# Patient Record
Sex: FEMALE | Race: WHITE | NOT HISPANIC OR LATINO | Employment: OTHER | URBAN - METROPOLITAN AREA
[De-identification: names, ages, dates, MRNs, and addresses within clinical notes are randomized per-mention and may not be internally consistent; named-entity substitution may affect disease eponyms.]

---

## 2017-01-14 ENCOUNTER — HOSPITAL ENCOUNTER (EMERGENCY)
Facility: HOSPITAL | Age: 22
Discharge: HOME | End: 2017-01-14
Attending: EMERGENCY MEDICINE
Payer: MEDICARE

## 2017-01-14 VITALS
DIASTOLIC BLOOD PRESSURE: 74 MMHG | BODY MASS INDEX: 34.33 KG/M2 | HEART RATE: 104 BPM | OXYGEN SATURATION: 100 % | TEMPERATURE: 98 F | SYSTOLIC BLOOD PRESSURE: 130 MMHG | WEIGHT: 200 LBS | RESPIRATION RATE: 20 BRPM

## 2017-01-14 DIAGNOSIS — M54.50 LOW BACK PAIN: Primary | ICD-10-CM

## 2017-01-14 PROCEDURE — 99283 EMERGENCY DEPT VISIT LOW MDM: CPT

## 2017-01-14 RX ORDER — PREDNISONE 10 MG/1
TABLET ORAL
Qty: 30 TABLET | Refills: 0 | Status: SHIPPED | OUTPATIENT
Start: 2017-01-14 | End: 2017-04-01 | Stop reason: ALTCHOICE

## 2017-01-14 RX ORDER — ACETAMINOPHEN 325 MG/1
650 TABLET ORAL ONCE
Status: COMPLETED | OUTPATIENT
Start: 2017-01-14 | End: 2017-01-14

## 2017-01-14 RX ADMIN — PREDNISONE 50 MG: 20 TABLET ORAL at 20:35

## 2017-01-14 RX ADMIN — ACETAMINOPHEN 650 MG: 325 TABLET, FILM COATED ORAL at 20:35

## 2017-01-24 ENCOUNTER — APPOINTMENT (EMERGENCY)
Dept: RADIOLOGY | Facility: HOSPITAL | Age: 22
End: 2017-01-24
Payer: MEDICARE

## 2017-01-24 ENCOUNTER — HOSPITAL ENCOUNTER (EMERGENCY)
Facility: HOSPITAL | Age: 22
Discharge: HOME | End: 2017-01-24
Attending: EMERGENCY MEDICINE
Payer: MEDICARE

## 2017-01-24 VITALS
RESPIRATION RATE: 20 BRPM | SYSTOLIC BLOOD PRESSURE: 136 MMHG | WEIGHT: 214 LBS | DIASTOLIC BLOOD PRESSURE: 74 MMHG | TEMPERATURE: 98.8 F | HEART RATE: 112 BPM | HEIGHT: 64 IN | OXYGEN SATURATION: 98 % | BODY MASS INDEX: 36.54 KG/M2

## 2017-01-24 DIAGNOSIS — M54.30 SCIATICA: Primary | ICD-10-CM

## 2017-01-24 LAB
CLARITY, POC: CLEAR
COLOR, POC: YELLOW
EXT BLOOD URINE: 50
EXT GLUCOSE, UA: 50
EXT KETONES: 15
EXT NITRITE, UA: NEGATIVE
EXT PH, UA: 5
EXT PROTEIN, UA: NEGATIVE
HCG UR QL: NORMAL
WBC # BLD EST: 25 10*3/UL

## 2017-01-24 PROCEDURE — 81025 URINE PREGNANCY TEST: CPT | Performed by: EMERGENCY MEDICINE

## 2017-01-24 PROCEDURE — 81002 URINALYSIS NONAUTO W/O SCOPE: CPT | Performed by: EMERGENCY MEDICINE

## 2017-01-24 PROCEDURE — 99283 EMERGENCY DEPT VISIT LOW MDM: CPT

## 2017-01-24 PROCEDURE — 74022 RADEX COMPL AQT ABD SERIES: CPT

## 2017-01-24 RX ORDER — CYCLOBENZAPRINE HCL 10 MG
5 TABLET ORAL 3 TIMES DAILY PRN
Qty: 9 TABLET | Refills: 0 | Status: SHIPPED | OUTPATIENT
Start: 2017-01-24 | End: 2017-08-06 | Stop reason: ALTCHOICE

## 2017-01-24 RX ORDER — CYCLOBENZAPRINE HCL 10 MG
10 TABLET ORAL ONCE
Status: COMPLETED | OUTPATIENT
Start: 2017-01-24 | End: 2017-01-24

## 2017-01-24 RX ORDER — NAPROXEN 500 MG/1
500 TABLET ORAL 2 TIMES DAILY WITH MEALS
Qty: 10 TABLET | Refills: 0 | Status: SHIPPED | OUTPATIENT
Start: 2017-01-24 | End: 2017-08-06 | Stop reason: ALTCHOICE

## 2017-01-24 RX ORDER — NAPROXEN 500 MG/1
500 TABLET ORAL ONCE
Status: COMPLETED | OUTPATIENT
Start: 2017-01-24 | End: 2017-01-24

## 2017-01-24 RX ADMIN — NAPROXEN 500 MG: 500 TABLET ORAL at 23:24

## 2017-01-24 RX ADMIN — CYCLOBENZAPRINE HYDROCHLORIDE 10 MG: 10 TABLET, FILM COATED ORAL at 23:30

## 2017-02-01 ENCOUNTER — GENERIC CONVERSION - ENCOUNTER (OUTPATIENT)
Dept: OTHER | Facility: OTHER | Age: 22
End: 2017-02-01

## 2017-02-17 ENCOUNTER — GENERIC CONVERSION - ENCOUNTER (OUTPATIENT)
Dept: OTHER | Facility: OTHER | Age: 22
End: 2017-02-17

## 2017-03-03 ENCOUNTER — HOSPITAL ENCOUNTER (EMERGENCY)
Facility: HOSPITAL | Age: 22
Discharge: HOME | End: 2017-03-03
Admitting: EMERGENCY MEDICINE
Payer: MEDICARE

## 2017-03-03 ENCOUNTER — APPOINTMENT (EMERGENCY)
Dept: RADIOLOGY | Facility: HOSPITAL | Age: 22
End: 2017-03-03
Payer: MEDICARE

## 2017-03-03 VITALS
HEIGHT: 64 IN | OXYGEN SATURATION: 98 % | SYSTOLIC BLOOD PRESSURE: 135 MMHG | DIASTOLIC BLOOD PRESSURE: 75 MMHG | HEART RATE: 118 BPM | TEMPERATURE: 98.3 F | BODY MASS INDEX: 33.12 KG/M2 | WEIGHT: 194 LBS | RESPIRATION RATE: 16 BRPM

## 2017-03-03 DIAGNOSIS — R06.02 SOB (SHORTNESS OF BREATH): Primary | ICD-10-CM

## 2017-03-03 PROCEDURE — 71020 HB CHEST X-RAY 2VW FRONTAL&LATL: CPT

## 2017-03-03 PROCEDURE — 99285 EMERGENCY DEPT VISIT HI MDM: CPT

## 2017-03-03 PROCEDURE — 94640 AIRWAY INHALATION TREATMENT: CPT

## 2017-03-03 RX ORDER — IPRATROPIUM BROMIDE AND ALBUTEROL SULFATE 2.5; .5 MG/3ML; MG/3ML
3 SOLUTION RESPIRATORY (INHALATION)
Status: DISCONTINUED | OUTPATIENT
Start: 2017-03-03 | End: 2017-03-03

## 2017-03-03 RX ORDER — IPRATROPIUM BROMIDE AND ALBUTEROL SULFATE 2.5; .5 MG/3ML; MG/3ML
3 SOLUTION RESPIRATORY (INHALATION) ONCE
Status: COMPLETED | OUTPATIENT
Start: 2017-03-03 | End: 2017-03-03

## 2017-03-03 RX ORDER — ALBUTEROL SULFATE 90 UG/1
2 AEROSOL, METERED RESPIRATORY (INHALATION) EVERY 6 HOURS PRN
Qty: 1 INHALER | Refills: 0 | Status: SHIPPED | OUTPATIENT
Start: 2017-03-03 | End: 2017-04-02

## 2017-03-03 RX ADMIN — IPRATROPIUM BROMIDE AND ALBUTEROL SULFATE 3 ML: .5; 3 SOLUTION RESPIRATORY (INHALATION) at 18:14

## 2017-03-07 ENCOUNTER — ALLSCRIPTS OFFICE VISIT (OUTPATIENT)
Dept: OTHER | Facility: OTHER | Age: 22
End: 2017-03-07

## 2017-03-10 ENCOUNTER — APPOINTMENT (EMERGENCY)
Dept: RADIOLOGY | Facility: HOSPITAL | Age: 22
End: 2017-03-10
Payer: MEDICARE

## 2017-03-10 ENCOUNTER — HOSPITAL ENCOUNTER (EMERGENCY)
Facility: HOSPITAL | Age: 22
Discharge: HOME | End: 2017-03-10
Attending: EMERGENCY MEDICINE | Admitting: EMERGENCY MEDICINE
Payer: MEDICARE

## 2017-03-10 VITALS
WEIGHT: 219 LBS | BODY MASS INDEX: 37.39 KG/M2 | OXYGEN SATURATION: 97 % | HEIGHT: 64 IN | HEART RATE: 110 BPM | RESPIRATION RATE: 16 BRPM | DIASTOLIC BLOOD PRESSURE: 70 MMHG | TEMPERATURE: 97.8 F | SYSTOLIC BLOOD PRESSURE: 128 MMHG

## 2017-03-10 DIAGNOSIS — R11.10 VOMITING: Primary | ICD-10-CM

## 2017-03-10 LAB
ANION GAP SERPL CALCULATED.3IONS-SCNC: 10 MMOL/L (ref 4–13)
BACTERIA UR QL AUTO: ABNORMAL /HPF
BASOPHILS # BLD AUTO: 0 THOUSANDS/ΜL (ref 0–0.1)
BASOPHILS NFR BLD AUTO: 1 % (ref 0–1)
BILIRUB UR QL STRIP: NEGATIVE
BUN SERPL-MCNC: 11 MG/DL (ref 5–25)
CALCIUM SERPL-MCNC: 8.1 MG/DL (ref 8.3–10.1)
CHLORIDE SERPL-SCNC: 105 MMOL/L (ref 100–108)
CLARITY UR: ABNORMAL
CO2 SERPL-SCNC: 27 MMOL/L (ref 21–32)
COLOR UR: YELLOW
CREAT SERPL-MCNC: 1.03 MG/DL (ref 0.6–1.3)
EOSINOPHIL # BLD AUTO: 0.2 THOUSAND/ΜL (ref 0–0.61)
EOSINOPHIL NFR BLD AUTO: 2 % (ref 0–6)
ERYTHROCYTE [DISTWIDTH] IN BLOOD BY AUTOMATED COUNT: 13.7 % (ref 11.6–15.1)
GFR SERPL CREATININE-BSD FRML MDRD: >60 ML/MIN/1.73SQ M
GLUCOSE SERPL-MCNC: 92 MG/DL (ref 65–140)
GLUCOSE UR STRIP-MCNC: NEGATIVE MG/DL
HCG UR QL: NORMAL
HCT VFR BLD AUTO: 38.2 % (ref 37–47)
HGB BLD-MCNC: 12.8 G/DL (ref 12–16)
HGB UR QL STRIP.AUTO: ABNORMAL
KETONES UR STRIP-MCNC: NEGATIVE MG/DL
LEUKOCYTE ESTERASE UR QL STRIP: NEGATIVE
LYMPHOCYTES # BLD AUTO: 2.5 THOUSANDS/ΜL (ref 0.6–4.47)
LYMPHOCYTES NFR BLD AUTO: 33 % (ref 14–44)
MCH RBC QN AUTO: 29.5 PG (ref 27–31)
MCHC RBC AUTO-ENTMCNC: 33.4 G/DL (ref 31.4–37.4)
MCV RBC AUTO: 88 FL (ref 82–98)
MONOCYTES # BLD AUTO: 1 THOUSAND/ΜL (ref 0.17–1.22)
MONOCYTES NFR BLD AUTO: 14 % (ref 4–12)
NEUTROPHILS # BLD AUTO: 3.8 THOUSANDS/ΜL (ref 1.85–7.62)
NEUTS SEG NFR BLD AUTO: 51 % (ref 43–75)
NITRITE UR QL STRIP: NEGATIVE
NON-SQ EPI CELLS URNS QL MICRO: ABNORMAL /HPF
NRBC BLD AUTO-RTO: 0 /100 WBCS
PH UR STRIP.AUTO: 7 [PH] (ref 5–9)
PLATELET # BLD AUTO: 345 THOUSANDS/UL (ref 130–400)
PMV BLD AUTO: 7 FL (ref 8.9–12.7)
POTASSIUM SERPL-SCNC: 3.8 MMOL/L (ref 3.5–5.3)
PROT UR STRIP-MCNC: NEGATIVE MG/DL
RBC # BLD AUTO: 4.33 MILLION/UL (ref 4.2–5.4)
RBC #/AREA URNS AUTO: ABNORMAL /HPF
SODIUM SERPL-SCNC: 142 MMOL/L (ref 136–145)
SP GR UR STRIP.AUTO: 1.02 (ref 1–1.03)
UROBILINOGEN UR QL STRIP.AUTO: 0.2 E.U./DL
WBC # BLD AUTO: 7.5 THOUSAND/UL (ref 4.8–10.8)
WBC #/AREA URNS AUTO: ABNORMAL /HPF

## 2017-03-10 PROCEDURE — 74022 RADEX COMPL AQT ABD SERIES: CPT

## 2017-03-10 PROCEDURE — 36415 COLL VENOUS BLD VENIPUNCTURE: CPT | Performed by: EMERGENCY MEDICINE

## 2017-03-10 PROCEDURE — 99285 EMERGENCY DEPT VISIT HI MDM: CPT

## 2017-03-10 PROCEDURE — 80048 BASIC METABOLIC PNL TOTAL CA: CPT | Performed by: EMERGENCY MEDICINE

## 2017-03-10 PROCEDURE — 81025 URINE PREGNANCY TEST: CPT | Performed by: EMERGENCY MEDICINE

## 2017-03-10 PROCEDURE — 85025 COMPLETE CBC W/AUTO DIFF WBC: CPT | Performed by: EMERGENCY MEDICINE

## 2017-03-10 PROCEDURE — 81001 URINALYSIS AUTO W/SCOPE: CPT | Performed by: EMERGENCY MEDICINE

## 2017-03-10 RX ORDER — ONDANSETRON 4 MG/1
4 TABLET, FILM COATED ORAL EVERY 6 HOURS
Qty: 12 TABLET | Refills: 0 | Status: SHIPPED | OUTPATIENT
Start: 2017-03-10 | End: 2017-08-06 | Stop reason: ALTCHOICE

## 2017-04-01 ENCOUNTER — APPOINTMENT (EMERGENCY)
Dept: RADIOLOGY | Facility: HOSPITAL | Age: 22
End: 2017-04-01
Payer: MEDICARE

## 2017-04-01 ENCOUNTER — HOSPITAL ENCOUNTER (EMERGENCY)
Facility: HOSPITAL | Age: 22
Discharge: HOME | End: 2017-04-01
Attending: EMERGENCY MEDICINE
Payer: MEDICARE

## 2017-04-01 ENCOUNTER — GENERIC CONVERSION - ENCOUNTER (OUTPATIENT)
Dept: OTHER | Facility: OTHER | Age: 22
End: 2017-04-01

## 2017-04-01 VITALS
RESPIRATION RATE: 16 BRPM | SYSTOLIC BLOOD PRESSURE: 141 MMHG | WEIGHT: 210 LBS | TEMPERATURE: 97.9 F | OXYGEN SATURATION: 99 % | DIASTOLIC BLOOD PRESSURE: 63 MMHG | HEIGHT: 64 IN | HEART RATE: 102 BPM | BODY MASS INDEX: 35.85 KG/M2

## 2017-04-01 DIAGNOSIS — K62.5 RECTAL BLEEDING: Primary | ICD-10-CM

## 2017-04-01 LAB
ANION GAP SERPL CALCULATED.3IONS-SCNC: 7 MMOL/L (ref 4–13)
BACTERIA UR QL AUTO: ABNORMAL /HPF
BASOPHILS # BLD AUTO: 0.1 THOUSANDS/ΜL (ref 0–0.1)
BASOPHILS NFR BLD AUTO: 1 % (ref 0–1)
BILIRUB UR QL STRIP: NEGATIVE
BUN SERPL-MCNC: 9 MG/DL (ref 5–25)
CALCIUM SERPL-MCNC: 8.6 MG/DL (ref 8.3–10.1)
CHLORIDE SERPL-SCNC: 105 MMOL/L (ref 100–108)
CLARITY UR: ABNORMAL
CO2 SERPL-SCNC: 26 MMOL/L (ref 21–32)
COLOR UR: YELLOW
CREAT SERPL-MCNC: 0.8 MG/DL (ref 0.6–1.3)
EOSINOPHIL # BLD AUTO: 0.4 THOUSAND/ΜL (ref 0–0.61)
EOSINOPHIL NFR BLD AUTO: 5 % (ref 0–6)
ERYTHROCYTE [DISTWIDTH] IN BLOOD BY AUTOMATED COUNT: 13.8 % (ref 11.6–15.1)
GFR SERPL CREATININE-BSD FRML MDRD: >60 ML/MIN/1.73SQ M
GLUCOSE SERPL-MCNC: 95 MG/DL (ref 65–140)
GLUCOSE UR STRIP-MCNC: NEGATIVE MG/DL
HCG UR QL: NEGATIVE
HCT VFR BLD AUTO: 37.9 % (ref 37–47)
HGB BLD-MCNC: 12.9 G/DL (ref 12–16)
HGB UR QL STRIP.AUTO: ABNORMAL
KETONES UR STRIP-MCNC: NEGATIVE MG/DL
LEUKOCYTE ESTERASE UR QL STRIP: ABNORMAL
LYMPHOCYTES # BLD AUTO: 4 THOUSANDS/ΜL (ref 0.6–4.47)
LYMPHOCYTES NFR BLD AUTO: 42 % (ref 14–44)
MCH RBC QN AUTO: 30.1 PG (ref 27–31)
MCHC RBC AUTO-ENTMCNC: 33.9 G/DL (ref 31.4–37.4)
MCV RBC AUTO: 89 FL (ref 82–98)
MONOCYTES # BLD AUTO: 0.9 THOUSAND/ΜL (ref 0.17–1.22)
MONOCYTES NFR BLD AUTO: 10 % (ref 4–12)
MUCOUS THREADS UR QL AUTO: ABNORMAL
NEUTROPHILS # BLD AUTO: 4 THOUSANDS/ΜL (ref 1.85–7.62)
NEUTS SEG NFR BLD AUTO: 43 % (ref 43–75)
NITRITE UR QL STRIP: NEGATIVE
NON-SQ EPI CELLS URNS QL MICRO: ABNORMAL /HPF
NRBC BLD AUTO-RTO: 0 /100 WBCS
PH UR STRIP.AUTO: 6.5 [PH] (ref 5–9)
PLATELET # BLD AUTO: 365 THOUSANDS/UL (ref 130–400)
PMV BLD AUTO: 7.4 FL (ref 8.9–12.7)
POTASSIUM SERPL-SCNC: 5.3 MMOL/L (ref 3.5–5.3)
PROT UR STRIP-MCNC: NEGATIVE MG/DL
RBC # BLD AUTO: 4.27 MILLION/UL (ref 4.2–5.4)
RBC #/AREA URNS AUTO: ABNORMAL /HPF
SODIUM SERPL-SCNC: 138 MMOL/L (ref 136–145)
SP GR UR STRIP.AUTO: 1.02 (ref 1–1.03)
UROBILINOGEN UR QL STRIP.AUTO: 0.2 E.U./DL
WBC # BLD AUTO: 9.5 THOUSAND/UL (ref 4.8–10.8)
WBC #/AREA URNS AUTO: ABNORMAL /HPF

## 2017-04-01 PROCEDURE — 99284 EMERGENCY DEPT VISIT MOD MDM: CPT

## 2017-04-01 PROCEDURE — 74177 CT ABD & PELVIS W/CONTRAST: CPT

## 2017-04-01 PROCEDURE — 96361 HYDRATE IV INFUSION ADD-ON: CPT

## 2017-04-01 PROCEDURE — 96360 HYDRATION IV INFUSION INIT: CPT

## 2017-04-01 PROCEDURE — 81001 URINALYSIS AUTO W/SCOPE: CPT | Performed by: EMERGENCY MEDICINE

## 2017-04-01 PROCEDURE — 81025 URINE PREGNANCY TEST: CPT | Performed by: EMERGENCY MEDICINE

## 2017-04-01 PROCEDURE — 36415 COLL VENOUS BLD VENIPUNCTURE: CPT | Performed by: EMERGENCY MEDICINE

## 2017-04-01 PROCEDURE — 87086 URINE CULTURE/COLONY COUNT: CPT | Performed by: EMERGENCY MEDICINE

## 2017-04-01 PROCEDURE — 80048 BASIC METABOLIC PNL TOTAL CA: CPT | Performed by: EMERGENCY MEDICINE

## 2017-04-01 PROCEDURE — 85025 COMPLETE CBC W/AUTO DIFF WBC: CPT | Performed by: EMERGENCY MEDICINE

## 2017-04-01 RX ORDER — IBUPROFEN 600 MG/1
600 TABLET ORAL ONCE
Status: COMPLETED | OUTPATIENT
Start: 2017-04-01 | End: 2017-04-01

## 2017-04-01 RX ADMIN — IOHEXOL 100 ML: 350 INJECTION, SOLUTION INTRAVENOUS at 21:20

## 2017-04-01 RX ADMIN — IBUPROFEN 600 MG: 600 TABLET, FILM COATED ORAL at 22:12

## 2017-04-01 RX ADMIN — SODIUM CHLORIDE 1000 ML: 0.9 INJECTION, SOLUTION INTRAVENOUS at 20:20

## 2017-04-02 LAB — BACTERIA UR CULT: NORMAL

## 2017-04-13 ENCOUNTER — ALLSCRIPTS OFFICE VISIT (OUTPATIENT)
Dept: OTHER | Facility: OTHER | Age: 22
End: 2017-04-13

## 2017-04-20 ENCOUNTER — ALLSCRIPTS OFFICE VISIT (OUTPATIENT)
Dept: OTHER | Facility: OTHER | Age: 22
End: 2017-04-20

## 2017-04-20 ENCOUNTER — HOSPITAL ENCOUNTER (EMERGENCY)
Facility: HOSPITAL | Age: 22
Discharge: HOME | End: 2017-04-20
Attending: EMERGENCY MEDICINE | Admitting: EMERGENCY MEDICINE
Payer: MEDICARE

## 2017-04-20 ENCOUNTER — APPOINTMENT (EMERGENCY)
Dept: RADIOLOGY | Facility: HOSPITAL | Age: 22
End: 2017-04-20
Payer: MEDICARE

## 2017-04-20 VITALS
HEART RATE: 103 BPM | BODY MASS INDEX: 41.66 KG/M2 | WEIGHT: 244 LBS | SYSTOLIC BLOOD PRESSURE: 146 MMHG | OXYGEN SATURATION: 98 % | HEIGHT: 64 IN | TEMPERATURE: 98.6 F | RESPIRATION RATE: 16 BRPM | DIASTOLIC BLOOD PRESSURE: 76 MMHG

## 2017-04-20 DIAGNOSIS — S50.01XA CONTUSION OF RIGHT ELBOW, INITIAL ENCOUNTER: Primary | ICD-10-CM

## 2017-04-20 PROCEDURE — 99283 EMERGENCY DEPT VISIT LOW MDM: CPT

## 2017-04-20 PROCEDURE — 73080 X-RAY EXAM OF ELBOW: CPT

## 2017-04-20 RX ORDER — HYDROCODONE BITARTRATE AND ACETAMINOPHEN 5; 325 MG/1; MG/1
1 TABLET ORAL ONCE
Status: COMPLETED | OUTPATIENT
Start: 2017-04-20 | End: 2017-04-20

## 2017-04-20 RX ORDER — ACETAMINOPHEN AND CODEINE PHOSPHATE 300; 30 MG/1; MG/1
1 TABLET ORAL EVERY 6 HOURS PRN
Qty: 6 TABLET | Refills: 0 | Status: SHIPPED | OUTPATIENT
Start: 2017-04-20 | End: 2017-04-22

## 2017-04-20 RX ORDER — AMOXICILLIN AND CLAVULANATE POTASSIUM 875; 125 MG/1; MG/1
1 TABLET, FILM COATED ORAL 2 TIMES DAILY
COMMUNITY
End: 2017-05-11 | Stop reason: ALTCHOICE

## 2017-04-20 RX ADMIN — HYDROCODONE BITARTRATE AND ACETAMINOPHEN 1 TABLET: 5; 325 TABLET ORAL at 19:55

## 2017-04-25 ENCOUNTER — GENERIC CONVERSION - ENCOUNTER (OUTPATIENT)
Dept: OTHER | Facility: OTHER | Age: 22
End: 2017-04-25

## 2017-04-27 ENCOUNTER — ALLSCRIPTS OFFICE VISIT (OUTPATIENT)
Dept: OTHER | Facility: OTHER | Age: 22
End: 2017-04-27

## 2017-04-27 DIAGNOSIS — S50.01XA CONTUSION OF RIGHT ELBOW: ICD-10-CM

## 2017-04-27 DIAGNOSIS — M25.521 PAIN IN RIGHT ELBOW: ICD-10-CM

## 2017-05-02 ENCOUNTER — GENERIC CONVERSION - ENCOUNTER (OUTPATIENT)
Dept: OTHER | Facility: OTHER | Age: 22
End: 2017-05-02

## 2017-05-02 ENCOUNTER — APPOINTMENT (OUTPATIENT)
Dept: OCCUPATIONAL THERAPY | Facility: CLINIC | Age: 22
End: 2017-05-02
Payer: MEDICARE

## 2017-05-02 PROCEDURE — G8985 CARRY GOAL STATUS: HCPCS

## 2017-05-02 PROCEDURE — 97165 OT EVAL LOW COMPLEX 30 MIN: CPT

## 2017-05-02 PROCEDURE — G8984 CARRY CURRENT STATUS: HCPCS

## 2017-05-04 ENCOUNTER — ALLSCRIPTS OFFICE VISIT (OUTPATIENT)
Dept: OTHER | Facility: OTHER | Age: 22
End: 2017-05-04

## 2017-05-09 ENCOUNTER — APPOINTMENT (OUTPATIENT)
Dept: OCCUPATIONAL THERAPY | Facility: CLINIC | Age: 22
End: 2017-05-09
Payer: MEDICARE

## 2017-05-09 PROCEDURE — 97140 MANUAL THERAPY 1/> REGIONS: CPT

## 2017-05-09 PROCEDURE — 97110 THERAPEUTIC EXERCISES: CPT

## 2017-05-11 ENCOUNTER — APPOINTMENT (EMERGENCY)
Dept: RADIOLOGY | Facility: HOSPITAL | Age: 22
End: 2017-05-11
Payer: MEDICARE

## 2017-05-11 ENCOUNTER — HOSPITAL ENCOUNTER (EMERGENCY)
Facility: HOSPITAL | Age: 22
Discharge: HOME | End: 2017-05-11
Payer: MEDICARE

## 2017-05-11 ENCOUNTER — ALLSCRIPTS OFFICE VISIT (OUTPATIENT)
Dept: OTHER | Facility: OTHER | Age: 22
End: 2017-05-11

## 2017-05-11 VITALS
HEART RATE: 98 BPM | SYSTOLIC BLOOD PRESSURE: 116 MMHG | HEIGHT: 64 IN | OXYGEN SATURATION: 97 % | RESPIRATION RATE: 18 BRPM | BODY MASS INDEX: 47.29 KG/M2 | TEMPERATURE: 97.4 F | DIASTOLIC BLOOD PRESSURE: 59 MMHG | WEIGHT: 277 LBS

## 2017-05-11 DIAGNOSIS — M25.562 LEFT KNEE PAIN: Primary | ICD-10-CM

## 2017-05-11 PROCEDURE — 73560 X-RAY EXAM OF KNEE 1 OR 2: CPT

## 2017-05-11 PROCEDURE — 99284 EMERGENCY DEPT VISIT MOD MDM: CPT

## 2017-05-11 RX ORDER — IBUPROFEN 400 MG/1
400 TABLET ORAL ONCE
Status: COMPLETED | OUTPATIENT
Start: 2017-05-11 | End: 2017-05-11

## 2017-05-11 RX ADMIN — IBUPROFEN 400 MG: 400 TABLET ORAL at 16:17

## 2017-05-12 ENCOUNTER — TRANSCRIBE ORDERS (OUTPATIENT)
Dept: ADMINISTRATIVE | Facility: HOSPITAL | Age: 22
End: 2017-05-12

## 2017-05-12 DIAGNOSIS — M94.262 CHONDROMALACIA OF LEFT KNEE: Primary | ICD-10-CM

## 2017-05-13 ENCOUNTER — HOSPITAL ENCOUNTER (EMERGENCY)
Facility: HOSPITAL | Age: 22
Discharge: HOME | End: 2017-05-13
Admitting: EMERGENCY MEDICINE
Payer: MEDICARE

## 2017-05-13 VITALS
BODY MASS INDEX: 47.29 KG/M2 | TEMPERATURE: 98.2 F | OXYGEN SATURATION: 98 % | WEIGHT: 277 LBS | HEART RATE: 93 BPM | HEIGHT: 64 IN | SYSTOLIC BLOOD PRESSURE: 127 MMHG | RESPIRATION RATE: 18 BRPM | DIASTOLIC BLOOD PRESSURE: 72 MMHG

## 2017-05-13 DIAGNOSIS — M25.562 LEFT LATERAL KNEE PAIN: Primary | ICD-10-CM

## 2017-05-13 PROCEDURE — 99283 EMERGENCY DEPT VISIT LOW MDM: CPT

## 2017-05-13 RX ORDER — GUAIFENESIN 600 MG
600 TABLET, EXTENDED RELEASE 12 HR ORAL ONCE
Status: COMPLETED | OUTPATIENT
Start: 2017-05-13 | End: 2017-05-13

## 2017-05-13 RX ORDER — PREDNISONE 50 MG/1
50 TABLET ORAL DAILY
Qty: 4 TABLET | Refills: 0 | Status: SHIPPED | OUTPATIENT
Start: 2017-05-13 | End: 2017-05-17

## 2017-05-13 RX ADMIN — PREDNISONE 50 MG: 20 TABLET ORAL at 15:49

## 2017-05-13 RX ADMIN — GUAIFENESIN 600 MG: 600 TABLET, EXTENDED RELEASE ORAL at 15:49

## 2017-05-15 ENCOUNTER — APPOINTMENT (OUTPATIENT)
Dept: OCCUPATIONAL THERAPY | Facility: CLINIC | Age: 22
End: 2017-05-15
Payer: MEDICARE

## 2017-05-15 PROCEDURE — 97140 MANUAL THERAPY 1/> REGIONS: CPT

## 2017-05-15 PROCEDURE — 97035 APP MDLTY 1+ULTRASOUND EA 15: CPT

## 2017-05-17 ENCOUNTER — APPOINTMENT (OUTPATIENT)
Dept: OCCUPATIONAL THERAPY | Facility: CLINIC | Age: 22
End: 2017-05-17
Payer: MEDICARE

## 2017-05-19 ENCOUNTER — HOSPITAL ENCOUNTER (EMERGENCY)
Facility: HOSPITAL | Age: 22
Discharge: HOME | End: 2017-05-19
Attending: EMERGENCY MEDICINE
Payer: COMMERCIAL

## 2017-05-19 ENCOUNTER — HOSPITAL ENCOUNTER (OUTPATIENT)
Dept: RADIOLOGY | Facility: HOSPITAL | Age: 22
Discharge: HOME | End: 2017-05-19
Attending: ORTHOPAEDIC SURGERY
Payer: COMMERCIAL

## 2017-05-19 VITALS
WEIGHT: 277 LBS | HEIGHT: 64 IN | SYSTOLIC BLOOD PRESSURE: 115 MMHG | OXYGEN SATURATION: 98 % | TEMPERATURE: 96.9 F | HEART RATE: 100 BPM | RESPIRATION RATE: 20 BRPM | BODY MASS INDEX: 47.29 KG/M2 | DIASTOLIC BLOOD PRESSURE: 76 MMHG

## 2017-05-19 DIAGNOSIS — M94.262 CHONDROMALACIA OF LEFT KNEE: ICD-10-CM

## 2017-05-19 DIAGNOSIS — M71.22 BAKER'S CYST OF KNEE, LEFT: Primary | ICD-10-CM

## 2017-05-19 PROCEDURE — 99283 EMERGENCY DEPT VISIT LOW MDM: CPT

## 2017-05-19 PROCEDURE — 73721 MRI JNT OF LWR EXTRE W/O DYE: CPT

## 2017-05-19 RX ORDER — IBUPROFEN 800 MG/1
800 TABLET ORAL 3 TIMES DAILY
Qty: 90 TABLET | Refills: 0 | Status: SHIPPED | OUTPATIENT
Start: 2017-05-19 | End: 2017-09-14

## 2017-05-22 ENCOUNTER — APPOINTMENT (OUTPATIENT)
Dept: OCCUPATIONAL THERAPY | Facility: CLINIC | Age: 22
End: 2017-05-22
Payer: MEDICARE

## 2017-05-22 PROCEDURE — 97110 THERAPEUTIC EXERCISES: CPT

## 2017-05-24 ENCOUNTER — APPOINTMENT (OUTPATIENT)
Dept: OCCUPATIONAL THERAPY | Facility: CLINIC | Age: 22
End: 2017-05-24
Payer: MEDICARE

## 2017-05-30 ENCOUNTER — ALLSCRIPTS OFFICE VISIT (OUTPATIENT)
Dept: OTHER | Facility: OTHER | Age: 22
End: 2017-05-30

## 2017-05-30 DIAGNOSIS — M25.561 PAIN IN RIGHT KNEE: ICD-10-CM

## 2017-05-30 DIAGNOSIS — M25.562 PAIN IN LEFT KNEE: ICD-10-CM

## 2017-06-06 ENCOUNTER — GENERIC CONVERSION - ENCOUNTER (OUTPATIENT)
Dept: OTHER | Facility: OTHER | Age: 22
End: 2017-06-06

## 2017-06-13 ENCOUNTER — APPOINTMENT (EMERGENCY)
Dept: RADIOLOGY | Facility: HOSPITAL | Age: 22
End: 2017-06-13
Payer: MEDICARE

## 2017-06-13 ENCOUNTER — HOSPITAL ENCOUNTER (EMERGENCY)
Facility: HOSPITAL | Age: 22
Discharge: HOME | End: 2017-06-14
Attending: EMERGENCY MEDICINE
Payer: MEDICARE

## 2017-06-13 VITALS
HEART RATE: 113 BPM | OXYGEN SATURATION: 98 % | TEMPERATURE: 98.4 F | WEIGHT: 266 LBS | HEIGHT: 64 IN | SYSTOLIC BLOOD PRESSURE: 131 MMHG | BODY MASS INDEX: 45.41 KG/M2 | RESPIRATION RATE: 17 BRPM | DIASTOLIC BLOOD PRESSURE: 71 MMHG

## 2017-06-13 DIAGNOSIS — R07.9 CHEST PAIN: Primary | ICD-10-CM

## 2017-06-13 DIAGNOSIS — R00.0 TACHYCARDIA: ICD-10-CM

## 2017-06-13 PROCEDURE — 93005 ELECTROCARDIOGRAM TRACING: CPT | Performed by: EMERGENCY MEDICINE

## 2017-06-13 PROCEDURE — 71010 HB CHEST X-RAY 1 VIEW FRONTAL (PORTABLE): CPT

## 2017-06-14 LAB
ALBUMIN SERPL BCP-MCNC: 2.9 G/DL (ref 3.5–5)
ALP SERPL-CCNC: 48 U/L (ref 46–116)
ALT SERPL W P-5'-P-CCNC: 24 U/L (ref 12–78)
ANION GAP SERPL CALCULATED.3IONS-SCNC: 12 MMOL/L (ref 4–13)
AST SERPL W P-5'-P-CCNC: 26 U/L (ref 5–45)
BASOPHILS # BLD AUTO: 0.1 THOUSANDS/ΜL (ref 0–0.1)
BASOPHILS NFR BLD AUTO: 1 % (ref 0–1)
BILIRUB SERPL-MCNC: 0.2 MG/DL (ref 0.2–1)
BUN SERPL-MCNC: 12 MG/DL (ref 5–25)
CALCIUM SERPL-MCNC: 8.4 MG/DL (ref 8.3–10.1)
CHLORIDE SERPL-SCNC: 103 MMOL/L (ref 100–108)
CO2 SERPL-SCNC: 23 MMOL/L (ref 21–32)
CREAT SERPL-MCNC: 1.14 MG/DL (ref 0.6–1.3)
EOSINOPHIL # BLD AUTO: 0.3 THOUSAND/ΜL (ref 0–0.61)
EOSINOPHIL NFR BLD AUTO: 2 % (ref 0–6)
ERYTHROCYTE [DISTWIDTH] IN BLOOD BY AUTOMATED COUNT: 12.9 % (ref 11.6–15.1)
GFR SERPL CREATININE-BSD FRML MDRD: >60 ML/MIN/1.73SQ M
GLUCOSE SERPL-MCNC: 92 MG/DL (ref 65–140)
HCT VFR BLD AUTO: 36.7 % (ref 37–47)
HGB BLD-MCNC: 12.4 G/DL (ref 12–16)
LYMPHOCYTES # BLD AUTO: 3.5 THOUSANDS/ΜL (ref 0.6–4.47)
LYMPHOCYTES NFR BLD AUTO: 28 % (ref 14–44)
MCH RBC QN AUTO: 30.1 PG (ref 27–31)
MCHC RBC AUTO-ENTMCNC: 33.8 G/DL (ref 31.4–37.4)
MCV RBC AUTO: 89 FL (ref 82–98)
MONOCYTES # BLD AUTO: 0.8 THOUSAND/ΜL (ref 0.17–1.22)
MONOCYTES NFR BLD AUTO: 6 % (ref 4–12)
NEUTROPHILS # BLD AUTO: 8 THOUSANDS/ΜL (ref 1.85–7.62)
NEUTS SEG NFR BLD AUTO: 63 % (ref 43–75)
NRBC BLD AUTO-RTO: 0 /100 WBCS
PLATELET # BLD AUTO: 322 THOUSANDS/UL (ref 130–400)
PMV BLD AUTO: 7.3 FL (ref 8.9–12.7)
POTASSIUM SERPL-SCNC: 4.4 MMOL/L (ref 3.5–5.3)
PROT SERPL-MCNC: 6.9 G/DL (ref 6.4–8.2)
RBC # BLD AUTO: 4.12 MILLION/UL (ref 4.2–5.4)
SODIUM SERPL-SCNC: 138 MMOL/L (ref 136–145)
TROPONIN I SERPL-MCNC: <0.02 NG/ML
TSH SERPL DL<=0.05 MIU/L-ACNC: 4.04 UIU/ML (ref 0.46–3.98)
WBC # BLD AUTO: 12.7 THOUSAND/UL (ref 4.8–10.8)

## 2017-06-14 PROCEDURE — 80053 COMPREHEN METABOLIC PANEL: CPT | Performed by: EMERGENCY MEDICINE

## 2017-06-14 PROCEDURE — 85025 COMPLETE CBC W/AUTO DIFF WBC: CPT | Performed by: EMERGENCY MEDICINE

## 2017-06-14 PROCEDURE — 99285 EMERGENCY DEPT VISIT HI MDM: CPT

## 2017-06-14 PROCEDURE — 84484 ASSAY OF TROPONIN QUANT: CPT | Performed by: EMERGENCY MEDICINE

## 2017-06-14 PROCEDURE — 36415 COLL VENOUS BLD VENIPUNCTURE: CPT | Performed by: EMERGENCY MEDICINE

## 2017-06-14 PROCEDURE — 84443 ASSAY THYROID STIM HORMONE: CPT | Performed by: EMERGENCY MEDICINE

## 2017-06-16 LAB
ATRIAL RATE: 114 BPM
P AXIS: 49 DEGREES
PR INTERVAL: 128 MS
QRS AXIS: 17 DEGREES
QRSD INTERVAL: 76 MS
QT INTERVAL: 320 MS
QTC INTERVAL: 441 MS
T WAVE AXIS: 23 DEGREES
VENTRICULAR RATE: 114 BPM

## 2017-06-21 ENCOUNTER — GENERIC CONVERSION - ENCOUNTER (OUTPATIENT)
Dept: OTHER | Facility: OTHER | Age: 22
End: 2017-06-21

## 2017-08-04 ENCOUNTER — GENERIC CONVERSION - ENCOUNTER (OUTPATIENT)
Dept: OTHER | Facility: OTHER | Age: 22
End: 2017-08-04

## 2017-08-06 ENCOUNTER — HOSPITAL ENCOUNTER (EMERGENCY)
Facility: HOSPITAL | Age: 22
Discharge: HOME | End: 2017-08-06
Attending: EMERGENCY MEDICINE | Admitting: EMERGENCY MEDICINE
Payer: MEDICARE

## 2017-08-06 ENCOUNTER — GENERIC CONVERSION - ENCOUNTER (OUTPATIENT)
Dept: OTHER | Facility: OTHER | Age: 22
End: 2017-08-06

## 2017-08-06 VITALS
TEMPERATURE: 97.3 F | HEIGHT: 64 IN | DIASTOLIC BLOOD PRESSURE: 68 MMHG | HEART RATE: 95 BPM | WEIGHT: 229.5 LBS | OXYGEN SATURATION: 97 % | RESPIRATION RATE: 18 BRPM | SYSTOLIC BLOOD PRESSURE: 128 MMHG | BODY MASS INDEX: 39.18 KG/M2

## 2017-08-06 DIAGNOSIS — N91.2 AMENORRHEA: ICD-10-CM

## 2017-08-06 DIAGNOSIS — R11.0 NAUSEA: Primary | ICD-10-CM

## 2017-08-06 LAB
CLARITY, POC: CLEAR
COLOR, POC: YELLOW
EXT BLOOD URINE: NORMAL
EXT GLUCOSE, UA: NORMAL
EXT KETONES: NORMAL
EXT NITRITE, UA: NORMAL
EXT PH, UA: 7
EXT PROTEIN, UA: NORMAL
HCG SERPL QL: NEGATIVE
WBC # BLD EST: NORMAL 10*3/UL

## 2017-08-06 PROCEDURE — 99283 EMERGENCY DEPT VISIT LOW MDM: CPT

## 2017-08-06 PROCEDURE — 84703 CHORIONIC GONADOTROPIN ASSAY: CPT | Performed by: EMERGENCY MEDICINE

## 2017-08-06 PROCEDURE — 36415 COLL VENOUS BLD VENIPUNCTURE: CPT | Performed by: EMERGENCY MEDICINE

## 2017-08-06 PROCEDURE — 81002 URINALYSIS NONAUTO W/O SCOPE: CPT | Performed by: EMERGENCY MEDICINE

## 2017-08-11 ENCOUNTER — GENERIC CONVERSION - ENCOUNTER (OUTPATIENT)
Dept: OTHER | Facility: OTHER | Age: 22
End: 2017-08-11

## 2017-08-20 ENCOUNTER — GENERIC CONVERSION - ENCOUNTER (OUTPATIENT)
Dept: OTHER | Facility: OTHER | Age: 22
End: 2017-08-20

## 2017-08-22 ENCOUNTER — GENERIC CONVERSION - ENCOUNTER (OUTPATIENT)
Dept: OTHER | Facility: OTHER | Age: 22
End: 2017-08-22

## 2017-09-14 ENCOUNTER — ALLSCRIPTS OFFICE VISIT (OUTPATIENT)
Dept: OTHER | Facility: OTHER | Age: 22
End: 2017-09-14

## 2017-09-14 ENCOUNTER — APPOINTMENT (EMERGENCY)
Dept: RADIOLOGY | Facility: HOSPITAL | Age: 22
End: 2017-09-14
Payer: MEDICARE

## 2017-09-14 ENCOUNTER — HOSPITAL ENCOUNTER (EMERGENCY)
Facility: HOSPITAL | Age: 22
Discharge: HOME | End: 2017-09-14
Attending: EMERGENCY MEDICINE
Payer: MEDICARE

## 2017-09-14 VITALS
HEIGHT: 64 IN | DIASTOLIC BLOOD PRESSURE: 64 MMHG | SYSTOLIC BLOOD PRESSURE: 134 MMHG | OXYGEN SATURATION: 99 % | HEART RATE: 90 BPM | WEIGHT: 221 LBS | TEMPERATURE: 98.1 F | RESPIRATION RATE: 18 BRPM | BODY MASS INDEX: 37.73 KG/M2

## 2017-09-14 DIAGNOSIS — R42 DIZZY: Primary | ICD-10-CM

## 2017-09-14 DIAGNOSIS — R11.0 NAUSEA: ICD-10-CM

## 2017-09-14 LAB
ALBUMIN SERPL BCP-MCNC: 3.4 G/DL (ref 3.5–5)
ALP SERPL-CCNC: 64 U/L (ref 46–116)
ALT SERPL W P-5'-P-CCNC: 25 U/L (ref 12–78)
ANION GAP SERPL CALCULATED.3IONS-SCNC: 5 MMOL/L (ref 4–13)
AST SERPL W P-5'-P-CCNC: 16 U/L (ref 5–45)
BACTERIA UR QL AUTO: ABNORMAL /HPF
BASOPHILS # BLD AUTO: 0.3 THOUSANDS/ΜL (ref 0–0.1)
BASOPHILS NFR BLD AUTO: 3 % (ref 0–1)
BILIRUB SERPL-MCNC: 0.4 MG/DL (ref 0.2–1)
BILIRUB UR QL STRIP: NEGATIVE
BUN SERPL-MCNC: 9 MG/DL (ref 5–25)
CALCIUM SERPL-MCNC: 8.8 MG/DL (ref 8.3–10.1)
CHLORIDE SERPL-SCNC: 105 MMOL/L (ref 100–108)
CLARITY UR: ABNORMAL
CO2 SERPL-SCNC: 28 MMOL/L (ref 21–32)
COLOR UR: YELLOW
CREAT SERPL-MCNC: 0.93 MG/DL (ref 0.6–1.3)
EOSINOPHIL # BLD AUTO: 0.1 THOUSAND/ΜL (ref 0–0.61)
EOSINOPHIL NFR BLD AUTO: 1 % (ref 0–6)
ERYTHROCYTE [DISTWIDTH] IN BLOOD BY AUTOMATED COUNT: 12.3 % (ref 11.6–15.1)
EXT PREG TEST URINE: NEGATIVE
GFR SERPL CREATININE-BSD FRML MDRD: 88 ML/MIN/1.73SQ M
GLUCOSE SERPL-MCNC: 89 MG/DL (ref 65–140)
GLUCOSE UR STRIP-MCNC: NEGATIVE MG/DL
HCT VFR BLD AUTO: 40.4 % (ref 37–47)
HGB BLD-MCNC: 13.5 G/DL (ref 12–16)
HGB BLD-MCNC: 14.8 G/DL
HGB UR QL STRIP.AUTO: ABNORMAL
KETONES UR STRIP-MCNC: ABNORMAL MG/DL
LEUKOCYTE ESTERASE UR QL STRIP: NEGATIVE
LYMPHOCYTES # BLD AUTO: 2.6 THOUSANDS/ΜL (ref 0.6–4.47)
LYMPHOCYTES NFR BLD AUTO: 26 % (ref 14–44)
MCH RBC QN AUTO: 29.1 PG (ref 27–31)
MCHC RBC AUTO-ENTMCNC: 33.5 G/DL (ref 31.4–37.4)
MCV RBC AUTO: 87 FL (ref 82–98)
MONOCYTES # BLD AUTO: 0.8 THOUSAND/ΜL (ref 0.17–1.22)
MONOCYTES NFR BLD AUTO: 8 % (ref 4–12)
MUCOUS THREADS UR QL AUTO: ABNORMAL
NEUTROPHILS # BLD AUTO: 6.5 THOUSANDS/ΜL (ref 1.85–7.62)
NEUTS SEG NFR BLD AUTO: 62 % (ref 43–75)
NITRITE UR QL STRIP: NEGATIVE
NON-SQ EPI CELLS URNS QL MICRO: ABNORMAL /HPF
PH UR STRIP.AUTO: 6 [PH] (ref 5–9)
PLATELET # BLD AUTO: 403 THOUSANDS/UL (ref 130–400)
PMV BLD AUTO: 7.7 FL (ref 8.9–12.7)
POTASSIUM SERPL-SCNC: 3.9 MMOL/L (ref 3.5–5.3)
PROT SERPL-MCNC: 7.1 G/DL (ref 6.4–8.2)
PROT UR STRIP-MCNC: NEGATIVE MG/DL
RBC # BLD AUTO: 4.65 MILLION/UL (ref 4.2–5.4)
RBC #/AREA URNS AUTO: ABNORMAL /HPF
SODIUM SERPL-SCNC: 138 MMOL/L (ref 136–145)
SP GR UR STRIP.AUTO: 1.01 (ref 1–1.03)
TROPONIN I SERPL-MCNC: <0.02 NG/ML
UROBILINOGEN UR QL STRIP.AUTO: 0.2 E.U./DL
WBC # BLD AUTO: 10.3 THOUSAND/UL (ref 4.8–10.8)
WBC #/AREA URNS AUTO: ABNORMAL /HPF

## 2017-09-14 PROCEDURE — 96374 THER/PROPH/DIAG INJ IV PUSH: CPT

## 2017-09-14 PROCEDURE — 99284 EMERGENCY DEPT VISIT MOD MDM: CPT

## 2017-09-14 PROCEDURE — 85025 COMPLETE CBC W/AUTO DIFF WBC: CPT | Performed by: EMERGENCY MEDICINE

## 2017-09-14 PROCEDURE — 70450 CT HEAD/BRAIN W/O DYE: CPT

## 2017-09-14 PROCEDURE — 96375 TX/PRO/DX INJ NEW DRUG ADDON: CPT

## 2017-09-14 PROCEDURE — 80053 COMPREHEN METABOLIC PANEL: CPT | Performed by: EMERGENCY MEDICINE

## 2017-09-14 PROCEDURE — 81025 URINE PREGNANCY TEST: CPT | Performed by: EMERGENCY MEDICINE

## 2017-09-14 PROCEDURE — 81001 URINALYSIS AUTO W/SCOPE: CPT | Performed by: EMERGENCY MEDICINE

## 2017-09-14 PROCEDURE — 36415 COLL VENOUS BLD VENIPUNCTURE: CPT | Performed by: EMERGENCY MEDICINE

## 2017-09-14 PROCEDURE — 93005 ELECTROCARDIOGRAM TRACING: CPT | Performed by: EMERGENCY MEDICINE

## 2017-09-14 PROCEDURE — 96361 HYDRATE IV INFUSION ADD-ON: CPT

## 2017-09-14 PROCEDURE — 84484 ASSAY OF TROPONIN QUANT: CPT | Performed by: EMERGENCY MEDICINE

## 2017-09-14 RX ORDER — PANTOPRAZOLE SODIUM 20 MG/1
20 TABLET, DELAYED RELEASE ORAL DAILY
Qty: 20 TABLET | Refills: 0 | Status: SHIPPED | OUTPATIENT
Start: 2017-09-14 | End: 2017-12-11

## 2017-09-14 RX ORDER — ONDANSETRON 2 MG/ML
4 INJECTION INTRAMUSCULAR; INTRAVENOUS ONCE
Status: DISCONTINUED | OUTPATIENT
Start: 2017-09-14 | End: 2017-09-14 | Stop reason: HOSPADM

## 2017-09-14 RX ORDER — MECLIZINE HYDROCHLORIDE 25 MG/1
25 TABLET ORAL ONCE
Status: COMPLETED | OUTPATIENT
Start: 2017-09-14 | End: 2017-09-14

## 2017-09-14 RX ORDER — ONDANSETRON 4 MG/1
4 TABLET, ORALLY DISINTEGRATING ORAL EVERY 6 HOURS PRN
Qty: 12 TABLET | Refills: 0 | Status: SHIPPED | OUTPATIENT
Start: 2017-09-14 | End: 2017-10-29

## 2017-09-14 RX ORDER — DIAZEPAM 5 MG/1
5 TABLET ORAL EVERY 12 HOURS PRN
Qty: 10 TABLET | Refills: 0 | Status: SHIPPED | OUTPATIENT
Start: 2017-09-14 | End: 2017-09-15

## 2017-09-14 RX ORDER — ONDANSETRON 2 MG/ML
4 INJECTION INTRAMUSCULAR; INTRAVENOUS ONCE
Status: COMPLETED | OUTPATIENT
Start: 2017-09-14 | End: 2017-09-14

## 2017-09-14 RX ORDER — DIAZEPAM 5 MG/1
5 TABLET ORAL ONCE
Status: COMPLETED | OUTPATIENT
Start: 2017-09-14 | End: 2017-09-14

## 2017-09-14 RX ORDER — ALBUTEROL SULFATE 2.5 MG/3ML
2.5 SOLUTION RESPIRATORY (INHALATION) EVERY 4 HOURS PRN
COMMUNITY
Start: 2015-02-06 | End: 2017-10-29

## 2017-09-14 RX ORDER — ALBUTEROL SULFATE 90 UG/1
AEROSOL, METERED RESPIRATORY (INHALATION)
COMMUNITY
Start: 2014-10-24 | End: 2017-12-19

## 2017-09-14 RX ADMIN — ONDANSETRON 4 MG: 2 INJECTION INTRAMUSCULAR; INTRAVENOUS at 16:25

## 2017-09-14 RX ADMIN — DIAZEPAM 5 MG: 5 TABLET ORAL at 19:00

## 2017-09-14 RX ADMIN — FAMOTIDINE 20 MG: 10 INJECTION, SOLUTION INTRAVENOUS at 16:27

## 2017-09-14 RX ADMIN — SODIUM CHLORIDE 1000 ML: 0.9 INJECTION, SOLUTION INTRAVENOUS at 16:26

## 2017-09-14 RX ADMIN — MECLIZINE HYDROCHLORIDE 25 MG: 25 TABLET ORAL at 16:29

## 2017-09-15 ENCOUNTER — HOSPITAL ENCOUNTER (EMERGENCY)
Facility: HOSPITAL | Age: 22
Discharge: HOME | End: 2017-09-15
Admitting: EMERGENCY MEDICINE
Payer: MEDICARE

## 2017-09-15 ENCOUNTER — HOSPITAL ENCOUNTER (EMERGENCY)
Facility: HOSPITAL | Age: 22
Discharge: HOME | End: 2017-09-15
Attending: EMERGENCY MEDICINE | Admitting: EMERGENCY MEDICINE
Payer: MEDICARE

## 2017-09-15 VITALS
WEIGHT: 221 LBS | DIASTOLIC BLOOD PRESSURE: 70 MMHG | SYSTOLIC BLOOD PRESSURE: 113 MMHG | HEART RATE: 104 BPM | OXYGEN SATURATION: 97 % | RESPIRATION RATE: 22 BRPM | BODY MASS INDEX: 37.73 KG/M2 | HEIGHT: 64 IN | TEMPERATURE: 98.9 F

## 2017-09-15 VITALS
SYSTOLIC BLOOD PRESSURE: 113 MMHG | DIASTOLIC BLOOD PRESSURE: 64 MMHG | OXYGEN SATURATION: 98 % | TEMPERATURE: 98.4 F | RESPIRATION RATE: 16 BRPM | HEART RATE: 85 BPM

## 2017-09-15 DIAGNOSIS — R42 DIZZINESS: Primary | ICD-10-CM
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
## 2017-09-15 DIAGNOSIS — R42 DIZZINESS: ICD-10-CM

## 2017-09-15 DIAGNOSIS — R51.9 HEAD PAIN: ICD-10-CM

## 2017-09-15 LAB
ATRIAL RATE: 102 BPM
P AXIS: 40 DEGREES
PR INTERVAL: 142 MS
QRS AXIS: 14 DEGREES
QRSD INTERVAL: 82 MS
QT INTERVAL: 334 MS
QTC INTERVAL: 435 MS
T WAVE AXIS: 24 DEGREES
VENTRICULAR RATE: 102 BPM

## 2017-09-15 PROCEDURE — 99283 EMERGENCY DEPT VISIT LOW MDM: CPT

## 2017-09-15 RX ORDER — IBUPROFEN 600 MG/1
600 TABLET ORAL ONCE
Status: COMPLETED | OUTPATIENT
Start: 2017-09-15 | End: 2017-09-15

## 2017-09-15 RX ORDER — ONDANSETRON 4 MG/1
4 TABLET, ORALLY DISINTEGRATING ORAL EVERY 8 HOURS PRN
Qty: 15 TABLET | Refills: 0 | Status: SHIPPED | OUTPATIENT
Start: 2017-09-15 | End: 2017-12-11

## 2017-09-15 RX ORDER — DIAZEPAM 5 MG/1
5 TABLET ORAL EVERY 6 HOURS PRN
COMMUNITY
End: 2017-12-11

## 2017-09-15 RX ORDER — MECLIZINE HYDROCHLORIDE 25 MG/1
25 TABLET ORAL EVERY 8 HOURS PRN
Qty: 15 TABLET | Refills: 0 | Status: SHIPPED | OUTPATIENT
Start: 2017-09-15 | End: 2017-12-11

## 2017-09-15 RX ADMIN — IBUPROFEN 600 MG: 600 TABLET, FILM COATED ORAL at 22:53

## 2017-09-16 ENCOUNTER — GENERIC CONVERSION - ENCOUNTER (OUTPATIENT)
Dept: OTHER | Facility: OTHER | Age: 22
End: 2017-09-16

## 2017-10-15 ENCOUNTER — GENERIC CONVERSION - ENCOUNTER (OUTPATIENT)
Dept: OTHER | Facility: OTHER | Age: 22
End: 2017-10-15

## 2017-10-29 ENCOUNTER — HOSPITAL ENCOUNTER (EMERGENCY)
Facility: HOSPITAL | Age: 22
Discharge: HOME | End: 2017-10-29
Attending: EMERGENCY MEDICINE
Payer: MEDICARE

## 2017-10-29 VITALS
RESPIRATION RATE: 16 BRPM | TEMPERATURE: 98.4 F | WEIGHT: 218 LBS | SYSTOLIC BLOOD PRESSURE: 136 MMHG | HEART RATE: 107 BPM | DIASTOLIC BLOOD PRESSURE: 78 MMHG | OXYGEN SATURATION: 98 % | HEIGHT: 64 IN | BODY MASS INDEX: 37.22 KG/M2

## 2017-10-29 DIAGNOSIS — N73.0 PID (ACUTE PELVIC INFLAMMATORY DISEASE): Primary | ICD-10-CM

## 2017-10-29 LAB
BACTERIA UR QL AUTO: ABNORMAL /HPF
BILIRUB UR QL STRIP: NEGATIVE
CLARITY UR: ABNORMAL
CLARITY, POC: NORMAL
COLOR UR: ABNORMAL
COLOR, POC: YELLOW
EXT BLOOD URINE: 50
EXT GLUCOSE, UA: NORMAL
EXT KETONES: 15
EXT NITRITE, UA: NEGATIVE
EXT PH, UA: 6
EXT PREG TEST URINE: NEGATIVE
EXT PROTEIN, UA: NEGATIVE
GLUCOSE UR STRIP-MCNC: NEGATIVE MG/DL
HGB UR QL STRIP.AUTO: ABNORMAL
KETONES UR STRIP-MCNC: ABNORMAL MG/DL
LEUKOCYTE ESTERASE UR QL STRIP: ABNORMAL
NITRITE UR QL STRIP: NEGATIVE
NON-SQ EPI CELLS URNS QL MICRO: ABNORMAL /HPF
PH UR STRIP.AUTO: 6.5 [PH] (ref 5–9)
PROT UR STRIP-MCNC: NEGATIVE MG/DL
RBC #/AREA URNS AUTO: ABNORMAL /HPF
SP GR UR STRIP.AUTO: 1.02 (ref 1–1.03)
UROBILINOGEN UR QL STRIP.AUTO: 0.2 E.U./DL
WBC # BLD EST: 500 10*3/UL
WBC #/AREA URNS AUTO: ABNORMAL /HPF

## 2017-10-29 PROCEDURE — 81002 URINALYSIS NONAUTO W/O SCOPE: CPT | Performed by: EMERGENCY MEDICINE

## 2017-10-29 PROCEDURE — 87491 CHLMYD TRACH DNA AMP PROBE: CPT | Performed by: EMERGENCY MEDICINE

## 2017-10-29 PROCEDURE — 81001 URINALYSIS AUTO W/SCOPE: CPT | Performed by: EMERGENCY MEDICINE

## 2017-10-29 PROCEDURE — 87086 URINE CULTURE/COLONY COUNT: CPT | Performed by: EMERGENCY MEDICINE

## 2017-10-29 PROCEDURE — 96372 THER/PROPH/DIAG INJ SC/IM: CPT

## 2017-10-29 PROCEDURE — 81025 URINE PREGNANCY TEST: CPT | Performed by: EMERGENCY MEDICINE

## 2017-10-29 PROCEDURE — 87591 N.GONORRHOEAE DNA AMP PROB: CPT | Performed by: EMERGENCY MEDICINE

## 2017-10-29 PROCEDURE — 99284 EMERGENCY DEPT VISIT MOD MDM: CPT

## 2017-10-29 RX ORDER — ONDANSETRON 4 MG/1
4 TABLET, ORALLY DISINTEGRATING ORAL ONCE
Status: COMPLETED | OUTPATIENT
Start: 2017-10-29 | End: 2017-10-29

## 2017-10-29 RX ORDER — AZITHROMYCIN 250 MG/1
1000 TABLET, FILM COATED ORAL ONCE
Status: COMPLETED | OUTPATIENT
Start: 2017-10-29 | End: 2017-10-29

## 2017-10-29 RX ORDER — METRONIDAZOLE 500 MG/1
2000 TABLET ORAL ONCE
Status: COMPLETED | OUTPATIENT
Start: 2017-10-29 | End: 2017-10-29

## 2017-10-29 RX ADMIN — LIDOCAINE HYDROCHLORIDE 250 MG: 10 INJECTION, SOLUTION EPIDURAL; INFILTRATION; INTRACAUDAL; PERINEURAL at 22:00

## 2017-10-29 RX ADMIN — METRONIDAZOLE 2000 MG: 500 TABLET ORAL at 22:02

## 2017-10-29 RX ADMIN — ONDANSETRON 4 MG: 4 TABLET, ORALLY DISINTEGRATING ORAL at 22:02

## 2017-10-29 RX ADMIN — AZITHROMYCIN 1000 MG: 250 TABLET, FILM COATED ORAL at 22:01

## 2017-10-30 NOTE — ED PROVIDER NOTES
History  Chief Complaint   Patient presents with    Abdominal Pain     for a couple weeks "I slept the wrong wilbur and I've had pain down there ever since  It hurts down there when I pee"    Painful Urination     burns during urination    Vaginal Discharge     states she has a foul oder coming from her vagina     Patient presents for evaluation of lower abdominal pain for 2 weeks after sleeping with the "wrong wilbur"  Patient also complains of pain with urination and foul smeeling vaginal discharge  History provided by:  Patient   used: No    Abdominal Pain   Associated symptoms: dysuria and vaginal discharge    Vaginal Discharge   Associated symptoms: abdominal pain and dysuria        Prior to Admission Medications   Prescriptions Last Dose Informant Patient Reported? Taking? Divalproex Sodium (DEPAKOTE PO) 10/28/2017 at Unknown time  Yes Yes   Sig: Take 750 mg by mouth   Lurasidone HCl (LATUDA) 60 MG TABS 10/28/2017 at Unknown time  Yes Yes   Sig: Take by mouth   albuterol (VENTOLIN HFA) 90 mcg/act inhaler 10/29/2017 at Unknown time  Yes Yes   Sig: Inhale   diazepam (VALIUM) 5 mg tablet 10/29/2017 at Unknown time  Yes Yes   Sig: Take 5 mg by mouth every 6 (six) hours as needed for anxiety   meclizine (ANTIVERT) 25 mg tablet   No No   Sig: Take 1 tablet by mouth every 8 (eight) hours as needed for dizziness for up to 5 days   ondansetron (ZOFRAN-ODT) 4 mg disintegrating tablet   No No   Sig: Take 1 tablet by mouth every 8 (eight) hours as needed for nausea for up to 5 days   pantoprazole (PROTONIX) 20 mg tablet   No No   Sig: Take 1 tablet by mouth daily for 20 days      Facility-Administered Medications: None       Past Medical History:   Diagnosis Date    Asthma     Bipolar 1 disorder ([facility] [address] )     Psychiatric disorder        Past Surgical History:   Procedure Laterality Date    KNEE SURGERY         History reviewed  No pertinent family history    I have reviewed and agree with the history as documented  Social History   Substance Use Topics    Smoking status: Former Smoker     Packs/day: 0 25     Quit date: 4/6/2017    Smokeless tobacco: Never Used    Alcohol use No        Review of Systems   Gastrointestinal: Positive for abdominal pain  Genitourinary: Positive for dysuria and vaginal discharge  All other systems reviewed and are negative  Physical Exam  ED Triage Vitals [10/29/17 2052]   Temperature Pulse Respirations Blood Pressure SpO2   98 4 °F (36 9 °C) (!) 107 16 136/78 98 %      Temp Source Heart Rate Source Patient Position - Orthostatic VS BP Location FiO2 (%)   Tympanic Monitor Sitting Right arm --      Pain Score       7           Orthostatic Vital Signs  Vitals:    10/29/17 2052   BP: 136/78   Pulse: (!) 107   Patient Position - Orthostatic VS: Sitting       Physical Exam   Constitutional: She is oriented to person, place, and time  No distress  HENT:   Mouth/Throat: Oropharynx is clear and moist    Eyes: Pupils are equal, round, and reactive to light  Cardiovascular: Normal rate, regular rhythm and intact distal pulses  Pulmonary/Chest: Effort normal and breath sounds normal  No respiratory distress  Abdominal: Soft  Bowel sounds are normal  There is tenderness  Mild lower abdominal tenderness   Genitourinary: Vagina normal  Cervix exhibits motion tenderness and discharge  Right adnexum displays no mass  Left adnexum displays no mass  Musculoskeletal: Normal range of motion  Neurological: She is alert and oriented to person, place, and time  Skin: Capillary refill takes less than 2 seconds  She is not diaphoretic  Nursing note and vitals reviewed        ED Medications  Medications   cefTRIAXone (ROCEPHIN) 250 mg in lidocaine (PF) (XYLOCAINE-MPF) 1 % IM only syringe (250 mg Intramuscular Given 10/29/17 2200)   azithromycin (ZITHROMAX) tablet 1,000 mg (1,000 mg Oral Given 10/29/17 2201)   metroNIDAZOLE (FLAGYL) tablet 2,000 mg (2,000 mg Oral Given 10/29/17 2202)   ondansetron (ZOFRAN-ODT) dispersible tablet 4 mg (4 mg Oral Given 10/29/17 2202)       Diagnostic Studies  Results Reviewed     Procedure Component Value Units Date/Time    Urine Microscopic [[id number]]  (Abnormal) Collected:  10/29/17 2153    Lab Status:  Final result Specimen:  Urine from Urine, Clean Catch Updated:  10/29/17 2213     RBC, UA 1-2 (A) /hpf      WBC, UA 10-20 (A) /hpf      Epithelial Cells Moderate (A) /hpf      Bacteria, UA Moderate (A) /hpf     Urine culture [[id number]] Collected:  10/29/17 2153    Lab Status: In process Specimen:  Urine from Urine, Clean Catch Updated:  10/29/17 2213    UA w Reflex to Microscopic w Reflex to Culture [[id number]]  (Abnormal) Collected:  10/29/17 2153    Lab Status:  Final result Specimen:  Urine from Urine, Clean Catch Updated:  10/29/17 2201     Color, UA Light Yellow     Clarity, UA Slightly Cloudy     Specific [place], UA 1 020     pH, UA 6 5     Leukocytes, UA Moderate (A)     Nitrite, UA Negative     Protein, UA Negative mg/dl      Glucose, UA Negative mg/dl      Ketones, UA Trace (A) mg/dl      Urobilinogen, UA 0 2 E U /dl      Bilirubin, UA Negative     Blood, UA Small (A)    Chlamydia/GC amplified DNA by PCR [[id number]] Collected:  10/29/17 2153    Lab Status:   In process Specimen:  Genital from Cervix Updated:  10/29/17 2158    POCT urinalysis dipstick [[id number]]  (Normal) Resulted:  10/29/17 2111    Lab Status:  Final result Updated:  10/29/17 2112     Color, UA yellow     Clarity, UA cloudy     EXT Glucose, UA normal     EXT Ketones, UA (Ref: Negative) 15     EXT Blood, UA (Ref: Negative) 50     EXT pH, UA 6     EXT Protein, UA (Ref: Negative) negative     EXT Leukocytes, UA (Ref: Negative) 500     EXT Nitrite, UA (Ref: Negative) negative    POCT pregnancy, urine [[id number]]  (Normal) Resulted:  10/29/17 2110    Lab Status:  Final result Updated:  10/29/17 2110     EXT PREG TEST UR (Ref: Negative) Negative                 No orders to display              Procedures  Procedures       Phone Contacts  ED Phone Contact    ED Course  ED Course                                MDM  Number of Diagnoses or Management Options  PID (acute pelvic inflammatory disease):   Diagnosis management comments: Advised on no sex for 1 week and to follow up with PMD        Amount and/or Complexity of Data Reviewed  Clinical lab tests: ordered and reviewed    Patient Progress  Patient progress: stable    CritCare Time    Disposition  Final diagnoses:   PID (acute pelvic inflammatory disease)     Time reflects when diagnosis was documented in both MDM as applicable and the Disposition within this note     Time User Action Codes Description Comment    10/29/2017 10:28 PM Harry Gu[address]upe [address] [[address] 0] PID (acute pelvic inflammatory disease)       ED Disposition     ED Disposition Condition Comment    Discharge  [address] discharge to home/self care      Condition at discharge: stable        Follow-up Information     Follow up With Specialties Details Why [facility], DO Family Medicine In 2 days  [address] [facility] [place] [employer]  [facility]  [phone]          Discharge Medication List as of 10/29/2017 10:29 PM      CONTINUE these medications which have NOT CHANGED    Details   albuterol (VENTOLIN HFA) 90 mcg/act inhaler Inhale, Starting Fri 10/24/2014, Historical Med      diazepam (VALIUM) 5 mg tablet Take 5 mg by mouth every 6 (six) hours as needed for anxiety, Historical Med      Divalproex Sodium (DEPAKOTE PO) Take 750 mg by mouth, Historical Med      Lurasidone HCl (LATUDA) 60 MG TABS Take by mouth, Historical Med      meclizine (ANTIVERT) 25 mg tablet Take 1 tablet by mouth every 8 (eight) hours as needed for dizziness for up to 5 days, Starting Fri 9/15/2017, Until Wed 9/20/2017, Print      ondansetron (ZOFRAN-ODT) 4 mg disintegrating tablet Take 1 tablet by mouth every 8 (eight) hours as needed for nausea for up to 5 days, Starting Fri 9/15/2017, Until Wed 9/20/2017, Print      pantoprazole (PROTONIX) 20 mg tablet Take 1 tablet by mouth daily for 20 days, Starting Thu 9/14/2017, Until Wed 10/4/2017, Print           No discharge procedures on file      ED Provider  Electronically Signed by           Dorota Luna DO  10/29/17 5644

## 2017-10-30 NOTE — DISCHARGE INSTRUCTIONS
Pelvic Inflammatory Disease   WHAT YOU NEED TO KNOW:   Pelvic inflammatory disease (PID) is a condition that causes your reproductive organs to become inflamed  Your reproductive organs include your ovaries, fallopian tubes, uterus, cervix (lower area of your uterus), and vagina  PID may cause chronic (long-term) abdominal pain and problems with future pregnancies  You may have no symptoms of PID  DISCHARGE INSTRUCTIONS:   Seek care immediately if:   · You have chills or a high fever  · You have pain in your upper right abdomen  · You have pain in your lower abdomen that does not go away with rest or medicine  · Your symptoms get worse or do not improve after 3 days of treatment  Contact your healthcare provider if:   · You have nausea or are vomiting  · Your skin is red, itchy, or you have a new rash  · You think or know you are pregnant  · You have questions or concerns about your condition or care  Medicines:   · Medicines  may be given to prevent or fight a bacterial infection or to reduce pain  Ask your healthcare provider how to take pain medicine safely  · Take your medicine as directed  Contact your healthcare provider if you think your medicine is not helping or if you have side effects  Tell him or her if you are allergic to any medicine  Keep a list of the medicines, vitamins, and herbs you take  Include the amounts, and when and why you take them  Bring the list or the pill bottles to follow-up visits  Carry your medicine list with you in case of an emergency  Manage PID:   · Finish your treatment  If you do not finish your treatment for PID, your infection may not go away  You may also have an increased risk for another STI in the future  · Do not have sex until your healthcare provider says it is okay  You will need to finish treatment before it is safe to have sex  · Talk to your sex partners  If you have an STI, tell your recent partners   Tell them to see a healthcare provider for testing and treatment  This will help stop the spread of infection to others or back to you  Decrease your risk for PID:   · Do not have unprotected sex  Always use a latex condom  Do not have sex while you or your partners are being treated for an STI  · Get tested for STIs  before and after new sex partners  Talk to your partner and ask them to get tested  · Do not delay treatment for STIs or vaginal infections  Talk to your healthcare provider if you think you have an STI  Early treatment can prevent health problems that may lead to PID  Follow up with your healthcare provider as directed: You may need a follow up visit a few days after you start your treatment  Your healthcare provider may ask you if your recent sexual partners have also been treated for an STI  You may need more tests if your symptoms do not go away or worsen after treatment  Your treatment may need to be changed if your symptoms are not getting better  Write down your questions so you remember to ask them during your visits  © 2017 [facility] Information is for End User's use only and may not be sold, redistributed or otherwise used for commercial purposes  All illustrations and images included in CareNotes® are the copyrighted property of A D A M , Inc  or Rosalio Cano  The above information is an  only  It is not intended as medical advice for individual conditions or treatments  Talk to your doctor, nurse or pharmacist before following any medical regimen to see if it is safe and effective for you

## 2017-10-31 LAB — BACTERIA UR CULT: NORMAL

## 2017-11-01 LAB
CHLAMYDIA DNA CVX QL NAA+PROBE: NORMAL
N GONORRHOEA DNA GENITAL QL NAA+PROBE: NORMAL

## 2017-11-02 ENCOUNTER — GENERIC CONVERSION - ENCOUNTER (OUTPATIENT)
Dept: OTHER | Facility: OTHER | Age: 22
End: 2017-11-02

## 2017-11-04 ENCOUNTER — GENERIC CONVERSION - ENCOUNTER (OUTPATIENT)
Dept: OTHER | Facility: OTHER | Age: 22
End: 2017-11-04

## 2017-11-12 ENCOUNTER — GENERIC CONVERSION - ENCOUNTER (OUTPATIENT)
Dept: OTHER | Facility: OTHER | Age: 22
End: 2017-11-12

## 2017-11-24 ENCOUNTER — HOSPITAL ENCOUNTER (EMERGENCY)
Facility: HOSPITAL | Age: 22
Discharge: HOME | End: 2017-11-24
Payer: MEDICARE

## 2017-11-24 VITALS
WEIGHT: 217.25 LBS | OXYGEN SATURATION: 96 % | SYSTOLIC BLOOD PRESSURE: 135 MMHG | HEIGHT: 64 IN | TEMPERATURE: 98.5 F | DIASTOLIC BLOOD PRESSURE: 75 MMHG | RESPIRATION RATE: 18 BRPM | BODY MASS INDEX: 37.09 KG/M2 | HEART RATE: 104 BPM

## 2017-11-24 DIAGNOSIS — N39.0 UTI (URINARY TRACT INFECTION): ICD-10-CM

## 2017-11-24 DIAGNOSIS — J06.9 URI (UPPER RESPIRATORY INFECTION): Primary | ICD-10-CM

## 2017-11-24 LAB
ALBUMIN SERPL BCP-MCNC: 3.3 G/DL (ref 3.5–5)
ALP SERPL-CCNC: 53 U/L (ref 46–116)
ALT SERPL W P-5'-P-CCNC: 22 U/L (ref 12–78)
ANION GAP SERPL CALCULATED.3IONS-SCNC: 11 MMOL/L (ref 4–13)
AST SERPL W P-5'-P-CCNC: 16 U/L (ref 5–45)
BACTERIA UR QL AUTO: ABNORMAL /HPF
BASOPHILS # BLD AUTO: 0.2 THOUSANDS/ΜL (ref 0–0.1)
BASOPHILS NFR BLD AUTO: 1 % (ref 0–1)
BILIRUB SERPL-MCNC: 0.2 MG/DL (ref 0.2–1)
BILIRUB UR QL STRIP: NEGATIVE
BUN SERPL-MCNC: 8 MG/DL (ref 5–25)
CALCIUM SERPL-MCNC: 9.3 MG/DL (ref 8.3–10.1)
CHLORIDE SERPL-SCNC: 100 MMOL/L (ref 100–108)
CLARITY UR: ABNORMAL
CO2 SERPL-SCNC: 26 MMOL/L (ref 21–32)
COLOR UR: YELLOW
CREAT SERPL-MCNC: 0.8 MG/DL (ref 0.6–1.3)
EOSINOPHIL # BLD AUTO: 0.5 THOUSAND/ΜL (ref 0–0.61)
EOSINOPHIL NFR BLD AUTO: 4 % (ref 0–6)
ERYTHROCYTE [DISTWIDTH] IN BLOOD BY AUTOMATED COUNT: 14 % (ref 11.6–15.1)
GFR SERPL CREATININE-BSD FRML MDRD: 106 ML/MIN/1.73SQ M
GLUCOSE SERPL-MCNC: 106 MG/DL (ref 65–140)
GLUCOSE UR STRIP-MCNC: NEGATIVE MG/DL
HCT VFR BLD AUTO: 40.5 % (ref 37–47)
HGB BLD-MCNC: 13.5 G/DL (ref 12–16)
HGB UR QL STRIP.AUTO: ABNORMAL
KETONES UR STRIP-MCNC: NEGATIVE MG/DL
LEUKOCYTE ESTERASE UR QL STRIP: ABNORMAL
LYMPHOCYTES # BLD AUTO: 2.3 THOUSANDS/ΜL (ref 0.6–4.47)
LYMPHOCYTES NFR BLD AUTO: 19 % (ref 14–44)
MCH RBC QN AUTO: 29.2 PG (ref 27–31)
MCHC RBC AUTO-ENTMCNC: 33.4 G/DL (ref 31.4–37.4)
MCV RBC AUTO: 87 FL (ref 82–98)
MONOCYTES # BLD AUTO: 1.6 THOUSAND/ΜL (ref 0.17–1.22)
MONOCYTES NFR BLD AUTO: 13 % (ref 4–12)
NEUTROPHILS # BLD AUTO: 7.7 THOUSANDS/ΜL (ref 1.85–7.62)
NEUTS SEG NFR BLD AUTO: 63 % (ref 43–75)
NITRITE UR QL STRIP: NEGATIVE
NON-SQ EPI CELLS URNS QL MICRO: ABNORMAL /HPF
NRBC BLD AUTO-RTO: 0 /100 WBCS
PH UR STRIP.AUTO: 5.5 [PH] (ref 5–9)
PLATELET # BLD AUTO: 351 THOUSANDS/UL (ref 130–400)
PMV BLD AUTO: 7.2 FL (ref 8.9–12.7)
POTASSIUM SERPL-SCNC: 3.6 MMOL/L (ref 3.5–5.3)
PROT SERPL-MCNC: 7.6 G/DL (ref 6.4–8.2)
PROT UR STRIP-MCNC: NEGATIVE MG/DL
RBC # BLD AUTO: 4.63 MILLION/UL (ref 4.2–5.4)
RBC #/AREA URNS AUTO: ABNORMAL /HPF
SODIUM SERPL-SCNC: 137 MMOL/L (ref 136–145)
SP GR UR STRIP.AUTO: 1.01 (ref 1–1.03)
UROBILINOGEN UR QL STRIP.AUTO: 0.2 E.U./DL
WBC # BLD AUTO: 12.2 THOUSAND/UL (ref 4.8–10.8)
WBC #/AREA URNS AUTO: ABNORMAL /HPF

## 2017-11-24 PROCEDURE — 80053 COMPREHEN METABOLIC PANEL: CPT | Performed by: PHYSICIAN ASSISTANT

## 2017-11-24 PROCEDURE — 85025 COMPLETE CBC W/AUTO DIFF WBC: CPT | Performed by: PHYSICIAN ASSISTANT

## 2017-11-24 PROCEDURE — 36415 COLL VENOUS BLD VENIPUNCTURE: CPT | Performed by: PHYSICIAN ASSISTANT

## 2017-11-24 PROCEDURE — 81001 URINALYSIS AUTO W/SCOPE: CPT | Performed by: PHYSICIAN ASSISTANT

## 2017-11-24 PROCEDURE — 96374 THER/PROPH/DIAG INJ IV PUSH: CPT

## 2017-11-24 PROCEDURE — 96361 HYDRATE IV INFUSION ADD-ON: CPT

## 2017-11-24 PROCEDURE — 99284 EMERGENCY DEPT VISIT MOD MDM: CPT

## 2017-11-24 RX ORDER — METOCLOPRAMIDE HYDROCHLORIDE 5 MG/ML
10 INJECTION INTRAMUSCULAR; INTRAVENOUS ONCE
Status: COMPLETED | OUTPATIENT
Start: 2017-11-24 | End: 2017-11-24

## 2017-11-24 RX ORDER — CEPHALEXIN 500 MG/1
500 CAPSULE ORAL 2 TIMES DAILY
Qty: 14 CAPSULE | Refills: 0 | Status: SHIPPED | OUTPATIENT
Start: 2017-11-24 | End: 2017-12-01

## 2017-11-24 RX ADMIN — SODIUM CHLORIDE 1000 ML: 0.9 INJECTION, SOLUTION INTRAVENOUS at 16:33

## 2017-11-24 RX ADMIN — METOCLOPRAMIDE 10 MG: 5 INJECTION, SOLUTION INTRAMUSCULAR; INTRAVENOUS at 16:33

## 2017-11-24 NOTE — DISCHARGE INSTRUCTIONS

## 2017-11-24 NOTE — ED PROVIDER NOTES
History  Chief Complaint   Patient presents with    Abdominal Cramping     1 month and 3 weeks pregnant  pains in lower abdomen this am   feels feverish   n,v, same as usual       25-year-old female, 7 weeks pregnant, presenting today with continued nausea and vomiting  States that she has been having abdominal cramping over the past month that is on and off that has not worsened in nature and is noted diffusely  States that she has not been to an OBGYN physician yet due to insurance reasons  Feels slightly dehydrated  Mild cough and nasal congestion  Otherwise feeling well without any other complaints  Has not been taking prenatal vitamins  Denies vaginal bleeding, one-sided abdominal pain, vaginal discharge, shortness of breath, wheezing, calf pain or swelling  Prior to Admission Medications   Prescriptions Last Dose Informant Patient Reported? Taking?    Divalproex Sodium (DEPAKOTE PO) More than a month at Unknown time  Yes No   Sig: Take 750 mg by mouth   Lurasidone HCl (LATUDA) 60 MG TABS Past Month at Unknown time  Yes Yes   Sig: Take by mouth   albuterol (VENTOLIN HFA) 90 mcg/act inhaler 11/23/2017 at 2000  Yes Yes   Sig: Inhale   diazepam (VALIUM) 5 mg tablet More than a month at Unknown time  Yes No   Sig: Take 5 mg by mouth every 6 (six) hours as needed for anxiety   meclizine (ANTIVERT) 25 mg tablet   No No   Sig: Take 1 tablet by mouth every 8 (eight) hours as needed for dizziness for up to 5 days   ondansetron (ZOFRAN-ODT) 4 mg disintegrating tablet   No No   Sig: Take 1 tablet by mouth every 8 (eight) hours as needed for nausea for up to 5 days   pantoprazole (PROTONIX) 20 mg tablet   No No   Sig: Take 1 tablet by mouth daily for 20 days      Facility-Administered Medications: None       Past Medical History:   Diagnosis Date    Asthma     Bipolar 1 disorder ([facility] [address] )     Psychiatric disorder        Past Surgical History:   Procedure Laterality Date    KNEE SURGERY         History reviewed  No pertinent family history  I have reviewed and agree with the history as documented  Social History   Substance Use Topics    Smoking status: Former Smoker     Packs/day: 0 25     Quit date: 4/6/2017    Smokeless tobacco: Never Used    Alcohol use No        Review of Systems   Constitutional: Negative  Negative for activity change  HENT: Negative  Eyes: Negative  Respiratory: Positive for cough  Negative for apnea, choking, chest tightness, shortness of breath, wheezing and stridor  Cardiovascular: Negative  Gastrointestinal: Positive for nausea and vomiting  Negative for abdominal distention, abdominal pain, anal bleeding, blood in stool, constipation, diarrhea and rectal pain  Genitourinary: Negative  Musculoskeletal: Negative  Skin: Negative  Neurological: Negative  All other systems reviewed and are negative  Physical Exam  ED Triage Vitals [11/24/17 1554]   Temperature Pulse Respirations Blood Pressure SpO2   98 5 °F (36 9 °C) 104 18 135/75 96 %      Temp Source Heart Rate Source Patient Position - Orthostatic VS BP Location FiO2 (%)   Oral Monitor Sitting Right arm --      Pain Score       5           Orthostatic Vital Signs  Vitals:    11/24/17 1554   BP: 135/75   Pulse: 104   Patient Position - Orthostatic VS: Sitting       Physical Exam   Constitutional: She is oriented to person, place, and time  She appears well-developed and well-nourished  Patient appears very well in no apparent distress    HENT:   Head: Normocephalic and atraumatic  Right Ear: External ear normal    Left Ear: External ear normal    Mouth/Throat: Oropharynx is clear and moist    Clear rhinorrhea, + nasal congestion  Eyes: Conjunctivae and EOM are normal  Pupils are equal, round, and reactive to light  Neck: Normal range of motion  Neck supple  Cardiovascular: Regular rhythm, normal heart sounds and intact distal pulses      Pulmonary/Chest: Effort normal and breath sounds normal  No respiratory distress  She has no wheezes  She has no rales  She exhibits no tenderness  spo2 is 96% indicating adequate oxygenation  Abdominal: Soft  Bowel sounds are normal  She exhibits no distension and no mass  There is no tenderness  There is no rebound and no guarding  No hernia  No abdominal tenderness or rebound guarding or peritoneal signs  Neurological: She is alert and oriented to person, place, and time  Skin: Skin is warm and dry  Capillary refill takes less than 2 seconds  Nursing note and vitals reviewed        ED Medications  Medications   sodium chloride 0 9 % bolus 1,000 mL (1,000 mL Intravenous New Bag 11/24/17 1633)   metoclopramide (REGLAN) injection 10 mg (10 mg Intravenous Given 11/24/17 1633)       Diagnostic Studies  Results Reviewed     Procedure Component Value Units Date/Time    Urine Microscopic [[id number]]  (Abnormal) Collected:  11/24/17 1706    Lab Status:  Final result Specimen:  Urine from Urine, Clean Catch Updated:  11/24/17 1727     RBC, UA 0-1 (A) /hpf      WBC, UA 2-4 (A) /hpf      Epithelial Cells Innumerable (A) /hpf      Bacteria, UA Moderate (A) /hpf     UA w Reflex to Microscopic [[id number]]  (Abnormal) Collected:  11/24/17 1706    Lab Status:  Final result Specimen:  Urine from Urine, Clean Catch Updated:  11/24/17 1717     Color, UA Yellow     Clarity, UA Cloudy     Specific [place], UA 1 010     pH, UA 5 5     Leukocytes, UA Trace (A)     Nitrite, UA Negative     Protein, UA Negative mg/dl      Glucose, UA Negative mg/dl      Ketones, UA Negative mg/dl      Urobilinogen, UA 0 2 E U /dl      Bilirubin, UA Negative     Blood, UA Small (A)    Comprehensive metabolic panel [[id number]]  (Abnormal) Collected:  11/24/17 1630    Lab Status:  Final result Specimen:  Blood from Arm, Left Updated:  11/24/17 1656     Sodium 137 mmol/L      Potassium 3 6 mmol/L      Chloride 100 mmol/L      CO2 26 mmol/L      Anion Gap 11 mmol/L      BUN 8 mg/dL      Creatinine summarize past medical records: yes  Independent visualization of images, tracings, or specimens: yes      CritCare Time    Disposition  Final diagnoses:   URI (upper respiratory infection)   UTI (urinary tract infection)     Time reflects when diagnosis was documented in both MDM as applicable and the Disposition within this note     Time User Action Codes Description Comment    11/24/2017  5:25 PM Silvina Anderson Add [J06 9] URI (upper respiratory infection)     11/24/2017  5:28 PM Silvina Anderson Add [N39 0] UTI (urinary tract infection)       ED Disposition     ED Disposition Condition Comment    Discharge  [place] discharge to home/self care  Condition at discharge: Good        Follow-up Information     Follow up With Specialties Details Why Contact Info Additional [address] Emergency Department Emergency Medicine Go to If symptoms worsen such as fevers, difficulty breathing, severe abdominal pain or vaginal bleeding  [facility]  [phone] [facility], [place], [place], [facility] [address], DO Family Medicine Schedule an appointment as soon as possible for a visit As needed [id number] [address] [place] [address]       Sparkle Moscoso MD Obstetrics and Gynecology Schedule an appointment as soon as possible for a visit in 1 day  [address] [place] [address] [address]  [phone]           Patient's Medications   Discharge Prescriptions    CEPHALEXIN (KEFLEX) 500 MG CAPSULE    Take 1 capsule by mouth 2 (two) times a day for 7 days       Start Date: 11/24/2017End Date: 12/1/2017       Order Dose: 500 mg       Quantity: 14 capsule    Refills: 0     No discharge procedures on file      ED Provider  Electronically Signed by           Wilma Huber PA-C  11/24/17 Waleska Soto PA-C  11/24/17 4919

## 2017-12-11 ENCOUNTER — HOSPITAL ENCOUNTER (EMERGENCY)
Facility: HOSPITAL | Age: 22
Discharge: HOME | End: 2017-12-11
Attending: EMERGENCY MEDICINE | Admitting: EMERGENCY MEDICINE
Payer: MEDICARE

## 2017-12-11 VITALS
WEIGHT: 214 LBS | HEIGHT: 64 IN | HEART RATE: 98 BPM | RESPIRATION RATE: 18 BRPM | BODY MASS INDEX: 36.54 KG/M2 | SYSTOLIC BLOOD PRESSURE: 126 MMHG | TEMPERATURE: 98 F | DIASTOLIC BLOOD PRESSURE: 63 MMHG | OXYGEN SATURATION: 97 %

## 2017-12-11 DIAGNOSIS — O20.0 THREATENED ABORTION IN EARLY PREGNANCY: Primary | ICD-10-CM

## 2017-12-11 DIAGNOSIS — S39.91XA BLUNT TRAUMA TO ABDOMEN, INITIAL ENCOUNTER: ICD-10-CM

## 2017-12-11 LAB
ABO GROUP BLD: NORMAL
B-HCG SERPL-ACNC: ABNORMAL MIU/ML
EXT PREG TEST URINE: POSITIVE
RH BLD: POSITIVE

## 2017-12-11 PROCEDURE — 36415 COLL VENOUS BLD VENIPUNCTURE: CPT | Performed by: EMERGENCY MEDICINE

## 2017-12-11 PROCEDURE — 81025 URINE PREGNANCY TEST: CPT | Performed by: EMERGENCY MEDICINE

## 2017-12-11 PROCEDURE — 84702 CHORIONIC GONADOTROPIN TEST: CPT | Performed by: EMERGENCY MEDICINE

## 2017-12-11 PROCEDURE — 99283 EMERGENCY DEPT VISIT LOW MDM: CPT

## 2017-12-11 PROCEDURE — 86901 BLOOD TYPING SEROLOGIC RH(D): CPT | Performed by: EMERGENCY MEDICINE

## 2017-12-11 PROCEDURE — 86900 BLOOD TYPING SEROLOGIC ABO: CPT | Performed by: EMERGENCY MEDICINE

## 2017-12-12 NOTE — DISCHARGE INSTRUCTIONS
Threatened Miscarriage   WHAT YOU NEED TO KNOW:   A threatened miscarriage occurs when you have vaginal bleeding within the first 20 weeks of pregnancy  It means that a miscarriage may happen  A threatened miscarriage may also be called a threatened   DISCHARGE INSTRUCTIONS:   Return to the emergency department if:   · You feel weak or faint  · Your pain or cramping in your abdomen or back gets worse  · You have vaginal bleeding that soaks 1 or more pads in an hour  · You pass material that looks like tissue or large clots  Contact your healthcare provider or obstetrician if:   · You have a fever  · You have trouble urinating, burning when you urinate, or feel a need to urinate often  · You have new or worsening vaginal bleeding  · You have vaginal pain or itching, or vaginal discharge that is yellow, green, or foul-smelling  · You have questions or concerns about your condition or care  Self-care: The following may help you manage your symptoms and decrease your risk for a miscarriage:  · Do not put anything in your vagina  Do not have sex, douche, or use tampons  These actions may increase your risk for infection and miscarriage  · Rest as directed  Do not exercise or do strenuous activities  These activities may cause  labor or miscarriage  Ask your healthcare provider what activities are okay to do  Stay healthy during pregnancy:   · Eat a variety of healthy foods  Healthy foods can help you get extra protein, water, and calories that you need while you are pregnant  Healthy foods include fruits, vegetables, whole-grain breads, low-fat dairy products, beans, lean meats, and fish  Avoid raw or undercooked meat and fish  Ask your healthcare provider if you need a special diet  · Take prenatal vitamins as directed  These help you get the right amount of vitamins and minerals  They may also decrease the risk of certain birth defects      · Do not drink alcohol or use illegal drugs  These can increase your risk for a miscarriage or harm your baby  · Do not smoke  Nicotine and other chemicals in cigarettes and cigars can harm your baby and cause miscarriage or  labor  Ask your healthcare provider for information if you currently smoke and need help to quit  E-cigarettes or smokeless tobacco still contain nicotine  Do not use these products  · Decrease your risk for an infection  Always wash your hands before eating or preparing meals  Do not spend time with people who are sick  Ask your healthcare provider if you need immunizations such as the flu or hepatitis B vaccine  Immunizations may decrease your risk for infections that could cause a miscarriage  · Manage your medical conditions  Keep your blood pressure and blood sugars under control  Maintain a healthy weight during pregnancy  Follow up with your obstetrician as directed: You may need to see your obstetrician frequently for ultrasounds or blood tests  Write down your questions so you remember to ask them during your visits  ©  [employer]  Information is for End User's use only and may not be sold, redistributed or otherwise used for commercial purposes  All illustrations and images included in CareNotes® are the copyrighted property of [employer]  or [employer]al[employer] Cano  The above information is an  only  It is not intended as medical advice for individual conditions or treatments  Talk to your doctor, nurse or pharmacist before following any medical regimen to see if it is safe and effective for you  Blunt Abdominal Injury   WHAT YOU NEED TO KNOW:   A blunt abdominal injury is a direct blow to the abdomen without an open wound  These injuries are caused by car accidents, sports injuries, or a fall  Organs such as your pancreas, liver, spleen, or bladder may be injured  Your intestines may also be injured   These injuries may cause internal bleeding  DISCHARGE INSTRUCTIONS:   Call 911 for any of the following:   · You feel weak, lightheaded, or you faint  · You have a fast heartbeat, fast breathing, and pale, sweaty skin  · You have new or severe pain, swelling, or firmness in your abdomen  Return to the emergency department if:   · You have nausea and are vomiting  · You have blood in your urine or bowel movement  Contact your healthcare provider if:   · You have questions or concerns about your condition or care  Medicines:   · NSAIDs  help decrease swelling and pain or fever  This medicine is available with or without a doctor's order  NSAIDs can cause stomach bleeding or kidney problems in certain people  If you take blood thinner medicine, always ask your healthcare provider if NSAIDs are safe for you  Always read the medicine label and follow directions  · Take your medicine as directed  Contact your healthcare provider if you think your medicine is not helping or if you have side effects  Tell him or her if you are allergic to any medicine  Keep a list of the medicines, vitamins, and herbs you take  Include the amounts, and when and why you take them  Bring the list or the pill bottles to follow-up visits  Carry your medicine list with you in case of an emergency  Apply ice:  Ice helps to decrease swelling and pain  Ice may also help prevent tissue damage  Use an ice pack, or put crushed ice in a plastic bag  Cover it with a towel before you apply it to your skin  Place it on your injured area for 15 to 20 minutes every hour or as directed  Limit activity as directed: This will help decrease pain and swelling, and prevent other injuries  Do not exercise or play sports until your healthcare provider says it is okay  Follow up with your healthcare provider as directed:  Write down your questions so you remember to ask them during your visits     © 2017 [employer] Information is for End User's use only and may not be sold, redistributed or otherwise used for commercial purposes  All illustrations and images included in CareNotes® are the copyrighted property of [employer] M [employer]  or Rosalio Cano  The above information is an  only  It is not intended as medical advice for individual conditions or treatments  Talk to your doctor, nurse or pharmacist before following any medical regimen to see if it is safe and effective for you

## 2017-12-12 NOTE — ED NOTES
Patient informed nurse that she has been having liquid discharge throughout the day,but no bleeding and no cramping at this time       Gadiel Daly RN  12/11/17 2021

## 2017-12-12 NOTE — ED PROVIDER NOTES
History  Chief Complaint   Patient presents with    Fall     pt states she fell earlier and is now having left upper quad pain  States she is 2 months preg but denies any bleeding  Supposed to be taking antibiotics for UTI prescribed 1 week ago and hasn't started them yet     8WKS PREG, C/O FELL TODAY 5 HRS AGO ON HER ABD  NO VAG BL  C/O ABD PAIN  NO ABD TENDERNESS        History provided by:  Patient  Fall   Mechanism of injury: fall    Injury location: ABD  Fall:     Fall occurred:  [employer] of impact: ABD  Prior to arrival data:     Bystander interventions:  None    Blood loss:  None    Loss of consciousness: no      Amnesic to event: no    Associated symptoms: abdominal pain    Associated symptoms: no nausea and no vomiting        Prior to Admission Medications   Prescriptions Last Dose Informant Patient Reported? Taking? albuterol (VENTOLIN HFA) 90 mcg/act inhaler   Yes No   Sig: Inhale      Facility-Administered Medications: None       Past Medical History:   Diagnosis Date    Asthma     Bipolar 1 disorder ([facility] [address] )     Psychiatric disorder        Past Surgical History:   Procedure Laterality Date    KNEE SURGERY         History reviewed  No pertinent family history  I have reviewed and agree with the history as documented  Social History   Substance Use Topics    Smoking status: Former Smoker     Packs/day: 0 25     Quit date: 4/6/2017    Smokeless tobacco: Never Used    Alcohol use No        Review of Systems   Gastrointestinal: Positive for abdominal pain  Negative for nausea and vomiting  Genitourinary: Negative for vaginal bleeding  PREG     All other systems reviewed and are negative        Physical Exam  ED Triage Vitals [12/11/17 1943]   Temperature Pulse Respirations Blood Pressure SpO2   98 °F (36 7 °C) 92 18 126/63 95 %      Temp Source Heart Rate Source Patient Position - Orthostatic VS BP Location FiO2 (%)   Oral Monitor Lying Right arm --      Pain Score       5 Orthostatic Vital Signs  Vitals:    12/11/17 1943 12/11/17 1945   BP: 126/63 126/63   Pulse: 92 98   Patient Position - Orthostatic VS: Lying        Physical Exam   Constitutional: She is oriented to person, place, and time  She appears well-developed and well-nourished  No distress  HENT:   Head: Normocephalic and atraumatic  Neck: Normal range of motion  Neck supple  Cardiovascular: Normal rate and regular rhythm  Abdominal: Soft  There is no tenderness  Musculoskeletal: Normal range of motion  She exhibits no tenderness  Neurological: She is alert and oriented to person, place, and time  Skin: Skin is warm and dry  She is not diaphoretic  Nursing note and vitals reviewed  ED Medications  Medications - No data to display    Diagnostic Studies  Results Reviewed     Procedure Component Value Units Date/Time    POCT pregnancy, urine [[id number]]  (Normal) Resulted:  12/11/17 1958    Lab Status:  Final result Updated:  12/11/17 1958     EXT PREG TEST UR (Ref: Negative) Positive                 No orders to display              Procedures  Procedures       Phone Contacts  ED Phone Contact    ED Course  ED Course                                [facility]  CritCare Time    Disposition  Final diagnoses:   None     ED Disposition     None      Follow-up Information    None       Patient's Medications   Discharge Prescriptions    No medications on file     No discharge procedures on file      ED Provider  Electronically Signed by           Buck Anne MD  12/11/17 8652

## 2017-12-14 ENCOUNTER — HOSPITAL ENCOUNTER (EMERGENCY)
Facility: HOSPITAL | Age: 22
Discharge: HOME | End: 2017-12-14
Attending: EMERGENCY MEDICINE | Admitting: EMERGENCY MEDICINE
Payer: MEDICARE

## 2017-12-14 VITALS
RESPIRATION RATE: 18 BRPM | HEIGHT: 64 IN | SYSTOLIC BLOOD PRESSURE: 133 MMHG | OXYGEN SATURATION: 98 % | DIASTOLIC BLOOD PRESSURE: 72 MMHG | HEART RATE: 88 BPM | WEIGHT: 214 LBS | BODY MASS INDEX: 36.54 KG/M2 | TEMPERATURE: 98.7 F

## 2017-12-14 DIAGNOSIS — R11.10 VOMITING: Primary | ICD-10-CM

## 2017-12-14 DIAGNOSIS — N30.90 CYSTITIS: ICD-10-CM

## 2017-12-14 DIAGNOSIS — Z3A.09 9 WEEKS GESTATION OF PREGNANCY: ICD-10-CM

## 2017-12-14 LAB
BACTERIA UR QL AUTO: ABNORMAL /HPF
BILIRUB UR QL STRIP: NEGATIVE
CLARITY UR: CLEAR
COLOR UR: YELLOW
COLOR, POC: NORMAL
EXT PREG TEST URINE: POSITIVE
GLUCOSE UR STRIP-MCNC: NEGATIVE MG/DL
HGB UR QL STRIP.AUTO: ABNORMAL
HYALINE CASTS #/AREA URNS LPF: ABNORMAL /LPF
KETONES UR STRIP-MCNC: NEGATIVE MG/DL
LEUKOCYTE ESTERASE UR QL STRIP: NEGATIVE
NITRITE UR QL STRIP: NEGATIVE
NON-SQ EPI CELLS URNS QL MICRO: ABNORMAL /HPF
PH UR STRIP.AUTO: 6.5 [PH] (ref 4.5–8)
PROT UR STRIP-MCNC: NEGATIVE MG/DL
RBC #/AREA URNS AUTO: ABNORMAL /HPF
SP GR UR STRIP.AUTO: 1.01 (ref 1–1.03)
UROBILINOGEN UR QL STRIP.AUTO: 0.2 E.U./DL
WBC #/AREA URNS AUTO: ABNORMAL /HPF

## 2017-12-14 PROCEDURE — 81001 URINALYSIS AUTO W/SCOPE: CPT

## 2017-12-14 PROCEDURE — 99283 EMERGENCY DEPT VISIT LOW MDM: CPT

## 2017-12-14 PROCEDURE — 96361 HYDRATE IV INFUSION ADD-ON: CPT

## 2017-12-14 PROCEDURE — 87086 URINE CULTURE/COLONY COUNT: CPT

## 2017-12-14 PROCEDURE — 81002 URINALYSIS NONAUTO W/O SCOPE: CPT | Performed by: EMERGENCY MEDICINE

## 2017-12-14 PROCEDURE — 81025 URINE PREGNANCY TEST: CPT | Performed by: EMERGENCY MEDICINE

## 2017-12-14 PROCEDURE — 96374 THER/PROPH/DIAG INJ IV PUSH: CPT

## 2017-12-14 RX ORDER — FLUCONAZOLE 150 MG/1
150 TABLET ORAL ONCE
Status: COMPLETED | OUTPATIENT
Start: 2017-12-14 | End: 2017-12-14

## 2017-12-14 RX ORDER — ONDANSETRON 2 MG/ML
4 INJECTION INTRAMUSCULAR; INTRAVENOUS ONCE
Status: COMPLETED | OUTPATIENT
Start: 2017-12-14 | End: 2017-12-14

## 2017-12-14 RX ORDER — CEPHALEXIN 500 MG/1
500 CAPSULE ORAL EVERY 12 HOURS SCHEDULED
Qty: 14 CAPSULE | Refills: 0 | Status: SHIPPED | OUTPATIENT
Start: 2017-12-14 | End: 2017-12-17

## 2017-12-14 RX ADMIN — ONDANSETRON 4 MG: 2 INJECTION INTRAMUSCULAR; INTRAVENOUS at 20:24

## 2017-12-14 RX ADMIN — FLUCONAZOLE 150 MG: 150 TABLET ORAL at 20:48

## 2017-12-14 RX ADMIN — SODIUM CHLORIDE 1000 ML: 0.9 INJECTION, SOLUTION INTRAVENOUS at 20:24

## 2017-12-15 NOTE — ED PROVIDER NOTES
History  Chief Complaint   Patient presents with    Vomiting     Pt 9 weeks pregnant and can't keep any food down  Pt c/o abdominal cramping that lasts up to 3 hours on and off       52-year-old female  9 week pregnant by ultrasound comes emergent department for evaluation of nausea and pelvic pain since the beginning of her pregnancy  Patient states that she is here because her symptoms are getting worse  Patient also complaining of clear mucoid discharge since the beginning of her pregnancy  Patient states that abdominal pain is diffuse and constant since the beginning of her pregnancy  Patient states that she has not take any medications because she has vomited  Patient states that she only had [employer] fries since the beginning of her pregnancy  Otherwise, patient denies fever, chest pain, shortness of breath, dysuria, constipation or diarrhea  Medical management decision making:  Patient presenting with nausea and abdominal pain in the setting of pregnancy  I will do a vaginal speculum exam to examine for discharge  I will also do bedside ultrasound to examine for for ectopic pregnancy I will treat patient symptomatically with Zofran and give 1 L of normal saline  I also do a urinalysis given the patient was diagnosed of UTI but has not taken antibiotics  Prior to Admission Medications   Prescriptions Last Dose Informant Patient Reported? Taking? albuterol (VENTOLIN HFA) 90 mcg/act inhaler 2017 at Unknown time  Yes Yes   Sig: Inhale      Facility-Administered Medications: None       Past Medical History:   Diagnosis Date    Asthma     Bipolar 1 disorder ([facility] [address] )     Psychiatric disorder        Past Surgical History:   Procedure Laterality Date    KNEE SURGERY         History reviewed  No pertinent family history  I have reviewed and agree with the history as documented      Social History   Substance Use Topics    Smoking status: Current Every Day Smoker     Packs/day: 0 25 Last attempt to quit: 4/6/2017    Smokeless tobacco: Never Used    Alcohol use No        Review of Systems   Constitutional: Negative for appetite change, chills, diaphoresis, fatigue and fever  HENT: Negative for congestion, ear discharge, ear pain, hearing loss, postnasal drip, rhinorrhea, sneezing and sore throat  Eyes: Negative for pain, discharge and redness  Respiratory: Negative for choking, chest tightness, shortness of breath, wheezing and stridor  Cardiovascular: Negative for chest pain and palpitations  Gastrointestinal: Positive for abdominal pain, nausea and vomiting  Negative for abdominal distention, blood in stool, constipation and diarrhea  Genitourinary: Negative for decreased urine volume, difficulty urinating, dysuria, flank pain, frequency and hematuria  Musculoskeletal: Negative for arthralgias, gait problem, joint swelling and neck pain  Skin: Negative for color change, pallor and rash  Allergic/Immunologic: Negative for environmental allergies, food allergies and immunocompromised state  Neurological: Negative for dizziness, seizures, weakness, light-headedness, numbness and headaches  Hematological: Negative for adenopathy  Does not bruise/bleed easily  Psychiatric/Behavioral: Negative for agitation and behavioral problems         Physical Exam  ED Triage Vitals   Temperature Pulse Respirations Blood Pressure SpO2   12/14/17 1855 12/14/17 1855 12/14/17 1855 12/14/17 1855 12/14/17 1855   98 7 °F (37 1 °C) 100 16 126/78 98 %      Temp src Heart Rate Source Patient Position - Orthostatic VS BP Location FiO2 (%)   -- 12/14/17 2048 12/14/17 2048 12/14/17 2048 --    Monitor Lying Right arm       Pain Score       12/14/17 2048       2           Orthostatic Vital Signs  Vitals:    12/14/17 1855 12/14/17 2048 12/14/17 2153   BP: 126/78 128/58 133/72   Pulse: 100 (!) 106 88   Patient Position - Orthostatic VS:  Lying Lying       Physical Exam   Constitutional: She is oriented to person, place, and time  She appears well-developed and well-nourished  HENT:   Head: Normocephalic and atraumatic  Nose: Nose normal    Mouth/Throat: Oropharynx is clear and moist    Eyes: Conjunctivae and EOM are normal  Pupils are equal, round, and reactive to light  Neck: Normal range of motion  Neck supple  Cardiovascular: Normal rate, regular rhythm and normal heart sounds  Exam reveals no gallop and no friction rub  No murmur heard  Pulmonary/Chest: Effort normal and breath sounds normal    Abdominal: Soft  Bowel sounds are normal  She exhibits no distension  There is tenderness  There is no rebound and no guarding  Musculoskeletal: Normal range of motion  Neurological: She is alert and oriented to person, place, and time  She has normal reflexes  Skin: Skin is warm and dry  No erythema  No pallor  Psychiatric: She has a normal mood and affect  Her behavior is normal    Nursing note and vitals reviewed  ED Medications  Medications   sodium chloride 0 9 % bolus 1,000 mL (0 mL Intravenous Stopped 12/14/17 2152)   ondansetron (ZOFRAN) injection 4 mg (4 mg Intravenous Given 12/14/17 2024)   fluconazole (DIFLUCAN) tablet 150 mg (150 mg Oral Given 12/14/17 2048)       Diagnostic Studies  Results Reviewed     Procedure Component Value Units Date/Time    Urine Microscopic [[id number]]  (Abnormal) Collected:  12/14/17 2002    Lab Status:  Final result Specimen:  Urine from Urine, Clean Catch Updated:  12/14/17 2028     RBC, UA 10-20 (A) /hpf      WBC, UA 10-20 (A) /hpf      Epithelial Cells Occasional /hpf      Bacteria, UA Occasional /hpf      Hyaline Casts, UA 3-5 (A) /lpf     Urine culture [[id number]] Collected:  12/14/17 2002    Lab Status:   In process Specimen:  Urine from Urine, Clean Catch Updated:  12/14/17 2028    POCT pregnancy, urine [[id number]]  (Normal) Resulted:  12/14/17 2003    Lab Status:  Final result Updated:  12/14/17 2003     EXT PREG TEST UR (Ref: Negative) positive    POCT urinalysis dipstick [[id number]]  (Normal) Resulted:  12/14/17 2003    Lab Status:  Final result Specimen:  Urine Updated:  12/14/17 2003     Color, UA see chart results    ED Urine Macroscopic [[id number]]  (Abnormal) Collected:  12/14/17 2002    Lab Status:  Final result Specimen:  Urine Updated:  12/14/17 2002     Color, UA Yellow     Clarity, UA Clear     pH, UA 6 5     Leukocytes, UA Negative     Nitrite, UA Negative     Protein, UA Negative mg/dl      Glucose, UA Negative mg/dl      Ketones, UA Negative mg/dl      Urobilinogen, UA 0 2 E U /dl      Bilirubin, UA Negative     Blood, UA Small (A)     Specific [place], UA 1 015    Narrative:       CLINITEK RESULT                 No orders to display         Procedures  Procedures      Phone Consults  ED Phone Contact    ED Course  ED Course as of Dec 15 0132   Thu Dec 14, 2017   2012 IUP found on bedside ultrasound, fetal heart rate 175    2138 I gave patient resources for shelters that she can stay at    2142 Patient feels better                                MDM  CritCare Time    Disposition  Final diagnoses:   Vomiting   9 weeks gestation of pregnancy   Cystitis     Time reflects when diagnosis was documented in both MDM as applicable and the Disposition within this note     Time User Action Codes Description Comment    12/14/2017  9:42 PM Viki Quemado Add [R11 10] Vomiting     12/14/2017  9:42 PM Viki Quemado Add [Z3A 09] 9 weeks gestation of pregnancy     12/14/2017  9:42 PM Viki Quemado Add [N30 90] Cystitis       ED Disposition     ED Disposition Condition Comment    Discharge  [address] discharge to home/self care      Condition at discharge: Stable        Follow-up Information     Follow up With Specialties Details Why [address] Connors, DO Family Medicine In 3 days  [address]   [phone]          Discharge Medication List as of 12/14/2017  9:43 PM      START taking these medications    Details   cephalexin (KEFLEX) 500 mg capsule Take 1 capsule by mouth every 12 (twelve) hours for 7 days, Starting Thu 12/14/2017, Until Thu 12/21/2017, Print         CONTINUE these medications which have NOT CHANGED    Details   albuterol (VENTOLIN HFA) 90 mcg/act inhaler Inhale, Starting Fri 10/24/2014, Historical Med           No discharge procedures on file  ED Provider  Attending physically available and evaluated [address]  I managed the patient along with the ED Attending      Electronically Signed by         Pallavi Vera MD  Resident  12/15/17 8734

## 2017-12-15 NOTE — DISCHARGE INSTRUCTIONS
Follow up with primary care doctor in 3-5 days  If your symptoms worsen, return to the ED     Acute Nausea and Vomiting   WHAT YOU NEED TO KNOW:   Acute nausea and vomiting start suddenly, worsen quickly, and last a short time  DISCHARGE INSTRUCTIONS:   Return to the emergency department if:   · You see blood in your vomit or your bowel movements  · You have sudden, severe pain in your chest and upper abdomen after hard vomiting or retching  · You have swelling in your neck and chest      · You are dizzy, cold, and thirsty and your eyes and mouth are dry  · You are urinating very little or not at all  · You have muscle weakness, leg cramps, and trouble breathing  · Your heart is beating much faster than normal      · You continue to vomit for more than 48 hours  Contact your healthcare provider if:   · You have frequent dry heaves (vomiting but nothing comes out)  · Your nausea and vomiting does not get better or go away after you use medicine  · You have questions or concerns about your condition or treatment  Medicines: You may need any of the following:  · Medicines  may be given to calm your stomach and stop your vomiting  You may also need medicines to help you feel more relaxed or to stop nausea and vomiting caused by motion sickness  · Gastrointestinal stimulants  are used to help empty your stomach and bowels  This may help decrease nausea and vomiting  · Take your medicine as directed  Contact your healthcare provider if you think your medicine is not helping or if you have side effects  Tell him or her if you are allergic to any medicine  Keep a list of the medicines, vitamins, and herbs you take  Include the amounts, and when and why you take them  Bring the list or the pill bottles to follow-up visits  Carry your medicine list with you in case of an emergency  Prevent or manage acute nausea and vomiting:   · Do not drink alcohol    Alcohol may upset or irritate your stomach  Too much alcohol can also cause acute nausea and vomiting  · Control stress  Headaches due to stress may cause nausea and vomiting  Find ways to relax and manage your stress  Get more rest and sleep  · Drink more liquids as directed  Vomiting can lead to dehydration  It is important to drink more liquids to help replace lost body fluids  Ask your healthcare provider how much liquid to drink each day and which liquids are best for you  Your provider may recommend that you drink an oral rehydration solution (ORS)  ORS contains water, salts, and sugar that are needed to replace the lost body fluids  Ask what kind of ORS to use, how much to drink, and where to get it  · Eat smaller meals, more often  Eat small amounts of food every 2 to 3 hours, even if you are not hungry  Food in your stomach may decrease your nausea  · Talk to your healthcare provider before you take over-the-counter (OTC) medicines  These medicines can cause serious problems if you use certain other medicines, or you have a medical condition  You may have problems if you use too much or use them for longer than the label says  Follow directions on the label carefully  Follow up with your healthcare provider as directed:  Write down your questions so you remember to ask them during your follow-up visits  © 2017 [employer] Information is for End User's use only and may not be sold, redistributed or otherwise used for commercial purposes  All illustrations and images included in CareNotes® are the copyrighted property of [employer]  or Rosalio Cano  The above information is an  only  It is not intended as medical advice for individual conditions or treatments  Talk to your doctor, nurse or pharmacist before following any medical regimen to see if it is safe and effective for you

## 2017-12-16 LAB — BACTERIA UR CULT: NORMAL

## 2017-12-17 ENCOUNTER — HOSPITAL ENCOUNTER (EMERGENCY)
Facility: HOSPITAL | Age: 22
Discharge: HOME | End: 2017-12-17
Attending: EMERGENCY MEDICINE | Admitting: EMERGENCY MEDICINE
Payer: MEDICARE

## 2017-12-17 VITALS
SYSTOLIC BLOOD PRESSURE: 109 MMHG | BODY MASS INDEX: 36.73 KG/M2 | RESPIRATION RATE: 18 BRPM | OXYGEN SATURATION: 99 % | WEIGHT: 214 LBS | HEART RATE: 90 BPM | DIASTOLIC BLOOD PRESSURE: 54 MMHG | TEMPERATURE: 98.6 F

## 2017-12-17 DIAGNOSIS — O20.0 THREATENED MISCARRIAGE: Primary | ICD-10-CM

## 2017-12-17 DIAGNOSIS — O46.90 VAGINAL BLEEDING IN PREGNANCY: ICD-10-CM

## 2017-12-17 LAB
BACTERIA UR QL AUTO: ABNORMAL /HPF
BILIRUB UR QL STRIP: NEGATIVE
CLARITY UR: CLEAR
COLOR UR: YELLOW
GLUCOSE UR STRIP-MCNC: NEGATIVE MG/DL
HGB UR QL STRIP.AUTO: ABNORMAL
KETONES UR STRIP-MCNC: NEGATIVE MG/DL
LEUKOCYTE ESTERASE UR QL STRIP: NEGATIVE
NITRITE UR QL STRIP: NEGATIVE
NON-SQ EPI CELLS URNS QL MICRO: ABNORMAL /HPF
PH UR STRIP.AUTO: 6.5 [PH] (ref 4.5–8)
PROT UR STRIP-MCNC: NEGATIVE MG/DL
RBC #/AREA URNS AUTO: ABNORMAL /HPF
SP GR UR STRIP.AUTO: 1.01 (ref 1–1.03)
UROBILINOGEN UR QL STRIP.AUTO: 0.2 E.U./DL
WBC #/AREA URNS AUTO: ABNORMAL /HPF

## 2017-12-17 PROCEDURE — 81001 URINALYSIS AUTO W/SCOPE: CPT

## 2017-12-17 PROCEDURE — 99284 EMERGENCY DEPT VISIT MOD MDM: CPT

## 2017-12-17 NOTE — DISCHARGE INSTRUCTIONS
Threatened Miscarriage   WHAT YOU NEED TO KNOW:   A threatened miscarriage occurs when you have vaginal bleeding within the first 20 weeks of pregnancy  It means that a miscarriage may happen  A threatened miscarriage may also be called a threatened   DISCHARGE INSTRUCTIONS:   Return to the emergency department if:   · You feel weak or faint  · Your pain or cramping in your abdomen or back gets worse  · You have vaginal bleeding that soaks 1 or more pads in an hour  · You pass material that looks like tissue or large clots  Contact your healthcare provider or obstetrician if:   · You have a fever  · You have trouble urinating, burning when you urinate, or feel a need to urinate often  · You have new or worsening vaginal bleeding  · You have vaginal pain or itching, or vaginal discharge that is yellow, green, or foul-smelling  · You have questions or concerns about your condition or care  Self-care: The following may help you manage your symptoms and decrease your risk for a miscarriage:  · Do not put anything in your vagina  Do not have sex, douche, or use tampons  These actions may increase your risk for infection and miscarriage  · Rest as directed  Do not exercise or do strenuous activities  These activities may cause  labor or miscarriage  Ask your healthcare provider what activities are okay to do  Stay healthy during pregnancy:   · Eat a variety of healthy foods  Healthy foods can help you get extra protein, water, and calories that you need while you are pregnant  Healthy foods include fruits, vegetables, whole-grain breads, low-fat dairy products, beans, lean meats, and fish  Avoid raw or undercooked meat and fish  Ask your healthcare provider if you need a special diet  · Take prenatal vitamins as directed  These help you get the right amount of vitamins and minerals  They may also decrease the risk of certain birth defects      · Do not drink alcohol or use illegal drugs  These can increase your risk for a miscarriage or harm your baby  · Do not smoke  Nicotine and other chemicals in cigarettes and cigars can harm your baby and cause miscarriage or  labor  Ask your healthcare provider for information if you currently smoke and need help to quit  E-cigarettes or smokeless tobacco still contain nicotine  Do not use these products  · Decrease your risk for an infection  Always wash your hands before eating or preparing meals  Do not spend time with people who are sick  Ask your healthcare provider if you need immunizations such as the flu or hepatitis B vaccine  Immunizations may decrease your risk for infections that could cause a miscarriage  · Manage your medical conditions  Keep your blood pressure and blood sugars under control  Maintain a healthy weight during pregnancy  Follow up with your obstetrician as directed: You may need to see your obstetrician frequently for ultrasounds or blood tests  Write down your questions so you remember to ask them during your visits  ©  [address] [employer] Information is for End User's use only and may not be sold, redistributed or otherwise used for commercial purposes  All illustrations and images included in CareNotes® are the copyrighted property of [employer]  or Rosalio Cano  The above information is an  only  It is not intended as medical advice for individual conditions or treatments  Talk to your doctor, nurse or pharmacist before following any medical regimen to see if it is safe and effective for you

## 2017-12-17 NOTE — ED ATTENDING ATTESTATION
Rodriguez Christianson MD, saw and evaluated the patient  I have discussed the patient with the resident/non-physician practitioner and agree with the resident's/non-physician practitioner's findings, Plan of Care, and MDM as documented in the resident's/non-physician practitioner's note, except where noted  All available labs and Radiology studies were reviewed  At this point I agree with the current assessment done in the Emergency Department  I have conducted an independent evaluation of this patient a history and physical is as follows:      Critical Care Time  CritCare Time    24 yo female , 10 weeks pregnant, c/o light spotting this morning, nausea, vomiting, no abdominal pain  Bleeding resolved  Pt had abdominal ultrasound last week with iup  Pt with no urinary symptoms  Vss, afebrile, lungs cta, rrr, abdomen soft nontender  Urine, bedside u/s  Reassurance

## 2017-12-17 NOTE — ED PROVIDER NOTES
History  Chief Complaint   Patient presents with    Vaginal Bleeding - Pregnant     Patient reports being 10 weeks pregnant, states that she had some light bleeding earlier when she went to the bathroom  50-year-old female  approximately 10 weeks pregnant presenting for evaluation of vaginal bleeding  Patient reports that symptoms started this morning and have currently resolved  She describes light vaginal bleeding, less than a normal period  Patient reports never having these symptoms before  She denies any associated lightheadedness, dizziness, CP, SOB, abdominal pain/cramping, urinary complaints or abnormal vaginal discharge  Patient was seen in the ED 4 days ago for emesis and had a bedside ultrasound at that time showing intrauterine pregnancy with fetal heart rate  Blood type is B+ per records  Patient has not followed up with OB yet  She reports that she has been taking her prenatal vitamins  A/P:   50-year-old female 10 weeks pregnant with vaginal bleeding, will do bedside ultrasound to assess fetal heart rate, UA to rule UTI, patient's blood type is B-positive therefore no RhoGAM is required, OB follow-up             Prior to Admission Medications   Prescriptions Last Dose Informant Patient Reported? Taking? Prenatal Vit-Iron Carbonyl-FA (PRENATAL MULTIVITAMIN) TABS   Yes Yes   Sig: Take 1 tablet by mouth daily   albuterol (VENTOLIN HFA) 90 mcg/act inhaler   Yes No   Sig: Inhale      Facility-Administered Medications: None       Past Medical History:   Diagnosis Date    Asthma     [facility]     Psychiatric disorder     Seizures ([facility] )        Past Surgical History:   Procedure Laterality Date    KNEE SURGERY         History reviewed  No pertinent family history  I have reviewed and agree with the history as documented      Social History   Substance Use Topics    Smoking status: Former Smoker     Packs/day: 0 25     Quit date: 2017    Smokeless tobacco: Never Used    Alcohol use No        Review of Systems   Constitutional: Negative for chills and fever  HENT: Negative for rhinorrhea and sore throat  Respiratory: Negative for cough and shortness of breath  Cardiovascular: Negative for chest pain and leg swelling  Gastrointestinal: Negative for abdominal pain, diarrhea, nausea and vomiting  Genitourinary: Positive for vaginal bleeding  Negative for dysuria, frequency, hematuria, urgency, vaginal discharge and vaginal pain  Musculoskeletal: Negative for back pain and neck pain  Skin: Negative for color change and rash  Allergic/Immunologic: Negative for immunocompromised state  Neurological: Negative for dizziness, weakness, light-headedness, numbness and headaches  Hematological: Negative for adenopathy  Does not bruise/bleed easily  Psychiatric/Behavioral: Negative for agitation and confusion  All other systems reviewed and are negative  Physical Exam  ED Triage Vitals   Temperature Pulse Respirations Blood Pressure SpO2   12/17/17 1422 12/17/17 1422 12/17/17 1422 12/17/17 1422 12/17/17 1422   98 6 °F (37 °C) 96 18 121/67 98 %      Temp Source Heart Rate Source Patient Position - Orthostatic VS BP Location FiO2 (%)   12/17/17 1422 12/17/17 1545 12/17/17 1422 12/17/17 1422 --   Oral Monitor Lying Right arm       Pain Score       12/17/17 1422       3           Orthostatic Vital Signs  Vitals:    12/17/17 1422 12/17/17 1545   BP: 121/67 109/54   Pulse: 96 90   Patient Position - Orthostatic VS: Lying Lying       Physical Exam   Constitutional: She is oriented to person, place, and time  She appears well-developed and well-nourished  HENT:   Head: Normocephalic and atraumatic  Nose: Nose normal    Mouth/Throat: Oropharynx is clear and moist    Eyes: Conjunctivae and EOM are normal    Neck: Normal range of motion  Neck supple  Cardiovascular: Normal rate, regular rhythm and normal heart sounds      Pulmonary/Chest: Effort normal and breath sounds normal    Abdominal: Soft  She exhibits no distension  There is no tenderness  There is no rebound and no guarding  No back/flank ttp   Musculoskeletal: She exhibits no edema or deformity  Neurological: She is alert and oriented to person, place, and time  She exhibits normal muscle tone  Skin: Skin is warm and dry  Psychiatric: She has a normal mood and affect  Judgment and thought content normal    Nursing note and vitals reviewed        ED Medications  Medications - No data to display    Diagnostic Studies  Results Reviewed     Procedure Component Value Units Date/Time    Urine Microscopic [[id number]]  (Abnormal) Collected:  12/17/17 1511    Lab Status:  Final result Specimen:  Urine from Urine, Clean Catch Updated:  12/17/17 1643     RBC, UA 2-4 (A) /hpf      WBC, UA 2-4 (A) /hpf      Epithelial Cells Occasional /hpf      Bacteria, UA Occasional /hpf     ED Urine Macroscopic [[id number]]  (Abnormal) Collected:  12/17/17 1511    Lab Status:  Final result Specimen:  Urine Updated:  12/17/17 1511     Color, UA Yellow     Clarity, UA Clear     pH, UA 6 5     Leukocytes, UA Negative     Nitrite, UA Negative     Protein, UA Negative mg/dl      Glucose, UA Negative mg/dl      Ketones, UA Negative mg/dl      Urobilinogen, UA 0 2 E U /dl      Bilirubin, UA Negative     Blood, UA Trace (A)     Specific [place], UA 1 010    Narrative:       CLINITEK RESULT                 No orders to display         Procedures  Pelvic Ultrasound  Performed by: Carlos WHITTINGTON  Authorized by: Geraldo Calvillo     Procedure date/time:  12/17/2017 4:40 PM  Patient location:  ED  Procedure details:     Indications: pregnant with vaginal bleeding      Assess:  Fetal viability    Technique:  Transabdominal obstetric (limited) exam  Uterine findings:     Single gestation: identified      Fetal heart rate: identified      Fetal heart rate (bpm):  166  Other findings:     Free pelvic fluid: not identified            Phone Consults  ED Phone Contact    ED Course  ED Course                                MDM  Number of Diagnoses or Management Options  Threatened miscarriage:   Vaginal bleeding in pregnancy:   Diagnosis management comments: 24 yo F approx  10 weeks pregnant with vaginal bleeding  - IUP with FHR seen on ultrasound  - discussed strict return precautions with pt  - gave pt OB clinic information         Amount and/or Complexity of Data Reviewed  Clinical lab tests: reviewed  Tests in the medicine section of CPT®: reviewed  Review and summarize past medical records: yes      CritCare Time    Disposition  Final diagnoses:   Threatened miscarriage   Vaginal bleeding in pregnancy     Time reflects when diagnosis was documented in both MDM as applicable and the Disposition within this note     Time User Action Codes Description Comment    12/17/2017  4:39 PM Sol Irons Add [O20 0] Threatened miscarriage     12/17/2017  4:39 PM Geoff Cote A Add [O46 90] Vaginal bleeding in pregnancy       ED Disposition     ED Disposition Condition Comment    Discharge  [address] discharge to home/self care  Condition at discharge: Stable        Follow-up Information     Follow up With Specialties Details Why [address]  Schedule an appointment as soon as possible for a visit  [address]  [phone]          Discharge Medication List as of 12/17/2017  4:39 PM      CONTINUE these medications which have NOT CHANGED    Details   Prenatal Vit-Iron Carbonyl-FA (PRENATAL MULTIVITAMIN) TABS Take 1 tablet by mouth daily, Historical Med      albuterol (VENTOLIN HFA) 90 mcg/act inhaler Inhale, Starting Fri 10/24/2014, Historical Med           No discharge procedures on file  ED Provider  Attending physically available and evaluated [address]  I managed the patient along with the ED Attending      Electronically Signed by         Sindy Leal DO  Resident  12/18/17 5034

## 2017-12-19 ENCOUNTER — HOSPITAL ENCOUNTER (EMERGENCY)
Facility: HOSPITAL | Age: 22
Discharge: HOME | End: 2017-12-19
Attending: EMERGENCY MEDICINE | Admitting: EMERGENCY MEDICINE
Payer: MEDICARE

## 2017-12-19 VITALS
DIASTOLIC BLOOD PRESSURE: 62 MMHG | SYSTOLIC BLOOD PRESSURE: 127 MMHG | OXYGEN SATURATION: 99 % | RESPIRATION RATE: 18 BRPM | HEART RATE: 92 BPM | WEIGHT: 214 LBS | BODY MASS INDEX: 36.73 KG/M2 | TEMPERATURE: 98.2 F

## 2017-12-19 DIAGNOSIS — R11.0 NAUSEA: Primary | ICD-10-CM

## 2017-12-19 PROCEDURE — 99285 EMERGENCY DEPT VISIT HI MDM: CPT

## 2017-12-19 NOTE — ED NOTES
Pt states she is homeless and 10 weeks pregnant  Pt feels nauseous and weak and hasn't taken her prenatal vitamins in 2 days  Pt concerned about her safety in a shelter  Pauline Salazar from case management is already aware         Artem Burger RN  62/09/97 5354

## 2017-12-19 NOTE — SOCIAL WORK
Cm contacted to complete consult for patient  Patient had resources for shelters at bedside  Patient stated that she attempted to contact some of the shelters but did not receive answers, or beds were full  Cm received permission to contact some of the shelters also to assist patient  Cm received similar answers at the shelters that were able to accept women  Cm instructed patient to contact [address] to be placed on the list for open shelter beds  Patient stated that she understood, but would most likely return to [facility], since all her stuff is there  Cm provided patient with written instructions on how to get the bus from the hospital back to the warming [facility]  Patient in agreement with this discharge plan   Cm also informed medical team

## 2017-12-19 NOTE — ED NOTES
Pt states she has been trying to get into the pregnancy shelter and would like to speak with the  about getting into it         Cindy Nicolas RN  17/23/29 0195

## 2017-12-19 NOTE — ED NOTES
Patient requesting to speak with the doctor in regards to her left knee  States that it has been causing her problems with ambulation  Patient is currently sitting with knee bent with no difficultlies        Ajith Corona RN  12/19/17 3743

## 2017-12-19 NOTE — DISCHARGE INSTRUCTIONS

## 2017-12-20 NOTE — SOCIAL WORK
CM responded to consult  Pt reported in triage that she is pregnant, homeless and does not feel safe in the [facility]  CM met with Pt and provided her with lists for [place] and [employer] since she feels unsafe at the [facility]  She was also given a list of longer-term shelters and transitional housing  Pt instructed to call all the shelters and see if the beds are available and if she can place herself on the waiting list   Pt stated no one is harming her at the shelter but she does not feel safe and that she has to be out of the shelter all day long and her feet are hurting from all the walking  Pt provided bus pass to get back to the [facility] and CM will check back with Pt after she made some phone calls  Hand off to case management office for D/C follow up

## 2017-12-20 NOTE — ED PROVIDER NOTES
History  Chief Complaint   Patient presents with    Weakness - Generalized     pt states she is 9 weeks pregnant and has been vomiting and feeling lightheaded  Patient is a 26 yo  with a pmh of bipolar disorder who presents for evaluation of nausea  Patient states that she is currently 10 weeks pregnant by recent U/S  She has been experiencing nausea throughout her pregnancy  Over the past 2 days she has een unable to keep meals down without vomiting  She is unsure of how many episodes of vomiting that she has had  She denies blood in the vomitus  She has been seen here multiple times over the month and has been given scripts for antiemetics and follow-up information for Ob/Gyn  She states that she currently lives in a local women's shelter  She would like to be placed at a shelter that can accommodate pregnant women  She complains of soreness in her knees and ankles today  She walks everywhere and is sore from walking here  She denies abdominal pain, cramping, vaginal pain, bleeding, discharge  Denies HA, sob, cp, dysuria, diarrhea, fever, chills  History provided by:  Patient   used: No        Prior to Admission Medications   Prescriptions Last Dose Informant Patient Reported? Taking? Prenatal Vit-Iron Carbonyl-FA (PRENATAL MULTIVITAMIN) TABS Past Week at Unknown time  Yes Yes   Sig: Take 1 tablet by mouth daily      Facility-Administered Medications: None       Past Medical History:   Diagnosis Date    Asthma     [facility]     Psychiatric disorder     Seizures ([facility])        Past Surgical History:   Procedure Laterality Date    KNEE SURGERY         History reviewed  No pertinent family history  I have reviewed and agree with the history as documented      Social History   Substance Use Topics    Smoking status: Former Smoker     Packs/day: 0 25     Quit date: 2017    Smokeless tobacco: Never Used    Alcohol use No        Review of Systems Constitutional: Negative for appetite change, chills, diaphoresis, fatigue and fever  HENT: Negative for congestion, rhinorrhea and sore throat  Respiratory: Negative for cough, shortness of breath, wheezing and stridor  Cardiovascular: Negative for chest pain, palpitations and leg swelling  Gastrointestinal: Positive for nausea and vomiting  Negative for abdominal distention, abdominal pain, anal bleeding, blood in stool, constipation and diarrhea  Endocrine: Negative for polydipsia and polyuria  Genitourinary: Negative for dysuria, hematuria, menstrual problem, vaginal bleeding, vaginal discharge and vaginal pain  Musculoskeletal: Positive for arthralgias  Negative for back pain, myalgias, neck pain and neck stiffness  Skin: Negative for pallor, rash and wound  Neurological: Negative for dizziness and headaches  Psychiatric/Behavioral: Negative for behavioral problems and confusion  The patient is nervous/anxious  Physical Exam  ED Triage Vitals   Temperature Pulse Respirations Blood Pressure SpO2   12/19/17 1313 12/19/17 1313 12/19/17 1313 12/19/17 1313 12/19/17 1313   98 2 °F (36 8 °C) (!) 115 18 119/58 98 %      Temp Source Heart Rate Source Patient Position - Orthostatic VS BP Location FiO2 (%)   12/19/17 1313 12/19/17 1530 12/19/17 1530 12/19/17 1530 --   Oral Monitor Lying Right arm       Pain Score       12/19/17 1530       3           Orthostatic Vital Signs  Vitals:    12/19/17 1313 12/19/17 1530   BP: 119/58 127/62   Pulse: (!) 115 92   Patient Position - Orthostatic VS:  Lying       Physical Exam   Constitutional: She is oriented to person, place, and time  Vital signs are normal  She appears well-developed and well-nourished  No distress  HENT:   Head: Normocephalic and atraumatic  Eyes: Conjunctivae are normal  No scleral icterus  Neck: Normal range of motion  Neck supple  Cardiovascular: Normal rate, regular rhythm, normal heart sounds and intact distal pulses  No murmur heard  Pulmonary/Chest: Effort normal and breath sounds normal  No respiratory distress  She has no wheezes  She has no rales  She exhibits no tenderness  Abdominal: Soft  Bowel sounds are normal  She exhibits no distension and no mass  There is no tenderness  There is no rebound and no guarding  Musculoskeletal: Normal range of motion  She exhibits no edema, tenderness or deformity  Neurological: She is alert and oriented to person, place, and time  Skin: Skin is warm and dry  No rash noted  She is not diaphoretic  No erythema  No pallor  Psychiatric: She has a normal mood and affect  Her behavior is normal    Nursing note and vitals reviewed  ED Medications  Medications - No data to display    Diagnostic Studies  Results Reviewed     None                 No orders to display         Procedures  Procedures      Phone Consults  ED Phone Contact    ED Course  ED Course                                MDM  Number of Diagnoses or Management Options  Nausea: new and requires workup  Diagnosis management comments: 24 yo  at 10 weeks by recent U/S presents with nausea and vomiting  Patient is well appearing  Will obtain UA to assess for dehydration  Will have  meet with patient to assist with placement in appropriate shelter for pregnant women  Patient received information and is refusing UA  Will discharge back to shelter         Amount and/or Complexity of Data Reviewed  Decide to obtain previous medical records or to obtain history from someone other than the patient: yes  Obtain history from someone other than the patient: yes  Review and summarize past medical records: yes  Discuss the patient with other providers: yes  Independent visualization of images, tracings, or specimens: yes    Risk of Complications, Morbidity, and/or Mortality  Presenting problems: low  Diagnostic procedures: minimal  Management options: minimal    Patient Progress  Patient progress: stable    CritCare Time    Disposition  Final diagnoses:   Nausea     Time reflects when diagnosis was documented in both MDM as applicable and the Disposition within this note     Time User Action Codes Description Comment    12/19/2017  3:56 PM Carlos Chapa Add [R11 0] Nausea       ED Disposition     ED Disposition Condition Comment    Discharge  [address] discharge to home/self care  Condition at discharge: Stable        Follow-up Information     Follow up With Specialties Details Why [address] [facility] Emergency Department Emergency Medicine Go to As needed, If symptoms worsen [address] [place] [address], [address] [place] [address], [facility]        Discharge Medication List as of 12/19/2017  3:57 PM      CONTINUE these medications which have NOT CHANGED    Details   Prenatal Vit-Iron Carbonyl-FA (PRENATAL MULTIVITAMIN) TABS Take 1 tablet by mouth daily, Historical Med           No discharge procedures on file  ED Provider  Attending physically available and evaluated [address]  I managed the patient along with the ED Attending      Electronically Signed by         Shira Yoder MD  Resident  12/20/17 2667

## 2017-12-21 NOTE — ED ATTENDING ATTESTATION
Roma Jimenez DO, saw and evaluated the patient  I have discussed the patient with the resident/non-physician practitioner and agree with the resident's/non-physician practitioner's findings, Plan of Care, and MDM as documented in the resident's/non-physician practitioner's note, except where noted  All available labs and Radiology studies were reviewed  At this point I agree with the current assessment done in the Emergency Department  I have conducted an independent evaluation of this patient a history and physical is as follows:  Patient is a 52-year-old female who is a  at approximately 10 weeks gestation  Patient has past medical history bipolar disorder  Patient presents for evaluation of persistent nausea and generalized weakness  States she's been experiencing nausea throughout her entire pregnancy  Decreased by mouth intake secondary to nausea  Denies any vaginal bleeding, cramping, spotting, discharge, pelvic pain or abdominal pain  Denies shortness of breath or swelling or edema  Denies any headache or visual changes  Patient states currently lives in a women's shelter and has had difficulty getting to her OB appointments  patient overall appears well  Urinalysis was negative for infection and also did not indicate any evidence for dehydration such as the presence of ketones or an elevated specific gravity  Patient was evaluated by our  for other living arrangements and follow up OB appointments      Critical Care Time  CritCare Time    Procedures

## 2018-01-02 ENCOUNTER — ALLSCRIPTS OFFICE VISIT (OUTPATIENT)
Dept: OTHER | Facility: OTHER | Age: 23
End: 2018-01-02

## 2018-01-09 ENCOUNTER — GENERIC CONVERSION - ENCOUNTER (OUTPATIENT)
Dept: OTHER | Facility: OTHER | Age: 23
End: 2018-01-09

## 2018-01-09 NOTE — PROGRESS NOTES
History of Present Illness  Care Coordination Encounter Information:   Type of Encounter: Telephonic    Spoke to Other   voice mail  Care Coordination SL Nurse [facility]:   The reason for call is to discuss outreach for follow up/needed services  Attempted to reach pt to see how she was doing and to encourage her to schedule and keep appt with PCP  Left VM requested pt call [facility] to schedule appt with PCP  Active Problems    1  Abnormal menses (626 9) (N92 6)   2  Attention deficit hyperactivity disorder (ADHD) (314 01) (F90 9)   3  Bilateral leg edema (782 3) (R60 0)   4  Bipolar disorder (296 80) (F31 9)   5  Birth control (V25 9) (Z30 9)   6  BMI 33 0-33 9,adult (V85 33) (Z68 33)   7  Chronic fatigue (780 79) (R53 82)   8  Chronic low back pain (724 2,338 29) (M54 5,G89 29)   9  Deviated nasal septum (470) (J34 2)   10  Follow-up exam (V67 9) (Z09)   11  Hypothyroidism (244 9) (E03 9)   12  Mucocele, 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)   13  Need for influenza vaccination (V04 81) (Z23)   14  Palpitations (785 1) (R00 2)   15  Patellofemoral arthralgia of left knee (719 46) (M25 562)   16  Seasonal allergies (477 9) (J30 2)   17  Stabbing chest pain (786 50) (R07 89)   18  Tachycardia (785 0) (R00 0)    Past Medical History    1  History of Acute URI (465 9) (J06 9)   2  History of Acute viral pharyngitis (462) (J02 8,B97 89)   3  History of Asthma with exacerbation (493 92) (J45 901)   4  History of acute sinusitis (V12 69) (Z87 09)   5  History of contact dermatitis (V13 3) (Z87 2)   6  History of MYLA (middle ear effusion), left (381 4) (H65 92)   7  History of Screening for HIV without presence of risk factors (V73 89) (Z11 4)   8  History of Upper respiratory disease (478 9) (J39 9)    Surgical History    1  Denied: History Of Prior Surgery    Family History  Mother    1  Family history of Cancer (199 1)   2  Family history of Diabetes  Father    3  Family history of Cancer (199 1)   4   Family history of Diabetes    Social History    · Current every day smoker (305 1) (F17 200)   · Current smoker (305 1) (F17 200)   · Lack of exercise (V69 0) (Z72 3)   · No alcohol use   · Pets/Animals: Dog   · Single   · Sleeps 8 - 10 hours a day    Current Meds    1  Loestrin 1 5/30 (21) 1 5-30 MG-MCG Oral Tablet; TAKE 1 TABLET DAILY AS DIRECTED; Therapy: 72VJV1498 to (Evaluate:33Nnr3381); Last Rx:22Mar2016 Ordered    2  Naproxen 375 MG Oral Tablet; TAKE 1 TABLET EVERY 12 HOURS AS NEEDED; Therapy: 32HMX2412 to (Ladon Peabody)  Requested for: 30MHJ8923; Last   Rx:08Nov2016 Ordered    3  Depakote 500 MG Oral Tablet Delayed Release (Divalproex Sodium); TAKE 1 TABLET AT   BEDTIME; Therapy: (Recorded:29Nov2016) to Recorded    4  Ventolin  (90 Base) MCG/ACT Inhalation Aerosol Solution; INHALE ONE TO TWO   PUFFS EVERY 4 TO 6 HOURS  AS NEEDED; Therapy: 67QGB4785 to (Evaluate:95Tat6051); Last Rx:27Mar2015 Ordered    5  Albuterol Sulfate (2 5 MG/3ML) 0 083% Inhalation Nebulization Solution; Take 1 puff every   4 hours as needed for wheezing; Therapy: 45WSP5387 to (Last Rx:06Feb2015) Ordered    6  Fluticasone Propionate 50 MCG/ACT Nasal Suspension; use 2 sprays in each nostril   once daily; Therapy: 02LFJ9549 to (Last Rx:27Jan2016) Ordered    7  SEROquel 50 MG Oral Tablet (QUEtiapine Fumarate); Therapy: (Recorded:22Apr2016) to Recorded    Allergies    1  No Known Drug Allergies    2  Seafood   3  Shellfish    End of Encounter Meds    1  Loestrin 1 5/30 (21) 1 5-30 MG-MCG Oral Tablet; TAKE 1 TABLET DAILY AS DIRECTED; Therapy: 73DSS8431 to (Evaluate:61Vpj0156); Last Rx:22Mar2016 Ordered    2  Naproxen 375 MG Oral Tablet; TAKE 1 TABLET EVERY 12 HOURS AS NEEDED; Therapy: 93CPG1271 to (Ladon Peabody)  Requested for: 74UZA5020; Last   Rx:08Nov2016 Ordered    3  Depakote 500 MG Oral Tablet Delayed Release (Divalproex Sodium); TAKE 1 TABLET AT   BEDTIME; Therapy: (Recorded:29Nov2016) to Recorded    4   Ventolin  (90 Base) MCG/ACT Inhalation Aerosol Solution; INHALE ONE TO TWO   PUFFS EVERY 4 TO 6 HOURS  AS NEEDED; Therapy: 88OQL6536 to (Evaluate:23Pfz6886); Last Rx:27Mar2015 Ordered    5  Albuterol Sulfate (2 5 MG/3ML) 0 083% Inhalation Nebulization Solution; Take 1 puff every   4 hours as needed for wheezing; Therapy: 61UZL2653 to (Last Rx:12Lnn0652) Ordered    6  Fluticasone Propionate 50 MCG/ACT Nasal Suspension; use 2 sprays in each nostril   once daily; Therapy: 34TFM1272 to (Last Rx:27Jan2016) Ordered    7  SEROquel 50 MG Oral Tablet (QUEtiapine Fumarate); Therapy: (Deborah Hippo) to Recorded    Patient Care Team    Care Team Member Role Specialty Office Number   Nava OLIVERA    Cardiology [phone]   [facility], [address] [facility] [address] [phone]     Signatures   Electronically signed by : Patric Case RN; Feb 1 2017  3:18PM EST                       (Author)

## 2018-01-09 NOTE — MISCELLANEOUS
Message  Message Free Text Note Form: Pt called on call phone c/o once daily vomiting for the past 5 weeks  She is a 26 yo primigravida currently at gestation age 7 wks who states that for the past 5 weeks, she has been vomiting once daily  Vomitus is NBNB  Pt states she she was not been able to follow up at [facility] due to inability to get through on the phone  She denies any abdominal pain, vaginal bleeding, diarrhea or urinary Sx  She also missed her past 2 appts  Offered pt to call in gravol and she to call [facility] in the am for an shari't  However pt is asking to be seen by an MD   She denies any lethargy, decreased urine output or weakness  Pt is insisting she needs to be seen today as she is "tired of this" and even though Sx have been unchanged for 5 weeks  She was advised that [facility] is closed today and will need to go to an urgent care centre or ER if she needs to be seen today   She verbalised understanding and prefers to go to the ER      Signatures   Electronically signed by : MANGO Mccord ; Aug  6 2017  2:10PM EST                       (Author)    Electronically signed by : MANGO Mccord ; Aug  6 2017  2:12PM EST                       (Author)    Electronically signed by : MANGO Marcus ; Aug  7 2017  7:07AM EST                       (Review)

## 2018-01-09 NOTE — MISCELLANEOUS
Provider Comments  Provider Comments:   CALLED PT FOR NOT SHOW, L/M TO RESCHEDULED          Signatures   Electronically signed by : MANGO Graham ; Nov 27 2017  9:34AM EST                       (Author)

## 2018-01-10 NOTE — MISCELLANEOUS
Message  Message Free Text Note Form: pt called with low back numbness for 3-4 mos  no incontinence  able to ambulate  told if still bothering her monday call [facility] for appt        Signatures   Electronically signed by : Daniel Ruiz DO; Nov 25 2016 10:00PM EST                       (Author)

## 2018-01-11 NOTE — MISCELLANEOUS
Message  Message Free Text Note Form: Message received @: 5:32 PM  Message Return @: 5:33PM  Message: seen in office for Ear pain and fluid behind the ear, now its bleeding  Plan: pt was advised to go to ED since she has wilner blood from the ear and the throat  Signatures   Electronically signed by : MANGO Diaz ; Feb  3 2016 10:10AM EST                       (Author)    Electronically signed by :  JAROD Leavitt ; Feb  3 2016  3:54PM EST                       (Author)

## 2018-01-11 NOTE — MISCELLANEOUS
Message  Message Free Text Note Form: Call received on call phone at 7:35pm and call returned promptly  Patient reports she has been feeling dizzy and nausea x 1 day  Patient denies fever, chills, cough, rhinorrhea  She reports she had one episode of NBNB emesis  Denies skin rashes  No hx of ill contacts  Patient reports is at a friends house while she was on the phone with me  She is tolerating oral intake  Voiding independently  No consumption of raw seafood/oysters'  Patient also reports she took 2 home pregnancy tests were negative  Patient reports she had her last menstrual period 6 weeks  She reports she was last sexually active on October 4, 2017, which was unprotected  Discussed with patient to keep hydrated  Patient doesn't need to be evaluated in ED as she is tolerating oral intake, voiding independently  And told the patient to call and make an appointment tomorrow to be evaluated by PCP        Signatures   Electronically signed by : MANGO Wynne ; Nov 12 2017 10:05PM EST                       (Author)    Electronically signed by : MANGO Talavera ; Dec  7 2017  4:28PM EST

## 2018-01-11 NOTE — PROCEDURES
Procedures by Christiano Daigle DO at 2016   7:24 PM      Author:  Christiano Daigle DO Service:  Cardiology Author Type:  Physician     Filed:  2016  7:31 PM Date of Service:  2016  7:24 PM Status:  Signed     :  Christiano Daigle DO (Physician)            PT NAME: Charlet Goldberg  : 1995  AGE: 22 y o  GENDER: female  MRN: [id number]  PROCEDURE: Holter monitor - 24 hour      INDICATIONS:   24 hour Holter monitor was performed 16 for evaluation of palpitations  PROCEDURE:    Average heart rate was 89 BPM; minimum heart rate was sinus bradycardia at 62 BPM at 02:30; and maximum heart rate was sinus tachycardia at 139 BPM at 13:38  No Ventricular ectopic activity  Supraventricular ectopic activity consisted of 1 isolated beat  No significant bradyarrhythmias were present  No significant symptoms recorded in patient diary    IMPRESSION:  1  Normal 24 hour holter tracing report  2  Rhythm was sinus throughout monitoring period  3  There were rare episodes of Supraventricular ectopic activity  4  There were no episodes of Ventricular ectopic activity    5  Patient's diary did not show any significant symptoms    Interpreted by: Christian[facility] Daigle DO, [facility]             Received for:Sesar Kidd DO  Aug 12 2016  7:29PM [facility] Standard Time

## 2018-01-11 NOTE — MISCELLANEOUS
Provider Comments  Provider Comments:   L/M TO CALL OFFICE TO R/S MISSED APPT LAD      Signatures   Electronically signed by : DANITZA Chapa;  Aug 22 2017  2:53PM EST                       (Author)

## 2018-01-11 NOTE — MISCELLANEOUS
Provider Comments  Provider Comments:   S/W pt who states she didn't know she missed her apt  She was sleeping and will call back later to R/S  Signatures   Electronically signed by :  Patsy Ledesma DO; May  1 2017  9:10AM EST                       (Author)

## 2018-01-12 VITALS
HEIGHT: 65 IN | SYSTOLIC BLOOD PRESSURE: 112 MMHG | TEMPERATURE: 98.4 F | DIASTOLIC BLOOD PRESSURE: 60 MMHG | HEART RATE: 118 BPM | RESPIRATION RATE: 18 BRPM | OXYGEN SATURATION: 98 %

## 2018-01-12 NOTE — MISCELLANEOUS
Message  Message Free Text Note Form: called on-call phone at 2:55pm  call returned immediately  States that yesterday visited the ER for shoulder pain after being accidentally kicked  was told she dislocated it  Had it relocated and was placed in sling  Today she was swimming and slipped and hurt it again  severe pain  thinks she dislocated it again  Wants to know what to take for pain  Advised her that if she thinks its dislocated again she needs to have someone take her to the ER to have it replaced again and from here on needs to keep it in the sling and avoid activity that might re-injure it  Patient also asked for referral to cardiology, has apt tomorrow  advised her to call for that during regular business hours  she agreed to plan        Signatures   Electronically signed by : Aleksander Cary MD; Jun 25 2016  5:16PM EST                       (Author)

## 2018-01-12 NOTE — PROGRESS NOTES
Assessment    1  Bilateral leg edema (782 3) (R60 0)    Plan  Bilateral leg edema    · Furosemide 20 MG Oral Tablet; TAKE 1 TABLET DAILY    Discussion/Summary    Arlene Simon is a 23 yo F who presents today for f/u of bilateral lower extremity edema  Kidney function and cardiac function appear to be WNL  Advised leg elevation, weight loss, compression stockings, monitoring of weight, and started pt on Furosemide 20mg daily for 30 days with 1 refill  Pt is told to follow up in 1 month to recheck blood work, weight and status of swelling  Chief Complaint  Patient presents with swollen ankles and feet  History of Present Illness  HPI: Arlene Simon is an obese 23 yo F who presents today for continuing swelling in her ankles and feet bilaterally since the beginning of March  Pt was seen for the same complaint on 3/7/17 and sent home on 1 week of Furosemide 20mg  Pt states that this helped her significantly but after she stopped taking the Furosemide, her swelling came back  It is associated with pain in her legs and occasional trouble walking  She was seen in the ED on 4/1/17 and blood work done at that time showed normal kidney function  Pt was evaluated by Dr Trino Pinon (Cardiologist) and had an echo done on 12/08/2016 which showed an EF of 55-60% and no significant cardiac abnormalities  Review of Systems    Constitutional: No fever, no chills, feels well, no tiredness, no recent weight gain or loss  Cardiovascular: fast heart rate and lower extremity edema, but the heart rate was not slow, no chest pain and no palpitations  Respiratory: no shortness of breath, no cough and no wheezing  Gastrointestinal: no complaints of abdominal pain, no constipation, no nausea or diarrhea, no vomiting, no bloody stools  Genitourinary: Increased frequency of urination  , but no dysuria, no pelvic pain, no incontinence and no dysmenorrhea  Musculoskeletal: no arthralgias and no myalgias  Integumentary: no rashes, no skin lesions and no skin wound  Neurological: no headache, no numbness, no tingling and no dizziness  Active Problems    1  Abnormal menses (626 9) (N92 6)   2  Attention deficit hyperactivity disorder (ADHD) (314 01) (F90 9)   3  Bilateral leg edema (782 3) (R60 0)   4  Bipolar disorder (296 80) (F31 9)   5  Birth control (V25 9) (Z30 9)   6  BMI 33 0-33 9,adult (V85 33) (Z68 33)   7  Chronic fatigue (780 79) (R53 82)   8  Chronic low back pain (724 2,338 29) (M54 5,G89 29)   9  Deviated nasal septum (470) (J34 2)   10  Follow-up exam (V67 9) (Z09)   11  Hypothyroidism (244 9) (E03 9)   12  Mucocele, 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)   13  Need for influenza vaccination (V04 81) (Z23)   14  Palpitations (785 1) (R00 2)   15  Patellofemoral arthralgia of left knee (719 46) (M25 562)   16  Seasonal allergies (477 9) (J30 2)   17  Stabbing chest pain (786 50) (R07 89)   18  Tachycardia (785 0) (R00 0)    Past Medical History    1  History of Acute URI (465 9) (J06 9)   2  History of Acute viral pharyngitis (462) (J02 8,B97 89)   3  History of Asthma with exacerbation (493 92) (J45 901)   4  History of acute sinusitis (V12 69) (Z87 09)   5  History of contact dermatitis (V13 3) (Z87 2)   6  History of MYLA (middle ear effusion), left (381 4) (H65 92)   7  History of Screening for HIV without presence of risk factors (V73 89) (Z11 4)   8  History of Upper respiratory disease (478 9) (J39 9)    Family History  Mother    1  Family history of Cancer (199 1)   2  Family history of Diabetes  Father    3  Family history of Cancer (199 1)   4  Family history of Diabetes    Social History    · Current every day smoker (305 1) (F17 200)   · Smokes about 1-2 cigarettes/day - 2 yrs   · Current smoker (305 1) (F17 200)   · Lack of exercise (V69 0) (Z72 3)   · No alcohol use   · Pets/Animals: Dog   · Single   · Sleeps 8 - 10 hours a day    Surgical History    1   Denied: History Of Prior Surgery    Current Meds   1  Albuterol Sulfate (2 5 MG/3ML) 0 083% Inhalation Nebulization Solution; Take 1 puff   every 4 hours as needed for wheezing; Therapy: 40IJP5170 to (Last Rx:99Gvj0337) Ordered   2  Depakote 500 MG Oral Tablet Delayed Release; TAKE 1 TABLET AT BEDTIME; Therapy: (Recorded:29Nov2016) to Recorded   3  Furosemide 20 MG Oral Tablet; TAKE 1 TABLET DAILY; Therapy: 75YEF0629 to (Evaluate:14Mar2017)  Requested for: 56USY8472; Last   Rx:07Mar2017 Ordered   4  Loestrin 1 5/30 (21) 1 5-30 MG-MCG Oral Tablet; TAKE 1 TABLET DAILY FOR 21 DAYS,   THEN 7 TABLET-FREE DAYS; REPEAT; Therapy: 39QJA5234 to (Evaluate:30Nov2017)  Requested for: 68AGP8816; Last   Rx:23Mar2017 Ordered   5  Microgestin 1 5/30 1 5-30 MG-MCG Oral Tablet; take 1 tablet by mouth once daily as   directed; Therapy: 18ZFD7683 to (Vimal Joe)  Requested for: 12GNY5837; Last   Rx:23Mar2017 Ordered   6  Naproxen 375 MG Oral Tablet; TAKE 1 TABLET EVERY 12 HOURS AS NEEDED; Therapy: 85GSD3478 to (Jaime Whitehead)  Requested for: 28GNP2503; Last   Rx:08Nov2016 Ordered   7  SEROquel 50 MG Oral Tablet; Therapy: (Montserrat White) to Recorded   8  Ventolin  (90 Base) MCG/ACT Inhalation Aerosol Solution; INHALE ONE TO   TWO PUFFS EVERY 4 TO 6 HOURS  AS NEEDED; Therapy: 07ILC0439 to (Evaluate:28Jty5114); Last Rx:27Mar2015 Ordered    Allergies    1  No Known Drug Allergies    2  Seafood   3  Shellfish    Vitals   Recorded: 13Apr2017 03:54PM   Temperature 98 9 F   Heart Rate 113   Respiration 18   Systolic 887   Diastolic 78   Height 5 ft 4 5 in   Weight 225 lb    BMI Calculated 38 02   BSA Calculated 2 07   O2 Saturation 98     Physical Exam    Constitutional   General appearance: No acute distress, well appearing and well nourished  Pulmonary   Respiratory effort: No increased work of breathing or signs of respiratory distress  Auscultation of lungs: Clear to auscultation      Cardiovascular   Palpation of heart: Normal PMI, no thrills  Auscultation of heart: Abnormal   The heart rate was tachycardic  Examination of extremities for edema and/or varicosities: Abnormal   bilateral ankle 1+ pitting edema and bilateral pretibial 1+ pitting edema  Skin   Skin and subcutaneous tissue: Normal without rashes or lesions  Neurologic   Sensation: No sensory loss  Psychiatric   Orientation to person, place, and time: Normal     Mood and affect: Normal          Attending Note  Attending Note [facility]: Patient's History: 22 y o  here for f/u of LE edema- temporary improvement while on short course of furosemide  Key Parts of the Exam: As per resident's note  Diagnosis and Plan: LE edema- stable clinically, in patient with normal renal function and normal cardiac w/u done a little over a year ago; likely related to obesity  Plan- low dose furosemide, f/u in 1-2 weeks with BMP at that time; counselled re: weight loss; below the knee compression stockings  I agree with the Resident's note  Future Appointments    Date/Time Provider Specialty Site   05/03/2017 05:15 PM MANGO Bradford  Family Medicine [facility]     Signatures   Electronically signed by : Abdirahman Valladares MD; Apr 13 2017  4:54PM EST                       (Author)    Electronically signed by :  MANGO Allison ; Apr 13 2017  5:00PM EST                       (Co-author)

## 2018-01-13 VITALS
BODY MASS INDEX: 37.15 KG/M2 | OXYGEN SATURATION: 99 % | SYSTOLIC BLOOD PRESSURE: 110 MMHG | HEART RATE: 109 BPM | DIASTOLIC BLOOD PRESSURE: 82 MMHG | HEIGHT: 65 IN | RESPIRATION RATE: 18 BRPM | WEIGHT: 223 LBS | TEMPERATURE: 95.9 F

## 2018-01-13 VITALS
HEART RATE: 107 BPM | HEIGHT: 65 IN | BODY MASS INDEX: 36.49 KG/M2 | SYSTOLIC BLOOD PRESSURE: 110 MMHG | WEIGHT: 219 LBS | RESPIRATION RATE: 18 BRPM | TEMPERATURE: 97.9 F | DIASTOLIC BLOOD PRESSURE: 70 MMHG

## 2018-01-13 VITALS
WEIGHT: 225 LBS | HEIGHT: 65 IN | HEART RATE: 113 BPM | SYSTOLIC BLOOD PRESSURE: 118 MMHG | RESPIRATION RATE: 18 BRPM | OXYGEN SATURATION: 98 % | BODY MASS INDEX: 37.49 KG/M2 | DIASTOLIC BLOOD PRESSURE: 78 MMHG | TEMPERATURE: 98.9 F

## 2018-01-13 VITALS
HEIGHT: 65 IN | HEART RATE: 113 BPM | DIASTOLIC BLOOD PRESSURE: 62 MMHG | OXYGEN SATURATION: 98 % | RESPIRATION RATE: 18 BRPM | WEIGHT: 227.44 LBS | BODY MASS INDEX: 37.89 KG/M2 | SYSTOLIC BLOOD PRESSURE: 110 MMHG

## 2018-01-13 NOTE — MISCELLANEOUS
Message  patient called on call phone at 6:30pm  call returned immediately  21 yo Female patient states shes having chest pain, substernal with palpitations for the last 5 days  occurs with rest  no other symptoms  chart reviewed and noted that patient saw dr Nikko Cabezas on June 28th  he ordered a Holter monitor and did a stress test  Both nml  I gave her the results of the tests and let her know that if the pain is significant she should come to the ER to be evaluated otherwise she can call Dr Tejinder Wiley office Monday morning to follow up  He had also recommended that she wear jody hose and start high salt diet  I asked if she is doing so and she states she hasn't because shes been busy  I advised her to follow cardio recs for best results with her symptoms  patient stated understanding  unsure if she wants to go to the ED at this time        Signatures   Electronically signed by : Rosetta Concepcion MD; Aug 20 2016  6:41PM EST                       (Author)

## 2018-01-13 NOTE — MISCELLANEOUS
Message  26 yo F with dog scratch yesterday over leg now with some swelling and pain  Denies rash or restriction in ROM  Denies subjective chills or fevers  Advised patient to wash leg with soap and water and monitor for signs for infection  Call office tomorrow AM and schedule to be seen same day  Patient understands and agrees with plan        Signatures   Electronically signed by : MANGO Marx ; Aug 20 2017  3:19PM EST                       (Author)    Electronically signed by : MANGO Valente ; Aug 21 2017 10:30AM EST                       (Author)

## 2018-01-14 VITALS
DIASTOLIC BLOOD PRESSURE: 80 MMHG | TEMPERATURE: 97.4 F | RESPIRATION RATE: 18 BRPM | HEART RATE: 112 BPM | OXYGEN SATURATION: 98 % | HEIGHT: 65 IN | SYSTOLIC BLOOD PRESSURE: 122 MMHG | WEIGHT: 224 LBS | BODY MASS INDEX: 37.32 KG/M2

## 2018-01-15 NOTE — MISCELLANEOUS
Provider Comments  Provider Comments:   no show, called and left message on machine        Signatures   Electronically signed by : MANGO Mendenhall ; Dec 12 2016  9:16AM EST                       (Author)

## 2018-01-15 NOTE — MISCELLANEOUS
Provider Comments  Provider Comments:     NOT AVAILABLE TO LEFT MESSAGE        Signatures   Electronically signed by :  Idelle Kussmaul, M D ; Aug  5 2017 10:33PM EST                       (Author)

## 2018-01-15 NOTE — MISCELLANEOUS
Message  Message Free Text Note Form: pt called this evening around 6:47pm  returned call within 10 mins  states she has been have rectal bleeding for 1 month- more than 10 times a day  today she felt dizzy and hence call the on call phone- denies chest pain, SOB, n/v or abdominal pain  pt states she's loosing a lot of blood daily and is worried  advised pt to go to ER for evaluation and check her Hgb  pt agreed      [facility]   Electronically signed by :  MANGO Hernadez ; Apr 1 2017  8:35PM EST                       (Author)

## 2018-01-15 NOTE — MISCELLANEOUS
Message  Message Free Text Note Form: Patient called on-call provider phone  Call returned immediately  Multiple attempts made to reach patient, however the phone would ring one or two times and then go straight to voicemail  Left voicemail indicating that I need to speak to patient and to call us back  Patient's message on the on-call provider phone stated "I took a pill 2 hours ago  I just drank alcohol  I was unaware that I could not drink while on this medication  I feel funny " Upon chart review, it shows patient was recently prescribed meclizine 25mg PO TID for dizziness and Zofran 8mg PO TID PRN  Best guess would be that patient took meclizine with alcohol, exacerbating her symptoms due to interaction  Due to patient not answering her phone and moderate interaction with alcohol + meclizine including side effects of CNS depression, breathing rate, phone called placed to [address] to conduct a well-check on patient  Dr Tamra Cedeno is aware  Signatures   Electronically signed by : MANGO Mahoney ; Sep 16 2017  8:29PM EST                       (Author)    Electronically signed by :  JAROD Simon ; Sep 26 2017  9:56AM EST                       (Author)

## 2018-01-16 NOTE — MISCELLANEOUS
Provider Comments  Provider Comments:   CALLED PT FOR NOT SHOW, L/M TO RESCHEDULED          Signatures   Electronically signed by : MANGO Gómez ; Feb 17 2017  5:08PM EST                       (Author)

## 2018-01-16 NOTE — PROGRESS NOTES
Chief Complaint  patient did not refill birth control pill and has not taken it for one month , lmp 7/24/16-7/30/16 , reports breast tenderness , urine hcg negative at home and in office , advised patient to use condoms if pregnancy is not desired and see doctor if menses does not return in the next couple weeks  Active Problems    1  Abnormal menses (626 9) (N92 6)   2  Attention deficit hyperactivity disorder (ADHD) (314 01) (F90 9)   3  Bipolar disorder (296 80) (F31 9)   4  Birth control (V25 9) (Z30 9)   5  Chronic fatigue (780 79) (R53 82)   6  Deviated nasal septum (470) (J34 2)   7  Encounter for screening for cardiovascular disorders (V81 2) (Z13 6)   8  Generalized rash (782 1) (R21)   9  Mucocele, 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)   10  Palpitations (785 1) (R00 2)   11  Patellofemoral arthralgia of left knee (719 46) (M25 562)   12  Seasonal allergies (477 9) (J30 2)   13  Stabbing chest pain (786 50) (R07 89)   14  Tachycardia (785 0) (R00 0)    Current Meds   1  Albuterol Sulfate (2 5 MG/3ML) 0 083% Inhalation Nebulization Solution; Take 1 puff   every 4 hours as needed for wheezing; Therapy: 49UPV1262 to (Last Rx:64Buy4363) Ordered   2  Fluticasone Propionate 50 MCG/ACT Nasal Suspension; use 2 sprays in each nostril   once daily; Therapy: 12VBY1957 to (Last Rx:27Jan2016) Ordered   3  HydrOXYzine HCl - 10 MG Oral Tablet; TAKE 1 TABLET 3 TIMES DAILY AS NEEDED; Therapy: 22Apr2016 to (Evaluate:69Lfv6888); Last Rx:22Apr2016 Ordered   4  Loestrin 1 5/30 (21) 1 5-30 MG-MCG Oral Tablet; TAKE 1 TABLET DAILY AS DIRECTED; Therapy: 42SAM0327 to (Evaluate:85Mpv8984); Last Rx:22Mar2016 Ordered   5  Loratadine-D 12HR 5-120 MG Oral Tablet Extended Release 12 Hour; TAKE 1 TABLET   EVERY 12 HOURS; Therapy: 90POY5310 to (Evaluate:50Gzo2822)  Requested for: 40NBR2275; Last   Rx:25Mar2016 Ordered   6  SEROquel 50 MG Oral Tablet; Therapy: (Ashlyn Mccarty) to Recorded   7   Ventolin  (90 Base) MCG/ACT Inhalation Aerosol Solution; INHALE ONE TO   TWO PUFFS EVERY 4 TO 6 HOURS  AS NEEDED; Therapy: 23OEO6728 to (Evaluate:82Rcg6042); Last Rx:27Mar2015 Ordered    Allergies    1  No Known Drug Allergies    2  Seafood   3   Shellfish    Results/Data  Urine HCG- POC 48Xkb5591 11:33AM Kalli Levy     Test Name Result Flag Reference   Urine HCG Negative         Plan  Abnormal menses    · Urine HCG- POC; Status:Complete;   Done: 64CGN3636 11:33AM    Signatures   Electronically signed by : MANGO Kahn ; Aug 30 2016 11:53AM EST                       (Author)

## 2018-01-17 NOTE — MISCELLANEOUS
Message  Message Free Text Note Form: Patient called to our on-call provider phone  Called back immediately  Patient stated that she was playing with her puppy earlier in the evening and she felt she twisted her pointer finger of right hand, now she is having pain, finger is a little swollen but she can move it  Advised patient to put ice pack, try ibuprofen 200 mg 3 tabs together with food, and to make an appointment tomorrow to be seen in our office        Signatures   Electronically signed by : MANGO Gilmore ; Oct 15 2017 10:03PM EST                       (Author)    Electronically signed by : JAROD Hoffman ; Oct 16 2017 10:38AM EST                       (Author)

## 2018-01-17 NOTE — PROGRESS NOTES
Assessment    1  Upper respiratory disease (478 9) (J39 9)   2  MYLA (middle ear effusion), left (381 4) (H65 92)    Plan  Upper respiratory disease    · Fluticasone Propionate 50 MCG/ACT Nasal Suspension; use 2 sprays in each  nostril once daily   · Pseudoephedrine HCl - 30 MG Oral Tablet; TAKE 1 TABLET EVERY 6 HOURS AS  NEEDED    Discussion/Summary    URI/Ear Effusion: will start with Fluticasone and Pseudophed  F/u as needed  Hydration/Honey and ginger Tea  The patient was counseled regarding  Chief Complaint  pt c/o cough and stuffy nose x 2 days      History of Present Illness  HPI: 18yo f with hx of asthma is here with c/o nasal congestion/headache and ear pain  Pt states she was out in the snow for last 3 days and started with this congestion  + cough  Denies any subjective fever or chills  Denies any sick contact  Review of Systems    Constitutional: feeling poorly, but no fever and no chills  ENT: earache, sore throat and nasal discharge  Cardiovascular: no chest pain and no palpitations  Respiratory: cough, but no shortness of breath and no wheezing  Active Problems    1  ADHD (attention deficit hyperactivity disorder) (314 01) (F90 9)   2  Bipolar disorder (296 80) (F31 9)   3  Contraception management (V25 9) (Z30 9)   4  Mucocele, 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)   5  Seasonal allergies (477 9) (J30 2)    Past Medical History    1  History of Acute URI (465 9) (J06 9)   2  History of Asthma with exacerbation (493 92) (J45 901)   3  History of acute sinusitis (V12 69) (Z87 09)   4  History of contact dermatitis (V13 3) (Z87 2)   5  History of Screening for HIV without presence of risk factors (V73 89) (Z11 4)  Active Problems And Past Medical History Reviewed: The active problems and past medical history were reviewed and updated today  Family History    1  Family history of Cancer (199 1)   2  Family history of Diabetes    3  Family history of Cancer (199 1)   4   Family history of Diabetes  Family History Reviewed: The family history was reviewed and updated today  Social History    · Current every day smoker (305 1) (F17 200)   · Current smoker (305 1) (F17 200)   · No alcohol use  The social history was reviewed and updated today  The social history was reviewed and is unchanged  Surgical History  Surgical History Reviewed: The surgical history was reviewed and updated today  Current Meds   1  Albuterol Sulfate (2 5 MG/3ML) 0 083% Inhalation Nebulization Solution; Take 1 puff   every 4 hours as needed for wheezing; Therapy: 55MOG6745 to (Last Rx:27Rvo0228) Ordered   2  Flonase Allergy Relief 50 MCG/ACT Nasal Suspension; use 2 sprays in each nostril once   daily; Therapy: 59QBF0029 to (Last Rx:10Nov2015) Ordered   3  Loestrin 1 5/30 (21) 1 5-30 MG-MCG Oral Tablet; TAKE 1 TABLET DAILY AS DIRECTED; Therapy: 13VEI0154 to (Evaluate:11Rba1831); Last Rx:05Jun2015 Ordered   4  Naproxen 500 MG Oral Tablet Recorded   5  Ventolin  (90 Base) MCG/ACT Inhalation Aerosol Solution; INHALE ONE TO   TWO PUFFS EVERY 4 TO 6 HOURS  AS NEEDED; Therapy: 83YZC8322 to (Evaluate:36Fjo3802); Last Rx:27Mar2015 Ordered    The medication list was reviewed and updated today  Allergies    1  No Known Drug Allergies    2  Seafood   3  Shellfish    Vitals   Recorded: 66YDS0091 10:57AM   Temperature 97 7 F   Heart Rate 109   Respiration 20   Systolic 337   Diastolic 70   Height 5 ft 4 5 in   2-20 Stature Percentile 53 %   Weight 173 lb 5 oz   2-20 Weight Percentile 93 %   BMI Calculated 29 29   BMI Percentile 92 %   BSA Calculated 1 85   O2 Saturation 99     Physical Exam    Constitutional   General appearance: No acute distress, well appearing and well nourished  Eyes   Conjunctiva and lids: No swelling, erythema or discharge  Pupils and irises: Equal, round and reactive to light      Ears, Nose, Mouth, and Throat   External inspection of ears and nose: Normal     Otoscopic examination: Abnormal   + L ear effusion, decrease light reflex  Pulmonary   Auscultation of lungs: Clear to auscultation  Cardiovascular   Auscultation of heart: Normal rate and rhythm, normal S1 and S2, without murmurs  Attending Note  Attending Note [facility]: Attending Note: I discussed the case with the Resident and reviewed the Resident's note and I agree with the Resident management plan as it was presented to me  Future Appointments    Date/Time Provider Specialty Site   02/01/2016 10:30 AM MANGO Villalta   Family Medicine [facility]     Signatures   Electronically signed by : MANGO Callahan ; Jan 27 2016 11:35AM EST                       (Author)    Electronically signed by : MANGO Peterson ; Jan 27 2016 11:41AM EST                       (Co-author)

## 2018-01-18 NOTE — MISCELLANEOUS
Message  Message Free Text Note Form: Received a text on call phone at 5:22pm that pt was having trouble keeping food down  She had an appt in office today which she no-showed  Tried calling pt back twice, with no answer  No voicemail set up for messages        Signatures   Electronically signed by : Sophie Dejesus MD; Aug  4 2017  5:39PM EST                       (Author)

## 2018-01-22 VITALS
SYSTOLIC BLOOD PRESSURE: 102 MMHG | HEIGHT: 65 IN | BODY MASS INDEX: 36.82 KG/M2 | OXYGEN SATURATION: 98 % | HEART RATE: 102 BPM | RESPIRATION RATE: 18 BRPM | TEMPERATURE: 97.7 F | DIASTOLIC BLOOD PRESSURE: 62 MMHG | WEIGHT: 221 LBS

## 2018-01-23 VITALS
DIASTOLIC BLOOD PRESSURE: 70 MMHG | WEIGHT: 209.25 LBS | HEIGHT: 64 IN | BODY MASS INDEX: 35.72 KG/M2 | SYSTOLIC BLOOD PRESSURE: 102 MMHG

## 2018-01-23 NOTE — MISCELLANEOUS
Message  Pt  dropped off a form this AM from a homeless shelter in [facility] and asked to have physician complete it, but she then cancelled her OB appt  for today  I called pt  to explain that unless she sees a Dr  they cannot fill out the physical form  She stated she is no longer going to that shelter, and is instead going to one in [place]  She stated she was currently at a new OB providers office  When asked if she planned to return here pt  said no  Stressed the importance of continuing prenatal care, and pt  assured me she would  Active Problems    1  Abnormal menses (626 9) (N92 6)   2  Acute sinusitis (461 9) (J01 90)   3  Acute vomiting (787 03) (R11 10)   4  Attention deficit hyperactivity disorder (ADHD) (314 01) (F90 9)   5  Bilateral knee pain (719 46) (M25 561,M25 562)   6  Bilateral leg edema (782 3) (R60 0)   7  Bipolar disorder (296 80) (F31 9)   8  BMI 33 0-33 9,adult (V85 33) (Z68 33)   9  Chronic fatigue (780 79) (R53 82)   10  Chronic low back pain (724 2,338 29) (M54 5,G89 29)   11  Contusion of elbow, right (923 11) (S50 01XA)   12  Deviated nasal septum (470) (J34 2)   13  Dizziness (780 4) (R42)   14  Elbow pain, right (719 42) (M25 521)   15  Follow-up exam (V67 9) (Z09)   16  Hypothyroidism (244 9) (E03 9)   17  Knee pain, left (719 46) (M25 562)   18  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)   19  Mucocele, 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)   20  Nausea and vomiting (787 01) (R11 2)   21  Nausea and vomiting, intractability of vomiting not specified, unspecified vomiting type    (787 01) (R11 2)   22  Patellofemoral arthralgia of left knee (719 46) (M25 562)   23  Pregnancy (V22 2) (Z34 90)   24  Right knee pain (719 46) (M25 561)   25  Seasonal allergies (477 9) (J30 2)   26  Vomiting blood (578 0) (K92 0)    Current Meds   1  Albuterol Sulfate (2 5 MG/3ML) 0 083% Inhalation Nebulization Solution; Take 1 puff   every 4 hours as needed for wheezing;    Therapy: 69KKC9800 to (Last Rx:80Cpq3018) Ordered   2  Flintstones Gummies Oral Tablet Chewable; Therapy: (Recorded:02Jan2018) to Recorded   3  Meclizine HCl - 25 MG Oral Tablet; take 1 three times daily; Therapy: 23Aae7253 to (Last Rx:45Eze4973)  Requested for: 39Piu2192 Ordered   4  Ondansetron HCl - 8 MG Oral Tablet; TAKE 1 TABLET Every 8 hours; Therapy: 34Sgy1244 to (Evaluate:16Sep2017)  Requested for: 25Brg2352; Last   Rx:87Hpq6815 Ordered    Allergies    1  No Known Drug Allergies    2  Seafood   3   Shellfish    Signatures   Electronically signed by : Severo Giovanni, RN; Jan 9 2018  3:24PM EST                       (Author)

## 2018-01-26 ENCOUNTER — TELEPHONE (OUTPATIENT)
Dept: GASTROENTEROLOGY | Facility: AMBULARY SURGERY CENTER | Age: 23
End: 2018-01-26

## 2018-01-30 ENCOUNTER — TELEPHONE (OUTPATIENT)
Dept: GASTROENTEROLOGY | Facility: AMBULARY SURGERY CENTER | Age: 23
End: 2018-01-30

## 2018-01-30 NOTE — LETTER
[facility] [address] [address]  Phone#  [phone]  Fax#  [phone]          1/30/2018    Dear Jazmin Gorman,    Review of our records shows you are due for the following:    Consult    Please call the following office to schedule your appointment:    Rosa M    We look forward to hearing from you      Sincerely,    [facility]

## 2018-01-30 NOTE — TELEPHONE ENCOUNTER
Left message to have patient call and schedule  This is the second attempt to contact patient   Will also send letter reminder

## 2018-02-13 NOTE — MISCELLANEOUS
Message  Patient contacted call with complaints missing her menstrual cycle this  Per patient she was supposed to have her period by 11/2 but she is 2 days late  States she is usually regular an worried that she might be pregnant  Patient sexually and does not use protection all the time  States she pregnancy test at which negative but she is still worried  Counseled patient that sometimes it is normal to be bit late for your period, however, if she is worried about possible pregnancy, she can call and make an appointment when office opens on Monday  Patient agrees with plan        Signatures   Electronically signed by : Kassie Orourke MD; Nov 5 2017 10:55PM EST                       (Author)

## 2018-06-12 ENCOUNTER — TELEPHONE (OUTPATIENT)
Dept: FAMILY MEDICINE CLINIC | Facility: CLINIC | Age: 23
End: 2018-06-12

## 2018-06-12 NOTE — TELEPHONE ENCOUNTER
Pt's  called in to schedule OB appt  Pt is currently going to [facility] for her OB visits  Pt is moving to [place] on 6/18/18  Pt is due July 2018  I advised the  that we will need all records  Please call  to discuss   's name is Randell Robertcory [phone] ext [phone]

## 2018-06-12 NOTE — TELEPHONE ENCOUNTER
Called and spoke with Arun López regarding pt  She stated she is currently a pt  @ [facility] and is being discharged on 6/18/18  She will be living with her sister here in [place] and wants to transfer her OB care to this practice  Her CINDY is 7/11/18  She has been receiving care at a practice in [facility]  I explained to Arun López that pt had come here on 1/2/18 to establish care but then transferred  We usually do not accept a pt  back during the same pregnancy if they transfer, but I spoke with Dr Mac Soto who said we need to see her records before making a decision  I inquired if sister could continue to take her to her current Dr for continuity of care since pt is due in 3 weeks  , Arun López stated she would call and ask sister if she could transport pt to [facility], but requested we fax her a release of records form for her to have pt sign and she would forward to prenatal provider  I told her we would contact her as soon as we review the records  If it is after the 18th we will contact pt directly if we are provided with current contact #

## 2018-06-19 ENCOUNTER — TELEPHONE (OUTPATIENT)
Dept: FAMILY MEDICINE CLINIC | Facility: CLINIC | Age: 23
End: 2018-06-19

## 2018-06-19 NOTE — TELEPHONE ENCOUNTER
Spoke with Garima Lewis,  @ [facility], regarding pts OB care  Dr Morgan Fonseca reviewed all prenatal records and then called pts sister, Vlad Marsh, whom pt is living with  Dr Morgan Fonseca asked sister if she could provide transportation for [place] to go to the  center in [place] weekly as she is currently getting weekly BPP/UVALDO's  Sister stated her car is currently not  working but they "could probably figure something out"  Sister then inquired about providers that deliver @ [facility] and was given Dr Annie Carrera name  Sister said she would call there to see if they would accept [place]  Dr Morgan Fonseca advised her that we have all prenatal records we can fax to his office if she lets us know the outcome  This AM our office received a call from [facility] OB floor where pt was being seen  They did not want to discharge her without knowing who pt's prenatal provider is  They were advised that we are currently not her provider  I called sister to see if pt was accepted at Dr Annie Carrera practice  Left message for her to please call back today and update us on pts status

## 2018-07-10 ENCOUNTER — TRANSCRIBE ORDERS (OUTPATIENT)
Dept: ADMINISTRATIVE | Facility: HOSPITAL | Age: 23
End: 2018-07-10

## 2018-07-19 ENCOUNTER — OFFICE VISIT (OUTPATIENT)
Dept: FAMILY MEDICINE CLINIC | Facility: CLINIC | Age: 23
End: 2018-07-19
Payer: MEDICARE

## 2018-07-19 ENCOUNTER — APPOINTMENT (EMERGENCY)
Dept: RADIOLOGY | Facility: HOSPITAL | Age: 23
End: 2018-07-19
Payer: MEDICARE

## 2018-07-19 ENCOUNTER — HOSPITAL ENCOUNTER (EMERGENCY)
Facility: HOSPITAL | Age: 23
Discharge: HOME | End: 2018-07-19
Attending: EMERGENCY MEDICINE | Admitting: EMERGENCY MEDICINE
Payer: MEDICARE

## 2018-07-19 VITALS
RESPIRATION RATE: 16 BRPM | DIASTOLIC BLOOD PRESSURE: 53 MMHG | OXYGEN SATURATION: 99 % | HEART RATE: 67 BPM | SYSTOLIC BLOOD PRESSURE: 111 MMHG | TEMPERATURE: 97.8 F

## 2018-07-19 VITALS
BODY MASS INDEX: 32.61 KG/M2 | RESPIRATION RATE: 16 BRPM | OXYGEN SATURATION: 98 % | TEMPERATURE: 98.7 F | HEART RATE: 78 BPM | WEIGHT: 190 LBS | DIASTOLIC BLOOD PRESSURE: 60 MMHG | SYSTOLIC BLOOD PRESSURE: 100 MMHG

## 2018-07-19 DIAGNOSIS — N93.9 VAGINAL BLEEDING: ICD-10-CM

## 2018-07-19 DIAGNOSIS — R07.9 CHEST PAIN, UNSPECIFIED TYPE: ICD-10-CM

## 2018-07-19 DIAGNOSIS — R07.1 CHEST PAIN VARYING WITH BREATHING: ICD-10-CM

## 2018-07-19 DIAGNOSIS — M54.50 LOWER BACK PAIN: Primary | ICD-10-CM

## 2018-07-19 DIAGNOSIS — H92.03 OTALGIA OF BOTH EARS: Primary | ICD-10-CM

## 2018-07-19 DIAGNOSIS — J45.901 ASTHMA EXACERBATION: ICD-10-CM

## 2018-07-19 LAB
ANION GAP SERPL CALCULATED.3IONS-SCNC: 9 MMOL/L (ref 4–13)
APTT PPP: 31 SECONDS (ref 24–33)
BASOPHILS # BLD AUTO: 0.04 THOUSANDS/ΜL (ref 0–0.1)
BASOPHILS NFR BLD AUTO: 0 % (ref 0–1)
BUN SERPL-MCNC: 17 MG/DL (ref 5–25)
CALCIUM SERPL-MCNC: 8.9 MG/DL (ref 8.3–10.1)
CHLORIDE SERPL-SCNC: 105 MMOL/L (ref 100–108)
CO2 SERPL-SCNC: 26 MMOL/L (ref 21–32)
CREAT SERPL-MCNC: 0.9 MG/DL (ref 0.6–1.3)
EOSINOPHIL # BLD AUTO: 0.27 THOUSAND/ΜL (ref 0–0.61)
EOSINOPHIL NFR BLD AUTO: 2 % (ref 0–6)
ERYTHROCYTE [DISTWIDTH] IN BLOOD BY AUTOMATED COUNT: 14.5 % (ref 11.6–15.1)
GFR SERPL CREATININE-BSD FRML MDRD: 92 ML/MIN/1.73SQ M
GLUCOSE SERPL-MCNC: 82 MG/DL (ref 65–140)
HCT VFR BLD AUTO: 40.6 % (ref 34.8–46.1)
HGB BLD-MCNC: 12.7 G/DL (ref 11.5–15.4)
IMM GRANULOCYTES # BLD AUTO: 0.07 THOUSAND/UL (ref 0–0.2)
IMM GRANULOCYTES NFR BLD AUTO: 1 % (ref 0–2)
INR PPP: 0.93 (ref 0.86–1.16)
LYMPHOCYTES # BLD AUTO: 2.45 THOUSANDS/ΜL (ref 0.6–4.47)
LYMPHOCYTES NFR BLD AUTO: 18 % (ref 14–44)
MCH RBC QN AUTO: 28.1 PG (ref 26.8–34.3)
MCHC RBC AUTO-ENTMCNC: 31.3 G/DL (ref 31.4–37.4)
MCV RBC AUTO: 90 FL (ref 82–98)
MONOCYTES # BLD AUTO: 1 THOUSAND/ΜL (ref 0.17–1.22)
MONOCYTES NFR BLD AUTO: 7 % (ref 4–12)
NEUTROPHILS # BLD AUTO: 9.72 THOUSANDS/ΜL (ref 1.85–7.62)
NEUTS SEG NFR BLD AUTO: 72 % (ref 43–75)
NRBC BLD AUTO-RTO: 0 /100 WBCS
PLATELET # BLD AUTO: 382 THOUSANDS/UL (ref 149–390)
PMV BLD AUTO: 9.2 FL (ref 8.9–12.7)
POTASSIUM SERPL-SCNC: 4.3 MMOL/L (ref 3.5–5.3)
PROTHROMBIN TIME: 9.8 SECONDS (ref 9.4–11.7)
RBC # BLD AUTO: 4.52 MILLION/UL (ref 3.81–5.12)
SODIUM SERPL-SCNC: 140 MMOL/L (ref 136–145)
WBC # BLD AUTO: 13.55 THOUSAND/UL (ref 4.31–10.16)

## 2018-07-19 PROCEDURE — 85610 PROTHROMBIN TIME: CPT | Performed by: PHYSICIAN ASSISTANT

## 2018-07-19 PROCEDURE — 96360 HYDRATION IV INFUSION INIT: CPT

## 2018-07-19 PROCEDURE — 99284 EMERGENCY DEPT VISIT MOD MDM: CPT

## 2018-07-19 PROCEDURE — 99213 OFFICE O/P EST LOW 20 MIN: CPT | Performed by: NURSE PRACTITIONER

## 2018-07-19 PROCEDURE — 85025 COMPLETE CBC W/AUTO DIFF WBC: CPT | Performed by: PHYSICIAN ASSISTANT

## 2018-07-19 PROCEDURE — 93000 ELECTROCARDIOGRAM COMPLETE: CPT | Performed by: NURSE PRACTITIONER

## 2018-07-19 PROCEDURE — 36415 COLL VENOUS BLD VENIPUNCTURE: CPT | Performed by: PHYSICIAN ASSISTANT

## 2018-07-19 PROCEDURE — 96361 HYDRATE IV INFUSION ADD-ON: CPT

## 2018-07-19 PROCEDURE — 71275 CT ANGIOGRAPHY CHEST: CPT

## 2018-07-19 PROCEDURE — 93005 ELECTROCARDIOGRAM TRACING: CPT

## 2018-07-19 PROCEDURE — 80048 BASIC METABOLIC PNL TOTAL CA: CPT | Performed by: PHYSICIAN ASSISTANT

## 2018-07-19 PROCEDURE — 85730 THROMBOPLASTIN TIME PARTIAL: CPT | Performed by: PHYSICIAN ASSISTANT

## 2018-07-19 RX ORDER — ONDANSETRON HYDROCHLORIDE 8 MG/1
1 TABLET, FILM COATED ORAL EVERY 8 HOURS
COMMUNITY
Start: 2017-09-14 | End: 2018-07-19 | Stop reason: ALTCHOICE

## 2018-07-19 RX ORDER — CITALOPRAM 10 MG/1
20 TABLET ORAL EVERY MORNING
Refills: 0 | COMMUNITY
Start: 2018-07-05 | End: 2018-11-02 | Stop reason: HOSPADM

## 2018-07-19 RX ORDER — LURASIDONE HYDROCHLORIDE 80 MG/1
80 TABLET, FILM COATED ORAL
Refills: 0 | COMMUNITY
Start: 2018-07-06 | End: 2018-09-12 | Stop reason: DRUGHIGH

## 2018-07-19 RX ORDER — DIPHENHYDRAMINE HCL 25 MG
50 CAPSULE ORAL
COMMUNITY
End: 2018-11-02 | Stop reason: HOSPADM

## 2018-07-19 RX ORDER — ALBUTEROL SULFATE 2.5 MG/3ML
SOLUTION RESPIRATORY (INHALATION)
COMMUNITY
Start: 2015-02-06 | End: 2020-01-10 | Stop reason: ALTCHOICE

## 2018-07-19 RX ORDER — RANITIDINE 150 MG/1
150 TABLET ORAL 2 TIMES DAILY
COMMUNITY
Start: 2018-06-15 | End: 2018-07-25 | Stop reason: SDUPTHER

## 2018-07-19 RX ORDER — ALBUTEROL SULFATE 90 UG/1
2 AEROSOL, METERED RESPIRATORY (INHALATION) EVERY 4 HOURS PRN
Qty: 1 INHALER | Refills: 0 | Status: SHIPPED | OUTPATIENT
Start: 2018-07-19 | End: 2018-08-18

## 2018-07-19 RX ADMIN — SODIUM CHLORIDE 1000 ML: 0.9 INJECTION, SOLUTION INTRAVENOUS at 16:59

## 2018-07-19 RX ADMIN — IOHEXOL 85 ML: 350 INJECTION, SOLUTION INTRAVENOUS at 17:30

## 2018-07-19 NOTE — DISCHARGE INSTRUCTIONS
Acute Low Back Pain, Ambulatory Care   GENERAL INFORMATION:   Acute low back pain  is discomfort in your lower back area that lasts for less than 12 weeks  The word acute is used to describe pain that starts suddenly, worsens quickly, and lasts for a short time  Common symptoms include the following:   · Back stiffness or spasms    · Pain down the back or side of one leg    · Holding yourself in an unusual position or posture to decrease your back pain    · Not being able to find a sitting position that is comfortable    · Slow increase in your pain for 24 to 48 hours after you stress your back    · Tenderness on your lower back or severe pain when you move your back  Seek immediate care for the following symptoms:   · Severe pain    · Sudden stiffness and heaviness in both buttocks down to both legs    · Numbness or weakness in one leg, or pain in both legs    · Numbness in your genital area or across your lower back    · Unable to control your urine or bowel movements  Treatment for acute low back pain  may include any of the following:  · Medicines:      ¨ NSAIDs  help decrease swelling and pain or fever  This medicine is available with or without a doctor's order  NSAIDs can cause stomach bleeding or kidney problems in certain people  If you take blood thinner medicine, always ask your healthcare provider if NSAIDs are safe for you  Always read the medicine label and follow directions  ¨ Muscle relaxers  help decrease muscle spasms pain  ¨ Prescription pain medicine  may be given  Ask how to take this medicine safely  · Surgery  may be needed if your pain is severe and other treatments do not work  Surgery may be needed for conditions of the lumbar spine, such as herniated disc or spinal stenosis  Manage your symptoms:   · Sleep on a firm mattress  If you do not have a firm mattress, have someone move your mattress to the floor for a few days   A piece of plywood under your mattress can also help make it firmer  · Apply ice  on your lower back for 15 to 20 minutes every hour or as directed  Use an ice pack, or put crushed ice in a plastic bag  Cover it with a towel  Ice helps prevent tissue damage and decreases swelling and pain  You can alternate ice and heat  · Apply heat  on your lower back for 20 to 30 minutes every 2 hours for as many days as directed  Heat helps decrease pain and muscle spasms  · Go to physical therapy  A physical therapist teaches you exercises to help improve movement and strength, and to decrease pain  Prevent acute low back pain:   · Use proper body mechanics  ¨ Bend at the hips and knees when you  objects  Do not bend from the waist  Use your leg muscles as you lift the load  Do not use your back  Keep the object close to your chest as you lift it  Try not to twist or lift anything above your waist     ¨ Change your position often when you stand for long periods of time  Rest one foot on a small box or footrest, and then switch to the other foot often  ¨ Try not to sit for long periods of time  When you do, sit in a straight-backed chair with your feet flat on the floor  Never reach, pull, or push while you are sitting  · Exercise regularly  Warm up before you exercise  Do exercises that strengthen your back muscles  Ask about the best exercise plan for you  · Maintain a healthy weight  Ask your healthcare provider how much you should weigh  Ask him to help you create a weight loss plan if you are overweight  Follow up with your healthcare provider as directed:  Return for a follow-up visit if you still have pain after 1 to 3 weeks of treatment  You may need to visit an orthopedist if your back pain lasts more than 6 to 12 weeks  Write down your questions so you remember to ask them during your visits  CARE AGREEMENT:   You have the right to help plan your care  Learn about your health condition and how it may be treated   Discuss treatment options with your caregivers to decide what care you want to receive  You always have the right to refuse treatment  The above information is an  only  It is not intended as medical advice for individual conditions or treatments  Talk to your doctor, nurse or pharmacist before following any medical regimen to see if it is safe and effective for you  © 2014 [address] is for End User's use only and may not be sold, redistributed or otherwise used for commercial purposes  All illustrations and images included in CareNotes® are the copyrighted property of [employer]  or Rosalio Cano  Asthma   AMBULATORY CARE:   Asthma  is a lung disease that makes breathing difficult  Chronic inflammation and reactions to triggers narrow the airways in your lungs  Asthma can become life-threatening if it is not managed  Cough-variant asthma  is a type of asthma that causes a dry cough that keeps coming back  A dry cough may be your only symptom, or you may also have chest tightness  These symptoms may be caused by exercise or exposure to odors, allergens, or respiratory tract infections  Cough-variant asthma is treated the same way as typical asthma  Common symptoms include the following:   · Coughing     · Wheezing     · Shortness of breath     · Chest tightness  Seek care immediately if:   · You have severe shortness of breath  · Your lips or nails turn blue or gray  · The skin around your neck and ribs pulls in with each breath  · You have shortness of breath, even after you take your short-term medicine as directed  · Your peak flow numbers are in the red zone of your AAP  Contact your healthcare provider if:   · You run out of medicine before your next refill is due  · Your symptoms get worse  · You need to take more medicine than usual to control your symptoms  · You have questions or concerns about your condition or care    Treatment for asthma  will depend on how severe your asthma is  Medicine may decrease inflammation, open airways, and make it easier to breathe  Medicines may be inhaled, taken as a pill, or injected  Short-term medicines relieve your symptoms quickly  Long-term medicines are used to prevent future attacks  You may also need medicine to help control your allergies  Manage and prevent future asthma attacks:   · Follow your asthma action plan  This is a written plan that you and your healthcare provider create  It explains which medicine you need and when to change doses if necessary  It also explains how you can monitor symptoms and use a peak flow meter  The meter measures how well your lungs are working  · Manage other health conditions , such as allergies, acid reflux, and sleep apnea  · Identify and avoid triggers  These may include pets, dust mites, mold, and cockroaches  · Do not smoke or be around others who smoke  Nicotine and other chemicals in cigarettes and cigars can cause lung damage  Ask your healthcare provider for information if you currently smoke and need help to quit  E-cigarettes or smokeless tobacco still contain nicotine  Talk to your healthcare provider before you use these products  · Ask about the flu vaccine  The flu can make your asthma worse  You may need a yearly flu shot  Follow up with your healthcare provider as directed: You will need to return to make sure your medicine is working and your symptoms are controlled  You may be referred to an asthma or allergy specialist  Jaleel Zambrano may be asked to keep a record of your peak flow values and bring it with you to your appointments  Write down your questions so you remember to ask them during your visits  © 2017 [address] is for End User's use only and may not be sold, redistributed or otherwise used for commercial purposes   All illustrations and images included in CareNotes® are the copyrighted property of [employer]  or Rosalio Cano  The above information is an  only  It is not intended as medical advice for individual conditions or treatments  Talk to your doctor, nurse or pharmacist before following any medical regimen to see if it is safe and effective for you

## 2018-07-19 NOTE — PROGRESS NOTES
Assessment/Plan:  1  Sent patient via ambulance to hospital to rule out PE  2  Follow-up condition changes or worsens  3  Take NSAID for symptom relief         Diagnoses and all orders for this visit:    Otalgia of both ears    Chest pain, unspecified type    Chest pain varying with breathing    Other orders  -     LATUDA 80 MG tablet;   -     ranitidine (ZANTAC) 150 mg tablet;   -     ondansetron (ZOFRAN) 8 mg tablet; Take 1 tablet by mouth every 8 (eight) hours  -     diphenhydrAMINE (BENADRYL) 25 mg capsule; Take 25 mg by mouth  -     citalopram (CeleXA) 10 mg tablet; Take 10 mg by mouth every morning  -     VENTOLIN  (90 Base) MCG/ACT inhaler;   -     albuterol (2 5 mg/3 mL) 0 083 % nebulizer solution; Inhale          Subjective:      Patient ID: Coty Angeles is a 25 y o  female  81-year-old female presents with bilateral ear pain for 1 week  Denies any changes in hearing  Denies URI sx  Patient reports that she has had having some chest pain as well shortness of breath with pain with breathing for couple days  Patient just gave birth to child 1 week ago  Reports severe pain with breathing  Not sure is she has coagulopathy  The following portions of the patient's history were reviewed and updated as appropriate: allergies and current medications  Review of Systems   Constitutional: Negative  HENT: Positive for ear pain  Respiratory: Negative  Cardiovascular: Negative  Objective:      /60   Pulse 78   Temp 98 7 °F (37 1 °C)   Resp 16   Wt 86 2 kg (190 lb)   LMP 10/04/2017   SpO2 98%   BMI 32 61 kg/m²          Physical Exam   Constitutional: She appears well-developed and well-nourished  HENT:   Head: Normocephalic and atraumatic  Right Ear: External ear normal    Left Ear: External ear normal    Mouth/Throat: Oropharynx is clear and moist    Cardiovascular: Normal rate, regular rhythm and normal heart sounds      Pulmonary/Chest: Effort normal and breath sounds normal

## 2018-07-19 NOTE — ED PROVIDER NOTES
History  Chief Complaint   Patient presents with    Vaginal Bleeding     Pt was at PMD's office for eval of ear infection  States vaginal delivery on   Has had low back pain and left chest pain since  Also sts continues with bleeding  Has to change pad every hour  26 y/o female, , presenting with multiple complaints today  Sent in by her family NP for rule out PE  States that she recently gave birth on the  of this month, 5 days ago, without any complications  Continues with vaginal bleeding since birth, going through a pad every hour or two  No lightheadedness  Goes on to mention that she has had left-sided chest pain worsened with deep inhalation over the past 3 days  She has not noticed any calf pain or swelling  No history of clots or smoking  OBGYN is Dr Rafal Nunez that she has also had some lower back pain that is nonradiating worsened with movement better with rest which seems to be her main concern today  She is not breast-feeding  Pain is 7 as 10 nonradiating  Occasional shortness of breath however states that she ran out of her inhaler, feels slightly different than her usual asthma  Denies nausea, vomiting, diarrhea, constipation, rash, lightheadedness, weakness  Prior to Admission Medications   Prescriptions Last Dose Informant Patient Reported? Taking?    LATUDA 80 MG tablet   Yes No   Sig: Take 80 mg by mouth daily at bedtime     VENTOLIN  (90 Base) MCG/ACT inhaler   Yes No   Sig: Inhale 2 puffs every 4 (four) hours as needed     albuterol (2 5 mg/3 mL) 0 083 % nebulizer solution   Yes No   Sig: Inhale   citalopram (CeleXA) 10 mg tablet   Yes No   Sig: Take 10 mg by mouth every morning   diphenhydrAMINE (BENADRYL) 25 mg capsule   Yes No   Sig: Take 50 mg by mouth daily at bedtime as needed     ranitidine (ZANTAC) 150 mg tablet   Yes No   Sig: Take 150 mg by mouth 2 (two) times a day        Facility-Administered Medications: None       Past Medical History:   Diagnosis Date    Asthma     Bipolar 1 disorder [facility])     Palpitations     Last Assessed [id number]    Psychiatric disorder     Seizures ([facility] [address] )     Stabbing chest pain     Last Assessed 29Nov2016    Tachycardia     Last Assessed [id number]    Upper respiratory disease     Last Assessed 27Jan2016       Past Surgical History:   Procedure Laterality Date    KNEE SURGERY         Family History   Problem Relation Age of Onset    Cancer Mother     Diabetes Mother     Cancer Father     Diabetes Father     Arthritis Father     Cancer Maternal Grandmother     Cancer Maternal Aunt     Cancer Paternal Uncle     Diabetes Other      I have reviewed and agree with the history as documented  Social History   Substance Use Topics    Smoking status: Former Smoker     Packs/day: 0 25     Quit date: 4/6/2017    Smokeless tobacco: Never Used      Comment: Per [employer] - current every day smoker    Alcohol use No      Comment: Per [employer] - social use        Review of Systems   Constitutional: Negative  Negative for activity change  HENT: Negative  Eyes: Negative  Respiratory: Positive for shortness of breath  Negative for apnea, cough, choking, chest tightness, wheezing and stridor  Cardiovascular: Positive for chest pain  Negative for palpitations and leg swelling  Gastrointestinal: Negative  Genitourinary: Positive for pelvic pain and vaginal bleeding  Negative for decreased urine volume, difficulty urinating, dyspareunia, dysuria, enuresis, flank pain, frequency, genital sores, hematuria, menstrual problem, urgency, vaginal discharge and vaginal pain  Musculoskeletal: Negative  Skin: Negative  Neurological: Negative  All other systems reviewed and are negative  Physical Exam  Physical Exam   Constitutional: She is oriented to person, place, and time  She appears well-developed and well-nourished  No distress  No respiratory distress     HENT:   Head: Normocephalic and atraumatic  Right Ear: External ear normal    Left Ear: External ear normal    Nose: Nose normal    Mouth/Throat: Oropharynx is clear and moist    Bilateral tympanic membranes are without erythema injection bulging or exudates, completely unremarkable  Eyes: Conjunctivae and EOM are normal  Pupils are equal, round, and reactive to light  Neck: Normal range of motion  Neck supple  Cardiovascular: Normal rate, regular rhythm, normal heart sounds and intact distal pulses  Exam reveals no gallop and no friction rub  No murmur heard  Pulmonary/Chest: Effort normal and breath sounds normal  No respiratory distress  She has no wheezes  She has no rales  She exhibits no tenderness  S PO2 is 99% indicating adequate oxygenation  Abdominal: Soft  Bowel sounds are normal  She exhibits no distension and no mass  There is tenderness  There is no rebound and no guarding  No hernia  No visible abnormality, patient has minimal amount of lower pelvic tenderness, uterus is contracted  No bogginess     Musculoskeletal:        Arms:  Neurological: She is alert and oriented to person, place, and time  Skin: Skin is warm and dry  Capillary refill takes less than 2 seconds  She is not diaphoretic  Nursing note and vitals reviewed        Vital Signs  ED Triage Vitals [07/19/18 1622]   Temperature Pulse Respirations Blood Pressure SpO2   97 8 °F (36 6 °C) 67 16 111/53 99 %      Temp src Heart Rate Source Patient Position - Orthostatic VS BP Location FiO2 (%)   -- -- -- -- --      Pain Score       6           Vitals:    07/19/18 1622   BP: 111/53   Pulse: 67       Visual Acuity      ED Medications  Medications   sodium chloride 0 9 % bolus 1,000 mL (1,000 mL Intravenous New Bag 7/19/18 1659)   iohexol (OMNIPAQUE) 350 MG/ML injection (MULTI-DOSE) 85 mL (85 mL Intravenous Given 7/19/18 1730)       Diagnostic Studies  Results Reviewed     Procedure Component Value Units Date/Time    Protime-INR [[id number]] (Normal) Collected:  07/19/18 1657    Lab Status:  Final result Specimen:  Blood from Arm, Left Updated:  07/19/18 1720     Protime 9 8 seconds      INR 0 93    APTT [[id number]]  (Normal) Collected:  07/19/18 1657    Lab Status:  Final result Specimen:  Blood from Arm, Left Updated:  07/19/18 1720     PTT 31 seconds     Basic metabolic panel [[id number]] Collected:  07/19/18 1657    Lab Status:  Final result Specimen:  Blood from Arm, Left Updated:  07/19/18 1718     Sodium 140 mmol/L      Potassium 4 3 mmol/L      Chloride 105 mmol/L      CO2 26 mmol/L      Anion Gap 9 mmol/L      BUN 17 mg/dL      Creatinine 0 90 mg/dL      Glucose 82 mg/dL      Calcium 8 9 mg/dL      eGFR 92 ml/min/1 73sq m     Narrative:         National Kidney Disease Education Program recommendations are as follows:  GFR calculation is accurate only with a steady state creatinine  Chronic Kidney disease less than 60 ml/min/1 73 sq  meters  Kidney failure less than 15 ml/min/1 73 sq  meters  CBC and differential [[id number]]  (Abnormal) Collected:  07/19/18 1657    Lab Status:  Final result Specimen:  Blood from Arm, Left Updated:  07/19/18 1705     WBC 13 55 (H) Thousand/uL      RBC 4 52 Million/uL      Hemoglobin 12 7 g/dL      Hematocrit 40 6 %      MCV 90 fL      MCH 28 1 pg      MCHC 31 3 (L) g/dL      RDW 14 5 %      MPV 9 2 fL      Platelets 272 Thousands/uL      nRBC 0 /100 WBCs      Neutrophils Relative 72 %      Immat GRANS % 1 %      Lymphocytes Relative 18 %      Monocytes Relative 7 %      Eosinophils Relative 2 %      Basophils Relative 0 %      Neutrophils Absolute 9 72 (H) Thousands/µL      Immature Grans Absolute 0 07 Thousand/uL      Lymphocytes Absolute 2 45 Thousands/µL      Monocytes Absolute 1 00 Thousand/µL      Eosinophils Absolute 0 27 Thousand/µL      Basophils Absolute 0 04 Thousands/µL                  CTA ED chest PE study   Final Result by Jesus Cedeno MD (07/19 1750)   1  No pulmonary emboli demonstrated  Workstation performed: [id number]                    Procedures  ECG 12 Lead Documentation  Date/Time: 7/19/2018 5:02 PM  Performed by: Payal Horn  Authorized by: Payal Horn     Indications / Diagnosis:  Left sided chest pain   ECG reviewed by me, the ED Provider: yes    Patient location:  ED  Previous ECG:     Previous ECG:  Unavailable    Comparison to cardiac monitor: Yes    Interpretation:     Interpretation: normal    Rate:     ECG rate:  60    ECG rate assessment: normal    Rhythm:     Rhythm: sinus rhythm    Ectopy:     Ectopy: none    QRS:     QRS axis:  Normal    QRS intervals:  Normal  Conduction:     Conduction: normal    ST segments:     ST segments:  Normal  T waves:     T waves: normal             Phone Contacts  ED Phone Contact    ED Course  ED Course as of Jul 19 1837   Thu Jul 19, 2018   1640 LM with [facility] [[email or web address]]  Number of Diagnoses or Management Options  Asthma exacerbation:   Lower back pain:   Vaginal bleeding:   Diagnosis management comments: Sent in by PCP for eval for PE  Discussed with patient results of the lab test and imaging studies  Patient overall is feeling better with some IV fluids  No signs of anemia or PE  Lumbosacral muscle tenderness  Overall vaginal bleeding has slowed  Chest pain and tightness may be secondary to lack of inhaler use over the past week as she ran out  Will have patient follow up with her PCP or OBGYN for re-evaluation or return for any worsening of symptoms  Patient verbalizes understanding and agrees with the above assessment plan         Amount and/or Complexity of Data Reviewed  Clinical lab tests: ordered and reviewed  Tests in the radiology section of CPT®: reviewed and ordered  Review and summarize past medical records: yes  Discuss the patient with other providers: yes (Dr Shila Brewster)  Independent visualization of images, tracings, or specimens: yes      CritCare Time    Disposition  Final diagnoses:   Lower back pain   Vaginal bleeding   Asthma exacerbation     Time reflects when diagnosis was documented in both MDM as applicable and the Disposition within this note     Time User Action Codes Description Comment    7/19/2018  6:32 PM Gama Aguilar Add [M54 5] Lower back pain     7/19/2018  6:33 PM Gama Aguilar Add [N93 9] Vaginal bleeding     7/19/2018  6:33 PM Gama Aguilar Add [J45 901] Asthma exacerbation       ED Disposition     ED Disposition Condition Comment    Discharge  [address] discharge to home/self care  Condition at discharge: Good        Follow-up Information     Follow up With Specialties Details Why Contact Info Additional [phone] Emergency Department Emergency Medicine Go to If symptoms worsen [address] [place] [address]  [phone] [facility] [address]    Sienna Acevedo MD Family Medicine Schedule an appointment as soon as possible for a visit As needed if symptoms persist  [address] [place] [id number]             Patient's Medications   Discharge Prescriptions    ALBUTEROL (PROVENTIL HFA,VENTOLIN HFA) 90 MCG/ACT INHALER    Inhale 2 puffs every 4 (four) hours as needed for wheezing or shortness of breath for up to 30 days       Start Date: 7/19/2018 End Date: 8/18/2018       Order Dose: 2 puffs       Quantity: 1 Inhaler    Refills: 0     No discharge procedures on file      ED Provider  Electronically Signed by           Marcella Zhu PA-C  07/19/18 7342

## 2018-07-20 ENCOUNTER — VBI (OUTPATIENT)
Dept: FAMILY MEDICINE CLINIC | Facility: CLINIC | Age: 23
End: 2018-07-20

## 2018-07-20 NOTE — TELEPHONE ENCOUNTER
Pt was seen in [address] on 7/19/18  CC; Vaginal bleeding  DX: Lower back pain; Vaginal bleeding; asthma exacerbation  Pt states she is not in need of f/u appt at this time  Informed of  on call, office hours and phone number

## 2018-07-22 LAB
ATRIAL RATE: 60 BPM
P AXIS: 47 DEGREES
PR INTERVAL: 138 MS
QRS AXIS: 20 DEGREES
QRSD INTERVAL: 84 MS
QT INTERVAL: 388 MS
QTC INTERVAL: 388 MS
T WAVE AXIS: 32 DEGREES
VENTRICULAR RATE: 60 BPM

## 2018-07-22 PROCEDURE — 93010 ELECTROCARDIOGRAM REPORT: CPT | Performed by: INTERNAL MEDICINE

## 2018-07-25 ENCOUNTER — OFFICE VISIT (OUTPATIENT)
Dept: FAMILY MEDICINE CLINIC | Facility: CLINIC | Age: 23
End: 2018-07-25
Payer: MEDICARE

## 2018-07-25 VITALS
BODY MASS INDEX: 32.27 KG/M2 | RESPIRATION RATE: 16 BRPM | OXYGEN SATURATION: 97 % | HEART RATE: 100 BPM | TEMPERATURE: 98.3 F | WEIGHT: 188 LBS | DIASTOLIC BLOOD PRESSURE: 80 MMHG | SYSTOLIC BLOOD PRESSURE: 120 MMHG

## 2018-07-25 DIAGNOSIS — J01.40 ACUTE NON-RECURRENT PANSINUSITIS: Primary | ICD-10-CM

## 2018-07-25 DIAGNOSIS — K21.9 GASTROESOPHAGEAL REFLUX DISEASE, ESOPHAGITIS PRESENCE NOT SPECIFIED: ICD-10-CM

## 2018-07-25 PROCEDURE — 99213 OFFICE O/P EST LOW 20 MIN: CPT | Performed by: NURSE PRACTITIONER

## 2018-07-25 RX ORDER — FLUTICASONE PROPIONATE 50 MCG
2 SPRAY, SUSPENSION (ML) NASAL DAILY
Qty: 16 G | Refills: 0 | Status: SHIPPED | OUTPATIENT
Start: 2018-07-25 | End: 2019-03-27 | Stop reason: ALTCHOICE

## 2018-07-25 RX ORDER — RANITIDINE 150 MG/1
150 TABLET ORAL 2 TIMES DAILY
Qty: 30 TABLET | Refills: 3 | Status: SHIPPED | OUTPATIENT
Start: 2018-07-25 | End: 2019-04-01 | Stop reason: ALTCHOICE

## 2018-07-25 NOTE — PROGRESS NOTES
Assessment/Plan:  1  Take medication as prescribed  2  Follow-up condition changes or worsens       Diagnoses and all orders for this visit:    Acute non-recurrent pansinusitis  -     fluticasone (FLONASE) 50 mcg/act nasal spray; 2 sprays into each nostril daily    Gastroesophageal reflux disease, esophagitis presence not specified  -     ranitidine (ZANTAC) 150 mg tablet; Take 1 tablet (150 mg total) by mouth 2 (two) times a day          Subjective:      Patient ID: Mey Knott is a 26 y o  female  28-year-old female presents with cold-like symptoms for about 3 days  Denies fever  Reports she has been sweating a little bit more than usual   Denies medications  Mild cough  Very congested  Patient has GERD would like a refill on her Zantac  Reports she just ran out  Helps with her reflux  No symptoms at this time  The following portions of the patient's history were reviewed and updated as appropriate: allergies and current medications  Review of Systems   Constitutional: Negative  HENT: Positive for congestion and rhinorrhea  Respiratory: Positive for cough  Cardiovascular: Negative  Gastrointestinal: Negative  Objective:      /80   Pulse 100   Temp 98 3 °F (36 8 °C)   Resp 16   Wt 85 3 kg (188 lb)   LMP 10/04/2017   SpO2 97%   BMI 32 27 kg/m²          Physical Exam   Constitutional: She appears well-developed and well-nourished  HENT:   Head: Normocephalic and atraumatic  Right Ear: External ear normal    Left Ear: External ear normal    Nose: Right sinus exhibits maxillary sinus tenderness and frontal sinus tenderness  Left sinus exhibits maxillary sinus tenderness and frontal sinus tenderness  Mouth/Throat: Oropharynx is clear and moist    Abdominal: Soft   Bowel sounds are normal

## 2018-09-11 ENCOUNTER — HOSPITAL ENCOUNTER (EMERGENCY)
Facility: HOSPITAL | Age: 23
Discharge: HOME | End: 2018-09-11
Attending: EMERGENCY MEDICINE | Admitting: EMERGENCY MEDICINE
Payer: MEDICARE

## 2018-09-11 VITALS
TEMPERATURE: 98.6 F | DIASTOLIC BLOOD PRESSURE: 55 MMHG | HEART RATE: 86 BPM | HEIGHT: 64 IN | RESPIRATION RATE: 14 BRPM | SYSTOLIC BLOOD PRESSURE: 126 MMHG

## 2018-09-11 DIAGNOSIS — F41.9 ANXIETY: Primary | ICD-10-CM

## 2018-09-11 PROCEDURE — 99284 EMERGENCY DEPT VISIT MOD MDM: CPT

## 2018-09-11 NOTE — DISCHARGE INSTRUCTIONS
Anxiety   WHAT YOU SHOULD KNOW:   Anxiety is a condition that causes you to feel excessive worry, uneasiness, or fear  Family or work stress, smoking, caffeine, and alcohol can increase your risk for anxiety  Certain medicines or health conditions can also increase your risk  Anxiety may begin gradually, and can become a long-term condition if it is not managed or treated  AFTER YOU LEAVE:   Medicines:   · Medicines  can help you feel more calm and relaxed, and decrease your symptoms  · Take your medicine as directed  Contact your healthcare provider if you think your medicine is not helping or if you have side effects  Tell him if you are allergic to any medicine  Keep a list of the medicines, vitamins, and herbs you take  Include the amounts, and when and why you take them  Bring the list or the pill bottles to follow-up visits  Carry your medicine list with you in case of an emergency  Follow up with your healthcare provider within 2 weeks or as directed:  Write down your questions so you remember to ask them during your visits  Manage anxiety:   · Go to counseling as directed  Cognitive behavioral therapy can help you understand and change how you react to events that trigger your symptoms  · Find ways to manage your symptoms  Activities such as exercise, meditation, or listening to music can help you relax  · Practice deep breathing  Breathing can change how your body reacts to stress  Focus on taking slow, deep breaths several times a day, or during an anxiety attack  Breathe in through your nose, and out through your mouth  · Avoid caffeine  Caffeine can make your symptoms worse  Avoid foods or drinks that are meant to increase your energy level  · Limit or avoid alcohol  Ask your healthcare provider if alcohol is safe for you  You may not be able to drink alcohol if you take certain anxiety or depression medicines  Limit alcohol to 1 drink per day if you are a woman   Limit alcohol to 2 drinks per day if you are a man  A drink of alcohol is 12 ounces of beer, 5 ounces of wine, or 1½ ounces of liquor  Contact your healthcare provider if:   · Your symptoms get worse or do not get better with treatment  · You think your medicine may be causing side effects  · Your anxiety keeps you from doing your regular daily activities  · You have new symptoms since your last visit  · You have questions or concerns about your condition or care  Seek care immediately or call 911 if:   · You have chest pain, tightness, or heaviness that may spread to your shoulders, arms, jaw, neck, or back  · You feel like hurting yourself or someone else  · You feel dizzy, lightheaded, or faint  © 2014 [address] [employer] is for End User's use only and may not be sold, redistributed or otherwise used for commercial purposes  All illustrations and images included in CareNotes® are the copyrighted property of A D A M , Inc  or Rosalio Cano  The above information is an  only  It is not intended as medical advice for individual conditions or treatments  Talk to your doctor, nurse or pharmacist before following any medical regimen to see if it is safe and effective for you
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
## 2018-09-11 NOTE — ED PROVIDER NOTES
History  Chief Complaint   Patient presents with    Psychiatric Evaluation     pt cites stressors at home-crisis worker out to home-pt stated thoughts of self-harm-"if I don't get out of that house, I'm going to jump in front of a car "       History provided by:  Patient   used: No    Psychiatric Evaluation   Presenting symptoms: no agitation, no homicidal ideas, no self-mutilation, no suicidal thoughts and no suicidal threats    Presenting symptoms comment:  Anxiety  Degree of incapacity (severity):  Mild  Context: recent medication change and stressful life event    Context: not alcohol use and not drug abuse    Relieved by: Antidepressants  Worsened by:  Nothing  Ineffective treatments:  None tried  Associated symptoms: anxiety    Associated symptoms: no abdominal pain, no appetite change, no chest pain, no decreased need for sleep, not distractible, no fatigue and no irritability    Risk factors: hx of mental illness    Risk factors: no family hx of mental illness        Prior to Admission Medications   Prescriptions Last Dose Informant Patient Reported? Taking?    LATUDA 80 MG tablet   Yes No   Sig: Take 80 mg by mouth daily at bedtime     VENTOLIN  (90 Base) MCG/ACT inhaler   Yes No   Sig: Inhale 2 puffs every 4 (four) hours as needed     albuterol (2 5 mg/3 mL) 0 083 % nebulizer solution   Yes No   Sig: Inhale   citalopram (CeleXA) 10 mg tablet   Yes No   Sig: Take 10 mg by mouth every morning   diphenhydrAMINE (BENADRYL) 25 mg capsule   Yes No   Sig: Take 50 mg by mouth daily at bedtime as needed     fluticasone (FLONASE) 50 mcg/act nasal spray   No No   Si sprays into each nostril daily   ranitidine (ZANTAC) 150 mg tablet   No No   Sig: Take 1 tablet (150 mg total) by mouth 2 (two) times a day      Facility-Administered Medications: None       Past Medical History:   Diagnosis Date    Asthma     Bipolar 1 disorder ([facility] [address] )     Palpitations     Last Assessed [id number]   Alexia Stokes Psychiatric disorder     Seizures ([facility] [address] )     Stabbing chest pain     Last Assessed 29Nov2016    Tachycardia     Last Assessed [id number]    Upper respiratory disease     Last Assessed 27Jan2016       Past Surgical History:   Procedure Laterality Date    KNEE SURGERY         Family History   Problem Relation Age of Onset    Cancer Mother     Diabetes Mother     Cancer Father     Diabetes Father     Arthritis Father     Cancer Maternal Grandmother     Cancer Maternal Aunt     Cancer Paternal Uncle     Diabetes Other      I have reviewed and agree with the history as documented  Social History   Substance Use Topics    Smoking status: Former Smoker     Packs/day: 1 00     Quit date: 4/6/2017    Smokeless tobacco: Never Used      Comment: Per [employer] - current every day smoker    Alcohol use No      Comment: Per [employer] - social use        Review of Systems   Constitutional: Negative for activity change, appetite change, chills, diaphoresis, fatigue, fever and irritability  HENT: Negative for congestion, dental problem, ear discharge, facial swelling, nosebleeds, rhinorrhea, sinus pressure and trouble swallowing  Eyes: Negative for photophobia, discharge, itching and visual disturbance  Respiratory: Negative for choking, chest tightness and shortness of breath  Cardiovascular: Negative for chest pain, palpitations and leg swelling  Gastrointestinal: Negative for abdominal distention, abdominal pain, constipation, diarrhea, nausea and vomiting  Endocrine: Negative for polydipsia and polyphagia  Genitourinary: Negative for decreased urine volume, difficulty urinating, dysuria, flank pain, frequency, hematuria, vaginal bleeding and vaginal discharge  Musculoskeletal: Negative for back pain, gait problem, joint swelling, neck pain and neck stiffness  Skin: Negative for color change and rash     Neurological: Negative for dizziness, facial asymmetry, speech difficulty, weakness and light-headedness  Psychiatric/Behavioral: Negative for agitation, behavioral problems, homicidal ideas, self-injury and suicidal ideas  The patient is nervous/anxious  The patient is not hyperactive  All other systems reviewed and are negative  Physical Exam  Physical Exam   Constitutional: She is oriented to person, place, and time  She appears well-developed and well-nourished  No distress  HENT:   Head: Normocephalic and atraumatic  Eyes: EOM are normal  Pupils are equal, round, and reactive to light  Neck: Normal range of motion  Neck supple  Cardiovascular: Normal rate, regular rhythm and normal heart sounds  No murmur heard  Pulmonary/Chest: Effort normal and breath sounds normal  No respiratory distress  She has no wheezes  She has no rales  Abdominal: Soft  Bowel sounds are normal  She exhibits no distension  There is no tenderness  There is no rebound and no guarding  Musculoskeletal: Normal range of motion  She exhibits no edema or deformity  Lymphadenopathy:     She has no cervical adenopathy  Neurological: She is alert and oriented to person, place, and time  No cranial nerve deficit  She exhibits normal muscle tone  Coordination normal    Skin: Capillary refill takes less than 2 seconds  No rash noted  No erythema  Psychiatric: Her behavior is normal  Her mood appears anxious  She expresses no homicidal and no suicidal ideation  She expresses no suicidal plans and no homicidal plans  Nursing note and vitals reviewed        Vital Signs  ED Triage Vitals [09/11/18 1730]   Temperature Pulse Respirations Blood Pressure SpO2   98 6 °F (37 °C) 86 14 126/55 --      Temp Source Heart Rate Source Patient Position - Orthostatic VS BP Location FiO2 (%)   Tympanic Monitor Sitting Right arm --      Pain Score       4           Vitals:    09/11/18 1730   BP: 126/55   Pulse: 86   Patient Position - Orthostatic VS: Sitting       Visual Acuity      ED Medications  Medications - No data to display    Diagnostic Studies  Results Reviewed     None                 No orders to display              Procedures  Procedures       Phone Contacts  ED Phone Contact    ED Course                               MDM  Number of Diagnoses or Management Options  Anxiety: new and does not require workup  Diagnosis management comments: Patient presents for evaluation of increased anxiety  Patient seen by her psychiatrist this morning in underwent medication changes  She reports stressors at home including sibling interactions  She denies any plan of suicide or homicide  She denies any auditory visual hallucinations  Vital signs stable  Patient requesting placement in a group home  She feels safe at home  She has not yet tried her new medications as they recently prescribed to her today  Patient medically clear for psychiatric evaluation  Crisis team saw patient emergency department discussed case with patient's sister and outpatient therapist   All have clear patient for discharge home with instructions to use her medications as prescribed and continue to follow up with her outpatient providers  Contact information given to patient for application to group home  Patient ambulatory, no acute distress at time discharge  No labs or imaging clinically indicated         Amount and/or Complexity of Data Reviewed  Review and summarize past medical records: yes  Discuss the patient with other providers: yes    Risk of Complications, Morbidity, and/or Mortality  Presenting problems: moderate  Diagnostic procedures: moderate  Management options: moderate      CritCare Time    Disposition  Final diagnoses:   Anxiety     Time reflects when diagnosis was documented in both MDM as applicable and the Disposition within this note     Time User Action Codes Description Comment    9/11/2018  7:09 PM Rylee Chandler Add [F41 9] Anxiety       ED Disposition     ED Disposition Condition Comment    Discharge  [place] Li discharge to home/self care  Condition at discharge: Good        Follow-up Information     Follow up With Specialties Details Why [facility], DO Family Medicine Schedule an appointment as soon as possible for a visit in 3 days As needed, If symptoms worsen [address]   [phone]            Discharge Medication List as of 9/11/2018  7:10 PM      CONTINUE these medications which have NOT CHANGED    Details   albuterol (2 5 mg/3 mL) 0 083 % nebulizer solution Inhale, Starting Fri 2/6/2015, Historical Med      citalopram (CeleXA) 10 mg tablet Take 10 mg by mouth every morning, Starting Thu 7/5/2018, Historical Med      diphenhydrAMINE (BENADRYL) 25 mg capsule Take 50 mg by mouth daily at bedtime as needed  , Historical Med      fluticasone (FLONASE) 50 mcg/act nasal spray 2 sprays into each nostril daily, Starting Wed 7/25/2018, Normal      LATUDA 80 MG tablet Take 80 mg by mouth daily at bedtime  , Starting Fri 7/6/2018, Historical Med      ranitidine (ZANTAC) 150 mg tablet Take 1 tablet (150 mg total) by mouth 2 (two) times a day, Starting Wed 7/25/2018, Normal      VENTOLIN  (90 Base) MCG/ACT inhaler Inhale 2 puffs every 4 (four) hours as needed  , Starting Fri 6/15/2018, Historical Med           No discharge procedures on file      ED Provider  Electronically Signed by           Santos Torres MD  09/11/18 4530

## 2018-09-12 ENCOUNTER — OFFICE VISIT (OUTPATIENT)
Dept: FAMILY MEDICINE CLINIC | Facility: CLINIC | Age: 23
End: 2018-09-12
Payer: MEDICARE

## 2018-09-12 VITALS
OXYGEN SATURATION: 99 % | HEIGHT: 64 IN | BODY MASS INDEX: 32.78 KG/M2 | TEMPERATURE: 96.9 F | HEART RATE: 95 BPM | RESPIRATION RATE: 20 BRPM | SYSTOLIC BLOOD PRESSURE: 122 MMHG | WEIGHT: 192 LBS | DIASTOLIC BLOOD PRESSURE: 66 MMHG

## 2018-09-12 DIAGNOSIS — IMO0001 COLD: Primary | ICD-10-CM

## 2018-09-12 PROBLEM — J01.40 ACUTE NON-RECURRENT PANSINUSITIS: Status: RESOLVED | Noted: 2018-07-25 | Resolved: 2018-09-12

## 2018-09-12 PROBLEM — H92.03 OTALGIA OF BOTH EARS: Status: RESOLVED | Noted: 2018-07-19 | Resolved: 2018-09-12

## 2018-09-12 PROBLEM — R07.1 CHEST PAIN VARYING WITH BREATHING: Status: RESOLVED | Noted: 2018-07-19 | Resolved: 2018-09-12

## 2018-09-12 PROBLEM — R07.9 CHEST PAIN: Status: RESOLVED | Noted: 2018-07-19 | Resolved: 2018-09-12

## 2018-09-12 PROCEDURE — 99213 OFFICE O/P EST LOW 20 MIN: CPT | Performed by: NURSE PRACTITIONER

## 2018-09-12 RX ORDER — LURASIDONE HYDROCHLORIDE 120 MG/1
TABLET, FILM COATED ORAL
Refills: 0 | COMMUNITY
Start: 2018-08-07 | End: 2018-11-02 | Stop reason: HOSPADM

## 2018-09-12 RX ORDER — LORATADINE 10 MG/1
TABLET ORAL
COMMUNITY
Start: 2017-09-05 | End: 2019-03-27 | Stop reason: ALTCHOICE

## 2018-09-12 NOTE — PROGRESS NOTES
Assessment/Plan:  1  Use nasal saline for congestion  2  Take Mucinex for cough  3  Follow-up condition changes or worsens       Diagnoses and all orders for this visit:    Cold    Other orders  -     loratadine (CLARITIN) 10 mg tablet; Take by mouth  -     LATUDA 120 MG tablet;           Subjective:      Patient ID: Radha Hill is a 26 y o  female  A 81-year-old female presents with cough and congestion for over week  Feels hot  No fever  Denies medications  Wet cough  Denies wheezing  The following portions of the patient's history were reviewed and updated as appropriate: allergies and current medications  Review of Systems   Constitutional: Negative  HENT: Positive for congestion  Respiratory: Positive for cough  Cardiovascular: Negative  Objective:      /66 (BP Location: Left arm, Patient Position: Sitting)   Pulse 95   Temp (!) 96 9 °F (36 1 °C) (Tympanic)   Resp 20   Ht 5' 4" (1 626 m)   Wt 87 1 kg (192 lb)   LMP 10/04/2017 Comment: post partum 2 months   SpO2 99%   Breastfeeding? Unknown   BMI 32 96 kg/m²          Physical Exam   Constitutional: She appears well-developed and well-nourished  HENT:   Head: Normocephalic and atraumatic  Right Ear: External ear normal    Left Ear: External ear normal    Nose: Rhinorrhea (clear) present  Mouth/Throat: Oropharynx is clear and moist    Cardiovascular: Normal rate, regular rhythm and normal heart sounds      Pulmonary/Chest: Effort normal and breath sounds normal

## 2018-09-15 ENCOUNTER — HOSPITAL ENCOUNTER (EMERGENCY)
Facility: HOSPITAL | Age: 23
End: 2018-09-15
Attending: EMERGENCY MEDICINE | Admitting: EMERGENCY MEDICINE
Payer: MEDICARE

## 2018-09-15 ENCOUNTER — APPOINTMENT (EMERGENCY)
Dept: RADIOLOGY | Facility: HOSPITAL | Age: 23
End: 2018-09-15
Payer: MEDICARE

## 2018-09-15 VITALS
OXYGEN SATURATION: 98 % | HEART RATE: 92 BPM | RESPIRATION RATE: 16 BRPM | SYSTOLIC BLOOD PRESSURE: 111 MMHG | TEMPERATURE: 97.9 F | HEIGHT: 63 IN | DIASTOLIC BLOOD PRESSURE: 60 MMHG | WEIGHT: 192 LBS | BODY MASS INDEX: 34.02 KG/M2

## 2018-09-15 DIAGNOSIS — F32.A DEPRESSION: Primary | ICD-10-CM

## 2018-09-15 DIAGNOSIS — R45.851 SUICIDAL IDEATION: ICD-10-CM

## 2018-09-15 DIAGNOSIS — F31.9 BIPOLAR 1 DISORDER (HCC): ICD-10-CM

## 2018-09-15 DIAGNOSIS — R45.850 HOMICIDAL IDEATION: ICD-10-CM

## 2018-09-15 LAB
ALBUMIN SERPL BCP-MCNC: 3.9 G/DL (ref 3.5–5)
ALP SERPL-CCNC: 88 U/L (ref 46–116)
ALT SERPL W P-5'-P-CCNC: 42 U/L (ref 12–78)
AMPHETAMINES SERPL QL SCN: NEGATIVE
ANION GAP SERPL CALCULATED.3IONS-SCNC: 7 MMOL/L (ref 4–13)
AST SERPL W P-5'-P-CCNC: 25 U/L (ref 5–45)
BACTERIA UR QL AUTO: ABNORMAL /HPF
BARBITURATES UR QL: NEGATIVE
BASOPHILS # BLD AUTO: 0.07 THOUSANDS/ΜL (ref 0–0.1)
BASOPHILS NFR BLD AUTO: 1 % (ref 0–1)
BENZODIAZ UR QL: NEGATIVE
BILIRUB SERPL-MCNC: 0.6 MG/DL (ref 0.2–1)
BILIRUB UR QL STRIP: NEGATIVE
BUN SERPL-MCNC: 13 MG/DL (ref 5–25)
CALCIUM SERPL-MCNC: 8.8 MG/DL (ref 8.3–10.1)
CHLORIDE SERPL-SCNC: 103 MMOL/L (ref 100–108)
CLARITY UR: CLEAR
CO2 SERPL-SCNC: 28 MMOL/L (ref 21–32)
COCAINE UR QL: NEGATIVE
COLOR UR: YELLOW
CREAT SERPL-MCNC: 1.13 MG/DL (ref 0.6–1.3)
EOSINOPHIL # BLD AUTO: 0.35 THOUSAND/ΜL (ref 0–0.61)
EOSINOPHIL NFR BLD AUTO: 2 % (ref 0–6)
ERYTHROCYTE [DISTWIDTH] IN BLOOD BY AUTOMATED COUNT: 15.3 % (ref 11.6–15.1)
ETHANOL SERPL-MCNC: 4 MG/DL (ref 0–3)
EXT PREG TEST URINE: NEGATIVE
GFR SERPL CREATININE-BSD FRML MDRD: 69 ML/MIN/1.73SQ M
GLUCOSE SERPL-MCNC: 92 MG/DL (ref 65–140)
GLUCOSE UR STRIP-MCNC: NEGATIVE MG/DL
HCT VFR BLD AUTO: 44.3 % (ref 34.8–46.1)
HGB BLD-MCNC: 14 G/DL (ref 11.5–15.4)
HGB UR QL STRIP.AUTO: NEGATIVE
IMM GRANULOCYTES # BLD AUTO: 0.08 THOUSAND/UL (ref 0–0.2)
IMM GRANULOCYTES NFR BLD AUTO: 1 % (ref 0–2)
KETONES UR STRIP-MCNC: NEGATIVE MG/DL
LEUKOCYTE ESTERASE UR QL STRIP: NEGATIVE
LYMPHOCYTES # BLD AUTO: 3.09 THOUSANDS/ΜL (ref 0.6–4.47)
LYMPHOCYTES NFR BLD AUTO: 21 % (ref 14–44)
MCH RBC QN AUTO: 28.6 PG (ref 26.8–34.3)
MCHC RBC AUTO-ENTMCNC: 31.6 G/DL (ref 31.4–37.4)
MCV RBC AUTO: 91 FL (ref 82–98)
METHADONE UR QL: NEGATIVE
MONOCYTES # BLD AUTO: 1.04 THOUSAND/ΜL (ref 0.17–1.22)
MONOCYTES NFR BLD AUTO: 7 % (ref 4–12)
NEUTROPHILS # BLD AUTO: 10.28 THOUSANDS/ΜL (ref 1.85–7.62)
NEUTS SEG NFR BLD AUTO: 68 % (ref 43–75)
NITRITE UR QL STRIP: NEGATIVE
NON-SQ EPI CELLS URNS QL MICRO: ABNORMAL /HPF
NRBC BLD AUTO-RTO: 0 /100 WBCS
OPIATES UR QL SCN: NEGATIVE
PCP UR QL: NEGATIVE
PH UR STRIP.AUTO: 6.5 [PH] (ref 5–9)
PLATELET # BLD AUTO: 397 THOUSANDS/UL (ref 149–390)
PMV BLD AUTO: 8.6 FL (ref 8.9–12.7)
POTASSIUM SERPL-SCNC: 3.9 MMOL/L (ref 3.5–5.3)
PROT SERPL-MCNC: 8 G/DL (ref 6.4–8.2)
PROT UR STRIP-MCNC: ABNORMAL MG/DL
RBC # BLD AUTO: 4.89 MILLION/UL (ref 3.81–5.12)
RBC #/AREA URNS AUTO: ABNORMAL /HPF
SODIUM SERPL-SCNC: 138 MMOL/L (ref 136–145)
SP GR UR STRIP.AUTO: 1.02 (ref 1–1.03)
THC UR QL: NEGATIVE
UROBILINOGEN UR QL STRIP.AUTO: 0.2 E.U./DL
WBC # BLD AUTO: 14.91 THOUSAND/UL (ref 4.31–10.16)
WBC #/AREA URNS AUTO: ABNORMAL /HPF

## 2018-09-15 PROCEDURE — 80320 DRUG SCREEN QUANTALCOHOLS: CPT | Performed by: EMERGENCY MEDICINE

## 2018-09-15 PROCEDURE — 81025 URINE PREGNANCY TEST: CPT | Performed by: EMERGENCY MEDICINE

## 2018-09-15 PROCEDURE — 80307 DRUG TEST PRSMV CHEM ANLYZR: CPT | Performed by: EMERGENCY MEDICINE

## 2018-09-15 PROCEDURE — 85025 COMPLETE CBC W/AUTO DIFF WBC: CPT | Performed by: EMERGENCY MEDICINE

## 2018-09-15 PROCEDURE — 81001 URINALYSIS AUTO W/SCOPE: CPT | Performed by: EMERGENCY MEDICINE

## 2018-09-15 PROCEDURE — 72100 X-RAY EXAM L-S SPINE 2/3 VWS: CPT

## 2018-09-15 PROCEDURE — 36415 COLL VENOUS BLD VENIPUNCTURE: CPT | Performed by: EMERGENCY MEDICINE

## 2018-09-15 PROCEDURE — 80053 COMPREHEN METABOLIC PANEL: CPT | Performed by: EMERGENCY MEDICINE

## 2018-09-15 PROCEDURE — 99285 EMERGENCY DEPT VISIT HI MDM: CPT

## 2018-09-15 RX ORDER — CLONIDINE HYDROCHLORIDE 0.1 MG/1
0.1 TABLET ORAL EVERY 8 HOURS SCHEDULED
Status: DISCONTINUED | OUTPATIENT
Start: 2018-09-15 | End: 2018-09-15 | Stop reason: HOSPADM

## 2018-09-15 RX ORDER — GUAIFENESIN 600 MG
600 TABLET, EXTENDED RELEASE 12 HR ORAL EVERY 12 HOURS SCHEDULED
Status: DISCONTINUED | OUTPATIENT
Start: 2018-09-15 | End: 2018-09-15 | Stop reason: HOSPADM

## 2018-09-15 RX ORDER — ALBUTEROL SULFATE 90 UG/1
2 AEROSOL, METERED RESPIRATORY (INHALATION) 4 TIMES DAILY
Status: DISCONTINUED | OUTPATIENT
Start: 2018-09-15 | End: 2018-09-15 | Stop reason: HOSPADM

## 2018-09-15 RX ORDER — METHADONE HYDROCHLORIDE 5 MG/1
5 TABLET ORAL DAILY
Status: DISCONTINUED | OUTPATIENT
Start: 2018-09-15 | End: 2018-09-15

## 2018-09-15 RX ORDER — QUETIAPINE FUMARATE 100 MG/1
100 TABLET, FILM COATED ORAL
COMMUNITY
End: 2018-09-20

## 2018-09-15 RX ADMIN — CLONIDINE HYDROCHLORIDE 0.1 MG: 0.1 TABLET ORAL at 15:40

## 2018-09-15 RX ADMIN — METHADONE HYDROCHLORIDE 5 MG: 5 TABLET ORAL at 15:40

## 2018-09-15 NOTE — ED NOTES
Call from Rochelle Torres at [facility]  Need to know the frequency of Latuda and if the MD aware of pt's WBC is high  I called Wanda regarding the medication  Nani [place] reported that pt takes  It once a day in the morning  Informed attending MD about WBC level  She stated pt is medically clear and she knows about the level  I called Rochelle Torres back with the information      Sarah García

## 2018-09-15 NOTE — EMTALA/ACUTE CARE TRANSFER
[facility] EMERGENCY DEPARTMENT  [id number]  Dept: [phone]      EMTALA TRANSFER CONSENT    NAME Bridgette Hannon                                         1995                              MRN [id number]    I have been informed of my rights regarding examination, treatment, and transfer   by Dr Bebe De La Fuente DO    Benefits:  Psychiatric    Risks:        Consent for Transfer:  I acknowledge that my medical condition has been evaluated and explained to me by the emergency department physician or other qualified medical person and/or my attending physician, who has recommended that I be transferred to the service of    at    The above potential benefits of such transfer, the potential risks associated with such transfer, and the probable risks of not being transferred have been explained to me, and I fully understand them  The doctor has explained that, in my case, the benefits of transfer outweigh the risks  I agree to be transferred  I authorize the performance of emergency medical procedures and treatments upon me in both transit and upon arrival at the receiving facility  Additionally, I authorize the release of any and all medical records to the receiving facility and request they be transported with me, if possible  I understand that the safest mode of transportation during a medical emergency is an ambulance and that the Hospital advocates the use of this mode of transport  Risks of traveling to the receiving facility by car, including absence of medical control, life sustaining equipment, such as oxygen, and medical personnel has been explained to me and I fully understand them  (NIKOLE CORRECT BOX BELOW)  [  ]  I consent to the stated transfer and to be transported by ambulance/helicopter  [  ]  I consent to the stated transfer, but refuse transportation by ambulance and accept full responsibility for my transportation by car    I understand the risks of non-ambulance transfers and I exonerate the Hospital and its staff from any deterioration in my condition that results from this refusal     X___________________________________________    DATE  09/15/18  TIME________  Signature of patient or legally responsible individual signing on patient behalf           RELATIONSHIP TO PATIENT_________________________          Provider Certification    NAME Elsa Castelan                                         1995                              MRN [id number]    A medical screening exam was performed on the above named patient  Based on the examination:    Condition Necessitating Transfer The primary encounter diagnosis was Depression  Diagnoses of Bipolar 1 disorder ([facility] [address] ), Suicidal ideation, and Homicidal ideation were also pertinent to this visit  Patient Condition:      Reason for Transfer:      Transfer Requirements: Facility     · Space available and qualified personnel available for treatment as acknowledged by    · Agreed to accept transfer and to provide appropriate medical treatment as acknowledged by          · Appropriate medical records of the examination and treatment of the patient are provided at the time of transfer   [facility], [address] _______  · Transfer will be performed by qualified personnel from    and appropriate transfer equipment as required, including the use of necessary and appropriate life support measures      Provider Certification: I have examined the patient and explained the following risks and benefits of being transferred/refusing transfer to the patient/family:         Based on these reasonable risks and benefits to the patient and/or the unborn child(artur), and based upon the information available at the time of the patients examination, I certify that the medical benefits reasonably to be expected from the provision of appropriate medical treatments at another medical facility outweigh the increasing risks, if any, to the individuals medical condition, and in the case of labor to the unborn child, from effecting the transfer      X____________________________________________ DATE 09/15/18        TIME_______      ORIGINAL - SEND TO MEDICAL RECORDS   COPY - SEND WITH PATIENT DURING TRANSFER

## 2018-09-15 NOTE — EMTALA/ACUTE CARE TRANSFER
[facility] EMERGENCY DEPARTMENT  [address] [facility] [address]  Dept: [phone]      EMTALA TRANSFER CONSENT    NAME Richi Cervantes                                         1995                              MRN [id number]    I have been informed of my rights regarding examination, treatment, and transfer   by Dr Sharpe     Benefits: Specialized equipment and/or services available at the receiving facility (Include comment)________________________    Risks: Potential for delay in receiving treatment      Consent for Transfer:  I acknowledge that my medical condition has been evaluated and explained to me by the emergency department physician or other qualified medical person and/or my attending physician, who has recommended that I be transferred to the service of  Accepting Physician: Dr Penelope Calvillo at [address] Name, [address] : Sigrid Chiang  The above potential benefits of such transfer, the potential risks associated with such transfer, and the probable risks of not being transferred have been explained to me, and I fully understand them  The doctor has explained that, in my case, the benefits of transfer outweigh the risks  I agree to be transferred  I authorize the performance of emergency medical procedures and treatments upon me in both transit and upon arrival at the receiving facility  Additionally, I authorize the release of any and all medical records to the receiving facility and request they be transported with me, if possible  I understand that the safest mode of transportation during a medical emergency is an ambulance and that the Hospital advocates the use of this mode of transport  Risks of traveling to the receiving facility by car, including absence of medical control, life sustaining equipment, such as oxygen, and medical personnel has been explained to me and I fully understand them      (NIKOLE CORRECT BOX BELOW)  [  ]  I consent to the stated transfer and to be transported by ambulance/helicopter  [  ]  I consent to the stated transfer, but refuse transportation by ambulance and accept full responsibility for my transportation by car  I understand the risks of non-ambulance transfers and I exonerate the Hospital and its staff from any deterioration in my condition that results from this refusal     X___________________________________________    DATE  09/15/18  TIME________  Signature of patient or legally responsible individual signing on patient behalf           RELATIONSHIP TO PATIENT_________________________          Provider Certification    NAME Priscilla Doan                                         1995                              MRN [id number]    A medical screening exam was performed on the above named patient  Based on the examination:    Condition Necessitating Transfer The primary encounter diagnosis was Depression  Diagnoses of Bipolar 1 disorder ([facility] [address] ), Suicidal ideation, and Homicidal ideation were also pertinent to this visit  Patient Condition: The patient has been stabilized such that within reasonable medical probability, no material deterioration of the patient condition or the condition of the unborn child(artur) is likely to result from the transfer    Reason for Transfer: Level of Care needed not available at this facility    Transfer Requirements: [facility]   · Space available and qualified personnel available for treatment as acknowledged by    · Agreed to accept transfer and to provide appropriate medical treatment as acknowledged by       Dr Pam Bautista  · Appropriate medical records of the examination and treatment of the patient are provided at the time of transfer   [facility], [address] _______  · Transfer will be performed by qualified personnel from [facility] [address]  and appropriate transfer equipment as required, including the use of necessary and appropriate life support measures      Provider Certification: I have examined the patient and explained the following risks and benefits of being transferred/refusing transfer to the patient/family:  General risk, such as traffic hazards, adverse weather conditions, rough terrain or turbulence, possible failure of equipment (including vehicle or aircraft), or consequences of actions of persons outside the control of the transport personnel      Based on these reasonable risks and benefits to the patient and/or the unborn child(artur), and based upon the information available at the time of the patients examination, I certify that the medical benefits reasonably to be expected from the provision of appropriate medical treatments at another medical facility outweigh the increasing risks, if any, to the individuals medical condition, and in the case of labor to the unborn child, from effecting the transfer      X____________________________________________ DATE 09/15/18        TIME_______      ORIGINAL - SEND TO MEDICAL RECORDS   COPY - SEND WITH PATIENT DURING TRANSFER
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
## 2018-09-15 NOTE — ED NOTES
Pt accepted at [facility] by Dr Paulino Mejía ([phone])  Informed attending MD and pt and pt's nurse  Edelmira Porter

## 2018-09-15 NOTE — ED NOTES
Pleasant and cooperative  C/o anxiety  Medicated per orders        Valentino Blue, RN  09/15/18 8684

## 2018-09-15 NOTE — ED NOTES
Labs and urine collected  Patient given warm blankets and remote  Requesting lunch, called dietary and order placed  Belongings: Grey pants, long sleeve black shirt, socks, sandals  Belongings locked in [place] [id number]        Maria Isabel Hussein  09/15/18 4204
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
## 2018-09-15 NOTE — ED NOTES
Pt states she prefers to stay in [place] but would consider [place] if bed search exhausted in [place]  [facility], [address] access center  No beds available  [facility] beds opening  Faxed chart for review       Ro De La Cruz

## 2018-09-20 ENCOUNTER — APPOINTMENT (EMERGENCY)
Dept: RADIOLOGY | Facility: HOSPITAL | Age: 23
End: 2018-09-20
Payer: MEDICARE

## 2018-09-20 ENCOUNTER — HOSPITAL ENCOUNTER (EMERGENCY)
Facility: HOSPITAL | Age: 23
End: 2018-09-21
Attending: EMERGENCY MEDICINE | Admitting: EMERGENCY MEDICINE
Payer: MEDICARE

## 2018-09-20 DIAGNOSIS — F32.A DEPRESSION: ICD-10-CM

## 2018-09-20 DIAGNOSIS — G40.909 SEIZURE DISORDER (HCC): Primary | ICD-10-CM

## 2018-09-20 LAB
AMPHETAMINES SERPL QL SCN: NEGATIVE
ANION GAP SERPL CALCULATED.3IONS-SCNC: 6 MMOL/L (ref 4–13)
BARBITURATES UR QL: NEGATIVE
BASOPHILS # BLD AUTO: 0.07 THOUSANDS/ΜL (ref 0–0.1)
BASOPHILS NFR BLD AUTO: 1 % (ref 0–1)
BENZODIAZ UR QL: NEGATIVE
BUN SERPL-MCNC: 18 MG/DL (ref 5–25)
CALCIUM SERPL-MCNC: 8.8 MG/DL (ref 8.3–10.1)
CHLORIDE SERPL-SCNC: 104 MMOL/L (ref 100–108)
CO2 SERPL-SCNC: 27 MMOL/L (ref 21–32)
COCAINE UR QL: NEGATIVE
CREAT SERPL-MCNC: 0.97 MG/DL (ref 0.6–1.3)
EOSINOPHIL # BLD AUTO: 0.53 THOUSAND/ΜL (ref 0–0.61)
EOSINOPHIL NFR BLD AUTO: 5 % (ref 0–6)
ERYTHROCYTE [DISTWIDTH] IN BLOOD BY AUTOMATED COUNT: 15.2 % (ref 11.6–15.1)
ETHANOL SERPL-MCNC: <3 MG/DL (ref 0–3)
EXT PREG TEST URINE: NEGATIVE
GFR SERPL CREATININE-BSD FRML MDRD: 83 ML/MIN/1.73SQ M
GLUCOSE SERPL-MCNC: 98 MG/DL (ref 65–140)
HCT VFR BLD AUTO: 41.4 % (ref 34.8–46.1)
HGB BLD-MCNC: 13.2 G/DL (ref 11.5–15.4)
IMM GRANULOCYTES # BLD AUTO: 0.08 THOUSAND/UL (ref 0–0.2)
IMM GRANULOCYTES NFR BLD AUTO: 1 % (ref 0–2)
LYMPHOCYTES # BLD AUTO: 3.64 THOUSANDS/ΜL (ref 0.6–4.47)
LYMPHOCYTES NFR BLD AUTO: 36 % (ref 14–44)
MCH RBC QN AUTO: 28.8 PG (ref 26.8–34.3)
MCHC RBC AUTO-ENTMCNC: 31.9 G/DL (ref 31.4–37.4)
MCV RBC AUTO: 90 FL (ref 82–98)
METHADONE UR QL: NEGATIVE
MONOCYTES # BLD AUTO: 0.82 THOUSAND/ΜL (ref 0.17–1.22)
MONOCYTES NFR BLD AUTO: 8 % (ref 4–12)
NEUTROPHILS # BLD AUTO: 4.87 THOUSANDS/ΜL (ref 1.85–7.62)
NEUTS SEG NFR BLD AUTO: 49 % (ref 43–75)
NRBC BLD AUTO-RTO: 0 /100 WBCS
OPIATES UR QL SCN: NEGATIVE
PCP UR QL: NEGATIVE
PLATELET # BLD AUTO: 397 THOUSANDS/UL (ref 149–390)
PMV BLD AUTO: 9 FL (ref 8.9–12.7)
POTASSIUM SERPL-SCNC: 4.8 MMOL/L (ref 3.5–5.3)
RBC # BLD AUTO: 4.58 MILLION/UL (ref 3.81–5.12)
SODIUM SERPL-SCNC: 137 MMOL/L (ref 136–145)
THC UR QL: NEGATIVE
WBC # BLD AUTO: 10.01 THOUSAND/UL (ref 4.31–10.16)

## 2018-09-20 PROCEDURE — 80048 BASIC METABOLIC PNL TOTAL CA: CPT | Performed by: EMERGENCY MEDICINE

## 2018-09-20 PROCEDURE — 70450 CT HEAD/BRAIN W/O DYE: CPT

## 2018-09-20 PROCEDURE — 80320 DRUG SCREEN QUANTALCOHOLS: CPT | Performed by: EMERGENCY MEDICINE

## 2018-09-20 PROCEDURE — 81025 URINE PREGNANCY TEST: CPT | Performed by: EMERGENCY MEDICINE

## 2018-09-20 PROCEDURE — 36415 COLL VENOUS BLD VENIPUNCTURE: CPT | Performed by: EMERGENCY MEDICINE

## 2018-09-20 PROCEDURE — 85025 COMPLETE CBC W/AUTO DIFF WBC: CPT | Performed by: EMERGENCY MEDICINE

## 2018-09-20 PROCEDURE — 80307 DRUG TEST PRSMV CHEM ANLYZR: CPT | Performed by: EMERGENCY MEDICINE

## 2018-09-20 RX ORDER — DIPHENHYDRAMINE HCL 25 MG
50 TABLET ORAL ONCE
Status: COMPLETED | OUTPATIENT
Start: 2018-09-20 | End: 2018-09-20

## 2018-09-20 RX ADMIN — DIPHENHYDRAMINE HCL 50 MG: 25 TABLET ORAL at 23:39
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
## 2018-09-20 NOTE — ED NOTES
Pt found shaking in bed with eyes closed  Pt foot pinched and pt opened eyes immediately and convulsions ceased         Nimo Hernandez RN  09/20/18 0177

## 2018-09-20 NOTE — ED NOTES
Pt changed into hospital attire and moved to secured holding area  Clothing removed and locked in cabinet  Pt sts she wants to go back to [facility] because she feels her meds aren't right       Juno Mcclelland RN  09/20/18 1264
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
## 2018-09-20 NOTE — ED NOTES
Pt wants a bed in [place] only  No beds at [facility]  [facility] will look at referral - faxed at 18:00  Pt requested that [facility] be bed searched for her; Melba Queen (crisis) called @ 19:00 - chart faxed at 19:05  [facility] called at 19:10 and stated they would only consider admitting pt as an "INVOLUNTARY"  Zenaida Cruz for an update @ 21:15 - spoke with Jaja Dietz; they have not looked at chart as yet due to being busy - pt was updated  Pt's phone was removed due to texting issue; sister Maurice Peraza was in ER and asked to speak with PES; pt said no and sister was informed who then refused to visit with pt  Pt's sister Arlene Shook asked to visit and pt declined  Referral faxed to [facility] @ 21:30 for possible bed tomorrow - called to verify receipt  Chart faxed to [facility] @ 22:00 - called and gave report on what follow-up was needed  Called Rizwan @ 22:35 for an update - message left as charge Rn was to busy to take the call  Pt and ER staff updated at end of PES shift
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
## 2018-09-20 NOTE — ED NOTES
Pt reported to charge nurse that she felt suicidal   Charge RN notified [place], crisis worker       Fabián Schwartz, SARKIS  09/20/18 5304

## 2018-09-21 VITALS
RESPIRATION RATE: 18 BRPM | TEMPERATURE: 98.3 F | HEART RATE: 79 BPM | OXYGEN SATURATION: 98 % | DIASTOLIC BLOOD PRESSURE: 57 MMHG | SYSTOLIC BLOOD PRESSURE: 102 MMHG

## 2018-09-21 LAB
ATRIAL RATE: 77 BPM
P AXIS: 36 DEGREES
PR INTERVAL: 152 MS
QRS AXIS: 20 DEGREES
QRSD INTERVAL: 86 MS
QT INTERVAL: 364 MS
QTC INTERVAL: 411 MS
T WAVE AXIS: 37 DEGREES
VENTRICULAR RATE: 77 BPM

## 2018-09-21 PROCEDURE — 99285 EMERGENCY DEPT VISIT HI MDM: CPT

## 2018-09-21 PROCEDURE — 93005 ELECTROCARDIOGRAM TRACING: CPT

## 2018-09-21 PROCEDURE — 93010 ELECTROCARDIOGRAM REPORT: CPT | Performed by: INTERNAL MEDICINE

## 2018-09-21 NOTE — ED NOTES
Pt had cell phone from previous shift, phone removed and placed in belonging bag in closet  Pt resting in bed, observation maintained       Alex Ferreira RN  09/21/18 4665

## 2018-09-21 NOTE — ED NOTES
Patient accepted by [facility] and [facility] gave RN number for nurse to nurse to call after [phone]  I called [facility] and was told they could transport at 1230am so I called [facility] and they said 11pm   They then said they may be able to do earlier they will call an hr before  as the units are priority over the ER for them  ER charge nurse notified  Patient signed voluntary consent and I faxed back to them

## 2018-09-21 NOTE — ED NOTES
[facility] called; new pick-up time is 15:30; ER staff, pt and [address] [place] [address] all notified  Called [facility] @ 16:10 to check on arrival time as ambulance is 40 minutes late - should arrive in about 5 minutes  Ambulance arrived about 14:35 - [facility]

## 2018-09-21 NOTE — ED NOTES
Pt requesting night time meds, Spoke with Truong from crisis and pt has not been taking meds and unable to verify meds at this time, spoke with family who gives pt meds and unable to verify names of meds at this time       Meka Oliva RN  09/21/18 8405

## 2018-09-21 NOTE — EMTALA/ACUTE CARE TRANSFER
[facility] EMERGENCY DEPARTMENT  [id number]  Dept: [phone]      EMTALA TRANSFER CONSENT    NAME Coty Angeles                                         1995                              MRN [id number]    I have been informed of my rights regarding examination, treatment, and transfer   by Dr Quiroga att  providers found    Benefits: Specialized equipment and/or services available at the receiving facility (Include comment)________________________    Risks: Potential for delay in receiving treatment      Transfer Request   I acknowledge that my medical condition has been evaluated and explained to me by the emergency department physician or other qualified medical person and/or my attending physician who has recommended and offered to me further medical examination and treatment  I understand the Hospital's obligation with respect to the treatment and stabilization of my emergency medical condition  I nevertheless request to be transferred  I release the Hospital, the doctor, and any other persons caring for me from all responsibility or liability for any injury or ill effects that may result from my transfer and agree to accept all responsibility for the consequences of my choice to transfer, rather than receive stabilizing treatment at the Hospital  I understand that because the transfer is my request, my insurance may not provide reimbursement for the services  The Hospital will assist and direct me and my family in how to make arrangements for transfer, but the hospital is not liable for any fees charged by the transport service  In spite of this understanding, I refuse to consent to further medical examination and treatment which has been offered to me, and request transfer to  Pola Marks Name, [address] : [facility]   I authorize the performance of emergency medical procedures and treatments upon me in both transit and upon arrival at the receiving facility  Additionally, I authorize the release of any and all medical records to the receiving facility and request they be transported with me, if possible  I authorize the performance of emergency medical procedures and treatments upon me in both transit and upon arrival at the receiving facility  Additionally, I authorize the release of any and all medical records to the receiving facility and request they be transported with me, if possible  I understand that the safest mode of transportation during a medical emergency is an ambulance and that the Hospital advocates the use of this mode of transport  Risks of traveling to the receiving facility by car, including absence of medical control, life sustaining equipment, such as oxygen, and medical personnel has been explained to me and I fully understand them  (NIKOLE CORRECT BOX BELOW)  [  x]  I consent to the stated transfer and to be transported by ambulance/helicopter  [  ]  I consent to the stated transfer, but refuse transportation by ambulance and accept full responsibility for my transportation by car  I understand the risks of non-ambulance transfers and I exonerate the Hospital and its staff from any deterioration in my condition that results from this refusal     X___________________________________________    DATE  18  TIME________  Signature of patient or legally responsible individual signing on patient behalf           RELATIONSHIP TO PATIENT_________________________          Provider Certification    NAME Anjali Nino Jeremias                                        [facility] 1995                              MRN [id number]    A medical screening exam was performed on the above named patient  Based on the examination:    Condition Necessitating Transfer The primary encounter diagnosis was Seizure disorder ([facility] [address] )  A diagnosis of Depression was also pertinent to this visit      Patient Condition: The patient has been stabilized such that within reasonable medical probability, no material deterioration of the patient condition or the condition of the unborn child(artur) is likely to result from the transfer    Reason for Transfer: Level of Care needed not available at this facility    Transfer Requirements: Óscar Syed   · Space available and qualified personnel available for treatment as acknowledged by    · Agreed to accept transfer and to provide appropriate medical treatment as acknowledged by       Dr Betti Nageotte  · Appropriate medical records of the examination and treatment of the patient are provided at the time of transfer   [facility], [address] _______  · Transfer will be performed by qualified personnel from    and appropriate transfer equipment as required, including the use of necessary and appropriate life support measures  Provider Certification: I have examined the patient and explained the following risks and benefits of being transferred/refusing transfer to the patient/family:  General risk, such as traffic hazards, adverse weather conditions, rough terrain or turbulence, possible failure of equipment (including vehicle or aircraft), or consequences of actions of persons outside the control of the transport personnel      Based on these reasonable risks and benefits to the patient and/or the unborn child(artur), and based upon the information available at the time of the patients examination, I certify that the medical benefits reasonably to be expected from the provision of appropriate medical treatments at another medical facility outweigh the increasing risks, if any, to the individuals medical condition, and in the case of labor to the unborn child, from effecting the transfer      X____________________________________________ DATE 09/21/18        TIME_______      ORIGINAL - SEND TO MEDICAL RECORDS   COPY - SEND WITH PATIENT DURING TRANSFER

## 2018-09-21 NOTE — ED NOTES
Received patient in sign out currently awaiting bed placement  Patient with no complaints   Patient s/o to Dr Jen Webb MD  09/21/18 5997

## 2018-09-21 NOTE — ED NOTES
Pt observed ambulating in common area, attempting to talk to other patients  Pt cheerful and speaking with smile on face       Paolo Butcher RN  09/21/18 1615

## 2018-09-21 NOTE — ED CARE HANDOFF
Emergency Department Sign Out Note        Sign out and transfer of care from outgoing physician  See Separate Emergency Department note  The patient, Fuad Foster, was evaluated by the previous provider for seizure and suicidal ideations  Workup Completed:  CT scan blood work all were normal  Patient needs to be screen by mental health workers    ED Course / Workup Pending (followup): Patient is was seen by the mental health workers and she is a voluntary psychiatric placement                             Procedures  MDM  Number of Diagnoses or Management Options  Depression:   Seizure disorder [facility]):   Diagnosis management comments: Patient is medically stable for psychiatric transfer, she is screened and accepted at [facility] Time      Disposition  Final diagnoses:   Seizure disorder ([facility] [address] )   Depression     Time reflects when diagnosis was documented in both MDM as applicable and the Disposition within this note     Time User Action Codes Description Comment    9/21/2018 12:42 PM Darleene Diesel Add [G40 909] Seizure disorder ([facility] [address] )     9/21/2018 12:42 PM Darleene Diesel Add [F32 9] Depression       ED Disposition     ED Disposition Condition Comment    Transfer to Another [address] [place] [address] Drive should be transferred out to [employer]        MD Documentation      Most Recent Value   Patient Condition  The patient has been stabilized such that within reasonable medical probability, no material deterioration of the patient condition or the condition of the unborn child(artur) is likely to result from the transfer   Reason for Transfer  Level of Care needed not available at this facility   Benefits of Transfer  Specialized equipment and/or services available at the receiving facility (Include comment)________________________   Risks of Transfer  Potential for delay in receiving treatment   Accepting Physician  Dr Kashif Zambrano Name, Grace Paul MD Dr Yogesh Noland   Provider Certification  General risk, such as traffic hazards, adverse weather conditions, rough terrain or turbulence, possible failure of equipment (including vehicle or aircraft), or consequences of actions of persons outside the control of the transport personnel      RN Documentation      [facility], [facility]      Follow-up Information    None       Discharge Medication List as of 9/21/2018  4:51 PM      CONTINUE these medications which have NOT CHANGED    Details   albuterol (2 5 mg/3 mL) 0 083 % nebulizer solution Inhale, Starting Fri 2/6/2015, Historical Med      citalopram (CeleXA) 10 mg tablet Take 10 mg by mouth every morning, Starting Thu 7/5/2018, Historical Med      diphenhydrAMINE (BENADRYL) 25 mg capsule Take 50 mg by mouth daily at bedtime as needed  , Historical Med      fluticasone (FLONASE) 50 mcg/act nasal spray 2 sprays into each nostril daily, Starting Wed 7/25/2018, Normal      LATUDA 120 MG tablet Starting Tue 8/7/2018, Historical Med      loratadine (CLARITIN) 10 mg tablet Take by mouth, Starting Tue 9/5/2017, Historical Med      ranitidine (ZANTAC) 150 mg tablet Take 1 tablet (150 mg total) by mouth 2 (two) times a day, Starting Wed 7/25/2018, Normal      VENTOLIN  (90 Base) MCG/ACT inhaler Inhale 2 puffs every 4 (four) hours as needed  , Starting Fri 6/15/2018, Historical Med           No discharge procedures on file         ED Provider  Electronically Signed by     Doreen Hay MD  09/25/18 6176

## 2018-10-01 ENCOUNTER — APPOINTMENT (OUTPATIENT)
Dept: PREADMISSION TESTING | Facility: HOSPITAL | Age: 23
End: 2018-10-01
Payer: MEDICARE

## 2018-10-01 NOTE — PRE-PROCEDURE INSTRUCTIONS
My Surgical Experience    The following information was developed to assist you to prepare for your operation  What do I need to do before coming to the hospital?   Arrange for a responsible person to drive you to and from the hospital    Arrange care for your children at home  Children are not allowed in the recovery areas of the hospital   Plan to wear clothing that is easy to put on and take off  If you are having shoulder surgery, wear a shirt that buttons or zippers in the front  Bathing  o Shower the evening before and the morning of your surgery with an antibacterial soap  Please refer to the Pre Op Showering Instructions for Surgery Patients Sheet   o Remove nail polish and all body piercing jewelry  o Do not shave any body part for at least 24 hours before surgery-this includes face, arms, legs and upper body  Food  o Nothing to eat or drink after midnight the night before your surgery  This includes candy and chewing gum  o Exception: If your surgery is after 12:00pm (noon), you may have clear liquids such as 7-Up®, ginger ale, apple or cranberry juice, Jell-O®, water, or clear broth until 8:00 am  o Do not drink milk or juice with pulp on the morning before surgery  o Do not drink alcohol 24 hours before surgery  Medicine  o Follow instructions you received from your surgeon about which medicines you may take on the day of surgery  o If instructed to take medicine on the morning of surgery, take pills with just a small sip of water  Call your prescribing doctor for specific infroamtion on what to do if you take insulin    What should I bring to the hospital?    Bring:  [place] Friday or a walker, if you have them, for foot or knee surgery   A list of the daily medicines, vitamins, minerals, herbals and nutritional supplements you take   Include the dosages of medicines and the time you take them each day   Glasses, dentures or hearing aids   Minimal clothing; you will be wearing hospital sleepwear   Photo ID; required to verify your identity   If you have a Living Will or Power of , bring a copy of the documents   If you have an ostomy, bring an extra pouch and any supplies you use    Do not bring   Medicines or inhalers   Money, valuables or jewelry    What other information should I know about the day of surgery?  Notify your surgeons if you develop a cold, sore throat, cough, fever, rash or any other illness   Report to the Ambulatory Surgical/Same Day Surgery Unit   You will be instructed to stop at Registration only if you have not been pre-registered   Inform your  fi they do not stay that they will be asked by the staff to leave a phone number where they can be reached   Be available to be reached before surgery  In the event the operating room schedule changes, you may be asked to come in earlier or later than expected    *It is important to tell your doctor and others involved in your health care if you are taking or have been taking any non-prescription drugs, vitamins, minerals, herbals or other nutritional supplements  Any of these may interact with some food or medicines and cause a reaction      Pre-Surgery Instructions:   Medication Instructions    citalopram (CeleXA) 10 mg tablet Instructed patient per Anesthesia Guidelines   diphenhydrAMINE (BENADRYL) 25 mg capsule Instructed patient per Anesthesia Guidelines   fluticasone (FLONASE) 50 mcg/act nasal spray Instructed patient per Anesthesia Guidelines   LATUDA 120 MG tablet Instructed patient per Anesthesia Guidelines   loratadine (CLARITIN) 10 mg tablet Instructed patient per Anesthesia Guidelines   OXcarbazepine (TRILEPTAL PO) Instructed patient per Anesthesia Guidelines   ranitidine (ZANTAC) 150 mg tablet Instructed patient per Anesthesia Guidelines   VENTOLIN  (90 Base) MCG/ACT inhaler Instructed patient per Anesthesia Guidelines      To take zantac, latuda, celexa, trileptal and use inhaler a m   Of surgery

## 2018-10-03 ENCOUNTER — ANESTHESIA (OUTPATIENT)
Dept: PERIOP | Facility: HOSPITAL | Age: 23
End: 2018-10-03
Payer: MEDICARE

## 2018-10-03 ENCOUNTER — HOSPITAL ENCOUNTER (OUTPATIENT)
Facility: HOSPITAL | Age: 23
Setting detail: OUTPATIENT SURGERY
Discharge: HOME | End: 2018-10-03
Attending: OBSTETRICS & GYNECOLOGY | Admitting: OBSTETRICS & GYNECOLOGY
Payer: MEDICARE

## 2018-10-03 ENCOUNTER — ANESTHESIA EVENT (OUTPATIENT)
Dept: PERIOP | Facility: HOSPITAL | Age: 23
End: 2018-10-03
Payer: MEDICARE

## 2018-10-03 VITALS
OXYGEN SATURATION: 100 % | TEMPERATURE: 97 F | WEIGHT: 194 LBS | DIASTOLIC BLOOD PRESSURE: 54 MMHG | HEART RATE: 74 BPM | BODY MASS INDEX: 34.38 KG/M2 | RESPIRATION RATE: 18 BRPM | HEIGHT: 63 IN | SYSTOLIC BLOOD PRESSURE: 94 MMHG

## 2018-10-03 LAB — EXT PREGNANCY TEST URINE: NEGATIVE

## 2018-10-03 PROCEDURE — 81025 URINE PREGNANCY TEST: CPT | Performed by: NURSE ANESTHETIST, CERTIFIED REGISTERED

## 2018-10-03 RX ORDER — SODIUM CHLORIDE, SODIUM LACTATE, POTASSIUM CHLORIDE, CALCIUM CHLORIDE 600; 310; 30; 20 MG/100ML; MG/100ML; MG/100ML; MG/100ML
125 INJECTION, SOLUTION INTRAVENOUS CONTINUOUS
Status: DISCONTINUED | OUTPATIENT
Start: 2018-10-03 | End: 2018-10-03 | Stop reason: HOSPADM

## 2018-10-03 RX ORDER — MIDAZOLAM HYDROCHLORIDE 1 MG/ML
INJECTION INTRAMUSCULAR; INTRAVENOUS AS NEEDED
Status: DISCONTINUED | OUTPATIENT
Start: 2018-10-03 | End: 2018-10-03 | Stop reason: SURG

## 2018-10-03 RX ORDER — LIDOCAINE HYDROCHLORIDE 10 MG/ML
INJECTION, SOLUTION INFILTRATION; PERINEURAL AS NEEDED
Status: DISCONTINUED | OUTPATIENT
Start: 2018-10-03 | End: 2018-10-03 | Stop reason: SURG

## 2018-10-03 RX ORDER — PROPOFOL 10 MG/ML
INJECTION, EMULSION INTRAVENOUS AS NEEDED
Status: DISCONTINUED | OUTPATIENT
Start: 2018-10-03 | End: 2018-10-03 | Stop reason: SURG

## 2018-10-03 RX ORDER — FENTANYL CITRATE 50 UG/ML
INJECTION, SOLUTION INTRAMUSCULAR; INTRAVENOUS AS NEEDED
Status: DISCONTINUED | OUTPATIENT
Start: 2018-10-03 | End: 2018-10-03 | Stop reason: SURG

## 2018-10-03 RX ORDER — FENTANYL CITRATE/PF 50 MCG/ML
25 SYRINGE (ML) INJECTION
Status: COMPLETED | OUTPATIENT
Start: 2018-10-03 | End: 2018-10-03

## 2018-10-03 RX ORDER — OXYCODONE HYDROCHLORIDE AND ACETAMINOPHEN 5; 325 MG/1; MG/1
1 TABLET ORAL EVERY 4 HOURS PRN
Status: DISCONTINUED | OUTPATIENT
Start: 2018-10-03 | End: 2018-10-03 | Stop reason: HOSPADM

## 2018-10-03 RX ORDER — SODIUM CHLORIDE, SODIUM LACTATE, POTASSIUM CHLORIDE, CALCIUM CHLORIDE 600; 310; 30; 20 MG/100ML; MG/100ML; MG/100ML; MG/100ML
75 INJECTION, SOLUTION INTRAVENOUS CONTINUOUS
Status: DISCONTINUED | OUTPATIENT
Start: 2018-10-03 | End: 2018-10-03 | Stop reason: SDUPTHER

## 2018-10-03 RX ORDER — ROCURONIUM BROMIDE 10 MG/ML
INJECTION, SOLUTION INTRAVENOUS AS NEEDED
Status: DISCONTINUED | OUTPATIENT
Start: 2018-10-03 | End: 2018-10-03 | Stop reason: SURG

## 2018-10-03 RX ORDER — SODIUM CHLORIDE, SODIUM LACTATE, POTASSIUM CHLORIDE, CALCIUM CHLORIDE 600; 310; 30; 20 MG/100ML; MG/100ML; MG/100ML; MG/100ML
50 INJECTION, SOLUTION INTRAVENOUS CONTINUOUS
Status: DISCONTINUED | OUTPATIENT
Start: 2018-10-03 | End: 2018-10-03 | Stop reason: SDUPTHER

## 2018-10-03 RX ORDER — BUPIVACAINE HYDROCHLORIDE 5 MG/ML
INJECTION, SOLUTION PERINEURAL AS NEEDED
Status: DISCONTINUED | OUTPATIENT
Start: 2018-10-03 | End: 2018-10-03 | Stop reason: HOSPADM

## 2018-10-03 RX ORDER — ONDANSETRON 2 MG/ML
INJECTION INTRAMUSCULAR; INTRAVENOUS AS NEEDED
Status: DISCONTINUED | OUTPATIENT
Start: 2018-10-03 | End: 2018-10-03 | Stop reason: SURG

## 2018-10-03 RX ORDER — SODIUM CHLORIDE 9 MG/ML
INJECTION, SOLUTION INTRAVENOUS CONTINUOUS PRN
Status: DISCONTINUED | OUTPATIENT
Start: 2018-10-03 | End: 2018-10-03 | Stop reason: SURG

## 2018-10-03 RX ORDER — METOCLOPRAMIDE HYDROCHLORIDE 5 MG/ML
10 INJECTION INTRAMUSCULAR; INTRAVENOUS ONCE AS NEEDED
Status: DISCONTINUED | OUTPATIENT
Start: 2018-10-03 | End: 2018-10-03 | Stop reason: HOSPADM

## 2018-10-03 RX ORDER — SODIUM CHLORIDE, SODIUM LACTATE, POTASSIUM CHLORIDE, CALCIUM CHLORIDE 600; 310; 30; 20 MG/100ML; MG/100ML; MG/100ML; MG/100ML
125 INJECTION, SOLUTION INTRAVENOUS CONTINUOUS
Status: DISCONTINUED | OUTPATIENT
Start: 2018-10-03 | End: 2018-10-03 | Stop reason: SDUPTHER

## 2018-10-03 RX ORDER — GLYCOPYRROLATE 0.2 MG/ML
INJECTION INTRAMUSCULAR; INTRAVENOUS AS NEEDED
Status: DISCONTINUED | OUTPATIENT
Start: 2018-10-03 | End: 2018-10-03 | Stop reason: SURG

## 2018-10-03 RX ADMIN — ROCURONIUM BROMIDE 40 MG: 10 INJECTION INTRAVENOUS at 10:40

## 2018-10-03 RX ADMIN — GLYCOPYRROLATE 0.4 MG: 0.2 INJECTION, SOLUTION INTRAMUSCULAR; INTRAVENOUS at 11:25

## 2018-10-03 RX ADMIN — SODIUM CHLORIDE: 0.9 INJECTION, SOLUTION INTRAVENOUS at 10:30

## 2018-10-03 RX ADMIN — FENTANYL CITRATE 100 MCG: 50 INJECTION, SOLUTION INTRAMUSCULAR; INTRAVENOUS at 10:40

## 2018-10-03 RX ADMIN — LIDOCAINE HYDROCHLORIDE 50 MG: 10 INJECTION, SOLUTION INFILTRATION; PERINEURAL at 10:40

## 2018-10-03 RX ADMIN — PROPOFOL 200 MG: 10 INJECTION, EMULSION INTRAVENOUS at 10:40

## 2018-10-03 RX ADMIN — OXYCODONE HYDROCHLORIDE AND ACETAMINOPHEN 1 TABLET: 5; 325 TABLET ORAL at 12:23

## 2018-10-03 RX ADMIN — NEOSTIGMINE METHYLSULFATE 3 MG: 1 INJECTION, SOLUTION INTRAMUSCULAR; INTRAVENOUS; SUBCUTANEOUS at 11:25

## 2018-10-03 RX ADMIN — FENTANYL CITRATE 25 MCG: 50 INJECTION, SOLUTION INTRAMUSCULAR; INTRAVENOUS at 11:50

## 2018-10-03 RX ADMIN — FENTANYL CITRATE 25 MCG: 50 INJECTION, SOLUTION INTRAMUSCULAR; INTRAVENOUS at 12:04

## 2018-10-03 RX ADMIN — MIDAZOLAM HYDROCHLORIDE 2 MG: 1 INJECTION, SOLUTION INTRAMUSCULAR; INTRAVENOUS at 10:33

## 2018-10-03 RX ADMIN — ONDANSETRON 4 MG: 2 INJECTION INTRAMUSCULAR; INTRAVENOUS at 10:51

## 2018-10-03 NOTE — DISCHARGE INSTRUCTIONS
DISCHARGE INSTRUCTIONS  DR PEACOCK    · No driving or operating any heavy equipment for at least 24 hours  · Resume normal diet:  Start light including liquids, toast, jello, soup etc   · Resume regular medications unless otherwise directed by you physician    If you have an abdominal incision:  · You may shower in the morning following surgery  · Replace dressing with gauze or band aid  · Call Dr Edyta Johnson for any redness, foul smelling discharge, fever of over 100 4 or any signs of infection      · Nothing in vagina for 5-7 days  No sex, douching or tampons  · Call physician for excessive bleeding, soaking though pads, or passing large clots     · Make an appointment to see MD in 2 weeks    Call Dr Edyta Johnson at [phone] for any problems or questions

## 2018-10-03 NOTE — H&P
Updated history and physical on the chart with notes being brought from the office; no pertinent changes; heart and lungs normal today; consent signed for laparoscopy with fulguration of fallopian tubes for sterilization  The patient is aware that any tubal ligation surgery there is a less than 1% risk of pregnancy in the future  The patient is aware that should she have a pregnancy test at any time in the future of this means that the pregnancy could be in the fallopian tubes meeting in ectopic pregnancy which can be a serious threat to her health and she should notify health professional immediately    She understands these risks and wants to go ahead with the surgical procedure

## 2018-10-03 NOTE — OP NOTE
OPERATIVE REPORT  PATIENT NAME: Jamaica Ortega    :  1995  MRN: [id number]  Pt Location: [facility] OR ROOM 02    SURGERY DATE: 10/3/2018    Surgeon(s) and Role:     * Virgil Pate MD - Primary    Preop Diagnosis:  Encounter for sterilization [Z30 2]    Post-Op Diagnosis Codes:     * Encounter for sterilization [Z30 2]    Procedure(s) (LRB):  LAPAROSCOPIC TUBAL LIGATION (N/A)    Specimen(s):  * No specimens in log *    Estimated Blood Loss:   Minimal    Drains:  Urethral Catheter Latex 16 Fr  (Active)   Number of days: 0       Anesthesia Type:   General    Operative Indications:  Encounter for sterilization [Z30 2]  Elective sterilization    Operative Findings:  Normal pelvis    Complications:   None    Procedure and Technique:  Patient brought into the operating room placed under general anesthesia in lithotomy position prepped and draped in the usual manner; weighted speculum was placed in the posterior fornix of the vagina, straight speculum was used to raise anterior aspect of the vagina revealing the cervix  The cervix was grasped on its anterior lip with an Allis clamp and an acorn uterine manipulator was inserted into the cervical os and connected to the Allis clamp  Hernandez catheter was inserted and left indwelling during the case  Attention was then directed to the umbilicus  A semi lunar infraumbilical incision was made with the 1st knife 2nd knife was used to dissect through subcutaneous and fat tissue to the fascial layer fascia was then doubly grasped with 2 Allis clamps  The Vicryl sutures were placed laterally to the Allis clamps to secure the laparoscoped in position during the procedure  An incision was made in between the Allis clamps through the fascia revealing the peritoneum the peritoneum was then doubly grasped with 2 hemostats and cut in the middle using Metzenbaum scissors    The blunt uterine trocar was then placed through the peritoneal incision and connected to the lateral Vicryl sutures with stability  The laparoscoped was then placed within the sleeve there was no damage to the underlying organs as visualized through the laparoscoped  The patient was then placed in steep Trendelenburg position and manipulating the uterus from below there was good visualization of the pelvis; the ovaries and tubes appeared within normal limits as did the midline uterus  Both fallopian tubes were identified by their fimbriated ends of both fallopian tubes were grasped on its midsection with the [employer] device and fulgurated through and through without damage to surrounding organs  There was good hemostasis at the tubal ligation sites and no bleeding  There was no bleeding anywhere in the pelvis as much CO2 gas as possible was then released from the abdomen the laparoscoped was removed from the sleeve the blunt trocars removed from the patient  The fascia was then closed with Vicryl sutures  The skin edges were infiltrated with Marcaine and the skin edges were reapproximated with 4 on dyed Vicryl in a subcuticular fashion with Steri-Strips approximating the skin edges  The wound was dressed  The Hernandez catheter was removed there was 700 cc of clear yellow urine in the Hernandez catheter at the end of the case    The rest of the vaginal instruments were removed the patient was clean the anesthesia was reversed and the patient was transferred to PACU in good condition   I was present for the entire procedure    Patient Disposition:  PACU     SIGNATURE: Emery Katz MD  DATE: October 3, 2018  TIME: 11:34 AM

## 2018-10-03 NOTE — PERIOPERATIVE NURSING NOTE
Patient received from PACU via stretcher  Awake and alert  Dressing to abdomen dry and intact  Denies any pain at this time  No vaginal bleeding noted at this time  Family at bedside  Tolerating PO fluids

## 2018-10-03 NOTE — PERIOPERATIVE NURSING NOTE
Assisted OOB to BR   Voided without difficulty  Small amount of vaginal bleeding noted  IV d/c'd  Patient requesting to go home

## 2018-10-03 NOTE — ANESTHESIA PREPROCEDURE EVALUATION
Review of Systems/Medical History  Patient summary reviewed  Chart reviewed      Cardiovascular  Exercise tolerance (METS): >4,     Pulmonary  Asthma , well controlled/ stable Asthma type of rescue: PRN inhaler,        GI/Hepatic    GERD ,             Endo/Other    Obesity  morbid obesity   GYN       Hematology   Musculoskeletal       Neurology  Seizures ,     Psychology           Physical Exam    Airway    Mallampati score: II  TM Distance: >3 FB  Neck ROM: full     Dental       Cardiovascular  Rhythm: regular, Rate: normal,     Pulmonary      Other Findings  Tongue deviated to the right       Anesthesia Plan  ASA Score- 2     Anesthesia Type- general with ASA Monitors  Additional Monitors:   Airway Plan: ETT  Plan Factors-    Induction-     Postoperative Plan-     Informed Consent- Anesthetic plan and risks discussed with patient  I personally reviewed this patient with the CRNA  Discussed and agreed on the Anesthesia Plan with the CRNA  Dara Soulier

## 2018-10-24 ENCOUNTER — HOSPITAL ENCOUNTER (INPATIENT)
Facility: HOSPITAL | Age: 23
LOS: 9 days | Discharge: HOME | DRG: 885 | End: 2018-11-02
Attending: PSYCHIATRY & NEUROLOGY | Admitting: PSYCHIATRY & NEUROLOGY
Payer: MEDICARE

## 2018-10-24 ENCOUNTER — HOSPITAL ENCOUNTER (EMERGENCY)
Facility: HOSPITAL | Age: 23
End: 2018-10-24
Attending: EMERGENCY MEDICINE | Admitting: EMERGENCY MEDICINE
Payer: MEDICARE

## 2018-10-24 ENCOUNTER — APPOINTMENT (EMERGENCY)
Dept: RADIOLOGY | Facility: HOSPITAL | Age: 23
End: 2018-10-24
Payer: MEDICARE

## 2018-10-24 VITALS
RESPIRATION RATE: 16 BRPM | OXYGEN SATURATION: 100 % | HEART RATE: 80 BPM | SYSTOLIC BLOOD PRESSURE: 110 MMHG | TEMPERATURE: 97 F | HEIGHT: 64 IN | WEIGHT: 194 LBS | BODY MASS INDEX: 33.12 KG/M2 | DIASTOLIC BLOOD PRESSURE: 60 MMHG

## 2018-10-24 DIAGNOSIS — F31.64 BIPOLAR I DISORDER, MOST RECENT EPISODE MIXED, SEVERE WITH PSYCHOTIC FEATURES (HCC): Primary | Chronic | ICD-10-CM

## 2018-10-24 DIAGNOSIS — K21.9 GASTROESOPHAGEAL REFLUX DISEASE, ESOPHAGITIS PRESENCE NOT SPECIFIED: ICD-10-CM

## 2018-10-24 DIAGNOSIS — G47.00 INSOMNIA: ICD-10-CM

## 2018-10-24 DIAGNOSIS — R56.9 SEIZURE (HCC): ICD-10-CM

## 2018-10-24 DIAGNOSIS — R82.90 ABNORMAL URINALYSIS: ICD-10-CM

## 2018-10-24 DIAGNOSIS — N94.9 VAGINAL DISCOMFORT: ICD-10-CM

## 2018-10-24 DIAGNOSIS — K21.9 GASTROESOPHAGEAL REFLUX DISEASE, ESOPHAGITIS PRESENCE NOT SPECIFIED: Primary | ICD-10-CM

## 2018-10-24 DIAGNOSIS — F25.9 SCHIZOAFFECTIVE DISORDER (HCC): ICD-10-CM

## 2018-10-24 LAB
ALBUMIN SERPL BCP-MCNC: 3.5 G/DL (ref 3.5–5)
ALP SERPL-CCNC: 83 U/L (ref 46–116)
ALT SERPL W P-5'-P-CCNC: 33 U/L (ref 12–78)
AMPHETAMINES SERPL QL SCN: NEGATIVE
ANION GAP SERPL CALCULATED.3IONS-SCNC: 9 MMOL/L (ref 4–13)
AST SERPL W P-5'-P-CCNC: 15 U/L (ref 5–45)
BACTERIA UR QL AUTO: ABNORMAL /HPF
BARBITURATES UR QL: NEGATIVE
BASOPHILS # BLD AUTO: 0.05 THOUSANDS/ΜL (ref 0–0.1)
BASOPHILS NFR BLD AUTO: 1 % (ref 0–1)
BENZODIAZ UR QL: NEGATIVE
BILIRUB SERPL-MCNC: 0.1 MG/DL (ref 0.2–1)
BILIRUB UR QL STRIP: NEGATIVE
BUN SERPL-MCNC: 17 MG/DL (ref 5–25)
CALCIUM SERPL-MCNC: 8.5 MG/DL (ref 8.3–10.1)
CHLORIDE SERPL-SCNC: 104 MMOL/L (ref 100–108)
CLARITY UR: ABNORMAL
CO2 SERPL-SCNC: 27 MMOL/L (ref 21–32)
COCAINE UR QL: NEGATIVE
COLOR UR: ABNORMAL
CREAT SERPL-MCNC: 0.75 MG/DL (ref 0.6–1.3)
EOSINOPHIL # BLD AUTO: 0.44 THOUSAND/ΜL (ref 0–0.61)
EOSINOPHIL NFR BLD AUTO: 5 % (ref 0–6)
ERYTHROCYTE [DISTWIDTH] IN BLOOD BY AUTOMATED COUNT: 13.1 % (ref 11.6–15.1)
ETHANOL SERPL-MCNC: <3 MG/DL (ref 0–3)
EXT PREG TEST URINE: NEGATIVE
GFR SERPL CREATININE-BSD FRML MDRD: 114 ML/MIN/1.73SQ M
GLUCOSE SERPL-MCNC: 81 MG/DL (ref 65–140)
GLUCOSE UR STRIP-MCNC: NEGATIVE MG/DL
HCT VFR BLD AUTO: 39.6 % (ref 34.8–46.1)
HGB BLD-MCNC: 12.6 G/DL (ref 11.5–15.4)
HGB UR QL STRIP.AUTO: NEGATIVE
IMM GRANULOCYTES # BLD AUTO: 0.04 THOUSAND/UL (ref 0–0.2)
IMM GRANULOCYTES NFR BLD AUTO: 1 % (ref 0–2)
KETONES UR STRIP-MCNC: NEGATIVE MG/DL
LEUKOCYTE ESTERASE UR QL STRIP: ABNORMAL
LYMPHOCYTES # BLD AUTO: 2.48 THOUSANDS/ΜL (ref 0.6–4.47)
LYMPHOCYTES NFR BLD AUTO: 28 % (ref 14–44)
MCH RBC QN AUTO: 29.8 PG (ref 26.8–34.3)
MCHC RBC AUTO-ENTMCNC: 31.8 G/DL (ref 31.4–37.4)
MCV RBC AUTO: 94 FL (ref 82–98)
METHADONE UR QL: NEGATIVE
MONOCYTES # BLD AUTO: 0.72 THOUSAND/ΜL (ref 0.17–1.22)
MONOCYTES NFR BLD AUTO: 8 % (ref 4–12)
NEUTROPHILS # BLD AUTO: 5.09 THOUSANDS/ΜL (ref 1.85–7.62)
NEUTS SEG NFR BLD AUTO: 57 % (ref 43–75)
NITRITE UR QL STRIP: NEGATIVE
NON-SQ EPI CELLS URNS QL MICRO: ABNORMAL /HPF
NRBC BLD AUTO-RTO: 0 /100 WBCS
OPIATES UR QL SCN: NEGATIVE
PCP UR QL: NEGATIVE
PH UR STRIP.AUTO: 6 [PH] (ref 5–9)
PLATELET # BLD AUTO: 317 THOUSANDS/UL (ref 149–390)
PMV BLD AUTO: 9.4 FL (ref 8.9–12.7)
POTASSIUM SERPL-SCNC: 4.2 MMOL/L (ref 3.5–5.3)
PROT SERPL-MCNC: 7.2 G/DL (ref 6.4–8.2)
PROT UR STRIP-MCNC: NEGATIVE MG/DL
RBC # BLD AUTO: 4.23 MILLION/UL (ref 3.81–5.12)
RBC #/AREA URNS AUTO: ABNORMAL /HPF
SODIUM SERPL-SCNC: 140 MMOL/L (ref 136–145)
SP GR UR STRIP.AUTO: >=1.03 (ref 1–1.03)
THC UR QL: NEGATIVE
TROPONIN I SERPL-MCNC: <0.02 NG/ML
UROBILINOGEN UR QL STRIP.AUTO: 0.2 E.U./DL
WBC # BLD AUTO: 8.82 THOUSAND/UL (ref 4.31–10.16)
WBC #/AREA URNS AUTO: ABNORMAL /HPF

## 2018-10-24 PROCEDURE — 80307 DRUG TEST PRSMV CHEM ANLYZR: CPT

## 2018-10-24 PROCEDURE — 81001 URINALYSIS AUTO W/SCOPE: CPT

## 2018-10-24 PROCEDURE — 81025 URINE PREGNANCY TEST: CPT

## 2018-10-24 PROCEDURE — 93005 ELECTROCARDIOGRAM TRACING: CPT

## 2018-10-24 PROCEDURE — 99285 EMERGENCY DEPT VISIT HI MDM: CPT

## 2018-10-24 PROCEDURE — 80053 COMPREHEN METABOLIC PANEL: CPT | Performed by: EMERGENCY MEDICINE

## 2018-10-24 PROCEDURE — 84484 ASSAY OF TROPONIN QUANT: CPT | Performed by: EMERGENCY MEDICINE

## 2018-10-24 PROCEDURE — 71045 X-RAY EXAM CHEST 1 VIEW: CPT

## 2018-10-24 PROCEDURE — 80320 DRUG SCREEN QUANTALCOHOLS: CPT

## 2018-10-24 PROCEDURE — 87147 CULTURE TYPE IMMUNOLOGIC: CPT

## 2018-10-24 PROCEDURE — 36415 COLL VENOUS BLD VENIPUNCTURE: CPT

## 2018-10-24 PROCEDURE — 85025 COMPLETE CBC W/AUTO DIFF WBC: CPT | Performed by: EMERGENCY MEDICINE

## 2018-10-24 PROCEDURE — 87086 URINE CULTURE/COLONY COUNT: CPT

## 2018-10-24 RX ORDER — HALOPERIDOL 5 MG
5 TABLET ORAL EVERY 8 HOURS PRN
Status: DISCONTINUED | OUTPATIENT
Start: 2018-10-24 | End: 2018-11-02 | Stop reason: HOSPADM

## 2018-10-24 RX ORDER — HYDROXYZINE HYDROCHLORIDE 25 MG/1
25 TABLET, FILM COATED ORAL EVERY 6 HOURS PRN
Status: DISCONTINUED | OUTPATIENT
Start: 2018-10-24 | End: 2018-10-27

## 2018-10-24 RX ORDER — IBUPROFEN 400 MG/1
400 TABLET ORAL EVERY 8 HOURS PRN
Status: CANCELLED | OUTPATIENT
Start: 2018-10-24

## 2018-10-24 RX ORDER — OXCARBAZEPINE 150 MG/1
150 TABLET, FILM COATED ORAL ONCE
Status: COMPLETED | OUTPATIENT
Start: 2018-10-24 | End: 2018-10-24

## 2018-10-24 RX ORDER — ALBUTEROL SULFATE 90 UG/1
2 AEROSOL, METERED RESPIRATORY (INHALATION) EVERY 4 HOURS PRN
Status: DISCONTINUED | OUTPATIENT
Start: 2018-10-24 | End: 2018-11-02 | Stop reason: HOSPADM

## 2018-10-24 RX ORDER — BENZTROPINE MESYLATE 0.5 MG/1
1 TABLET ORAL EVERY 6 HOURS PRN
Status: CANCELLED | OUTPATIENT
Start: 2018-10-24

## 2018-10-24 RX ORDER — CARBAMAZEPINE 200 MG/1
200 TABLET ORAL
COMMUNITY
Start: 2018-10-22 | End: 2018-10-24

## 2018-10-24 RX ORDER — ALBUTEROL SULFATE 90 UG/1
2 AEROSOL, METERED RESPIRATORY (INHALATION) EVERY 4 HOURS PRN
Status: CANCELLED | OUTPATIENT
Start: 2018-10-24

## 2018-10-24 RX ORDER — HALOPERIDOL 5 MG/ML
5 INJECTION INTRAMUSCULAR EVERY 6 HOURS PRN
Status: CANCELLED | OUTPATIENT
Start: 2018-10-24

## 2018-10-24 RX ORDER — BENZTROPINE MESYLATE 1 MG/1
1 TABLET ORAL EVERY 6 HOURS PRN
Status: DISCONTINUED | OUTPATIENT
Start: 2018-10-24 | End: 2018-11-02 | Stop reason: HOSPADM

## 2018-10-24 RX ORDER — HALOPERIDOL 5 MG/ML
5 INJECTION INTRAMUSCULAR EVERY 6 HOURS PRN
Status: DISCONTINUED | OUTPATIENT
Start: 2018-10-24 | End: 2018-11-02 | Stop reason: HOSPADM

## 2018-10-24 RX ORDER — BENZTROPINE MESYLATE 1 MG/ML
1 INJECTION INTRAMUSCULAR; INTRAVENOUS EVERY 6 HOURS PRN
Status: CANCELLED | OUTPATIENT
Start: 2018-10-24

## 2018-10-24 RX ORDER — TRAZODONE HYDROCHLORIDE 50 MG/1
50 TABLET ORAL
Status: DISCONTINUED | OUTPATIENT
Start: 2018-10-24 | End: 2018-11-02 | Stop reason: HOSPADM

## 2018-10-24 RX ORDER — HALOPERIDOL 5 MG
5 TABLET ORAL EVERY 8 HOURS PRN
Status: CANCELLED | OUTPATIENT
Start: 2018-10-24

## 2018-10-24 RX ORDER — FLUTICASONE PROPIONATE 50 MCG
2 SPRAY, SUSPENSION (ML) NASAL DAILY
Status: DISCONTINUED | OUTPATIENT
Start: 2018-10-25 | End: 2018-11-02 | Stop reason: HOSPADM

## 2018-10-24 RX ORDER — FLUTICASONE PROPIONATE 50 MCG
2 SPRAY, SUSPENSION (ML) NASAL DAILY
Status: CANCELLED | OUTPATIENT
Start: 2018-10-25

## 2018-10-24 RX ORDER — BENZTROPINE MESYLATE 1 MG/ML
1 INJECTION INTRAMUSCULAR; INTRAVENOUS EVERY 6 HOURS PRN
Status: DISCONTINUED | OUTPATIENT
Start: 2018-10-24 | End: 2018-11-02 | Stop reason: HOSPADM

## 2018-10-24 RX ORDER — HYDROXYZINE HYDROCHLORIDE 25 MG/1
50 TABLET, FILM COATED ORAL ONCE
Status: COMPLETED | OUTPATIENT
Start: 2018-10-24 | End: 2018-10-24

## 2018-10-24 RX ORDER — IBUPROFEN 200 MG
400 TABLET ORAL EVERY 8 HOURS PRN
Status: DISCONTINUED | OUTPATIENT
Start: 2018-10-24 | End: 2018-11-01

## 2018-10-24 RX ORDER — TRAZODONE HYDROCHLORIDE 50 MG/1
50 TABLET ORAL
Status: CANCELLED | OUTPATIENT
Start: 2018-10-24

## 2018-10-24 RX ORDER — FAMOTIDINE 20 MG/1
20 TABLET, FILM COATED ORAL 2 TIMES DAILY
Status: DISCONTINUED | OUTPATIENT
Start: 2018-10-24 | End: 2018-10-24 | Stop reason: HOSPADM

## 2018-10-24 RX ORDER — HYDROXYZINE HYDROCHLORIDE 25 MG/1
25 TABLET, FILM COATED ORAL EVERY 6 HOURS PRN
Status: CANCELLED | OUTPATIENT
Start: 2018-10-24

## 2018-10-24 RX ORDER — DIPHENHYDRAMINE HCL 25 MG
50 TABLET ORAL ONCE
Status: COMPLETED | OUTPATIENT
Start: 2018-10-24 | End: 2018-10-24

## 2018-10-24 RX ADMIN — DIPHENHYDRAMINE HCL 50 MG: 25 TABLET ORAL at 21:51

## 2018-10-24 RX ADMIN — FAMOTIDINE 20 MG: 20 TABLET, FILM COATED ORAL at 21:51

## 2018-10-24 RX ADMIN — OXCARBAZEPINE 150 MG: 150 TABLET ORAL at 21:51

## 2018-10-24 RX ADMIN — HYDROXYZINE HYDROCHLORIDE 50 MG: 25 TABLET ORAL at 21:36

## 2018-10-24 NOTE — ED ATTENDING ATTESTATION
Adore Freeman MD, saw and evaluated the patient  I have discussed the patient with the resident/non-physician practitioner and agree with the resident's/non-physician practitioner's findings, Plan of Care, and MDM as documented in the resident's/non-physician practitioner's note, except where noted  All available labs and Radiology studies were reviewed  At this point I agree with the current assessment done in the Emergency Department  I have conducted an independent evaluation of this patient a history and physical is as follows:    Patient was turned over from Dr Rachid Vaughn  Laboratory and other studies were reviewed  The patient is medically cleared for psychiatric evaluation and placement      Critical Care Time  CritCare Time    Procedures

## 2018-10-24 NOTE — ED NOTES
Pt requested [facility] - no beds  Pt further requesting only to go to a hospital with medical services available  [address] faxed @ 18:45 - called to verify receipt; no beds at [facility]  [facility] is not in-network with pt's secondary and pt would be required to cover the 20% medicare doesn't cover  No beds at [facility]'s wouldn't review case until 10/25/18  [facility] - didn't know if they had beds or not, but asked the chart be faxed; chart faxed @ 18:15 - called to verify receipt  Called [facility] plan @ [phone] and spoke with [facility] is in network  Dr Rashel Wooten accepted pt about 19:45; Rn report to be called to [phone]  SATISH Orozco) scheduled to  pt @ 22:30   ER; pt; [facility] all notified  Pt's sister Zelda Donato notified

## 2018-10-24 NOTE — ED NOTES
Patient is C/O CP  Dr Aquilino Stack notified  No new orders at this time  Will continue to monitor  Md notified of patient's PM medications  Will continue to monitor        Shyla Skaggs RN  10/24/18 9712

## 2018-10-24 NOTE — ED NOTES
25 yo SWF; exceptionally well known to ER and PES; presents via her [address] RIST worker who picked pt up at [facility]; due to [address] toward sisters  Please note: Pt was d/c'd from [facility] 2 days ago  Stressor: sisters talked about my daughter - the mini-me I made"; d/c'd to soon from inpt; "Rx's are not working any more"  Mood = "angry"; unstable  Sxs include: "I want to smash my sister's heads together - I want them dead basically and I want new sisters"; pt reports a 56 pound weight increase in less than one month die to "Rx's and getting my tubes tied"; poor concentration; "low" energy level (but sleeping 11 hours nightly); poor (perceived) social supports; low self-esteem; not thinking clearly with +racing/tangential thoughts; anxiety (denies physical sxs) - "mind races"; +paranoia toward sisters; pt also worried about the children in the home getting hurt, accidentally (by pt)  Pt denies: SI; D/A use (last cannabis was 9/10/18 per old records); any change in appetite or sleep  Collateral with [facility] RIST staff, Kika Martin: "Picked up pt at [facility], pt had called the office several times from program today; pt stated she can't go home - if I go home, I will re-arrange my sister's faces (initially referring to Boone Bai, then both); pt is afraid anger will go toward the kids; pt wants to go back to [place]"

## 2018-10-24 NOTE — ED PROVIDER NOTES
History  Chief Complaint   Patient presents with    Psychiatric Evaluation     Patient states that she wants to punch her sisters for saying that she is a bad mother  Patient states "I just want to punch them in their face  I want to punch them until they are dead!"      25 y/o female presents with homicidal ideation where she wants to punch her sisters until they die as she doesn't like them  She has a 4 month old baby and her sisters said that the patient is not a good mother, so she got angry  Denies suicidal or homicidal dieation  Multiple admits to inpatient psych hospitals in the past  Denies medical complaints  History provided by:  Patient   used: No        Prior to Admission Medications   Prescriptions Last Dose Informant Patient Reported? Taking?    LATUDA 120 MG tablet   Yes No   Sig: daily with breakfast     OXcarbazepine (TRILEPTAL PO)   Yes No   Sig: Take 200 mg by mouth 3 (three) times a day   VENTOLIN  (90 Base) MCG/ACT inhaler   Yes No   Sig: Inhale 2 puffs every 4 (four) hours as needed     albuterol (2 5 mg/3 mL) 0 083 % nebulizer solution   Yes No   Sig: Inhale   citalopram (CeleXA) 10 mg tablet   Yes No   Sig: Take 10 mg by mouth every morning   diphenhydrAMINE (BENADRYL) 25 mg capsule   Yes No   Sig: Take 50 mg by mouth daily at bedtime as needed     fluticasone (FLONASE) 50 mcg/act nasal spray   No No   Si sprays into each nostril daily   loratadine (CLARITIN) 10 mg tablet   Yes No   Sig: Take by mouth   ranitidine (ZANTAC) 150 mg tablet   No No   Sig: Take 1 tablet (150 mg total) by mouth 2 (two) times a day      Facility-Administered Medications: None       Past Medical History:   Diagnosis Date    Asthma     Bipolar 1 disorder (HCC)     Palpitations     Last Assessed [id number]    Psychiatric disorder     Seizures (HCC)     last time 2 weeks ago- petit mal    Stabbing chest pain     Last Assessed [id number]    Tachycardia     Last Assessed [id number]    Upper respiratory disease     Last Assessed 27Jan2016       Past Surgical History:   Procedure Laterality Date    KNEE SURGERY      [place] LAP,TUBAL CAUTERY N/A 10/3/2018    Procedure: LAPAROSCOPIC TUBAL LIGATION;  Surgeon: Zee Paige MD;  Location: [address] [place] [address];  Service: Gynecology       Family History   Problem Relation Age of Onset    Cancer Mother     Diabetes Mother     Cancer Father     Diabetes Father     Arthritis Father     Cancer Maternal Grandmother     Cancer Maternal Aunt     Cancer Paternal Uncle     Diabetes Other      I have reviewed and agree with the history as documented  Social History   Substance Use Topics    Smoking status: Current Every Day Smoker     Packs/day: 0 25     Years: 4 00     Types: Cigarettes     Last attempt to quit: 9/17/2018    Smokeless tobacco: Never Used    Alcohol use No        Review of Systems   All other systems reviewed and are negative  Physical Exam  Physical Exam   Constitutional: She is oriented to person, place, and time  She appears well-developed and well-nourished  HENT:   Head: Normocephalic and atraumatic  Eyes: Pupils are equal, round, and reactive to light  EOM are normal    Neck: Normal range of motion  Neck supple  Cardiovascular: Normal rate and regular rhythm  Pulmonary/Chest: Effort normal and breath sounds normal    Abdominal: Soft  Bowel sounds are normal    Musculoskeletal: Normal range of motion  Neurological: She is alert and oriented to person, place, and time  Skin: Skin is warm and dry  Psychiatric: She has a normal mood and affect  Nursing note and vitals reviewed        Vital Signs  ED Triage Vitals [10/24/18 1543]   Temperature Pulse Respirations Blood Pressure SpO2   98 2 °F (36 8 °C) 94 18 125/63 97 %      Temp Source Heart Rate Source Patient Position - Orthostatic VS BP Location FiO2 (%)   Tympanic Monitor Sitting Right arm --      Pain Score       No Pain           Vitals:    10/24/18 1543 10/24/18 1612   BP: 125/63 125/63   Pulse: 94 94   Patient Position - Orthostatic VS: Sitting        Visual Acuity      ED Medications  Medications - No data to display    Diagnostic Studies  Results Reviewed     Procedure Component Value Units Date/Time    Rapid drug screen, urine [[id number]] Collected:  10/24/18 1628    Lab Status:  No result Specimen:  Urine from Urine, Clean Catch     Ethanol [[id number]] Collected:  10/24/18 1628    Lab Status:  No result Specimen:  Blood from Arm, Left     UA w Reflex to Microscopic w Reflex to Culture [[id number]] Collected:  10/24/18 1628    Lab Status:  No result Specimen:  Urine from Urine, Clean Catch     POCT pregnancy, urine [[id number]]     Lab Status:  No result Specimen:  Urine                  No orders to display              Procedures  ECG 12 Lead Documentation  Performed by: Chandrika Bautista by: Nora Perez     ECG reviewed by me, the ED Provider: yes    Patient location:  ED  Previous ECG:     Previous ECG:  Unavailable  Interpretation:     Interpretation: non-specific    Rate:     ECG rate assessment: normal    Rhythm:     Rhythm: sinus rhythm    Ectopy:     Ectopy: none    QRS:     QRS axis:  Normal  Conduction:     Conduction: normal    ST segments:     ST segments:  Normal  T waves:     T waves: normal             Phone Contacts  ED Phone Contact    ED Course                               MDM  Number of Diagnoses or Management Options  Diagnosis management comments: Patient medically evaluated, labs pending   Crisis consulted, care pending care transitioned to Dr Monik Gee and/or Complexity of Data Reviewed  Clinical lab tests: ordered  Tests in the radiology section of CPT®: ordered  Tests in the medicine section of CPT®: ordered    Patient Progress  Patient progress: stable    CritCare Time    Disposition  Final diagnoses:   None     ED Disposition     None      Follow-up Information    None         Patient's Medications   Discharge Prescriptions    No medications on file     No discharge procedures on file      ED Provider  Electronically Signed by           Beatrice Heller DO  10/24/18 3741

## 2018-10-24 NOTE — ED CARE HANDOFF
Emergency Department Sign Out Note        Sign out and transfer of care from Dr Krupa Carrillo See Separate Emergency Department note  The patient, Christina Orozco, was evaluated by the previous provider for psychiatric evaluation    Workup Completed:  labs    ED Course / Workup Pending (followup): Patient was turned over from prior physician  Laboratory values another tests were reviewed  The patient is deemed medically clear for psychiatric evaluation and/or placement                             Procedures  MDM  CritCare Time      Disposition  Final diagnoses:   None     ED Disposition     None      Follow-up Information    None       Patient's Medications   Discharge Prescriptions    No medications on file     No discharge procedures on file         ED Provider  Electronically Signed by     Chidi Valle MD  10/24/18 2363

## 2018-10-24 NOTE — ED NOTES
Dinner order placed for patient  Patient sitting in room watching TV  Patient is cooperative at this time  Will continue to monitor       Ree SARKIS Sykes  10/24/18 3591

## 2018-10-25 ENCOUNTER — APPOINTMENT (INPATIENT)
Dept: RADIOLOGY | Facility: HOSPITAL | Age: 23
DRG: 885 | End: 2018-10-25
Payer: MEDICARE

## 2018-10-25 PROBLEM — Z00.8 ENCOUNTER FOR PSYCHOLOGICAL EVALUATION: Status: ACTIVE | Noted: 2018-10-25

## 2018-10-25 PROBLEM — N30.90 CYSTITIS: Status: ACTIVE | Noted: 2018-10-25

## 2018-10-25 LAB
ATRIAL RATE: 71 BPM
BACTERIA UR CULT: ABNORMAL
MAGNESIUM SERPL-MCNC: 2.1 MG/DL (ref 1.6–2.6)
P AXIS: 43 DEGREES
PR INTERVAL: 152 MS
QRS AXIS: 36 DEGREES
QRSD INTERVAL: 88 MS
QT INTERVAL: 388 MS
QTC INTERVAL: 421 MS
T WAVE AXIS: 35 DEGREES
TROPONIN I SERPL-MCNC: <0.02 NG/ML
TSH SERPL DL<=0.05 MIU/L-ACNC: 1.25 UIU/ML (ref 0.36–3.74)
VENTRICULAR RATE: 71 BPM

## 2018-10-25 PROCEDURE — 99232 SBSQ HOSP IP/OBS MODERATE 35: CPT | Performed by: PHYSICIAN ASSISTANT

## 2018-10-25 PROCEDURE — 84484 ASSAY OF TROPONIN QUANT: CPT | Performed by: PHYSICIAN ASSISTANT

## 2018-10-25 PROCEDURE — 99222 1ST HOSP IP/OBS MODERATE 55: CPT | Performed by: PSYCHIATRY & NEUROLOGY

## 2018-10-25 PROCEDURE — 70450 CT HEAD/BRAIN W/O DYE: CPT

## 2018-10-25 PROCEDURE — 93010 ELECTROCARDIOGRAM REPORT: CPT | Performed by: INTERNAL MEDICINE

## 2018-10-25 PROCEDURE — 83735 ASSAY OF MAGNESIUM: CPT | Performed by: PSYCHIATRY & NEUROLOGY

## 2018-10-25 PROCEDURE — 99222 1ST HOSP IP/OBS MODERATE 55: CPT | Performed by: SURGERY

## 2018-10-25 PROCEDURE — 84443 ASSAY THYROID STIM HORMONE: CPT | Performed by: PSYCHIATRY & NEUROLOGY

## 2018-10-25 PROCEDURE — 93005 ELECTROCARDIOGRAM TRACING: CPT

## 2018-10-25 RX ORDER — CIPROFLOXACIN 500 MG/1
500 TABLET, FILM COATED ORAL
Status: COMPLETED | OUTPATIENT
Start: 2018-10-25 | End: 2018-10-28

## 2018-10-25 RX ORDER — ACETAMINOPHEN 325 MG/1
975 TABLET ORAL EVERY 6 HOURS PRN
Status: DISCONTINUED | OUTPATIENT
Start: 2018-10-25 | End: 2018-11-02 | Stop reason: HOSPADM

## 2018-10-25 RX ORDER — PROMETHAZINE HYDROCHLORIDE 25 MG/1
25 TABLET ORAL EVERY 6 HOURS PRN
Status: DISCONTINUED | OUTPATIENT
Start: 2018-10-25 | End: 2018-11-02 | Stop reason: HOSPADM

## 2018-10-25 RX ORDER — OXCARBAZEPINE 300 MG/1
300 TABLET, FILM COATED ORAL 2 TIMES DAILY
Status: DISCONTINUED | OUTPATIENT
Start: 2018-10-25 | End: 2018-10-25

## 2018-10-25 RX ORDER — LORAZEPAM 2 MG/ML
2 INJECTION INTRAMUSCULAR EVERY 6 HOURS PRN
Status: DISCONTINUED | OUTPATIENT
Start: 2018-10-25 | End: 2018-11-02 | Stop reason: HOSPADM

## 2018-10-25 RX ORDER — LORAZEPAM 1 MG/1
2 TABLET ORAL EVERY 6 HOURS PRN
Status: DISCONTINUED | OUTPATIENT
Start: 2018-10-25 | End: 2018-11-02 | Stop reason: HOSPADM

## 2018-10-25 RX ORDER — FAMOTIDINE 20 MG/1
20 TABLET, FILM COATED ORAL EVERY 12 HOURS SCHEDULED
Status: DISCONTINUED | OUTPATIENT
Start: 2018-10-25 | End: 2018-11-02 | Stop reason: HOSPADM

## 2018-10-25 RX ORDER — ALBUTEROL SULFATE 90 UG/1
2 AEROSOL, METERED RESPIRATORY (INHALATION) EVERY 4 HOURS PRN
Status: DISCONTINUED | OUTPATIENT
Start: 2018-10-25 | End: 2018-10-25

## 2018-10-25 RX ORDER — CITALOPRAM 20 MG/1
20 TABLET ORAL DAILY
Status: DISCONTINUED | OUTPATIENT
Start: 2018-10-25 | End: 2018-11-02 | Stop reason: HOSPADM

## 2018-10-25 RX ORDER — ALBUTEROL SULFATE 2.5 MG/3ML
2.5 SOLUTION RESPIRATORY (INHALATION) EVERY 4 HOURS PRN
Status: DISCONTINUED | OUTPATIENT
Start: 2018-10-25 | End: 2018-10-25

## 2018-10-25 RX ORDER — LORATADINE 10 MG/1
10 TABLET ORAL DAILY
Status: DISCONTINUED | OUTPATIENT
Start: 2018-10-25 | End: 2018-11-02 | Stop reason: HOSPADM

## 2018-10-25 RX ORDER — OXCARBAZEPINE 300 MG/1
300 TABLET, FILM COATED ORAL 2 TIMES DAILY
Status: DISCONTINUED | OUTPATIENT
Start: 2018-10-25 | End: 2018-10-26

## 2018-10-25 RX ORDER — ACETAMINOPHEN 325 MG/1
650 TABLET ORAL EVERY 4 HOURS PRN
Status: DISCONTINUED | OUTPATIENT
Start: 2018-10-25 | End: 2018-11-02 | Stop reason: HOSPADM

## 2018-10-25 RX ORDER — FLUTICASONE PROPIONATE 50 MCG
2 SPRAY, SUSPENSION (ML) NASAL DAILY
Status: DISCONTINUED | OUTPATIENT
Start: 2018-10-25 | End: 2018-10-25

## 2018-10-25 RX ORDER — DIPHENHYDRAMINE HCL 25 MG
50 TABLET ORAL
Status: DISCONTINUED | OUTPATIENT
Start: 2018-10-25 | End: 2018-11-02 | Stop reason: HOSPADM

## 2018-10-25 RX ADMIN — LORAZEPAM 2 MG: 1 TABLET ORAL at 15:44

## 2018-10-25 RX ADMIN — OXCARBAZEPINE 300 MG: 300 TABLET, FILM COATED ORAL at 15:40

## 2018-10-25 RX ADMIN — FAMOTIDINE 20 MG: 20 TABLET ORAL at 21:47

## 2018-10-25 RX ADMIN — CIPROFLOXACIN HYDROCHLORIDE 500 MG: 500 TABLET, FILM COATED ORAL at 21:47

## 2018-10-25 RX ADMIN — HALOPERIDOL LACTATE 5 MG: 5 INJECTION, SOLUTION INTRAMUSCULAR at 19:37

## 2018-10-25 RX ADMIN — FLUTICASONE PROPIONATE 2 SPRAY: 50 SPRAY, METERED NASAL at 10:50

## 2018-10-25 NOTE — CASE MANAGEMENT
called patients sister Bertha Adams for additional history and she stated she is [place]'s legal guardian and will fax the guardianship paperwork to 's fax number  Dankwilner Angie said [place] was at [facility] for 4 months which is a [facility] in [place], [place] and she signed [place] out on June 18th because she was 4 months pregnant  Bertha Adams said [place] stayed with her until the end of July and then moved in with sister Manju Carrillo because there were concerns about [place] being with the baby  [place] had been attending the [facility] day program which is a Monday through Thursday 8-3:30 PM program which teaches life skills and coping skills and there is a psychiatrist to monitor medications  Bertha Adams also said [place] has a [facility] RIST team [facility])which follows her in the community  She said the RIST team was aware [place] would like group home placement and Bertha Adams said she was told "they will look into it" but Bertha Adams said she is not aware of any referrals being made  Bertha Adams said [place] did have a tubal ligation on October 3rd of this year   told Bertha Adams that [place] refused to sign any KARIN for family or providers and will continue to request KARIN's

## 2018-10-25 NOTE — CONSULTS
Consult- [place] Suarez 1995, 26 y o  female MRN: [id number]  Unit/Bed#: OW6 551-71 Encounter: [id number]  Primary Care Provider: Cheri Ba DO   Date and time admitted to hospital: 10/24/2018 11:27 PM    Inpatient consult to Internal Medicine  Consult performed by: Antonietta Scruggs ordered by: Brenden Welch        Chest pain   Assessment & Plan    · Patient with multiple somatic complaints including left chest pain  Nonradiating  Associated with nausea and vomiting per patient recollection however no one has observed the vomiting  Reports lightheadedness  · Troponin negative  EKG normal sinus without ischemic changes  · Continue anti reflux medication, Pepcid  · Can check orthostatic vitals       Cystitis   Assessment & Plan    · Follow-up urine culture, Cipro x3 days     * Encounter for psychological evaluation   Assessment & Plan    · Per primary team       Recommendations for Discharge:  · Follow up with PCP   · No further workup   · Will sign off, call [facility] with questions as patient follows Dr Radha Estes at [facility] / Coordination of Care Time: 30 minutes  Greater than 50% of total time spent on patient counseling and coordination of care  Collaboration of Care: Were Recommendations Directly Discussed with Primary Treatment Team? - Yes   History of Present Illness:    [place] Suarez is a 26 y o  female who is originally admitted to the [facility] service due to psych eval  We are consulted for medical management  Patient has past medical history significant for GERD and takes Pepcid as well as intermittent asthma and uses a Ventolin inhaler on an as needed basis  She states that she had chest pain that started yesterday around her heart which she points to her left chest wall  Denies radiation but states that she feels nauseated and lightheaded    Nausea and lightheadedness persists throughout the day and Zofran does not help per the patient's recollection  States that she was vomiting but did not show any of the staff  She states she cannot breathe because of the chest pain  On exam patient has chest wall tenderness, normal troponin, normal EKG  She also reports palpitations  She denied suprapubic tenderness, frequency or urgency but does report nausea  No CVA tenderness  There is questionable seizure-like activity? Denies fevers or chills  She was admitted due to homicidal ideations towards her sister  Patient repeatedly was telling me that she needs to be on the medical floor and not the psychiatric floor  Review of Systems:    Review of Systems   Constitutional: Negative for chills and fever  HENT: Negative for congestion  Respiratory: Positive for shortness of breath  Negative for wheezing  Cardiovascular: Positive for chest pain and palpitations  Gastrointestinal: Negative for abdominal pain  Genitourinary: Negative for dysuria, frequency, hematuria and urgency  Musculoskeletal: Negative for back pain  Allergic/Immunologic: Negative for immunocompromised state  Neurological: Positive for light-headedness  Negative for dizziness and numbness  Psychiatric/Behavioral: Negative for confusion       Past Medical and Surgical History:     Past Medical History:   Diagnosis Date    Anxiety     Asthma     Bipolar 1 disorder ([facility] [address] )     Depression     Palpitations     Last Assessed [id number]    Psychiatric disorder     Psychiatric illness     Seizures ([facility] [address] )     last time 2 weeks ago- petit mal    Stabbing chest pain     Last Assessed [id number]    Tachycardia     Last Assessed [id number]    Upper respiratory disease     Last Assessed 27Jan2016       Past Surgical History:   Procedure Laterality Date    KNEE SURGERY      [place] LAP,TUBAL CAUTERY N/A 10/3/2018    Procedure: LAPAROSCOPIC TUBAL LIGATION;  Surgeon: Emery Katz MD;  Location: [address] [place] [address];  Service: Gynecology       Meds/Allergies:    all medications and allergies reviewed    Allergies: Allergies   Allergen Reactions    Fish-Derived Products Itching    Shellfish Allergy Anaphylaxis    Shellfish-Derived Products        Social History:     Marital Status: Single    Substance Use History:   History   Alcohol Use No     History   Smoking Status    Former Smoker    Packs/day: 0 25    Years: 4 00    Types: Cigarettes    Quit date: 9/17/2018   Smokeless Tobacco    Never Used     History   Drug Use No     Comment: last smoked 3-4 days ago     Family History:    Family History   Problem Relation Age of Onset    Cancer Mother     Diabetes Mother     Cancer Father     Diabetes Father     Arthritis Father     Cancer Maternal Grandmother     Cancer Maternal Aunt     Cancer Paternal Uncle     Diabetes Other      Physical Exam:     Vitals:   Blood Pressure: 112/72 (10/25/18 1602)  Pulse: 87 (10/25/18 1602)  Temperature: 98 °F (36 7 °C) (10/25/18 1602)  Temp Source: Oral (10/25/18 1602)  Respirations: 16 (10/25/18 1602)  Height: 5' 4" (162 6 cm) (10/24/18 2359)  Weight - Scale: 88 kg (194 lb) (10/24/18 2359)  SpO2: 99 % (10/24/18 2359)    Physical Exam   Constitutional: She is oriented to person, place, and time  She appears well-developed and well-nourished  HENT:   Head: Normocephalic and atraumatic  Mouth/Throat: Oropharynx is clear and moist    Eyes: EOM are normal    Neck: Neck supple  Cardiovascular: Normal rate, regular rhythm and normal heart sounds  No murmur heard  Pulmonary/Chest: Effort normal and breath sounds normal  She has no wheezes  She exhibits tenderness (left chest wall )  Abdominal: Soft  Bowel sounds are normal    Musculoskeletal: Normal range of motion  She exhibits no edema  Neurological: She is alert and oriented to person, place, and time  Skin: Skin is warm and dry  Psychiatric: She has a normal mood and affect  Additional Data:     Lab Results: I have personally reviewed pertinent reports  Results from last 7 days  Lab Units 10/24/18  1647   WBC Thousand/uL 8 82   HEMOGLOBIN g/dL 12 6   HEMATOCRIT % 39 6   PLATELETS Thousands/uL 317   NEUTROS PCT % 57   LYMPHS PCT % 28   MONOS PCT % 8   EOS PCT % 5       Results from last 7 days  Lab Units 10/24/18  1628   SODIUM mmol/L 140   POTASSIUM mmol/L 4 2   CHLORIDE mmol/L 104   CO2 mmol/L 27   BUN mg/dL 17   CREATININE mg/dL 0 75   ANION GAP mmol/L 9   CALCIUM mg/dL 8 5   ALBUMIN g/dL 3 5   TOTAL BILIRUBIN mg/dL 0 10*   ALK PHOS U/L 83   ALT U/L 33   AST U/L 15           Results from last 7 days  Lab Units 10/25/18  1458 10/24/18  1628   TROPONIN I ng/mL <0 02 <0 02     No results found for: HGBA1C            Imaging: I have personally reviewed pertinent reports  No orders to display       EKG, Pathology, and Other Studies Reviewed on Admission:   · EKG: NSR     ** Please Note: This note has been constructed using a voice recognition system   **

## 2018-10-25 NOTE — PROGRESS NOTES
Patient denies SI and states that she continues with HI toward her sister  No plan at this time  States that her sister has custody of her 4 month old daughter and that she (patient) "sometimes" helps to care for her daughter  Patient irritable  Cursing after meeting with the Dr because she no longer wants to be on [facility]  About the unit  Appears comfortable  Will monitor

## 2018-10-25 NOTE — PROGRESS NOTES
Pt yelled "help" from shower in pt room at 1845  Pt found sitting curled up in back corner of the shower, states she fell and hit the back of her head on the wall  Pt dressed and ambulated self to bed  Pt was assessed, no bruises or abrasions were noted, Range of motion is WNL  Pt complains of pain in the back of her head, states she also hit her nose  Pt does not show any signs of trauma  Vitals taken at 1855, /69, P-99, [id number]% on room air  The fall was not witnessed  Staff who were in her bedroom at the time state they did not hear any noises that would indicate a fall had occurred
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
## 2018-10-25 NOTE — ASSESSMENT & PLAN NOTE
· Patient with multiple somatic complaints including left chest pain  Nonradiating  Associated with nausea and vomiting per patient recollection however no one has observed the vomiting  Reports lightheadedness  · Troponin negative  EKG normal sinus without ischemic changes    · Continue anti reflux medication, Pepcid  · Can check orthostatic vitals

## 2018-10-25 NOTE — EMTALA/ACUTE CARE TRANSFER
[facility] EMERGENCY DEPARTMENT  [address] [facility] [address]  Dept: [phone]      EMTALA TRANSFER CONSENT    NAME Arnie Reeder                                         1995                              MRN [id number]    I have been informed of my rights regarding examination, treatment, and transfer   by Dr Mary Roy MD    Benefits: Specialized equipment and/or services available at the receiving facility (Include comment)________________________    Risks: Potential for delay in receiving treatment      Transfer Request   I acknowledge that my medical condition has been evaluated and explained to me by the emergency department physician or other qualified medical person and/or my attending physician who has recommended and offered to me further medical examination and treatment  I understand the Hospital's obligation with respect to the treatment and stabilization of my emergency medical condition  I nevertheless request to be transferred  I release the Hospital, the doctor, and any other persons caring for me from all responsibility or liability for any injury or ill effects that may result from my transfer and agree to accept all responsibility for the consequences of my choice to transfer, rather than receive stabilizing treatment at the Hospital  I understand that because the transfer is my request, my insurance may not provide reimbursement for the services  The Hospital will assist and direct me and my family in how to make arrangements for transfer, but the hospital is not liable for any fees charged by the transport service  In spite of this understanding, I refuse to consent to further medical examination and treatment which has been offered to me, and request transfer to  [facility]ola Marks Name, [address] : [facility]   I authorize the performance of emergency medical procedures and treatments upon me in both transit and upon arrival at the receiving facility  Additionally, I authorize the release of any and all medical records to the receiving facility and request they be transported with me, if possible  I authorize the performance of emergency medical procedures and treatments upon me in both transit and upon arrival at the receiving facility  Additionally, I authorize the release of any and all medical records to the receiving facility and request they be transported with me, if possible  I understand that the safest mode of transportation during a medical emergency is an ambulance and that the Hospital advocates the use of this mode of transport  Risks of traveling to the receiving facility by car, including absence of medical control, life sustaining equipment, such as oxygen, and medical personnel has been explained to me and I fully understand them  (NIKOLE CORRECT BOX BELOW)  [  ]  I consent to the stated transfer and to be transported by ambulance/helicopter  [  ]  I consent to the stated transfer, but refuse transportation by ambulance and accept full responsibility for my transportation by car  I understand the risks of non-ambulance transfers and I exonerate the Hospital and its staff from any deterioration in my condition that results from this refusal     X___________________________________________    DATE  10/24/18  TIME________  Signature of patient or legally responsible individual signing on patient behalf           RELATIONSHIP TO PATIENT_________________________          Provider Certification    NAME Heath [facility]                                         1995                              MRN [id number]    A medical screening exam was performed on the above named patient  Based on the examination:    Condition Necessitating Transfer The primary encounter diagnosis was Gastroesophageal reflux disease, esophagitis presence not specified   A diagnosis of Schizoaffective disorder ([facility] [address] ) was also pertinent to this visit  Patient Condition: The patient has been stabilized such that within reasonable medical probability, no material deterioration of the patient condition or the condition of the unborn child(artur) is likely to result from the transfer    Reason for Transfer: Level of Care needed not available at this facility    Transfer Requirements: [address]   · Space available and qualified personnel available for treatment as acknowledged by    · Agreed to accept transfer and to provide appropriate medical treatment as acknowledged by       Dr Cj De La Cruz  · Appropriate medical records of the examination and treatment of the patient are provided at the time of transfer   [facility] [address] _______  · Transfer will be performed by qualified personnel from    and appropriate transfer equipment as required, including the use of necessary and appropriate life support measures  Provider Certification: I have examined the patient and explained the following risks and benefits of being transferred/refusing transfer to the patient/family:  General risk, such as traffic hazards, adverse weather conditions, rough terrain or turbulence, possible failure of equipment (including vehicle or aircraft), or consequences of actions of persons outside the control of the transport personnel      Based on these reasonable risks and benefits to the patient and/or the unborn child(artur), and based upon the information available at the time of the patients examination, I certify that the medical benefits reasonably to be expected from the provision of appropriate medical treatments at another medical facility outweigh the increasing risks, if any, to the individuals medical condition, and in the case of labor to the unborn child, from effecting the transfer      X____________________________________________ DATE 10/24/18        TIME_______      ORIGINAL - SEND TO MEDICAL RECORDS   COPY - SEND WITH PATIENT DURING TRANSFER
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
## 2018-10-25 NOTE — PROGRESS NOTES
Patient c/o of her "heart hurting"  Also states that she has numbness in her left arm  VS checked; 109/60 HR 85  EKG to be completed  RN informed NP Sadie Hernández

## 2018-10-25 NOTE — EMTALA/ACUTE CARE TRANSFER
[facility] EMERGENCY DEPARTMENT  Jeanna Sosa [phone]  Dept: [phone]      EMTALA TRANSFER CONSENT    NAME Morris Gill                                         1995                              MRN [id number]    I have been informed of my rights regarding examination, treatment, and transfer   by Dr Maria Teresa Garcia MD    Benefits:      Risks:        Consent for Transfer:  I acknowledge that my medical condition has been evaluated and explained to me by the emergency department physician or other qualified medical person and/or my attending physician, who has recommended that I be transferred to the service of    at [address] [place] [address] Name, Rubina 41 : [facility]  The above potential benefits of such transfer, the potential risks associated with such transfer, and the probable risks of not being transferred have been explained to me, and I fully understand them  The doctor has explained that, in my case, the benefits of transfer outweigh the risks  I agree to be transferred  I authorize the performance of emergency medical procedures and treatments upon me in both transit and upon arrival at the receiving facility  Additionally, I authorize the release of any and all medical records to the receiving facility and request they be transported with me, if possible  I understand that the safest mode of transportation during a medical emergency is an ambulance and that the Hospital advocates the use of this mode of transport  Risks of traveling to the receiving facility by car, including absence of medical control, life sustaining equipment, such as oxygen, and medical personnel has been explained to me and I fully understand them  (NIKOLE CORRECT BOX BELOW)  [  ]  I consent to the stated transfer and to be transported by ambulance/helicopter    [  ]  I consent to the stated transfer, but refuse transportation by ambulance and accept full responsibility for my transportation by car  I understand the risks of non-ambulance transfers and I exonerate the Hospital and its staff from any deterioration in my condition that results from this refusal     X___________________________________________    DATE  10/24/18  TIME________  Signature of patient or legally responsible individual signing on patient behalf           RELATIONSHIP TO PATIENT_________________________          Provider Certification    NAME Conway Boas Cathie Guarneri                                         1995                              MRN [id number]    A medical screening exam was performed on the above named patient  Based on the examination:    Condition Necessitating Transfer The encounter diagnosis was Gastroesophageal reflux disease, esophagitis presence not specified  Patient Condition:      Reason for Transfer:      Transfer Requirements: [address]   · Space available and qualified personnel available for treatment as acknowledged by    · Agreed to accept transfer and to provide appropriate medical treatment as acknowledged by          · Appropriate medical records of the examination and treatment of the patient are provided at the time of transfer   [facility], [address] _______  · Transfer will be performed by qualified personnel from    and appropriate transfer equipment as required, including the use of necessary and appropriate life support measures      Provider Certification: I have examined the patient and explained the following risks and benefits of being transferred/refusing transfer to the patient/family:         Based on these reasonable risks and benefits to the patient and/or the unborn child(artur), and based upon the information available at the time of the patients examination, I certify that the medical benefits reasonably to be expected from the provision of appropriate medical treatments at another medical facility outweigh the increasing risks, if any, to the individuals medical condition, and in the case of labor to the unborn child, from effecting the transfer      X____________________________________________ DATE 10/24/18        TIME_______      ORIGINAL - SEND TO MEDICAL RECORDS   COPY - SEND WITH PATIENT DURING TRANSFER

## 2018-10-25 NOTE — PLAN OF CARE
Problem: Risk for Violence/Aggression Toward Others  Goal: Treatment Goal: Refrain from acts of violence/aggression during length of stay, and demonstrate improved impulse control at the time of discharge  Outcome: Progressing    Goal: Verbalize thoughts and feelings  Interventions:  - Assess and re-assess patient's level of risk, every waking shift  - Engage patient in 1:1 interactions, daily, for a minimum of 15 minutes   - Allow patient to express feelings and frustrations in a safe and non-threatening manner   - Establish rapport/trust with patient    Outcome: Progressing    Goal: Refrain from harming others  Outcome: Progressing    Goal: Refrain from destructive acts on the environment or property  Outcome: Progressing    Goal: Identify appropriate positive anger management techniques  Interventions:  - Offer anger management and coping skills groups   - Staff will provide positive feedback for appropriate anger control   Outcome: Not Progressing      Problem: DISCHARGE PLANNING  Goal: Discharge to home or other facility with appropriate resources  INTERVENTIONS:  - Identify barriers to discharge w/patient and caregiver  - Arrange for needed discharge resources and transportation as appropriate  - Identify discharge learning needs (meds, wound care, etc )  - Arrange for interpretive services to assist at discharge as needed  - Refer to Case Management Department for coordinating discharge planning if the patient needs post-hospital services based on physician/advanced practitioner order or complex needs related to functional status, cognitive ability, or social support system   Outcome: Progressing      Problem: Ineffective Coping  Goal: Participates in unit activities  Interventions:  - Provide therapeutic environment   - Provide required programming   - Redirect inappropriate behaviors    Outcome: Progressing

## 2018-10-25 NOTE — CONSULTS
H&P Exam    Jamaica Mata 26 y o  female MRN: [id number]  Unit/Bed#: WD4 927-77 Encounter: [id number]    Assessment:  27 yo female with bipolar 1 disorder     Plan:  SLIM for UTI treatment   Albuterol as needed   Psychiatric care per behavioral health    History of Present Illness   Jamaica Erickson is a 27 yo female that was [facility] the [facility] ED yesterday 10/24/18 for homicidal ideation  She states that she wanted to punch her sister in the head  Pt has had multiple inpatient psych admissions in the past  She admits to having asthma and that she uses albuterol  Also admits to having a tubal ligation  She denies any other medical issues  Pt complained of chest pain in the ED, EKG and chest x-ray normal  No medical complaints at this time         Historical Information   Past Medical History:   Diagnosis Date    Anxiety     Asthma     [facility]     Depression     Palpitations     Last Assessed [id number]    Psychiatric disorder     Psychiatric illness     Seizures ([facility] )     last time 2 weeks ago- petit mal    Stabbing chest pain     Last Assessed [id number]    Tachycardia     Last Assessed [id number]    Upper respiratory disease     Last Assessed [id number]     Past Surgical History:   Procedure Laterality Date    KNEE SURGERY      NV LAP,TUBAL CAUTERY N/A 10/3/2018    Procedure: LAPAROSCOPIC TUBAL LIGATION;  Surgeon: Bong Sierra MD;  Location: [address] [place] [address];  Service: Gynecology     Social History   History   Alcohol Use No     History   Drug Use No     Comment: last smoked 3-4 days ago     History   Smoking Status    Former Smoker    Packs/day: 0 25    Years: 4 00    Types: Cigarettes    Quit date: 9/17/2018   Smokeless Tobacco    Never Used     Family History: non-contributory    Meds/Allergies   Prescriptions Prior to Admission   Medication Sig Dispense Refill Last Dose    albuterol (2 5 mg/3 mL) 0 083 % nebulizer solution Inhale   Past Month at Unknown time    citalopram (CeleXA) 10 mg tablet Take 20 mg by mouth every morning    0 10/24/2018 at Unknown time    diphenhydrAMINE (BENADRYL) 25 mg capsule Take 50 mg by mouth daily at bedtime as needed     10/23/2018 at Unknown time    fluticasone (FLONASE) 50 mcg/act nasal spray 2 sprays into each nostril daily 16 g 0 Past Month at Unknown time    LATUDA 120 MG tablet daily with breakfast    0 10/24/2018 at Unknown time    loratadine (CLARITIN) 10 mg tablet Take by mouth   10/24/2018 at Unknown time    OXcarbazepine (TRILEPTAL PO) Take 200 mg by mouth 2 (two) times a day     10/24/2018 at Unknown time    ranitidine (ZANTAC) 150 mg tablet Take 1 tablet (150 mg total) by mouth 2 (two) times a day 30 tablet 3 10/24/2018 at Unknown time    VENTOLIN  (90 Base) MCG/ACT inhaler Inhale 2 puffs every 4 (four) hours as needed     Past Week at Unknown time       Current Facility-Administered Medications:     albuterol (PROVENTIL HFA,VENTOLIN HFA) inhaler 2 puff, 2 puff, Inhalation, Q4H PRN, Elodia Milan MD    benztropine (COGENTIN) injection 1 mg, 1 mg, Intramuscular, Q6H PRN, Elodia Milan MD    benztropine (COGENTIN) tablet 1 mg, 1 mg, Oral, Q6H PRN, Elodia Milan MD    fluticasone (FLONASE) 50 mcg/act nasal spray 2 spray, 2 spray, Each Nare, Daily, Elodia Milan MD    haloperidol (HALDOL) tablet 5 mg, 5 mg, Oral, Q8H PRN, Elodia Milan MD    haloperidol lactate (HALDOL) injection 5 mg, 5 mg, Intramuscular, Q6H PRN, Elodia Milan MD    hydrOXYzine HCL (ATARAX) tablet 25 mg, 25 mg, Oral, Q6H PRN, Elodia Milan MD    ibuprofen (MOTRIN) tablet 400 mg, 400 mg, Oral, Q8H PRN, Elodia Milan MD    magnesium hydroxide (MILK OF MAGNESIA) 400 mg/5 mL oral suspension 30 mL, 30 mL, Oral, Daily PRN, Elodia Milan MD    traZODone (DESYREL) tablet 50 mg, 50 mg, Oral, HS Elodia GRUBBS MD  Allergies   Allergen Reactions    Fish-Derived Products Itching    Shellfish Allergy Anaphylaxis    Shellfish-Derived Products        Objective   First Vitals:   Blood Pressure: 121/70 (10/24/18 2359)  Pulse: 80 (10/24/18 2359)  Temperature: 98 4 °F (36 9 °C) (10/24/18 2359)  Temp Source: Oral (10/24/18 2359)  Respirations: 16 (10/24/18 2359)  Height: 5' 4" (162 6 cm) (10/24/18 2359)  Weight - Scale: 88 kg (194 lb) (10/24/18 2359)  SpO2: 99 % (10/24/18 2359)    Current Vitals:   Blood Pressure: 121/70 (10/24/18 2359)  Pulse: 80 (10/24/18 2359)  Temperature: 98 4 °F (36 9 °C) (10/24/18 2359)  Temp Source: Oral (10/24/18 2359)  Respirations: 16 (10/24/18 2359)  Height: 5' 4" (162 6 cm) (10/24/18 2359)  Weight - Scale: 88 kg (194 lb) (10/24/18 2359)  SpO2: 99 % (10/24/18 2359)    No intake or output data in the 24 hours ending 10/25/18 0748    Invasive Devices     Peripheral Intravenous Line            Peripheral IV 10/03/18 22 days          Drain            Urethral Catheter Latex 16 Fr  21 days                Review of Systems   Constitutional: Negative for chills and fever  HENT: Negative  Respiratory: Negative for chest tightness and shortness of breath  Cardiovascular: Negative for chest pain and palpitations  Gastrointestinal: Negative for abdominal pain, constipation, diarrhea, nausea and vomiting  Genitourinary: Negative  Neurological: Negative for dizziness and light-headedness  Psychiatric/Behavioral: Negative for sleep disturbance  Physical Exam   Constitutional: She is oriented to person, place, and time  She appears well-developed and well-nourished  HENT:   Head: Normocephalic and atraumatic  Cardiovascular: Normal rate and regular rhythm  Pulmonary/Chest: Effort normal and breath sounds normal  No respiratory distress  Abdominal: Soft  There is no tenderness  Musculoskeletal: Normal range of motion  She exhibits no edema  Neurological: She is alert and oriented to person, place, and time  No cranial nerve deficit  Skin: Skin is warm and dry  She is not diaphoretic         Lab Results:   Recent Results (from the past 24 hour(s))   Rapid drug screen, urine    Collection Time: 10/24/18  4:28 PM   Result Value Ref Range    Amph/Meth UR Negative Negative    Barbiturate Ur Negative Negative    Benzodiazepine Urine Negative Negative    Cocaine Urine Negative Negative    Methadone Urine Negative Negative    Opiate Urine Negative Negative    PCP Ur Negative Negative    THC Urine Negative Negative   Ethanol    Collection Time: 10/24/18  4:28 PM   Result Value Ref Range    Ethanol Lvl <3 0 - 3 mg/dL   UA w Reflex to Microscopic w Reflex to Culture    Collection Time: 10/24/18  4:28 PM   Result Value Ref Range    Color, UA Light Yellow     Clarity, UA Slightly Cloudy     Specific Gravity, UA >=1 030 1 000 - 1 030    pH, UA 6 0 5 0 - 9 0    Leukocytes, UA Small (A) Negative    Nitrite, UA Negative Negative    Protein, UA Negative Negative mg/dl    Glucose, UA Negative Negative mg/dl    Ketones, UA Negative Negative mg/dl    Urobilinogen, UA 0 2 0 2, 1 0 E U /dl E U /dl    Bilirubin, UA Negative Negative    Blood, UA Negative Negative   Comprehensive metabolic panel    Collection Time: 10/24/18  4:28 PM   Result Value Ref Range    Sodium 140 136 - 145 mmol/L    Potassium 4 2 3 5 - 5 3 mmol/L    Chloride 104 100 - 108 mmol/L    CO2 27 21 - 32 mmol/L    ANION GAP 9 4 - 13 mmol/L    BUN 17 5 - 25 mg/dL    Creatinine 0 75 0 60 - 1 30 mg/dL    Glucose 81 65 - 140 mg/dL    Calcium 8 5 8 3 - 10 1 mg/dL    AST 15 5 - 45 U/L    ALT 33 12 - 78 U/L    Alkaline Phosphatase 83 46 - 116 U/L    Total Protein 7 2 6 4 - 8 2 g/dL    Albumin 3 5 3 5 - 5 0 g/dL    Total Bilirubin 0 10 (L) 0 20 - 1 00 mg/dL    eGFR 114 ml/min/1 73sq m   Troponin I    Collection Time: 10/24/18  4:28 PM   Result Value Ref Range    Troponin I <0 02 <=0 04 ng/mL   Urine Microscopic    Collection Time: 10/24/18  4:28 PM   Result Value Ref Range    RBC, UA None Seen None Seen, 0-5 /hpf    WBC, UA 10-20 (A) None Seen, 0-5, 5-55, 5-65 /hpf Epithelial Cells Moderate (A) None Seen, Occasional /hpf    Bacteria, UA Moderate (A) None Seen, Occasional /hpf   POCT pregnancy, urine    Collection Time: 10/24/18  4:43 PM   Result Value Ref Range    EXT PREG TEST UR (Ref: Negative) Negative    CBC and differential    Collection Time: 10/24/18  4:47 PM   Result Value Ref Range    WBC 8 82 4 31 - 10 16 Thousand/uL    RBC 4 23 3 81 - 5 12 Million/uL    Hemoglobin 12 6 11 5 - 15 4 g/dL    Hematocrit 39 6 34 8 - 46 1 %    MCV 94 82 - 98 fL    MCH 29 8 26 8 - 34 3 pg    MCHC 31 8 31 4 - 37 4 g/dL    RDW 13 1 11 6 - 15 1 %    MPV 9 4 8 9 - 12 7 fL    Platelets 911 243 - 061 Thousands/uL    nRBC 0 /100 WBCs    Neutrophils Relative 57 43 - 75 %    Immat GRANS % 1 0 - 2 %    Lymphocytes Relative 28 14 - 44 %    Monocytes Relative 8 4 - 12 %    Eosinophils Relative 5 0 - 6 %    Basophils Relative 1 0 - 1 %    Neutrophils Absolute 5 09 1 85 - 7 62 Thousands/µL    Immature Grans Absolute 0 04 0 00 - 0 20 Thousand/uL    Lymphocytes Absolute 2 48 0 60 - 4 47 Thousands/µL    Monocytes Absolute 0 72 0 17 - 1 22 Thousand/µL    Eosinophils Absolute 0 44 0 00 - 0 61 Thousand/µL    Basophils Absolute 0 05 0 00 - 0 10 Thousands/µL       Imaging: normal   - Chest x-ray 10/24: No acute cardiopulmonary disease      EKG, Pathology, and Other Studies: Reviewed   - EKG 10/24: normal sinus rhythm, normal rate and rhythm    Code Status: No Order    Dagmar Jovel PA-C

## 2018-10-25 NOTE — TREATMENT PLAN
TREATMENT PLAN REVIEW - Behavioral Health Jamaica Kilgore 26 y o  1995 female MRN: [id number]    [address] [place] [address] Room / Bed: [facility] Encounter: [id number]          Admit Date/Time:  10/24/2018 11:27 PM    Treatment Team: Attending Provider: Noreen Grayson MD; Consulting Physician: Aicha Camacho MD; Patient Care Technician: Garrett Galicia; Patient Care Technician: Marlene Feng; Patient Care Technician: Xu Shankar; Patient Care Technician: Lin Wild; Patient Care Technician: Ernie Rosales; Registered Nurse: Arelis Santana RN; Consulting Physician:  Marck Thorpe MD    Diagnosis: Principal Problem:    Encounter for psychological evaluation  Active Problems:    Chest pain    Gastroesophageal reflux disease    Cystitis    Patient Strengths: negotiates basic needs, patient is on a voluntary commitment     Patient Barriers: family conflict, limited education, poor insight, uncooperative, unresourceful    Short Term Goals: decrease in homicidal thoughts    Long Term Goals: free of homicidal thoughts    Progress Towards Goals: starting psychitric medications as prescribed    Recommended Treatment: medication management, patient medication education, group therapy, milieu therapy, continued Behavioral Health psychiatric evaluation/assessment process     Treatment Frequency: daily medication monitoring, group and milieu therapy daily, monitoring through interdisciplinary rounds, monitoring through weekly patient care conferences    Expected Discharge Date:  4-6 days    Discharge Plan: follow up with [address] [place] [address] Team for close psychiatric monitoring, return to previous living arrangement    Treatment Plan Created/Updated By: Noreen Grayson MD

## 2018-10-25 NOTE — ED NOTES
Patient is sitting in bed watching tv  No complaints at this time  Will continue to monitor        Naheed Simms RN  10/24/18 3055

## 2018-10-25 NOTE — PROGRESS NOTES
CMS Checklist:     BMI: yes  BP: yes   Fasting glucose: yes  A1c: no, needs to be ordered  Lipid panel: no , needs to be ordered

## 2018-10-26 LAB
ATRIAL RATE: 76 BPM
P AXIS: 36 DEGREES
PR INTERVAL: 146 MS
QRS AXIS: 26 DEGREES
QRSD INTERVAL: 84 MS
QT INTERVAL: 366 MS
QTC INTERVAL: 411 MS
T WAVE AXIS: 31 DEGREES
VENTRICULAR RATE: 76 BPM

## 2018-10-26 PROCEDURE — 99231 SBSQ HOSP IP/OBS SF/LOW 25: CPT | Performed by: PHYSICIAN ASSISTANT

## 2018-10-26 PROCEDURE — 99222 1ST HOSP IP/OBS MODERATE 55: CPT | Performed by: PSYCHIATRY & NEUROLOGY

## 2018-10-26 PROCEDURE — 93010 ELECTROCARDIOGRAM REPORT: CPT | Performed by: INTERNAL MEDICINE

## 2018-10-26 RX ORDER — ONDANSETRON 4 MG/1
4 TABLET, ORALLY DISINTEGRATING ORAL EVERY 8 HOURS PRN
Status: DISCONTINUED | OUTPATIENT
Start: 2018-10-26 | End: 2018-11-02 | Stop reason: HOSPADM

## 2018-10-26 RX ORDER — GABAPENTIN 300 MG/1
300 CAPSULE ORAL 3 TIMES DAILY
Status: DISCONTINUED | OUTPATIENT
Start: 2018-10-26 | End: 2018-10-31

## 2018-10-26 RX ADMIN — FAMOTIDINE 20 MG: 20 TABLET ORAL at 21:12

## 2018-10-26 RX ADMIN — OXCARBAZEPINE 300 MG: 300 TABLET, FILM COATED ORAL at 08:12

## 2018-10-26 RX ADMIN — LURASIDONE HYDROCHLORIDE 100 MG: 80 TABLET, FILM COATED ORAL at 08:12

## 2018-10-26 RX ADMIN — DIPHENHYDRAMINE HCL 50 MG: 25 TABLET ORAL at 21:11

## 2018-10-26 RX ADMIN — FAMOTIDINE 20 MG: 20 TABLET ORAL at 08:12

## 2018-10-26 RX ADMIN — OXCARBAZEPINE 450 MG: 300 TABLET, FILM COATED ORAL at 17:33

## 2018-10-26 RX ADMIN — HALOPERIDOL 5 MG: 5 TABLET ORAL at 15:38

## 2018-10-26 RX ADMIN — LORATADINE 10 MG: 10 TABLET ORAL at 08:12

## 2018-10-26 RX ADMIN — CIPROFLOXACIN HYDROCHLORIDE 500 MG: 500 TABLET, FILM COATED ORAL at 15:38

## 2018-10-26 RX ADMIN — LORAZEPAM 2 MG: 1 TABLET ORAL at 13:07

## 2018-10-26 RX ADMIN — CITALOPRAM HYDROBROMIDE 20 MG: 20 TABLET ORAL at 08:12

## 2018-10-26 RX ADMIN — ACETAMINOPHEN 975 MG: 325 TABLET, FILM COATED ORAL at 17:18

## 2018-10-26 RX ADMIN — IBUPROFEN 400 MG: 400 TABLET ORAL at 15:37

## 2018-10-26 RX ADMIN — GABAPENTIN 300 MG: 300 CAPSULE ORAL at 15:38

## 2018-10-26 RX ADMIN — ALBUTEROL SULFATE 2 PUFF: 90 AEROSOL, METERED RESPIRATORY (INHALATION) at 12:26

## 2018-10-26 RX ADMIN — CIPROFLOXACIN HYDROCHLORIDE 500 MG: 500 TABLET, FILM COATED ORAL at 06:44

## 2018-10-26 NOTE — PROGRESS NOTES
Pt states depressed, anxious  HI with plan to "bash in" the head of her sister  Pt denies all other symptoms  She is cooperative with medications  Pt had a brief argument with other pts, was heard saying "get the fuck out of my way," redirected back to room  Pt was cooperative at this time  States she  "just wanted to watch TV" and she is upset that it is not on yet

## 2018-10-26 NOTE — PROGRESS NOTES
Pt was calm this morning denies SI depression or delusions  Pt stated " I do have HI thoughts towards my younger sister because she is annoying  I still want to hurt her,I don't like her  " Pt did not want to discuss any more about her feeling towards her sister  Will cont to monitor

## 2018-10-26 NOTE — PROGRESS NOTES
Pt Bp rechecked after lunch 102/64 Per Pt she was very anxious and felt like she was going to have a seizure  PRN Ativan given at 1315  Pt stated " I am 26years old but my mind is like a 12year old and that is why I get what I want   My parents give me what I want so they dont have to deal with my anger  " It was explained to Pt that getting angry and hurting people is wrong and Pt stated " I dont know any better Im like a 12year old " Will monitor

## 2018-10-26 NOTE — PROGRESS NOTES
At 11am vss Pt BP was a little low Pt denies dizziness nausea pain or discomfort  Pt was encouraged to increase fluids  Pt verbalized understanding   Will cont to monitor and recheck Bp
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
## 2018-10-26 NOTE — PROGRESS NOTES
Pt increasing agitation, uncooperative with staff  Pt threatening staff, "If you don't leave me alone you'll be sorry "  Verbal de-escalation unsuccessful  Pt attempting to push staff out of the way  Pt received PRN Haldol IM, then attempted to barricade self in bathroom but was prevented by staff  Pt continued to threaten staff, "Your neck is mine!" and "You better not come into work tomorrow!"  Pt was placed in 4-point locked restraints due to out of control behavior, verbal threats and grabbing at staff  Pt continued to fight, kick and scream at staff, states "I want to die! I have nothing to live for!"  Pt was reminded she has a three month old, replied "Linda Kimble that baby, she can die!"  Pt pulling at restraints, attempting to grab nearby staff

## 2018-10-26 NOTE — PROGRESS NOTES
Debriefing occurred, pt verbalized understanding of her behavior that led to restraint use, verbalized understanding of discontinuation criteria, agreed she will cooperate with staff and refrain from aggression and threats toward others

## 2018-10-26 NOTE — PROGRESS NOTES
Pt in day room at start of shift and c/o having a seizure after being seen moving her head side to side for less than a minute  Pt had no post ictal period and was not incontinent  When asked why she felt she had a seizure she stated she had pain all over her body  Pt was informed that she had just had medications to prevent seizures and was asked to remain sitting so she did not fall  She was given her scheduled medications and a PRN Haldol for agitated behaviors  Pt now in small tv room socializing with peers,  c/o feeling dizzy and requesting that her blood sugar be tested  Explained to pt that she is not diabetic and does not have testing ordered  Encouraged to reamain seated until dinner arrives  Pt is angry and talking about staff as RN left the room  Will monitor
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
## 2018-10-26 NOTE — PLAN OF CARE
Problem: Risk for Violence/Aggression Toward Others  Goal: Treatment Goal: Refrain from acts of violence/aggression during length of stay, and demonstrate improved impulse control at the time of discharge  Outcome: Progressing    Goal: Verbalize thoughts and feelings  Interventions:  - Assess and re-assess patient's level of risk, every waking shift  - Engage patient in 1:1 interactions, daily, for a minimum of 15 minutes   - Allow patient to express feelings and frustrations in a safe and non-threatening manner   - Establish rapport/trust with patient    Outcome: Progressing    Goal: Refrain from harming others  Outcome: Progressing    Goal: Refrain from destructive acts on the environment or property  Outcome: Progressing    Goal: Identify appropriate positive anger management techniques  Interventions:  - Offer anger management and coping skills groups   - Staff will provide positive feedback for appropriate anger control   Outcome: Progressing      Problem: DISCHARGE PLANNING  Goal: Discharge to home or other facility with appropriate resources  INTERVENTIONS:  - Identify barriers to discharge w/patient and caregiver  - Arrange for needed discharge resources and transportation as appropriate  - Identify discharge learning needs (meds, wound care, etc )  - Arrange for interpretive services to assist at discharge as needed  - Refer to Case Management Department for coordinating discharge planning if the patient needs post-hospital services based on physician/advanced practitioner order or complex needs related to functional status, cognitive ability, or social support system   Outcome: Progressing      Problem: Ineffective Coping  Goal: Participates in unit activities  Interventions:  - Provide therapeutic environment   - Provide required programming   - Redirect inappropriate behaviors    Outcome: Progressing

## 2018-10-26 NOTE — PROGRESS NOTES
Pt is pleasant and calm upon approach c/o of stomach ache after breakfast  Pt was encouraged to eat small bites and drink fluids and lay down and if cramping cont she can be given maallox Pt declined maallox and went to sleep and rested until lunch time and offered no f c/o at this time
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
## 2018-10-26 NOTE — PROGRESS NOTES
Pt moved from room with roommate as roommate acting out  Pt asked that restraints be removed from bed and assures staff that she is in control of her behavior  Asking for HS meds at this time and told she has to wait until scheduled time  childlike behaviors, speaks in child's voice at times and asked if staff could stay with her while she falls asleep  Given extra blanket  Pt went and laid down  No behavioral issues  Will monitor
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
## 2018-10-27 LAB
BACTERIA UR QL AUTO: ABNORMAL /HPF
BILIRUB UR QL STRIP: NEGATIVE
CLARITY UR: CLEAR
COLOR UR: YELLOW
GLUCOSE UR STRIP-MCNC: NEGATIVE MG/DL
HGB UR QL STRIP.AUTO: NEGATIVE
HYALINE CASTS #/AREA URNS LPF: ABNORMAL /LPF
KETONES UR STRIP-MCNC: NEGATIVE MG/DL
LEUKOCYTE ESTERASE UR QL STRIP: ABNORMAL
NITRITE UR QL STRIP: NEGATIVE
NON-SQ EPI CELLS URNS QL MICRO: ABNORMAL /HPF
PH UR STRIP.AUTO: 6.5 [PH] (ref 4.5–8)
PROT UR STRIP-MCNC: NEGATIVE MG/DL
RBC #/AREA URNS AUTO: ABNORMAL /HPF
SP GR UR STRIP.AUTO: 1.02 (ref 1–1.03)
UROBILINOGEN UR QL STRIP.AUTO: 0.2 E.U./DL
WBC #/AREA URNS AUTO: ABNORMAL /HPF

## 2018-10-27 PROCEDURE — 99231 SBSQ HOSP IP/OBS SF/LOW 25: CPT | Performed by: PSYCHIATRY & NEUROLOGY

## 2018-10-27 PROCEDURE — 81001 URINALYSIS AUTO W/SCOPE: CPT | Performed by: PSYCHIATRY & NEUROLOGY

## 2018-10-27 RX ORDER — HYDROXYZINE HYDROCHLORIDE 25 MG/1
50 TABLET, FILM COATED ORAL EVERY 6 HOURS PRN
Status: DISCONTINUED | OUTPATIENT
Start: 2018-10-27 | End: 2018-11-02 | Stop reason: HOSPADM

## 2018-10-27 RX ADMIN — OXCARBAZEPINE 450 MG: 300 TABLET, FILM COATED ORAL at 09:27

## 2018-10-27 RX ADMIN — FAMOTIDINE 20 MG: 20 TABLET ORAL at 09:29

## 2018-10-27 RX ADMIN — CITALOPRAM HYDROBROMIDE 20 MG: 20 TABLET ORAL at 09:28

## 2018-10-27 RX ADMIN — HYDROXYZINE HYDROCHLORIDE 25 MG: 25 TABLET ORAL at 02:42

## 2018-10-27 RX ADMIN — IBUPROFEN 400 MG: 400 TABLET ORAL at 10:57

## 2018-10-27 RX ADMIN — GABAPENTIN 300 MG: 300 CAPSULE ORAL at 09:29

## 2018-10-27 RX ADMIN — HYDROXYZINE HYDROCHLORIDE 50 MG: 25 TABLET ORAL at 16:48

## 2018-10-27 RX ADMIN — DIPHENHYDRAMINE HCL 50 MG: 25 TABLET ORAL at 20:00

## 2018-10-27 RX ADMIN — GABAPENTIN 300 MG: 300 CAPSULE ORAL at 16:46

## 2018-10-27 RX ADMIN — LORATADINE 10 MG: 10 TABLET ORAL at 09:28

## 2018-10-27 RX ADMIN — ACETAMINOPHEN 650 MG: 325 TABLET ORAL at 16:56

## 2018-10-27 RX ADMIN — TRAZODONE HYDROCHLORIDE 50 MG: 50 TABLET ORAL at 21:39

## 2018-10-27 RX ADMIN — CIPROFLOXACIN HYDROCHLORIDE 500 MG: 500 TABLET, FILM COATED ORAL at 16:46

## 2018-10-27 RX ADMIN — HYDROXYZINE HYDROCHLORIDE 25 MG: 25 TABLET ORAL at 09:34

## 2018-10-27 RX ADMIN — GABAPENTIN 300 MG: 300 CAPSULE ORAL at 20:00

## 2018-10-27 RX ADMIN — TRAZODONE HYDROCHLORIDE 50 MG: 50 TABLET ORAL at 02:42

## 2018-10-27 RX ADMIN — FLUTICASONE PROPIONATE 2 SPRAY: 50 SPRAY, METERED NASAL at 09:46

## 2018-10-27 RX ADMIN — CIPROFLOXACIN HYDROCHLORIDE 500 MG: 500 TABLET, FILM COATED ORAL at 06:43

## 2018-10-27 RX ADMIN — LURASIDONE HYDROCHLORIDE 100 MG: 80 TABLET, FILM COATED ORAL at 09:28

## 2018-10-27 RX ADMIN — OXCARBAZEPINE 450 MG: 300 TABLET, FILM COATED ORAL at 17:00

## 2018-10-27 RX ADMIN — FAMOTIDINE 20 MG: 20 TABLET ORAL at 20:00
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
## 2018-10-27 NOTE — PLAN OF CARE
Problem: Risk for Violence/Aggression Toward Others  Goal: Treatment Goal: Refrain from acts of violence/aggression during length of stay, and demonstrate improved impulse control at the time of discharge  Outcome: Progressing    Goal: Verbalize thoughts and feelings  Interventions:  - Assess and re-assess patient's level of risk, every waking shift  - Engage patient in 1:1 interactions, daily, for a minimum of 15 minutes   - Allow patient to express feelings and frustrations in a safe and non-threatening manner   - Establish rapport/trust with patient    Outcome: Progressing    Goal: Refrain from harming others  Outcome: Progressing    Goal: Refrain from destructive acts on the environment or property  Outcome: Progressing    Goal: Identify appropriate positive anger management techniques  Interventions:  - Offer anger management and coping skills groups   - Staff will provide positive feedback for appropriate anger control   Outcome: Progressing      Problem: DISCHARGE PLANNING  Goal: Discharge to home or other facility with appropriate resources  INTERVENTIONS:  - Identify barriers to discharge w/patient and caregiver  - Arrange for needed discharge resources and transportation as appropriate  - Identify discharge learning needs (meds, wound care, etc )  - Arrange for interpretive services to assist at discharge as needed  - Refer to Case Management Department for coordinating discharge planning if the patient needs post-hospital services based on physician/advanced practitioner order or complex needs related to functional status, cognitive ability, or social support system   Outcome: Progressing      Problem: Ineffective Coping  Goal: Participates in unit activities  Interventions:  - Provide therapeutic environment   - Provide required programming   - Redirect inappropriate behaviors    Outcome: Progressing      Problem: SAFETY, RESTRAINT - VIOLENT/SELF-DESTRUCTIVE  Goal: Remains free of harm/injury from restraints (Restraint for Violent/Self-Destructive Behavior)  INTERVENTIONS:  - Instruct patient/family regarding restraint use   - Assess and monitor physiologic and psychological status   - Provide interventions and comfort measures to meet assessed patient needs   - Ensure continuous in person monitoring is provided   - Identify and implement measures to help patient regain control  - Assess readiness for release of restraint   Outcome: Progressing    Goal: Returns to optimal restraint-free functioning  INTERVENTIONS:  - Assess the patient's behavior and symptoms that indicate continued need for restraint  - Identify and implement measures to help patient regain control  - Assess readiness for release of restraint    Outcome: Progressing
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
## 2018-10-27 NOTE — PROGRESS NOTES
Pt awoke during the night and asked for something to help her get back to sleep  Pt requested trazodone and benadryl  Pt was informed that she had already received benadryl with her HS medications  Pt given PRN trazodone and atarax 0242  Pt returned to bed and was sleeping within 30 minutes

## 2018-10-28 PROCEDURE — 99231 SBSQ HOSP IP/OBS SF/LOW 25: CPT | Performed by: PSYCHIATRY & NEUROLOGY

## 2018-10-28 RX ADMIN — ACETAMINOPHEN 650 MG: 325 TABLET ORAL at 15:08

## 2018-10-28 RX ADMIN — OXCARBAZEPINE 450 MG: 300 TABLET, FILM COATED ORAL at 08:37

## 2018-10-28 RX ADMIN — TRAZODONE HYDROCHLORIDE 50 MG: 50 TABLET ORAL at 22:01

## 2018-10-28 RX ADMIN — FAMOTIDINE 20 MG: 20 TABLET ORAL at 21:35

## 2018-10-28 RX ADMIN — HYDROXYZINE HYDROCHLORIDE 50 MG: 25 TABLET ORAL at 16:40

## 2018-10-28 RX ADMIN — GABAPENTIN 300 MG: 300 CAPSULE ORAL at 08:38

## 2018-10-28 RX ADMIN — GABAPENTIN 300 MG: 300 CAPSULE ORAL at 17:05

## 2018-10-28 RX ADMIN — LORAZEPAM 2 MG: 1 TABLET ORAL at 13:09

## 2018-10-28 RX ADMIN — GABAPENTIN 300 MG: 300 CAPSULE ORAL at 21:35

## 2018-10-28 RX ADMIN — LURASIDONE HYDROCHLORIDE 100 MG: 80 TABLET, FILM COATED ORAL at 08:37

## 2018-10-28 RX ADMIN — FAMOTIDINE 20 MG: 20 TABLET ORAL at 08:37

## 2018-10-28 RX ADMIN — CIPROFLOXACIN HYDROCHLORIDE 500 MG: 500 TABLET, FILM COATED ORAL at 06:44

## 2018-10-28 RX ADMIN — LORATADINE 10 MG: 10 TABLET ORAL at 08:37

## 2018-10-28 RX ADMIN — OXCARBAZEPINE 450 MG: 300 TABLET, FILM COATED ORAL at 17:05

## 2018-10-28 RX ADMIN — DIPHENHYDRAMINE HCL 50 MG: 25 TABLET ORAL at 22:02

## 2018-10-28 RX ADMIN — CITALOPRAM HYDROBROMIDE 20 MG: 20 TABLET ORAL at 08:38

## 2018-10-28 NOTE — PROGRESS NOTES
Pt was calm  And offered no c/o pain or discomfort this morning  Pt di not want to take her morning meds  But was able to take them after with encouragement  Pt stated " I am so so today and I don't feel like taking my meds because they make me feel sleepy  "It was  Explained the importance of taking her meds and they will help her stay well and Pt verbalized understanding and decided to take the meds  Will monitor

## 2018-10-28 NOTE — PROGRESS NOTES
Pt requested to get her HS medications as early as possible, because she was tired and wanted to get to sleep  Pt given HS meds and PRN benadryl at 2000  Pt went to bed briefly, without sleeping, then was back up in the milieu at [facility] and watching tv  Pt then requesting trazodone for sleep  This writer encouraged pt to take the medication when she is actually ready to go to sleep  Pt was given trazodone at 2139 then went to bed  Medication is effective as pt is asleep within an hour

## 2018-10-29 PROBLEM — Z00.8 ENCOUNTER FOR PSYCHOLOGICAL EVALUATION: Status: RESOLVED | Noted: 2018-10-25 | Resolved: 2018-10-29

## 2018-10-29 PROBLEM — S54.10XA MEDIAN NERVE INJURY: Status: ACTIVE | Noted: 2017-05-04

## 2018-10-29 PROBLEM — F70 MILD INTELLECTUAL DISABILITY: Chronic | Status: ACTIVE | Noted: 2018-10-29

## 2018-10-29 PROBLEM — F31.64 BIPOLAR I DISORDER, MOST RECENT EPISODE MIXED, SEVERE WITH PSYCHOTIC FEATURES (HCC): Chronic | Status: ACTIVE | Noted: 2018-10-17

## 2018-10-29 PROBLEM — F31.9 BIPOLAR 1 DISORDER (HCC): Status: ACTIVE | Noted: 2018-10-17

## 2018-10-29 PROBLEM — J30.2 SEASONAL ALLERGIES: Status: ACTIVE | Noted: 2017-05-04

## 2018-10-29 PROCEDURE — 99232 SBSQ HOSP IP/OBS MODERATE 35: CPT | Performed by: PSYCHIATRY & NEUROLOGY

## 2018-10-29 RX ORDER — OXCARBAZEPINE 300 MG/1
600 TABLET, FILM COATED ORAL 2 TIMES DAILY
Status: DISCONTINUED | OUTPATIENT
Start: 2018-10-29 | End: 2018-11-02 | Stop reason: HOSPADM

## 2018-10-29 RX ADMIN — OXCARBAZEPINE 600 MG: 300 TABLET, FILM COATED ORAL at 17:50

## 2018-10-29 RX ADMIN — HALOPERIDOL 5 MG: 5 TABLET ORAL at 12:30

## 2018-10-29 RX ADMIN — LURASIDONE HYDROCHLORIDE 100 MG: 80 TABLET, FILM COATED ORAL at 09:49

## 2018-10-29 RX ADMIN — DIPHENHYDRAMINE HCL 50 MG: 25 TABLET ORAL at 21:55

## 2018-10-29 RX ADMIN — OXCARBAZEPINE 450 MG: 300 TABLET, FILM COATED ORAL at 09:49

## 2018-10-29 RX ADMIN — FLUTICASONE PROPIONATE 2 SPRAY: 50 SPRAY, METERED NASAL at 09:49

## 2018-10-29 RX ADMIN — GABAPENTIN 300 MG: 300 CAPSULE ORAL at 21:17

## 2018-10-29 RX ADMIN — ACETAMINOPHEN 650 MG: 325 TABLET ORAL at 10:07

## 2018-10-29 RX ADMIN — FAMOTIDINE 20 MG: 20 TABLET ORAL at 09:49

## 2018-10-29 RX ADMIN — CITALOPRAM HYDROBROMIDE 20 MG: 20 TABLET ORAL at 09:49

## 2018-10-29 RX ADMIN — TRAZODONE HYDROCHLORIDE 50 MG: 50 TABLET ORAL at 21:55

## 2018-10-29 RX ADMIN — GABAPENTIN 300 MG: 300 CAPSULE ORAL at 09:50

## 2018-10-29 RX ADMIN — FAMOTIDINE 20 MG: 20 TABLET ORAL at 21:17

## 2018-10-29 RX ADMIN — HYDROXYZINE HYDROCHLORIDE 50 MG: 25 TABLET ORAL at 03:09

## 2018-10-29 RX ADMIN — GABAPENTIN 300 MG: 300 CAPSULE ORAL at 17:50

## 2018-10-29 RX ADMIN — LORATADINE 10 MG: 10 TABLET ORAL at 09:49

## 2018-10-29 RX ADMIN — LORAZEPAM 2 MG: 1 TABLET ORAL at 12:30
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
## 2018-10-29 NOTE — PROGRESS NOTES
Patient sitting in dayroom and was witnessed by nursing students sliding herself off of the chair and on to the floor  Patient then laid on the floor in the dayroom and would not respond to staffs questions  Patient appeared to be sleeping but eyes were fluttering as if she were awake  Vital signs WNL  121/65, o2 99%  Patient then opened her eyes and was able to get off of the floor  No complaints  Will monitor

## 2018-10-29 NOTE — CASE MANAGEMENT
called Manny DIAMOND ( [phone]) regarding coordinating discharge planning ,  spoke with Ezell Paget Ezell Paget asked  fax a KARIN and said the KARIN will be given to the  and they will call    did fax the KARIN to fax [phone]

## 2018-10-29 NOTE — PROGRESS NOTES
Progress Note - Behavioral Health     Jamaica Kilgore 26 y o  female MRN: [id number]   Unit/Bed#: PX6 594-34 Encounter: [id number]    Behavior over the last 24 hours: unchanged  Autumn still feels anxious and depressed, still reports homicidal thoughts towards sister "to smash her head against the wall"  She denies suicidal thoughts today  Labile affect, was in restraints before the weekend and continued to express thoughts to harm sister over the weekend  Childlike, immature, poor insight  Limited participation in milieu  Has been taking medications with some resistance as per staff report      Sleep: slept off and on  Appetite: normal  Medication side effects: No   ROS: reports chest pain and nose pain, denies any shortness of breath or abdominal pain    Mental Status Evaluation:    Appearance:  disheveled   Behavior:  cooperative, limited eye contact, restless and fidgety   Speech:  scant, soft   Mood:  depressed, anxious   Affect:  labile   Thought Process:  organized, impaired abstract reasoning, concrete   Associations: concrete associations   Thought Content:  somatic delusions, paranoid ideation   Perceptual Disturbances: no auditory hallucinations, no visual hallucinations   Risk Potential: Suicidal ideation - None at present  Homicidal ideation - Yes, towards sister "to smashe her head against the wall"  Potential for aggression - Yes, due to poor impulse control   Sensorium:  oriented to person, place and time/date   Memory:  recent and remote memory grossly intact   Consciousness:  alert and awake   Attention: attention span and concentration appear shorter than expected for age   Insight:  impaired   Judgment: impaired   Gait/Station: normal gait/station and normal balance   Motor Activity: no abnormal movements     Vital signs in last 24 hours:    Temp:  [98 °F (36 7 °C)-98 1 °F (36 7 °C)] 98 °F (36 7 °C)  HR:  [89-94] 89  Resp:  [16] 16  BP: (100-125)/(55-58) 100/55    Laboratory results:  I have personally reviewed all pertinent laboratory/tests results  Most Recent Labs:   Lab Results   Component Value Date    WBC 8 82 10/24/2018    RBC 4 23 10/24/2018    HGB 12 6 10/24/2018    HCT 39 6 10/24/2018     10/24/2018    RDW 13 1 10/24/2018    NEUTROABS 5 09 10/24/2018     10/24/2018    K 4 2 10/24/2018     10/24/2018    CO2 27 10/24/2018    BUN 17 10/24/2018    CREATININE 0 75 10/24/2018    GLUC 81 10/24/2018    CALCIUM 8 5 10/24/2018    AST 15 10/24/2018    ALT 33 10/24/2018    ALKPHOS 83 10/24/2018    TP 7 2 10/24/2018    ALB 3 5 10/24/2018    TBILI 0 10 (L) 10/24/2018    MKE3KORUBBOB 1 250 10/25/2018   Drug Screen:   Lab Results   Component Value Date    AMPMETHUR Negative 10/24/2018    BARBTUR Negative 10/24/2018    BDZUR Negative 10/24/2018    THCUR Negative 10/24/2018    COCAINEUR Negative 10/24/2018    METHADONEUR Negative 10/24/2018    OPIATEUR Negative 10/24/2018    PCPUR Negative 10/24/2018       Progress Toward Goals: no significant progress, still anxious, remains labile, still has homicidal thoughts    Assessment/Plan   Principal Problem:    Bipolar I disorder, most recent episode mixed, severe with psychotic features ([facility] [address] )  Active Problems:    Attention deficit hyperactivity disorder, combined type    Mild intellectual disability    Chest pain    Gastroesophageal reflux disease    Cystitis    Pseudoseizure    Recommended Treatment:     Planned medication and treatment changes: All current active medications have been reviewed    Encourage group therapy, milieu therapy and occupational therapy  Behavioral Health checks every 5 minutes  Increase Latuda to 120 mg daily to help with mood  Increase Trileptal to 600 mg bid to help with mood stabilization  Check lipid profile, hemoglobin A1C, HCG and RPR in the morning - not done on admission    Continue all other medications:    Current Facility-Administered Medications:  acetaminophen 650 mg Oral Q4H PRN Albertina Jacinto MD acetaminophen 975 mg Oral Q6H PRN Farhana Spence MD   albuterol 2 puff Inhalation Q4H PRN Vijay Raza MD   benztropine 1 mg Intramuscular Q6H PRN Vijay Raza MD   benztropine 1 mg Oral Q6H PRN Vijay Raza MD   citalopram 20 mg Oral Daily Farhana Spence MD   diphenhydrAMINE 50 mg Oral HS PRN Farhana Spence MD   famotidine 20 mg Oral Q12H [address] Farhana Spence MD   fluticasone 2 spray Each Nare Daily Vijay Raza MD   gabapentin 300 mg Oral TID Farhana Spence MD   haloperidol 5 mg Oral Q8H PRN Vijay Raza MD   haloperidol lactate 5 mg Intramuscular Q6H PRN Vijay Raza MD   hydrOXYzine HCL 50 mg Oral Q6H PRN Chrissie Cao MD   ibuprofen 400 mg Oral Q8H PRN Vijay Raza MD   loratadine 10 mg Oral Daily Farhana Spence MD   LORazepam 2 mg Intramuscular Q6H PRN Farhana Spence MD   LORazepam 2 mg Oral Q6H PRN MD Khanh Willis ON 10/30/2018] lurasidone 120 mg Oral Daily With Breakfast Polo Rosario MD   magnesium hydroxide 30 mL Oral Daily PRN Vijay Raza MD   ondansetron 4 mg Oral Q8H PRN Farhana Spence MD   OXcarbazepine 600 mg Oral BID Polo Rosario MD   promethazine 25 mg Oral Q6H PRN Farhana Spence MD   traZODone 50 mg Oral HS PRN Vijay Raza MD       Risks / Benefits of Treatment:    Risks, benefits, and possible side effects of medications explained to patient including risk of parkinsonian symptoms, Tardive Dyskinesia and metabolic syndrome related to treatment with antipsychotic medications and risk of suicidality related to treatment with antidepressants  Patient has limited understanding of risks and benefits treatment at this time, but agrees to take medications as prescribed  Risks of medications in pregnancy explained if female patient  Patient verbalizes understanding and agrees to notify her doctor if she becomes pregnant      Counseling / Coordination of Care:    Patient's progress discussed with staff in treatment team meeting  Medications, treatment progress and treatment plan reviewed with patient  Medication changes discussed with patient      Maricarmen Valentin MD 10/29/18

## 2018-10-29 NOTE — PROGRESS NOTES
Pt awoke to use the bathroom during the night  Pt stated she is having some mild anxiety and trouble falling back to sleep  Pt given PRN atarax @ 0309  Medication appears to be effective as pt is sleeping within an hour

## 2018-10-30 LAB
CHOLEST SERPL-MCNC: 151 MG/DL (ref 50–200)
EST. AVERAGE GLUCOSE BLD GHB EST-MCNC: 103 MG/DL
HBA1C MFR BLD: 5.2 % (ref 4.2–6.3)
HCG SERPL QL: NEGATIVE
HDLC SERPL-MCNC: 34 MG/DL (ref 40–60)
LDLC SERPL CALC-MCNC: 57 MG/DL (ref 0–100)
NONHDLC SERPL-MCNC: 117 MG/DL
RPR SER QL: NORMAL
TRIGL SERPL-MCNC: 300 MG/DL

## 2018-10-30 PROCEDURE — 86592 SYPHILIS TEST NON-TREP QUAL: CPT | Performed by: PSYCHIATRY & NEUROLOGY

## 2018-10-30 PROCEDURE — 99232 SBSQ HOSP IP/OBS MODERATE 35: CPT | Performed by: PSYCHIATRY & NEUROLOGY

## 2018-10-30 PROCEDURE — 80061 LIPID PANEL: CPT | Performed by: PSYCHIATRY & NEUROLOGY

## 2018-10-30 PROCEDURE — 84703 CHORIONIC GONADOTROPIN ASSAY: CPT | Performed by: PSYCHIATRY & NEUROLOGY

## 2018-10-30 PROCEDURE — 83036 HEMOGLOBIN GLYCOSYLATED A1C: CPT | Performed by: PSYCHIATRY & NEUROLOGY

## 2018-10-30 RX ADMIN — FLUTICASONE PROPIONATE 2 SPRAY: 50 SPRAY, METERED NASAL at 08:53

## 2018-10-30 RX ADMIN — LORAZEPAM 2 MG: 1 TABLET ORAL at 09:02

## 2018-10-30 RX ADMIN — OXCARBAZEPINE 600 MG: 300 TABLET, FILM COATED ORAL at 17:02

## 2018-10-30 RX ADMIN — LORAZEPAM 2 MG: 1 TABLET ORAL at 15:46

## 2018-10-30 RX ADMIN — CITALOPRAM HYDROBROMIDE 20 MG: 20 TABLET ORAL at 08:52

## 2018-10-30 RX ADMIN — LORATADINE 10 MG: 10 TABLET ORAL at 08:52

## 2018-10-30 RX ADMIN — FAMOTIDINE 20 MG: 20 TABLET ORAL at 08:52

## 2018-10-30 RX ADMIN — FAMOTIDINE 20 MG: 20 TABLET ORAL at 21:14

## 2018-10-30 RX ADMIN — GABAPENTIN 300 MG: 300 CAPSULE ORAL at 17:02

## 2018-10-30 RX ADMIN — LURASIDONE HYDROCHLORIDE 120 MG: 80 TABLET, FILM COATED ORAL at 08:52

## 2018-10-30 RX ADMIN — DIPHENHYDRAMINE HCL 50 MG: 25 TABLET ORAL at 21:22

## 2018-10-30 RX ADMIN — OXCARBAZEPINE 600 MG: 300 TABLET, FILM COATED ORAL at 08:52

## 2018-10-30 RX ADMIN — GABAPENTIN 300 MG: 300 CAPSULE ORAL at 21:15

## 2018-10-30 RX ADMIN — GABAPENTIN 300 MG: 300 CAPSULE ORAL at 08:52

## 2018-10-30 RX ADMIN — TRAZODONE HYDROCHLORIDE 50 MG: 50 TABLET ORAL at 21:21

## 2018-10-30 NOTE — PROGRESS NOTES
Patient denies HI as well as having aggressive thoughts toward her sister  Appropriate as of this time  Does remain somatic  Disheveled; encouraged shower  RN also encouraged patient to participate in groups  Will monitor

## 2018-10-30 NOTE — CASE MANAGEMENT
spoke to Brigid Byrne, director at [facility] and updated    explained Autumn continues homicidal towards her sister and can not return to her residence and Autumn requested a group home for the discharge plan  Brigid Byrne said there is a 2-3 year wait in [place] for a group home and the patients being discharged from [facility] have priority   asked since Autumn was at [facility] a few months ago would Autumn qualify for a group home sooner and Brigid Byrne said she would not  Brigid Byrne said they have tried to help Jamaica numerous times and set her up for emergency housing and "she would disappear for weeks"  He said he believes her outpatient is at [facility] in [place] but she is also non compliant there  Brigid Byrne said he would like to send their housing liason, Edelmira Payne to come and meet with [place] this week   is to call Edelmira Payne after team meeting tomorrow to discuss a day and time of a visit  Edd's cell number is [phone]

## 2018-10-30 NOTE — PROGRESS NOTES
Clinical Pharmacy Note: Antipsychotic Monitoring Requirements     Irma Casey is a 26 y o  female who presents with mixed symptoms of bipolar disorder and unstable mood and is currently taking  lurasidone 120 mg once daily  Antipsychotic treatment increases the risk of developing type 2 diabetes mellitus, hypertension, and hyperlipidemia  According to the [facility]   (NICE) guidelines, baseline weight, waist circumference, pulse, blood pressure, fasting blood glucose, hemoglobin A1c, lipid profile, and prolactin level (if initiating risperidone or paliperidone) should be collected  These guidelines coincide with Centers and Medicare & Medicaid Services (CMS) requirements including baseline Body Mass Index, blood pressure, fasting glucose, hemoglobin A1c, and fasting lipid panel  CMS states laboratory values collected 12 months from discharge date are acceptable for evaluation  The following values have been collected:  BMI:  yes  Blood pressure: yes  Fasting glucose: yes  Hemoglobin A1c: yes  Lipid panel: yes    Based on the results of hemoglobin A1c, lipid panel, and blood pressure monitoring, [place] is an potential candidate for no pharmacological interventions and routine monitoring based on  HbA1c<6 5%, ASCVD<7 5% and BP within goal       ASCVD risk score: n/a  History of ACS, MI, stable angina, stroke, TIA, PAD, coronary/aterial revascularization:  yes  LDL cholesterol =>190 mg/dL: no  Age: 26  Diabetes: no  Sex: female  Race: white  HDL Cholesterol: 34  Systolic Blood Pressure: 910  Treatment for Hypertension: no  Smoker: no  (score reflects the risk of cardiovascular events such as stroke or myocardial infarction in the next 10 years)    Pharmacy will continue to follow patient with team   Electronically signed by: Fabby Acosta, Pharmacist,

## 2018-10-30 NOTE — PROGRESS NOTES
Progress Note - Behavioral Health     Jamaica Kilgore 26 y o  female MRN: [id number]   Unit/Bed#: QW9 686-92 Encounter: [id number]    Behavior over the last 24 hours: some improvement  Jamaica states she feels slightly less depressed today  Denies suicidal thoughts and denies thoughts to harm sister today  Still very somatic otherwise, anxious, childlike and immature  Limited participation in milieu  Has been taking medications  Sleep: slept better  Appetite: normal  Medication side effects: No   ROS: reports vaginal discomfort, denies any chest pain or abdominal pain    Mental Status Evaluation:    Appearance:  disheveled   Behavior:  cooperative, poor eye contact   Speech:  scant, soft   Mood:  anxious, slightly less depressed   Affect:  labile   Thought Process:  organized, concrete   Associations: concrete associations   Thought Content:  somatic preoccupation, paranoid ideation   Perceptual Disturbances: no auditory hallucinations, no visual hallucinations   Risk Potential: Suicidal ideation - None at present  Homicidal ideation - None at present  Potential for aggression - Yes, due to poor impulse control   Sensorium:  oriented to person, place and time/date   Memory:  recent and remote memory grossly intact   Consciousness:  alert and awake   Attention: poor concentration and poor attention span   Insight:  impaired   Judgment: impaired   Gait/Station: normal gait/station and normal balance   Motor Activity: no abnormal movements     Vital signs in last 24 hours:    Temp:  [98 °F (36 7 °C)] 98 °F (36 7 °C)  HR:  [73-95] 90  Resp:  [16] 16  BP: ()/(46-63) 115/63    Laboratory results:  I have personally reviewed all pertinent laboratory/tests results      Lipid Profile:   Lab Results   Component Value Date    CHOLESTEROL 151 10/30/2018    HDL 34 (L) 10/30/2018    TRIG 300 (H) 10/30/2018    LDLCALC 57 10/30/2018    [place] 117 10/30/2018   RPR:   Lab Results   Component Value Date    RPR Non-Reactive 10/30/2018   Pregnancy:   Lab Results   Component Value Date    PREGSERUM Negative 10/30/2018       Progress Toward Goals: limited progress, slightly less depressed, not suicidal today, poor insight    Assessment/Plan   Principal Problem:    Bipolar I disorder, most recent episode mixed, severe with psychotic features ([address] )  Active Problems:    Attention deficit hyperactivity disorder, combined type    Mild intellectual disability    Chest pain    Gastroesophageal reflux disease    Cystitis    Pseudoseizure    Recommended Treatment:     Planned medication and treatment changes: All current active medications have been reviewed    Encourage group therapy, milieu therapy and occupational therapy  [address] checks every 5 minutes  PB/GYN consult due to vaginal discomfort    Continue current medications:    Current Facility-Administered Medications:  acetaminophen 650 mg Oral Q4H PRN Elisha Freire MD   acetaminophen 975 mg Oral Q6H PRN Elisha Freire MD   albuterol 2 puff Inhalation Q4H PRN Jose Cruz Wilks MD   benztropine 1 mg Intramuscular Q6H PRN Jose Cruz Wilks MD   benztropine 1 mg Oral Q6H PRN Jose Cruz Wilks MD   citalopram 20 mg Oral Daily Elisha Freire MD   diphenhydrAMINE 50 mg Oral HS PRN Elisha Freire MD   famotidine 20 mg Oral Q12H [address] Elisha Freire MD   fluticasone 2 spray Each Nare Daily Jose Cruz Wilks MD   gabapentin 300 mg Oral TID Elisha Frerie MD   haloperidol 5 mg Oral Q8H PRN Jose Cruz Wilks MD   haloperidol lactate 5 mg Intramuscular Q6H PRN Jose Cruz Wilks MD   hydrOXYzine HCL 50 mg Oral Q6H PRN Jennifer Givens MD   ibuprofen 400 mg Oral Q8H PRN Jose Cruz Wilks MD   loratadine 10 mg Oral Daily Elisha Freire MD   LORazepam 2 mg Intramuscular Q6H PRN Elisha Freire MD   LORazepam 2 mg Oral Q6H PRN Elisha Freire MD   lurasidone 120 mg Oral Daily With Breakfast Poyl Wheat MD   magnesium hydroxide 30 mL Oral Daily PRN Vickie Arango Orlando Pack MD   ondansetron 4 mg Oral Q8H PRN Ike Payne MD   OXcarbazepine 600 mg Oral BID tM Marques MD   promethazine 25 mg Oral Q6H PRN Ike Payne MD   traZODone 50 mg Oral HS PRN Tara Brown MD       Risks / Benefits of Treatment:    Risks, benefits, and possible side effects of medications explained to patient  Patient has limited understanding of risks and benefits of treatment at this time, but agrees to take medications as prescribed  Risks of medications in pregnancy explained if female patient  Patient verbalizes understanding and agrees to notify her doctor if she becomes pregnant  Counseling / Coordination of Care:    Patient's progress discussed with staff in treatment team meeting  Medications, treatment progress and treatment plan reviewed with patient      Mendel Chase, MD 10/30/18

## 2018-10-30 NOTE — PROGRESS NOTES
Patient came to RN with reports of feeling like like she had a "cut in her vagina"  Patient states that she is bleeding and does not believe that it her period  RN notified Dr Kimberly Jose whom put in a OBGYN consult  Will monitor

## 2018-10-31 PROCEDURE — 99232 SBSQ HOSP IP/OBS MODERATE 35: CPT | Performed by: NURSE PRACTITIONER

## 2018-10-31 PROCEDURE — 99251 PR INITL INPATIENT CONSULT NEW/ESTAB PT 20 MIN: CPT | Performed by: OBSTETRICS & GYNECOLOGY

## 2018-10-31 RX ORDER — GABAPENTIN 400 MG/1
400 CAPSULE ORAL 3 TIMES DAILY
Status: DISCONTINUED | OUTPATIENT
Start: 2018-10-31 | End: 2018-11-02 | Stop reason: HOSPADM

## 2018-10-31 RX ADMIN — FAMOTIDINE 20 MG: 20 TABLET ORAL at 08:13

## 2018-10-31 RX ADMIN — LORATADINE 10 MG: 10 TABLET ORAL at 08:13

## 2018-10-31 RX ADMIN — HALOPERIDOL 5 MG: 5 TABLET ORAL at 12:47

## 2018-10-31 RX ADMIN — ACETAMINOPHEN 975 MG: 325 TABLET, FILM COATED ORAL at 08:27

## 2018-10-31 RX ADMIN — DIPHENHYDRAMINE HCL 50 MG: 25 TABLET ORAL at 20:13

## 2018-10-31 RX ADMIN — GABAPENTIN 400 MG: 400 CAPSULE ORAL at 20:13

## 2018-10-31 RX ADMIN — OXCARBAZEPINE 600 MG: 300 TABLET, FILM COATED ORAL at 17:06

## 2018-10-31 RX ADMIN — GABAPENTIN 300 MG: 300 CAPSULE ORAL at 08:13

## 2018-10-31 RX ADMIN — FAMOTIDINE 20 MG: 20 TABLET ORAL at 20:13

## 2018-10-31 RX ADMIN — CITALOPRAM HYDROBROMIDE 20 MG: 20 TABLET ORAL at 08:13

## 2018-10-31 RX ADMIN — HYDROXYZINE HYDROCHLORIDE 50 MG: 25 TABLET ORAL at 12:47

## 2018-10-31 RX ADMIN — ACETAMINOPHEN 975 MG: 325 TABLET, FILM COATED ORAL at 16:17

## 2018-10-31 RX ADMIN — LURASIDONE HYDROCHLORIDE 120 MG: 80 TABLET, FILM COATED ORAL at 08:13

## 2018-10-31 RX ADMIN — LORAZEPAM 2 MG: 1 TABLET ORAL at 08:26

## 2018-10-31 RX ADMIN — GABAPENTIN 400 MG: 400 CAPSULE ORAL at 17:06

## 2018-10-31 RX ADMIN — OXCARBAZEPINE 600 MG: 300 TABLET, FILM COATED ORAL at 08:13

## 2018-10-31 NOTE — PROGRESS NOTES
Pt paces around unit frequently  Denies SI/HI currently  Pt is attention seeking and somatic  Stating "I need to be on a medical floor because I feel shaky"  Pt does not appear anxious or shaky  Pt is also requesting to have a one to one all night long  Medicated with prn trazodone and benadryl at hs

## 2018-10-31 NOTE — CONSULTS
Consultation - Gynecology   Drew Garcia 26 y o  female MRN: [id number]  Unit/Bed#: VU6 558-01 Encounter: [id number]    No chief complaint on file  History of Present Illness   Physician Requesting Consult: Leia Rodriguez MD  Reason for Consult / Principal Problem: Vaginal Irritation  Subspeciality: General GYN  HPI: Drew Garcia is a 26y o  year old P1 s/p tubal ligation, who is currently admitted for bipolar disorder, who request evaluation of vaginal discomfort  Reports vaginal irritation for the past few weeks with occasional itching  Denies heavy vaginal discharge or associated odor  Denies taking any treatment for vaginal discomfort  Denies using douching products  Also reports minimal amount of vaginal bleeding yesterday, soaking less than a pad  Reports irregular menses at baseline, with last period occurring approximately 3 months ago  Denies ongoing vaginal bleeding  Inpatient consult to Obstetrics / Gynecology     Performed by  Ismael Cheema by Valere Bees               Review of Systems   Constitutional: Negative for chills, diaphoresis, fatigue and fever  Respiratory: Negative for cough, chest tightness and shortness of breath  Cardiovascular: Negative for chest pain, palpitations and leg swelling  Gastrointestinal: Negative for abdominal pain, constipation, diarrhea, nausea and vomiting  Genitourinary: Positive for menstrual problem   Negative for dysuria, hematuria, pelvic pain, vaginal bleeding, vaginal discharge and vaginal pain         + vaginal irritation        Historical Information   Past Medical History:   Diagnosis Date    Anxiety     Asthma     Bipolar 1 disorder ([facility] )     Depression     Palpitations     Last Assessed [id number]    Psychiatric disorder     Psychiatric illness     Seizures ([facility] )     last time 2 weeks ago- petit mal    Stabbing chest pain     Last Assessed [id number]    Tachycardia     Last Assessed [id number]    Upper respiratory disease     Last Assessed 2016     Past Surgical History:   Procedure Laterality Date    KNEE SURGERY      SD LAP,TUBAL CAUTERY N/A 10/3/2018    Procedure: LAPAROSCOPIC TUBAL LIGATION;  Surgeon: Adelso Foote MD;  Location: [facility];  Service: Gynecology     OB History    Para Term  AB Living   1             SAB TAB Ectopic Multiple Live Births                  # Outcome Date GA Lbr Tavon/2nd Weight Sex Delivery Anes PTL Lv   1                  Family History   Problem Relation Age of Onset    Cancer Mother     Diabetes Mother     Cancer Father     Diabetes Father     Arthritis Father     Cancer Maternal Grandmother     Cancer Maternal Aunt     Cancer Paternal Uncle     Diabetes Other      Social History   History   Alcohol Use No     History   Drug Use No     Comment: last smoked 3-4 days ago     History   Smoking Status    Former Smoker    Packs/day: 0 25    Years: 4 00    Types: Cigarettes    Quit date: 2018   Smokeless Tobacco    Never Used       Meds/Allergies   Current Facility-Administered Medications   Medication Dose Route Frequency    acetaminophen (TYLENOL) tablet 650 mg  650 mg Oral Q4H PRN    acetaminophen (TYLENOL) tablet 975 mg  975 mg Oral Q6H PRN    albuterol (PROVENTIL HFA,VENTOLIN HFA) inhaler 2 puff  2 puff Inhalation Q4H PRN    benztropine (COGENTIN) injection 1 mg  1 mg Intramuscular Q6H PRN    benztropine (COGENTIN) tablet 1 mg  1 mg Oral Q6H PRN    citalopram (CeleXA) tablet 20 mg  20 mg Oral Daily    diphenhydrAMINE (BENADRYL) tablet 50 mg  50 mg Oral HS PRN    famotidine (PEPCID) tablet 20 mg  20 mg Oral Q12H ASHUTOSH    fluticasone (FLONASE) 50 mcg/act nasal spray 2 spray  2 spray Each Nare Daily    gabapentin (NEURONTIN) capsule 400 mg  400 mg Oral TID    haloperidol (HALDOL) tablet 5 mg  5 mg Oral Q8H PRN    haloperidol lactate (HALDOL) injection 5 mg  5 mg Intramuscular Q6H PRN    hydrOXYzine HCL (ATARAX) tablet 50 mg  50 mg Oral Q6H PRN    ibuprofen (MOTRIN) tablet 400 mg  400 mg Oral Q8H PRN    loratadine (CLARITIN) tablet 10 mg  10 mg Oral Daily    LORazepam (ATIVAN) 2 mg/mL injection 2 mg  2 mg Intramuscular Q6H PRN    LORazepam (ATIVAN) tablet 2 mg  2 mg Oral Q6H PRN    lurasidone (LATUDA) tablet 120 mg  120 mg Oral Daily With Breakfast    magnesium hydroxide (MILK OF MAGNESIA) 400 mg/5 mL oral suspension 30 mL  30 mL Oral Daily PRN    ondansetron (ZOFRAN-ODT) dispersible tablet 4 mg  4 mg Oral Q8H PRN    OXcarbazepine (TRILEPTAL) tablet 600 mg  600 mg Oral BID    promethazine (PHENERGAN) tablet 25 mg  25 mg Oral Q6H PRN    traZODone (DESYREL) tablet 50 mg  50 mg Oral HS PRN         Allergies   Allergen Reactions    Fish-Derived Products Itching    Shellfish Allergy Anaphylaxis    Shellfish-Derived Products        Objective   Vitals: Blood pressure 113/55, pulse 78, temperature (!) 96 6 °F (35 9 °C), temperature source Tympanic, resp  rate 15, height 5' 4" (1 626 m), weight 88 kg (194 lb), SpO2 98 %, not currently breastfeeding  Body mass index is 33 3 kg/m²  No intake or output data in the 24 hours ending 10/31/18 1456    Invasive Devices     Peripheral Intravenous Line            Peripheral IV 10/03/18 28 days          Drain            Urethral Catheter Latex 16 Fr  28 days                Physical Exam   Constitutional: She is oriented to person, place, and time  She appears well-developed and well-nourished  No distress  Genitourinary: There is no rash, tenderness or lesion on the right labia  There is no rash or tenderness on the left labia  No erythema or tenderness in the vagina  No foreign body in the vagina  No signs of injury around the vagina  No vaginal discharge found  Genitourinary Comments: Scant brown old blood in vaginal vault, without discharge or odor    Neurological: She is alert and oriented to person, place, and time  Skin: She is not diaphoretic         Lab Results:   Admission on 10/24/2018   Component Date Value    TSH 3RD GENERATON 10/25/2018 1 250     Magnesium 10/25/2018 2 1     Troponin I 10/25/2018 <0 02     Ventricular Rate 10/25/2018 71     Atrial Rate 10/25/2018 71     WA Interval 10/25/2018 152     QRSD Interval 10/25/2018 88     QT Interval 10/25/2018 388     QTC Interval 10/25/2018 421     P Axis 10/25/2018 43     QRS Axis 10/25/2018 36     T Wave Axis 10/25/2018 35     Color, UA 10/27/2018 Yellow     Clarity, UA 10/27/2018 Clear     Specific Gravity, UA 10/27/2018 1 018     pH, UA 10/27/2018 6 5     Leukocytes, UA 10/27/2018 Small*    Nitrite, UA 10/27/2018 Negative     Protein, UA 10/27/2018 Negative     Glucose, UA 10/27/2018 Negative     Ketones, UA 10/27/2018 Negative     Urobilinogen, UA 10/27/2018 0 2     Bilirubin, UA 10/27/2018 Negative     Blood, UA 10/27/2018 Negative     RBC, UA 10/27/2018 None Seen     WBC, UA 10/27/2018 4-10*    Epithelial Cells 10/27/2018 None Seen     Bacteria, UA 10/27/2018 Occasional     Hyaline Casts, UA 10/27/2018 None Seen     Cholesterol 10/30/2018 151     Triglycerides 10/30/2018 300*    HDL, Direct 10/30/2018 34*    LDL Calculated 10/30/2018 57     Non-HDL-Chol (CHOL-HDL) 10/30/2018 117     Hemoglobin A1C 10/30/2018 5 2     EAG 10/30/2018 103     Preg, Serum 10/30/2018 Negative     RPR 10/30/2018 Non-Reactive      Imaging Studies: I have personally reviewed pertinent reports  EKG, Pathology, and Other Studies: I have personally reviewed pertinent reports        Assessment/Plan     Assessment:  26 y o  P1 s/p tubal ligation with vaginal irritation likely secondary to light menstrual bleeding, without signs of infection   Plan:  Encourage pelvic hygiene-loose fitting clothing, hydrating lotions, continued non-use of douching products or lotions with perfumes  Can [facility] at [facility] for routine gynecologic care, evaluation of abnormal uterine bleeding    Gynecology will sign off     Code Status: Level 1 - Full Code  Advance Directive and Living Will:      Power of :    POLST:      Counseling / Coordination of Care  Total floor / unit time spent today15 minutes  minutes  Greater than 50% of total time was spent with the patient and / or family counseling and / or coordination of care       Clary Dixon DO

## 2018-10-31 NOTE — PLAN OF CARE
Risk for Violence/Aggression Toward Others     Treatment Goal: Refrain from acts of violence/aggression during length of stay, and demonstrate improved impulse control at the time of discharge Progressing     Verbalize thoughts and feelings Progressing     Refrain from harming others Progressing     Refrain from destructive acts on the environment or property Progressing     Identify appropriate positive anger management techniques Progressing        SAFETY, RESTRAINT - VIOLENT/SELF-DESTRUCTIVE     Remains free of harm/injury from restraints (Restraint for Violent/Self-Destructive Behavior) Progressing     Returns to optimal restraint-free functioning Progressing

## 2018-10-31 NOTE — CASE MANAGEMENT
SW called [facility] in [address] [place] [address], [phone], to give update re: pt's status  SW advised that pt C/O somatic issues & anxiety  He said that pt could not return to her previous address  Is currently homeless  Luis Garcia he was talking with pt's providers re: housing programs  SW asked when he'd come to see pt  He said he could come tomorrow morning to see pt

## 2018-10-31 NOTE — PLAN OF CARE
Problem: Ineffective Coping  Goal: Participates in unit activities  Interventions:  - Provide therapeutic environment   - Provide required programming   - Redirect inappropriate behaviors    Outcome: Not Progressing  No group  attendance today

## 2018-10-31 NOTE — PROGRESS NOTES
Pt c/o headache 10/10  PRN Tylenol administered @ 1617  Pain reassessment done at 1715 which was effective  Pain 3/10  No other interventions requested

## 2018-10-31 NOTE — PROGRESS NOTES
Pt denies SI and HI  She does have anxiety and had prn medication for same (see previous note)  Pt is childlike and immature, has poor impulse control, but has been in behavioral control thus far  She is somatic and has many physical complaints  Will continue to monitor

## 2018-10-31 NOTE — PROGRESS NOTES
Progress Note - Behavioral Health     Christina Orozco 26 y o  female MRN: [id number]   Unit/Bed#: QV5 558-01 Encounter: [id number]    Behavior over the last 24 hours: minimal improvement  Jamaica was seen today for continuation of care, records reviewed, patient was discussed in morning case reviewed team   Jamaica remains somatically preoccupied, she reports vaginal discharge white in clumps be in nature and is describing vaginal discomfort  Gyn consult was placed yesterday awaiting evaluation  Autumn also complains of nausea denies vomiting, remains anxious, child-like though cooperative and pleasant  Patient reports that her appetite is normal she sleeping well and tolerating her medications without issue her eye contact is poor, she denies suicidal or homicidal ideation denies auditory and visual hallucinations      Sleep: normal  Appetite: normal  Medication side effects: No   ROS: reports nausea, vaginal discharge and white clumpy discharge with foul smell, denies any headache, shortness of breath, chest pain, abdominal pain, muscle cramps, tremor or vomiting    Mental Status Evaluation:    Appearance:  disheveled   Behavior:  cooperative, calm, poor eye contact   Speech:  scant, soft   Mood:  anxious, slightly less depressed   Affect:  somewhat labile   Thought Process:  concrete   Associations: concrete associations   Thought Content:  somatic preoccupation, paranoid ideation   Perceptual Disturbances: no auditory hallucinations, no visual hallucinations, does not appear responding to internal stimuli   Risk Potential: Suicidal ideation - None at present  Homicidal ideation - None at present  Potential for aggression - Yes, due to poor impulse control   Sensorium:  oriented to person and place   Memory:  recent and remote memory grossly intact   Consciousness:  alert and awake   Attention: poor concentration and poor attention span   Insight:  impaired   Judgment: impaired   Gait/Station: normal gait/station and normal balance   Motor Activity: no abnormal movements     Vital signs in last 24 hours:    Temp:  [96 6 °F (35 9 °C)-98 9 °F (37 2 °C)] 96 6 °F (35 9 °C)  HR:  [] 78  Resp:  [15-18] 15  BP: (113-129)/(55-96) 113/55    Laboratory results:    I have personally reviewed all pertinent laboratory/tests results  Most Recent Labs:   Lab Results   Component Value Date    WBC 8 82 10/24/2018    RBC 4 23 10/24/2018    HGB 12 6 10/24/2018    HCT 39 6 10/24/2018     10/24/2018    RDW 13 1 10/24/2018    NEUTROABS 5 09 10/24/2018     10/24/2018    K 4 2 10/24/2018     10/24/2018    CO2 27 10/24/2018    BUN 17 10/24/2018    CREATININE 0 75 10/24/2018    GLUC 81 10/24/2018    CALCIUM 8 5 10/24/2018    AST 15 10/24/2018    ALT 33 10/24/2018    ALKPHOS 83 10/24/2018    TP 7 2 10/24/2018    ALB 3 5 10/24/2018    TBILI 0 10 (L) 10/24/2018    CHOLESTEROL 151 10/30/2018    HDL 34 (L) 10/30/2018    TRIG 300 (H) 10/30/2018    LDLCALC 57 10/30/2018    NONHDLC 117 10/30/2018    DJW6HTMMOHNG 1 250 10/25/2018    FREET4 1 01 12/08/2016    T3FREE 3 15 03/25/2016    PREGSERUM Negative 10/30/2018    ,890 (H) 12/11/2017    RPR Non-Reactive 10/30/2018    HGBA1C 5 2 10/30/2018     10/30/2018       Progress Toward Goals: limited progress patient has been compliant with medication, continues to be somatically preoccupied reports vaginal discharge and discomfort gyn consult pending  Patient continues to report anxiety  Assessment/Plan   Principal Problem:    Bipolar I disorder, most recent episode mixed, severe with psychotic features ([address] )  Active Problems:    Chest pain    Gastroesophageal reflux disease    Cystitis    Pseudoseizure    Attention deficit hyperactivity disorder, combined type    Mild intellectual disability    Recommended Treatment:     Planned medication and treatment changes: All current active medications have been reviewed    Encourage group therapy, milieu therapy and occupational therapy  Behavioral Health checks every 7 minutes  Increase Neurontin from 300 mg p o  T i d  To 400 mg p o  T i d  for continued anxiety  Gyn consult pending for vaginal discomfort and discharge  Marcio Nunez from [facility] in [place] will come to evaluate patient tomorrow 11/01/2018  Continue all other medications as ordered    Current Facility-Administered Medications:  acetaminophen 650 mg Oral Q4H PRN Monserrat Bean MD   acetaminophen 975 mg Oral Q6H PRN Monserrat Bean MD   albuterol 2 puff Inhalation Q4H PRN Bernardo Benites MD   benztropine 1 mg Intramuscular Q6H PRN Bernardo Benites MD   benztropine 1 mg Oral Q6H PRN Bernardo Benites MD   citalopram 20 mg Oral Daily Monserrat Bean MD   diphenhydrAMINE 50 mg Oral HS PRN Monserrat Bean MD   famotidine 20 mg Oral Q12H [address] Monserrat Bean MD   fluticasone 2 spray Each Nare Daily Bernardo Benites MD   gabapentin 400 mg Oral TID PAMELA Hess   haloperidol 5 mg Oral Q8H PRN Bernardo Benites MD   haloperidol lactate 5 mg Intramuscular Q6H PRN Bernardo Benites MD   hydrOXYzine HCL 50 mg Oral Q6H PRN Daysi Vicente MD   ibuprofen 400 mg Oral Q8H PRN Bernardo Benites MD   loratadine 10 mg Oral Daily Monserrat Bean MD   LORazepam 2 mg Intramuscular Q6H PRN Monserrat Bean MD   LORazepam 2 mg Oral Q6H PRN Monserrat Bean MD   lurasidone 120 mg Oral Daily With Breakfast Aleyda Cross MD   magnesium hydroxide 30 mL Oral Daily PRN Bernardo Benites MD   ondansetron 4 mg Oral Q8H PRN Monserrat Bean MD   OXcarbazepine 600 mg Oral BID Aleyda Cross MD   promethazine 25 mg Oral Q6H PRN Monserrat Bean MD   traZODone 50 mg Oral HS PRN Bernardo Benties MD       Risks / Benefits of Treatment:    Risks, benefits, and possible side effects of medications explained to patient and patient verbalizes understanding and agreement for treatment      Counseling / Coordination of Care:    Patient's progress discussed with staff in treatment team meeting  Medications, treatment progress and treatment plan reviewed with patient  Supportive counseling provided to the patient

## 2018-11-01 PROBLEM — E78.1 HYPERTRIGLYCERIDEMIA: Status: ACTIVE | Noted: 2018-11-01

## 2018-11-01 PROBLEM — R10.32 LEFT LOWER QUADRANT PAIN: Status: ACTIVE | Noted: 2018-11-01

## 2018-11-01 PROBLEM — R07.9 CHEST PAIN: Status: RESOLVED | Noted: 2018-07-19 | Resolved: 2018-11-01

## 2018-11-01 LAB
ALBUMIN SERPL BCP-MCNC: 3.4 G/DL (ref 3.5–5)
ALP SERPL-CCNC: 74 U/L (ref 46–116)
ALT SERPL W P-5'-P-CCNC: 61 U/L (ref 12–78)
ANION GAP SERPL CALCULATED.3IONS-SCNC: 4 MMOL/L (ref 4–13)
AST SERPL W P-5'-P-CCNC: 32 U/L (ref 5–45)
BASOPHILS # BLD AUTO: 0.07 THOUSANDS/ΜL (ref 0–0.1)
BASOPHILS NFR BLD AUTO: 1 % (ref 0–1)
BILIRUB SERPL-MCNC: 0.29 MG/DL (ref 0.2–1)
BUN SERPL-MCNC: 17 MG/DL (ref 5–25)
CALCIUM SERPL-MCNC: 9 MG/DL (ref 8.3–10.1)
CHLORIDE SERPL-SCNC: 102 MMOL/L (ref 100–108)
CO2 SERPL-SCNC: 30 MMOL/L (ref 21–32)
CREAT SERPL-MCNC: 0.99 MG/DL (ref 0.6–1.3)
EOSINOPHIL # BLD AUTO: 0.54 THOUSAND/ΜL (ref 0–0.61)
EOSINOPHIL NFR BLD AUTO: 5 % (ref 0–6)
ERYTHROCYTE [DISTWIDTH] IN BLOOD BY AUTOMATED COUNT: 13.1 % (ref 11.6–15.1)
GFR SERPL CREATININE-BSD FRML MDRD: 81 ML/MIN/1.73SQ M
GLUCOSE P FAST SERPL-MCNC: 102 MG/DL (ref 65–99)
GLUCOSE SERPL-MCNC: 102 MG/DL (ref 65–140)
HCT VFR BLD AUTO: 36.6 % (ref 34.8–46.1)
HGB BLD-MCNC: 12 G/DL (ref 11.5–15.4)
IMM GRANULOCYTES # BLD AUTO: 0.15 THOUSAND/UL (ref 0–0.2)
IMM GRANULOCYTES NFR BLD AUTO: 2 % (ref 0–2)
LYMPHOCYTES # BLD AUTO: 3.67 THOUSANDS/ΜL (ref 0.6–4.47)
LYMPHOCYTES NFR BLD AUTO: 37 % (ref 14–44)
MCH RBC QN AUTO: 30 PG (ref 26.8–34.3)
MCHC RBC AUTO-ENTMCNC: 32.8 G/DL (ref 31.4–37.4)
MCV RBC AUTO: 92 FL (ref 82–98)
MONOCYTES # BLD AUTO: 0.88 THOUSAND/ΜL (ref 0.17–1.22)
MONOCYTES NFR BLD AUTO: 9 % (ref 4–12)
NEUTROPHILS # BLD AUTO: 4.69 THOUSANDS/ΜL (ref 1.85–7.62)
NEUTS SEG NFR BLD AUTO: 46 % (ref 43–75)
NRBC BLD AUTO-RTO: 0 /100 WBCS
PLATELET # BLD AUTO: 325 THOUSANDS/UL (ref 149–390)
PMV BLD AUTO: 8.9 FL (ref 8.9–12.7)
POTASSIUM SERPL-SCNC: 4 MMOL/L (ref 3.5–5.3)
PROT SERPL-MCNC: 7 G/DL (ref 6.4–8.2)
RBC # BLD AUTO: 4 MILLION/UL (ref 3.81–5.12)
SODIUM SERPL-SCNC: 136 MMOL/L (ref 136–145)
WBC # BLD AUTO: 10 THOUSAND/UL (ref 4.31–10.16)

## 2018-11-01 PROCEDURE — 99232 SBSQ HOSP IP/OBS MODERATE 35: CPT | Performed by: NURSE PRACTITIONER

## 2018-11-01 PROCEDURE — 80053 COMPREHEN METABOLIC PANEL: CPT | Performed by: NURSE PRACTITIONER

## 2018-11-01 PROCEDURE — 85025 COMPLETE CBC W/AUTO DIFF WBC: CPT | Performed by: NURSE PRACTITIONER

## 2018-11-01 PROCEDURE — 99232 SBSQ HOSP IP/OBS MODERATE 35: CPT | Performed by: PSYCHIATRY & NEUROLOGY

## 2018-11-01 RX ORDER — IBUPROFEN 200 MG
600 TABLET ORAL EVERY 8 HOURS PRN
Status: DISCONTINUED | OUTPATIENT
Start: 2018-11-01 | End: 2018-11-02 | Stop reason: HOSPADM

## 2018-11-01 RX ADMIN — GABAPENTIN 400 MG: 400 CAPSULE ORAL at 20:30

## 2018-11-01 RX ADMIN — IBUPROFEN 400 MG: 400 TABLET ORAL at 16:05

## 2018-11-01 RX ADMIN — DIPHENHYDRAMINE HCL 50 MG: 25 TABLET ORAL at 21:13

## 2018-11-01 RX ADMIN — GABAPENTIN 400 MG: 400 CAPSULE ORAL at 08:25

## 2018-11-01 RX ADMIN — HYDROXYZINE HYDROCHLORIDE 50 MG: 25 TABLET ORAL at 12:24

## 2018-11-01 RX ADMIN — FAMOTIDINE 20 MG: 20 TABLET ORAL at 20:30

## 2018-11-01 RX ADMIN — OXCARBAZEPINE 600 MG: 300 TABLET, FILM COATED ORAL at 08:25

## 2018-11-01 RX ADMIN — CITALOPRAM HYDROBROMIDE 20 MG: 20 TABLET ORAL at 08:24

## 2018-11-01 RX ADMIN — LORATADINE 10 MG: 10 TABLET ORAL at 08:25

## 2018-11-01 RX ADMIN — FLUTICASONE PROPIONATE 2 SPRAY: 50 SPRAY, METERED NASAL at 08:24

## 2018-11-01 RX ADMIN — GABAPENTIN 400 MG: 400 CAPSULE ORAL at 17:38

## 2018-11-01 RX ADMIN — OXCARBAZEPINE 600 MG: 300 TABLET, FILM COATED ORAL at 17:38

## 2018-11-01 RX ADMIN — FAMOTIDINE 20 MG: 20 TABLET ORAL at 08:24

## 2018-11-01 RX ADMIN — LURASIDONE HYDROCHLORIDE 120 MG: 80 TABLET, FILM COATED ORAL at 08:25

## 2018-11-01 RX ADMIN — TRAZODONE HYDROCHLORIDE 50 MG: 50 TABLET ORAL at 20:30

## 2018-11-01 NOTE — CMS CERTIFICATION NOTE
Recertification: Based upon physical, mental and social evaluations, I certify that inpatient psychiatric services continue to be medically necessary for this patient for a duration of 230 midnights for the treatment of  Bipolar I disorder, most recent episode mixed, severe with psychotic features [facility])  Available alternative community resources still do not meet the patient's mental health care needs  I further attest that an established written individualized plan of care has been updated and is outlined in the patient's medical records

## 2018-11-01 NOTE — PLAN OF CARE
Problem: Ineffective Coping  Goal: Participates in unit activities  Interventions:  - Provide therapeutic environment   - Provide required programming   - Redirect inappropriate behaviors    Outcome: Progressing  Calm throughout the day  Pt did check in on morning groups at their conclusion but did not attend them in progress

## 2018-11-01 NOTE — CASE MANAGEMENT
Mtg held with pt & Lakhwinder Islas, [phone], [facility]  from [place]  Only complaint that pt had was re: a stomach ache that she had  Pt denied SI, HI, & voices  Asked when she'd be going home  Pt said she spoke to her sister, Ezequiel Zuniga, this AM  Ardena Breath their conversation went well  Pt C/O her mother (who's her rep payee) did not give her money  Manoj Hay spoke about possibility of changing the rep payee  Said her 23 yr old sister, Lencho Salmeron, got on her nerves  Mirian Zhou spoke to pt about continuing PHP  SW asked how many days/wk she went  Pt said she went 4 days/wk  SW asked pt if she had been going that many days  At 1st, pt said yes  SW asked her again  Pt smiled, & said 1 or 2 days  Manoj Hay spoke to pt about going 4 days, as it would keep her occupied & out of the house while her sister was there  Pt also asked about group home  Manoj Hay said there were no opening at this time  He said staying with her sister in [facility] would let him pursue other options  Pt calm  Manoj Hay spoke to SW after mtg with pt  SW asked if pt was at her baseline  He said yes  SW said pt would probably be D/C tomorrow, if her sister agreed with pt returning home  Said he could pick pt up @ D/C  SW called pt's sister, Charisse Barros, [phone]  Advised her of above mtg  SW said pt was doing well  Said pt was ready for D/C  Sister confirmed she spoke with pt this AM, & their conversation went well  SW asked if pt could return to live with her  Sister said yes  SW asked about status with other sister in the home  Ezequiel Zuniga said that they "incited each other"  SW advised of plan for pt to go to PHP 4 day/s wk  Said she could not pick pt up  SW said that [facility] , Manoj Hay, could pick pt up  SW asked if she'd be home tomorrow, & she said yes  SW called Salma Handley [facility], [phone], ext [phone]  Left message advising of above & to confirm PHP start date of 11/6 @ 9 AM  SW spoke to Josh Desai, nurse prac, & advised of above  She spoke to pt's , who agreed with D/C for tomorrow  SW called [facility], & advised that pt to be D/C tomorrow  Said he'd pick pt up @ 10 AM tomorrow

## 2018-11-01 NOTE — PROGRESS NOTES
Pt calm and cooperative with care  Pt was medication compliant  Pt reported anxiety this morning, but refused all other symptoms  Pt originally refused AM medications, as she was unhappy with her breakfast tray  Pt eventually agreed to take her medications after much encouragement  Pt continues with somatic behavior today  Pt reported that she had blood coming from her rectum, but when pt showed this nurse, no blood was visible on the toilet paper  This nurse informed pt that if she was bleeding vaginally, the blood she noticed could be from that  Pt denied bleeding vaginally, but yesterday reported she was  Pt was overheard on the phone, becoming worked up on the phone  After pt hung up, pt requested to speak to this nurse  Pt was laying in her bed and crying out for "Sissy"  Pt stated that her stomach hurt, stabbing pains, 10/10  Pt requested to see the doctor  The doctor had already been consulted  Pt was offered Tylenol, but refused stating that "Tylenol doesn't work"  When asked when her stomach began to hurt, pt stated "When I was on the phone with [address] [facility] [address] [address] [address]  Sissy needs back surgery  Who is going to watch the kids?"  Pt was again offered Tylenol, but refused  Pt was out in the milieu intermittently, but did not interact much with her peers  Will continue to monitor

## 2018-11-01 NOTE — PROGRESS NOTES
Progress Note - Behavioral Health     Jazmin Gorman 26 y o  female MRN: [id number]   Unit/Bed#: XX4 558-01 Encounter: [id number]    Behavior over the last 24 hours: limited improvement  Jamaica was seen today for continuation of care, records reviewed, the inpatient discussed in case reviewed team   Patient is calm and cooperative upon approach she is slightly better eye contact, she remains slightly disheveled, she is less anxious less depressed she remains slightly irritable at times she remains child-like in her behavior  Jamaica continues to complain of abdominal pain and decreased appetite though she reports this is due to not liking the change in her food which is a low-cholesterol low-fat diet due to elevated triglycerides and low HDL  Requested a repeat consult from Internal Medicine to evaluate abdominal pain and consideration for discussing diet and possibility of adding fish oil or another medication to address abnormal lipid profile  Consult pending      Sleep: slept off and on  Appetite: decreased , patient unhappy with low cholesterol/low fat diet Medication side effects: No   ROS: reports abdominal pain and abdominal cramps, denies any headache, shortness of breath or chest pain    Mental Status Evaluation:    Appearance:  slightly disheveled   Behavior:  cooperative, calm, slightly better eye contact   Speech:  normal rate, slow   Mood:  less anxious, less depressed, slightly less irritable   Affect:  slightly less labile   Thought Process:  concrete   Associations: concrete associations   Thought Content:  somatic preoccupation   Perceptual Disturbances: no auditory hallucinations, no visual hallucinations   Risk Potential: Suicidal ideation - None  Homicidal ideation - None  Potential for aggression - Yes, due to poor impulse control   Sensorium:  oriented to person, place and time/date   Memory:  recent memory intact, remote memory intact   Consciousness:  alert and awake   Attention: decreased concentration and decreased attention span   Insight:  impaired   Judgment: impaired   Gait/Station: normal gait/station and normal balance   Motor Activity: no abnormal movements     Vital signs in last 24 hours:    Temp:  [97 5 °F (36 4 °C)-98 4 °F (36 9 °C)] 97 5 °F (36 4 °C)  HR:  [] 81  Resp:  [15-18] 15  BP: (107-111)/(52-65) 108/52    Laboratory results:  No new labs to review today    Progress Toward Goals: limited improvement  Patient has improved eye contact, remains child-like, decreased oral intake  Assessment/Plan   Principal Problem:    Bipolar I disorder, most recent episode mixed, severe with psychotic features ([facility] [address] )  Active Problems:    Chest pain    Gastroesophageal reflux disease    Cystitis    Pseudoseizure    Attention deficit hyperactivity disorder, combined type    Mild intellectual disability    Recommended Treatment:     Planned medication and treatment changes:    -All current active medications have been reviewed  -Encourage group therapy, milieu therapy and occupational therapy  -[facility] checks every 7 minutes  -Tamie Clark from [facility] in [place] will be visiting patient today for evaluation 11/01/2018 (patient requesting to return home to live with sister in previous living situation)  -Internal Medicine to evaluate patient for continued complaints of abdominal pain and patient reporting poor oral intake due to concerns about change in diet to low cholesterol low fat diet  Question if fish oil or another medication can be added for elevated triglycerides (triglycerides 300 on 10/30/2018, HDL low at 34 on 10/30/2018)    -Continue current medications:    Current Facility-Administered Medications:  acetaminophen 650 mg Oral Q4H PRN Indra Lua MD   acetaminophen 975 mg Oral Q6H PRN Indra Lua MD   albuterol 2 puff Inhalation Q4H PRN Kimberly Herndon MD   benztropine 1 mg Intramuscular Q6H PRN Kimberly Herndon MD   benztropine 1 mg Oral Q6H PRN Abraham Ramos MD   citalopram 20 mg Oral Daily Slim Fairbanks MD   diphenhydrAMINE 50 mg Oral HS PRN Slim Fairbanks MD   famotidine 20 mg Oral Q12H [address] Slim Fairbanks MD   fluticasone 2 spray Each Nare Daily Abraham Ramos MD   gabapentin 400 mg Oral TID PAMELA Landeros   haloperidol 5 mg Oral Q8H PRN Abraham Ramos MD   haloperidol lactate 5 mg Intramuscular Q6H PRN Abraham Ramos MD   hydrOXYzine HCL 50 mg Oral Q6H PRN Carson Barboza MD   ibuprofen 400 mg Oral Q8H PRN Abraham Ramos MD   loratadine 10 mg Oral Daily Slim Fairbanks MD   LORazepam 2 mg Intramuscular Q6H PRN Slim Fairbanks MD   LORazepam 2 mg Oral Q6H PRN Slim Fairbanks MD   lurasidone 120 mg Oral Daily With Breakfast Donn Martines MD   magnesium hydroxide 30 mL Oral Daily PRN Abraham Ramos MD   ondansetron 4 mg Oral Q8H PRN Slim Fairbanks MD   OXcarbazepine 600 mg Oral BID Donn Martines MD   promethazine 25 mg Oral Q6H PRN Slim Fairbanks MD   traZODone 50 mg Oral HS PRN Abraham Ramos MD       Risks / Benefits of Treatment:    Risks, benefits, and possible side effects of medications explained to patient and patient verbalizes understanding and agreement for treatment  Counseling / Coordination of Care:    Patient's progress discussed with staff in treatment team meeting  Medications, treatment progress and treatment plan reviewed with patient  Supportive counseling provided to the patient

## 2018-11-01 NOTE — PROGRESS NOTES
Pt reported increased anxiety  PRN Atarax administered as ordered  [facility] contacted regarding abdominal cramps and elevated lipid panel  SLIM reports they will see the pt today  Will continue to monitor

## 2018-11-01 NOTE — CONSULTS
Consult- [place] CHOWDHURY Caroline Ortega 1995, 26 y o  female MRN: [id number]    Unit/Bed#: DN3 558-01 Encounter: [id number]    Primary Care Provider: Griselda Valenzuela DO   Date and time admitted to hospital: 10/24/2018 11:27 PM      Consults    Hypertriglyceridemia   Assessment & Plan    · Recommend lifestyle modifications  · Lowfat/low chol diet  · Exercise  · OP follow up     Left lower quadrant pain   Assessment & Plan    · Present all day, no rebound or guarding  · Patient s/p tubal ligation 10/3/18  · Denied vaginal bleeding, states no menstrual cycle since procedure, but reported vaginal bleeding to GYN yesterday (see consultation note)  · Reports N/V/D but this has not been observed by staff and she reports blood per rectum and hematemesis but per nursing she has been complaining of random symptoms on and off for days and keeps commenting that tomorrow she will be in "medical" (patient is for tentative discharge home tomorrow)  · Will check CBC and CMP  · Unclear if this pain is ovarian in etiology versus somatic  · Supportive measures  · Motrin PRN  · Monitor closely       VTE Pharmacologic Prophylaxis:   Pharmacologic: patient low risk, recommend ambulation  Mechanical: Mechanical VTE prophylaxis in place  Was care plan discussed with the Primary service today?: Yes discussed with nursing    Education and Discussions with Family / Patient: patient    Time Spent for Care: 20 minutes  More than 50% of total time spent on counseling and coordination of care as described above  Is patient acceptable for discharge from medicine standpoint?: Yes, if she is tolerating diet and is ambulatory she can be discharged with OP follow up  Discharge Recommendations: follow up with PCP and GYN    Subjective:   Reports abd pain LLQ   States she had had rectal bleeding, emsis with blood, nausea, diarrhea 5-6x/day, lightheaded and dizzy, reported falling and feeling so weak that she couldn't get up, reports heartburn, coughing up blood  Stated she is in bed all day but er discussion with nursing she has been out of room interacting with staff for majority of the day  She dislikes current diet  Nursing has not observed any of the blood losses patient reports  Per nursing she does not appear to be in distress    Objective:     Vitals:   Temp (24hrs), Av 9 °F (36 6 °C), Min:97 5 °F (36 4 °C), Max:98 4 °F (36 9 °C)    Temp:  [97 5 °F (36 4 °C)-98 4 °F (36 9 °C)] 98 4 °F (36 9 °C)  HR:  [] 120  Resp:  [15-18] 15  BP: (108-113)/(52-65) 113/57  SpO2:  [97 %-98 %] 97 %  Body mass index is 33 3 kg/m²  Input and Output Summary (last 24 hours):     No intake or output data in the 24 hours ending 18    Physical Exam:     Physical Exam   Constitutional: She is oriented to person, place, and time  She appears well-developed  HENT:   Head: Normocephalic and atraumatic  Mouth/Throat: Oropharynx is clear and moist    Neck: Normal range of motion  Neck supple  Cardiovascular: Normal rate and regular rhythm  Pulmonary/Chest: Effort normal  No respiratory distress  Abdominal: Soft  Bowel sounds are normal  She exhibits no distension and no mass  There is tenderness  There is no rebound and no guarding  Abdomen tender LLQ   Musculoskeletal: Normal range of motion  She exhibits no edema  Neurological: She is alert and oriented to person, place, and time  No cranial nerve deficit  Skin: Skin is warm and dry  Psychiatric: Her speech is normal  Her affect is labile  She expresses inappropriate judgment  Affect flat   Vitals reviewed  Additional Data:     Labs:      Results from last 7 days  Lab Units 18   WBC Thousand/uL 10 00   HEMOGLOBIN g/dL 12 0   HEMATOCRIT % 36 6   PLATELETS Thousands/uL 325   NEUTROS PCT % 46   LYMPHS PCT % 37   MONOS PCT % 9   EOS PCT % 5           Invalid input(s): LABALBU        * I Have Reviewed All Lab Data Listed Above  * Additional Pertinent Lab Tests Reviewed:  All Labs For Current Hospital Admission Reviewed    Imaging:    Imaging Reports Reviewed Today Include: none  Imaging Personally Reviewed by Myself Includes:  none    Cultures:   Blood Culture: No results found for: BLOODCX  Urine Culture:   Lab Results   Component Value Date    URINECX (A) 10/24/2018     >100,000 cfu/ml Beta Hemolytic Streptococcus Group B    URINECX >100,000 cfu/ml  12/14/2017    URINECX 40,000-49,000 cfu/ml  10/29/2017    URINECX No Growth <1000 cfu/mL 04/01/2017     Sputum Culture: No components found for: SPUTUMCX  Wound Culture: No results found for: WOUNDCULT    Last 24 Hours Medication List:     Current Facility-Administered Medications:  acetaminophen 650 mg Oral Q4H PRN Brock Feng MD   acetaminophen 975 mg Oral Q6H PRN Brock Feng MD   albuterol 2 puff Inhalation Q4H PRN Ardenjennifer oCx MD   benztropine 1 mg Intramuscular Q6H PRN Ardenjennifer Cox MD   benztropine 1 mg Oral Q6H PRN Ardenjennifer Cox MD   citalopram 20 mg Oral Daily Brock Feng MD   diphenhydrAMINE 50 mg Oral HS PRN Brock Feng MD   famotidine 20 mg Oral Q12H [address] Brock Feng MD   fluticasone 2 spray Each Nare Daily Arden Cox MD   gabapentin 400 mg Oral TID Jena Oliva, CRNP   haloperidol 5 mg Oral Q8H PRN Arden Cox MD   haloperidol lactate 5 mg Intramuscular Q6H PRN Arden Cox MD   hydrOXYzine HCL 50 mg Oral Q6H PRN Lavetta Cockayne, MD   ibuprofen 600 mg Oral Q8H PRN Alfred Cuff, CRNP   loratadine 10 mg Oral Daily Brock Feng MD   LORazepam 2 mg Intramuscular Q6H PRN Brock Feng MD   LORazepam 2 mg Oral Q6H PRN Brock Feng MD   lurasidone 120 mg Oral Daily With Breakfast Jaime Souza MD   magnesium hydroxide 30 mL Oral Daily PRN Arden Cox MD   ondansetron 4 mg Oral Q8H PRN Brock Feng MD   OXcarbazepine 600 mg Oral BID Jaime Souza MD   promethazine 25 mg Oral Q6H PRN Brock Feng MD   traZODone 50 mg Oral HS PRN Taz Cottrell Shoshana Jones MD        Today, Patient Was Seen By: PAMELA Dennis    ** Please Note:  Dictation voice to text software may have been used in the creation of this document   **

## 2018-11-01 NOTE — ASSESSMENT & PLAN NOTE
· Present all day, no rebound or guarding  · Patient s/p tubal ligation 10/3/18  · Denied vaginal bleeding, states no menstrual cycle since procedure, but reported vaginal bleeding to GYN yesterday (see consultation note)  · Reports N/V/D but this has not been observed by staff and she reports blood per rectum and hematemesis but per nursing she has been complaining of random symptoms on and off for days and keeps commenting that tomorrow she will be in "medical" (patient is for tentative discharge home tomorrow)  · Will check CBC and CMP  · Unclear if this pain is ovarian in etiology versus somatic  · Supportive measures  · Motrin PRN  · Monitor closely

## 2018-11-01 NOTE — PROGRESS NOTES
Pt denies all symptoms except for anxiety which she states it's moderate d/t missing her daughter  Pt continues to be very childlike and attention seeking  Pt visible in the milieu and social among staff and peers and attended group this evening  Medication compliant

## 2018-11-02 VITALS
RESPIRATION RATE: 16 BRPM | SYSTOLIC BLOOD PRESSURE: 118 MMHG | HEART RATE: 78 BPM | OXYGEN SATURATION: 97 % | TEMPERATURE: 98.1 F | HEIGHT: 64 IN | WEIGHT: 194 LBS | DIASTOLIC BLOOD PRESSURE: 55 MMHG | BODY MASS INDEX: 33.12 KG/M2

## 2018-11-02 PROBLEM — N30.90 CYSTITIS: Status: RESOLVED | Noted: 2018-10-25 | Resolved: 2018-11-02

## 2018-11-02 PROCEDURE — 99239 HOSP IP/OBS DSCHRG MGMT >30: CPT | Performed by: NURSE PRACTITIONER

## 2018-11-02 RX ORDER — OXCARBAZEPINE 600 MG/1
600 TABLET, FILM COATED ORAL 2 TIMES DAILY
Qty: 60 TABLET | Refills: 1 | Status: SHIPPED | OUTPATIENT
Start: 2018-11-02 | End: 2019-04-01 | Stop reason: SDUPTHER

## 2018-11-02 RX ORDER — TRAZODONE HYDROCHLORIDE 50 MG/1
50 TABLET ORAL
Qty: 30 TABLET | Refills: 0 | Status: SHIPPED | OUTPATIENT
Start: 2018-11-02 | End: 2019-04-01 | Stop reason: SDUPTHER

## 2018-11-02 RX ORDER — CITALOPRAM 20 MG/1
20 TABLET ORAL DAILY
Qty: 30 TABLET | Refills: 1 | Status: SHIPPED | OUTPATIENT
Start: 2018-11-02 | End: 2019-04-01 | Stop reason: SDDI

## 2018-11-02 RX ORDER — GABAPENTIN 400 MG/1
400 CAPSULE ORAL 3 TIMES DAILY
Qty: 90 CAPSULE | Refills: 1 | Status: SHIPPED | OUTPATIENT
Start: 2018-11-02 | End: 2019-04-01 | Stop reason: SDDI

## 2018-11-02 RX ADMIN — LURASIDONE HYDROCHLORIDE 120 MG: 80 TABLET, FILM COATED ORAL at 08:16

## 2018-11-02 RX ADMIN — OXCARBAZEPINE 600 MG: 300 TABLET, FILM COATED ORAL at 08:16

## 2018-11-02 RX ADMIN — GABAPENTIN 400 MG: 400 CAPSULE ORAL at 08:16

## 2018-11-02 RX ADMIN — FAMOTIDINE 20 MG: 20 TABLET ORAL at 08:16

## 2018-11-02 RX ADMIN — HYDROXYZINE HYDROCHLORIDE 50 MG: 25 TABLET ORAL at 09:56

## 2018-11-02 RX ADMIN — CITALOPRAM HYDROBROMIDE 20 MG: 20 TABLET ORAL at 08:16

## 2018-11-02 RX ADMIN — LORATADINE 10 MG: 10 TABLET ORAL at 08:16

## 2018-11-02 NOTE — PROGRESS NOTES
Pt continues to be somatic, c/o cramping and feeling dizzy  Pt advised to lay down and is currently using Aqua K pad as ordered for her abdominal cramps  Admits to anxiety from "all of her medical issues"  Denies SI/HI  Medication compliant  Received prn trazodone and benadryl at hs

## 2018-11-02 NOTE — CASE MANAGEMENT
VICTORINO rec'd call from Oleta Sandifer, [facility], [phone], ext [phone]  VICTORINO advised that pt to be D/C today  VICTORINO confirmed that pt could go to [facility] on 11/6 @ [address] said yes  VICTORINO said that pt's LOBO [facility]WE[facility], would pick pt up today  VICTORINO advised that pt returning to live with her sister, Mae Owens  VICTORINO asked for her fax # so SW could fax D/C info to her  Yadira Do in agreement  Said her fax # was [phone]  VICTORINO met with pt & LOBO Anthony, prior to D/C  VICTORINO reviewed Medicare D/C agreement form with pt  Pt signed it, & copy given to pt  VICTORINO advised that she spoke to Oleta Sandifer @ [facility]  VICTORINO said that it was expected that pt would go to [facility] 4 days/wk  SW asked pt what days she was to go  Pt said [address]  VICTORINO asked what she did on Fri  "SLEEP"  Orly Hanson spoke to pt re:planning something to do on her day off from [facility]  Pt in good spirits  Pleasant  Asked for a PRN for anxiety, which pt rec'd from her nurse  Amilcar Smith told pt he'd meet with her sister when he took her home to review her D/C plan

## 2018-11-02 NOTE — NURSING NOTE
Patient ambulated from unit accompanied by her   BRANDAN reviewed with patient and , patient verbalized understanding  Prescriptions and belongings returned to patient

## 2018-11-02 NOTE — PLAN OF CARE
Risk for Violence/Aggression Toward Others     Treatment Goal: Refrain from acts of violence/aggression during length of stay, and demonstrate improved impulse control at the time of discharge Adequate for Discharge     Verbalize thoughts and feelings Adequate for Discharge     Refrain from harming others Adequate for Discharge     Refrain from destructive acts on the environment or property Adequate for Discharge     Identify appropriate positive anger management techniques Adequate for Discharge        SAFETY, RESTRAINT - VIOLENT/SELF-DESTRUCTIVE     Remains free of harm/injury from restraints (Restraint for Violent/Self-Destructive Behavior) Adequate for Discharge     Returns to optimal restraint-free functioning Adequate for Discharge

## 2018-11-02 NOTE — CASE MANAGEMENT
Pt looking forward to D/C  Affect bright  Appropriate  Pt to be picked up @ 10 AM by [facility] [place] from [place]   Pt to start [facility] in [place], [place], on 11/6 @ 9 AM

## 2018-11-02 NOTE — DISCHARGE INSTRUCTIONS
Fluticasone (Into the nose)   Fluticasone (thrw-IEW-m-sone)  Treats allergy symptoms, such as runny or stuffy nose  This medicine is a corticosteroid  Brand Name(s): Children's Flonase, DermacinRx CartoDB, DermacinRx [place], [facility], Flonase Sensimist, Fluticasone Propionate Novaplus, Ticaspray, Veramyst   There may be other brand names for this medicine  When This Medicine Should Not Be Used: This medicine is not right for everyone  Do not use if you had an allergic reaction to fluticasone  How to Use This Medicine:   Spray  · Your doctor will tell you how much medicine to use  Do not use more than directed  · This medicine is for use only in the nose  Do not get any of it in your eyes or on your skin  If it does get on these areas, rinse it off right away  · Prime the spray: Release 6 test sprays into the air away from the face, or pump the bottle until some of the medicine sprays out  Now it is ready to use  Prime the spray if it has not been used for more than 7 days (or 30 days for Veramyst®) or if the cap has been left off the bottle for 5 days or longer  · Shake the medicine well just before each use  · Before using the medicine, gently blow your nose to clear the nostrils  · After using the nasal spray, wipe the tip of the bottle with a clean tissue and put the cap back on  · You may need to use this medicine for a few days before you start to feel better  · Read and follow the patient instructions that come with this medicine  Talk to your doctor or pharmacist if you have any questions  · Follow the instructions on the medicine label if you are using this medicine without a prescription  · Missed dose: Take a dose as soon as you remember  If it is almost time for your next dose, wait until then and take a regular dose  Do not take extra medicine to make up for a missed dose  · Keep the bottle tightly closed when not using it   Store at room temperature, away from heat and direct light  Do not freeze or refrigerate  Throw this medicine away after you use 120 sprays  Drugs and Foods to Avoid:   Ask your doctor or pharmacist before using any other medicine, including over-the-counter medicines, vitamins, and herbal products  · Do not use this medicine together with ritonavir  · Some foods and medicines can affect how fluticasone works  Tell your doctor if you are using ketoconazole  Warnings While Using This Medicine:   · Tell your doctor if you are pregnant or breastfeeding, or if you have liver disease, asthma, an infection, or a history of cataracts or glaucoma  Make sure your doctor knows if you have had nose surgery, a nose injury, or a recent infection in your nose  · This medicine may cause the following problems:  ¨ Holes or ulcers inside the nose  ¨ Slow wound healing  ¨ Cataracts or glaucoma  ¨ Problems with the adrenal glands  ¨ Slow growth in children  · Avoid people who are sick or have infections  Tell your doctor right away if you think you have been exposed to measles or chickenpox  · Your doctor will check your progress and the effects of this medicine at regular visits  Keep all appointments  · Call your doctor if your symptoms do not improve or if they get worse  · Keep all medicine out of the reach of children  Never share your medicine with anyone    Possible Side Effects While Using This Medicine:   Call your doctor right away if you notice any of these side effects:  · Allergic reaction: Itching or hives, swelling in your face or hands, swelling or tingling in your mouth or throat, chest tightness, trouble breathing  · Burning, redness, swelling, or irritation around or inside your nose  · Eye pain or vision changes  · Fever, chills, cough, sore throat, and body aches  · Heavy nosebleeds  · Sores or white patches inside the nose or mouth  · Tiredness, weakness, dizziness  If you notice other side effects that you think are caused by this medicine, tell your doctor  Call your doctor for medical advice about side effects  You may report side effects to FDA at 8-337-FDA-6713  © 2017 [address] [employer] Information is for End User's use only and may not be sold, redistributed or otherwise used for commercial purposes  The above information is an  only  It is not intended as medical advice for individual conditions or treatments  Talk to your doctor, nurse or pharmacist before following any medical regimen to see if it is safe and effective for you  Diphenhydramine (By mouth)   Diphenhydramine (dye-fen-STANLEY-tameka-meen)  Treats hay fever, allergy, and cold symptoms  Also treats insomnia  Brand Name(s): Aller-G-Time, Allergy Relief, Allermax, Anti-Hist, Banophen, Benadryl Allergy, Children's Allergy, Children's Benadryl Allergy, Children's Wal-Dryl Allergy, Complete Allergy, Complete Allergy Medicine, Contrast Allergy PreMed Pack, Diphen, Diphenhist, Dormin Sleep Aid   There may be other brand names for this medicine  When This Medicine Should Not Be Used: You should not use this medicine if you have ever had an allergic reaction to diphenhydramine  This medicine should not be given to women who are breast feeding  Do not give any over-the-counter (OTC) cough and cold medicine to a baby or child under 3years old  Using these medicines in very young children might cause serious or possibly life-threatening side effects  How to Use This Medicine:   Capsule, Thin Sheet, Dissolving Tablet, Tablet, Liquid, Liquid Filled Capsule, Chewable Tablet  · Your doctor will tell you how much medicine to use  Do not use more than directed  · Follow the instructions on the medicine label if you are using this medicine without a prescription  · Measure the oral liquid medicine with a marked measuring spoon, oral syringe, or medicine cup  · Swallow the capsule, tablet, and liquid filled capsule whole  Do not crush, break, or chew it    · The chewable tablet must be chewed completely before you swallow it  · Make sure your hands are dry before you handle the disintegrating tablet  Peel back the foil from the blister pack, then remove the tablet  Do not push the tablet through the foil  Place the tablet in your mouth  After it has melted, swallow or take a drink of water  If a dose is missed: Take a dose as soon as you remember  If it is almost time fo  Metabolic Syndrome X   WHAT YOU NEED TO KNOW:   Metabolic syndrome is a group of medical conditions that can lead to heart disease, stroke, or type 2 diabetes  The medical conditions include high triglycerides, low HDL cholesterol, high blood pressure, high blood sugar levels, and extra abdominal fat  DISCHARGE INSTRUCTIONS:   Medicines:   Blood pressure medicine: This is given to lower your blood pressure  A controlled blood pressure helps protect your organs, such as your heart, lungs, brain, and kidneys  Take your blood pressure medicine exactly as directed  Cholesterol medicine: This type of medicine is given to help decrease (lower) the amount of cholesterol (fat) in your blood  Cholesterol medicine works best if you also exercise and eat a healthy diet that is low in certain kinds of fats  Some cholesterol medicines may cause liver problems  You may need to have blood taken for tests while using this medicine  Take your medicine as directed  Contact your healthcare provider if you think your medicine is not helping or if you have side effects  Tell him or her if you are allergic to any medicine  Keep a list of the medicines, vitamins, and herbs you take  Include the amounts, and when and why you take them  Bring the list or the pill bottles to follow-up visits  Carry your medicine list with you in case of an emergency  Hypoglycemic medicine: This medicine may be given to decrease the amount of sugar in your blood   Hypoglycemic medicine helps your body move the sugar to your cells, where it is needed for energy  Follow up with your healthcare provider as directed:  Write down your questions so you remember to ask them during your visits  Manage metabolic syndrome:   Maintain a healthy weight:  Weight loss helps lower cholesterol, triglycerides, blood pressure, and blood glucose levels  It can also raise HDL (good cholesterol)  Ask your healthcare provider how much you should weigh  Ask him to help you create a weight loss plan if you are overweight  Eat a variety of healthy foods:  Healthy foods include fruits, vegetables, whole-grain breads, low-fat dairy products, beans, lean meats, and fish  Eat foods that are low in fat and sodium (salt)  A dietitian can help you plan healthy meals  Exercise:  Ask your healthcare provider to help you create an exercise plan  Exercise can help lower your blood pressure, cholesterol, and blood sugar levels  Exercise can also help raise your HDL level and help you to lose weight  Get at least 30 minutes of exercise, 5 days each week  Check your blood pressure as directed: You may be asked to keep a record of your blood pressure and bring it with you to follow-up visits  Ask your healthcare provider what your blood pressure should be and how to check it  Limit alcohol:  Women should limit alcohol to 1 drink a day  Men should limit alcohol to 2 drinks a day  A drink of alcohol is 12 ounces of beer, 5 ounces of wine, or 1½ ounces of liquor  Do not smoke: If you smoke, it is never too late to quit  Smoking further increases your risk for heart disease and stroke  Ask your healthcare provider for information if you need help quitting  Contact your healthcare provider if:   You have more thirst or hunger than usual      You urinate more frequently  You have blurred vision  You have questions or concerns about your condition or care  Seek care immediately or call 911 if:   Your blood pressure is higher than healthcare providers told it should be      You have chest pain or discomfort that spreads to your arms, jaw, or back  You have a severe headache or dizziness  You have trouble thinking, speaking, or understanding others  © 2017 [address] [employer]  Information is for End User's use only and may not be sold, redistributed or otherwise used for commercial purposes  All illustrations and images included in CareNotes® are the copyrighted property of [employer] D [employer]  or Rosalio Cano  The above information is an  only  It is not intended as medical advice for individual conditions or treatments  Talk to your doctor, nurse or pharmacist before following any medical regimen to see if it is safe and effective for you  Ranitidine (By mouth)   Ranitidine Hydrochloride (qi-JV-vi-gamaliel ana maria-droe-KLOR-yandy)  Treats and prevents heartburn  Also treats stomach ulcers, gastroesophageal reflux disease (GERD), and conditions that cause too much stomach acid  Brand Name(s): DermaSilkRx Anodynexa [place], DermacinRx Inflammatral [place], [facility] Acid Control 150, [facility] Acid Reducer, Good Sense Acid Reducer, Leader Acid Control, Leader raNITIdine HCl, [facility] Acid Reducer, [facility] Acid Reducer, TopCare Heartburn Relief 150, TopCare Heartburn Relief 75, Zantac, Zantac 150, Zantac 300, Zantac 75   There may be other brand names for this medicine  When This Medicine Should Not Be Used: This medicine is not right for everyone  Do not use it if you had an allergic reaction to ranitidine  How to Use This Medicine:   Capsule, Tablet, Liquid, Fizzy Tablet, Liquid Filled Capsule, Granule  · Your doctor will tell you how much medicine to use  Do not use more than directed  · Follow the instructions on the medicine label if you are using this medicine without a prescription  · Measure the oral liquid medicine with a marked measuring spoon, oral syringe, or medicine cup    · The effervescent tablet should not be chewed, swallowed whole, or dissolved on the tongue  The Zantac 25 EFFERdose Tablet should be mixed in 1 teaspoon (or more) of water  Do not drink the liquid until the tablet is completely dissolved  · If you are using this medicine to prevent heartburn, take it 30 to 60 minutes before you eat or drink anything that causes you to have heartburn  · Missed dose: Take a dose as soon as you remember  If it is almost time for your next dose, wait until then and take a regular dose  Do not take extra medicine to make up for a missed dose  · Store the medicine in a closed container at room temperature, away from heat, moisture, and direct light  You may store the oral liquid in the refrigerator  Drugs and Foods to Avoid:   Ask your doctor or pharmacist before using any other medicine, including over-the-counter medicines, vitamins, and herbal products  · Some medicines can affect how ranitidine works  Tell your doctor if you are using the following:   ¨ Atazanavir  ¨ Delavirdine  ¨ Gefitinib  ¨ Glipizide  ¨ Ketoconazole  ¨ Midazolam  ¨ Triazolam  ¨ Warfarin  Warnings While Using This Medicine:   · Tell your doctor if you are pregnant or breastfeeding, or if you have kidney disease, liver disease, or a history of acute porphyria  · EFFERdose® tablets contain phenylalanine  If you have phenylketonuria (PKU), talk to your doctor before you use this medicine  · Tell any doctor or dentist who treats you that you are using this medicine  This medicine may affect certain medical test results  · Keep all medicine out of the reach of children  Never share your medicine with anyone    Possible Side Effects While Using This Medicine:   Call your doctor right away if you notice any of these side effects:  · Allergic reaction: Itching or hives, swelling in your face or hands, swelling or tingling in your mouth or throat, chest tightness, trouble breathing  · Blistering, peeling, red skin rash  · Dark urine or pale stools, nausea, vomiting, loss of appetite, stomach pain, yellow skin or eyes  · Fast, slow, or uneven heartbeat  · Unusual bleeding, bruising, or weakness  If you notice these less serious side effects, talk with your doctor:   · Constipation or diarrhea  · Headache  · Mild nausea, vomiting, or stomach pain  If you notice other side effects that you think are caused by this medicine, tell your doctor  Call your doctor for medical advice about side effects  You may report side effects to FDA at 5-462-FZK-1811  © 2017 [address] [employer]  Information is for End User's use only and may not be sold, redistributed or otherwise used for commercial purposes  The above information is an  only  It is not intended as medical advice for individual conditions or treatments  Talk to your doctor, nurse or pharmacist before following any medical regimen to see if it is safe and effective for you  Oxcarbazepine (By mouth)   Oxcarbazepine (ah-lag-SXM-e-peen)  Treats seizures  Brand Name(s): Oxtellar XR, Trileptal   There may be other brand names for this medicine  When This Medicine Should Not Be Used: This medicine is not right for everyone  Do not use it if you had an allergic reaction to oxcarbazepine  How to Use This Medicine:   Liquid, Tablet, Long Acting Tablet  · Take your medicine as directed  Your dose may need to be changed several times to find what works best for you  · This medicine should come with a Medication Guide  Ask your pharmacist for a copy if you do not have one  · Swallow the extended-release tablet whole  Do not crush, break, or chew it  · Oral liquid: Measure the oral liquid medicine with a marked measuring spoon, oral syringe, or medicine cup  Shake well just before you measure a dose  Swallow the medicine directly, or mix it in a glass with a small amount of water and drink all of the mixture right away    · Food:   ¨ Take the extended-release tablet on an empty stomach at least 1 hour before or 2 hours after a meal   ¨ You may take the oral liquid or regular tablet with or without food  · Missed dose: Take a dose as soon as you remember  If it is almost time for your next dose, wait until then and take a regular dose  Do not take extra medicine to make up for a missed dose  · Store the medicine in a closed container at room temperature, away from heat, moisture, and direct light  Store the oral liquid in the original container and use within 7 weeks after you first open the bottle  Throw away any unused liquid  Drugs and Foods to Avoid:   Ask your doctor or pharmacist before using any other medicine, including over-the-counter medicines, vitamins, and herbal products  · Some medicines and foods can affect how this drug works  Tell your doctor if you are also using any of the following:  ¨ Calcium channel blocker medicine, including felodipine or verapamil  ¨ Other medicine to control seizures  · Tell your doctor if you use anything else that makes you sleepy  Some examples are allergy medicine, narcotic pain medicine, and alcohol  Warnings While Using This Medicine:   · Tell your doctor if you are pregnant or breastfeeding, or if you have kidney disease or liver disease  Also tell your doctor if you are allergic to carbamazepine or tartrazine  · This medicine may cause the following problems:  ¨ Drug reaction with eosinophilia and systemic symptoms (DRESS), which may damage organs such as the liver, kidney, or heart  ¨ Low sodium levels in the blood  ¨ Serious skin or allergic reactions  ¨ Decreased levels of blood cells, which may cause bleeding problems or increase your risk for infection  · This medicine may cause depression, thoughts of suicide, or changes in mood or behavior  Tell your doctor if you have a history of depression or mental health problems  · Do not stop using this medicine suddenly  Your doctor will need to slowly decrease your dose before you stop it completely   The seizures might become more frequent if you suddenly stop using this medicine  · Birth control that uses hormones may not work as well while you are using this medicine  Use a different type of birth control if you need to  Talk with your doctor if you have questions  · This medicine may make you dizzy or drowsy  Do not drive or do anything else that could be dangerous until you know how this medicine affects you  · Your doctor will do lab tests at regular visits to check on the effects of this medicine  Keep all appointments  · Keep all medicine out of the reach of children  Never share your medicine with anyone  Possible Side Effects While Using This Medicine:   Call your doctor right away if you notice any of these side effects:  · Allergic reaction: Itching or hives, swelling in your face or hands, swelling or tingling in your mouth or throat, chest tightness, trouble breathing  · Blistering, peeling, red skin rash  · Confusion, weakness, muscle twitching  · Fever, skin rash, or swollen glands in your armpits, neck, or groin  · Unusual thoughts or behaviors, thoughts of hurting yourself, or feeling depressed, irritable, nervous, restless  · Unusual bleeding, bruising, or weakness  If you notice these less serious side effects, talk with your doctor:   · Dizziness, headache  · Nausea, vomiting, stomach pain  · Trouble concentrating or speaking, tiredness, clumsiness, trouble walking  · Vision changes, double vision  If you notice other side effects that you think are caused by this medicine, tell your doctor  Call your doctor for medical advice about side effects  You may report side effects to FDA at 8-396-FDA-4768  © 2017 [address] [employer] Information is for End User's use only and may not be sold, redistributed or otherwise used for commercial purposes  The above information is an  only  It is not intended as medical advice for individual conditions or treatments   Talk to your doctor, nurse or pharmacist before following any medical regimen to see if it is safe and effective for you  Lurasidone (By mouth)   Lurasidone (dfy-CNZ-s-done)  Treats schizophrenia and depression  Brand Name(s): Jesus   There may be other brand names for this medicine  When This Medicine Should Not Be Used: This medicine is not right for everyone  Do not use it if you had an allergic reaction to lurasidone  How to Use This Medicine:   Tablet  · Take your medicine as directed  Your dose may need to be changed several times to find what works best for you  · It is best to take this medicine with food or milk  · Keep using this medicine for the full treatment time, even if you feel better after the first few doses  · This medicine should come with a Medication Guide  Ask your pharmacist for a copy if you do not have one  · Missed dose: Take a dose as soon as you remember  If it is almost time for your next dose, wait until then and take a regular dose  Do not take extra medicine to make up for a missed dose  · Store the medicine in a closed container at room temperature, away from heat, moisture, and direct light  Drugs and Foods to Avoid:   Ask your doctor or pharmacist before using any other medicine, including over-the-counter medicines, vitamins, and herbal products  · Do not use this medicine if you are using certain other medicines including carbamazepine, clarithromycin, ketoconazole, phenytoin, rifampin, ritonavir, Emy's wort, or voriconazole  · Some medicines can affect how lurasidone works  Tell your doctor if you are using any of the following:  ¨ Atazanavir, bosentan, diltiazem, efavirenz, erythromycin, etravirine, fluconazole, modafinil, nafcillin, or verapamil  ¨ Blood pressure medicine  · Do not eat grapefruit or drink grapefruit juice while you are using this medicine  · Tell your doctor if you use anything else that makes you sleepy   Some examples are allergy medicine, narcotic pain medicine, and alcohol  · Do not drink alcohol while you are using this medicine  Warnings While Using This Medicine:   · Tell your doctor if you are pregnant or breastfeeding, or if you have kidney disease, liver disease, blood or bone marrow problems, diabetes, prolactin-dependent breast cancer, Parkinson's disease, trouble swallowing, or a history of seizures or neuroleptic malignant syndrome (NMS)  Tell your doctor if you have any kind of blood vessel or heart problems, including low blood pressure, heart failure, a low amount of blood, heart rhythm problems, or a history of a heart attack or stroke  · This medicine may cause the following problems:  ¨ Increased risk of stroke  ¨ Neuroleptic malignant syndrome (NMS)  ¨ Tardive dyskinesia (a movement disorder)  ¨ Changes in blood sugar levels  · This medicine can increase thoughts of suicide  Tell your doctor right away if you start to feel depressed and have thoughts about hurting yourself  · This medicine may make you bleed, bruise, or get infections more easily  Take precautions to prevent illness and injury  Wash your hands often  · This medicine may make you dizzy or drowsy, or to have trouble with thinking or controlling body movements, which may lead to falls, fractures or other injuries  Do not drive or do anything else that could be dangerous until you know how this medicine affects you  · This medicine might reduce how much you sweat  Your body could get too hot if you do not sweat enough  If your body gets too hot, you might feel dizzy, weak, tired, or confused  You might vomit or have an upset stomach  Do not get too hot while you are exercising  Avoid places that are very hot  · Tell any doctor or dentist who treats you that you are using this medicine  This medicine may affect certain medical test results  · Your doctor will do lab tests at regular visits to check on the effects of this medicine  Keep all appointments    · Keep all medicine out of the reach of children  Never share your medicine with anyone  Possible Side Effects While Using This Medicine:   Call your doctor right away if you notice any of these side effects:  · Allergic reaction: Itching or hives, swelling in your face or hands, swelling or tingling in your mouth or throat, chest tightness, trouble breathing  · Chest pain, fast, slow, pounding, or uneven heartbeat  · Confusion, double vision, headache, trouble speaking, thinking, or walking  · Fever, chills, cough, runny or stuffy nose, sore throat, body aches  · Increased thirst, hunger, or urination  · Lightheadedness, dizziness, or fainting  · Mood or behavioral changes, or thoughts of hurting yourself or others  · Neck muscle spasm, throat tightness, trouble swallowing or breathing, or sticking out of the tongue  · Seizures  · Sweating, muscle stiffness  · Twitching or muscle movements you cannot control (often in your face, tongue, or jaw)  If you notice these less serious side effects, talk with your doctor:   · Anxiety or restlessness  · Diarrhea, nausea, vomiting, or upset stomach  · Sleepiness or unusual drowsiness, tiredness  · Weight gain  If you notice other side effects that you think are caused by this medicine, tell your doctor  Call your doctor for medical advice about side effects  You may report side effects to FDA at 3-806-FDA-8125  © 2017 [address] Information is for End User's use only and may not be sold, redistributed or otherwise used for commercial purposes  The above information is an  only  It is not intended as medical advice for individual conditions or treatments  Talk to your doctor, nurse or pharmacist before following any medical regimen to see if it is safe and effective for you  Loratadine (By mouth)   Loratadine ([id number]-U-[id number]-gamaliel)  Treats allergy symptoms and hives     Brand Name(s): Alavert, Allergy, Brite-Life Allergy Relief, Children's Claritin, Children's Claritin Allergy, Children's Loratadine, Children's Loratadine Allergy, Claritin, Claritin 24HR, Claritin Liqui-Gels, Claritin RediTabs, [facility] Loratadine, [facility] Children's Loratadine, [facility] Loratadine, Good Sense Allergy Relief   There may be other brand names for this medicine  When This Medicine Should Not Be Used: You should not use this medicine if you have had an allergic reaction to loratadine  Do not give any over-the-counter (OTC) cough and cold medicine to a baby or child under 3years old  Using these medicines in very young children might cause serious or possibly life-threatening side effects  How to Use This Medicine:   Tablet, Dissolving Tablet, Liquid, Liquid Filled Capsule, Chewable Tablet  · Your doctor will tell you how much of this medicine to use and how often  Do not use more medicine or use it more often than your doctor tells you to  · Follow the instructions on the medicine label if you are using this medicine without a prescription  · After you remove a rapidly disintegrating tablet (Reditab®) from the package, use it right away by putting the tablet on your tongue  The tablet will break up and dissolve quickly  You may take the tablet with or without water  · Measure the oral liquid medicine with a marked measuring spoon, oral syringe, or medicine cup  If a dose is missed:   · If you miss a dose or forget to take your medicine, take it as soon as you can  If it is almost time for your next dose, wait until then to take the medicine and skip the missed dose  · Do not use extra medicine to make up for a missed dose  How to Store and Dispose of This Medicine:   · Store the medicine at room temperature, away from heat and direct light  Do not freeze  · Ask your pharmacist, doctor, or health caregiver about the best way to dispose of any outdated medicine or medicine no longer needed    · Keep all medicine out of the reach of children and never share your medicine with anyone  Drugs and Foods to Avoid:      Ask your doctor or pharmacist before using any other medicine, including over-the-counter medicines, vitamins, and herbal products  Warnings While Using This Medicine:   · Make sure your doctor knows if you are pregnant or breastfeeding, or if you have liver disease or kidney disease  Possible Side Effects While Using This Medicine:   Call your doctor right away if you notice any of these side effects:  · Allergic reaction: Itching or hives, swelling in face or hands, swelling or tingling in the mouth or throat, tightness in chest, trouble breathing  · Extreme weakness  · Fast or irregular heartbeat  · Uncontrolled movements of the head, neck, eyes or tongue  If you notice these less serious side effects, talk with your doctor:   · Cough, sore throat, hoarseness  · Drowsiness  · Dry mouth  · Headache  · Nervousness  · Red, irritated eyes  If you notice other side effects that you think are caused by this medicine, tell your doctor  Call your doctor for medical advice about side effects  You may report side effects to FDA at 0-295-FDA-8097  © 2017 [facility] Information is for End User's use only and may not be sold, redistributed or otherwise used for commercial purposes  The above information is an  only  It is not intended as medical advice for individual conditions or treatments  Talk to your doctor, nurse or pharmacist before following any medical regimen to see if it is safe and effective for you  Gabapentin (By mouth)   Gabapentin (lincoln-a-PEN-tin)  Treats seizures and pain caused by shingles  Brand Name(s): ACTIVE-PAC with Gabapentin, Convenience Genaro, Cyclo/[place] 10/300 Pack, FusePaq Fanatrex, [place]-V, Gralise, [address] Pack, Neurontin, SmartRx [place] Kit   There may be other brand names for this medicine  When This Medicine Should Not Be Used:    This medicine is not right for everyone  Do not use it if you had an allergic reaction to gabapentin  How to Use This Medicine:   Capsule, Liquid, Tablet  · Take your medicine as directed  Your dose may need to be changed several times to find what works best for you  If you have epilepsy, do not allow more than 12 hours to pass between doses  · Capsule: Swallow the capsule whole with plenty of water  Do not open, crush, or chew it  · Gralise® tablet: Swallow the tablet whole   Do not crush, break, or chew it  · Neurontin® tablet: If you break a tablet into 2 pieces, use the second half as your next dose  If you don't use it within 28 days, throw it away  · Measure the oral liquid medicine with a marked measuring spoon, oral syringe, or medicine cup  · This medicine should come with a Medication Guide  Ask your pharmacist for a copy if you do not have one  · Missed dose: Take a dose as soon as you remember  If it is almost time for your next dose, wait until then and take a regular dose  Do not take extra medicine to make up for a missed dose  · Store the medicine in a closed container at room temperature, away from heat, moisture, and direct light  Store the Neurontin® oral liquid in the refrigerator  Do not freeze  Drugs and Foods to Avoid:   Ask your doctor or pharmacist before using any other medicine, including over-the-counter medicines, vitamins, and herbal products  · Some medicines can affect how gabapentin works  Tell your doctor if you also use any of the following:   ¨ Hydrocodone  ¨ Morphine  · If you take an antacid, wait at least 2 hours before you take gabapentin  · Tell your doctor if you use anything else that makes you sleepy  Some examples are allergy medicine, narcotic pain medicine, and alcohol  Warnings While Using This Medicine:   · Tell your doctor if you are pregnant or breastfeeding, or if you have kidney problems or are receiving dialysis   Tell your doctor if you have a history of depression or mental health problems  · This medicine may increase depression or thoughts of suicide  Tell your doctor right away if you start to feel more depressed or think about hurting yourself  · This medicine may cause a serious allergic reaction called multiorgan hypersensitivity, which can damage organs and be life-threatening  · Do not stop using this medicine suddenly  Your doctor will need to slowly decrease your dose before you stop it completely  If you take this medicine to prevent seizures, your seizures may return or occur more often if you stop this medicine suddenly  · This medicine may make you dizzy or drowsy  Do not drive or do anything else that could be dangerous until you know how this medicine affects you  · Tell any doctor or dentist who treats you that you are using this medicine  This medicine may affect certain medical test results  · Your doctor will check your progress and the effects of this medicine at regular visits  Keep all appointments  · Keep all medicine out of the reach of children  Never share your medicine with anyone    Possible Side Effects While Using This Medicine:   Call your doctor right away if you notice any of these side effects:  · Allergic reaction: Itching or hives, swelling in your face or hands, swelling or tingling in your mouth or throat, chest tightness, trouble breathing  · Behavior problems, aggression, restlessness, trouble concentrating, moodiness (especially in children)  · Blistering, peeling, red skin rash  · Change in how much or how often you urinate, bloody or cloudy urine,  · Chest pain, fast heartbeat, trouble breathing  · Dark urine or pale stools, nausea, vomiting, loss of appetite, stomach pain, yellow skin or eyes  · Fever, rash, swollen or tender glands in the neck, armpit, or groin  · Problems with coordination, shakiness, unsteadiness  · Rapid weight gain, swelling in your hands, ankles, or feet  · Unusual moods or behaviors, thoughts of hurting yourself, feeling depressed  If you notice these less serious side effects, talk with your doctor:   · Dizziness, drowsiness, sleepiness, tiredness  If you notice other side effects that you think are caused by this medicine, tell your doctor  Call your doctor for medical advice about side effects  You may report side effects to FDA at 2-743-FDA-1786  © 2017 [address] Information is for End User's use only and may not be sold, redistributed or otherwise used for commercial purposes  The above information is an  only  It is not intended as medical advice for individual conditions or treatments  Talk to your doctor, nurse or pharmacist before following any medical regimen to see if it is safe and effective for you  · r your next dose, wait until then and take a regular dose  Do not take extra medicine to make up for a missed dose  How to Store and Dispose of This Medicine:   · Store the medicine in a closed container at room temperature, away from heat, moisture, and direct light  Do not freeze the liquid  · Ask your pharmacist, doctor, or health caregiver about the best way to dispose of any outdated medicine or medicine no longer needed  · Keep all medicine out of the reach of children  Never share your medicine with anyone  Drugs and Foods to Avoid:   Ask your doctor or pharmacist before using any other medicine, including over-the-counter medicines, vitamins, and herbal products  · Make sure your doctor knows if you are using an MAO inhibitor (MAOI) such as Eldepryl®, Marplan®, [place], or Parnate®  · Ask your doctor before you use any other products that have diphenhydramine in it  This includes medicines that you use on your skin  · Tell your doctor if you use anything else that makes you sleepy  Some examples are allergy medicine, narcotic pain medicine, and alcohol  · Do not drink alcohol while you are using this medicine    Warnings While Using This Medicine: · Make sure your doctor knows if you are pregnant or breastfeeding, or if you have an enlarged prostate, or trouble urinating  Tell your doctor if you have glaucoma, or a breathing problem such as emphysema, bronchitis, or asthma  Make sure your doctor knows if you have any other allergies  · This medicine may make you dizzy or drowsy  Avoid driving, using machines, or doing anything else that could be dangerous if you are not alert  · This medicine might contain phenylalanine (aspartame)  This is a concern if you have a disorder called phenylketonuria (a problem with amino acids)  If you have this condition, talk to your doctor before using this medicine  Possible Side Effects While Using This Medicine:   Call your doctor right away if you notice any of these side effects:  · Allergic reaction: Itching or hives, swelling in your face or hands, swelling or tingling in your mouth or throat, chest tightness, trouble breathing  · Hallucinations (seeing things that are not really there)  · Lightheadedness or fainting  · Pain when urinating, or change in how much or how often you urinate  If you notice these less serious side effects, talk with your doctor:   · Clumsiness  · Constipation, nausea, or stomach upset  · Dry nose, mouth, or throat  · Nervousness or excitability, especially in children  · Upset stomach  · Thick mucus in your nose or throat  If you notice other side effects that you think are caused by this medicine, tell your doctor  Call your doctor for medical advice about side effects  You may report side effects to FDA at 2-486-FDA-1175  © 2017 [address]  Information is for End User's use only and may not be sold, redistributed or otherwise used for commercial purposes  The above information is an  only  It is not intended as medical advice for individual conditions or treatments   Talk to your doctor, nurse or pharmacist before following any medical regimen to see if it is safe and effective for you  Citalopram (By mouth)   Citalopram (hlg-QYP-ck-pram)  Treats depression  Brand Name(s): CeleXA   There may be other brand names for this medicine  When This Medicine Should Not Be Used: This medicine is not right for everyone  Do not use it if you had an allergic reaction to citalopram   How to Use This Medicine:   Liquid, Tablet  · Take this medicine as directed  You may need to take it for a month or more before you feel better  Your dose may need to be changed to find out what works best for you  · Oral liquid: Measure the oral liquid medicine with a marked measuring spoon, oral syringe, or medicine cup  · This medicine should come with a Medication Guide  Ask your pharmacist for a copy if you do not have one  · Missed dose: Take a dose as soon as you remember  If it is almost time for your next dose, wait until then and take a regular dose  Do not take extra medicine to make up for a missed dose  · Store the medicine in a closed container at room temperature, away from heat, moisture, and direct light  Drugs and Foods to Avoid:   Ask your doctor or pharmacist before using any other medicine, including over-the-counter medicines, vitamins, and herbal products  · Do not use this medicine together with pimozide  Do not use this medicine and an MAO inhibitor (MAOI) within 14 days of each other  · Some medicines can affect how citalopram works   Tell your doctor if you are using the following:   ¨ Buspirone, carbamazepine, chlorpromazine, cimetidine, fentanyl, gatifloxacin, levomethadyl, lithium, methadone, moxifloxacin, omeprazole, pentamidine, Emy's wort, thioridazine, tramadol, or tryptophan supplements  ¨ Amphetamines  ¨ Blood thinner (including warfarin)  ¨ Diuretic (water pill)  ¨ Medicine for heart rhythm problems (including amiodarone, procainamide, quinidine, sotalol)  ¨ NSAID pain or arthritis medicine (including aspirin, celecoxib, diclofenac, ibuprofen, naproxen)  ¨ Triptan medicine to treat migraine headaches (including sumatriptan)  · Do not drink alcohol while you are using this medicine  Warnings While Using This Medicine:   · Tell your doctor if you are pregnant or breastfeeding, or if you have kidney disease, liver disease, bleeding problems, glaucoma, heart problems, or a seizure disorder  Tell your doctor if you or anyone in your family has a bipolar disorder, heart rhythm problem (such as QT prolongation or a slow heartbeat), or a recent heart attack  · This medicine may cause the following problems:   ¨ Heart rhythm problems  ¨ Serotonin syndrome (more likely when used with certain other medicines)  ¨ Increased risk of bleeding problems  ¨ Low sodium levels  ¨ Slow growth in children  · This medicine can increase thoughts of suicide  Tell your doctor right away if you start to feel depressed and have thoughts about hurting yourself  · This medicine may make you dizzy or drowsy  Do not drive or do anything that could be dangerous until you know how this medicine affects you  · Do not stop using this medicine suddenly  Your doctor will need to slowly decrease your dose before you stop it completely  · Your doctor will check your progress and the effects of this medicine at regular visits  Keep all appointments  · Keep all medicine out of the reach of children  Never share your medicine with anyone    Possible Side Effects While Using This Medicine:   Call your doctor right away if you notice any of these side effects:  · Allergic reaction: Itching or hives, swelling in your face or hands, swelling or tingling in your mouth or throat, chest tightness, trouble breathing  · Anxiety, restlessness, fever, sweating, muscle spasms, nausea, vomiting, diarrhea, seeing or hearing things that are not there  · Chest pain, trouble breathing  · Confusion, weakness, and muscle twitching  · Fast, pounding, or uneven heartbeat  · Feeling more excited or energetic than usual, trouble sleeping, racing thoughts  · Eye pain, vision changes, seeing halos around lights  · Lightheadedness, dizziness, fainting  · Painful, prolonged erection of your penis  · Thoughts of hurting yourself or others, unusual behavior  · Unusual bleeding or bruising  If you notice these less serious side effects, talk with your doctor:   · Decreased appetite, weight loss (in children)  · Dry mouth  · Sexual problems  · Sleepiness or drowsiness  If you notice other side effects that you think are caused by this medicine, tell your doctor  Call your doctor for medical advice about side effects  You may report side effects to FDA at 4-241-FDA-5555  © 2017 [address]  Information is for End User's use only and may not be sold, redistributed or otherwise used for commercial purposes  The above information is an  only  It is not intended as medical advice for individual conditions or treatments  Talk to your doctor, nurse or pharmacist before following any medical regimen to see if it is safe and effective for you  Trazodone (By mouth)   Trazodone (TRAZ-oh-done)  Treats depression  Brand Name(s): Oleptro   There may be other brand names for this medicine  When This Medicine Should Not Be Used: This medicine is not right for everyone  Do not use it if you had an allergic reaction to trazodone  How to Use This Medicine:   Tablet, Long Acting Tablet  · Take your medicine as directed  Your dose may need to be changed several times to find what works best for you  · Regular tablet: Take it with or shortly after a meal or light snack  · Extended-release tablet: Take it at the same time each day, preferably at bedtime, without food  · The tablet can be swallowed whole, or you may break the tablet in half along the score line  Do not break the tablet unless your doctor tells you to  Do not crush or chew the tablet    · This medicine should come with a Medication Guide  Ask your pharmacist for a copy if you do not have one  · Missed dose: Take a dose as soon as you remember  If it is almost time for your next dose, wait until then and take a regular dose  Do not take extra medicine to make up for a missed dose  · Store the medicine in a closed container at room temperature, away from heat, moisture, and direct light  Drugs and Foods to Avoid:   Ask your doctor or pharmacist before using any other medicine, including over-the-counter medicines, vitamins, and herbal products  · Do not use trazodone if you currently take an MAO inhibitor (MAOI) or have used an MAOI in the past 14 days  · Tell your doctor if you also use any of the following:  ¨ Carbamazepine, digoxin, phenytoin, indinavir, ritonavir, buspirone, fentanyl, lithium, tryptophan, Emy's wort, tramadol  ¨ Medicine to treat a fungal infection (such as itraconazole, ketoconazole), a diuretic (water pill), blood pressure medicine, an NSAID pain or arthritis medicine (such as aspirin, celecoxib, diclofenac, ibuprofen, naproxen), a blood thinner (such as warfarin), other medicine for depression, or triptan medicine to treat migraine headaches  · Do not drink alcohol while you are using this medicine  · Tell your doctor if you use anything else that makes you sleepy  Some examples are allergy medicine, narcotic pain medicine, and alcohol  Warnings While Using This Medicine:   · Tell your doctor if you are pregnant or breastfeeding, or if you have kidney disease, liver disease, bleeding problems, glaucoma, heart disease, heart rhythm problems, or low blood pressure  Tell your doctor if you recently had a heart attack  · For some children, teenagers, and young adults, this medicine may increase mental or emotional problems  This may lead to thoughts of suicide and violence  Talk with your doctor right away if you have any thoughts or behavior changes that concern you   Tell your doctor if you or anyone in your family has a history of bipolar disorder or suicide attempts  · This medicine may cause the following problems:  ¨ Serotonin syndrome (more likely when used with certain other medicines)  ¨ Heart rhythm problems (QT prolongation)  ¨ Low sodium levels  ¨ Higher risk of bleeding  · Do not stop using this medicine suddenly  Your doctor will need to slowly decrease your dose before you stop it completely  · This medicine may make you dizzy or drowsy  Do not drive or do anything that could be dangerous until you know how this medicine affects you  Stand or sit up slowly if you are dizzy  · Tell any doctor or dentist who treats you that you are using this medicine  You may need to stop using this medicine several days before you have surgery or medical tests  · Your doctor will check your progress and the effects of this medicine at regular visits  Keep all appointments  · Keep all medicine out of the reach of children  Never share your medicine with anyone    Possible Side Effects While Using This Medicine:   Call your doctor right away if you notice any of these side effects:  · Allergic reaction: Itching or hives, swelling in your face or hands, swelling or tingling in your mouth or throat, chest tightness, trouble breathing  · Anxiety, restlessness, fever, sweating, muscle spasms, nausea, vomiting, diarrhea, seeing or hearing things that are not there  · Confusion, weakness, muscle twitching  · Fast, pounding, or uneven heartbeat  · Lightheadedness, dizziness, fainting  · Painful, prolonged erection of your penis  · Sudden increase in energy, feeling irritable, trouble sleeping  · Thoughts of hurting yourself or others, unusual behavior  · Unusual bleeding or bruising  If you notice these less serious side effects, talk with your doctor:   · Constipation, mild nausea  · Dry mouth  · Eye pain, vision changes, seeing halos around lights  · Headache  · Sleepiness or unusual drowsiness  If you notice other side effects that you think are caused by this medicine, tell your doctor  Call your doctor for medical advice about side effects  You may report side effects to FDA at 7-621-FDA-6942  © 2017 [address] Information is for End User's use only and may not be sold, redistributed or otherwise used for commercial purposes  The above information is an  only  It is not intended as medical advice for individual conditions or treatments  Talk to your doctor, nurse or pharmacist before following any medical regimen to see if it is safe and effective for you
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
## 2018-11-02 NOTE — PLAN OF CARE

## 2018-11-02 NOTE — DISCHARGE INSTR - LAB
Contact Information: If you have any questions, concerns, pended studies, tests and/or procedures, or emergencies regarding your inpatient behavioral health visit  Please contact [facility] [phone] and ask to speak to a , nurse or physician  A contact is available 24 hours/ 7 days a week at this number  Summary of Procedures Performed During your Stay:  Below is a list of major procedures performed during your hospital stay and a summary of results:  - No major procedures performed  Pending Studies     None        If studies are pending at discharge, follow up with your PCP and/or referring provider

## 2018-11-02 NOTE — PROGRESS NOTES
Patient has various somatic symptoms, she reports having some anxiety r/t discharge  She denies SI and HI  She reports readiness for discharge

## 2018-11-04 ENCOUNTER — HOSPITAL ENCOUNTER (EMERGENCY)
Facility: HOSPITAL | Age: 23
End: 2018-11-06
Attending: EMERGENCY MEDICINE | Admitting: EMERGENCY MEDICINE
Payer: MEDICARE

## 2018-11-04 DIAGNOSIS — F31.9 BIPOLAR DISORDER (HCC): Primary | ICD-10-CM

## 2018-11-04 LAB
AMPHETAMINES SERPL QL SCN: NEGATIVE
BARBITURATES UR QL: NEGATIVE
BENZODIAZ UR QL: NEGATIVE
BILIRUB UR QL STRIP: NEGATIVE
CLARITY UR: CLEAR
COCAINE UR QL: NEGATIVE
COLOR UR: YELLOW
EXT PREG TEST URINE: NEGATIVE
GLUCOSE UR STRIP-MCNC: NEGATIVE MG/DL
HGB UR QL STRIP.AUTO: NEGATIVE
KETONES UR STRIP-MCNC: NEGATIVE MG/DL
LEUKOCYTE ESTERASE UR QL STRIP: NEGATIVE
METHADONE UR QL: NEGATIVE
NITRITE UR QL STRIP: NEGATIVE
OPIATES UR QL SCN: NEGATIVE
PCP UR QL: NEGATIVE
PH UR STRIP.AUTO: 6 [PH] (ref 5–9)
PROT UR STRIP-MCNC: NEGATIVE MG/DL
SP GR UR STRIP.AUTO: 1.02 (ref 1–1.03)
THC UR QL: NEGATIVE
UROBILINOGEN UR QL STRIP.AUTO: 0.2 E.U./DL

## 2018-11-04 PROCEDURE — 80307 DRUG TEST PRSMV CHEM ANLYZR: CPT | Performed by: EMERGENCY MEDICINE

## 2018-11-04 PROCEDURE — 81003 URINALYSIS AUTO W/O SCOPE: CPT | Performed by: EMERGENCY MEDICINE

## 2018-11-04 PROCEDURE — 81025 URINE PREGNANCY TEST: CPT | Performed by: EMERGENCY MEDICINE

## 2018-11-04 PROCEDURE — 99285 EMERGENCY DEPT VISIT HI MDM: CPT

## 2018-11-05 RX ORDER — FAMOTIDINE 20 MG/1
20 TABLET, FILM COATED ORAL 2 TIMES DAILY
Status: DISCONTINUED | OUTPATIENT
Start: 2018-11-05 | End: 2018-11-06 | Stop reason: HOSPADM

## 2018-11-05 RX ORDER — OXCARBAZEPINE 150 MG/1
600 TABLET, FILM COATED ORAL EVERY 12 HOURS SCHEDULED
Status: DISCONTINUED | OUTPATIENT
Start: 2018-11-05 | End: 2018-11-06 | Stop reason: HOSPADM

## 2018-11-05 RX ORDER — TRAZODONE HYDROCHLORIDE 50 MG/1
50 TABLET ORAL ONCE
Status: COMPLETED | OUTPATIENT
Start: 2018-11-05 | End: 2018-11-05

## 2018-11-05 RX ORDER — TRAZODONE HYDROCHLORIDE 50 MG/1
50 TABLET ORAL
Status: DISCONTINUED | OUTPATIENT
Start: 2018-11-05 | End: 2018-11-06 | Stop reason: HOSPADM

## 2018-11-05 RX ORDER — CITALOPRAM 10 MG/1
20 TABLET ORAL DAILY
Status: DISCONTINUED | OUTPATIENT
Start: 2018-11-05 | End: 2018-11-06 | Stop reason: HOSPADM

## 2018-11-05 RX ADMIN — TRAZODONE HYDROCHLORIDE 50 MG: 50 TABLET ORAL at 21:17

## 2018-11-05 RX ADMIN — OXCARBAZEPINE 600 MG: 150 TABLET ORAL at 20:51

## 2018-11-05 RX ADMIN — FAMOTIDINE 20 MG: 20 TABLET, FILM COATED ORAL at 18:53

## 2018-11-05 RX ADMIN — TRAZODONE HYDROCHLORIDE 50 MG: 50 TABLET ORAL at 00:18

## 2018-11-05 RX ADMIN — GABAPENTIN 400 MG: 100 CAPSULE ORAL at 20:50

## 2018-11-05 RX ADMIN — LURASIDONE HYDROCHLORIDE 120 MG: 80 TABLET, FILM COATED ORAL at 20:50

## 2018-11-05 RX ADMIN — CITALOPRAM HYDROBROMIDE 20 MG: 10 TABLET ORAL at 18:53

## 2018-11-05 RX ADMIN — Medication: at 13:40

## 2018-11-05 NOTE — ED NOTES
[address] assessed patient again to see if she was still having HI and she was  Meredith Friends for update and was told they would call us back with update  [address] called and declined patient stating they did not feel that she was voluntary and they are declining her as a voluntary  [facility] was notified and they will be out to screen for voluntary commitment      [facility] arrived to screen patient for committment

## 2018-11-05 NOTE — ED NOTES
Patient laying in bed  Appears to be sleeping  Respirations easy and unlabored  Remains under constant visual observation       Faby Vega RN  11/05/18 [id number]
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
## 2018-11-05 NOTE — ED NOTES
Patient standing and staring inside window in the control room  Asked to sit down , patient wants to see someone  Crisis notified, but busy at the moment        George Cali  11/05/18 7508
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
## 2018-11-05 NOTE — ED NOTES
21:09 - [address] and left a message  Alex Julia stated that they do not take the pt's secondary insurance  [facility] has a bed available  Pt's information was faxed over for review

## 2018-11-06 VITALS
HEART RATE: 97 BPM | TEMPERATURE: 99 F | DIASTOLIC BLOOD PRESSURE: 53 MMHG | RESPIRATION RATE: 18 BRPM | OXYGEN SATURATION: 97 % | SYSTOLIC BLOOD PRESSURE: 107 MMHG

## 2018-11-06 RX ADMIN — GABAPENTIN 400 MG: 100 CAPSULE ORAL at 16:10

## 2018-11-06 RX ADMIN — GABAPENTIN 400 MG: 100 CAPSULE ORAL at 10:24

## 2018-11-06 RX ADMIN — FAMOTIDINE 20 MG: 20 TABLET, FILM COATED ORAL at 10:32

## 2018-11-06 RX ADMIN — CITALOPRAM HYDROBROMIDE 20 MG: 10 TABLET ORAL at 10:23

## 2018-11-06 RX ADMIN — OXCARBAZEPINE 600 MG: 150 TABLET ORAL at 11:21

## 2018-11-06 RX ADMIN — LURASIDONE HYDROCHLORIDE 120 MG: 80 TABLET, FILM COATED ORAL at 07:51

## 2018-11-06 NOTE — ED CARE HANDOFF
Emergency Department Sign Out Note        Sign out and transfer of care from outgoing physician    See Separate Emergency Department note  The patient, Drew Garcia, was evaluated by the previous provider for psychiatric evaluation and placement  Workup Completed:  Patient had full medical workup less than a week ago when she was hospitalized for similar issues  They reviewed by me and she is medically clear for psychiatric placement    ED Course / Workup Pending (followup): Patient is stable and been common cooperative she is going to need to be committed to the hospital for psychiatric placement  Procedures  [facility]  CritCare Time      Disposition  Final diagnoses:   Bipolar disorder ([facility] )     Time reflects when diagnosis was documented in both MDM as applicable and the Disposition within this note     Time User Action Codes Description Comment    11/6/2018  4:04 PM Giana Myles Add [F31 9] Bipolar disorder [facility])       ED Disposition     ED Disposition Condition Comment    Transfer to Another [address] [place] [address] Drive should be transferred out to [facility]        MD Documentation      Most Recent Value   Patient Condition  The patient has been stabilized such that within reasonable medical probability, no material deterioration of the patient condition or the condition of the unborn child(artur) is likely to result from the transfer   Reason for Transfer  Level of Care needed not available at this facility   Benefits of Transfer  Specialized equipment and/or services available at the receiving facility (Include comment)________________________   Risks of Transfer  Potential for delay in receiving treatment   Accepting Physician  [address] Name, Kuldeep Goodman   Sending MD  [facility]   Provider Certification  General risk, such as traffic hazards, adverse weather conditions, rough terrain or turbulence, possible failure of equipment (including vehicle or aircraft), or consequences of actions of persons outside the control of the transport personnel      RN Documentation      Most [address] [facility] [address] Name, [address]      Follow-up Information    None       Discharge Medication List as of 11/6/2018  6:08 PM      CONTINUE these medications which have NOT CHANGED    Details   albuterol (2 5 mg/3 mL) 0 083 % nebulizer solution Inhale, Starting Fri 2/6/2015, Historical Med      citalopram (CeleXA) 20 mg tablet Take 1 tablet (20 mg total) by mouth daily for 30 days, Starting Fri 11/2/2018, Until Sun 12/2/2018, Print      diphenhydrAMINE (BENADRYL) 50 MG tablet Take 1 tablet (50 mg total) by mouth daily at bedtime as needed for sleep, Starting Fri 11/2/2018, No Print      fluticasone (FLONASE) 50 mcg/act nasal spray 2 sprays into each nostril daily, Starting Wed 7/25/2018, Normal      gabapentin (NEURONTIN) 400 mg capsule Take 1 capsule (400 mg total) by mouth 3 (three) times a day for 30 days, Starting Fri 11/2/2018, Until Sun 12/2/2018, Print      loratadine (CLARITIN) 10 mg tablet Take by mouth, Starting Tue 9/5/2017, Historical Med      lurasidone (LATUDA) 120 mg tablet Take 1 tablet (120 mg total) by mouth daily with breakfast for 30 days, Starting Fri 11/2/2018, Until Sun 12/2/2018, Print      OXcarbazepine (TRILEPTAL) 600 mg tablet Take 1 tablet (600 mg total) by mouth 2 (two) times a day for 30 days, Starting Fri 11/2/2018, Until Sun 12/2/2018, Print      ranitidine (ZANTAC) 150 mg tablet Take 1 tablet (150 mg total) by mouth 2 (two) times a day, Starting Wed 7/25/2018, Normal      traZODone (DESYREL) 50 mg tablet Take 1 tablet (50 mg total) by mouth daily at bedtime as needed for sleep for up to 30 days, Starting Fri 11/2/2018, Until Sun 12/2/2018, Print      VENTOLIN  (90 Base) MCG/ACT inhaler Inhale 2 puffs every 4 (four) hours as needed  , Starting Fri 6/15/2018, Historical Med           No discharge procedures on file         ED Provider  Electronically Signed by     Margarette Cruz MD  11/13/18 8644

## 2018-11-06 NOTE — ED NOTES
Pt continues to sleep, repositions self in bed, remains on constant observation     Yu Polanco RN  11/06/18 5935

## 2018-11-06 NOTE — ED NOTES
Pt sleeping quietly in bed  Easily aroused and cooperative with medication  Breakfast tray ordered   Remains on continuous observation     Nathalia Palacios RN  11/06/18 6614

## 2018-11-06 NOTE — ED NOTES
Pt ate 100% of lunch   watchint tv in room   Remains on continuous observation     Susu Wallace RN  11/06/18 4162
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
## 2018-11-06 NOTE — ED NOTES
Patient resting on stretcher, requesting phone to call sister, advised she may not have the phone at this time     Kelton Chopra RN  11/05/18 0475

## 2018-11-06 NOTE — ED NOTES
Dr Armstead Duane aware of pt c/o and request for US, assessment as noted  No new orders given        [address]  11/05/18 2121

## 2018-11-06 NOTE — ED NOTES
11/6/18 @ 0715:  Farida Lopez at [facility] updated on bed search; patient was declined at [facility] and [facility], and is currently referred to [facility]  Loulou Mansfield [address] [place] [address] Way: Patient is accepted at [facility]  Patient is accepted by Dr Raul Akbar per [facility]OR[facility]  Transportation is arranged with [facility]; [facility] declined transport  Transportation is scheduled for 0800 11/7/18      Nurse report is to be called to [phone] prior to patient transfer  ED staff and Aaron Montiel at [facility] notified    Loulou Mansfield MS

## 2018-11-06 NOTE — ED NOTES
16:51 - [facility] called and can transport pt at 18:00  ED staff notified, Luanne Tariq notified, and Magui Vásquez notified      Richard Farmer MA  Crisis Intervention Worker  11/06/18

## 2018-11-06 NOTE — ED NOTES
Pt ate 75% of breakfast   Cooperative    Remains on continuous observation     Rosa Ann RN  11/06/18 1653

## 2018-11-06 NOTE — EMTALA/ACUTE CARE TRANSFER
[facility] EMERGENCY DEPARTMENT  [id number] [id number]  Dept: [phone]      EMTALA TRANSFER CONSENT    NAME Amarilys Jeffrey                                         1995                              MRN [id number]    I have been informed of my rights regarding examination, treatment, and transfer   by Dr Shantelle Estevez MD    Benefits: Specialized equipment and/or services available at the receiving facility (Include comment)________________________    Risks: Potential for delay in receiving treatment      Transfer Request   I acknowledge that my medical condition has been evaluated and explained to me by the emergency department physician or other qualified medical person and/or my attending physician who has recommended and offered to me further medical examination and treatment  I understand the Hospital's obligation with respect to the treatment and stabilization of my emergency medical condition  I nevertheless request to be transferred  I release the Hospital, the doctor, and any other persons caring for me from all responsibility or liability for any injury or ill effects that may result from my transfer and agree to accept all responsibility for the consequences of my choice to transfer, rather than receive stabilizing treatment at the Hospital  I understand that because the transfer is my request, my insurance may not provide reimbursement for the services  The Hospital will assist and direct me and my family in how to make arrangements for transfer, but the hospital is not liable for any fees charged by the transport service  In spite of this understanding, I refuse to consent to further medical examination and treatment which has been offered to me, and request transfer to  [address], [address] : Andrew Chiang   I authorize the performance of emergency medical procedures and treatments upon me in both transit and upon arrival at the receiving facility  Additionally, I authorize the release of any and all medical records to the receiving facility and request they be transported with me, if possible  I authorize the performance of emergency medical procedures and treatments upon me in both transit and upon arrival at the receiving facility  Additionally, I authorize the release of any and all medical records to the receiving facility and request they be transported with me, if possible  I understand that the safest mode of transportation during a medical emergency is an ambulance and that the Hospital advocates the use of this mode of transport  Risks of traveling to the receiving facility by car, including absence of medical control, life sustaining equipment, such as oxygen, and medical personnel has been explained to me and I fully understand them  (NIKOLE CORRECT BOX BELOW)  [  ]  I consent to the stated transfer and to be transported by ambulance/helicopter  [  ]  I consent to the stated transfer, but refuse transportation by ambulance and accept full responsibility for my transportation by car  I understand the risks of non-ambulance transfers and I exonerate the Hospital and its staff from any deterioration in my condition that results from this refusal     X___________________________________________    DATE  18  TIME________  Signature of patient or legally responsible individual signing on patient behalf           RELATIONSHIP TO PATIENT_________________________          Provider Certification    NAME Cande Franco                                         1995                              MRN [id number]    A medical screening exam was performed on the above named patient  Based on the examination:    Condition Necessitating Transfer The encounter diagnosis was Bipolar disorder ([facility] [address] )      Patient Condition: The patient has been stabilized such that within reasonable medical probability, no material deterioration of the patient condition or the condition of the unborn child(artur) is likely to result from the transfer    Reason for Transfer: Level of Care needed not available at this facility    Transfer Requirements: [facility]   · Space available and qualified personnel available for treatment as acknowledged by    · Agreed to accept transfer and to provide appropriate medical treatment as acknowledged by       Boo  · Appropriate medical records of the examination and treatment of the patient are provided at the time of transfer   [facility], [address] _______  · Transfer will be performed by qualified personnel from    and appropriate transfer equipment as required, including the use of necessary and appropriate life support measures  Provider Certification: I have examined the patient and explained the following risks and benefits of being transferred/refusing transfer to the patient/family:  General risk, such as traffic hazards, adverse weather conditions, rough terrain or turbulence, possible failure of equipment (including vehicle or aircraft), or consequences of actions of persons outside the control of the transport personnel      Based on these reasonable risks and benefits to the patient and/or the unborn child(artur), and based upon the information available at the time of the patients examination, I certify that the medical benefits reasonably to be expected from the provision of appropriate medical treatments at another medical facility outweigh the increasing risks, if any, to the individuals medical condition, and in the case of labor to the unborn child, from effecting the transfer      X____________________________________________ DATE 11/06/18        TIME_______      ORIGINAL - SEND TO MEDICAL RECORDS   COPY - SEND WITH PATIENT DURING TRANSFER

## 2018-12-18 ENCOUNTER — TELEPHONE (OUTPATIENT)
Dept: FAMILY MEDICINE CLINIC | Facility: CLINIC | Age: 23
End: 2018-12-18

## 2018-12-18 NOTE — TELEPHONE ENCOUNTER
Pt c/o dizziness/nausea, her period id late but she states she had her tubes tied in October  She thinks it might her blood sugar "because diabetes runs in her famity"  Offered appt but the staff said they can't bring her here nor can they check her sugar    and if she takes an evening appt she will get locked out of her house  She would like a call back asap with advice    @ [phone]

## 2019-03-20 ENCOUNTER — TELEPHONE (OUTPATIENT)
Dept: PSYCHOLOGY | Facility: CLINIC | Age: 24
End: 2019-03-20

## 2019-03-27 ENCOUNTER — OFFICE VISIT (OUTPATIENT)
Dept: URGENT CARE | Facility: CLINIC | Age: 24
End: 2019-03-27
Payer: MEDICARE

## 2019-03-27 VITALS
WEIGHT: 194 LBS | TEMPERATURE: 98 F | BODY MASS INDEX: 33.12 KG/M2 | RESPIRATION RATE: 16 BRPM | HEIGHT: 64 IN | SYSTOLIC BLOOD PRESSURE: 124 MMHG | OXYGEN SATURATION: 98 % | HEART RATE: 94 BPM | DIASTOLIC BLOOD PRESSURE: 66 MMHG

## 2019-03-27 DIAGNOSIS — Z76.0 MEDICATION REFILL: ICD-10-CM

## 2019-03-27 DIAGNOSIS — F41.9 ANXIETY: Primary | ICD-10-CM

## 2019-03-27 PROCEDURE — G0463 HOSPITAL OUTPT CLINIC VISIT: HCPCS | Performed by: NURSE PRACTITIONER

## 2019-03-27 PROCEDURE — 99203 OFFICE O/P NEW LOW 30 MIN: CPT | Performed by: NURSE PRACTITIONER

## 2019-03-27 RX ORDER — TRAZODONE HYDROCHLORIDE 100 MG/1
100 TABLET ORAL
COMMUNITY
End: 2020-01-10 | Stop reason: ALTCHOICE

## 2019-03-27 RX ORDER — CLONAZEPAM 1 MG/1
1 TABLET ORAL 2 TIMES DAILY
Qty: 14 TABLET | Refills: 0 | Status: SHIPPED | OUTPATIENT
Start: 2019-03-27 | End: 2019-04-01 | Stop reason: SDUPTHER

## 2019-03-27 RX ORDER — OXCARBAZEPINE 600 MG/1
300 TABLET, FILM COATED ORAL EVERY 12 HOURS SCHEDULED
COMMUNITY
End: 2020-01-10 | Stop reason: ALTCHOICE

## 2019-03-27 RX ORDER — CLONAZEPAM 1 MG/1
1 TABLET ORAL 2 TIMES DAILY
COMMUNITY
End: 2019-03-27 | Stop reason: SDUPTHER

## 2019-03-27 RX ORDER — ZIPRASIDONE HYDROCHLORIDE 40 MG/1
40 CAPSULE ORAL 2 TIMES DAILY WITH MEALS
COMMUNITY
End: 2020-01-10 | Stop reason: ALTCHOICE
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
## 2019-03-27 NOTE — PATIENT INSTRUCTIONS
Anxiety   WHAT YOU NEED TO KNOW:   Anxiety is a condition that causes you to feel extremely worried or nervous  The feelings are so strong that they can cause problems with your daily activities or sleep  Anxiety may be triggered by something you fear, or it may happen without a cause  Family or work stress, smoking, caffeine, and alcohol can increase your risk for anxiety  Certain medicines or health conditions can also increase your risk  Anxiety can become a long-term condition if it is not managed or treated  DISCHARGE INSTRUCTIONS:   Call 911 if:   · You have chest pain, tightness, or heaviness that may spread to your shoulders, arms, jaw, neck, or back  · You feel like hurting yourself or someone else  Contact your healthcare provider if:   · Your symptoms get worse or do not get better with treatment  · Your anxiety keeps you from doing your regular daily activities  · You have new symptoms since your last visit  · You have questions or concerns about your condition or care  Medicines:   · Medicines  may be given to help you feel more calm and relaxed, and decrease your symptoms  · Take your medicine as directed  Contact your healthcare provider if you think your medicine is not helping or if you have side effects  Tell him of her if you are allergic to any medicine  Keep a list of the medicines, vitamins, and herbs you take  Include the amounts, and when and why you take them  Bring the list or the pill bottles to follow-up visits  Carry your medicine list with you in case of an emergency  Follow up with your healthcare provider within 2 weeks or as directed:  Write down your questions so you remember to ask them during your visits  Manage anxiety:   · Talk to someone about your anxiety  Your healthcare provider may suggest counseling  Cognitive behavioral therapy can help you understand and change how you react to events that trigger your symptoms   You might feel more comfortable talking with a friend or family member about your anxiety  Choose someone you know will be supportive and encouraging  · Find ways to relax  Activities such as exercise, meditation, or listening to music can help you relax  Spend time with friends, or do things you enjoy  · Practice deep breathing  Deep breathing can help you relax when you feel anxious  Focus on taking slow, deep breaths several times a day, or during an anxiety attack  Breathe in through your nose and out through your mouth  · Create a regular sleep routine  Regular sleep can help you feel calmer during the day  Go to sleep and wake up at the same times every day  Do not watch television or use the computer right before bed  Your room should be comfortable, dark, and quiet  · Eat a variety of healthy foods  Healthy foods include fruits, vegetables, low-fat dairy products, lean meats, fish, whole-grain breads, and cooked beans  Healthy foods can help you feel less anxious and have more energy  · Exercise regularly  Exercise can increase your energy level  Exercise may also lift your mood and help you sleep better  Your healthcare provider can help you create an exercise plan  · Do not smoke  Nicotine and other chemicals in cigarettes and cigars can increase anxiety  Ask your healthcare provider for information if you currently smoke and need help to quit  E-cigarettes or smokeless tobacco still contain nicotine  Talk to your healthcare provider before you use these products  · Do not have caffeine  Caffeine can make your symptoms worse  Do not have foods or drinks that are meant to increase your energy level  · Limit or do not drink alcohol  Ask your healthcare provider if alcohol is safe for you  You may not be able to drink alcohol if you take certain anxiety or depression medicines  Limit alcohol to 1 drink per day if you are a woman  Limit alcohol to 2 drinks per day if you are a man   A drink of alcohol is 12 ounces of beer, 5 ounces of wine, or 1½ ounces of liquor  · Do not use drugs  Drugs can make your anxiety worse  It can also make anxiety hard to manage  Talk to your healthcare provider if you use drugs and want help to quit  © 2017 [address] [employer] Information is for End User's use only and may not be sold, redistributed or otherwise used for commercial purposes  All illustrations and images included in CareNotes® are the copyrighted property of A D A M , [employer]  or Rosalio Cano  The above information is an  only  It is not intended as medical advice for individual conditions or treatments  Talk to your doctor, nurse or pharmacist before following any medical regimen to see if it is safe and effective for you

## 2019-04-01 ENCOUNTER — OFFICE VISIT (OUTPATIENT)
Dept: PSYCHOLOGY | Facility: CLINIC | Age: 24
End: 2019-04-01
Payer: MEDICARE

## 2019-04-01 VITALS
BODY MASS INDEX: 39.61 KG/M2 | HEIGHT: 64 IN | DIASTOLIC BLOOD PRESSURE: 70 MMHG | HEART RATE: 98 BPM | SYSTOLIC BLOOD PRESSURE: 118 MMHG | WEIGHT: 232 LBS

## 2019-04-01 DIAGNOSIS — F31.64 BIPOLAR I DISORDER, MOST RECENT EPISODE MIXED, SEVERE WITH PSYCHOTIC FEATURES (HCC): Primary | Chronic | ICD-10-CM

## 2019-04-01 PROCEDURE — 90791 PSYCH DIAGNOSTIC EVALUATION: CPT

## 2019-04-01 PROCEDURE — G0410 GRP PSYCH PARTIAL HOSP 45-50: HCPCS

## 2019-04-01 PROCEDURE — G0177 OPPS/PHP; TRAIN & EDUC SERV: HCPCS

## 2019-04-02 ENCOUNTER — OFFICE VISIT (OUTPATIENT)
Dept: PSYCHOLOGY | Facility: CLINIC | Age: 24
End: 2019-04-02
Payer: MEDICARE

## 2019-04-02 VITALS — RESPIRATION RATE: 22 BRPM | DIASTOLIC BLOOD PRESSURE: 60 MMHG | SYSTOLIC BLOOD PRESSURE: 108 MMHG | HEART RATE: 92 BPM

## 2019-04-02 DIAGNOSIS — F32.9 MAJOR DEPRESSION: ICD-10-CM

## 2019-04-02 DIAGNOSIS — F41.9 ANXIETY: ICD-10-CM

## 2019-04-02 DIAGNOSIS — Z76.0 MEDICATION REFILL: ICD-10-CM

## 2019-04-02 DIAGNOSIS — F31.64 BIPOLAR I DISORDER, MOST RECENT EPISODE MIXED, SEVERE WITH PSYCHOTIC FEATURES (HCC): Primary | ICD-10-CM

## 2019-04-02 PROCEDURE — G0177 OPPS/PHP; TRAIN & EDUC SERV: HCPCS

## 2019-04-02 PROCEDURE — G0410 GRP PSYCH PARTIAL HOSP 45-50: HCPCS

## 2019-04-02 RX ORDER — CLONAZEPAM 1 MG/1
1 TABLET ORAL 2 TIMES DAILY
Qty: 14 TABLET | Refills: 0 | Status: SHIPPED | OUTPATIENT
Start: 2019-04-02 | End: 2020-01-10 | Stop reason: ALTCHOICE

## 2019-04-02 RX ORDER — CLONAZEPAM 1 MG/1
1 TABLET ORAL 2 TIMES DAILY
Qty: 14 TABLET | Refills: 0 | Status: SHIPPED | OUTPATIENT
Start: 2019-04-02 | End: 2019-04-02

## 2019-04-02 RX ORDER — ESCITALOPRAM OXALATE 10 MG/1
10 TABLET ORAL DAILY
Qty: 30 TABLET | Refills: 1 | Status: SHIPPED | OUTPATIENT
Start: 2019-04-02 | End: 2020-01-10 | Stop reason: ALTCHOICE

## 2019-04-02 RX ORDER — ESCITALOPRAM OXALATE 10 MG/1
10 TABLET ORAL DAILY
Qty: 30 TABLET | Refills: 1 | Status: SHIPPED | OUTPATIENT
Start: 2019-04-02 | End: 2019-04-02

## 2019-04-03 ENCOUNTER — APPOINTMENT (OUTPATIENT)
Dept: PSYCHOLOGY | Facility: CLINIC | Age: 24
End: 2019-04-03
Payer: MEDICARE

## 2019-04-04 ENCOUNTER — OFFICE VISIT (OUTPATIENT)
Dept: PSYCHOLOGY | Facility: CLINIC | Age: 24
End: 2019-04-04
Payer: MEDICARE

## 2019-04-04 ENCOUNTER — DOCUMENTATION (OUTPATIENT)
Dept: PSYCHOLOGY | Facility: CLINIC | Age: 24
End: 2019-04-04

## 2019-04-04 VITALS — SYSTOLIC BLOOD PRESSURE: 104 MMHG | HEART RATE: 80 BPM | DIASTOLIC BLOOD PRESSURE: 70 MMHG | RESPIRATION RATE: 20 BRPM

## 2019-04-04 DIAGNOSIS — F31.64 BIPOLAR I DISORDER, MOST RECENT EPISODE MIXED, SEVERE WITH PSYCHOTIC FEATURES (HCC): Primary | Chronic | ICD-10-CM

## 2019-04-04 PROCEDURE — G0177 OPPS/PHP; TRAIN & EDUC SERV: HCPCS

## 2019-04-04 PROCEDURE — G0410 GRP PSYCH PARTIAL HOSP 45-50: HCPCS

## 2019-04-05 ENCOUNTER — DOCUMENTATION (OUTPATIENT)
Dept: PSYCHOLOGY | Facility: CLINIC | Age: 24
End: 2019-04-05

## 2019-04-05 ENCOUNTER — APPOINTMENT (OUTPATIENT)
Dept: PSYCHOLOGY | Facility: CLINIC | Age: 24
End: 2019-04-05
Payer: MEDICARE

## 2019-04-08 ENCOUNTER — OFFICE VISIT (OUTPATIENT)
Dept: PSYCHOLOGY | Facility: CLINIC | Age: 24
End: 2019-04-08
Payer: MEDICARE

## 2019-04-08 ENCOUNTER — APPOINTMENT (OUTPATIENT)
Dept: PSYCHOLOGY | Facility: CLINIC | Age: 24
End: 2019-04-08
Payer: MEDICARE

## 2019-04-08 DIAGNOSIS — F31.64 BIPOLAR I DISORDER, MOST RECENT EPISODE MIXED, SEVERE WITH PSYCHOTIC FEATURES (HCC): Primary | Chronic | ICD-10-CM

## 2019-04-08 DIAGNOSIS — F90.2 ATTENTION DEFICIT HYPERACTIVITY DISORDER, COMBINED TYPE: Chronic | ICD-10-CM

## 2019-04-08 PROCEDURE — G0177 OPPS/PHP; TRAIN & EDUC SERV: HCPCS

## 2019-04-08 PROCEDURE — G0410 GRP PSYCH PARTIAL HOSP 45-50: HCPCS

## 2019-04-09 ENCOUNTER — OFFICE VISIT (OUTPATIENT)
Dept: PSYCHOLOGY | Facility: CLINIC | Age: 24
End: 2019-04-09
Payer: MEDICARE

## 2019-04-09 ENCOUNTER — DOCUMENTATION (OUTPATIENT)
Dept: PSYCHOLOGY | Facility: CLINIC | Age: 24
End: 2019-04-09
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
## 2019-04-09 NOTE — PSYCH
Subjective:     Patient ID: Mary Carmen Delgado is a 26 y o  female  [facility] Discharge Summary:   Admission Date: 4/1/19  Patient was referred by [facility]  Discharge Date: 4/9/19  Was this a routine discharge? no   Diagnosis: Axis I: Bipolar I disorder, most recent episode mixed, severe with psychotic features [facility])  Treating Physician: Dr Meri Silva  Treatment Complications: inability to follow program rules, outburst during groups    Presenting Need:  Per Dr Harper Seip:  Mary Carmen Delgado is a 26 y o  female with past psychiatric history of Bipolar Disorder, Anxiety  is admitted to [facility] referred by José Cee at [facility] in [place]  Today [place] Suarez is defiant, demanding and non cooperative in interview  Pt states she does not want to anwer questions because "it should be in my paperwork" and because this provider is not her therapist   She states she has problems with anger, sadness, poor sleep and appetite  She is easily agitated and has poor impulse control  Per this writer:  Mary Carmen Delgado referred to [facility] following inpatient hospitalization at [facility] due to behavioral concerns and increased depression  She is a poor historian  She states, "I don't want to be here "  She expressed resistance towards program rules when reviewed  She reports behavioral issues and states, "I have the mindset of a 9year old  I need the attention "   She struggles with feelings of hopelessness, helplessness, poor appetite, isolation, and poor motivation  Stressors include her move in March '18 to [place] from [place] and her sister, Andre Almaguer whom she has no contact with, having custody of her 10 month old daughter  She reports a history of self mutilation via cutting with a razor blade on her left forearm most recently 2 months ago  She identifies she cuts to relieve anger and stress but did not find it helpful        Course of treatment includes:    group counseling, medication management, individual case management, psychoeducation, psychiatric evaluation, family counseling and family contact father     Treatment Progress: [place] Roberto attended 5 days of PHP focusing on distress tolerance skills, activating wellness tools, and creating structure  She had difficulty with interpersonal skills within the group setting  Defiant and demanding and non-cooperative at times with difficulty re-directing  Program rules were reviewed multiple times with [place] due to her lack of compliance with exchanging phone numbers with peers, leaving premises with peers during program hours, and getting in peers cars during program hours  On 4/9/19 the treatment team met with [place] to discuss concerns of her inability to follow program rules  [place] had an outburst, became disrespectful towards staff, and defiant  Other group members had to be removed from the room due to her vulgar language and refusing to remove self from group setting  Recommended discharge due to non-compliance with program rules  Staff met with [place]'s father prior to discharge  He was provided [employer] and Crisis phone number  Aftercare recommendations include:  J[place] and her father were provided brochures on local mental health providers  They were also encouraged to call [place]'s insurance company to obtain more providers in network with her insurance  Outpatient medication management and individual therapy were recommended  Crisis phone number provided to [place]'s father  Utilize WRAP, support groups, keep appointments, and take medication as prescribed       Discharge Medications include:  Current Outpatient Medications:   •  albuterol (2 5 mg/3 mL) 0 083 % nebulizer solution, Inhale, Disp: , Rfl:   •  clonazePAM (KlonoPIN) 1 mg tablet, Take 1 tablet (1 mg total) by mouth 2 (two) times a day for 7 days, Disp: 14 tablet, Rfl: 0  •  escitalopram (LEXAPRO) 10 mg tablet, Take 1 tablet (10 mg total) by mouth daily, Disp: 30 tablet, Rfl: 1  •  OXcarbazepine (TRILEPTAL) 600 mg tablet, Take 300 mg by mouth every 12 (twelve) hours, Disp: , Rfl:   •  traZODone (DESYREL) 100 mg tablet, Take 100 mg by mouth daily at bedtime, Disp: , Rfl:   •  ziprasidone (GEODON) 40 mg capsule, Take 40 mg by mouth 2 (two) times a day with meals, Disp: , Rfl:

## 2019-04-10 ENCOUNTER — APPOINTMENT (OUTPATIENT)
Dept: PSYCHOLOGY | Facility: CLINIC | Age: 24
End: 2019-04-10
Payer: MEDICARE

## 2019-04-11 ENCOUNTER — APPOINTMENT (OUTPATIENT)
Dept: PSYCHOLOGY | Facility: CLINIC | Age: 24
End: 2019-04-11
Payer: MEDICARE

## 2019-04-12 ENCOUNTER — APPOINTMENT (OUTPATIENT)
Dept: PSYCHOLOGY | Facility: CLINIC | Age: 24
End: 2019-04-12
Payer: MEDICARE

## 2019-05-02 ENCOUNTER — HOSPITAL ENCOUNTER (OUTPATIENT)
Dept: RADIOLOGY | Facility: HOSPITAL | Age: 24
Discharge: HOME | End: 2019-05-02
Payer: MEDICARE

## 2019-05-02 ENCOUNTER — APPOINTMENT (OUTPATIENT)
Dept: LAB | Facility: HOSPITAL | Age: 24
End: 2019-05-02
Payer: MEDICARE

## 2019-05-02 ENCOUNTER — TRANSCRIBE ORDERS (OUTPATIENT)
Dept: ADMINISTRATIVE | Facility: HOSPITAL | Age: 24
End: 2019-05-02

## 2019-05-02 DIAGNOSIS — E55.9 VITAMIN D DEFICIENCY DISEASE: ICD-10-CM

## 2019-05-02 DIAGNOSIS — Z13.9 SCREENING FOR CONDITION: ICD-10-CM

## 2019-05-02 DIAGNOSIS — M19.90 OSTEOARTHRITIS, UNSPECIFIED OSTEOARTHRITIS TYPE, UNSPECIFIED SITE: ICD-10-CM

## 2019-05-02 DIAGNOSIS — M19.90 OSTEOARTHRITIS, UNSPECIFIED OSTEOARTHRITIS TYPE, UNSPECIFIED SITE: Primary | ICD-10-CM

## 2019-05-02 LAB
25(OH)D3 SERPL-MCNC: 23.4 NG/ML (ref 30–100)
ALBUMIN SERPL BCP-MCNC: 3.7 G/DL (ref 3.5–5)
ALP SERPL-CCNC: 88 U/L (ref 46–116)
ALT SERPL W P-5'-P-CCNC: 26 U/L (ref 12–78)
ANION GAP SERPL CALCULATED.3IONS-SCNC: 9 MMOL/L (ref 4–13)
AST SERPL W P-5'-P-CCNC: 16 U/L (ref 5–45)
BASOPHILS # BLD AUTO: 0.06 THOUSANDS/ΜL (ref 0–0.1)
BASOPHILS NFR BLD AUTO: 1 % (ref 0–1)
BILIRUB SERPL-MCNC: 0.3 MG/DL (ref 0.2–1)
BUN SERPL-MCNC: 13 MG/DL (ref 5–25)
CALCIUM SERPL-MCNC: 9.2 MG/DL (ref 8.3–10.1)
CHLORIDE SERPL-SCNC: 103 MMOL/L (ref 100–108)
CHOLEST SERPL-MCNC: 219 MG/DL (ref 50–200)
CO2 SERPL-SCNC: 28 MMOL/L (ref 21–32)
CREAT SERPL-MCNC: 0.86 MG/DL (ref 0.6–1.3)
CRP SERPL QL: 4.6 MG/L
EOSINOPHIL # BLD AUTO: 0.36 THOUSAND/ΜL (ref 0–0.61)
EOSINOPHIL NFR BLD AUTO: 4 % (ref 0–6)
ERYTHROCYTE [DISTWIDTH] IN BLOOD BY AUTOMATED COUNT: 13 % (ref 11.6–15.1)
ERYTHROCYTE [SEDIMENTATION RATE] IN BLOOD: 12 MM/HOUR (ref 0–20)
EST. AVERAGE GLUCOSE BLD GHB EST-MCNC: 105 MG/DL
GFR SERPL CREATININE-BSD FRML MDRD: 96 ML/MIN/1.73SQ M
GLUCOSE P FAST SERPL-MCNC: 98 MG/DL (ref 65–99)
HBA1C MFR BLD: 5.3 % (ref 4.2–6.3)
HCT VFR BLD AUTO: 42.5 % (ref 34.8–46.1)
HDLC SERPL-MCNC: 71 MG/DL (ref 40–60)
HGB BLD-MCNC: 13.7 G/DL (ref 11.5–15.4)
IMM GRANULOCYTES # BLD AUTO: 0.03 THOUSAND/UL (ref 0–0.2)
IMM GRANULOCYTES NFR BLD AUTO: 0 % (ref 0–2)
INSULIN SERPL-ACNC: 17.8 MU/L (ref 3–25)
LDLC SERPL CALC-MCNC: 128 MG/DL (ref 0–100)
LYMPHOCYTES # BLD AUTO: 4.11 THOUSANDS/ΜL (ref 0.6–4.47)
LYMPHOCYTES NFR BLD AUTO: 47 % (ref 14–44)
MCH RBC QN AUTO: 29.5 PG (ref 26.8–34.3)
MCHC RBC AUTO-ENTMCNC: 32.2 G/DL (ref 31.4–37.4)
MCV RBC AUTO: 91 FL (ref 82–98)
MONOCYTES # BLD AUTO: 0.71 THOUSAND/ΜL (ref 0.17–1.22)
MONOCYTES NFR BLD AUTO: 8 % (ref 4–12)
NEUTROPHILS # BLD AUTO: 3.5 THOUSANDS/ΜL (ref 1.85–7.62)
NEUTS SEG NFR BLD AUTO: 40 % (ref 43–75)
NONHDLC SERPL-MCNC: 148 MG/DL
NRBC BLD AUTO-RTO: 0 /100 WBCS
PLATELET # BLD AUTO: 340 THOUSANDS/UL (ref 149–390)
PMV BLD AUTO: 8.8 FL (ref 8.9–12.7)
POTASSIUM SERPL-SCNC: 3.8 MMOL/L (ref 3.5–5.3)
PROT SERPL-MCNC: 7.6 G/DL (ref 6.4–8.2)
RBC # BLD AUTO: 4.65 MILLION/UL (ref 3.81–5.12)
SODIUM SERPL-SCNC: 140 MMOL/L (ref 136–145)
TRIGL SERPL-MCNC: 99 MG/DL
TSH SERPL DL<=0.05 MIU/L-ACNC: 6.45 UIU/ML (ref 0.36–3.74)
WBC # BLD AUTO: 8.77 THOUSAND/UL (ref 4.31–10.16)

## 2019-05-02 PROCEDURE — 73562 X-RAY EXAM OF KNEE 3: CPT

## 2019-05-02 PROCEDURE — 36415 COLL VENOUS BLD VENIPUNCTURE: CPT

## 2019-05-02 PROCEDURE — 85652 RBC SED RATE AUTOMATED: CPT

## 2019-05-02 PROCEDURE — 86140 C-REACTIVE PROTEIN: CPT

## 2019-05-02 PROCEDURE — 83525 ASSAY OF INSULIN: CPT

## 2019-05-02 PROCEDURE — 80053 COMPREHEN METABOLIC PANEL: CPT

## 2019-05-02 PROCEDURE — 80061 LIPID PANEL: CPT

## 2019-05-02 PROCEDURE — 84443 ASSAY THYROID STIM HORMONE: CPT

## 2019-05-02 PROCEDURE — 85025 COMPLETE CBC W/AUTO DIFF WBC: CPT

## 2019-05-02 PROCEDURE — 82306 VITAMIN D 25 HYDROXY: CPT

## 2019-05-02 PROCEDURE — 83036 HEMOGLOBIN GLYCOSYLATED A1C: CPT

## 2019-05-22 ENCOUNTER — HOSPITAL ENCOUNTER (EMERGENCY)
Facility: HOSPITAL | Age: 24
Discharge: HOME | End: 2019-05-22
Attending: EMERGENCY MEDICINE | Admitting: EMERGENCY MEDICINE
Payer: MEDICARE

## 2019-05-22 ENCOUNTER — APPOINTMENT (EMERGENCY)
Dept: RADIOLOGY | Facility: HOSPITAL | Age: 24
End: 2019-05-22
Payer: MEDICARE

## 2019-05-22 ENCOUNTER — APPOINTMENT (EMERGENCY)
Dept: CT IMAGING | Facility: HOSPITAL | Age: 24
End: 2019-05-22
Payer: MEDICARE

## 2019-05-22 VITALS
HEIGHT: 64 IN | RESPIRATION RATE: 18 BRPM | SYSTOLIC BLOOD PRESSURE: 126 MMHG | BODY MASS INDEX: 40.2 KG/M2 | HEART RATE: 80 BPM | OXYGEN SATURATION: 99 % | TEMPERATURE: 98 F | WEIGHT: 235.45 LBS | DIASTOLIC BLOOD PRESSURE: 69 MMHG

## 2019-05-22 DIAGNOSIS — N39.0 UTI (URINARY TRACT INFECTION): ICD-10-CM

## 2019-05-22 DIAGNOSIS — R42 DIZZINESS: Primary | ICD-10-CM

## 2019-05-22 LAB
ALBUMIN SERPL BCP-MCNC: 3.8 G/DL (ref 3.5–5)
ALP SERPL-CCNC: 91 U/L (ref 46–116)
ALT SERPL W P-5'-P-CCNC: 20 U/L (ref 12–78)
ANION GAP SERPL CALCULATED.3IONS-SCNC: 7 MMOL/L (ref 4–13)
AST SERPL W P-5'-P-CCNC: 15 U/L (ref 5–45)
BACTERIA UR QL AUTO: ABNORMAL /HPF
BASOPHILS # BLD AUTO: 0.06 THOUSANDS/ΜL (ref 0–0.1)
BASOPHILS NFR BLD AUTO: 1 % (ref 0–1)
BILIRUB SERPL-MCNC: 0.2 MG/DL (ref 0.2–1)
BILIRUB UR QL STRIP: NEGATIVE
BUN SERPL-MCNC: 12 MG/DL (ref 5–25)
CALCIUM SERPL-MCNC: 9.3 MG/DL (ref 8.3–10.1)
CHLORIDE SERPL-SCNC: 102 MMOL/L (ref 100–108)
CLARITY UR: ABNORMAL
CO2 SERPL-SCNC: 28 MMOL/L (ref 21–32)
COLOR UR: YELLOW
CREAT SERPL-MCNC: 0.82 MG/DL (ref 0.6–1.3)
EOSINOPHIL # BLD AUTO: 0.32 THOUSAND/ΜL (ref 0–0.61)
EOSINOPHIL NFR BLD AUTO: 3 % (ref 0–6)
ERYTHROCYTE [DISTWIDTH] IN BLOOD BY AUTOMATED COUNT: 12.8 % (ref 11.6–15.1)
EXT PREG TEST URINE: NEGATIVE
GFR SERPL CREATININE-BSD FRML MDRD: 102 ML/MIN/1.73SQ M
GLUCOSE SERPL-MCNC: 89 MG/DL (ref 65–140)
GLUCOSE UR STRIP-MCNC: NEGATIVE MG/DL
HCT VFR BLD AUTO: 44.8 % (ref 34.8–46.1)
HGB BLD-MCNC: 14.4 G/DL (ref 11.5–15.4)
HGB UR QL STRIP.AUTO: NEGATIVE
IMM GRANULOCYTES # BLD AUTO: 0.06 THOUSAND/UL (ref 0–0.2)
IMM GRANULOCYTES NFR BLD AUTO: 1 % (ref 0–2)
KETONES UR STRIP-MCNC: NEGATIVE MG/DL
LACTATE SERPL-SCNC: 0.7 MMOL/L (ref 0.5–2)
LEUKOCYTE ESTERASE UR QL STRIP: ABNORMAL
LIPASE SERPL-CCNC: 180 U/L (ref 73–393)
LYMPHOCYTES # BLD AUTO: 3.36 THOUSANDS/ΜL (ref 0.6–4.47)
LYMPHOCYTES NFR BLD AUTO: 27 % (ref 14–44)
MAGNESIUM SERPL-MCNC: 2 MG/DL (ref 1.6–2.6)
MCH RBC QN AUTO: 29.4 PG (ref 26.8–34.3)
MCHC RBC AUTO-ENTMCNC: 32.1 G/DL (ref 31.4–37.4)
MCV RBC AUTO: 92 FL (ref 82–98)
MONOCYTES # BLD AUTO: 0.89 THOUSAND/ΜL (ref 0.17–1.22)
MONOCYTES NFR BLD AUTO: 7 % (ref 4–12)
NEUTROPHILS # BLD AUTO: 7.62 THOUSANDS/ΜL (ref 1.85–7.62)
NEUTS SEG NFR BLD AUTO: 61 % (ref 43–75)
NITRITE UR QL STRIP: NEGATIVE
NON-SQ EPI CELLS URNS QL MICRO: ABNORMAL /HPF
NRBC BLD AUTO-RTO: 0 /100 WBCS
NT-PROBNP SERPL-MCNC: 96 PG/ML
PH UR STRIP.AUTO: 6.5 [PH]
PLATELET # BLD AUTO: 322 THOUSANDS/UL (ref 149–390)
PMV BLD AUTO: 8.7 FL (ref 8.9–12.7)
POTASSIUM SERPL-SCNC: 4 MMOL/L (ref 3.5–5.3)
PROT SERPL-MCNC: 7.9 G/DL (ref 6.4–8.2)
PROT UR STRIP-MCNC: NEGATIVE MG/DL
RBC # BLD AUTO: 4.89 MILLION/UL (ref 3.81–5.12)
RBC #/AREA URNS AUTO: ABNORMAL /HPF
SODIUM SERPL-SCNC: 137 MMOL/L (ref 136–145)
SP GR UR STRIP.AUTO: 1.01 (ref 1–1.03)
TROPONIN I SERPL-MCNC: <0.02 NG/ML
UROBILINOGEN UR QL STRIP.AUTO: 0.2 E.U./DL
WBC # BLD AUTO: 12.31 THOUSAND/UL (ref 4.31–10.16)
WBC #/AREA URNS AUTO: ABNORMAL /HPF

## 2019-05-22 PROCEDURE — 84484 ASSAY OF TROPONIN QUANT: CPT | Performed by: EMERGENCY MEDICINE

## 2019-05-22 PROCEDURE — 36415 COLL VENOUS BLD VENIPUNCTURE: CPT | Performed by: EMERGENCY MEDICINE

## 2019-05-22 PROCEDURE — 96374 THER/PROPH/DIAG INJ IV PUSH: CPT

## 2019-05-22 PROCEDURE — 83735 ASSAY OF MAGNESIUM: CPT | Performed by: EMERGENCY MEDICINE

## 2019-05-22 PROCEDURE — 83880 ASSAY OF NATRIURETIC PEPTIDE: CPT | Performed by: EMERGENCY MEDICINE

## 2019-05-22 PROCEDURE — 96361 HYDRATE IV INFUSION ADD-ON: CPT

## 2019-05-22 PROCEDURE — 83690 ASSAY OF LIPASE: CPT | Performed by: EMERGENCY MEDICINE

## 2019-05-22 PROCEDURE — 83605 ASSAY OF LACTIC ACID: CPT | Performed by: EMERGENCY MEDICINE

## 2019-05-22 PROCEDURE — 93005 ELECTROCARDIOGRAM TRACING: CPT

## 2019-05-22 PROCEDURE — 87086 URINE CULTURE/COLONY COUNT: CPT | Performed by: EMERGENCY MEDICINE

## 2019-05-22 PROCEDURE — 99284 EMERGENCY DEPT VISIT MOD MDM: CPT | Performed by: EMERGENCY MEDICINE

## 2019-05-22 PROCEDURE — 81001 URINALYSIS AUTO W/SCOPE: CPT | Performed by: EMERGENCY MEDICINE

## 2019-05-22 PROCEDURE — 80053 COMPREHEN METABOLIC PANEL: CPT | Performed by: EMERGENCY MEDICINE

## 2019-05-22 PROCEDURE — 85025 COMPLETE CBC W/AUTO DIFF WBC: CPT | Performed by: EMERGENCY MEDICINE

## 2019-05-22 PROCEDURE — 70450 CT HEAD/BRAIN W/O DYE: CPT

## 2019-05-22 PROCEDURE — 99285 EMERGENCY DEPT VISIT HI MDM: CPT

## 2019-05-22 PROCEDURE — 71046 X-RAY EXAM CHEST 2 VIEWS: CPT

## 2019-05-22 PROCEDURE — 81025 URINE PREGNANCY TEST: CPT | Performed by: EMERGENCY MEDICINE

## 2019-05-22 RX ORDER — MECLIZINE HYDROCHLORIDE 25 MG/1
25 TABLET ORAL 3 TIMES DAILY PRN
Qty: 9 TABLET | Refills: 0 | Status: SHIPPED | OUTPATIENT
Start: 2019-05-22 | End: 2020-01-10 | Stop reason: ALTCHOICE

## 2019-05-22 RX ORDER — CEPHALEXIN 250 MG/1
500 CAPSULE ORAL ONCE
Status: COMPLETED | OUTPATIENT
Start: 2019-05-22 | End: 2019-05-22

## 2019-05-22 RX ORDER — KETOROLAC TROMETHAMINE 30 MG/ML
30 INJECTION, SOLUTION INTRAMUSCULAR; INTRAVENOUS ONCE
Status: COMPLETED | OUTPATIENT
Start: 2019-05-22 | End: 2019-05-22

## 2019-05-22 RX ORDER — DIAZEPAM 5 MG/1
5 TABLET ORAL ONCE
Status: DISCONTINUED | OUTPATIENT
Start: 2019-05-22 | End: 2019-05-22

## 2019-05-22 RX ORDER — MECLIZINE HCL 12.5 MG/1
25 TABLET ORAL ONCE
Status: COMPLETED | OUTPATIENT
Start: 2019-05-22 | End: 2019-05-22

## 2019-05-22 RX ORDER — CEPHALEXIN 500 MG/1
500 CAPSULE ORAL EVERY 6 HOURS SCHEDULED
Qty: 12 CAPSULE | Refills: 0 | Status: SHIPPED | OUTPATIENT
Start: 2019-05-22 | End: 2019-05-25

## 2019-05-22 RX ADMIN — KETOROLAC TROMETHAMINE 30 MG: 30 INJECTION, SOLUTION INTRAMUSCULAR at 19:38

## 2019-05-22 RX ADMIN — MECLIZINE 25 MG: 12.5 TABLET ORAL at 19:36

## 2019-05-22 RX ADMIN — CEPHALEXIN 500 MG: 250 CAPSULE ORAL at 21:22

## 2019-05-22 RX ADMIN — SODIUM CHLORIDE 1000 ML: 0.9 INJECTION, SOLUTION INTRAVENOUS at 20:07

## 2019-05-23 LAB
ATRIAL RATE: 79 BPM
P AXIS: 34 DEGREES
PR INTERVAL: 142 MS
QRS AXIS: 6 DEGREES
QRSD INTERVAL: 84 MS
QT INTERVAL: 362 MS
QTC INTERVAL: 415 MS
T WAVE AXIS: 19 DEGREES
VENTRICULAR RATE: 79 BPM

## 2019-05-23 PROCEDURE — 93010 ELECTROCARDIOGRAM REPORT: CPT | Performed by: INTERNAL MEDICINE

## 2019-05-24 LAB — BACTERIA UR CULT: NORMAL

## 2019-05-25 ENCOUNTER — HOSPITAL ENCOUNTER (EMERGENCY)
Facility: HOSPITAL | Age: 24
Discharge: HOME | End: 2019-05-25
Attending: EMERGENCY MEDICINE | Admitting: EMERGENCY MEDICINE
Payer: MEDICARE

## 2019-05-25 VITALS
OXYGEN SATURATION: 95 % | SYSTOLIC BLOOD PRESSURE: 122 MMHG | HEIGHT: 64 IN | RESPIRATION RATE: 18 BRPM | BODY MASS INDEX: 39.37 KG/M2 | HEART RATE: 89 BPM | WEIGHT: 230.6 LBS | DIASTOLIC BLOOD PRESSURE: 62 MMHG | TEMPERATURE: 98.5 F

## 2019-05-25 DIAGNOSIS — Z76.89 ENCOUNTER FOR MEDICAL CARE: Primary | ICD-10-CM

## 2019-05-25 LAB — GLUCOSE SERPL-MCNC: 88 MG/DL (ref 65–140)

## 2019-05-25 PROCEDURE — 99282 EMERGENCY DEPT VISIT SF MDM: CPT | Performed by: EMERGENCY MEDICINE

## 2019-05-25 PROCEDURE — 82948 REAGENT STRIP/BLOOD GLUCOSE: CPT

## 2019-05-25 PROCEDURE — 99284 EMERGENCY DEPT VISIT MOD MDM: CPT

## 2019-06-29 ENCOUNTER — HOSPITAL ENCOUNTER (EMERGENCY)
Facility: HOSPITAL | Age: 24
End: 2019-06-30
Attending: EMERGENCY MEDICINE
Payer: MEDICARE

## 2019-06-29 DIAGNOSIS — Z04.6 INVOLUNTARY COMMITMENT: ICD-10-CM

## 2019-06-29 DIAGNOSIS — F29 PSYCHOSIS (HCC): ICD-10-CM

## 2019-06-29 DIAGNOSIS — F31.9 BIPOLAR DISORDER (HCC): Primary | ICD-10-CM

## 2019-06-29 LAB
AMPHETAMINES SERPL QL SCN: NEGATIVE
BACTERIA UR QL AUTO: ABNORMAL /HPF
BARBITURATES UR QL: NEGATIVE
BENZODIAZ UR QL: NEGATIVE
BILIRUB UR QL STRIP: ABNORMAL
CLARITY UR: ABNORMAL
COCAINE UR QL: NEGATIVE
COLOR UR: YELLOW
ETHANOL SERPL-MCNC: <10 MG/DL
EXT PREG TEST URINE: NORMAL
EXT. CONTROL ED NAV: POSITIVE
GLUCOSE SERPL-MCNC: 94 MG/DL (ref 65–140)
GLUCOSE UR STRIP-MCNC: NEGATIVE MG/DL
HGB UR QL STRIP.AUTO: NEGATIVE
KETONES UR STRIP-MCNC: ABNORMAL MG/DL
LEUKOCYTE ESTERASE UR QL STRIP: ABNORMAL
METHADONE UR QL: NEGATIVE
MUCOUS THREADS UR QL AUTO: ABNORMAL
NITRITE UR QL STRIP: NEGATIVE
NON-SQ EPI CELLS URNS QL MICRO: ABNORMAL /HPF
OPIATES UR QL SCN: NEGATIVE
PCP UR QL: NEGATIVE
PH UR STRIP.AUTO: 6 [PH]
PROT UR STRIP-MCNC: NEGATIVE MG/DL
RBC #/AREA URNS AUTO: ABNORMAL /HPF
SP GR UR STRIP.AUTO: >=1.03 (ref 1–1.03)
THC UR QL: POSITIVE
UROBILINOGEN UR QL STRIP.AUTO: 1 E.U./DL
WBC #/AREA URNS AUTO: ABNORMAL /HPF

## 2019-06-29 PROCEDURE — 81025 URINE PREGNANCY TEST: CPT | Performed by: EMERGENCY MEDICINE

## 2019-06-29 PROCEDURE — 81001 URINALYSIS AUTO W/SCOPE: CPT | Performed by: EMERGENCY MEDICINE

## 2019-06-29 PROCEDURE — 36415 COLL VENOUS BLD VENIPUNCTURE: CPT | Performed by: EMERGENCY MEDICINE

## 2019-06-29 PROCEDURE — 82948 REAGENT STRIP/BLOOD GLUCOSE: CPT

## 2019-06-29 PROCEDURE — 99285 EMERGENCY DEPT VISIT HI MDM: CPT

## 2019-06-29 PROCEDURE — 80307 DRUG TEST PRSMV CHEM ANLYZR: CPT | Performed by: EMERGENCY MEDICINE

## 2019-06-29 PROCEDURE — 81003 URINALYSIS AUTO W/O SCOPE: CPT | Performed by: EMERGENCY MEDICINE

## 2019-06-29 PROCEDURE — 80320 DRUG SCREEN QUANTALCOHOLS: CPT | Performed by: EMERGENCY MEDICINE

## 2019-06-29 PROCEDURE — 87086 URINE CULTURE/COLONY COUNT: CPT | Performed by: EMERGENCY MEDICINE

## 2019-06-29 RX ORDER — ZIPRASIDONE HYDROCHLORIDE 20 MG/1
20 CAPSULE ORAL 2 TIMES DAILY WITH MEALS
COMMUNITY
End: 2019-07-10 | Stop reason: HOSPADM

## 2019-06-29 RX ORDER — TRAZODONE HYDROCHLORIDE 50 MG/1
50 TABLET ORAL
Status: CANCELLED | OUTPATIENT
Start: 2019-06-29

## 2019-06-29 RX ORDER — MAGNESIUM HYDROXIDE/ALUMINUM HYDROXICE/SIMETHICONE 120; 1200; 1200 MG/30ML; MG/30ML; MG/30ML
30 SUSPENSION ORAL ONCE
Status: DISCONTINUED | OUTPATIENT
Start: 2019-06-29 | End: 2019-06-30 | Stop reason: HOSPADM

## 2019-06-29 RX ORDER — LORAZEPAM 2 MG/ML
1 INJECTION INTRAMUSCULAR EVERY 6 HOURS PRN
Status: CANCELLED | OUTPATIENT
Start: 2019-06-29

## 2019-06-29 RX ORDER — MAGNESIUM HYDROXIDE/ALUMINUM HYDROXICE/SIMETHICONE 120; 1200; 1200 MG/30ML; MG/30ML; MG/30ML
30 SUSPENSION ORAL EVERY 4 HOURS PRN
Status: CANCELLED | OUTPATIENT
Start: 2019-06-29

## 2019-06-29 RX ORDER — OXCARBAZEPINE 600 MG/1
300 TABLET, FILM COATED ORAL EVERY 12 HOURS SCHEDULED
Status: ON HOLD | COMMUNITY
End: 2019-07-10 | Stop reason: SDUPTHER

## 2019-06-29 RX ORDER — TRAZODONE HYDROCHLORIDE 100 MG/1
100 TABLET ORAL
Status: ON HOLD | COMMUNITY
End: 2019-07-10 | Stop reason: SDUPTHER

## 2019-06-29 RX ORDER — BENZTROPINE MESYLATE 1 MG/1
1 TABLET ORAL EVERY 6 HOURS PRN
Status: CANCELLED | OUTPATIENT
Start: 2019-06-29

## 2019-06-29 RX ORDER — ACETAMINOPHEN 325 MG/1
650 TABLET ORAL EVERY 6 HOURS PRN
Status: CANCELLED | OUTPATIENT
Start: 2019-06-29

## 2019-06-29 RX ORDER — BENZTROPINE MESYLATE 1 MG/ML
1 INJECTION INTRAMUSCULAR; INTRAVENOUS EVERY 6 HOURS PRN
Status: CANCELLED | OUTPATIENT
Start: 2019-06-29

## 2019-06-29 RX ORDER — HALOPERIDOL 5 MG/ML
5 INJECTION INTRAMUSCULAR EVERY 6 HOURS PRN
Status: CANCELLED | OUTPATIENT
Start: 2019-06-29

## 2019-06-29 RX ORDER — HALOPERIDOL 5 MG
5 TABLET ORAL EVERY 6 HOURS PRN
Status: CANCELLED | OUTPATIENT
Start: 2019-06-29

## 2019-06-29 RX ORDER — LORAZEPAM 1 MG/1
1 TABLET ORAL ONCE
Status: COMPLETED | OUTPATIENT
Start: 2019-06-29 | End: 2019-06-29

## 2019-06-29 RX ORDER — LORAZEPAM 1 MG/1
1 TABLET ORAL EVERY 6 HOURS PRN
Status: CANCELLED | OUTPATIENT
Start: 2019-06-29

## 2019-06-29 RX ORDER — NICOTINE 21 MG/24HR
1 PATCH, TRANSDERMAL 24 HOURS TRANSDERMAL DAILY
Status: CANCELLED | OUTPATIENT
Start: 2019-06-30

## 2019-06-29 RX ADMIN — LORAZEPAM 1 MG: 1 TABLET ORAL at 19:30
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
## 2019-06-29 NOTE — ED NOTES
Unable to contact [facility] for pt med list due to pharmacy closed       Forest Castelan RN  06/29/19 1926

## 2019-06-29 NOTE — ED NOTES
Crisis received original 302 from the [facility] at 18:35, at which time it was completed and faxed to Intake at 18:55 w/ pt's medical clearance note for referral to [facility]

## 2019-06-30 ENCOUNTER — HOSPITAL ENCOUNTER (INPATIENT)
Facility: HOSPITAL | Age: 24
LOS: 10 days | Discharge: HOME | DRG: 885 | End: 2019-07-10
Attending: PSYCHIATRY & NEUROLOGY | Admitting: PSYCHIATRY & NEUROLOGY
Payer: MEDICARE

## 2019-06-30 ENCOUNTER — APPOINTMENT (INPATIENT)
Dept: RADIOLOGY | Facility: HOSPITAL | Age: 24
DRG: 885 | End: 2019-06-30
Payer: MEDICARE

## 2019-06-30 VITALS
BODY MASS INDEX: 39.27 KG/M2 | OXYGEN SATURATION: 96 % | HEART RATE: 86 BPM | SYSTOLIC BLOOD PRESSURE: 112 MMHG | HEIGHT: 64 IN | DIASTOLIC BLOOD PRESSURE: 68 MMHG | TEMPERATURE: 98.3 F | WEIGHT: 230 LBS | RESPIRATION RATE: 18 BRPM

## 2019-06-30 DIAGNOSIS — J45.909 UNCOMPLICATED ASTHMA, UNSPECIFIED ASTHMA SEVERITY, UNSPECIFIED WHETHER PERSISTENT: ICD-10-CM

## 2019-06-30 DIAGNOSIS — F41.9 ANXIETY: ICD-10-CM

## 2019-06-30 DIAGNOSIS — F17.200 NICOTINE DEPENDENCE: ICD-10-CM

## 2019-06-30 DIAGNOSIS — G47.00 INSOMNIA, UNSPECIFIED TYPE: ICD-10-CM

## 2019-06-30 DIAGNOSIS — R41.3 MEMORY CHANGES: ICD-10-CM

## 2019-06-30 DIAGNOSIS — K59.00 CONSTIPATION, UNSPECIFIED CONSTIPATION TYPE: ICD-10-CM

## 2019-06-30 DIAGNOSIS — F31.9 BIPOLAR DISORDER (HCC): ICD-10-CM

## 2019-06-30 DIAGNOSIS — K21.9 GASTROESOPHAGEAL REFLUX DISEASE WITHOUT ESOPHAGITIS: ICD-10-CM

## 2019-06-30 DIAGNOSIS — G43.909 MIGRAINE: ICD-10-CM

## 2019-06-30 DIAGNOSIS — F31.63 BIPOLAR 1 DISORDER, MIXED, SEVERE (HCC): Primary | ICD-10-CM

## 2019-06-30 DIAGNOSIS — G40.909 SEIZURE DISORDER (HCC): ICD-10-CM

## 2019-06-30 DIAGNOSIS — R42 DIZZINESS: ICD-10-CM

## 2019-06-30 DIAGNOSIS — R41.9 COGNITIVE COMPLAINTS: ICD-10-CM

## 2019-06-30 PROBLEM — R73.03 BORDERLINE DIABETES: Status: ACTIVE | Noted: 2019-06-30

## 2019-06-30 PROBLEM — N30.00 ACUTE CYSTITIS WITHOUT HEMATURIA: Status: ACTIVE | Noted: 2019-06-30

## 2019-06-30 PROBLEM — F32.A DEPRESSION: Status: ACTIVE | Noted: 2019-06-30

## 2019-06-30 LAB
ALBUMIN SERPL BCP-MCNC: 4.4 G/DL (ref 3–5.2)
ALP SERPL-CCNC: 89 U/L (ref 43–122)
ALT SERPL W P-5'-P-CCNC: 25 U/L (ref 9–52)
ANION GAP SERPL CALCULATED.3IONS-SCNC: 10 MMOL/L (ref 5–14)
AST SERPL W P-5'-P-CCNC: 23 U/L (ref 14–36)
BACTERIA UR CULT: NORMAL
BASOPHILS # BLD AUTO: 0.1 THOUSANDS/ΜL (ref 0–0.1)
BASOPHILS NFR BLD AUTO: 1 % (ref 0–1)
BILIRUB SERPL-MCNC: 0.2 MG/DL
BUN SERPL-MCNC: 9 MG/DL (ref 5–25)
CALCIUM SERPL-MCNC: 9.6 MG/DL (ref 8.4–10.2)
CHLORIDE SERPL-SCNC: 103 MMOL/L (ref 97–108)
CO2 SERPL-SCNC: 27 MMOL/L (ref 22–30)
CREAT SERPL-MCNC: 0.78 MG/DL (ref 0.6–1.2)
EOSINOPHIL # BLD AUTO: 0.3 THOUSAND/ΜL (ref 0–0.4)
EOSINOPHIL NFR BLD AUTO: 3 % (ref 0–6)
ERYTHROCYTE [DISTWIDTH] IN BLOOD BY AUTOMATED COUNT: 13.3 %
GFR SERPL CREATININE-BSD FRML MDRD: 108 ML/MIN/1.73SQ M
GLUCOSE SERPL-MCNC: 109 MG/DL (ref 70–99)
GLUCOSE SERPL-MCNC: 122 MG/DL (ref 65–140)
GLUCOSE SERPL-MCNC: 82 MG/DL (ref 65–140)
HCT VFR BLD AUTO: 43.9 % (ref 36–46)
HGB BLD-MCNC: 13.5 G/DL (ref 12–16)
LYMPHOCYTES # BLD AUTO: 2.7 THOUSANDS/ΜL (ref 0.5–4)
LYMPHOCYTES NFR BLD AUTO: 27 % (ref 25–45)
MCH RBC QN AUTO: 28.1 PG (ref 26–34)
MCHC RBC AUTO-ENTMCNC: 30.6 G/DL (ref 31–36)
MCV RBC AUTO: 92 FL (ref 80–100)
MONOCYTES # BLD AUTO: 0.7 THOUSAND/ΜL (ref 0.2–0.9)
MONOCYTES NFR BLD AUTO: 7 % (ref 1–10)
NEUTROPHILS # BLD AUTO: 6.3 THOUSANDS/ΜL (ref 1.8–7.8)
NEUTS SEG NFR BLD AUTO: 62 % (ref 45–65)
PLATELET # BLD AUTO: 268 THOUSANDS/UL (ref 150–450)
PMV BLD AUTO: 7 FL (ref 8.9–12.7)
POTASSIUM SERPL-SCNC: 3.6 MMOL/L (ref 3.6–5)
PROT SERPL-MCNC: 8 G/DL (ref 5.9–8.4)
RBC # BLD AUTO: 4.78 MILLION/UL (ref 4–5.2)
SODIUM SERPL-SCNC: 140 MMOL/L (ref 137–147)
TSH SERPL DL<=0.05 MIU/L-ACNC: 2.17 UIU/ML (ref 0.47–4.68)
WBC # BLD AUTO: 10.1 THOUSAND/UL (ref 4.5–11)

## 2019-06-30 PROCEDURE — 99222 1ST HOSP IP/OBS MODERATE 55: CPT | Performed by: INTERNAL MEDICINE

## 2019-06-30 PROCEDURE — 80053 COMPREHEN METABOLIC PANEL: CPT | Performed by: PSYCHIATRY & NEUROLOGY

## 2019-06-30 PROCEDURE — 82948 REAGENT STRIP/BLOOD GLUCOSE: CPT

## 2019-06-30 PROCEDURE — 85025 COMPLETE CBC W/AUTO DIFF WBC: CPT | Performed by: PSYCHIATRY & NEUROLOGY

## 2019-06-30 PROCEDURE — 73630 X-RAY EXAM OF FOOT: CPT

## 2019-06-30 PROCEDURE — 84443 ASSAY THYROID STIM HORMONE: CPT | Performed by: PSYCHIATRY & NEUROLOGY

## 2019-06-30 RX ORDER — TRAZODONE HYDROCHLORIDE 50 MG/1
50 TABLET ORAL
Status: DISCONTINUED | OUTPATIENT
Start: 2019-06-30 | End: 2019-07-10 | Stop reason: HOSPADM

## 2019-06-30 RX ORDER — LORAZEPAM 1 MG/1
1 TABLET ORAL EVERY 6 HOURS PRN
Status: DISCONTINUED | OUTPATIENT
Start: 2019-06-30 | End: 2019-07-02

## 2019-06-30 RX ORDER — ONDANSETRON 4 MG/1
4 TABLET, ORALLY DISINTEGRATING ORAL EVERY 6 HOURS PRN
Status: DISCONTINUED | OUTPATIENT
Start: 2019-06-30 | End: 2019-07-01 | Stop reason: SDUPTHER

## 2019-06-30 RX ORDER — OXCARBAZEPINE 300 MG/1
300 TABLET, FILM COATED ORAL EVERY 12 HOURS SCHEDULED
Status: DISCONTINUED | OUTPATIENT
Start: 2019-06-30 | End: 2019-07-10 | Stop reason: HOSPADM

## 2019-06-30 RX ORDER — HALOPERIDOL 5 MG/ML
5 INJECTION INTRAMUSCULAR EVERY 6 HOURS PRN
Status: DISCONTINUED | OUTPATIENT
Start: 2019-06-30 | End: 2019-07-10 | Stop reason: HOSPADM

## 2019-06-30 RX ORDER — MAGNESIUM HYDROXIDE/ALUMINUM HYDROXICE/SIMETHICONE 120; 1200; 1200 MG/30ML; MG/30ML; MG/30ML
30 SUSPENSION ORAL EVERY 4 HOURS PRN
Status: DISCONTINUED | OUTPATIENT
Start: 2019-06-30 | End: 2019-07-10 | Stop reason: HOSPADM

## 2019-06-30 RX ORDER — NICOTINE 21 MG/24HR
1 PATCH, TRANSDERMAL 24 HOURS TRANSDERMAL DAILY
Status: DISCONTINUED | OUTPATIENT
Start: 2019-06-30 | End: 2019-07-10 | Stop reason: HOSPADM

## 2019-06-30 RX ORDER — MECLIZINE HCL 12.5 MG/1
12.5 TABLET ORAL EVERY 8 HOURS PRN
Status: DISCONTINUED | OUTPATIENT
Start: 2019-06-30 | End: 2019-07-10 | Stop reason: HOSPADM

## 2019-06-30 RX ORDER — ACETAMINOPHEN 325 MG/1
650 TABLET ORAL EVERY 6 HOURS PRN
Status: DISCONTINUED | OUTPATIENT
Start: 2019-06-30 | End: 2019-07-10 | Stop reason: HOSPADM

## 2019-06-30 RX ORDER — HALOPERIDOL 5 MG
5 TABLET ORAL EVERY 6 HOURS PRN
Status: DISCONTINUED | OUTPATIENT
Start: 2019-06-30 | End: 2019-07-10 | Stop reason: HOSPADM

## 2019-06-30 RX ORDER — LORAZEPAM 2 MG/ML
1 INJECTION INTRAMUSCULAR EVERY 6 HOURS PRN
Status: DISCONTINUED | OUTPATIENT
Start: 2019-06-30 | End: 2019-07-10 | Stop reason: HOSPADM

## 2019-06-30 RX ORDER — BENZTROPINE MESYLATE 1 MG/ML
1 INJECTION INTRAMUSCULAR; INTRAVENOUS EVERY 6 HOURS PRN
Status: DISCONTINUED | OUTPATIENT
Start: 2019-06-30 | End: 2019-07-10 | Stop reason: HOSPADM

## 2019-06-30 RX ORDER — BENZTROPINE MESYLATE 1 MG/1
1 TABLET ORAL EVERY 6 HOURS PRN
Status: DISCONTINUED | OUTPATIENT
Start: 2019-06-30 | End: 2019-07-10 | Stop reason: HOSPADM

## 2019-06-30 RX ADMIN — LORAZEPAM 1 MG: 1 TABLET ORAL at 16:56

## 2019-06-30 RX ADMIN — NICOTINE 1 PATCH: 21 PATCH, EXTENDED RELEASE TRANSDERMAL at 15:59

## 2019-06-30 RX ADMIN — MECLIZINE HYDROCHLORIDE 12.5 MG: 12.5 TABLET ORAL at 21:38

## 2019-06-30 RX ADMIN — OXCARBAZEPINE 300 MG: 300 TABLET, FILM COATED ORAL at 21:38

## 2019-06-30 RX ADMIN — ACETAMINOPHEN 650 MG: 325 TABLET ORAL at 15:58

## 2019-06-30 RX ADMIN — TRAZODONE HYDROCHLORIDE 50 MG: 50 TABLET ORAL at 21:38

## 2019-06-30 RX ADMIN — ONDANSETRON 4 MG: 4 TABLET, ORALLY DISINTEGRATING ORAL at 21:38

## 2019-06-30 NOTE — EMTALA/ACUTE CARE TRANSFER
[facility]  [address] [address]  [phone]  Dept: [phone]      EMTALA TRANSFER CONSENT    NAME Jamaica Gutierrez                                         1995                              MRN [id number]    I have been informed of my rights regarding examination, treatment, and transfer   by Dr Mary Teixeira MD    Benefits:  inpatient care    Risks:    Transport related    Transfer Request   I acknowledge that my medical condition has been evaluated and explained to me by the emergency department physician or other qualified medical person and/or my attending physician who has recommended and offered to me further medical examination and treatment  I understand the Hospital's obligation with respect to the treatment and stabilization of my emergency medical condition  I nevertheless request to be transferred  I release the Hospital, the doctor, and any other persons caring for me from all responsibility or liability for any injury or ill effects that may result from my transfer and agree to accept all responsibility for the consequences of my choice to transfer, rather than receive stabilizing treatment at the Hospital  I understand that because the transfer is my request, my insurance may not provide reimbursement for the services  The Hospital will assist and direct me and my family in how to make arrangements for transfer, but the hospital is not liable for any fees charged by the transport service  In spite of this understanding, I refuse to consent to further medical examination and treatment which has been offered to me, and request transfer to  Pola Marks Name, [address] : [place] [address] [place] [address]  I authorize the performance of emergency medical procedures and treatments upon me in both transit and upon arrival at the receiving facility    Additionally, I authorize the release of any and all medical records to the receiving facility and request they be transported with me, if possible  I authorize the performance of emergency medical procedures and treatments upon me in both transit and upon arrival at the receiving facility  Additionally, I authorize the release of any and all medical records to the receiving facility and request they be transported with me, if possible  I understand that the safest mode of transportation during a medical emergency is an ambulance and that the Hospital advocates the use of this mode of transport  Risks of traveling to the receiving facility by car, including absence of medical control, life sustaining equipment, such as oxygen, and medical personnel has been explained to me and I fully understand them  (NIKOLE CORRECT BOX BELOW)  [  ]  I consent to the stated transfer and to be transported by ambulance/helicopter  [  ]  I consent to the stated transfer, but refuse transportation by ambulance and accept full responsibility for my transportation by car  I understand the risks of non-ambulance transfers and I exonerate the Hospital and its staff from any deterioration in my condition that results from this refusal     X___________________________________________    DATE  19  TIME________  Signature of patient or legally responsible individual signing on patient behalf           RELATIONSHIP TO PATIENT_________________________          Provider Certification    NAME Jamaica Luna                                        [facility] 1995                              MRN [id number]    A medical screening exam was performed on the above named patient  Based on the examination:    Condition Necessitating Transfer The primary encounter diagnosis was Bipolar disorder ([facility] [address] )  Diagnoses of Psychosis ([facility] [address] ) and Involuntary commitment were also pertinent to this visit      Patient Condition:      Reason for Transfer:      Transfer Requirements: [address] · Space available and qualified personnel available for treatment as acknowledged by Aarti Weissr, [phone]  · Agreed to accept transfer and to provide appropriate medical treatment as acknowledged by       Dr Piper Sierra MD  · Appropriate medical records of the examination and treatment of the patient are provided at the time of transfer   [address] [facility], [address] _______  · Transfer will be performed by qualified personnel from [facility]  and appropriate transfer equipment as required, including the use of necessary and appropriate life support measures  Provider Certification: I have examined the patient and explained the following risks and benefits of being transferred/refusing transfer to the patient/family:         Based on these reasonable risks and benefits to the patient and/or the unborn child(artur), and based upon the information available at the time of the patients examination, I certify that the medical benefits reasonably to be expected from the provision of appropriate medical treatments at another medical facility outweigh the increasing risks, if any, to the individuals medical condition, and in the case of labor to the unborn child, from effecting the transfer      X____________________________________________ DATE 06/30/19        TIME_______      ORIGINAL - SEND TO MEDICAL RECORDS   COPY - SEND WITH PATIENT DURING TRANSFER

## 2019-06-30 NOTE — ED NOTES
Insurance Authorization for admission:   Medicare A and B: no pre cert is required    COB:  Called [facility] ([phone]): not on file  [facility] ([phone]): termed in 2013

## 2019-06-30 NOTE — ED NOTES
Patient is accepted at [address]  Patient is accepted by Dr Erich Vila MD per Mily  Transportation is arranged with [facility]  Transportation is scheduled for 12:45PM    Patient may go to the floor upon arrival         Nurse report is to be called to [phone] prior to patient transfer

## 2019-06-30 NOTE — ED NOTES
Patient sleeping   1:1 observation continues     [facility], RN  06/30/19 5159

## 2019-06-30 NOTE — PLAN OF CARE
Problem: Risk for Self Injury/Neglect  Goal: Treatment Goal: Remain safe during length of stay, learn and adopt new coping skills, and be free of self-injurious ideation, impulses and acts at the time of discharge  Outcome: Progressing     Problem: Anxiety  Goal: Anxiety is at manageable level  Description  Interventions:  - Assess and monitor patient's anxiety level  - Monitor for signs and symptoms of anxiety both physical and emotional (heart palpitations, chest pain, shortness of breath, headaches, nausea, feeling jumpy, restlessness, irritable, apprehensive)  - Collaborate with interdisciplinary team and initiate plan and interventions as ordered    - [place] patient to unit/surroundings  - Explain treatment plan  - Encourage participation in care  - Encourage verbalization of concerns/fears  - Identify coping mechanisms  - Assist in developing anxiety-reducing skills  - Administer/offer alternative therapies  - Limit or eliminate stimulants  Outcome: Progressing     Problem: Risk for Violence/Aggression Toward Others  Goal: Treatment Goal: Refrain from acts of violence/aggression during length of stay, and demonstrate improved impulse control at the time of discharge  Outcome: Progressing     Problem: Depression  Goal: Treatment Goal: Demonstrate behavioral control of depressive symptoms, verbalize feelings of improved mood/affect, and adopt new coping skills prior to discharge  Outcome: Not Progressing

## 2019-07-01 LAB
AMORPH URATE CRY URNS QL MICRO: ABNORMAL /HPF
BACTERIA UR QL AUTO: ABNORMAL /HPF
BILIRUB UR QL STRIP: NEGATIVE
CLARITY UR: CLEAR
COLOR UR: ABNORMAL
EST. AVERAGE GLUCOSE BLD GHB EST-MCNC: 108 MG/DL
GLUCOSE SERPL-MCNC: 135 MG/DL (ref 65–140)
GLUCOSE SERPL-MCNC: 91 MG/DL (ref 65–140)
GLUCOSE UR STRIP-MCNC: NEGATIVE MG/DL
HBA1C MFR BLD: 5.4 % (ref 4.2–6.3)
HGB UR QL STRIP.AUTO: NEGATIVE
KETONES UR STRIP-MCNC: NEGATIVE MG/DL
LEUKOCYTE ESTERASE UR QL STRIP: 25
MUCOUS THREADS UR QL AUTO: ABNORMAL
NITRITE UR QL STRIP: NEGATIVE
NON-SQ EPI CELLS URNS QL MICRO: ABNORMAL /HPF
PH UR STRIP.AUTO: 5 [PH]
PROT UR STRIP-MCNC: ABNORMAL MG/DL
RBC #/AREA URNS AUTO: ABNORMAL /HPF
SP GR UR STRIP.AUTO: 1.02 (ref 1–1.04)
URATE SERPL-MCNC: 5.2 MG/DL (ref 2.7–7.5)
UROBILINOGEN UA: 1 MG/DL
WBC #/AREA URNS AUTO: ABNORMAL /HPF

## 2019-07-01 PROCEDURE — 81003 URINALYSIS AUTO W/O SCOPE: CPT | Performed by: INTERNAL MEDICINE

## 2019-07-01 PROCEDURE — 82948 REAGENT STRIP/BLOOD GLUCOSE: CPT

## 2019-07-01 PROCEDURE — 86592 SYPHILIS TEST NON-TREP QUAL: CPT | Performed by: INTERNAL MEDICINE

## 2019-07-01 PROCEDURE — 84550 ASSAY OF BLOOD/URIC ACID: CPT | Performed by: INTERNAL MEDICINE

## 2019-07-01 PROCEDURE — 81001 URINALYSIS AUTO W/SCOPE: CPT | Performed by: INTERNAL MEDICINE

## 2019-07-01 PROCEDURE — 83036 HEMOGLOBIN GLYCOSYLATED A1C: CPT | Performed by: INTERNAL MEDICINE

## 2019-07-01 PROCEDURE — 99222 1ST HOSP IP/OBS MODERATE 55: CPT | Performed by: PSYCHIATRY & NEUROLOGY

## 2019-07-01 RX ORDER — TRAZODONE HYDROCHLORIDE 100 MG/1
100 TABLET ORAL
Status: DISCONTINUED | OUTPATIENT
Start: 2019-07-01 | End: 2019-07-10 | Stop reason: HOSPADM

## 2019-07-01 RX ORDER — HYDROXYZINE 50 MG/1
50 TABLET, FILM COATED ORAL EVERY 6 HOURS PRN
Status: DISCONTINUED | OUTPATIENT
Start: 2019-07-01 | End: 2019-07-10 | Stop reason: HOSPADM

## 2019-07-01 RX ORDER — ZIPRASIDONE HYDROCHLORIDE 40 MG/1
40 CAPSULE ORAL 2 TIMES DAILY WITH MEALS
Status: DISCONTINUED | OUTPATIENT
Start: 2019-07-01 | End: 2019-07-03

## 2019-07-01 RX ORDER — ONDANSETRON 4 MG/1
4 TABLET, ORALLY DISINTEGRATING ORAL EVERY 8 HOURS PRN
Status: DISCONTINUED | OUTPATIENT
Start: 2019-07-01 | End: 2019-07-10 | Stop reason: HOSPADM

## 2019-07-01 RX ADMIN — ZIPRASIDONE HCL 40 MG: 40 CAPSULE ORAL at 16:57

## 2019-07-01 RX ADMIN — ZIPRASIDONE HCL 40 MG: 40 CAPSULE ORAL at 12:21

## 2019-07-01 RX ADMIN — TRAZODONE HYDROCHLORIDE 100 MG: 100 TABLET, FILM COATED ORAL at 20:18

## 2019-07-01 RX ADMIN — NICOTINE 1 PATCH: 21 PATCH, EXTENDED RELEASE TRANSDERMAL at 09:58

## 2019-07-01 RX ADMIN — LORAZEPAM 1 MG: 1 TABLET ORAL at 13:22

## 2019-07-01 RX ADMIN — OXCARBAZEPINE 300 MG: 300 TABLET, FILM COATED ORAL at 20:18

## 2019-07-01 RX ADMIN — ONDANSETRON 4 MG: 4 TABLET, ORALLY DISINTEGRATING ORAL at 20:27

## 2019-07-01 RX ADMIN — OXCARBAZEPINE 300 MG: 300 TABLET, FILM COATED ORAL at 09:55
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
## 2019-07-01 NOTE — ASSESSMENT & PLAN NOTE
Patient states that she has a history of stress-induced abscess seizures  Has not been on her antiseizure medication for the last 5 days    Resume outpatient dose of Trileptal

## 2019-07-01 NOTE — PROGRESS NOTES
Pt c/o anxiety, has many somatic complaints, appears restless, frequent mood changes, received PRN ativan which appears to be effective, pt has no further complaints at this time

## 2019-07-01 NOTE — ASSESSMENT & PLAN NOTE
Patient states that she has some urinary urgency and frequency  Is afebrile  Urine culture previously was equivocal   Repeat urine culture  Hold off on antibiotics as patient is currently afebrile without any leukocytosis

## 2019-07-01 NOTE — CONSULTS
Consult- Autumn Jamesetta Romberg 1995, 26 y o  female MRN: [id number]    Unit/Bed#: [facility] 347-02 Encounter: [id number]    Primary Care Provider: Carlos Eduardo Chu DO   Date and time admitted to hospital: 6/30/2019  2:51 PM      Inpatient consult for Medical Clearance for [facility] patient  Consult performed by: Bakari Jovel MD  Consult ordered by: Sharon Hayes MD          * Depression  Assessment & Plan  Patient states that lately she has been depressed  She had showed aggressive behavior towards her father and her mother  She admits to having poor appetite but denies any sleeping disturbance patient denies any auditory visual hallucinations or suicidal or homicidal thoughts or ideation currently  Further treatment per Psychiatry    Acute cystitis without hematuria  Assessment & Plan  Patient states that she has some urinary urgency and frequency  Is afebrile  Urine culture previously was equivocal   Repeat urine culture  Hold off on antibiotics as patient is currently afebrile without any leukocytosis  Borderline diabetes  Assessment & Plan  Follow up on hemoglobin A1c  Continue with serial blood glucose monitoring  Patient is not on any medications as an outpatient  Seizure disorder [facility])  Assessment & Plan  Patient states that she has a history of stress-induced abscess seizures  Has not been on her antiseizure medication for the last 5 days  Resume outpatient dose of Trileptal                VTE Prophylaxis: Reason for no pharmacologic prophylaxis Low risk  Encourage ambulation  / reason for no mechanical VTE prophylaxis Low risk  Recommendations for Discharge:  · To follow    Counseling / Coordination of Care Time: 20 minutes  Greater than 50% of total time spent on patient counseling and coordination of care  Collaboration of Care:  Were Recommendations Directly Discussed with Primary Treatment Team? - Yes     History of Present Illness:    Bryan Bee is a 26 y o  female who is originally admitted to the psychiatry service due to aggressive behavior  We are consulted for medical clearance  Patient was able to provide good history at bedside  She states that she does not have any suicidal homicidal thoughts or ideation however was brought to the emergency room because of having aggressive behavior to words his father and his mother  Patient currently denies any homicidal thoughts or aggressive behavior  She denies any auditory visual hallucinations or delusions  She states that she does not have good appetite and complains of pain in her left big toe which she hurt while playing soccer  She complains of occasional episodes of dizziness  She complains of occasional urinary frequency but denies any dysuria or hematuria  He denies any fever or chills  Review of Systems:    Review of Systems   Constitutional: Positive for appetite change and fatigue  HENT: Negative  Eyes: Negative  Respiratory: Negative  Cardiovascular: Negative  Gastrointestinal: Positive for nausea  Genitourinary: Positive for frequency  Musculoskeletal: Positive for joint swelling  Skin: Negative  Neurological: Positive for dizziness  Hematological: Negative  Psychiatric/Behavioral: Positive for agitation and behavioral problems  Past Medical and Surgical History:     Past Medical History:   Diagnosis Date    ADHD     Anxiety     Asthma     Bipolar 1 disorder ([facility] )     Depression     Diabetes mellitus ([facility] )     GERD (gastroesophageal reflux disease)     Mental and behavioral problem     Migraine     Psychiatric disorder     Psychosis ([facility] )     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 )     Seizures ([facility] )     Suicide attempt ([facility] )        Past Surgical History:   Procedure Laterality Date    TUBAL LIGATION         Meds/Allergies:    all medications and allergies reviewed    Allergies:    Allergies   Allergen Reactions    Shellfish-Derived Products Hives       Social History: Marital Status: Single    Substance Use History:   Social History     Substance and Sexual Activity   Alcohol Use Never    Frequency: Never     Social History     Tobacco Use   Smoking Status Current Every Day Smoker    Packs/day: 1 00    Types: Cigarettes   Smokeless Tobacco Never Used     Social History     Substance and Sexual Activity   Drug Use Yes    Frequency: 1 0 times per week    Types: Marijuana    Comment: last used lastnight       Family History:    non-contributory    Physical Exam:     Vitals:   Blood Pressure: 107/55 (06/30/19 1700)  Pulse: 72 (06/30/19 1700)  Temperature: 97 5 °F (36 4 °C) (06/30/19 1700)  Temp Source: Temporal (06/30/19 1700)  Respirations: 18 (06/30/19 1700)  Height: 5' 4" (162 6 cm) (06/30/19 1454)  Weight - Scale: 103 kg (226 lb) (06/30/19 1454)    Physical Exam   Constitutional: She is oriented to person, place, and time  No distress  HENT:   Head: Normocephalic and atraumatic  Eyes: Pupils are equal, round, and reactive to light  EOM are normal    Neck: Normal range of motion  Neck supple  Cardiovascular: Normal rate and regular rhythm  Pulmonary/Chest: Effort normal and breath sounds normal    Abdominal: Soft  Bowel sounds are normal    Musculoskeletal: She exhibits no edema  Tenderness of left big toe without any underlying erythema  No joint swelling  Neurological: She is alert and oriented to person, place, and time  She displays normal reflexes  No cranial nerve deficit or sensory deficit  She exhibits normal muscle tone  Coordination normal    Nonfocal neurological findings  Cranial nerves 2-12 intact  Skin: Skin is warm  She is not diaphoretic  Psychiatric:   Low mood and flat affect       Additional Data:     Lab Results: I have personally reviewed pertinent reports        Results from last 7 days   Lab Units 06/30/19  1724   WBC Thousand/uL 10 10   HEMOGLOBIN g/dL 13 5   HEMATOCRIT % 43 9   PLATELETS Thousands/uL 268   NEUTROS PCT % 62   LYMPHS PCT % 27   MONOS PCT % 7   EOS PCT % 3     Results from last 7 days   Lab Units 06/30/19  1724   SODIUM mmol/L 140   POTASSIUM mmol/L 3 6   CHLORIDE mmol/L 103   CO2 mmol/L 27   BUN mg/dL 9   CREATININE mg/dL 0 78   ANION GAP mmol/L 10   CALCIUM mg/dL 9 6   ALBUMIN g/dL 4 4   TOTAL BILIRUBIN mg/dL 0 20   ALK PHOS U/L 89   ALT U/L 25   AST U/L 23   GLUCOSE RANDOM mg/dL 109*             No results found for: HGBA1C  Results from last 7 days   Lab Units 06/30/19  1133 06/29/19  2036   POC GLUCOSE mg/dl 82 94           Imaging: I have personally reviewed pertinent reports  XR foot 2 vw left    (Results Pending)       EKG, Pathology, and Other Studies Reviewed on Admission:   · EKG:  Not available    ** Please Note: This note has been constructed using a voice recognition system   **

## 2019-07-01 NOTE — PLAN OF CARE
Problem: Risk for Self Injury/Neglect  Goal: Treatment Goal: Remain safe during length of stay, learn and adopt new coping skills, and be free of self-injurious ideation, impulses and acts at the time of discharge  Outcome: Progressing     Problem: Risk for Violence/Aggression Toward Others  Goal: Treatment Goal: Refrain from acts of violence/aggression during length of stay, and demonstrate improved impulse control at the time of discharge  Outcome: Progressing     Problem: Depression  Goal: Treatment Goal: Demonstrate behavioral control of depressive symptoms, verbalize feelings of improved mood/affect, and adopt new coping skills prior to discharge  Outcome: Not Progressing     Problem: Anxiety  Goal: Anxiety is at manageable level  Description  Interventions:  - Assess and monitor patient's anxiety level  - Monitor for signs and symptoms of anxiety both physical and emotional (heart palpitations, chest pain, shortness of breath, headaches, nausea, feeling jumpy, restlessness, irritable, apprehensive)  - Collaborate with interdisciplinary team and initiate plan and interventions as ordered    - [place] patient to unit/surroundings  - Explain treatment plan  - Encourage participation in care  - Encourage verbalization of concerns/fears  - Identify coping mechanisms  - Assist in developing anxiety-reducing skills  - Administer/offer alternative therapies  - Limit or eliminate stimulants  Outcome: Not Progressing

## 2019-07-01 NOTE — PROGRESS NOTES
Pt has been appropriate in milieu this shift  She denies SI/HI  She has not c/o anxiety  She stated she is having a good evening because she finally got what she wanted for dinner  She stated she was tired and retired to bed after dinner and is currently resting in bed  Compliant with meds and care  No behavioral outbursts  Will continue to monitor

## 2019-07-01 NOTE — PROGRESS NOTES
07/01/19 0988   Team Meeting   Meeting Type Daily Rounds   Team Members Present   Team Members Present Physician;Nurse;;   Physician Team Member Genesis Loo   Nursing Team Member Jalen   Care Management Team Member [id number] Patino Work Team Member Dhiraj   Patient/Family Present   Patient Present No   Patient's Family Present No     Will continue with 303 hearing
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
## 2019-07-01 NOTE — PROGRESS NOTES
Patient witnessed eating food in the milieu  Apologized to staff for how she behaved earlier  Said the Ativan was helpful for her and went to go take a shower

## 2019-07-01 NOTE — PROGRESS NOTES
Pt complaining of nausea, dizziness, and insomnia, received PRN trazadone, antivert, and zofran  Pt appears comfortable, sleeping currently  Medications were effective

## 2019-07-01 NOTE — ASSESSMENT & PLAN NOTE
Follow up on hemoglobin A1c  Continue with serial blood glucose monitoring  Patient is not on any medications as an outpatient
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
## 2019-07-02 LAB
GLUCOSE SERPL-MCNC: 100 MG/DL (ref 65–140)
GLUCOSE SERPL-MCNC: 105 MG/DL (ref 65–140)
GLUCOSE SERPL-MCNC: 106 MG/DL (ref 65–140)
GLUCOSE SERPL-MCNC: 114 MG/DL (ref 65–140)
GLUCOSE SERPL-MCNC: 115 MG/DL (ref 65–140)
GLUCOSE SERPL-MCNC: 129 MG/DL (ref 65–140)
RPR SER QL: NORMAL

## 2019-07-02 PROCEDURE — 82948 REAGENT STRIP/BLOOD GLUCOSE: CPT

## 2019-07-02 PROCEDURE — 99232 SBSQ HOSP IP/OBS MODERATE 35: CPT | Performed by: PSYCHIATRY & NEUROLOGY

## 2019-07-02 RX ORDER — IBUPROFEN 600 MG/1
600 TABLET ORAL EVERY 6 HOURS PRN
Status: DISCONTINUED | OUTPATIENT
Start: 2019-07-02 | End: 2019-07-10 | Stop reason: HOSPADM

## 2019-07-02 RX ORDER — IBUPROFEN 400 MG/1
800 TABLET ORAL EVERY 6 HOURS PRN
Status: DISCONTINUED | OUTPATIENT
Start: 2019-07-02 | End: 2019-07-10 | Stop reason: HOSPADM

## 2019-07-02 RX ORDER — LORAZEPAM 1 MG/1
1 TABLET ORAL EVERY 8 HOURS PRN
Status: DISCONTINUED | OUTPATIENT
Start: 2019-07-02 | End: 2019-07-10 | Stop reason: HOSPADM

## 2019-07-02 RX ADMIN — TRAZODONE HYDROCHLORIDE 100 MG: 100 TABLET, FILM COATED ORAL at 21:05

## 2019-07-02 RX ADMIN — OXCARBAZEPINE 300 MG: 300 TABLET, FILM COATED ORAL at 21:05

## 2019-07-02 RX ADMIN — NICOTINE 1 PATCH: 21 PATCH, EXTENDED RELEASE TRANSDERMAL at 08:33

## 2019-07-02 RX ADMIN — LORAZEPAM 1 MG: 1 TABLET ORAL at 12:36

## 2019-07-02 RX ADMIN — ZIPRASIDONE HCL 40 MG: 40 CAPSULE ORAL at 16:59

## 2019-07-02 RX ADMIN — ZIPRASIDONE HCL 40 MG: 40 CAPSULE ORAL at 08:33

## 2019-07-02 RX ADMIN — ONDANSETRON 4 MG: 4 TABLET, ORALLY DISINTEGRATING ORAL at 21:05

## 2019-07-02 RX ADMIN — MECLIZINE HYDROCHLORIDE 12.5 MG: 12.5 TABLET ORAL at 16:59

## 2019-07-02 RX ADMIN — MECLIZINE HYDROCHLORIDE 12.5 MG: 12.5 TABLET ORAL at 09:40

## 2019-07-02 RX ADMIN — ACETAMINOPHEN 650 MG: 325 TABLET ORAL at 09:40

## 2019-07-02 RX ADMIN — OXCARBAZEPINE 300 MG: 300 TABLET, FILM COATED ORAL at 08:32

## 2019-07-02 NOTE — CASE MANAGEMENT
Pt attended tx team mtg with dr, nurse, & Brenda Lynch in hospital [facility]  Tx plan reviewed  Pt signed plan  VICTORINO met with pt after tx team mtg  Pt was living with her dad, his GF, & 13 yr old step-sister  Pt does not want to return to live with him  Pt wants to return to [place] to live with either her mom or sister  Pt did not know name of her dr Berniec Guerra to [place] a few months ago after D/C from [facility] in [place]  Said her dad was rep payee  VICTORINO advised pt of 303 hearing tomorrow @ 11 AM  Rts reviewed & given to pt  Pt said she was never 302'd before  VICTORINO said she was given chance to sign 201 in the ER, but refused  Said she did not remember  C/O having problems with her memory  Said her daughter's birthday was 7/12  Said she'd be 3 yr old  Daughter lives with sister, Guero Cabezas in [place]  Signed ROIs for dad, mom, sister, PCP, & MH provider  VICTORINO called pt's dad & advised of 303 hearing details  Gave him phone # he had to call tomorrow for phone conference  He agreed to call

## 2019-07-02 NOTE — PLAN OF CARE
Problem: Risk for Self Injury/Neglect  Goal: Treatment Goal: Remain safe during length of stay, learn and adopt new coping skills, and be free of self-injurious ideation, impulses and acts at the time of discharge  Outcome: Progressing     Problem: Depression  Goal: Treatment Goal: Demonstrate behavioral control of depressive symptoms, verbalize feelings of improved mood/affect, and adopt new coping skills prior to discharge  Outcome: Progressing     Problem: Anxiety  Goal: Anxiety is at manageable level  Description  Interventions:  - Assess and monitor patient's anxiety level  - Monitor for signs and symptoms of anxiety both physical and emotional (heart palpitations, chest pain, shortness of breath, headaches, nausea, feeling jumpy, restlessness, irritable, apprehensive)  - Collaborate with interdisciplinary team and initiate plan and interventions as ordered    - [place] patient to unit/surroundings  - Explain treatment plan  - Encourage participation in care  - Encourage verbalization of concerns/fears  - Identify coping mechanisms  - Assist in developing anxiety-reducing skills  - Administer/offer alternative therapies  - Limit or eliminate stimulants  Outcome: Progressing     Problem: Risk for Violence/Aggression Toward Others  Goal: Treatment Goal: Refrain from acts of violence/aggression during length of stay, and demonstrate improved impulse control at the time of discharge  Outcome: Progressing     Problem: DISCHARGE PLANNING  Goal: Discharge to home or other facility with appropriate resources  Description  INTERVENTIONS:  - Identify barriers to discharge w/patient and caregiver  - Arrange for needed discharge resources and transportation as appropriate  - Identify discharge learning needs (meds, wound care, etc )  - Refer to Case Management Department for coordinating discharge planning if the patient needs post-hospital services based on physician/advanced practitioner order or complex needs related to functional status, cognitive ability, or social support system   Outcome: Progressing     Problem: Ineffective Coping  Goal: Participates in unit activities  Description  Interventions:  - Provide therapeutic environment   - Provide required programming   - Redirect inappropriate behaviors   Outcome: Progressing

## 2019-07-02 NOTE — PROGRESS NOTES
Teresa Ju  was seen for continuing care and reviewed with staff  Progress Note - Behavioral Health   Jamaica Lockhart 26 y o  female MRN: @MRN   Unit/Bed#: [facility] 699-63 Encounter: [id number]       Report from staff regarding this patient received and discussed, and records reviewed prior to seeing this patient   Pt felt better today  Pt feels stressed out, and depressed  Had episodes of moderate verbal agitation yesterday about her diet, but could control her emotional responses  Appeared having flat emotional responses today   Initially, and more cheerful and active later as the day goes on  Was not angry agitated  Neuropsychological testing was completed today  Later the patient stated that at night she has feeling of "freeze of her body quote  Likely the patient described sleep paralysis  Will continue to monitor, and order Neurology consult if needed  Sleep: improved  Appetite: normal    Medication side effects:no complaints    Mental Status Evaluation:    Appearance:  dressed in hospital attire   Behavior:  calm, guarded   Mood:  depressed, dysphoric, anxious   Affect: constricted    Speech:  decreased rate, slow   Thought Process:  concrete   Thought Content:  negative thinking   Perceptual Disturbances: does not appear responding to internal stimuli   Risk Potential: Suicidal ideation - None at present, contracts for safety on the unit  Homicidal ideation - None  Potential for aggression - No   Sensorium:  oriented to person and place only   Memory:  short term memory mildly impaired   Consciousness:  alert and awake   Insight:  impaired   Judgment: impaired   Motor Activity: no abnormal movements         Laboratory results:  I have personally reviewed all pertinent laboratory results      BMP:   Recent Labs     06/30/19  1724   K 3 6      CO2 27   BUN 9     Vitals:    07/02/19 0700   BP: 121/61   Pulse: 60   Resp: 16   Temp: 97 9 °F (36 6 °C)   SpO2:         Assessment/Plan   Principal Problem:    Depression  Active Problems:    Seizure disorder ([facility] [address] )    Borderline diabetes    Acute cystitis without hematuria      Recommended Treatment:   Patient's mood improved today  Will not change her medications today will continue to monitor her condition  Brief supportive therapy was provided  Patient was receptive  Planned medication and treatment changes: All current active medications have been reviewed  Continue treatment with group therapy, milieu therapy, occupational therapy and medication management  Risks / Benefits of Treatment:    Risks, benefits, and possible side effects of medications explained to patient and patient verbalizes understanding and agreement for treatment  Planned medication and treatment changes: All current active medications have been reviewed  Continue treatment with group therapy, milieu therapy, occupational therapy and medication management  Counseling / Coordination of Care:    Patient's progress discussed with staff in treatment team meeting  ** Please Note: This note has been constructed using a voice recognition system   **      ----------------------------------------------------------------------------------------------------------------

## 2019-07-02 NOTE — PLAN OF CARE
**UPDATED NOTE**    Problem: Ineffective Coping  Goal: Participates in unit activities  Description  Interventions:  - Provide therapeutic environment   - Provide required programming   - Redirect inappropriate behaviors   Outcome: Progressing     Attended community meeting  Able to interact appropriately during group discussion, including talking about how she could create a calm space  "when I get my own place"  Sits among peers but does not socialize with them  Met with DELEON/L  Tearful  "I don't want to go home to my dad  He doesn't want me"  Encouragement provided to manage distress and subsequently problem solve  After some time, patient resolution was to "sign a release for my mom and sister" in hopes of a different discharge plan  Visibly de escalated and calmer at end of session  Will continue to encourage independent problem solving and distress tolerance  [facility]  Requested and received list of community resources and supports for use upon discharge  Brightened affect  Actively articipated in positive messaging activity, demonstrating ability to identify areas of negativity requiring attention

## 2019-07-02 NOTE — TREATMENT PLAN
TREATMENT PLAN REVIEW - Behavioral Health Jamaica Chantelle 26 y o  1995 female MRN: [id number]    [address] [address] Room / Bed: Deepali Shay 347/[facility] 257-77 Encounter: [id number]          Admit Date/Time:  6/30/2019  2:51 PM    Treatment Team: Attending Provider: Nat Branham MD; Consulting Physician: Alecia Cottrell MD; : Marline Olszewski; Patient Care Assistant: Junior Rosa; Patient Care Technician: Heath Caro;  Registered Nurse: Zan Lancaster RN    Diagnosis: Principal Problem:    Depression  Active Problems:    Seizure disorder ([address] )    Borderline diabetes    Acute cystitis without hematuria    Patient Strengths: negotiates basic needs     Patient Barriers: difficulty adapting, family conflict    Short Term Goals: decrease in paranoid thoughts, decrease in level of agitation, decrease in frequency of aggressive outbursts    Long Term Goals: resolution of manic symptoms    Progress Towards Goals: starting psychitric medications as prescribed, continue psychiatric medications as prescribed    Recommended Treatment: medication management, patient medication education, group therapy, milieu therapy, continued Behavioral Health psychiatric evaluation/assessment process     Treatment Frequency: daily medication monitoring, group and milieu therapy daily, monitoring through interdisciplinary rounds, monitoring through weekly patient care conferences    Expected Discharge Date:   7-9 days      Discharge Plan: referral to [address] [place] [address] Team for close psychiatric monitoring    Treatment Plan Created/Updated By: Nat Branham MD

## 2019-07-02 NOTE — PROGRESS NOTES
Pt has hx of Bipolar d/o with psychotic features  Pt presents as demanding with staff and med seeking  Appears to lack in the ability to rely on coping skills for anxiety  Complained of dizziness, menstrual cramping, nausea and anxiety which she received prn meds for (see MAR for details ) Pt had pos response with dizziness, nausea, pain and anxiety  Pt is often observed with flat affect, appears to be frustrated with peers on occasion and has difficulty with positive communication with others  Currently no immediate thoughts of self harm or violence towards others  Is compliant in attending group

## 2019-07-02 NOTE — H&P
Psychiatric Evaluation - Behavioral Health     Identification Data:Jamaica Lockhart 26 y o  female MRN: [id number]  Unit/Bed#: [facility] 701-25 Encounter: [id number]    Chief Complaint: depression, anxiety, unstable mood, hallucinations, delusional thoughts, bizarre behavior, aggressive behavior, agitation and inability to care for self    History of Present Illness     Jamaica Valdivia is a 26 y o  female with a history of Bipolar Disorder, cognitive disorder and Borderline Personality Disorder who was admitted to the inpatient psychiatric unit on a involuntary 302 commitment basis due to mixed symptoms of bipolar disorder, unstable mood, disorganized behavior, aggressive behavior and inability to care for self  Symptoms prior to admission included increased irritability, erratic behavior, bizarre behavior, racing thoughts, anger outbursts, difficulty controlling anger, aggressive behavior and visual hallucinations of "shadows"  Onset of symptoms was abrupt starting several days ago with progressively worsening course since that time  Stressors preceding admission included family issues  On initial evaluation after admission to the inpatient psychiatric unit the patient  Reported that she had memory loss for her recent events, she stated that she was told she tried to throw the pregnant cut out the windows of her house but she had no recollection of that, the patient was superficially cooperating was the interview, however started to respond was answers I do not remember when we talked about some sensitive issues such as the patient recent aggressive act in statement toward her family members  The patient admitted having some aggressive tendencies, however she denied that she remember anything recently  The patient stated she was treated in inpatient psychiatric unit in [facility] recently, was discharged with a combination of Geodon and Trileptal  for her mood disorder and trazodone for sleep    The patient [phone] petition submitted by her father is to page long describing the patient recent aggressive acts, unstable mood, and unsafe behaviors  Past psychiatric history significant for multiple inpatient psychiatric unit, the writer was 1 of the patient's attending psychiatrist last year in [facility]  The patient was on multiple psychiatric medications including Invega Sustenna injectable but the patient stated that she declined to use it  The patient used to have [place] act team but most recently she moved to [place] to live with her father and does not have act team in [facility]  The the patient had history of suicidal attempts, but stated that recently she was not self harmful  The patient also carries diagnosis of borderline personality disorder with unstable mood, unstable relationship, unstable self esteem and self image, and self harmful behaviors  Previous records were also indicated that the patient had  Symptoms of somatoform disorder      Psychiatric Review Of Systems:    sleep changes: no  appetite changes: yes  energy/anergy: decreased  anxiety/panic: yes  amanda: past mixed episodes  self injurious behavior/risky behavior: yes, not recently  Suicidal ideation: yes  Homicidal ideation: no  Auditory hallucinations: no  Visual hallucinations: yes, visual hallucinations  Delusional thinking: paranoid thoughts      Historical Information     Past Psychiatric History:     Past Inpatient Psychiatric Treatment:   Multiple past inpatient psychiatric admissions  Past Outpatient Psychiatric Treatment:    Was in outpatient psychiatric treatment in the past with a psychiatrist   Past Suicide Attempts:    yes  Past Psychiatric Medication Trials:    patient does not remember and multiple psychiatric medication trials     Substance Abuse History:  Social History     Tobacco History     Smoking Status  Current Every Day Smoker Smoking Frequency  1 pack/day Smoking Tobacco Type  Cigarettes    Smokeless Tobacco Use  Never Used          Alcohol History     Alcohol Use Status  Never          Drug Use     Drug Use Status  Yes Types  Marijuana Frequency   1 time/week Comment  last used lastnight          Sexual Activity     Sexually Active  Yes Partners  Female, Male Birth Control/Protection  Condom Male          Activities of Daily Living    Not Asked               Additional Substance Use Detail     Questions Responses    Substance Use Assessment Substance use within the past 12 months    Alcohol Use Frequency Denies use in past 12 months    Cannabis frequency Never used    Comment: Never used on 6/30/2019     Cocaine frequency Never used    Comment: Never used on 6/30/2019     Crack Cocaine Frequency Denies use in past 12 months    Methamphetamine Frequency Denies use in past 12 months    Narcotic Frequency Denies use in past 12 months    Benzodiazepine Frequency Denies use in past 12 months    Amphetamine frequency Denies use in past 12 months    Barbituate Frequency Denies use use in past 12 months    Inhalant frequency Never used    Comment: Never used on 6/30/2019     Hallucinogen frequency Never used    Comment: Never used on 6/30/2019     Ecstasy frequency Never used    Comment: Never used on 6/30/2019     Other drug frequency Never used    Comment: Never used on 6/30/2019     Opiate frequency Denies use in past 12 months    Last reviewed by Terrance Gomes RN on 6/30/2019        I have assessed this patient for substance use within the past 12 months    History of Inpatient/Outpatient rehabilitation program: no  Smoking history: 1/2 pack per day    Family Psychiatric History:     Psychiatric Illness:  unable to obtain  Substance Abuse:  unable to obtain  Suicide Attempts:  patient denies    Social History:    Marital History: single  Occupational History: currently unemployed          Traumatic History:     Abuse: not willing to provide details    Past Medical History:    History of Seizures: yes    Past Medical History:   Diagnosis Date    ADHD     Anxiety     Asthma     Bipolar 1 disorder ([address] [address] )     Depression     Diabetes mellitus ([address] [address] )     GERD (gastroesophageal reflux disease)     Mental and behavioral problem     Migraine     Psychiatric disorder     Psychosis ([place] [address] )     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[address] )     Seizures ([place] [address] )     Suicide attempt ([address] [address] )      Past Surgical History:   Procedure Laterality Date    TUBAL LIGATION         Medical Review Of Systems:    EFO Review Of Systems: Constitutional: negative  Respiratory: negative  Cardiovascular: negative  Gastrointestinal: negative  Musculoskeletal:negative  Neurological: negative  Endocrine: negative  Allergic/Immunologic: negative    Allergies: Allergies   Allergen Reactions    Shellfish-Derived Products Hives       Medications: All current active medications have been reviewed      Objective     Vital signs in last 24 hours:    Temp:  [98 °F (36 7 °C)-98 5 °F (36 9 °C)] 98 2 °F (36 8 °C)  HR:  [60-97] 97  Resp:  [16-18] 16  BP: ()/(50-71) 114/64    No intake or output data in the 24 hours ending 07/01/19 2118     Mental Status Evaluation:      Appearance:  dressed in hospital attire   Behavior:  calm, guarded   Mood:  dysphoric, anxious   Affect: constricted    Speech:  decreased rate, slow   Language: appropriate   Thought Process:  concrete   Associations: concrete associations   Thought Content:  paranoid ideation   Perceptual Disturbances: does not appear responding to internal stimuli   Risk Potential: Suicidal ideation - None at present  Homicidal ideation - None at present  Potential for aggression - Not at present   Sensorium:  oriented to person only   Memory:  short term memory severely impaired   Consciousness:  alert and awake   Attention: decreased concentration   Fund of Knowledge: past history: unable to assess   Insight:  significantly impaired   Judgment: poor   Muscle Tone: normal   Gait/Station: normal gait/station and normal balance   Motor Activity: no abnormal movements               Laboratory Results:   I have personally reviewed all pertinent laboratory/tests results  Most Recent Labs:   Lab Results   Component Value Date    WBC 10 10 06/30/2019    RBC 4 78 06/30/2019    HGB 13 5 06/30/2019    HCT 43 9 06/30/2019     06/30/2019    RDW 13 3 06/30/2019    NEUTROABS 6 30 06/30/2019    SODIUM 140 06/30/2019    K 3 6 06/30/2019     06/30/2019    CO2 27 06/30/2019    BUN 9 06/30/2019    CREATININE 0 78 06/30/2019    GLUC 109 (H) 06/30/2019    CALCIUM 9 6 06/30/2019    AST 23 06/30/2019    ALT 25 06/30/2019    ALKPHOS 89 06/30/2019    TP 8 0 06/30/2019    ALB 4 4 06/30/2019    TBILI 0 20 06/30/2019    WWP8DOAKAJDR 2 170 06/30/2019    HGBA1C 5 4 07/01/2019     07/01/2019       Imaging Studies: Xr Foot 3+ Vw Left    Result Date: 7/1/2019  Narrative: LEFT FOOT INDICATION:   trauma  Left foot pain COMPARISON:  None VIEWS:  XR FOOT 3+ VW LEFT FINDINGS: There is no acute fracture or dislocation  Foreshortened 4th metatarsal almost certainly developmental in nature  No significant degenerative changes  No lytic or blastic lesions seen  Soft tissues are unremarkable  Impression: No acute osseous abnormality  Workstation performed: [id number]       Code Status: Level 1 - Full Code    Assessment/Plan   Principal Problem:    Depression  Active Problems:    Seizure disorder ([address] )    Borderline diabetes    Acute cystitis without hematuria      Treatment Plan:     Planned Treatment and Medication Changes: All current active medications have been reviewed  Encourage group therapy, milieu therapy and occupational therapy  Behavioral Health checks every 7 minutes  Restart  Patient's medications, restart Trileptal and restart Geodon, was starting dose 40 mg twice a day, and trazodone for sleep    The patient had concerns about her diet, H&P done by internal medicine doctor indicated borderline diabetes and the writer started diabetic diet  Neurocognitive psychological testing will be ordered by the writer to follow up on the patient memory problems  The patient had history of somatization disorder, and that should be taking into account why evaluating patient's problems  Current Facility-Administered Medications:  acetaminophen 650 mg Oral Q6H PRN Shannon Kimbrough MD   aluminum-magnesium hydroxide-simethicone 30 mL Oral Q4H PRN Shannon Kimbrough MD   benztropine 1 mg Intramuscular Q6H PRN Shannon Kimbrough MD   benztropine 1 mg Oral Q6H PRN Shannon Kimbrough MD   haloperidol 5 mg Oral Q6H PRN Shannon Kimbrough MD   haloperidol lactate 5 mg Intramuscular Q6H PRN Shannon Kimbrough MD   hydrOXYzine HCL 50 mg Oral Q6H PRN [facility] Laura   LORazepam 1 mg Intramuscular Q6H PRN Shannon Kimbrough MD   LORazepam 1 mg Oral Q6H PRN Shannon Kimbrough MD   magnesium hydroxide 30 mL Oral Daily PRN Shannon Kimbrough MD   meclizine 12 5 mg Oral Q8H PRN Lala Kocher, MD   nicotine 1 patch Transdermal Daily Shannon Kimbrough MD   ondansetron 4 mg Oral Q8H PRN [facility], MD   OXcarbazepine 300 mg Oral Q12H Jose Delgado MD   traZODone 100 mg Oral HS [facility], MD   traZODone 50 mg Oral HS PRN Shannon Kimbrough MD   ziprasidone 40 mg Oral BID With Meals Arun [place], MD       Risks / Benefits of Treatment:    Risks, benefits, and possible side effects of medications explained to patient and patient verbalizes understanding and agreement for treatment  Counseling / Coordination of Care:    Patient's presentation on admission and proposed treatment plan discussed with staff  Diagnosis, medication changes and treatment plan reviewed with patient      Inpatient Psychiatric Certification:    Estimated length of stay: 7 midnights    Based upon physical, mental and social evaluations, I certify that inpatient psychiatric services are medically necessary for this patient for a duration of 7 midnights for the treatment of Depression    Available alternative community resources do not meet the patient's mental health care needs  I further attest that an established written individualized plan of care has been implemented and is outlined in the patient's medical records  ** Please Note: This note has been constructed using a voice recognition system   **

## 2019-07-02 NOTE — PROGRESS NOTES
Pt pleasant and about unit  Brightens on approach  Childlike at times  Can be med seeking; c/o dizziness and given antivert per request  Appeared it was effective as pt about unit without difficulty but when asked she stated it was not effective and that her doctor told her if antivert does not work she will need a CT scan  Pt c/o nausea at dinner and accepted ginger ale with good effect  Pt denies SI/SAB  She has not c/o anxiety and states she is not depressed  Pt is compliant with routines and care  Will continue to monitor

## 2019-07-02 NOTE — PROGRESS NOTES
Treatment Rounds: Dr Ralf Mcrae,  Michell Short and writer met with patient to discuss treatment planning and to sign treatment plan

## 2019-07-03 PROBLEM — F31.63 BIPOLAR 1 DISORDER, MIXED, SEVERE (HCC): Status: ACTIVE | Noted: 2019-07-03

## 2019-07-03 LAB
GLUCOSE SERPL-MCNC: 139 MG/DL (ref 65–140)
GLUCOSE SERPL-MCNC: 92 MG/DL (ref 65–140)
GLUCOSE SERPL-MCNC: 95 MG/DL (ref 65–140)
GLUCOSE SERPL-MCNC: 96 MG/DL (ref 65–140)

## 2019-07-03 PROCEDURE — 82948 REAGENT STRIP/BLOOD GLUCOSE: CPT

## 2019-07-03 PROCEDURE — 99232 SBSQ HOSP IP/OBS MODERATE 35: CPT | Performed by: PSYCHIATRY & NEUROLOGY

## 2019-07-03 RX ADMIN — OXCARBAZEPINE 300 MG: 300 TABLET, FILM COATED ORAL at 08:26

## 2019-07-03 RX ADMIN — ONDANSETRON 4 MG: 4 TABLET, ORALLY DISINTEGRATING ORAL at 21:22

## 2019-07-03 RX ADMIN — ZIPRASIDONE HCL 40 MG: 40 CAPSULE ORAL at 08:26

## 2019-07-03 RX ADMIN — NICOTINE 1 PATCH: 21 PATCH, EXTENDED RELEASE TRANSDERMAL at 10:38

## 2019-07-03 RX ADMIN — TRAZODONE HYDROCHLORIDE 100 MG: 100 TABLET, FILM COATED ORAL at 21:21

## 2019-07-03 RX ADMIN — OXCARBAZEPINE 300 MG: 300 TABLET, FILM COATED ORAL at 21:21

## 2019-07-03 RX ADMIN — IBUPROFEN 600 MG: 600 TABLET ORAL at 09:01

## 2019-07-03 RX ADMIN — ZIPRASIDONE HCL 60 MG: 20 CAPSULE ORAL at 15:50

## 2019-07-03 NOTE — PROGRESS NOTES
Treatment Rounds: 303 hearing today at 11 a m  Unknown discharge plan as of this time  Dr CUNNINGHAM will be increasing patients Albino

## 2019-07-03 NOTE — PLAN OF CARE
Problem: Risk for Self Injury/Neglect  Goal: Treatment Goal: Remain safe during length of stay, learn and adopt new coping skills, and be free of self-injurious ideation, impulses and acts at the time of discharge  Outcome: Progressing     Problem: Depression  Goal: Treatment Goal: Demonstrate behavioral control of depressive symptoms, verbalize feelings of improved mood/affect, and adopt new coping skills prior to discharge  Outcome: Progressing     Problem: Anxiety  Goal: Anxiety is at manageable level  Description  Interventions:  - Assess and monitor patient's anxiety level  - Monitor for signs and symptoms of anxiety both physical and emotional (heart palpitations, chest pain, shortness of breath, headaches, nausea, feeling jumpy, restlessness, irritable, apprehensive)  - Collaborate with interdisciplinary team and initiate plan and interventions as ordered    - [place] patient to unit/surroundings  - Explain treatment plan  - Encourage participation in care  - Encourage verbalization of concerns/fears  - Identify coping mechanisms  - Assist in developing anxiety-reducing skills  - Administer/offer alternative therapies  - Limit or eliminate stimulants  Outcome: Progressing     Problem: Risk for Violence/Aggression Toward Others  Goal: Treatment Goal: Refrain from acts of violence/aggression during length of stay, and demonstrate improved impulse control at the time of discharge  Outcome: Progressing     Problem: DISCHARGE PLANNING  Goal: Discharge to home or other facility with appropriate resources  Description  INTERVENTIONS:  - Identify barriers to discharge w/patient and caregiver  - Arrange for needed discharge resources and transportation as appropriate  - Identify discharge learning needs (meds, wound care, etc )  - Refer to Case Management Department for coordinating discharge planning if the patient needs post-hospital services based on physician/advanced practitioner order or complex needs related to functional status, cognitive ability, or social support system   Outcome: Progressing     Problem: Ineffective Coping  Goal: Participates in unit activities  Description  Interventions:  - Provide therapeutic environment   - Provide required programming   - Redirect inappropriate behaviors   Outcome: Progressing Pink

## 2019-07-03 NOTE — QUICK NOTE
Vanesa Kelly  was seen for continuing care and reviewed with staff  Progress Note - Behavioral Health   Jamaica Lockhart 26 y o  female MRN: @MRN   Unit/Bed#: [facility] 665-49 Encounter: [id number]       Report from staff regarding this patient received and discussed, and records reviewed prior to seeing this patient  Patient's depression is 10/10 and anxiety is 9/10, although she states her mood is "okay " She complains of severe migraines on the left side that last for 2 hours and are worsened by bright light  She also complains of intermittent numbness and weakness on the left side of her body for the past few days  Patient continues to struggle with memory, reading, and attention and moods are unstable  Sleep: normal  Appetite: increased    Medication side effects:no complaints    Mental Status Evaluation:    Appearance:  dressed in hospital attire   Behavior:  cooperative, calm, guarded   Mood:  depressed, anxious   Affect: constricted    Speech:  normal rate and volume   Thought Process:  concrete   Thought Content:  normal, negative thinking   Perceptual Disturbances: no auditory hallucinations, no visual hallucinations, denies auditory hallucinations when asked, does not appear responding to internal stimuli   Risk Potential: Suicidal ideation - None, contracts for safety on the unit  Homicidal ideation - None  Potential for aggression - No   Sensorium:  oriented to person and place only   Memory:  short term memory mildly impaired   Consciousness:  alert and awake   Insight:  impaired   Judgment: impaired   Motor Activity: no abnormal movements         Laboratory results:  I have personally reviewed all pertinent laboratory results  BMP: No results for input(s): NA, K, CL, CO2, BUN in the last 72 hours      Invalid input(s): CREA, GLU  Vitals:    07/03/19 1801   BP: 113/66   Pulse: 94   Resp: 16   Temp: 98 2 °F (36 8 °C)   SpO2:         Assessment/Plan   Principal Problem:    Bipolar 1 disorder, mixed, severe ([facility] )  Active Problems:    [facility] disorder ([facility] )    Depression    Borderline diabetes    Acute cystitis without hematuria      Recommended Treatment:   Increasing geodon to 60 mg BID  Will consult neurology about migraines, numbness, weakness and memory  Patient does not want to take more medications but is open to trying mental exercises to improve attention  At hearing, patient agreed to stay for up to 20 more days  Planned medication and treatment changes: All current active medications have been reviewed  Continue treatment with group therapy, milieu therapy, occupational therapy and medication management  Risks / Benefits of Treatment:    Risks, benefits, and possible side effects of medications explained to patient and patient verbalizes understanding and agreement for treatment  Planned medication and treatment changes: All current active medications have been reviewed  Continue treatment with group therapy, milieu therapy, occupational therapy and medication management    Counseling / Coordination of Care:    Patient's progress discussed with staff in treatment team meeting           ----------------------------------------------------------------------------------------------------------------

## 2019-07-03 NOTE — CONSULTS
Neuropsychological Test Report      Patient:  Trudy Kelly  Referral: Dr Crescencio Vogel  : 89  C  A : 23 years, 11 months    Location:  [address] [place] [address]  Examiner: Dr Sofia Freed: 12 years      Presenting Problem:     The patient is being referred to assess her for Cognitive Complaints      Past Medical History     History of DM II, Seizures, Schizoid Personality Disorder, Suicide Attempt, Psychosis, Psychiatric Disorder, Migraines, Mental and Behavioral Problems, GERD, Depression, Bipolar I Disorder, Asthma, Anxiety, ADHD, Hallucinations, Delusional Thinking, Agitation, Inability to Care for Self, Borderline Personality Disorder, Multiple Prior Inpatient Admissions  No current facility-administered medications on file prior to encounter  Current Outpatient Medications on File Prior to Encounter   Medication Sig Dispense Refill    OXcarbazepine (TRILEPTAL) 600 mg tablet Take 300 mg by mouth every 12 (twelve) hours      traZODone (DESYREL) 100 mg tablet Take 100 mg by mouth daily at bedtime      ziprasidone (GEODON) 20 mg capsule Take 20 mg by mouth 2 (two) times a day with meals         Behavioral Observations     The patient was alert, oriented to time, but not place, and demonstrated no abnormalities of thought or behavior at this time  She admits to visual hallucinations taking the form of "shadows looking like people "  Her appetite is reported to be largely within normal limits  The patient demonstrated no abnormal movements and ambulated independently  She demonstrated normal speech, and normal thought processes  During the evaluation the patient shared that she had "weakness" on the left side of her body, and this was borne out by decided weakness in her left (non-dominant) hand  She also reported a history of Special Education while in school apparently due to marked Dyslexia       While she appeared to try her best efforts she demonstrated difficulties in retaining information, often complaining that she had the information, but that she could not retain it and "it was gone "  This was especially prominent during assessments of her memory  General Cognitive Functioning     Robin Cognitive Assessment Screen - 16/30 - Impaired Levels      Attention/Concentration     Immediate Attention - Impaired Levels//  Sustained Attention - Borderline Levels//  Processing Speed - Borderline Levels      Executive Functioning     Cognitive Flexibility/Control - Impaired Levels//  Generative Ability (Verbal) - Impaired//  Working Memory - Borderline                Abstract Reasoning - Impaired      Language Functioning     Naming - Average Levels//  Vocabulary - Impaired Levels//  Verbal Comprehension - Borderline Levels      Memory     Auditory Narrative       Short Delay - Borderline Levels//  Long Delay - Low Average Levels     Auditory Verbal List Learning      Encoding - Impaired//  Short Delay - Impaired Levels//  Long Delay - Impaired     Visual Recognition - Average Levels      Visual Spatial Construction - Average Levels      Mood     Depression - Moderate to Severe Levels//  Anxiety - Moderate Levels      Conclusions:     As seen the patient appears to struggle in her ability to attend, retain, and be able to store information most likely due to the combined challenges of her Bipolar Disorder, Reading problems, and her continuing challenges with Attention Deficit Hyperactivity Disorder  While she has been diagnosed with many disorders, these appear to the ones most related to the  challenges with her cognition          Diagnostic Impression:    (1) F 31 32 Bipolar Disorder, Current Episode, Depressed, Moderate    (2) F 81 0 Specific Reading Disorder    (3) F 90 0 Attention Deficit Hyperactivity Disorder, Inattentive Type      Watson Valencia, Ph D   Psychologist

## 2019-07-03 NOTE — PROGRESS NOTES
Pt is pleasant and bright states " I am not suicidal or homicidal , I  Am just depressed the depression gets the best of me   I am feeling a little better today though"Prn Motrin requested for menstrual cramps at 0900 will monitor

## 2019-07-03 NOTE — PROGRESS NOTES
Laying in bed sleeping in the begining of shift offered no c/o pain and denies all s/s  Will monitor

## 2019-07-03 NOTE — PROGRESS NOTES
Pt denies SI and HI  She denies any hallucinations  She has a childlike demeanor with an inappropriate smile and blunted affect  Pt states she feels depressed, but "okay"  Also complaining about increased appetite  Pt frequently asks for snacks  Will monitor

## 2019-07-03 NOTE — CASE MANAGEMENT
303 hearing held  Pt agreed to stay for more tx  Pt committed for up to 20 days  Pt spoke with Patricia Roberts that she COULD NOT return to with her dad  SW asked pt if she called her mom or sister to ask if she could stay with them  Pt said no  Pt pleasant  Childlike   Denied SI & HI

## 2019-07-03 NOTE — PROGRESS NOTES
DELEON GROUP NOTE     07/03/19 0930   Activity/Group Checklist   Group Community meeting   Attendance Attended   Attendance Duration (min) 16-30   Interactions Interacted appropriately   Affect/Mood Appropriate   Goals Achieved Able to listen to others; Able to engage in interactions   Objectives/Key Points:  Living intentionally  Goal Setting  Thought for the Day  Self Check In  Individual goals held for afternoon life skills group  Bright Affect  Group content also addressed sensory coping strategies to manage mood changes   secondary to Bipolar Diagnosis

## 2019-07-03 NOTE — PROGRESS NOTES
Chio Levine  was seen for continuing care and reviewed with staff  Progress Note - Behavioral Health   Jamaica Lockhart 26 y o  female MRN: @MRN   Unit/Bed#: Edel [facility]more 985-27 Encounter: [id number]       Report from staff regarding this patient received and discussed, and records reviewed prior to seeing this patient   Pt was calmer today, cheerful  Feeling sleepy and depressed  Depression is 10 out of 10 today, have been sad for "a couple of days"  Anxiety is 9 out of 10  Complained about headache  Motrin did not help in the past      Sleep: improved  Appetite: fair    Medication side effects:no complaints    Mental Status Evaluation:    Appearance:  dressed appropriately, casually dressed   Behavior:  calm   Mood:  anxious   Affect: constricted    Speech:  slow   Thought Process:  concrete   Thought Content:  negative thinking, poverty of thought   Perceptual Disturbances: denies auditory hallucinations when asked, does not appear responding to internal stimuli   Risk Potential: Suicidal ideation - None, contracts for safety on the unit  Homicidal ideation - None  Potential for aggression - No   Sensorium:  oriented to person, place and time   Memory:  short term memory mildly impaired   Consciousness:  alert and awake   Insight:  impaired   Judgment: limited   Motor Activity: no abnormal movements         Laboratory results:  I have personally reviewed all pertinent laboratory results      BMP:   Recent Labs     06/30/19  1724   K 3 6      CO2 27   BUN 9     Vitals:    07/02/19 1602   BP: 119/56   Pulse: 90   Resp: 16   Temp: 97 8 °F (36 6 °C)   SpO2:         Assessment/Plan   Principal Problem:    Bipolar 1 disorder, mixed, severe ([facility])  Active Problems:    Seizure disorder ([facility])    Depression    Borderline diabetes    Acute cystitis without hematuria      Recommended Treatment: will continue to increase Ceodone to 60 mg bid,   Continue Trileptal   The patient stated she had headaches but the was no headaches associated behavioral, the patient knows for some motivations of her emotional feelings, will continue to monitor  The was no neurological abnormalities noted  Planned medication and treatment changes: All current active medications have been reviewed  Continue treatment with group therapy, milieu therapy, occupational therapy and medication management  Risks / Benefits of Treatment:    Risks, benefits, and possible side effects of medications explained to patient and patient verbalizes understanding and agreement for treatment  Planned medication and treatment changes: All current active medications have been reviewed  Continue treatment with group therapy, milieu therapy, occupational therapy and medication management  Counseling / Coordination of Care:    Patient's progress discussed with staff in treatment team meeting  ** Please Note: This note has been constructed using a voice recognition system   **      ----------------------------------------------------------------------------------------------------------------

## 2019-07-03 NOTE — PLAN OF CARE
Problem: Ineffective Coping  Goal: Participates in unit activities  Description  Interventions:  - Provide therapeutic environment   - Provide required programming   - Redirect inappropriate behaviors   Outcome: Progressing

## 2019-07-04 LAB
GLUCOSE SERPL-MCNC: 110 MG/DL (ref 65–140)
GLUCOSE SERPL-MCNC: 110 MG/DL (ref 65–140)
GLUCOSE SERPL-MCNC: 117 MG/DL (ref 65–140)
GLUCOSE SERPL-MCNC: 95 MG/DL (ref 65–140)

## 2019-07-04 PROCEDURE — 82948 REAGENT STRIP/BLOOD GLUCOSE: CPT

## 2019-07-04 PROCEDURE — 99232 SBSQ HOSP IP/OBS MODERATE 35: CPT | Performed by: PSYCHIATRY & NEUROLOGY

## 2019-07-04 RX ORDER — ZIPRASIDONE HYDROCHLORIDE 40 MG/1
40 CAPSULE ORAL
Status: DISCONTINUED | OUTPATIENT
Start: 2019-07-05 | End: 2019-07-10 | Stop reason: HOSPADM

## 2019-07-04 RX ORDER — HYDROXYZINE 50 MG/1
50 TABLET, FILM COATED ORAL 3 TIMES DAILY
Status: DISCONTINUED | OUTPATIENT
Start: 2019-07-04 | End: 2019-07-10 | Stop reason: HOSPADM

## 2019-07-04 RX ORDER — ZIPRASIDONE HYDROCHLORIDE 40 MG/1
80 CAPSULE ORAL
Status: DISCONTINUED | OUTPATIENT
Start: 2019-07-05 | End: 2019-07-10 | Stop reason: HOSPADM

## 2019-07-04 RX ADMIN — ZIPRASIDONE HCL 60 MG: 20 CAPSULE ORAL at 16:32

## 2019-07-04 RX ADMIN — ZIPRASIDONE HCL 60 MG: 20 CAPSULE ORAL at 08:41

## 2019-07-04 RX ADMIN — NICOTINE 1 PATCH: 21 PATCH, EXTENDED RELEASE TRANSDERMAL at 10:48

## 2019-07-04 RX ADMIN — IBUPROFEN 800 MG: 400 TABLET ORAL at 19:27

## 2019-07-04 RX ADMIN — OXCARBAZEPINE 300 MG: 300 TABLET, FILM COATED ORAL at 21:15

## 2019-07-04 RX ADMIN — HYDROXYZINE HYDROCHLORIDE 50 MG: 50 TABLET, FILM COATED ORAL at 12:55

## 2019-07-04 RX ADMIN — ONDANSETRON 4 MG: 4 TABLET, ORALLY DISINTEGRATING ORAL at 12:59

## 2019-07-04 RX ADMIN — TRAZODONE HYDROCHLORIDE 100 MG: 100 TABLET, FILM COATED ORAL at 21:15

## 2019-07-04 RX ADMIN — IBUPROFEN 800 MG: 400 TABLET ORAL at 08:53

## 2019-07-04 RX ADMIN — OXCARBAZEPINE 300 MG: 300 TABLET, FILM COATED ORAL at 08:41

## 2019-07-04 RX ADMIN — HYDROXYZINE HYDROCHLORIDE 50 MG: 50 TABLET, FILM COATED ORAL at 16:00

## 2019-07-04 RX ADMIN — HYDROXYZINE HYDROCHLORIDE 50 MG: 50 TABLET, FILM COATED ORAL at 21:15

## 2019-07-04 NOTE — PROGRESS NOTES
Pt given PRN Zofran and Atarax at 1259  After eating lunch Pt began crying and rocking back and forth in her chair stating " I have anxiety " Pt was encouraged to drink cold water and go to her room to rest and let the anxiety pill and nausea pill work   will monitor

## 2019-07-04 NOTE — PROGRESS NOTES
Pt denies all symptoms  Pt is mainly seclusive to room with brief intervals out in the milieu  Pt had a tearful moment on the phone with her family regarding her money and wanting to make sure someone throws her daughter a 1st birthday party  Pt is calm and cooperative  Compliant with medications  Will monitor

## 2019-07-04 NOTE — PROGRESS NOTES
Pt is pleasant and bright and is requesting clarification on her meds order stated" The Dr said they will decrease the Geodon  In the morning and increase it at night  Pt was reassured to ask the Dr to clarify orders and this nurse will put it in the chart  She also request something for migraine pain  Something she was also told to tell the Dr and this nurse will put in a note for it   Pt states she is depressed but not as much and not suicidal Will monitor

## 2019-07-04 NOTE — PLAN OF CARE
Problem: Risk for Self Injury/Neglect  Goal: Treatment Goal: Remain safe during length of stay, learn and adopt new coping skills, and be free of self-injurious ideation, impulses and acts at the time of discharge  Outcome: Progressing     Problem: Depression  Goal: Treatment Goal: Demonstrate behavioral control of depressive symptoms, verbalize feelings of improved mood/affect, and adopt new coping skills prior to discharge  Outcome: Progressing     Problem: Anxiety  Goal: Anxiety is at manageable level  Description  Interventions:  - Assess and monitor patient's anxiety level  - Monitor for signs and symptoms of anxiety both physical and emotional (heart palpitations, chest pain, shortness of breath, headaches, nausea, feeling jumpy, restlessness, irritable, apprehensive)  - Collaborate with interdisciplinary team and initiate plan and interventions as ordered    - [place] patient to unit/surroundings  - Explain treatment plan  - Encourage participation in care  - Encourage verbalization of concerns/fears  - Identify coping mechanisms  - Assist in developing anxiety-reducing skills  - Administer/offer alternative therapies  - Limit or eliminate stimulants  Outcome: Progressing     Problem: Risk for Violence/Aggression Toward Others  Goal: Treatment Goal: Refrain from acts of violence/aggression during length of stay, and demonstrate improved impulse control at the time of discharge  Outcome: Progressing     Problem: DISCHARGE PLANNING  Goal: Discharge to home or other facility with appropriate resources  Description  INTERVENTIONS:  - Identify barriers to discharge w/patient and caregiver  - Arrange for needed discharge resources and transportation as appropriate  - Identify discharge learning needs (meds, wound care, etc )  - Refer to Case Management Department for coordinating discharge planning if the patient needs post-hospital services based on physician/advanced practitioner order or complex needs related to functional status, cognitive ability, or social support system   Outcome: Progressing     Problem: Ineffective Coping  Goal: Participates in unit activities  Description  Interventions:  - Provide therapeutic environment   - Provide required programming   - Redirect inappropriate behaviors   Outcome: Progressing

## 2019-07-04 NOTE — PROGRESS NOTES
Progress Note - Behavioral Health   Jamaica Lockhart 26 y o  female MRN: [id number]  Unit/Bed#: [facility] 347-02 Encounter: [id number]    Assessment/Plan   Principal Problem:    Bipolar 1 disorder, mixed, severe ([facility] )  Active Problems:    Seizure disorder ([facility] )    Depression    Borderline diabetes    Acute cystitis without hematuria      Behavior over the last 24 hours:  unchanged  Sleep: normal  Appetite: increased  Medication side effects: Yes sedation and increased appetite  ROS: sedation    Mental Status Evaluation:  Appearance:  age appropriate and casually dressed   Behavior:  normal   Speech:  normal pitch and normal volume   Mood:  anxious and labile   Affect:  mood-congruent   Thought Process:  goal directed and logical   Thought Content:  normal   Perceptual Disturbances: None   Risk Potential: Suicidal Ideations none, Homicidal Ideations none and Potential for Aggression No   Sensorium:  person, place and time/date   Cognition:  grossly intact   Consciousness:  alert and awake    Attention: attention span appeared shorter than expected for age   Insight:  limited   Judgment: limited   Gait/Station: normal gait/station and normal balance   Motor Activity: no abnormal movements     Progress Toward Goals: Patient remains compliant with medications  She stated after dose increase on Geodon to 60 mg bid she has been feeling more sedated and she is reporting increased appetite  I agree to change dose to 40 mg qam and 80 mg qhs  She stated she is very anxious and is requesting Atarax 25 mg tid standing order  She also mentioned she had been diagnosed with ADHD in the past and is interested in starting medication treatment  I will pass this information to her primary treatment team     Recommended Treatment: Continue with group therapy, milieu therapy and occupational therapy        Risks, benefits and possible side effects of Medications:   Risks, benefits, and possible side effects of medications explained to patient and patient verbalizes understanding  Medications:   all current active meds have been reviewed, continue current psychiatric medications and current meds:   Current Facility-Administered Medications   Medication Dose Route Frequency    acetaminophen (TYLENOL) tablet 650 mg  650 mg Oral Q6H PRN    aluminum-magnesium hydroxide-simethicone (MYLANTA) 200-200-20 mg/5 mL oral suspension 30 mL  30 mL Oral Q4H PRN    benztropine (COGENTIN) injection 1 mg  1 mg Intramuscular Q6H PRN    benztropine (COGENTIN) tablet 1 mg  1 mg Oral Q6H PRN    haloperidol (HALDOL) tablet 5 mg  5 mg Oral Q6H PRN    haloperidol lactate (HALDOL) injection 5 mg  5 mg Intramuscular Q6H PRN    hydrOXYzine HCL (ATARAX) tablet 50 mg  50 mg Oral Q6H PRN    hydrOXYzine HCL (ATARAX) tablet 50 mg  50 mg Oral TID    ibuprofen (MOTRIN) tablet 600 mg  600 mg Oral Q6H PRN    ibuprofen (MOTRIN) tablet 800 mg  800 mg Oral Q6H PRN    LORazepam (ATIVAN) 2 mg/mL injection 1 mg  1 mg Intramuscular Q6H PRN    LORazepam (ATIVAN) tablet 1 mg  1 mg Oral Q8H PRN    magnesium hydroxide (MILK OF MAGNESIA) 400 mg/5 mL oral suspension 30 mL  30 mL Oral Daily PRN    meclizine (ANTIVERT) tablet 12 5 mg  12 5 mg Oral Q8H PRN    nicotine (NICODERM CQ) 21 mg/24 hr TD 24 hr patch 1 patch  1 patch Transdermal Daily    ondansetron (ZOFRAN-ODT) dispersible tablet 4 mg  4 mg Oral Q8H PRN    OXcarbazepine (TRILEPTAL) tablet 300 mg  300 mg Oral Q12H [facility]    traZODone (DESYREL) tablet 100 mg  100 mg Oral HS    traZODone (DESYREL) tablet 50 mg  50 mg Oral HS PRN    [START ON 7/5/2019] ziprasidone (GEODON) capsule 40 mg  40 mg Oral Daily With Breakfast    [START ON 7/5/2019] ziprasidone (GEODON) capsule 80 mg  80 mg Oral Daily With Dinner     Labs: reviewed    Counseling / Coordination of Care  Total floor / unit time spent today n/a minutes  Greater than 50% of total time was spent with the patient and / or family counseling and / or coordination of care   A description of the counseling / coordination of care:

## 2019-07-05 ENCOUNTER — APPOINTMENT (INPATIENT)
Dept: RADIOLOGY | Facility: HOSPITAL | Age: 24
DRG: 885 | End: 2019-07-05
Payer: MEDICARE

## 2019-07-05 PROBLEM — R10.9 ABDOMINAL PAIN: Status: ACTIVE | Noted: 2019-07-05

## 2019-07-05 LAB
CHOLEST SERPL-MCNC: 133 MG/DL
GLUCOSE SERPL-MCNC: 106 MG/DL (ref 65–140)
GLUCOSE SERPL-MCNC: 87 MG/DL (ref 65–140)
HDLC SERPL-MCNC: 42 MG/DL (ref 40–59)
LDLC SERPL CALC-MCNC: 73 MG/DL
NONHDLC SERPL-MCNC: 91 MG/DL
TRIGL SERPL-MCNC: 88 MG/DL

## 2019-07-05 PROCEDURE — 82948 REAGENT STRIP/BLOOD GLUCOSE: CPT

## 2019-07-05 PROCEDURE — 99231 SBSQ HOSP IP/OBS SF/LOW 25: CPT | Performed by: FAMILY MEDICINE

## 2019-07-05 PROCEDURE — 74022 RADEX COMPL AQT ABD SERIES: CPT

## 2019-07-05 PROCEDURE — 80061 LIPID PANEL: CPT | Performed by: PSYCHIATRY & NEUROLOGY

## 2019-07-05 RX ORDER — ALBUTEROL SULFATE 90 UG/1
2 AEROSOL, METERED RESPIRATORY (INHALATION) EVERY 4 HOURS PRN
Status: DISCONTINUED | OUTPATIENT
Start: 2019-07-05 | End: 2019-07-10 | Stop reason: HOSPADM

## 2019-07-05 RX ORDER — DOCUSATE SODIUM 100 MG/1
100 CAPSULE, LIQUID FILLED ORAL 2 TIMES DAILY
Status: DISCONTINUED | OUTPATIENT
Start: 2019-07-05 | End: 2019-07-10 | Stop reason: HOSPADM

## 2019-07-05 RX ORDER — PANTOPRAZOLE SODIUM 40 MG/1
40 TABLET, DELAYED RELEASE ORAL
Status: DISCONTINUED | OUTPATIENT
Start: 2019-07-05 | End: 2019-07-10 | Stop reason: HOSPADM

## 2019-07-05 RX ADMIN — ZIPRASIDONE HCL 40 MG: 40 CAPSULE ORAL at 08:22

## 2019-07-05 RX ADMIN — DOCUSATE SODIUM 100 MG: 100 CAPSULE, LIQUID FILLED ORAL at 19:30

## 2019-07-05 RX ADMIN — OXCARBAZEPINE 300 MG: 300 TABLET, FILM COATED ORAL at 08:22

## 2019-07-05 RX ADMIN — OXCARBAZEPINE 300 MG: 300 TABLET, FILM COATED ORAL at 21:19

## 2019-07-05 RX ADMIN — TRAZODONE HYDROCHLORIDE 100 MG: 100 TABLET, FILM COATED ORAL at 21:19

## 2019-07-05 RX ADMIN — NICOTINE 1 PATCH: 21 PATCH, EXTENDED RELEASE TRANSDERMAL at 08:23

## 2019-07-05 RX ADMIN — HYDROXYZINE HYDROCHLORIDE 50 MG: 50 TABLET, FILM COATED ORAL at 15:41

## 2019-07-05 RX ADMIN — PANTOPRAZOLE SODIUM 40 MG: 40 TABLET, DELAYED RELEASE ORAL at 13:56

## 2019-07-05 RX ADMIN — HYDROXYZINE HYDROCHLORIDE 50 MG: 50 TABLET, FILM COATED ORAL at 21:19

## 2019-07-05 RX ADMIN — ZIPRASIDONE HCL 80 MG: 40 CAPSULE ORAL at 15:42

## 2019-07-05 RX ADMIN — HYDROXYZINE HYDROCHLORIDE 50 MG: 50 TABLET, FILM COATED ORAL at 08:22

## 2019-07-05 NOTE — ASSESSMENT & PLAN NOTE
· Diffuse but she is lying in bed comfortable says had a BM today  Denies any urinary symptoms no nausea no vomiting  States happens after she eats, since she has been here   · Abdomen is distended but soft no acute abdomen decreased bowel sounds    · Questionable there is fecal impaction  · Will obtain obstruction series

## 2019-07-05 NOTE — PROGRESS NOTES
Pt is cooperative with medications  She appears depressed, complaining of anxiety  Pt denies all other S/S, brightens on approach  Pt does not have any other complaints at this time  She is medication compliant, social with peers

## 2019-07-05 NOTE — PROGRESS NOTES
Pt denies all symptoms  Pt is seclusive to room complaining of stomach pain and feeling tired  Pt had an x-ray done of the abdomen per the medical doctors request  Pt is cooperative and calm  Compliant with medications  Will monitor

## 2019-07-05 NOTE — PROGRESS NOTES
Progress Note - [place] Bright 1995, 26 y o  female MRN: [id number]    Unit/Bed#: [facility] 347-02 Encounter: [id number]    Primary Care Provider: Sameer Salgado DO   Date and time admitted to hospital: 2019  2:51 PM        Abdominal pain  Assessment & Plan  · Diffuse but she is lying in bed comfortable says had a BM today  Denies any urinary symptoms no nausea no vomiting  States happens after she eats, since she has been here   · Abdomen is distended but soft no acute abdomen decreased bowel sounds  · Questionable there is fecal impaction  · Will obtain obstruction series      VTE Pharmacologic Prophylaxis:   Pharmacologic: Pharmacologic VTE Prophylaxis contraindicated due to low risk   Mechanical VTE Prophylaxis in Place: No    Patient Centered Rounds: I have performed bedside rounds with nursing staff today  Discussions with Specialists or Other Care Team Provider: nursing     Education and Discussions with Family / Patient: patient     Time Spent for Care: 30 minutes  More than 50% of total time spent on counseling and coordination of care as described above  Current Length of Stay: 5 day(s)    Current Patient Status: Inpatient Psych   Certification Statement: The patient will continue to require additional inpatient hospital stay due to Psychiatric treatment    Discharge Plan: To be determined by psychiatrist    Code Status: Level 1 - Full Code      Subjective:   Patient seen and examined has been complaining of abdominal pain diffuse since he has been eating since that time she has been here but she does has a heard has psychosomatic complaints all the time she states she had a bowel movement today she denies any dysuria she is finishing her  There is no nausea no vomiting and crampy did been eating and drinking okay      Objective:     Vitals:   Temp (24hrs), Av 8 °F (36 6 °C), Min:97 5 °F (36 4 °C), Max:98 1 °F (36 7 °C)    Temp:  [97 5 °F (36 4 °C)-98 1 °F (36 7 °C)] 97 5 °F (36 4 °C)  HR:  [75-81] 81  Resp:  [16-18] 16  BP: ()/(53-55) 95/55  Body mass index is 38 79 kg/m²  Input and Output Summary (last 24 hours):     No intake or output data in the 24 hours ending 07/05/19 1648    Physical Exam:     Physical Exam   Constitutional: She is oriented to person, place, and time  Patient is obese   Abdominal: Soft  She exhibits distension (But no acute abdomen)  She exhibits no mass  There is no tenderness (There is no absolute tenderness on deep palpation of all 4 quadrants)  There is no rebound and no guarding  Bowel sounds are hypoactive   Musculoskeletal: Normal range of motion  Neurological: She is alert and oriented to person, place, and time  She has normal reflexes  cranial nerve 2-12 are normal   Normal neurological exam   Skin: Skin is warm  Psychiatric: She has a normal mood and affect  Additional Data:     Labs:    Results from last 7 days   Lab Units 06/30/19  1724   WBC Thousand/uL 10 10   HEMOGLOBIN g/dL 13 5   HEMATOCRIT % 43 9   PLATELETS Thousands/uL 268   NEUTROS PCT % 62   LYMPHS PCT % 27   MONOS PCT % 7   EOS PCT % 3     Results from last 7 days   Lab Units 06/30/19  1724   SODIUM mmol/L 140   POTASSIUM mmol/L 3 6   CHLORIDE mmol/L 103   CO2 mmol/L 27   BUN mg/dL 9   CREATININE mg/dL 0 78   ANION GAP mmol/L 10   CALCIUM mg/dL 9 6   ALBUMIN g/dL 4 4   TOTAL BILIRUBIN mg/dL 0 20   ALK PHOS U/L 89   ALT U/L 25   AST U/L 23   GLUCOSE RANDOM mg/dL 109*         Results from last 7 days   Lab Units 07/05/19  1146 07/05/19  0809 07/04/19 2051 07/04/19  1653 07/04/19  1205 07/04/19  0806 07/03/19 2057 07/03/19  1700 07/03/19  1148 07/03/19  0835 07/02/19 2050 07/02/19  1645   POC GLUCOSE mg/dl 87 106 110 110 117 95 95 139 92 96 100 114     Results from last 7 days   Lab Units 07/01/19  0614   HEMOGLOBIN A1C % 5 4               * I Have Reviewed All Lab Data Listed Above    * Additional Pertinent Lab Tests Reviewed:none today     Imaging:    Imaging Reports Reviewed Today Include:   Imaging Personally Reviewed by Myself Includes:      Recent Cultures (last 7 days):     Results from last 7 days   Lab Units 06/29/19  1631   URINE CULTURE  10,000-19,000 cfu/ml        Last 24 Hours Medication List:     Current Facility-Administered Medications:  acetaminophen 650 mg Oral Q6H PRN Latoya Hollis MD   albuterol 2 puff Inhalation Q4H PRN Crystal Cuellar MD   aluminum-magnesium hydroxide-simethicone 30 mL Oral Q4H PRN Latoya Hollis MD   benztropine 1 mg Intramuscular Q6H PRN Latoya Hollis MD   benztropine 1 mg Oral Q6H PRN Latoya Hollis MD   haloperidol 5 mg Oral Q6H PRN Latoya Hollis MD   haloperidol lactate 5 mg Intramuscular Q6H PRN Latoya Hollis MD   hydrOXYzine HCL 50 mg Oral Q6H PRN Crystal Cuellar MD   hydrOXYzine HCL 50 mg Oral TID Tamie Smith MD   ibuprofen 600 mg Oral Q6H PRN Crystal Cuellar MD   ibuprofen 800 mg Oral Q6H PRN Crystal Cuellar MD   LORazepam 1 mg Intramuscular Q6H PRN Latoya Hollis MD   LORazepam 1 mg Oral Q8H PRN Crystal Cuellar MD   magnesium hydroxide 30 mL Oral Daily PRN Latoya Hollis MD   meclizine 12 5 mg Oral Q8H PRN Lg Burger MD   nicotine 1 patch Transdermal Daily Latoya Hollis MD   ondansetron 4 mg Oral Q8H PRN Crystal Cuellar MD   OXcarbazepine 300 mg Oral Q12H Yessica Gross MD   pantoprazole 40 mg Oral Early Morning Crystal Cuellar MD   traZODone 100 mg Oral HS Crystal Cuellar MD   traZODone 50 mg Oral HS PRN Latoya Hollis MD   ziprasidone 40 mg Oral Daily With Breakfast Tamie Smith MD   ziprasidone 80 mg Oral Daily With Machelle Obrien MD        Today, Patient Was Seen By: [place] Gaines MD    ** Please Note: Dictation voice to text software may have been used in the creation of this document   **

## 2019-07-05 NOTE — PLAN OF CARE
Problem: Risk for Self Injury/Neglect  Goal: Treatment Goal: Remain safe during length of stay, learn and adopt new coping skills, and be free of self-injurious ideation, impulses and acts at the time of discharge  Outcome: Progressing     Problem: Depression  Goal: Treatment Goal: Demonstrate behavioral control of depressive symptoms, verbalize feelings of improved mood/affect, and adopt new coping skills prior to discharge  Outcome: Progressing     Problem: Anxiety  Goal: Anxiety is at manageable level  Description  Interventions:  - Assess and monitor patient's anxiety level  - Monitor for signs and symptoms of anxiety both physical and emotional (heart palpitations, chest pain, shortness of breath, headaches, nausea, feeling jumpy, restlessness, irritable, apprehensive)  - Collaborate with interdisciplinary team and initiate plan and interventions as ordered    - [place] patient to unit/surroundings  - Explain treatment plan  - Encourage participation in care  - Encourage verbalization of concerns/fears  - Identify coping mechanisms  - Assist in developing anxiety-reducing skills  - Administer/offer alternative therapies  - Limit or eliminate stimulants  Outcome: Progressing     Problem: Risk for Violence/Aggression Toward Others  Goal: Treatment Goal: Refrain from acts of violence/aggression during length of stay, and demonstrate improved impulse control at the time of discharge  Outcome: Progressing

## 2019-07-05 NOTE — QUICK NOTE
Jesús Chun  was seen for continuing care and reviewed with staff  Progress Note - Behavioral Health   Jamaica Lockhart 26 y o  female MRN: @MRN   Unit/Bed#: [facility] 465-22 Encounter: [id number]       Report from staff regarding this patient received and discussed, and records reviewed prior to seeing this patient  The patient appears irritable  She initially rated her anxiety 2/10 but became increasingly anxious during the conversation and rated it 5/10  She complains of sleeping too much, migraines, rib pain, abdominal pain when eating, difficulty breathing, nightmares, and panic attacks in the middle of the night  She does not want to try medication for nightmares because she is afraid of side effects and doubts efficacy  Says she might be able to live with her sister upon discharge  Sleep: hypersomnia  Appetite: increased    Medication side effects:sedation    Mental Status Evaluation:    Appearance:  dressed appropriately, casually dressed   Behavior:  normal   Mood:  anxious   Affect: mood-congruent    Speech:  normal rate and volume   Thought Process:  concrete   Thought Content:  normal   Perceptual Disturbances: no auditory hallucinations, no visual hallucinations, denies auditory hallucinations when asked, does not appear responding to internal stimuli   Risk Potential: Suicidal ideation - None, contracts for safety on the unit  Homicidal ideation - None  Potential for aggression - No   Sensorium:  oriented to person, place and time   Memory:  short term memory mildly impaired   Consciousness:  alert and awake   Insight:  limited   Judgment: limited   Motor Activity: no abnormal movements         Laboratory results:  I have personally reviewed all pertinent laboratory results  BMP: No results for input(s): NA, K, CL, CO2, BUN in the last 72 hours      Invalid input(s): CREA, GLU  Vitals:    07/05/19 0700   BP: 107/53   Pulse: 75   Resp: 18   Temp: 98 1 °F (36 7 °C)   SpO2:         Assessment/Plan Principal Problem:    Bipolar 1 disorder, mixed, severe ([facility] [address] )  Active Problems:    [facility] disorder ([facility] )    Depression    Borderline diabetes    Acute cystitis without hematuria      Recommended Treatment: Patient agreed to try tracking nightmares for several days  Refer to neurology for migraines and GI for abdominal pain  Continue current medication regimen  Planned medication and treatment changes: All current active medications have been reviewed  Continue treatment with group therapy, milieu therapy, occupational therapy and medication management  Risks / Benefits of Treatment:    Risks, benefits, and possible side effects of medications explained to patient and patient verbalizes understanding and agreement for treatment  Planned medication and treatment changes: All current active medications have been reviewed  Continue treatment with group therapy, milieu therapy, occupational therapy and medication management    Counseling / Coordination of Care:    Patient's progress discussed with staff in treatment team meeting             ----------------------------------------------------------------------------------------------------------------

## 2019-07-06 RX ORDER — SODIUM PHOSPHATE, DIBASIC AND SODIUM PHOSPHATE, MONOBASIC 7; 19 G/133ML; G/133ML
1 ENEMA RECTAL DAILY PRN
Status: DISCONTINUED | OUTPATIENT
Start: 2019-07-07 | End: 2019-07-08

## 2019-07-06 RX ORDER — SENNOSIDES 8.6 MG
3 TABLET ORAL 2 TIMES DAILY
Status: DISCONTINUED | OUTPATIENT
Start: 2019-07-06 | End: 2019-07-10

## 2019-07-06 RX ADMIN — HYDROXYZINE HYDROCHLORIDE 50 MG: 50 TABLET, FILM COATED ORAL at 08:43

## 2019-07-06 RX ADMIN — ZIPRASIDONE HCL 40 MG: 40 CAPSULE ORAL at 08:43

## 2019-07-06 RX ADMIN — HYDROXYZINE HYDROCHLORIDE 50 MG: 50 TABLET, FILM COATED ORAL at 16:51

## 2019-07-06 RX ADMIN — DOCUSATE SODIUM 100 MG: 100 CAPSULE, LIQUID FILLED ORAL at 17:12

## 2019-07-06 RX ADMIN — NICOTINE 1 PATCH: 21 PATCH, EXTENDED RELEASE TRANSDERMAL at 08:49

## 2019-07-06 RX ADMIN — DOCUSATE SODIUM 100 MG: 100 CAPSULE, LIQUID FILLED ORAL at 08:49

## 2019-07-06 RX ADMIN — TRAZODONE HYDROCHLORIDE 100 MG: 100 TABLET, FILM COATED ORAL at 21:08

## 2019-07-06 RX ADMIN — PANTOPRAZOLE SODIUM 40 MG: 40 TABLET, DELAYED RELEASE ORAL at 06:09

## 2019-07-06 RX ADMIN — ZIPRASIDONE HCL 80 MG: 40 CAPSULE ORAL at 16:50

## 2019-07-06 RX ADMIN — SENNOSIDES 25.8 MG: 8.6 TABLET, FILM COATED ORAL at 17:12

## 2019-07-06 RX ADMIN — OXCARBAZEPINE 300 MG: 300 TABLET, FILM COATED ORAL at 21:08

## 2019-07-06 RX ADMIN — HYDROXYZINE HYDROCHLORIDE 50 MG: 50 TABLET, FILM COATED ORAL at 21:08

## 2019-07-06 RX ADMIN — OXCARBAZEPINE 300 MG: 300 TABLET, FILM COATED ORAL at 08:44

## 2019-07-06 NOTE — PROGRESS NOTES
Pt is calm and depressed upon approach this morning stated " I dont feel that much different I still feel depressed but not as much as when I came in " Pt was oob for breakfast and went back to bed to sleep   Will monitor

## 2019-07-06 NOTE — PROGRESS NOTES
07/05/19 1415   Activity/Group Checklist   Group Other (Comment)  (Art Therapy Group/[facility], Processing)   Attendance Attended   Attendance Duration (min) 46-60  (Left group early)   Interactions Interacted appropriately   Affect/Mood Appropriate   Goals Achieved Able to listen to others; Able to engage in interactions; Able to recieve feedback  (Able to engage art materials; limited participation)

## 2019-07-06 NOTE — PROGRESS NOTES
Keesha Villagomez  was seen for continuing care and reviewed with staff  Progress Note - Behavioral Health   Jamaica Lockhart 26 y o  female MRN: @MRN   Unit/Bed#: [facility] 194-56 Encounter: [id number]      Report from staff regarding this patient received and discussed, and records reviewed prior to seeing this patient   Nursing report that patient is doing better, no suicide statement  On today's interview, she is cooperative, she claimed that she can stay with her sister, she is not homeless anymore  She said that she had mental illness and on SSI benefit for years, and she could not know her diagnosis, she claimed that she finished 12 grade, in regular class  Sleep: good  Appetite: good    Medication side effects: no    Mental Status Evaluation:    Appearance:  Overweight, well groom   Behavior:  Friendly, fair eye contact   Mood:  euthymic   Affect: congruent   Speech:  Clear fluent   Thought Process:  Some tangential   Thought Content:  No delusion, agreed on safety contract   Perceptual Disturbances: No hallucinations   Risk Potential: No suicidal homicidal ideations   Sensorium:  intact   Memory:  Recent memory intact   Consciousness:  X 3   Insight:  Limited, not understand her mental illness   Judgment: Intact, she is taking meds  Motor Activity: No EPS or hand tremors noticed         Laboratory results:  I have personally reviewed all pertinent laboratory results  BMP: No results for input(s): NA, K, CL, CO2, BUN in the last 72 hours  Invalid input(s): CREA, GLU  Vitals:    07/06/19 0812   BP: 129/66   Pulse: 69   Resp: 16   Temp: 98 5 °F (36 9 °C)   SpO2:         Assessment/Plan   Principal Problem:    Bipolar 1 disorder, mixed, severe (HCC)  Active Problems:    Seizure disorder (HCC)    Depression    Borderline diabetes    Acute cystitis without hematuria    Abdominal pain      Recommended Treatment:     Planned medication and treatment changes:     All current active medications have been reviewed  Continue treatment with group therapy, milieu therapy, occupational therapy and medication management      Patient is stable, cooperative, takes meds, limited insight due to mental illness, no agitations, will continue current meds, monitor mood and behaviors     ----------------------------------------------------------------------------------------------------------------

## 2019-07-06 NOTE — PLAN OF CARE
Problem: Risk for Self Injury/Neglect  Goal: Treatment Goal: Remain safe during length of stay, learn and adopt new coping skills, and be free of self-injurious ideation, impulses and acts at the time of discharge  Outcome: Progressing     Problem: Depression  Goal: Treatment Goal: Demonstrate behavioral control of depressive symptoms, verbalize feelings of improved mood/affect, and adopt new coping skills prior to discharge  Outcome: Progressing     Problem: Anxiety  Goal: Anxiety is at manageable level  Description  Interventions:  - Assess and monitor patient's anxiety level  - Monitor for signs and symptoms of anxiety both physical and emotional (heart palpitations, chest pain, shortness of breath, headaches, nausea, feeling jumpy, restlessness, irritable, apprehensive)  - Collaborate with interdisciplinary team and initiate plan and interventions as ordered    - [place] patient to unit/surroundings  - Explain treatment plan  - Encourage participation in care  - Encourage verbalization of concerns/fears  - Identify coping mechanisms  - Assist in developing anxiety-reducing skills  - Administer/offer alternative therapies  - Limit or eliminate stimulants  Outcome: Progressing     Problem: Risk for Violence/Aggression Toward Others  Goal: Treatment Goal: Refrain from acts of violence/aggression during length of stay, and demonstrate improved impulse control at the time of discharge  Outcome: Progressing     Problem: DISCHARGE PLANNING  Goal: Discharge to home or other facility with appropriate resources  Description  INTERVENTIONS:  - Identify barriers to discharge w/patient and caregiver  - Arrange for needed discharge resources and transportation as appropriate  - Identify discharge learning needs (meds, wound care, etc )  - Refer to Case Management Department for coordinating discharge planning if the patient needs post-hospital services based on physician/advanced practitioner order or complex needs related to functional status, cognitive ability, or social support system   Outcome: Progressing     Problem: Ineffective Coping  Goal: Participates in unit activities  Description  Interventions:  - Provide therapeutic environment   - Provide required programming   - Redirect inappropriate behaviors   Outcome: Progressing

## 2019-07-07 LAB — GLUCOSE SERPL-MCNC: 102 MG/DL (ref 65–140)

## 2019-07-07 PROCEDURE — 82948 REAGENT STRIP/BLOOD GLUCOSE: CPT

## 2019-07-07 RX ADMIN — ZIPRASIDONE HCL 40 MG: 40 CAPSULE ORAL at 08:39

## 2019-07-07 RX ADMIN — DOCUSATE SODIUM 100 MG: 100 CAPSULE, LIQUID FILLED ORAL at 08:40

## 2019-07-07 RX ADMIN — NICOTINE 1 PATCH: 21 PATCH, EXTENDED RELEASE TRANSDERMAL at 09:00

## 2019-07-07 RX ADMIN — HYDROXYZINE HYDROCHLORIDE 50 MG: 50 TABLET, FILM COATED ORAL at 16:24

## 2019-07-07 RX ADMIN — ZIPRASIDONE HCL 80 MG: 40 CAPSULE ORAL at 16:24

## 2019-07-07 RX ADMIN — DOCUSATE SODIUM 100 MG: 100 CAPSULE, LIQUID FILLED ORAL at 17:30

## 2019-07-07 RX ADMIN — OXCARBAZEPINE 300 MG: 300 TABLET, FILM COATED ORAL at 08:40

## 2019-07-07 RX ADMIN — HYDROXYZINE HYDROCHLORIDE 50 MG: 50 TABLET, FILM COATED ORAL at 08:39

## 2019-07-07 RX ADMIN — OXCARBAZEPINE 300 MG: 300 TABLET, FILM COATED ORAL at 21:33

## 2019-07-07 RX ADMIN — PANTOPRAZOLE SODIUM 40 MG: 40 TABLET, DELAYED RELEASE ORAL at 06:16

## 2019-07-07 RX ADMIN — SENNOSIDES 25.8 MG: 8.6 TABLET, FILM COATED ORAL at 08:40

## 2019-07-07 RX ADMIN — TRAZODONE HYDROCHLORIDE 100 MG: 100 TABLET, FILM COATED ORAL at 21:33

## 2019-07-07 RX ADMIN — HYDROXYZINE HYDROCHLORIDE 50 MG: 50 TABLET, FILM COATED ORAL at 21:32

## 2019-07-07 NOTE — PROGRESS NOTES
Nikita Mendosa  was seen for continuing care and reviewed with staff  Progress Note - Behavioral Health   Jamaica Lockhart 26 y o  female MRN: @MRN   Unit/Bed#: [facility] 591-11 Encounter: [id number]       Report from staff regarding this patient received and discussed, and records reviewed prior to seeing this patient   Nursing report that patient is more awake alert since the medications were adjusted, she is more social with peers, less depress anxious affect  In today's interview, she had no complaints, she did some art work this morning, She empathized again she can stay with her sister who had 6 children at home  Sleep: good  Appetite: good     Medication side effects: no     Mental Status Evaluation:     Appearance:  Overweight, well groom   Behavior:  Friendly, fair eye contact   Mood:  euthymic   Affect: congruent   Speech:  Clear fluent   Thought Process:  Most goal orientedl   Thought Content:  No delusion,    Perceptual Disturbances: No hallucinations   Risk Potential: agreed on safety contract   Sensorium:  intact   Memory:  Recent memory intact   Consciousness:  X 3   Insight:  Limited, not understand her mental illness   Judgment: Intact, she is taking meds, attend group more social   Motor Activity: No EPS or hand tremors noticed         Laboratory results:  I have personally reviewed all pertinent laboratory results  BMP: No results for input(s): NA, K, CL, CO2, BUN in the last 72 hours  Invalid input(s): CREA, GLU  Vitals:    07/07/19 0915   BP: 118/62   Pulse: 89   Resp: 16   Temp: 98 °F (36 7 °C)   SpO2:         Assessment/Plan   Principal Problem:    Bipolar 1 disorder, mixed, severe (HCC)  Active Problems:    Seizure disorder (HCC)    Depression    Borderline diabetes    Acute cystitis without hematuria    Abdominal pain      Recommended Treatment:     Planned medication and treatment changes: All current active medications have been reviewed    Continue treatment with group therapy, milieu therapy, occupational therapy and medication management  Patient seems better today, more social attends the group, did some art work, no complaint on meds, supportive therapy, continue current meds      Berta Simon MD  [phone]  ----------------------------------------------------------------------------------------------------------------

## 2019-07-07 NOTE — PROGRESS NOTES
Pt pleasant and smiles on approach  Denies SI  Continues with somatic complaints  C/o stomach ache and said it was from meds and in same conversation blamed it on food  Pt compliant with care  No behavioral issues  Will monitor

## 2019-07-07 NOTE — PLAN OF CARE
Problem: Risk for Self Injury/Neglect  Goal: Treatment Goal: Remain safe during length of stay, learn and adopt new coping skills, and be free of self-injurious ideation, impulses and acts at the time of discharge  Outcome: Progressing     Problem: Depression  Goal: Treatment Goal: Demonstrate behavioral control of depressive symptoms, verbalize feelings of improved mood/affect, and adopt new coping skills prior to discharge  Outcome: Progressing     Problem: Anxiety  Goal: Anxiety is at manageable level  Description  Interventions:  - Assess and monitor patient's anxiety level  - Monitor for signs and symptoms of anxiety both physical and emotional (heart palpitations, chest pain, shortness of breath, headaches, nausea, feeling jumpy, restlessness, irritable, apprehensive)  - Collaborate with interdisciplinary team and initiate plan and interventions as ordered    - [place] patient to unit/surroundings  - Explain treatment plan  - Encourage participation in care  - Encourage verbalization of concerns/fears  - Identify coping mechanisms  - Assist in developing anxiety-reducing skills  - Administer/offer alternative therapies  - Limit or eliminate stimulants  Outcome: Progressing     Problem: Risk for Violence/Aggression Toward Others  Goal: Treatment Goal: Refrain from acts of violence/aggression during length of stay, and demonstrate improved impulse control at the time of discharge  Outcome: Progressing     Problem: DISCHARGE PLANNING  Goal: Discharge to home or other facility with appropriate resources  Description  INTERVENTIONS:  - Identify barriers to discharge w/patient and caregiver  - Arrange for needed discharge resources and transportation as appropriate  - Identify discharge learning needs (meds, wound care, etc )  - Refer to Case Management Department for coordinating discharge planning if the patient needs post-hospital services based on physician/advanced practitioner order or complex needs related to functional status, cognitive ability, or social support system   Outcome: Progressing     Problem: Ineffective Coping  Goal: Participates in unit activities  Description  Interventions:  - Provide therapeutic environment   - Provide required programming   - Redirect inappropriate behaviors   Outcome: Progressing
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
## 2019-07-07 NOTE — PROGRESS NOTES
Pt is pleasant bright and social with peers during breakfast and stated she went to the bathroom already why is she getting three tabs of senna but then took the senna and stated " It should help with my stomache pain so I will take it   Pt also reported less depression and anxiety seems to be getting better Will monitor

## 2019-07-08 PROCEDURE — 99222 1ST HOSP IP/OBS MODERATE 55: CPT | Performed by: PHYSICIAN ASSISTANT

## 2019-07-08 RX ORDER — BUTALBITAL, ACETAMINOPHEN AND CAFFEINE 50; 325; 40 MG/1; MG/1; MG/1
1 TABLET ORAL EVERY 4 HOURS PRN
Status: DISCONTINUED | OUTPATIENT
Start: 2019-07-08 | End: 2019-07-10 | Stop reason: HOSPADM

## 2019-07-08 RX ORDER — BUTALBITAL, ACETAMINOPHEN AND CAFFEINE 50; 325; 40 MG/1; MG/1; MG/1
1 TABLET ORAL ONCE
Status: COMPLETED | OUTPATIENT
Start: 2019-07-08 | End: 2019-07-08

## 2019-07-08 RX ORDER — SODIUM PHOSPHATE, DIBASIC AND SODIUM PHOSPHATE, MONOBASIC 7; 19 G/133ML; G/133ML
1 ENEMA RECTAL DAILY PRN
Status: DISCONTINUED | OUTPATIENT
Start: 2019-07-08 | End: 2019-07-10 | Stop reason: HOSPADM

## 2019-07-08 RX ADMIN — PANTOPRAZOLE SODIUM 40 MG: 40 TABLET, DELAYED RELEASE ORAL at 06:05

## 2019-07-08 RX ADMIN — BUTALBITAL, ACETAMINOPHEN, AND CAFFEINE 1 TABLET: 50; 325; 40 TABLET ORAL at 14:58

## 2019-07-08 RX ADMIN — ZIPRASIDONE HCL 80 MG: 40 CAPSULE ORAL at 17:46

## 2019-07-08 RX ADMIN — BUTALBITAL, ACETAMINOPHEN, AND CAFFEINE 1 TABLET: 50; 325; 40 TABLET ORAL at 20:18

## 2019-07-08 RX ADMIN — ZIPRASIDONE HCL 40 MG: 40 CAPSULE ORAL at 08:08

## 2019-07-08 RX ADMIN — HYDROXYZINE HYDROCHLORIDE 50 MG: 50 TABLET, FILM COATED ORAL at 20:19

## 2019-07-08 RX ADMIN — HYDROXYZINE HYDROCHLORIDE 50 MG: 50 TABLET, FILM COATED ORAL at 11:20

## 2019-07-08 RX ADMIN — TRAZODONE HYDROCHLORIDE 100 MG: 100 TABLET, FILM COATED ORAL at 21:37

## 2019-07-08 RX ADMIN — HYDROXYZINE HYDROCHLORIDE 50 MG: 50 TABLET, FILM COATED ORAL at 17:46

## 2019-07-08 RX ADMIN — DOCUSATE SODIUM 100 MG: 100 CAPSULE, LIQUID FILLED ORAL at 17:46

## 2019-07-08 RX ADMIN — HYDROXYZINE HYDROCHLORIDE 50 MG: 50 TABLET, FILM COATED ORAL at 08:08

## 2019-07-08 RX ADMIN — NICOTINE 1 PATCH: 21 PATCH, EXTENDED RELEASE TRANSDERMAL at 08:07

## 2019-07-08 RX ADMIN — OXCARBAZEPINE 300 MG: 300 TABLET, FILM COATED ORAL at 21:37

## 2019-07-08 RX ADMIN — OXCARBAZEPINE 300 MG: 300 TABLET, FILM COATED ORAL at 08:07

## 2019-07-08 NOTE — PROGRESS NOTES
Pt is calm, compliant with medications  She denies all S/S, in the milieu and attending groups  Pt focused on discharge due to daughter's upcoming birthday  She has no other complaints

## 2019-07-08 NOTE — QUICK NOTE
Shari Soulier  was seen for continuing care and reviewed with staff  Progress Note - Behavioral Health   Jamaica Kilgore 26 y o  female MRN: @MRN   Unit/Bed#: [facility] 567-83 Encounter: [id number]       Report from staff regarding this patient received and discussed, and records reviewed prior to seeing this patient  Patient describes her mood as "good," although she appears irritable  Denies anxiety and depression  She has no complaints this morning  She says her migraines are better  Also says she is no longer having problems with her memory or attention  She is requesting to be discharged in time for her daughter's birthday on Friday  Sleep: normal  Appetite: normal    Medication side effects:no complaints    Mental Status Evaluation:    Appearance:  dressed appropriately, casually dressed   Behavior:  normal, cooperative   Mood:  normal   Affect: constricted    Speech:  normal rate and volume   Thought Process:  normal and logical   Thought Content:  normal   Perceptual Disturbances: no auditory hallucinations, no visual hallucinations, denies auditory hallucinations when asked, does not appear responding to internal stimuli   Risk Potential: Suicidal ideation - None, contracts for safety on the unit  Homicidal ideation - None  Potential for aggression - No   Sensorium:  oriented to person, place and time   Memory:  recent and remote memory grossly intact   Consciousness:  alert and awake   Insight:  normal   Judgment: normal   Motor Activity: no abnormal movements         Laboratory results:  I have personally reviewed all pertinent laboratory results  BMP: No results for input(s): NA, K, CL, CO2, BUN in the last 72 hours      Invalid input(s): CREA, GLU  Vitals:    07/08/19 0759   BP: 105/53   Pulse: 99   Resp: 18   Temp: 97 6 °F (36 4 °C)   SpO2:         Assessment/Plan   Principal Problem:    Bipolar 1 disorder, mixed, severe (HCC)  Active Problems:    Seizure disorder (HCC)    Depression    Borderline diabetes    Acute cystitis without hematuria    Abdominal pain      Recommended Treatment:     Planned medication and treatment changes: All current active medications have been reviewed  Continue treatment with group therapy, milieu therapy, occupational therapy and medication management  Risks / Benefits of Treatment:    Risks, benefits, and possible side effects of medications explained to patient and patient verbalizes understanding and agreement for treatment  Planned medication and treatment changes: All current active medications have been reviewed  Continue treatment with group therapy, milieu therapy, occupational therapy and medication management    Counseling / Coordination of Care:    Patient's progress discussed with staff in treatment team meeting         ----------------------------------------------------------------------------------------------------------------

## 2019-07-08 NOTE — PROGRESS NOTES
Treatment Rounds: CM will be contacting patients sister to see if she can go to her house on discharge

## 2019-07-08 NOTE — PROGRESS NOTES
[facility] GROUP NOTE     07/08/19 1000   Activity/Group Checklist   Group Admission/Discharge  (completed Relapse Prevention Plan/7 Day Calendar)   Attendance Attended   Attendance Duration (min) 16-30   Interactions Interacted appropriately   Affect/Mood Appropriate   Goals Achieved Identified feelings; Identified triggers; Identified relapse prevention strategies; Discussed coping strategies; Able to self-disclose; Able to recieve feedback; Other (Comment)  (able to complete independently with additional time for task)

## 2019-07-08 NOTE — PROGRESS NOTES
[facility] GROUP NOTE     07/08/19 0930   Activity/Group Checklist   Group Community meeting   Attendance Attended   Attendance Duration (min) 16-30   Interactions Interacted appropriately  (identified self harm as an area where she wishes to change)   Affect/Mood Appropriate;Bright  (improved insight/ability to express self)   Goals Achieved Identified feelings; Identified triggers; Identified distorted thoughts/beliefs; Able to listen to others; Able to engage in interactions; Able to self-disclose; Able to recieve feedback   Objectives/Key Points:  Living intentionally  SMART Goal Setting  Thought for the Day  Self Check In

## 2019-07-08 NOTE — PLAN OF CARE
Problem: Risk for Self Injury/Neglect  Goal: Treatment Goal: Remain safe during length of stay, learn and adopt new coping skills, and be free of self-injurious ideation, impulses and acts at the time of discharge  Outcome: Progressing     Problem: Depression  Goal: Treatment Goal: Demonstrate behavioral control of depressive symptoms, verbalize feelings of improved mood/affect, and adopt new coping skills prior to discharge  Outcome: Progressing     Problem: Anxiety  Goal: Anxiety is at manageable level  Description  Interventions:  - Assess and monitor patient's anxiety level  - Monitor for signs and symptoms of anxiety both physical and emotional (heart palpitations, chest pain, shortness of breath, headaches, nausea, feeling jumpy, restlessness, irritable, apprehensive)  - Collaborate with interdisciplinary team and initiate plan and interventions as ordered    - [place] patient to unit/surroundings  - Explain treatment plan  - Encourage participation in care  - Encourage verbalization of concerns/fears  - Identify coping mechanisms  - Assist in developing anxiety-reducing skills  - Administer/offer alternative therapies  - Limit or eliminate stimulants  Outcome: Progressing     Problem: Risk for Violence/Aggression Toward Others  Goal: Treatment Goal: Refrain from acts of violence/aggression during length of stay, and demonstrate improved impulse control at the time of discharge  Outcome: Progressing

## 2019-07-08 NOTE — CASE MANAGEMENT
VICTORINO called pt's sister, Jonnie Franco, [phone], to see if pt could live with her after D/C   Message left @ 1:20 PM

## 2019-07-08 NOTE — CASE MANAGEMENT
SW met with pt  Pt asked SW if she called her sister, Shanique Evans  SW said yes & that she left a message  Pt said her sister was always at home, but was babysitting kids  Pt said she spoke with sister, who said pt could stay with her  Pt said her case was still open with [facility], for services  Pt told SW she could call her other sister, Dave Hsu, to confirm housing with Shanique Cristina  Pt signed KARIN for [facility]

## 2019-07-08 NOTE — CONSULTS
Consultation - Neurology   Jamaica Kilgore 26 y o  female MRN: [id number]  Unit/Bed#: [facility] 933-34 Encounter: [id number]      Assessment/Plan   Assessment:  Suspect this is likely an intractable migraine headache  The patient appears comfortable despite reporting a 5/10 headache  Would not want to escalate therapies unless significant distress noted  Plan as follows  Plan:  - Will add Fioricet PRN headaches, with first dose now  · Will avoid use of Triptans for abortive treatment given reported history of palpitations, and likely failure to provide therapeutic relief at this time as the headache had started days ago  Would also defer use of IV Solu-medrol as this can acutely worsen psychiatric disease    - Consider use of magnesium oxide 400 mg BID and Riboflavin 100 mg BID supplementation for migraine prevention    - Continue Zofran PRN nausea  - Continued inpatient psychiatric care per primary team    - An outpatient hospital follow up appointment has been requested with the headache team  Office will call the patient to schedule an appointment  History of Present Illness     Reason for Consult / Principal Problem: Migraines, History of Seizures    HPI: Martha Slater is a 26 y o  female with a past medical history of bipolar disorder, with multiple inpatient psychiatric admissions, borderline personality disorder, somatoform disorder, cognitive dysfunction, migraines, and seizure disorder who was admitted to the [place] [address]  Unit on an involuntary 302 basis, petitioned by her family, due to unstable mood, aggressive behavior, and inability to care for herself  Neurology is asked to see the patent in consultation regarding her history of seizures and migraines  The patient has been reporting a headache since 7/3  She had tried Motrin 600 mg once, which did not relieve her symptoms   The headache is located on the left side of her head and radiates around the back of her towards towards the front  It is described as a stabbing, throbbing pain  It is associated with photophobia and phonophobia  She had reported intermittent left hand numbness and weakness  The headache has affected her ability to concentrate  She reports that she occasionally experiences word finding difficulties at the peak of her headaches  At time of neurologic evaluation, her headache has begun to resolve on its own, rating it a 5/10  It is still associated with photophobia  She has not used any additional PRN medications to help relieve her headache  She believes that this headache cycle was triggered by significant stress at home with her father and because of stress regarding an inpatient psychiatric admission  She states that weather will also trigger her headaches  She does not take anything prophylactically for migraines and has never seen a neurologist     In regards to her seizure history, the patient was diagnosed with psychogenic pseudo-seizures  As seen in her merged chart, she was seen by in October 2018 while admitted to the psychiatric unit at [facility]  The following information is obtained from Nikhil, [facility] consultation note:   "Per review of her record, it seems she is seen quite frequently in the ED with a multitude of complaints; however the first time I see a report of seizures is on 9/20/18 during an ED visit where she noting painful shaking in her leg  There does not appear to be any seizure work-up in her history, including EEG or MRI of the brain  It is unclear when oxcarbazepine was added to her medication regimen, but this seems to be more recent and possibly following a psychiatric stay    Jamaica is seen today laying in bed  She initially refused to see me, but when I told her I only needed to ask her a few questions, she agreed  She is overall a poor historian  She cannot tell me who diagnosed her with seizures   She also cannot give me an accurate timeline, noting they began "a few years ago " She describes shaking "all over her body" and when asked to demonstrate this for me, it appears as tremors  She tells me that this usually happens when she is upset and will last 1-2 hours  There is no aura  There is usually no LOC, but she does "sometimes blackout" but she denies any falls  No tongue bite/self injury and no loss of bowel or bladder control  No family history of seizures  No pertinent seizure risk factors other than anxiety and depression with multiple psychiatric admissions  She tells me that she thinks she had an EEG at [place] [address] 4-5 years ago  She is not sure when or who prescribed oxcarbazepine  She has never seen a neurologist  Per her report, her last event was yesterday, described as shaking all over, lasting 10 minutes, She did not lose consciousness and tells me that no one witnessed this event  "  Per Dr Philip Dao who had also been involved in the case, the patient had changed her story after hearing that her spells were likely non-organic  No AEDs were initiated by neurology at this time  Inpatient consult to Neurology  Consult performed by: Stas Delarosa PA-C  Consult ordered by: Juana Dennis MD          Review of Systems   A 12 point ROS was completed   Other than the above mentioned complaints in the HPI and those commented on below, all remaining systems were negative:  - Anxiety   - Stress     Historical Information   Past Medical History:   Diagnosis Date    ADHD     Anxiety     Asthma     [facility] disorder [facility]     Depression     Diabetes mellitus ([facility]     GERD (gastroesophageal reflux disease)     Mental and behavioral problem     Migraine     Psychiatric disorder     Psychosis [facility] )     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[facility] )     Seizures ([facility] )     Suicide attempt ([facility] )      Past Surgical History:   Procedure Laterality Date    TUBAL LIGATION       Social History   Social History     Substance and Sexual Activity   Alcohol Use Never    Frequency: Never     Social History     Substance and Sexual Activity   Drug Use Yes    Frequency: 1 0 times per week    Types: Marijuana    Comment: last used lastnight     Social History     Tobacco Use   Smoking Status Current Every Day Smoker    Packs/day: 1 00    Types: Cigarettes   Smokeless Tobacco Never Used     Family History: History reviewed  No pertinent family history  Review of previous medical records was completed  Meds/Allergies   all current active meds have been reviewed    Allergies   Allergen Reactions    Shellfish-Derived Products Hives       Objective   Vitals:Blood pressure 105/53, pulse 99, temperature 97 6 °F (36 4 °C), temperature source Temporal, resp  rate 18, height 5' 4" (1 626 m), weight 103 kg (226 lb), SpO2 97 %  ,Body mass index is 38 79 kg/m²  No intake or output data in the 24 hours ending 07/08/19 1204    Invasive Devices: Invasive Devices     None                 Physical Exam   Constitutional: She is oriented to person, place, and time  She appears well-developed and well-nourished  No distress  Patient pleasant and cooperative  No acute distress  HENT:   Head: Normocephalic and atraumatic  Right Ear: External ear normal    Left Ear: External ear normal    Nose: Nose normal    Mouth/Throat: Oropharynx is clear and moist    Eyes: Pupils are equal, round, and reactive to light  Conjunctivae and EOM are normal  Right eye exhibits no discharge  Left eye exhibits no discharge  No scleral icterus  Neck: Normal range of motion  Neck supple  Cardiovascular: Normal rate  Pulmonary/Chest: Effort normal  No respiratory distress  Musculoskeletal: Normal range of motion  She exhibits no edema, tenderness or deformity  Neurological: She is alert and oriented to person, place, and time  She has normal strength  Gait normal    Skin: Skin is warm and dry  No rash noted  She is not diaphoretic  No erythema     Psychiatric: Her speech is normal    Pleasant and conversational  Normal affect  No evidence of agitation or aggressive behavior  Speech is normal  Thought content is normal     Nursing note and vitals reviewed  Neurologic Exam     Mental Status   Oriented to person, place, and time  Speech: speech is normal   Level of consciousness: alert  Knowledge: good  Cranial Nerves     CN II   Visual acuity: (Grossly intact)    CN III, IV, VI   Pupils are equal, round, and reactive to light  Extraocular motions are normal    Nystagmus: none   Conjugate gaze: present    CN V   Facial sensation intact  CN VII   Facial expression full, symmetric  CN VIII   Hearing: intact    CN XII   Tongue: not atrophic  Fasciculations: absent  Tongue deviation: none    Motor Exam   Muscle bulk: normal  Overall muscle tone: normal    Strength   Strength 5/5 throughout  Sensory Exam   Light touch normal      Gait, Coordination, and Reflexes     Gait  Gait: normal    Tremor   Resting tremor: absent  Intention tremor: absent  Action tremor: absent      Lab Results: I have personally reviewed pertinent reports  Imaging Studies: I have personally reviewed pertinent reports  EKG, Pathology, and Other Studies: I have personally reviewed pertinent reports      VTE Prophylaxis: Patient is ambulatory     Code Status: Level 1 - Full Code

## 2019-07-08 NOTE — PROGRESS NOTES
Pt expressing frustration that she believes many peers are leaving tomorrow and she is not  States she "will flip out if not discharged" by her daughter's birthday in 5 days  Pt encouraged to remain positive and in behavioral control so she can leave in a timely manner  More positive and laughing after discussion

## 2019-07-09 LAB — GLUCOSE SERPL-MCNC: 105 MG/DL (ref 65–140)

## 2019-07-09 PROCEDURE — 82948 REAGENT STRIP/BLOOD GLUCOSE: CPT

## 2019-07-09 RX ADMIN — TRAZODONE HYDROCHLORIDE 100 MG: 100 TABLET, FILM COATED ORAL at 21:21

## 2019-07-09 RX ADMIN — HYDROXYZINE HYDROCHLORIDE 50 MG: 50 TABLET, FILM COATED ORAL at 08:50

## 2019-07-09 RX ADMIN — HYDROXYZINE HYDROCHLORIDE 50 MG: 50 TABLET, FILM COATED ORAL at 20:47

## 2019-07-09 RX ADMIN — OXCARBAZEPINE 300 MG: 300 TABLET, FILM COATED ORAL at 20:47

## 2019-07-09 RX ADMIN — OXCARBAZEPINE 300 MG: 300 TABLET, FILM COATED ORAL at 08:48

## 2019-07-09 RX ADMIN — ZIPRASIDONE HCL 80 MG: 40 CAPSULE ORAL at 16:23

## 2019-07-09 RX ADMIN — LORAZEPAM 1 MG: 1 TABLET ORAL at 17:45

## 2019-07-09 RX ADMIN — PANTOPRAZOLE SODIUM 40 MG: 40 TABLET, DELAYED RELEASE ORAL at 06:03

## 2019-07-09 RX ADMIN — NICOTINE 1 PATCH: 21 PATCH, EXTENDED RELEASE TRANSDERMAL at 08:52

## 2019-07-09 RX ADMIN — BUTALBITAL, ACETAMINOPHEN, AND CAFFEINE 1 TABLET: 50; 325; 40 TABLET ORAL at 16:21

## 2019-07-09 RX ADMIN — HYDROXYZINE HYDROCHLORIDE 50 MG: 50 TABLET, FILM COATED ORAL at 16:22

## 2019-07-09 RX ADMIN — DOCUSATE SODIUM 100 MG: 100 CAPSULE, LIQUID FILLED ORAL at 08:50

## 2019-07-09 RX ADMIN — MECLIZINE HYDROCHLORIDE 12.5 MG: 12.5 TABLET ORAL at 16:22

## 2019-07-09 RX ADMIN — ACETAMINOPHEN 650 MG: 325 TABLET ORAL at 17:45

## 2019-07-09 RX ADMIN — ZIPRASIDONE HCL 40 MG: 40 CAPSULE ORAL at 08:50

## 2019-07-09 RX ADMIN — DOCUSATE SODIUM 100 MG: 100 CAPSULE, LIQUID FILLED ORAL at 17:08

## 2019-07-09 RX ADMIN — BUTALBITAL, ACETAMINOPHEN, AND CAFFEINE 1 TABLET: 50; 325; 40 TABLET ORAL at 08:47

## 2019-07-09 NOTE — CASE MANAGEMENT
Pt to be D/C tomorrow  VICTORINO called pt's sister, Jonnie Franco, [phone], & advised that pt to be D/C tomorrow  Keira in agreement with D/C  Said she'd be home  SW asked if she could pick pt up  She said no  Also said her mother could not pick pt up  SW said she'd work on arranging transport  SW called [facility] , [phone] (program pt said her case was still open with)  Spoke to [facility], who said pt's case was closed  SW explained that pt returning to live in [place]  He said pt could call him after she was in [place] to discuss re-opening her case  Pt's insurance is still open with [facility] ([facility])  Pt will need to change insurance provider once she is in [place]  Until that time, SW scheduled intake appt @ [facility] on 7/11 @ 2 PM with Miss Indy Burns  Also scheduled appt with PCP, Dr Arnold Graham, [phone], on 7/12 @ 1:15 PM  Pt given phone # for [facility] to call for intake

## 2019-07-09 NOTE — PROGRESS NOTES
Pt complaining of anxiety, preoccupied with discharge, worried about missing her daughter's birthday  Pt received PRN atarax, has no further complaints  Medication was effective

## 2019-07-09 NOTE — CASE MANAGEMENT
SW called pt's other sister, Alyssa Winston, [phone]  Left message advising of pt's D/C for tomorrow

## 2019-07-09 NOTE — PROGRESS NOTES
Pt stated that the Fioricet was barely effective for her migraine and rates her current pain at 5/10  Will continue to monitor

## 2019-07-09 NOTE — QUICK NOTE
Elvira Hernandez  was seen for continuing care and reviewed with staff  Progress Note - Behavioral Health   Jamaica Magui 26 y o  female MRN: @MRN   Unit/Bed#: [facility] 858-58 Encounter: [id number]       Report from staff regarding this patient received and discussed, and records reviewed prior to seeing this patient  Patient is talkative and energetic this morning and describes her mood as "good " She is looking forward to being discharged and seeing her daughter  Her insight and judgement have improved  She says she understands that she has drug and mental health problems and she knows that she needs to remain stable in order to continue seeing her daughter  She says she will "take things day by day "    Sleep: normal  Appetite: normal    Medication side effects:no complaints    Mental Status Evaluation:    Appearance:  dressed appropriately, casually dressed   Behavior:  normal, pleasant, cooperative   Mood:  improved   Affect: mood-congruent    Speech:  normal rate and volume   Thought Process:  normal and logical   Thought Content:  normal   Perceptual Disturbances: no auditory hallucinations, no visual hallucinations, denies auditory hallucinations when asked, does not appear responding to internal stimuli   Risk Potential: Suicidal ideation - None, contracts for safety on the unit  Homicidal ideation - None  Potential for aggression - No   Sensorium:  oriented to person, place and time   Memory:  recent and remote memory grossly intact   Consciousness:  alert and awake   Insight:  improved   Judgment: improved   Motor Activity: no abnormal movements         Laboratory results:  I have personally reviewed all pertinent laboratory results  BMP: No results for input(s): NA, K, CL, CO2, BUN in the last 72 hours      Invalid input(s): CREA, GLU  Vitals:    07/09/19 0810   BP: 90/60   Pulse: 88   Resp: 16   Temp: (!) 97 2 °F (36 2 °C)   SpO2:         Assessment/Plan   Principal Problem:    Bipolar 1 disorder, mixed, severe ([facility] )  Active Problems:    Seizure disorder ([facility] )    Depression    Borderline diabetes    Acute cystitis without hematuria    Abdominal pain      Recommended Treatment:     Planned medication and treatment changes: All current active medications have been reviewed  Continue treatment with group therapy, milieu therapy, occupational therapy and medication management  Risks / Benefits of Treatment:    Risks, benefits, and possible side effects of medications explained to patient and patient verbalizes understanding and agreement for treatment  Planned medication and treatment changes: All current active medications have been reviewed  Continue treatment with group therapy, milieu therapy, occupational therapy and medication management    Counseling / Coordination of Care:    Patient's progress discussed with staff in treatment team meeting           ----------------------------------------------------------------------------------------------------------------

## 2019-07-09 NOTE — PROGRESS NOTES
[facility] GROUP NOTE     07/09/19 0930   Activity/Group Checklist   Group Community meeting   Attendance Attended   Attendance Duration (min) 16-30   Interactions Interacted appropriately   Affect/Mood Appropriate   Goals Achieved Identified feelings; Able to listen to others; Able to engage in interactions; Able to manage/cope with feelings;Verbalized increased hopefulness; Able to recieve feedback   Objectives/Key Points:  Living intentionally  Goal Setting  Thought for the Day  Self Check In

## 2019-07-09 NOTE — DISCHARGE INSTR - OTHER ORDERS
[facility] - [phone]    [facility]  - call Kaylie Alvarez, director, at [facility]  to re-open your case - [phone]    Substance abuse Hotline # - [phone]

## 2019-07-09 NOTE — CMS CERTIFICATION NOTE
Recertification: Based upon physical, mental and social evaluations, I certify that inpatient psychiatric services continue to be medically necessary for this patient for a duration of 20 midnights for the treatment of Bipolar Disorder  Available alternative community resources still do not meet the patient's mental health care needs  I further attest that an established written individualized plan of care has been updated and is outlined in the patient's medical records

## 2019-07-09 NOTE — PROGRESS NOTES
Pt is cooperative with medications, complaining of anxiety, denies all other S/S  Pt is visible in the milieu, appears bright on approach, focused on discharged  Pt had no complaints or issues

## 2019-07-09 NOTE — PROGRESS NOTES
Pt was out in the milieu socializing with peers  Pt was bright with approach and pleasant and cooperative  Pt was compliant with medications but refused Senna  Pt c/o of 6/10 left sided migraine pain; Fioricet was given  Pt stated that her mood is great and she slept well  Pt denies SI, HI, hallucinations and delusions

## 2019-07-09 NOTE — PLAN OF CARE
Problem: Risk for Self Injury/Neglect  Goal: Treatment Goal: Remain safe during length of stay, learn and adopt new coping skills, and be free of self-injurious ideation, impulses and acts at the time of discharge  Outcome: Progressing     Problem: Depression  Goal: Treatment Goal: Demonstrate behavioral control of depressive symptoms, verbalize feelings of improved mood/affect, and adopt new coping skills prior to discharge  Outcome: Progressing     Problem: Anxiety  Goal: Anxiety is at manageable level  Description  Interventions:  - Assess and monitor patient's anxiety level  - Monitor for signs and symptoms of anxiety both physical and emotional (heart palpitations, chest pain, shortness of breath, headaches, nausea, feeling jumpy, restlessness, irritable, apprehensive)  - Collaborate with interdisciplinary team and initiate plan and interventions as ordered    - [place] patient to unit/surroundings  - Explain treatment plan  - Encourage participation in care  - Encourage verbalization of concerns/fears  - Identify coping mechanisms  - Assist in developing anxiety-reducing skills  - Administer/offer alternative therapies  - Limit or eliminate stimulants  Outcome: Progressing     Problem: Risk for Violence/Aggression Toward Others  Goal: Treatment Goal: Refrain from acts of violence/aggression during length of stay, and demonstrate improved impulse control at the time of discharge  Outcome: Progressing

## 2019-07-10 VITALS
HEART RATE: 83 BPM | BODY MASS INDEX: 38.58 KG/M2 | SYSTOLIC BLOOD PRESSURE: 130 MMHG | OXYGEN SATURATION: 97 % | DIASTOLIC BLOOD PRESSURE: 60 MMHG | WEIGHT: 226 LBS | HEIGHT: 64 IN | RESPIRATION RATE: 16 BRPM | TEMPERATURE: 98 F

## 2019-07-10 PROCEDURE — 99239 HOSP IP/OBS DSCHRG MGMT >30: CPT | Performed by: PSYCHIATRY & NEUROLOGY

## 2019-07-10 RX ORDER — TRAZODONE HYDROCHLORIDE 100 MG/1
100 TABLET ORAL
Qty: 30 TABLET | Refills: 0 | Status: SHIPPED | OUTPATIENT
Start: 2019-07-10 | End: 2020-01-10 | Stop reason: ALTCHOICE

## 2019-07-10 RX ORDER — MECLIZINE HCL 12.5 MG/1
12.5 TABLET ORAL EVERY 8 HOURS PRN
Qty: 30 TABLET | Refills: 0 | Status: SHIPPED | OUTPATIENT
Start: 2019-07-10 | End: 2020-01-10 | Stop reason: ALTCHOICE

## 2019-07-10 RX ORDER — NICOTINE 21 MG/24HR
1 PATCH, TRANSDERMAL 24 HOURS TRANSDERMAL DAILY
Qty: 28 PATCH | Refills: 0 | Status: SHIPPED | OUTPATIENT
Start: 2019-07-11 | End: 2020-01-10 | Stop reason: ALTCHOICE

## 2019-07-10 RX ORDER — ALBUTEROL SULFATE 90 UG/1
2 AEROSOL, METERED RESPIRATORY (INHALATION) EVERY 4 HOURS PRN
Qty: 1 INHALER | Refills: 0 | Status: SHIPPED | OUTPATIENT
Start: 2019-07-10 | End: 2019-08-09

## 2019-07-10 RX ORDER — DOCUSATE SODIUM 100 MG/1
100 CAPSULE, LIQUID FILLED ORAL 2 TIMES DAILY
Qty: 10 CAPSULE | Refills: 0 | Status: SHIPPED | OUTPATIENT
Start: 2019-07-10 | End: 2020-01-10 | Stop reason: ALTCHOICE

## 2019-07-10 RX ORDER — NICOTINE 21 MG/24HR
1 PATCH, TRANSDERMAL 24 HOURS TRANSDERMAL DAILY
Status: DISCONTINUED | OUTPATIENT
Start: 2019-07-10 | End: 2019-07-10

## 2019-07-10 RX ORDER — BUTALBITAL, ACETAMINOPHEN AND CAFFEINE 50; 325; 40 MG/1; MG/1; MG/1
1 TABLET ORAL DAILY PRN
Qty: 15 TABLET | Refills: 1 | Status: SHIPPED | OUTPATIENT
Start: 2019-07-10 | End: 2019-07-25

## 2019-07-10 RX ORDER — ZIPRASIDONE HYDROCHLORIDE 40 MG/1
40 CAPSULE ORAL
Qty: 30 CAPSULE | Refills: 0 | Status: SHIPPED | OUTPATIENT
Start: 2019-07-11 | End: 2020-01-10 | Stop reason: ALTCHOICE

## 2019-07-10 RX ORDER — PANTOPRAZOLE SODIUM 40 MG/1
40 TABLET, DELAYED RELEASE ORAL
Qty: 30 TABLET | Refills: 0 | Status: SHIPPED | OUTPATIENT
Start: 2019-07-11 | End: 2020-01-10 | Stop reason: ALTCHOICE

## 2019-07-10 RX ORDER — HYDROXYZINE 50 MG/1
50 TABLET, FILM COATED ORAL 3 TIMES DAILY
Qty: 30 TABLET | Refills: 0 | Status: SHIPPED | OUTPATIENT
Start: 2019-07-10 | End: 2020-01-10 | Stop reason: ALTCHOICE

## 2019-07-10 RX ORDER — OXCARBAZEPINE 600 MG/1
300 TABLET, FILM COATED ORAL EVERY 12 HOURS SCHEDULED
Qty: 30 TABLET | Refills: 0 | Status: SHIPPED | OUTPATIENT
Start: 2019-07-10 | End: 2020-01-10 | Stop reason: ALTCHOICE

## 2019-07-10 RX ORDER — BUTALBITAL, ACETAMINOPHEN AND CAFFEINE 50; 325; 40 MG/1; MG/1; MG/1
1 TABLET ORAL EVERY 4 HOURS PRN
Qty: 30 TABLET | Refills: 0 | Status: SHIPPED | OUTPATIENT
Start: 2019-07-10 | End: 2019-07-10

## 2019-07-10 RX ORDER — NICOTINE 21 MG/24HR
1 PATCH, TRANSDERMAL 24 HOURS TRANSDERMAL DAILY
Status: DISCONTINUED | OUTPATIENT
Start: 2019-07-11 | End: 2019-07-10 | Stop reason: HOSPADM

## 2019-07-10 RX ORDER — ZIPRASIDONE HYDROCHLORIDE 80 MG/1
80 CAPSULE ORAL
Qty: 30 CAPSULE | Refills: 0 | Status: SHIPPED | OUTPATIENT
Start: 2019-07-10 | End: 2020-01-10 | Stop reason: ALTCHOICE

## 2019-07-10 RX ADMIN — OXCARBAZEPINE 300 MG: 300 TABLET, FILM COATED ORAL at 08:19

## 2019-07-10 RX ADMIN — BUTALBITAL, ACETAMINOPHEN, AND CAFFEINE 1 TABLET: 50; 325; 40 TABLET ORAL at 08:26

## 2019-07-10 RX ADMIN — DOCUSATE SODIUM 100 MG: 100 CAPSULE, LIQUID FILLED ORAL at 08:19

## 2019-07-10 RX ADMIN — ZIPRASIDONE HCL 40 MG: 40 CAPSULE ORAL at 08:19

## 2019-07-10 RX ADMIN — NICOTINE 1 PATCH: 21 PATCH, EXTENDED RELEASE TRANSDERMAL at 08:19

## 2019-07-10 RX ADMIN — HYDROXYZINE HYDROCHLORIDE 50 MG: 50 TABLET, FILM COATED ORAL at 08:19

## 2019-07-10 RX ADMIN — PANTOPRAZOLE SODIUM 40 MG: 40 TABLET, DELAYED RELEASE ORAL at 06:03

## 2019-07-10 NOTE — PLAN OF CARE
Problem: Risk for Self Injury/Neglect  Goal: Treatment Goal: Remain safe during length of stay, learn and adopt new coping skills, and be free of self-injurious ideation, impulses and acts at the time of discharge  Outcome: Completed     Problem: Depression  Goal: Treatment Goal: Demonstrate behavioral control of depressive symptoms, verbalize feelings of improved mood/affect, and adopt new coping skills prior to discharge  Outcome: Completed     Problem: Anxiety  Goal: Anxiety is at manageable level  Description  Interventions:  - Assess and monitor patient's anxiety level  - Monitor for signs and symptoms of anxiety both physical and emotional (heart palpitations, chest pain, shortness of breath, headaches, nausea, feeling jumpy, restlessness, irritable, apprehensive)  - Collaborate with interdisciplinary team and initiate plan and interventions as ordered    - [place] patient to unit/surroundings  - Explain treatment plan  - Encourage participation in care  - Encourage verbalization of concerns/fears  - Identify coping mechanisms  - Assist in developing anxiety-reducing skills  - Administer/offer alternative therapies  - Limit or eliminate stimulants  Outcome: Completed     Problem: Risk for Violence/Aggression Toward Others  Goal: Treatment Goal: Refrain from acts of violence/aggression during length of stay, and demonstrate improved impulse control at the time of discharge  Outcome: Completed     Problem: DISCHARGE PLANNING  Goal: Discharge to home or other facility with appropriate resources  Description  INTERVENTIONS:  - Identify barriers to discharge w/patient and caregiver  - Arrange for needed discharge resources and transportation as appropriate  - Identify discharge learning needs (meds, wound care, etc )  - Refer to Case Management Department for coordinating discharge planning if the patient needs post-hospital services based on physician/advanced practitioner order or complex needs related to functional status, cognitive ability, or social support system   Outcome: Completed     Problem: Ineffective Coping  Goal: Participates in unit activities  Description  Interventions:  - Provide therapeutic environment   - Provide required programming   - Redirect inappropriate behaviors   Outcome: Completed

## 2019-07-10 NOTE — PROGRESS NOTES
07/10/19 0918   Team Meeting   Meeting Type Daily Rounds   Team Members Present   Team Members Present Physician;Nurse;   Physician Team Member Dr CUNNINGHAM   Nursing Team Member [facility] Management Team Member Nessa Payne   OT Team Member Florencia   Patient/Family Present   Patient Present No   Patient's Family Present No   Patient will be discharged today via [place]  Time not set up

## 2019-07-10 NOTE — PROGRESS NOTES
[facility] GROUP NOTE     07/09/19 1500   Activity/Group Checklist   Group Exercise  (Yoga)   Attendance Attended   Attendance Duration (min) 16-30   Interactions Other (Comment)  (Required redirection for outburst behavior  Fair response )   Affect/Mood Other (Comment)  (Distracted by a male peer  Poor awareness of social cues )   Goals Achieved Able to listen to others; Able to engage in interactions   Primary Objectives/Concepts Covered  Mind/Body Connection  Body awareness  Importance of utilizing stillness  Intentional focus through breath, movement  Use of relaxation as a preventative and   Restorative practice  Social skills and tolerance of remaining still over periods of time continues to emerge  Is more successful when an active group follows a seated/educational group or vice versa

## 2019-07-10 NOTE — PROGRESS NOTES
07/09/19 1115   Activity/Group Checklist   Group Other (Comment)  (Surviving Trauma Group/Education, Open Discussion)   Attendance Attended   Attendance Duration (min) Greater than 60   Interactions Interacted appropriately   Affect/Mood Appropriate   Goals Achieved Able to listen to others; Able to engage in interactions

## 2019-07-10 NOTE — NURSING NOTE
AVS reviewed with pt, states understanding  Denies symptoms at time of d/c  [facility] escorted pt to lobby for ride home via [facility]

## 2019-07-10 NOTE — PROGRESS NOTES
Pt about unit and social with peers  Somatic c/o continue  Continued to c/o headache and fiorcet was not effective  She had also c/o dizziness and stated that antivert did not help  Pt requested further medication and was becoming restless/agitated so tylenol and ativan were given and pt layed down briefly and napped  Pt brighter with no further complaints upon awakening  She denies all symptoms and stes she is looking forward to d/c tomorrow  She took HS meds early and retired to bed before 10pm  Will monitor

## 2019-07-10 NOTE — CASE MANAGEMENT
VICTORINO met with pt prior to D/C  Pt eager for D/C  Mood euthymic  Denied all S/S prior to admission  VICTORINO asked pt to signed Fay Salgado, which pt did  Anxious to go to sister's house, [address] [facility]  Excited that she'll be home for her daughter's 1st birthday on 7/12  VICTORINO rec'd message @ 9:38 AM, from pt's sister, [address] [facility], saying that today was not a good day for pt to be D/C, due to her not having things ready for pt  She said next wk would be better for D/C  VICTORINO called sister back  Left message @ 11 AM, advising there was no criteria for pt to be in hospital  VICTORINO said that pt would be D/C today, & that transportation had been arranged  VICTORINO called transport services  Spoke to Jennifer   Scheduled [employer] pick-up @ 12:15 PM  Faxed transport request
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
## 2019-08-24 ENCOUNTER — APPOINTMENT (EMERGENCY)
Dept: RADIOLOGY | Facility: HOSPITAL | Age: 24
End: 2019-08-24
Attending: EMERGENCY MEDICINE
Payer: MEDICARE

## 2019-08-24 ENCOUNTER — HOSPITAL ENCOUNTER (EMERGENCY)
Facility: HOSPITAL | Age: 24
Discharge: HOME | End: 2019-08-25
Attending: EMERGENCY MEDICINE | Admitting: EMERGENCY MEDICINE
Payer: MEDICARE

## 2019-08-24 VITALS
TEMPERATURE: 98.2 F | HEART RATE: 104 BPM | RESPIRATION RATE: 20 BRPM | OXYGEN SATURATION: 98 % | DIASTOLIC BLOOD PRESSURE: 74 MMHG | SYSTOLIC BLOOD PRESSURE: 122 MMHG

## 2019-08-24 DIAGNOSIS — S40.021A CONTUSION OF RIGHT UPPER ARM, INITIAL ENCOUNTER: Primary | ICD-10-CM

## 2019-08-24 DIAGNOSIS — S53.409A ELBOW SPRAIN, INITIAL ENCOUNTER: ICD-10-CM

## 2019-08-24 PROCEDURE — 73030 X-RAY EXAM OF SHOULDER: CPT

## 2019-08-24 PROCEDURE — 73060 X-RAY EXAM OF HUMERUS: CPT

## 2019-08-24 PROCEDURE — 99283 EMERGENCY DEPT VISIT LOW MDM: CPT

## 2019-08-24 PROCEDURE — 73080 X-RAY EXAM OF ELBOW: CPT

## 2019-08-24 RX ORDER — ACETAMINOPHEN 325 MG/1
650 TABLET ORAL ONCE
Status: COMPLETED | OUTPATIENT
Start: 2019-08-24 | End: 2019-08-24

## 2019-08-24 RX ADMIN — ACETAMINOPHEN 650 MG: 325 TABLET, FILM COATED ORAL at 23:09

## 2019-09-03 ENCOUNTER — APPOINTMENT (EMERGENCY)
Dept: RADIOLOGY | Facility: HOSPITAL | Age: 24
End: 2019-09-03
Payer: MEDICARE

## 2019-09-03 ENCOUNTER — HOSPITAL ENCOUNTER (EMERGENCY)
Facility: HOSPITAL | Age: 24
Discharge: HOME | End: 2019-09-03
Attending: EMERGENCY MEDICINE | Admitting: EMERGENCY MEDICINE
Payer: MEDICARE

## 2019-09-03 VITALS
HEART RATE: 97 BPM | RESPIRATION RATE: 16 BRPM | SYSTOLIC BLOOD PRESSURE: 127 MMHG | OXYGEN SATURATION: 96 % | TEMPERATURE: 97.5 F | DIASTOLIC BLOOD PRESSURE: 66 MMHG

## 2019-09-03 DIAGNOSIS — S93.609A FOOT SPRAIN: Primary | ICD-10-CM

## 2019-09-03 PROCEDURE — 99283 EMERGENCY DEPT VISIT LOW MDM: CPT

## 2019-09-03 PROCEDURE — 73630 X-RAY EXAM OF FOOT: CPT
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
## 2019-09-05 ENCOUNTER — HOSPITAL ENCOUNTER (EMERGENCY)
Facility: HOSPITAL | Age: 24
Discharge: HOME | End: 2019-09-05
Attending: EMERGENCY MEDICINE
Payer: MEDICARE

## 2019-09-05 VITALS
TEMPERATURE: 98.9 F | SYSTOLIC BLOOD PRESSURE: 132 MMHG | RESPIRATION RATE: 18 BRPM | HEART RATE: 107 BPM | OXYGEN SATURATION: 96 % | DIASTOLIC BLOOD PRESSURE: 79 MMHG

## 2019-09-05 DIAGNOSIS — M79.672 FOOT PAIN, LEFT: Primary | ICD-10-CM

## 2019-09-05 PROCEDURE — 99283 EMERGENCY DEPT VISIT LOW MDM: CPT

## 2019-09-05 RX ORDER — NAPROXEN 500 MG/1
500 TABLET ORAL 2 TIMES DAILY WITH MEALS
Qty: 30 TABLET | Refills: 0 | Status: SHIPPED | OUTPATIENT
Start: 2019-09-05 | End: 2020-01-10 | Stop reason: ALTCHOICE

## 2019-09-05 NOTE — ED PROVIDER NOTES
History  Chief Complaint   Patient presents with    Foot Pain     pt presents to the ed left foot pain  pt states she was seen everal days ago for the same complaint  pt states "it hurts to walk" pt walked in to the department with no diffulty      HPI    22 year female that presents today with left foot pain  Patient states she was seen a few days ago regarding the same thing had an x-ray which was negative  States she is having difficulty with walking  She is states she is not taking anything for pain  He has not made an appointment with Orthopedics  Denies any recent falls  Denies any numbness or tingling  Normal range of motion  22 year female that presents today with left foot pain  No obvious trauma appreciated  No swelling appreciated normal range of motion no tenderness on exam   Symptomatic treatment advised to follow up with orthopedic  Prior to Admission Medications   Prescriptions Last Dose Informant Patient Reported? Taking?    OXcarbazepine (TRILEPTAL) 600 mg tablet   Yes No   Sig: Take 300 mg by mouth every 12 (twelve) hours   OXcarbazepine (TRILEPTAL) 600 mg tablet   No No   Sig: Take 0 5 tablets (300 mg total) by mouth every 12 (twelve) hours for 30 days   albuterol (2 5 mg/3 mL) 0 083 % nebulizer solution   Yes No   Sig: Inhale   clonazePAM (KlonoPIN) 1 mg tablet   No No   Sig: Take 1 tablet (1 mg total) by mouth 2 (two) times a day for 7 days   docusate sodium (COLACE) 100 mg capsule   No No   Sig: Take 1 capsule (100 mg total) by mouth 2 (two) times a day   escitalopram (LEXAPRO) 10 mg tablet   No No   Sig: Take 1 tablet (10 mg total) by mouth daily   hydrOXYzine HCL (ATARAX) 50 mg tablet   No No   Sig: Take 1 tablet (50 mg total) by mouth 3 (three) times a day   meclizine (ANTIVERT) 12 5 MG tablet   No No   Sig: Take 1 tablet (12 5 mg total) by mouth every 8 (eight) hours as needed for dizziness   meclizine (ANTIVERT) 25 mg tablet   No No   Sig: Take 1 tablet (25 mg total) by mouth 3 (three) times a day as needed for dizziness for up to 3 days   nicotine (NICODERM CQ) 21 mg/24 hr TD 24 hr patch   No No   Sig: Place 1 patch on the skin daily   Patient not taking: Reported on 8/24/2019   pantoprazole (PROTONIX) 40 mg tablet   No No   Sig: Take 1 tablet (40 mg total) by mouth daily in the early morning for 30 days   traZODone (DESYREL) 100 mg tablet   Yes No   Sig: Take 100 mg by mouth daily at bedtime   traZODone (DESYREL) 100 mg tablet   No No   Sig: Take 1 tablet (100 mg total) by mouth daily at bedtime for 30 days   ziprasidone (GEODON) 40 mg capsule   Yes No   Sig: Take 40 mg by mouth 2 (two) times a day with meals   ziprasidone (GEODON) 40 mg capsule   No No   Sig: Take 1 capsule (40 mg total) by mouth daily with breakfast for 30 days Please also take Geodon 80 mg with dinner   ziprasidone (GEODON) 80 mg capsule   No No   Sig: Take 1 capsule (80 mg total) by mouth daily with dinner for 30 days      Facility-Administered Medications: None       Past Medical History:   Diagnosis Date    ADHD     Anxiety     Asthma     [facility] disorder [facility]     Depression     Diabetes mellitus ([facility]     GERD (gastroesophageal reflux disease)     Mental and behavioral problem     Migraine     Palpitations     Last Assessed [id number]    Psychiatric disorder     Psychiatric illness     Psychosis ([facility]     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)     Tachycardia     Last Assessed 29Nov2016    Upper respiratory disease     Last Assessed 27Jan2016       Past Surgical History:   Procedure Laterality Date    KNEE SURGERY      [place] LAP,TUBAL CAUTERY N/A 10/3/2018    Procedure: LAPAROSCOPIC TUBAL LIGATION;  Surgeon: Searcy Sandifer, MD;  Location: [facility];  Service: Gynecology    TUBAL LIGATION         Family History   Problem Relation Age of Onset    Migraines Sister     Cancer Mother     Diabetes Mother     Cancer Father     Diabetes Father     Arthritis Father     Cancer Maternal Grandmother     Cancer Maternal Aunt     Cancer Paternal Uncle     Diabetes Other      I have reviewed and agree with the history as documented  Social History     Tobacco Use    Smoking status: Current Every Day Smoker     Packs/day: 1 00     Types: Cigarettes    Smokeless tobacco: Never Used   Substance Use Topics    Alcohol use: Never     Frequency: Never    Drug use: Yes     Frequency: 1 0 times per week     Types: Marijuana     Comment: last smoked 3-4 days ago        Review of Systems   Constitutional: Negative  Negative for diaphoresis and fever  HENT: Negative  Respiratory: Negative  Negative for cough, shortness of breath and wheezing  Cardiovascular: Negative  Negative for chest pain, palpitations and leg swelling  Gastrointestinal: Negative for abdominal distention, abdominal pain, nausea and vomiting  Genitourinary: Negative  Musculoskeletal:        Left foot pain      Skin: Negative  Neurological: Negative  Psychiatric/Behavioral: Negative  All other systems reviewed and are negative  Physical Exam  Physical Exam   Constitutional: She is oriented to person, place, and time  She appears well-developed and well-nourished  No distress  HENT:   Head: Normocephalic and atraumatic  Eyes: Pupils are equal, round, and reactive to light  EOM are normal    Neck: Normal range of motion  Neck supple  Cardiovascular: Normal rate, regular rhythm and normal heart sounds  No murmur heard  Pulmonary/Chest: Effort normal and breath sounds normal    Abdominal: Soft  Bowel sounds are normal  She exhibits no distension  There is no tenderness  There is no rebound and no guarding  Musculoskeletal: Normal range of motion  Left foot with no swelling  No abrasion  No obvious trauma  No deformity  2+ DP pulses    Soft compartments  Neurological: She is alert and oriented to person, place, and time  Skin: Skin is warm and dry  She is not diaphoretic  Psychiatric: She has a normal mood and affect  Vitals reviewed  Vital Signs  ED Triage Vitals [09/05/19 1458]   Temperature Pulse Respirations Blood Pressure SpO2   98 9 °F (37 2 °C) (!) 107 18 132/79 96 %      Temp src Heart Rate Source Patient Position - Orthostatic VS BP Location FiO2 (%)   -- Monitor -- -- --      Pain Score       --           Vitals:    09/05/19 1458   BP: 132/79   Pulse: (!) 107         Visual Acuity      ED Medications  Medications - No data to display    Diagnostic Studies  Results Reviewed     None                 No orders to display              Procedures  Procedures       ED Course                               MDM    Disposition  Final diagnoses:   None     ED Disposition     None      Follow-up Information    None         Patient's Medications   Discharge Prescriptions    No medications on file     No discharge procedures on file      ED Provider  Electronically Signed by           Estuardo Parham MD  09/05/19 7997

## 2019-09-06 ENCOUNTER — TELEPHONE (OUTPATIENT)
Dept: FAMILY MEDICINE CLINIC | Facility: CLINIC | Age: 24
End: 2019-09-06

## 2019-09-06 NOTE — TELEPHONE ENCOUNTER
Patient called for f/u er appointment for foot pain was given naproxen for pain , she has had mid chest discomfort after starting naproxen , lasting all night last night , no sob , per Dr Juan Cervantes patient to stop naproxen and take tylenol OTC Pepcid or zantac if needed , if chest pain continues or develops sob patient instructed to go to the ER

## 2019-09-17 ENCOUNTER — OFFICE VISIT (OUTPATIENT)
Dept: FAMILY MEDICINE CLINIC | Facility: CLINIC | Age: 24
End: 2019-09-17
Payer: MEDICARE

## 2019-09-17 VITALS
DIASTOLIC BLOOD PRESSURE: 72 MMHG | WEIGHT: 225.6 LBS | OXYGEN SATURATION: 98 % | HEART RATE: 102 BPM | BODY MASS INDEX: 38.72 KG/M2 | SYSTOLIC BLOOD PRESSURE: 124 MMHG | TEMPERATURE: 97.2 F

## 2019-09-17 DIAGNOSIS — R53.83 FATIGUE, UNSPECIFIED TYPE: ICD-10-CM

## 2019-09-17 DIAGNOSIS — R11.2 NAUSEA AND VOMITING, INTRACTABILITY OF VOMITING NOT SPECIFIED, UNSPECIFIED VOMITING TYPE: ICD-10-CM

## 2019-09-17 DIAGNOSIS — B34.9 VIRAL SYNDROME: Primary | ICD-10-CM

## 2019-09-17 DIAGNOSIS — R61 NIGHT SWEATS: ICD-10-CM

## 2019-09-17 PROCEDURE — 99213 OFFICE O/P EST LOW 20 MIN: CPT | Performed by: FAMILY MEDICINE

## 2019-09-24 NOTE — PROGRESS NOTES
Subjective:    52-year-old female presents with 2 weeks of cold like symptoms  Of note, patient has known behavioral disorder, however admits to stopping all of her medications several weeks ago  Patient reports fevers, cough, chills, fatigue, night sweats, sneezing, fullness of the ears, rhinorrhea, congestion, headaches, lack of appetite, nausea, vomiting, body aches, and general malaise  She says she has not eaten anything for 2 weeks  She states that she is also stressed because she is in a new living situation with her partner  Objective:  /72 (BP Location: Left arm, Patient Position: Sitting, Cuff Size: Adult)   Pulse 102   Temp (!) 97 2 °F (36 2 °C) (Tympanic)   Wt 102 kg (225 lb 9 6 oz)   LMP 08/20/2019 (Approximate)   SpO2 98%   BMI 38 72 kg/m²   Gen: Alert, oriented, NAD  HEENT: Exam benign  Ear canals & TMs normal  Nasal mucosa clear without congestion  Throat is clear, nonerythematous, without discharge  Neck is without adenopathy  Pulm: CTAB  CVS: RRR (+) S1S2  Abd: Soft, nontender, normoactive bowel sounds  Ext: Intact, atraumatic, nontender, no edema  Psych: Flat affect, slowed speech  Assessment/Plan    1  Viral syndrome  CBC and differential    Mono Qual W/Rflx Qn    Lyme Antibody Profile with reflex to WB    C-reactive protein   2  Fatigue, unspecified type  CBC and differential    Mono Qual W/Rflx Qn    Lyme Antibody Profile with reflex to WB    C-reactive protein   3  Nausea and vomiting, intractability of vomiting not specified, unspecified vomiting type  CBC and differential    Mono Qual W/Rflx Qn    Lyme Antibody Profile with reflex to WB    C-reactive protein   4  Night sweats  CBC and differential    Mono Qual W/Rflx Qn    Lyme Antibody Profile with reflex to WB    C-reactive protein       Suspect psychosomatic etiology of patient's symptoms  She admits to being very stressed  She has also stopped taking all of her psychiatric medications   Will rule out mono, lyme  Will check CBC for leukocytosis  Check CRP for inflammatory conditions  However, patient's symptoms are likely psychogenic in nature  Advised patient to follow up with behavioral health specialist regarding the discontinuation of her medications  Teresa Marsh DO  09/24/19  12:04 PM    Some portions of this record may have been generated with voice recognition software  There may be translation, syntax, or grammatical errors  Occasional wrong word or "sound-a-like" substitutions may have occurred due to the inherent limitations of the voice recognition software  Read the chart carefully and recognize, using context, where substations may have occurred   If you have any questions, please contact the dictating provider for clarification or correction, as needed

## 2019-10-06 ENCOUNTER — TELEPHONE (OUTPATIENT)
Dept: OTHER | Facility: OTHER | Age: 24
End: 2019-10-06

## 2019-10-06 ENCOUNTER — APPOINTMENT (EMERGENCY)
Dept: RADIOLOGY | Facility: HOSPITAL | Age: 24
End: 2019-10-06
Attending: EMERGENCY MEDICINE
Payer: MEDICARE

## 2019-10-06 ENCOUNTER — HOSPITAL ENCOUNTER (EMERGENCY)
Facility: HOSPITAL | Age: 24
Discharge: HOME | End: 2019-10-06
Attending: EMERGENCY MEDICINE | Admitting: EMERGENCY MEDICINE
Payer: MEDICARE

## 2019-10-06 VITALS
WEIGHT: 225 LBS | RESPIRATION RATE: 18 BRPM | BODY MASS INDEX: 38.62 KG/M2 | TEMPERATURE: 97.8 F | SYSTOLIC BLOOD PRESSURE: 115 MMHG | DIASTOLIC BLOOD PRESSURE: 61 MMHG | OXYGEN SATURATION: 96 % | HEART RATE: 104 BPM

## 2019-10-06 DIAGNOSIS — J40 BRONCHITIS: ICD-10-CM

## 2019-10-06 DIAGNOSIS — R05.9 COUGH: Primary | ICD-10-CM

## 2019-10-06 LAB
ANION GAP SERPL CALCULATED.3IONS-SCNC: 3 MMOL/L (ref 4–13)
BASOPHILS # BLD AUTO: 0.06 THOUSANDS/ΜL (ref 0–0.1)
BASOPHILS NFR BLD AUTO: 1 % (ref 0–1)
BUN SERPL-MCNC: 12 MG/DL (ref 5–25)
CALCIUM SERPL-MCNC: 8.6 MG/DL (ref 8.3–10.1)
CHLORIDE SERPL-SCNC: 102 MMOL/L (ref 100–108)
CK SERPL-CCNC: 59 U/L (ref 26–192)
CO2 SERPL-SCNC: 31 MMOL/L (ref 21–32)
CREAT SERPL-MCNC: 0.91 MG/DL (ref 0.6–1.3)
DEPRECATED D DIMER PPP: 270 NG/ML (FEU) (ref 190–520)
EOSINOPHIL # BLD AUTO: 0.4 THOUSAND/ΜL (ref 0–0.61)
EOSINOPHIL NFR BLD AUTO: 5 % (ref 0–6)
ERYTHROCYTE [DISTWIDTH] IN BLOOD BY AUTOMATED COUNT: 13.1 % (ref 11.6–15.1)
GFR SERPL CREATININE-BSD FRML MDRD: 89 ML/MIN/1.73SQ M
GLUCOSE SERPL-MCNC: 98 MG/DL (ref 65–140)
HCT VFR BLD AUTO: 42.9 % (ref 34.8–46.1)
HGB BLD-MCNC: 13.4 G/DL (ref 11.5–15.4)
IMM GRANULOCYTES # BLD AUTO: 0.05 THOUSAND/UL (ref 0–0.2)
IMM GRANULOCYTES NFR BLD AUTO: 1 % (ref 0–2)
LYMPHOCYTES # BLD AUTO: 2.56 THOUSANDS/ΜL (ref 0.6–4.47)
LYMPHOCYTES NFR BLD AUTO: 29 % (ref 14–44)
MCH RBC QN AUTO: 28.2 PG (ref 26.8–34.3)
MCHC RBC AUTO-ENTMCNC: 31.2 G/DL (ref 31.4–37.4)
MCV RBC AUTO: 90 FL (ref 82–98)
MONOCYTES # BLD AUTO: 1.02 THOUSAND/ΜL (ref 0.17–1.22)
MONOCYTES NFR BLD AUTO: 11 % (ref 4–12)
NEUTROPHILS # BLD AUTO: 4.84 THOUSANDS/ΜL (ref 1.85–7.62)
NEUTS SEG NFR BLD AUTO: 53 % (ref 43–75)
NRBC BLD AUTO-RTO: 0 /100 WBCS
PLATELET # BLD AUTO: 317 THOUSANDS/UL (ref 149–390)
PMV BLD AUTO: 9 FL (ref 8.9–12.7)
POTASSIUM SERPL-SCNC: 4 MMOL/L (ref 3.5–5.3)
RBC # BLD AUTO: 4.75 MILLION/UL (ref 3.81–5.12)
SODIUM SERPL-SCNC: 136 MMOL/L (ref 136–145)
WBC # BLD AUTO: 8.93 THOUSAND/UL (ref 4.31–10.16)

## 2019-10-06 PROCEDURE — 80048 BASIC METABOLIC PNL TOTAL CA: CPT | Performed by: EMERGENCY MEDICINE

## 2019-10-06 PROCEDURE — 82550 ASSAY OF CK (CPK): CPT | Performed by: EMERGENCY MEDICINE

## 2019-10-06 PROCEDURE — 85379 FIBRIN DEGRADATION QUANT: CPT | Performed by: EMERGENCY MEDICINE

## 2019-10-06 PROCEDURE — 94640 AIRWAY INHALATION TREATMENT: CPT

## 2019-10-06 PROCEDURE — 85025 COMPLETE CBC W/AUTO DIFF WBC: CPT | Performed by: EMERGENCY MEDICINE

## 2019-10-06 PROCEDURE — 36415 COLL VENOUS BLD VENIPUNCTURE: CPT | Performed by: EMERGENCY MEDICINE

## 2019-10-06 PROCEDURE — 99284 EMERGENCY DEPT VISIT MOD MDM: CPT

## 2019-10-06 PROCEDURE — 71046 X-RAY EXAM CHEST 2 VIEWS: CPT

## 2019-10-06 PROCEDURE — 93005 ELECTROCARDIOGRAM TRACING: CPT

## 2019-10-06 RX ORDER — PREDNISONE 20 MG/1
60 TABLET ORAL DAILY
Qty: 15 TABLET | Refills: 0 | Status: SHIPPED | OUTPATIENT
Start: 2019-10-06 | End: 2019-10-11

## 2019-10-06 RX ORDER — ACETAMINOPHEN 325 MG/1
650 TABLET ORAL ONCE
Status: COMPLETED | OUTPATIENT
Start: 2019-10-06 | End: 2019-10-06

## 2019-10-06 RX ORDER — AZITHROMYCIN 250 MG/1
250 TABLET, FILM COATED ORAL DAILY
Qty: 6 TABLET | Refills: 0 | Status: SHIPPED | OUTPATIENT
Start: 2019-10-06 | End: 2019-10-12

## 2019-10-06 RX ORDER — ALBUTEROL SULFATE 90 UG/1
2 AEROSOL, METERED RESPIRATORY (INHALATION) EVERY 4 HOURS PRN
Qty: 1 INHALER | Refills: 0 | Status: SHIPPED | OUTPATIENT
Start: 2019-10-06 | End: 2021-02-07

## 2019-10-06 RX ORDER — PREDNISONE 20 MG/1
60 TABLET ORAL ONCE
Status: COMPLETED | OUTPATIENT
Start: 2019-10-06 | End: 2019-10-06

## 2019-10-06 RX ORDER — IPRATROPIUM BROMIDE AND ALBUTEROL SULFATE 2.5; .5 MG/3ML; MG/3ML
3 SOLUTION RESPIRATORY (INHALATION) ONCE
Status: COMPLETED | OUTPATIENT
Start: 2019-10-06 | End: 2019-10-06

## 2019-10-06 RX ADMIN — IPRATROPIUM BROMIDE AND ALBUTEROL SULFATE 3 ML: 2.5; .5 SOLUTION RESPIRATORY (INHALATION) at 20:00

## 2019-10-06 RX ADMIN — PREDNISONE 60 MG: 20 TABLET ORAL at 20:50

## 2019-10-06 RX ADMIN — ACETAMINOPHEN 650 MG: 325 TABLET, FILM COATED ORAL at 19:48

## 2019-10-06 NOTE — TELEPHONE ENCOUNTER
Samm Preston 1995  CONFIDENTIALTY NOTICE: This fax transmission is intended only for the addressee  It contains information that is legally privileged,  confidential or otherwise protected from use or disclosure  If you are not the intended recipient, you are strictly prohibited from reviewing,  disclosing, copying using or disseminating any of this information or taking any action in reliance on or regarding this information  If you have  received this fax in error, please notify us immediately by telephone so that we can arrange for its return to us  Page:   Call Id: [id number]  Health Call  Standard Call Report  Health Call  Patient Name: Samm Preston  Gender: Female  : 1995  Age: 22 Y 2 M 12 D  Return Phone  Number: [phone] (Home)  Address: [address] [place] [address]  City/State/Zip: [address] [place] [address]  Practice Name: [email or web address] Charged:  Physician:  [address] [place] [address] Name:  Relationship To  Patient: Self  Return Phone Number: [phone] (Home)  Presenting Problem: "I have a fever of 100 8 (oral), cough  and runny nose "  Service Type: Triage  Charged Service 1: Messages  Pharmacy Name and  Number:  Nurse Assessment  Protocols  Protocol Title Nurse Date/Time  Disp  Time Disposition Final User  10/6/2019 7:10:42 PM Close Yes SARKIS Hirsch, Liu Benítez  Comments  User: Berta Mitchell RN Date/Time: 10/6/2019 7:10:36 PM  Returned call for cough and temp of 100 8/orally pt states" I'm going over to the hospital now " Didn't want care advice  She  going to the [address] [place] [address]

## 2019-10-06 NOTE — ED PROVIDER NOTES
History  Chief Complaint   Patient presents with    Cough     Pt reports cough and chest pain x 1 week, worse when coughing  23 y/o female presents with shortness of breath and chest pain and cough for a week  No vomiting, nausea, abdominal pain,fevers,chills or any other symptoms  Is a smoker  History provided by:  Patient   used: No        Prior to Admission Medications   Prescriptions Last Dose Informant Patient Reported? Taking?    OXcarbazepine (TRILEPTAL) 600 mg tablet   Yes No   Sig: Take 300 mg by mouth every 12 (twelve) hours   OXcarbazepine (TRILEPTAL) 600 mg tablet   No No   Sig: Take 0 5 tablets (300 mg total) by mouth every 12 (twelve) hours for 30 days   albuterol (2 5 mg/3 mL) 0 083 % nebulizer solution   Yes No   Sig: Inhale   clonazePAM (KlonoPIN) 1 mg tablet   No No   Sig: Take 1 tablet (1 mg total) by mouth 2 (two) times a day for 7 days   docusate sodium (COLACE) 100 mg capsule   No No   Sig: Take 1 capsule (100 mg total) by mouth 2 (two) times a day   Patient not taking: Reported on 9/17/2019   escitalopram (LEXAPRO) 10 mg tablet   No No   Sig: Take 1 tablet (10 mg total) by mouth daily   Patient not taking: Reported on 9/17/2019   hydrOXYzine HCL (ATARAX) 50 mg tablet   No No   Sig: Take 1 tablet (50 mg total) by mouth 3 (three) times a day   Patient not taking: Reported on 9/17/2019   meclizine (ANTIVERT) 12 5 MG tablet   No No   Sig: Take 1 tablet (12 5 mg total) by mouth every 8 (eight) hours as needed for dizziness   Patient not taking: Reported on 9/17/2019   meclizine (ANTIVERT) 25 mg tablet   No No   Sig: Take 1 tablet (25 mg total) by mouth 3 (three) times a day as needed for dizziness for up to 3 days   naproxen (NAPROSYN) 500 mg tablet   No No   Sig: Take 1 tablet (500 mg total) by mouth 2 (two) times a day with meals   Patient not taking: Reported on 9/17/2019   nicotine (NICODERM CQ) 21 mg/24 hr TD 24 hr patch   No No   Sig: Place 1 patch on the skin daily   Patient not taking: Reported on 8/24/2019   pantoprazole (PROTONIX) 40 mg tablet   No No   Sig: Take 1 tablet (40 mg total) by mouth daily in the early morning for 30 days   traZODone (DESYREL) 100 mg tablet   Yes No   Sig: Take 100 mg by mouth daily at bedtime   traZODone (DESYREL) 100 mg tablet   No No   Sig: Take 1 tablet (100 mg total) by mouth daily at bedtime for 30 days   ziprasidone (GEODON) 40 mg capsule   Yes No   Sig: Take 40 mg by mouth 2 (two) times a day with meals   ziprasidone (GEODON) 40 mg capsule   No No   Sig: Take 1 capsule (40 mg total) by mouth daily with breakfast for 30 days Please also take Geodon 80 mg with dinner   ziprasidone (GEODON) 80 mg capsule   No No   Sig: Take 1 capsule (80 mg total) by mouth daily with dinner for 30 days      Facility-Administered Medications: None       Past Medical History:   Diagnosis Date    ADHD     Anxiety     Asthma     Bipolar 1 disorder [facility]     Depression     Diabetes mellitus ([facility]     GERD (gastroesophageal reflux disease)     Mental and behavioral problem     Migraine     Palpitations     Last Assessed [id number]    Psychiatric disorder     Psychiatric illness     Psychosis ([facility]     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[facility]     Seizures ([facility] )     last time 2 weeks ago- petit mal    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[facility] )     Pseudoseizures    Stabbing chest pain     Last Assessed 29Nov2016    [facility])     Tachycardia     Last Assessed 29Nov2016    Upper respiratory disease     Last Assessed 27Jan2016       Past Surgical History:   Procedure Laterality Date    KNEE SURGERY      ID LAP,TUBAL CAUTERY N/A 10/3/2018    Procedure: LAPAROSCOPIC TUBAL LIGATION;  Surgeon: Tereso Marte MD;  Location: [facility];  Service: Gynecology    TUBAL LIGATION         Family History   Problem Relation Age of Onset    Migraines Sister     Cancer Mother     Diabetes Mother     Cancer Father     Diabetes Father     Arthritis Father     Cancer Maternal Grandmother     Cancer Maternal Aunt     Cancer Paternal Uncle     Diabetes Other      I have reviewed and agree with the history as documented  Social History     Tobacco Use    Smoking status: Current Every Day Smoker     Packs/day: 0 50     Types: Cigarettes    Smokeless tobacco: Never Used   Substance Use Topics    Alcohol use: Yes     Frequency: Never     Comment: occasional    Drug use: Yes     Frequency: 1 0 times per week     Types: Marijuana     Comment: last smoked 3-4 days ago        Review of Systems   All other systems reviewed and are negative  Physical Exam  Physical Exam   Constitutional: She is oriented to person, place, and time  She appears well-developed and well-nourished  HENT:   Head: Normocephalic and atraumatic  Eyes: Pupils are equal, round, and reactive to light  EOM are normal    Neck: Normal range of motion  Neck supple  Cardiovascular: Normal rate and regular rhythm  Pulmonary/Chest: Effort normal and breath sounds normal  No stridor  No respiratory distress  She has no wheezes  She has no rales  She exhibits no tenderness  Abdominal: Soft  Bowel sounds are normal    Musculoskeletal: Normal range of motion  Neurological: She is alert and oriented to person, place, and time  Skin: Skin is warm and dry  Psychiatric: She has a normal mood and affect  Nursing note and vitals reviewed        Vital Signs  ED Triage Vitals [10/06/19 1924]   Temperature Pulse Respirations Blood Pressure SpO2   97 8 °F (36 6 °C) 104 18 115/61 96 %      Temp Source Heart Rate Source Patient Position - Orthostatic VS BP Location FiO2 (%)   Tympanic Monitor Lying Right arm --      Pain Score       8           Vitals:    10/06/19 1924   BP: 115/61   Pulse: 104   Patient Position - Orthostatic VS: Lying         Visual Acuity      ED Medications  Medications   acetaminophen (TYLENOL) tablet 650 mg (650 mg Oral Given 10/6/19 1948)   ipratropium-albuterol (DUO-NEB) 0 5-2 5 mg/3 mL inhalation solution 3 mL (3 mL Nebulization Given 10/6/19 2000)   predniSONE tablet 60 mg (60 mg Oral Given 10/6/19 2050)       Diagnostic Studies  Results Reviewed     Procedure Component Value Units Date/Time    D-dimer, quantitative [[id number]]  (Normal) Collected:  10/06/19 1953    Lab Status:  Final result Specimen:  Blood from Arm, Left Updated:  10/06/19 2022     D-Dimer, Quant 270 ng/ml (FEU)     Basic metabolic panel [[id number]]  (Abnormal) Collected:  10/06/19 1953    Lab Status:  Final result Specimen:  Blood from Arm, Left Updated:  10/06/19 2016     Sodium 136 mmol/L      Potassium 4 0 mmol/L      Chloride 102 mmol/L      CO2 31 mmol/L      ANION GAP 3 mmol/L      BUN 12 mg/dL      Creatinine 0 91 mg/dL      Glucose 98 mg/dL      Calcium 8 6 mg/dL      eGFR 89 ml/min/1 73sq m     Narrative:       [place] guidelines for Chronic Kidney Disease (CKD):     Stage 1 with normal or high GFR (GFR > 90 mL/min/1 73 square meters)    Stage 2 Mild CKD (GFR = 60-89 mL/min/1 73 square meters)    Stage 3A Moderate CKD (GFR = 45-59 mL/min/1 73 square meters)    Stage 3B Moderate CKD (GFR = 30-44 mL/min/1 73 square meters)    Stage 4 Severe CKD (GFR = 15-29 mL/min/1 73 square meters)    Stage 5 End Stage CKD (GFR <15 mL/min/1 73 square meters)  Note: GFR calculation is accurate only with a steady state creatinine    CK (with reflex to MB) [[id number]]  (Normal) Collected:  10/06/19 1953    Lab Status:  Final result Specimen:  Blood from Arm, Left Updated:  10/06/19 2016     Total CK 59 U/L     CBC and differential [[id number]]  (Abnormal) Collected:  10/06/19 1953    Lab Status:  Final result Specimen:  Blood from Arm, Left Updated:  10/06/19 1959     WBC 8 93 Thousand/uL      RBC 4 75 Million/uL      Hemoglobin 13 4 g/dL      Hematocrit 42 9 %      MCV 90 fL      MCH 28 2 pg      MCHC 31 2 g/dL      RDW 13 1 %      MPV 9 0 fL      Platelets 001 Thousands/uL      nRBC 0 /100 WBCs Neutrophils Relative 53 %      Immat GRANS % 1 %      Lymphocytes Relative 29 %      Monocytes Relative 11 %      Eosinophils Relative 5 %      Basophils Relative 1 %      Neutrophils Absolute 4 84 Thousands/µL      Immature Grans Absolute 0 05 Thousand/uL      Lymphocytes Absolute 2 56 Thousands/µL      Monocytes Absolute 1 02 Thousand/µL      Eosinophils Absolute 0 40 Thousand/µL      Basophils Absolute 0 06 Thousands/µL                  XR chest 2 views   Final Result by Abril Reyes MD (10/07 1890)      No acute cardiopulmonary disease  Workstation performed: [id number]                    Procedures  ECG 12 Lead Documentation Only  Performed by: Prudence Brittle, DO  Authorized by: Prudence Brittle, DO     ECG reviewed by me, the ED Provider: yes    Patient location:  ED  Previous ECG:     Previous ECG:  Unavailable    Comparison to cardiac monitor: Yes    Interpretation:     Interpretation: normal    Rate:     ECG rate assessment: normal    Rhythm:     Rhythm: sinus rhythm    Ectopy:     Ectopy: none    QRS:     QRS axis:  Normal  Conduction:     Conduction: normal    ST segments:     ST segments:  Normal  T waves:     T waves: normal             ED Course                               MDM  Number of Diagnoses or Management Options  Bronchitis:   Cough:   Diagnosis management comments: Patient evaluated with labs EKG imaging  I reviewed the results and discussed them with the patient  Patient discharged with appropriate instructions medications and follow-up  Patient verbalized understanding had no further questions at the time of discharge  Patient had stable vital signs and well-appearing at the time of discharge         Amount and/or Complexity of Data Reviewed  Clinical lab tests: ordered and reviewed  Tests in the radiology section of CPT®: ordered and reviewed  Tests in the medicine section of CPT®: ordered and reviewed    Patient Progress  Patient progress: stable      Disposition  Final diagnoses:   Cough   Bronchitis     Time reflects when diagnosis was documented in both MDM as applicable and the Disposition within this note     Time User Action Codes Description Comment    10/6/2019  8:45 PM Rashaad Alejandro Add [R05] Cough     10/6/2019  8:45 PM Rashaad Alejandro Add [J40] Bronchitis       ED Disposition     ED Disposition Condition Date/Time Comment    Discharge Stable [place] Oct 6, 2019  8:45 PM [address] discharge to home/self care              Follow-up Information     Follow up With Specialties Details Why Contact Info Additional Information    Susie Rudd DO Family Medicine Schedule an appointment as soon as possible for a visit   [address] Emergency Department Emergency Medicine  If symptoms worsen [address] [facility] ED, [place], [place], [address]          Discharge Medication List as of 10/6/2019  8:45 PM      START taking these medications    Details   albuterol (PROVENTIL HFA,VENTOLIN HFA) 90 mcg/act inhaler Inhale 2 puffs every 4 (four) hours as needed for wheezing, Starting Sun 10/6/2019, Print      predniSONE 20 mg tablet Take 3 tablets (60 mg total) by mouth daily for 5 days, Starting Sun 10/6/2019, Until Fri 10/11/2019, Print         CONTINUE these medications which have NOT CHANGED    Details   albuterol (2 5 mg/3 mL) 0 083 % nebulizer solution Inhale, Starting Fri 2/6/2015, Historical Med      clonazePAM (KlonoPIN) 1 mg tablet Take 1 tablet (1 mg total) by mouth 2 (two) times a day for 7 days, Starting Tue 4/2/2019, Until Wed 5/22/2019, Print      docusate sodium (COLACE) 100 mg capsule Take 1 capsule (100 mg total) by mouth 2 (two) times a day, Starting Wed 7/10/2019, Print      escitalopram (LEXAPRO) 10 mg tablet Take 1 tablet (10 mg total) by mouth daily, Starting Tue 4/2/2019, Normal      hydrOXYzine HCL (ATARAX) 50 mg tablet Take 1 tablet (50 mg total) by mouth 3 (three) times a day, Starting Wed 7/10/2019, Print      meclizine (ANTIVERT) 12 5 MG tablet Take 1 tablet (12 5 mg total) by mouth every 8 (eight) hours as needed for dizziness, Starting Wed 7/10/2019, Print      naproxen (NAPROSYN) 500 mg tablet Take 1 tablet (500 mg total) by mouth 2 (two) times a day with meals, Starting Thu 9/5/2019, Print      nicotine (NICODERM CQ) 21 mg/24 hr TD 24 hr patch Place 1 patch on the skin daily, Starting Thu 7/11/2019, Print      OXcarbazepine (TRILEPTAL) 600 mg tablet Take 300 mg by mouth every 12 (twelve) hours, Historical Med      pantoprazole (PROTONIX) 40 mg tablet Take 1 tablet (40 mg total) by mouth daily in the early morning for 30 days, Starting Thu 7/11/2019, Until Sat 8/10/2019, Print      traZODone (DESYREL) 100 mg tablet Take 100 mg by mouth daily at bedtime, Historical Med      ziprasidone (GEODON) 40 mg capsule Take 40 mg by mouth 2 (two) times a day with meals, Historical Med           No discharge procedures on file      ED Provider  Electronically Signed by           Paul Jennings DO  10/08/19 7226

## 2019-10-08 LAB
ATRIAL RATE: 104 BPM
P AXIS: 47 DEGREES
PR INTERVAL: 130 MS
QRS AXIS: 16 DEGREES
QRSD INTERVAL: 84 MS
QT INTERVAL: 328 MS
QTC INTERVAL: 431 MS
T WAVE AXIS: 23 DEGREES
VENTRICULAR RATE: 104 BPM

## 2019-10-08 PROCEDURE — 93010 ELECTROCARDIOGRAM REPORT: CPT | Performed by: INTERNAL MEDICINE

## 2019-10-13 ENCOUNTER — HOSPITAL ENCOUNTER (EMERGENCY)
Facility: HOSPITAL | Age: 24
Discharge: HOME | End: 2019-10-13
Attending: EMERGENCY MEDICINE | Admitting: EMERGENCY MEDICINE
Payer: MEDICARE

## 2019-10-13 VITALS
HEIGHT: 64 IN | TEMPERATURE: 98.8 F | BODY MASS INDEX: 38.41 KG/M2 | SYSTOLIC BLOOD PRESSURE: 122 MMHG | WEIGHT: 225 LBS | OXYGEN SATURATION: 98 % | DIASTOLIC BLOOD PRESSURE: 66 MMHG | RESPIRATION RATE: 18 BRPM | HEART RATE: 92 BPM

## 2019-10-13 DIAGNOSIS — Z32.02 NEGATIVE PREGNANCY TEST: Primary | ICD-10-CM

## 2019-10-13 LAB
EXT PREG TEST URINE: NEGATIVE
EXT. CONTROL ED NAV: NORMAL

## 2019-10-13 PROCEDURE — 81025 URINE PREGNANCY TEST: CPT | Performed by: PHYSICIAN ASSISTANT

## 2019-10-13 PROCEDURE — 99282 EMERGENCY DEPT VISIT SF MDM: CPT

## 2019-10-13 NOTE — ED NOTES
Pt had documented LMP of 9/26 last week when she was here as an ED patient     Uvaldo Izaguirre, SARKIS  10/13/19 [id number]

## 2019-10-13 NOTE — ED PROVIDER NOTES
History  Chief Complaint   Patient presents with    Pregnancy Test     Pt sts she has lower abd cramping off and on, having food cravings and has missed her period this month  Possible pregnancy but didn't take a test      56-year-old female presents here for pregnancy test   She was seen in the ER last week and states her last menstrual period was around September 26 and is here today because she says that she is late however it would seem she is not due for another week or so  She is here for a pregnancy test   She has been having some generalized abdominal cramping and food cravings which she states is abnormal for her  No fever, chills, chest pain, nausea, vomiting, dizziness, lightheadedness, vaginal bleeding, vaginal discharge, foul odors  Prior to Admission Medications   Prescriptions Last Dose Informant Patient Reported? Taking? OXcarbazepine (TRILEPTAL) 600 mg tablet   Yes No   Sig: Take 300 mg by mouth every 12 (twelve) hours   OXcarbazepine (TRILEPTAL) 600 mg tablet   No No   Sig: Take 0 5 tablets (300 mg total) by mouth every 12 (twelve) hours for 30 days   albuterol (2 5 mg/3 mL) 0 083 % nebulizer solution   Yes No   Sig: Inhale   albuterol (PROVENTIL HFA,VENTOLIN HFA) 90 mcg/act inhaler   No No   Sig: Inhale 2 puffs every 4 (four) hours as needed for wheezing   azithromycin (ZITHROMAX) 250 mg tablet   No No   Sig: Take 1 tablet (250 mg total) by mouth daily for 6 days Take first 2 tablets together, then 1 every day until finished     clonazePAM (KlonoPIN) 1 mg tablet   No No   Sig: Take 1 tablet (1 mg total) by mouth 2 (two) times a day for 7 days   docusate sodium (COLACE) 100 mg capsule   No No   Sig: Take 1 capsule (100 mg total) by mouth 2 (two) times a day   Patient not taking: Reported on 9/17/2019   escitalopram (LEXAPRO) 10 mg tablet   No No   Sig: Take 1 tablet (10 mg total) by mouth daily   Patient not taking: Reported on 9/17/2019   hydrOXYzine HCL (ATARAX) 50 mg tablet   No No Sig: Take 1 tablet (50 mg total) by mouth 3 (three) times a day   Patient not taking: Reported on 9/17/2019   meclizine (ANTIVERT) 12 5 MG tablet   No No   Sig: Take 1 tablet (12 5 mg total) by mouth every 8 (eight) hours as needed for dizziness   Patient not taking: Reported on 9/17/2019   meclizine (ANTIVERT) 25 mg tablet   No No   Sig: Take 1 tablet (25 mg total) by mouth 3 (three) times a day as needed for dizziness for up to 3 days   naproxen (NAPROSYN) 500 mg tablet   No No   Sig: Take 1 tablet (500 mg total) by mouth 2 (two) times a day with meals   Patient not taking: Reported on 9/17/2019   nicotine (NICODERM CQ) 21 mg/24 hr TD 24 hr patch   No No   Sig: Place 1 patch on the skin daily   Patient not taking: Reported on 8/24/2019   pantoprazole (PROTONIX) 40 mg tablet   No No   Sig: Take 1 tablet (40 mg total) by mouth daily in the early morning for 30 days   traZODone (DESYREL) 100 mg tablet   Yes No   Sig: Take 100 mg by mouth daily at bedtime   traZODone (DESYREL) 100 mg tablet   No No   Sig: Take 1 tablet (100 mg total) by mouth daily at bedtime for 30 days   ziprasidone (GEODON) 40 mg capsule   Yes No   Sig: Take 40 mg by mouth 2 (two) times a day with meals   ziprasidone (GEODON) 40 mg capsule   No No   Sig: Take 1 capsule (40 mg total) by mouth daily with breakfast for 30 days Please also take Geodon 80 mg with dinner   ziprasidone (GEODON) 80 mg capsule   No No   Sig: Take 1 capsule (80 mg total) by mouth daily with dinner for 30 days      Facility-Administered Medications: None       Past Medical History:   Diagnosis Date    ADHD     Anxiety     Asthma     Bipolar [facility] disorder ([facility] [address] [facility]     Depression     Diabetes mellitus ([facility]     GERD (gastroesophageal reflux disease)     Mental and behavioral problem     Migraine     Palpitations     Last Assessed 29Nov2016    Psychiatric disorder     Psychiatric illness     Psychosis (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 [address] )     Seizures ([facility] [address] )     last time 2 weeks ago- petit mal    Seizures ([address] )     Pseudoseizures    Stabbing chest pain     Last Assessed 29Nov2016    Suicide [facility])     Tachycardia     Last Assessed 29Nov2016    Upper respiratory disease     Last Assessed 27Jan2016       Past Surgical History:   Procedure Laterality Date    KNEE SURGERY      CT LAP,TUBAL CAUTERY N/A 10/3/2018    Procedure: LAPAROSCOPIC TUBAL LIGATION;  Surgeon: Daisy Raman MD;  Location: [address] [place] [address];  Service: Gynecology    TUBAL LIGATION         Family History   Problem Relation Age of Onset    Migraines Sister     Cancer Mother     Diabetes Mother     Cancer Father     Diabetes Father     Arthritis Father     Cancer Maternal Grandmother     Cancer Maternal Aunt     Cancer Paternal Uncle     Diabetes Other      I have reviewed and agree with the history as documented  Social History     Tobacco Use    Smoking status: Former Smoker     Packs/day: 0 50     Types: Cigarettes    Smokeless tobacco: Never Used   Substance Use Topics    Alcohol use: Yes     Frequency: Never     Comment: occasional    Drug use: Yes     Frequency: 1 0 times per week     Types: Marijuana     Comment: last smoked 3-4 days ago        Review of Systems   Constitutional: Negative for chills and fever  HENT: Negative for sneezing and sore throat  Respiratory: Negative for cough and shortness of breath  Cardiovascular: Negative for chest pain, palpitations and leg swelling  Gastrointestinal: Negative for abdominal pain, constipation, diarrhea, nausea and vomiting  Musculoskeletal: Negative for back pain, gait problem and joint swelling  Skin: Negative for color change, pallor, rash and wound  Neurological: Negative for dizziness, syncope, weakness, light-headedness, numbness and headaches  All other systems reviewed and are negative  Physical Exam  Physical Exam   Constitutional: She appears well-developed and well-nourished  No distress     HENT: Head: Normocephalic and atraumatic  Nose: Nose normal    Eyes: EOM are normal    Neck: Normal range of motion  Cardiovascular: Normal rate, regular rhythm, normal heart sounds and intact distal pulses  Exam reveals no gallop and no friction rub  No murmur heard  Pulmonary/Chest: Effort normal and breath sounds normal  No stridor  No respiratory distress  She has no wheezes  She has no rales  Sp02 is 98% indicating adequate oxygenation on room air   Abdominal: Soft  Bowel sounds are normal  She exhibits no distension and no mass  There is no tenderness  There is no guarding  Skin: Skin is warm and dry  Capillary refill takes less than 2 seconds  No rash noted  She is not diaphoretic  No erythema  No pallor  Nursing note and vitals reviewed  Vital Signs  ED Triage Vitals [10/13/19 1530]   Temperature Pulse Respirations Blood Pressure SpO2   98 8 °F (37 1 °C) 92 18 122/66 98 %      Temp src Heart Rate Source Patient Position - Orthostatic VS BP Location FiO2 (%)   -- -- -- -- --      Pain Score       5           Vitals:    10/13/19 1530   BP: 122/66   Pulse: 92         Visual Acuity      ED Medications  Medications - No data to display    Diagnostic Studies  Results Reviewed     Procedure Component Value Units Date/Time    POCT pregnancy, urine [[id number]]  (Normal) Resulted:  10/13/19 1539    Lab Status:  Final result Updated:  10/13/19 1539     EXT PREG TEST UR (Ref: Negative) negative     Control valid                 No orders to display              Procedures  Procedures       ED Course                               MDM  Number of Diagnoses or Management Options  Diagnosis management comments: Negative pregnancy test  Advised to repeat in another week if continues with no period  Gave patient proper education regarding diagnosis  Answered all questions  Return to ED for any worsening of symptoms otherwise follow up with primary care physician for re-evaluation   Discussed plan with patient who verbalized understanding and agreed to plan  Amount and/or Complexity of Data Reviewed  Tests in the medicine section of CPT®: reviewed and ordered  Discussion of test results with the performing providers: yes  Review and summarize past medical records: yes  Discuss the patient with other providers: yes  Independent visualization of images, tracings, or specimens: yes        Disposition  Final diagnoses:   Negative pregnancy test     Time reflects when diagnosis was documented in both MDM as applicable and the Disposition within this note     Time User Action Codes Description Comment    10/13/2019  3:51 PM Bassamwilner Alvarado Add [Z32 02] Negative pregnancy test       ED Disposition     ED Disposition Condition Date/Time Comment    Discharge Stable Sun Oct 13, 2019  3:51 PM Jamaica WILNER Martinezt discharge to home/self care              Follow-up Information     Follow up With Specialties Details Why Contact Info Additional Information    [facility] Emergency Department Emergency Medicine Go to   [address] [place] [address]  [phone] [facility] ED, [place], [place], [address]          Discharge Medication List as of 10/13/2019  3:51 PM      CONTINUE these medications which have NOT CHANGED    Details   albuterol (2 5 mg/3 mL) 0 083 % nebulizer solution Inhale, Starting Fri 2/6/2015, Historical Med      albuterol (PROVENTIL HFA,VENTOLIN HFA) 90 mcg/act inhaler Inhale 2 puffs every 4 (four) hours as needed for wheezing, Starting Sun 10/6/2019, Print      clonazePAM (KlonoPIN) 1 mg tablet Take 1 tablet (1 mg total) by mouth 2 (two) times a day for 7 days, Starting Tue 4/2/2019, Until Wed 5/22/2019, Print      docusate sodium (COLACE) 100 mg capsule Take 1 capsule (100 mg total) by mouth 2 (two) times a day, Starting Wed 7/10/2019, Print      escitalopram (LEXAPRO) 10 mg tablet Take 1 tablet (10 mg total) by mouth daily, Starting Tue 4/2/2019, Normal      hydrOXYzine HCL (ATARAX) 50 mg tablet Take 1 tablet (50 mg total) by mouth 3 (three) times a day, Starting Wed 7/10/2019, Print      meclizine (ANTIVERT) 12 5 MG tablet Take 1 tablet (12 5 mg total) by mouth every 8 (eight) hours as needed for dizziness, Starting Wed 7/10/2019, Print      naproxen (NAPROSYN) 500 mg tablet Take 1 tablet (500 mg total) by mouth 2 (two) times a day with meals, Starting Thu 9/5/2019, Print      nicotine (NICODERM CQ) 21 mg/24 hr TD 24 hr patch Place 1 patch on the skin daily, Starting Thu 7/11/2019, Print      OXcarbazepine (TRILEPTAL) 600 mg tablet Take 300 mg by mouth every 12 (twelve) hours, Historical Med      pantoprazole (PROTONIX) 40 mg tablet Take 1 tablet (40 mg total) by mouth daily in the early morning for 30 days, Starting Thu 7/11/2019, Until Sat 8/10/2019, Print      traZODone (DESYREL) 100 mg tablet Take 100 mg by mouth daily at bedtime, Historical Med      ziprasidone (GEODON) 40 mg capsule Take 40 mg by mouth 2 (two) times a day with meals, Historical Med         STOP taking these medications       azithromycin (ZITHROMAX) 250 mg tablet Comments:   Reason for Stopping:             No discharge procedures on file      ED Provider  Electronically Signed by           Kevin Geiger PA-C  10/13/19 2019

## 2019-11-06 ENCOUNTER — TELEPHONE (OUTPATIENT)
Dept: FAMILY MEDICINE CLINIC | Facility: CLINIC | Age: 24
End: 2019-11-06

## 2020-01-01 NOTE — PROGRESS NOTES
Normal CT head  Trauma will sign off at this time  Feel free to call with any questions or concerns  Passed

## 2020-01-10 ENCOUNTER — HOSPITAL ENCOUNTER (EMERGENCY)
Facility: HOSPITAL | Age: 25
Discharge: HOME | End: 2020-01-10
Attending: EMERGENCY MEDICINE | Admitting: EMERGENCY MEDICINE
Payer: MEDICARE

## 2020-01-10 VITALS
TEMPERATURE: 96.9 F | RESPIRATION RATE: 20 BRPM | SYSTOLIC BLOOD PRESSURE: 124 MMHG | OXYGEN SATURATION: 100 % | DIASTOLIC BLOOD PRESSURE: 68 MMHG | HEART RATE: 80 BPM

## 2020-01-10 DIAGNOSIS — L30.9 DERMATITIS: ICD-10-CM

## 2020-01-10 DIAGNOSIS — R21 RASH: Primary | ICD-10-CM

## 2020-01-10 LAB
EXT PREG TEST URINE: NEGATIVE
EXT. CONTROL ED NAV: NORMAL

## 2020-01-10 PROCEDURE — 99284 EMERGENCY DEPT VISIT MOD MDM: CPT | Performed by: EMERGENCY MEDICINE

## 2020-01-10 PROCEDURE — 99283 EMERGENCY DEPT VISIT LOW MDM: CPT

## 2020-01-10 PROCEDURE — 81025 URINE PREGNANCY TEST: CPT | Performed by: EMERGENCY MEDICINE

## 2020-01-10 RX ORDER — FAMOTIDINE 20 MG/1
20 TABLET, FILM COATED ORAL ONCE
Status: COMPLETED | OUTPATIENT
Start: 2020-01-10 | End: 2020-01-10

## 2020-01-10 RX ORDER — CALAMINE
LOTION (ML) TOPICAL AS NEEDED
Qty: 120 ML | Refills: 0 | Status: SHIPPED | OUTPATIENT
Start: 2020-01-10 | End: 2020-02-12

## 2020-01-10 RX ORDER — FAMOTIDINE 20 MG/1
20 TABLET, FILM COATED ORAL 2 TIMES DAILY
Qty: 30 TABLET | Refills: 0 | Status: SHIPPED | OUTPATIENT
Start: 2020-01-10 | End: 2021-02-07

## 2020-01-10 RX ORDER — DIPHENHYDRAMINE HCL 25 MG
50 TABLET ORAL ONCE
Status: COMPLETED | OUTPATIENT
Start: 2020-01-10 | End: 2020-01-10

## 2020-01-10 RX ORDER — PREDNISONE 20 MG/1
60 TABLET ORAL ONCE
Status: COMPLETED | OUTPATIENT
Start: 2020-01-10 | End: 2020-01-10

## 2020-01-10 RX ORDER — METHYLPREDNISOLONE 4 MG/1
TABLET ORAL
Qty: 21 TABLET | Refills: 0 | Status: SHIPPED | OUTPATIENT
Start: 2020-01-10 | End: 2021-02-07

## 2020-01-10 RX ORDER — DIPHENHYDRAMINE HCL 25 MG
25 TABLET ORAL EVERY 8 HOURS PRN
Qty: 15 TABLET | Refills: 0 | Status: SHIPPED | OUTPATIENT
Start: 2020-01-10 | End: 2020-02-12

## 2020-01-10 RX ORDER — DIPHENHYDRAMINE HCL 25 MG
25 TABLET ORAL ONCE
Status: DISCONTINUED | OUTPATIENT
Start: 2020-01-10 | End: 2020-01-10

## 2020-01-10 RX ADMIN — PREDNISONE 60 MG: 20 TABLET ORAL at 04:08

## 2020-01-10 RX ADMIN — DIPHENHYDRAMINE HCL 50 MG: 25 TABLET, COATED ORAL at 04:08

## 2020-01-10 RX ADMIN — FAMOTIDINE 20 MG: 20 TABLET ORAL at 04:08

## 2020-01-10 NOTE — ED PROVIDER NOTES
History  Chief Complaint   Patient presents with    Rash     pt states started with itchy rash on body for 3-4 days, areas itching worse tonight     23 y/o female presents with rash noted to her abdomen,back and arms started 2 days ago getting worse  Didn't take any medications  No throat closing,swelling, no diarrhea, no wheezing,shortness of breath  Itching worse at night  No bed bugs per patient, no itching between her fingers  No insect bites, no new medications, no known allergic response  History provided by:  Patient   used: No        Prior to Admission Medications   Prescriptions Last Dose Informant Patient Reported? Taking?    albuterol (PROVENTIL HFA,VENTOLIN HFA) 90 mcg/act inhaler   No No   Sig: Inhale 2 puffs every 4 (four) hours as needed for wheezing      Facility-Administered Medications: None       Past Medical History:   Diagnosis Date    ADHD     Anxiety     Asthma     Bipolar 1 disorder (HCC)     Depression     Diabetes mellitus ([facility]     GERD (gastroesophageal reflux disease)     Mental and behavioral problem     Migraine     Palpitations     Last Assessed [id number]    Psychiatric disorder     Psychiatric illness     Psychosis ([facility] )     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[facility] )     Seizures ([facility] )     last time 2 weeks ago- petit mal    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    Stabbing chest pain     Last Assessed [id number]    Suicide attempt [facility])     Tachycardia     Last Assessed 29Nov2016    Upper respiratory disease     Last Assessed 27Jan2016       Past Surgical History:   Procedure Laterality Date    KNEE SURGERY      [place] LAP,TUBAL CAUTERY N/A 10/3/2018    Procedure: LAPAROSCOPIC TUBAL LIGATION;  Surgeon: Emory Campbell MD;  Location: [place] [facility];  Service: Gynecology    TUBAL LIGATION         Family History   Problem Relation Age of Onset    Migraines Sister     Cancer Mother     Diabetes Mother     Cancer Father     Diabetes Father     Arthritis Father  Cancer Maternal Grandmother     Cancer Maternal Aunt     Cancer Paternal Uncle     Diabetes Other      I have reviewed and agree with the history as documented  Social History     Tobacco Use    Smoking status: Former Smoker     Packs/day: 0 50     Types: Cigarettes    Smokeless tobacco: Never Used   Substance Use Topics    Alcohol use: Yes     Frequency: Never     Comment: occasional    Drug use: Yes     Frequency: 1 0 times per week     Types: Marijuana     Comment: last smoked 3-4 days ago        Review of Systems   All other systems reviewed and are negative  Physical Exam  Physical Exam   Constitutional: She is oriented to person, place, and time  She appears well-developed and well-nourished  HENT:   Head: Normocephalic and atraumatic  Eyes: Pupils are equal, round, and reactive to light  EOM are normal    Neck: Normal range of motion  Neck supple  Cardiovascular: Normal rate and regular rhythm  Pulmonary/Chest: Effort normal and breath sounds normal    Abdominal: Soft  Bowel sounds are normal    Musculoskeletal: Normal range of motion  Neurological: She is alert and oriented to person, place, and time  Skin: Skin is warm and dry  Rash noted abdomen,back and upper extremities red dry consistent with allergic contact dermatitis,    Psychiatric: She has a normal mood and affect  Nursing note and vitals reviewed        Vital Signs  ED Triage Vitals [01/10/20 0354]   Temperature Pulse Respirations Blood Pressure SpO2   (!) 96 9 °F (36 1 °C) 80 20 124/68 100 %      Temp Source Heart Rate Source Patient Position - Orthostatic VS BP Location FiO2 (%)   Tympanic -- Sitting Right arm --      Pain Score       No Pain           Vitals:    01/10/20 0354   BP: 124/68   Pulse: 80   Patient Position - Orthostatic VS: Sitting         Visual Acuity      ED Medications  Medications   predniSONE tablet 60 mg (60 mg Oral Given 1/10/20 3583)   famotidine (PEPCID) tablet 20 mg (20 mg Oral Given 1/10/20 0408)   diphenhydrAMINE (BENADRYL) tablet 50 mg (50 mg Oral Given 1/10/20 0408)       Diagnostic Studies  Results Reviewed     Procedure Component Value Units Date/Time    POCT pregnancy, urine [[id number]]  (Normal) Resulted:  01/10/20 0400    Lab Status:  Final result Updated:  01/10/20 0401     EXT PREG TEST UR (Ref: Negative) negative     Control valid                 No orders to display              Procedures  Procedures         ED Course                               MDM  Number of Diagnoses or Management Options  Patient Progress  Patient progress: stable        Disposition  Final diagnoses:   Rash   Dermatitis     Time reflects when diagnosis was documented in both MDM as applicable and the Disposition within this note     Time User Action Codes Description Comment    1/10/2020  4:09 AM Aundrea Alejandro Add [R21] Rash     1/10/2020  4:09 AM Diamond Alejandro Add [L30 9] Dermatitis       ED Disposition     ED Disposition Condition Date/Time Comment    Discharge Stable Fri [place]an[place] 10, 2020  4:09 AM Jamaica Jaimes discharge to home/self care              Follow-up Information     Follow up With Specialties Details Why Contact Info Additional Information    Neftaly Varela,  Family Medicine Schedule an appointment as soon as possible for a visit   [facility] [address] Emergency Department Emergency Medicine  If symptoms worsen [address] [facility] [address]  [phone] [facility] [address]          Patient's Medications   Discharge Prescriptions    CALAMINE LOTION    Apply topically as needed for itching for up to 7 days       Start Date: 1/10/2020 End Date: 1/17/2020       Order Dose: --       Quantity: 120 mL    Refills: 0    DIPHENHYDRAMINE (BENADRYL) 25 MG TABLET    Take 1 tablet (25 mg total) by mouth every 8 (eight) hours as needed for itching for up to 5 days       Start Date: 1/10/2020 End Date: 1/15/2020       Order Dose: 25 mg       Quantity: 15 tablet    Refills: 0    FAMOTIDINE (PEPCID) 20 MG TABLET    Take 1 tablet (20 mg total) by mouth 2 (two) times a day       Start Date: 1/10/2020 End Date: --       Order Dose: 20 mg       Quantity: 30 tablet    Refills: 0    METHYLPREDNISOLONE 4 MG TABLET THERAPY PACK    Use as directed on package       Start Date: 1/10/2020 End Date: --       Order Dose: --       Quantity: 21 tablet    Refills: 0     No discharge procedures on file      ED Provider  Electronically Signed by           Jacob Clemente DO  01/10/20 0410

## 2020-01-23 ENCOUNTER — APPOINTMENT (EMERGENCY)
Dept: RADIOLOGY | Facility: HOSPITAL | Age: 25
End: 2020-01-23
Payer: MEDICARE

## 2020-01-23 ENCOUNTER — HOSPITAL ENCOUNTER (EMERGENCY)
Facility: HOSPITAL | Age: 25
Discharge: HOME | End: 2020-01-23
Attending: EMERGENCY MEDICINE | Admitting: EMERGENCY MEDICINE
Payer: MEDICARE

## 2020-01-23 VITALS
HEART RATE: 116 BPM | TEMPERATURE: 97.9 F | RESPIRATION RATE: 16 BRPM | DIASTOLIC BLOOD PRESSURE: 59 MMHG | SYSTOLIC BLOOD PRESSURE: 117 MMHG | OXYGEN SATURATION: 99 %

## 2020-01-23 DIAGNOSIS — N94.6 DYSMENORRHEA: Primary | ICD-10-CM

## 2020-01-23 DIAGNOSIS — O02.1 MISSED ABORTION: ICD-10-CM

## 2020-01-23 LAB
ALBUMIN SERPL BCP-MCNC: 3.9 G/DL (ref 3.5–5)
ALP SERPL-CCNC: 65 U/L (ref 46–116)
ALT SERPL W P-5'-P-CCNC: 26 U/L (ref 12–78)
ANION GAP SERPL CALCULATED.3IONS-SCNC: 8 MMOL/L (ref 4–13)
AST SERPL W P-5'-P-CCNC: 15 U/L (ref 5–45)
B-HCG SERPL-ACNC: <2 MIU/ML
BASOPHILS # BLD AUTO: 0.06 THOUSANDS/ΜL (ref 0–0.1)
BASOPHILS NFR BLD AUTO: 1 % (ref 0–1)
BILIRUB SERPL-MCNC: 0.5 MG/DL (ref 0.2–1)
BUN SERPL-MCNC: 12 MG/DL (ref 5–25)
CALCIUM SERPL-MCNC: 8.7 MG/DL (ref 8.3–10.1)
CHLORIDE SERPL-SCNC: 103 MMOL/L (ref 100–108)
CO2 SERPL-SCNC: 28 MMOL/L (ref 21–32)
CREAT SERPL-MCNC: 1.13 MG/DL (ref 0.6–1.3)
EOSINOPHIL # BLD AUTO: 0.25 THOUSAND/ΜL (ref 0–0.61)
EOSINOPHIL NFR BLD AUTO: 2 % (ref 0–6)
ERYTHROCYTE [DISTWIDTH] IN BLOOD BY AUTOMATED COUNT: 13.3 % (ref 11.6–15.1)
GFR SERPL CREATININE-BSD FRML MDRD: 69 ML/MIN/1.73SQ M
GLUCOSE SERPL-MCNC: 104 MG/DL (ref 65–140)
HCT VFR BLD AUTO: 41.8 % (ref 34.8–46.1)
HGB BLD-MCNC: 13.3 G/DL (ref 11.5–15.4)
IMM GRANULOCYTES # BLD AUTO: 0.05 THOUSAND/UL (ref 0–0.2)
IMM GRANULOCYTES NFR BLD AUTO: 0 % (ref 0–2)
LYMPHOCYTES # BLD AUTO: 2.64 THOUSANDS/ΜL (ref 0.6–4.47)
LYMPHOCYTES NFR BLD AUTO: 21 % (ref 14–44)
MCH RBC QN AUTO: 28.3 PG (ref 26.8–34.3)
MCHC RBC AUTO-ENTMCNC: 31.8 G/DL (ref 31.4–37.4)
MCV RBC AUTO: 89 FL (ref 82–98)
MONOCYTES # BLD AUTO: 0.91 THOUSAND/ΜL (ref 0.17–1.22)
MONOCYTES NFR BLD AUTO: 7 % (ref 4–12)
NEUTROPHILS # BLD AUTO: 8.72 THOUSANDS/ΜL (ref 1.85–7.62)
NEUTS SEG NFR BLD AUTO: 69 % (ref 43–75)
NRBC BLD AUTO-RTO: 0 /100 WBCS
PLATELET # BLD AUTO: 353 THOUSANDS/UL (ref 149–390)
PMV BLD AUTO: 9 FL (ref 8.9–12.7)
POTASSIUM SERPL-SCNC: 3.8 MMOL/L (ref 3.5–5.3)
PROT SERPL-MCNC: 7.2 G/DL (ref 6.4–8.2)
RBC # BLD AUTO: 4.7 MILLION/UL (ref 3.81–5.12)
SODIUM SERPL-SCNC: 139 MMOL/L (ref 136–145)
WBC # BLD AUTO: 12.63 THOUSAND/UL (ref 4.31–10.16)

## 2020-01-23 PROCEDURE — 36415 COLL VENOUS BLD VENIPUNCTURE: CPT | Performed by: PHYSICIAN ASSISTANT

## 2020-01-23 PROCEDURE — 96361 HYDRATE IV INFUSION ADD-ON: CPT

## 2020-01-23 PROCEDURE — 80053 COMPREHEN METABOLIC PANEL: CPT | Performed by: PHYSICIAN ASSISTANT

## 2020-01-23 PROCEDURE — 99284 EMERGENCY DEPT VISIT MOD MDM: CPT

## 2020-01-23 PROCEDURE — 76815 OB US LIMITED FETUS(S): CPT

## 2020-01-23 PROCEDURE — 85025 COMPLETE CBC W/AUTO DIFF WBC: CPT | Performed by: PHYSICIAN ASSISTANT

## 2020-01-23 PROCEDURE — 99284 EMERGENCY DEPT VISIT MOD MDM: CPT | Performed by: PHYSICIAN ASSISTANT

## 2020-01-23 PROCEDURE — 96360 HYDRATION IV INFUSION INIT: CPT

## 2020-01-23 PROCEDURE — 84702 CHORIONIC GONADOTROPIN TEST: CPT | Performed by: PHYSICIAN ASSISTANT

## 2020-01-23 RX ORDER — KETOROLAC TROMETHAMINE 30 MG/ML
15 INJECTION, SOLUTION INTRAMUSCULAR; INTRAVENOUS ONCE
Status: DISCONTINUED | OUTPATIENT
Start: 2020-01-23 | End: 2020-01-23

## 2020-01-23 RX ORDER — ACETAMINOPHEN 325 MG/1
650 TABLET ORAL ONCE
Status: COMPLETED | OUTPATIENT
Start: 2020-01-23 | End: 2020-01-23

## 2020-01-23 RX ADMIN — SODIUM CHLORIDE 1000 ML: 0.9 INJECTION, SOLUTION INTRAVENOUS at 14:37

## 2020-01-23 RX ADMIN — ACETAMINOPHEN 650 MG: 325 TABLET, FILM COATED ORAL at 14:39

## 2020-01-23 NOTE — ED NOTES
Patient observed to be sitting upright in bed smiling and laughing, eating quesadillas and chips from [facility] with boyfriend  Does not appear to be in any distress  Patient informed she did not need to sit with her gown down so her breasts were visible, patient repositioned and covered  Informed we were waiting for ultrasound results                Rosalia Hall RN  01/23/20 1600

## 2020-01-30 ENCOUNTER — HOSPITAL ENCOUNTER (EMERGENCY)
Facility: HOSPITAL | Age: 25
Discharge: HOME | End: 2020-01-30
Attending: EMERGENCY MEDICINE | Admitting: EMERGENCY MEDICINE
Payer: MEDICARE

## 2020-01-30 ENCOUNTER — APPOINTMENT (EMERGENCY)
Dept: RADIOLOGY | Facility: HOSPITAL | Age: 25
End: 2020-01-30
Payer: MEDICARE

## 2020-01-30 VITALS
SYSTOLIC BLOOD PRESSURE: 119 MMHG | HEART RATE: 78 BPM | OXYGEN SATURATION: 99 % | TEMPERATURE: 97.5 F | DIASTOLIC BLOOD PRESSURE: 67 MMHG | RESPIRATION RATE: 20 BRPM

## 2020-01-30 DIAGNOSIS — S69.91XA INJURY OF FINGER OF RIGHT HAND, INITIAL ENCOUNTER: Primary | ICD-10-CM

## 2020-01-30 PROCEDURE — 99284 EMERGENCY DEPT VISIT MOD MDM: CPT | Performed by: PHYSICIAN ASSISTANT

## 2020-01-30 PROCEDURE — 99283 EMERGENCY DEPT VISIT LOW MDM: CPT

## 2020-01-30 PROCEDURE — 73140 X-RAY EXAM OF FINGER(S): CPT

## 2020-01-30 NOTE — ED NOTES
Pt and visitor arguing in room very loudly about text messages  Pt and visitor asked to please keep the volume down due to the area they are in        [address], RN  01/30/20 1105

## 2020-01-30 NOTE — ED PROVIDER NOTES
History  Chief Complaint   Patient presents with    Finger Pain     pt presents to the ed with right 5th finger pain post fall      21 y/o female presenting with right 5th finger injury after she had a fall from a seated position and then the chair landed directly onto the pinky finger  No open injury however does note some swelling and bruising  Denies numbness, paresthesias, weakness, other joint pain or swelling, other injury  Prior to Admission Medications   Prescriptions Last Dose Informant Patient Reported? Taking?    albuterol (PROVENTIL HFA,VENTOLIN HFA) 90 mcg/act inhaler   No No   Sig: Inhale 2 puffs every 4 (four) hours as needed for wheezing   calamine lotion   No No   Sig: Apply topically as needed for itching for up to 7 days   diphenhydrAMINE (BENADRYL) 25 mg tablet   No No   Sig: Take 1 tablet (25 mg total) by mouth every 8 (eight) hours as needed for itching for up to 5 days   famotidine (PEPCID) 20 mg tablet   No No   Sig: Take 1 tablet (20 mg total) by mouth 2 (two) times a day   methylPREDNISolone 4 MG tablet therapy pack   No No   Sig: Use as directed on package      Facility-Administered Medications: None       Past Medical History:   Diagnosis Date    ADHD     Anxiety     Asthma     [facility]     Depression     Diabetes mellitus [facility]     GERD (gastroesophageal reflux disease)     Mental and behavioral problem     Migraine     Palpitations     Last Assessed [id number]    Psychiatric disorder     Psychiatric illness     Psychosis [facility]     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[facility]     Seizures ([facility] )     last time 2 weeks ago- petit mal    Seizures ([facility]     Pseudoseizures    Stabbing chest pain     Last Assessed 29Nov2016    [facility])     Tachycardia     Last Assessed 29Nov2016    Upper respiratory disease     Last Assessed 27Jan2016       Past Surgical History:   Procedure Laterality Date    KNEE SURGERY      CT LAP,TUBAL CAUTERY N/A 10/3/2018 Procedure: LAPAROSCOPIC TUBAL LIGATION;  Surgeon: Jabier Cogan, MD;  Location: [facility];  Service: Gynecology    TUBAL LIGATION         Family History   Problem Relation Age of Onset    Migraines Sister     Cancer Mother     Diabetes Mother     Cancer Father     Diabetes Father     Arthritis Father     Cancer Maternal Grandmother     Cancer Maternal Aunt     Cancer Paternal Uncle     Diabetes Other      I have reviewed and agree with the history as documented  Social History     Tobacco Use    Smoking status: Former Smoker     Packs/day: 0 50     Types: Cigarettes    Smokeless tobacco: Never Used   Substance Use Topics    Alcohol use: Yes     Frequency: Never     Comment: occasional    Drug use: Not Currently     Frequency: 1 0 times per week     Types: Marijuana        Review of Systems   Constitutional: Negative  HENT: Negative  Eyes: Negative  Respiratory: Negative  Cardiovascular: Negative  Gastrointestinal: Negative  Genitourinary: Negative  Musculoskeletal: Negative  Skin: Positive for color change  Negative for pallor, rash and wound  Neurological: Negative  All other systems reviewed and are negative  Physical Exam  Physical Exam   Constitutional: She is oriented to person, place, and time  She appears well-developed and well-nourished  HENT:   Head: Normocephalic and atraumatic  Nose: Nose normal    Eyes: Conjunctivae are normal    Cardiovascular: Normal rate and intact distal pulses  Pulmonary/Chest: Effort normal    spo2 is 99% indicating adequate oxygenation    Musculoskeletal: Normal range of motion  She exhibits tenderness  She exhibits no edema or deformity  Neurological: She is alert and oriented to person, place, and time  Skin: Skin is warm and dry  Capillary refill takes less than 2 seconds  Nursing note and vitals reviewed        Vital Signs  ED Triage Vitals [01/30/20 1624]   Temperature Pulse Respirations Blood Pressure SpO2   97 5 °F (36 4 °C) 78 20 119/67 99 %      Temp Source Heart Rate Source Patient Position - Orthostatic VS BP Location FiO2 (%)   Tympanic Monitor -- -- --      Pain Score       --           Vitals:    01/30/20 1624   BP: 119/67   Pulse: 78         Visual Acuity      ED Medications  Medications - No data to display    Diagnostic Studies  Results Reviewed     None                 XR finger fifth digit-pinkie RIGHT    (Results Pending)              Procedures  Procedures         ED Course                               MDM  Number of Diagnoses or Management Options  Diagnosis management comments: Discussed xray  Patient placed in a finger splint and assessed by me with good neurovascular exam before and after  No obvious fracture noted on xray  Patient is informed to return to the emergency department for worsening of symptoms and was given proper education regarding their diagnosis and symptoms  Otherwise the patient is informed to follow up with their primary care doctor for re-evaluation  The patient verbalizes understanding and agrees with above assessment and plan  All questions were answered  Please Note: Fluency Direct voice recognition software may have been used in the creation of this document  Wrong words or sound a like substitutions may have occurred due to the inherent limitations of the voice software             Amount and/or Complexity of Data Reviewed  Tests in the radiology section of CPT®: reviewed and ordered  Review and summarize past medical records: yes  Independent visualization of images, tracings, or specimens: yes          Disposition  Final diagnoses:   Injury of finger of right hand, initial encounter     Time reflects when diagnosis was documented in both MDM as applicable and the Disposition within this note     Time User Action Codes Description Comment    1/30/2020  5:06 PM Josiah Jackson Add [[id number] [id number]] Injury of finger of right hand, initial encounter       ED Disposition     ED Disposition Condition Date/Time Comment    Discharge Stable Thu Jan 30, 2020  5:05 PM Jamaica Loyd discharge to home/self care  Follow-up Information     Follow up With Specialties Details Why Contact Info Additional [address] Emergency Department Emergency Medicine Go to  If symptoms worsen [facility]  [phone] [facility], Stu Guzman, [address] [facility], [place] [address], DO Family Medicine Schedule an appointment as soon as possible for a visit  As needed, if symptoms persist  [address]   [phone]             Patient's Medications   Discharge Prescriptions    No medications on file     No discharge procedures on file      ED Provider  Electronically Signed by           Rohini Lockhart PA-C  01/30/20 7756

## 2020-02-04 NOTE — ED PROCEDURE NOTE
Procedure  Splint application  Date/Time: 2/4/2020 3:56 PM  Performed by: Fifi Ramirez PA-C  Authorized by: Fifi Ramirez PA-C     Patient location:  Bedside  Performing Provider:  PA  Consent:     Consent obtained:  Verbal    Consent given by:  Patient    Risks discussed:  Discoloration, numbness, pain and swelling    Alternatives discussed:  No treatment  Universal protocol:     Procedure explained and questions answered to patient or proxy's satisfaction: yes      Relevant documents present and verified: yes      Test results available and properly labeled: yes      Radiology Images displayed and confirmed  If images not available, report reviewed: yes      Required blood products, implants, devices, and special equipment available: yes      Site/side marked: yes      Immediately prior to procedure a time out was called: yes      Patient identity confirmed:  Verbally with patient  Indication:     Indications: sprain/strain    Pre-procedure details:     Sensation:  Normal  Procedure details:     Laterality:  Right    Location:  Finger    Finger:  R small finger    Strapping: no      Splint type:  Finger splint, static    Supplies:  Aluminum splint  Post-procedure details:     Pain:  Improved    Sensation:  Normal    Neurovascular Exam: skin pink, capillary refill <2 sec, normal pulses and skin intact, warm, and dry      Patient tolerance of procedure:   Tolerated well, no immediate complications                     Fifi Ramirez PA-C  02/04/20 1559

## 2020-02-12 ENCOUNTER — HOSPITAL ENCOUNTER (EMERGENCY)
Facility: HOSPITAL | Age: 25
Discharge: HOME | End: 2020-02-12
Attending: EMERGENCY MEDICINE
Payer: MEDICARE

## 2020-02-12 VITALS
OXYGEN SATURATION: 99 % | DIASTOLIC BLOOD PRESSURE: 84 MMHG | TEMPERATURE: 97.2 F | WEIGHT: 202.6 LBS | RESPIRATION RATE: 18 BRPM | BODY MASS INDEX: 34.78 KG/M2 | HEART RATE: 109 BPM | SYSTOLIC BLOOD PRESSURE: 137 MMHG

## 2020-02-12 DIAGNOSIS — T14.8XXA ABRASION: Primary | ICD-10-CM

## 2020-02-12 PROCEDURE — 99282 EMERGENCY DEPT VISIT SF MDM: CPT | Performed by: PHYSICIAN ASSISTANT

## 2020-02-12 PROCEDURE — 99282 EMERGENCY DEPT VISIT SF MDM: CPT

## 2020-02-12 NOTE — ED PROVIDER NOTES
History  Chief Complaint   Patient presents with    Laceration     has laceration on right upper leg, cut same on glass from a table     This is a 73-year-old female patient with a tetanus vaccination last year  Was moving a piece of glass with her boyfriend and accidentally received a deep abrasion to her right anterior thigh bleeding was controlled it is superficial   This skin could be approximated superficially however it was too superficial to suture  The wound was clean with Betadine by the triage nurse  We followed up with cleaning with tap water  The skin was approximated glue was applied  She tolerated well without complication she has no other complaints she is stable for discharge          Prior to Admission Medications   Prescriptions Last Dose Informant Patient Reported? Taking?    albuterol (PROVENTIL HFA,VENTOLIN HFA) 90 mcg/act inhaler Not Taking at Unknown time  No No   Sig: Inhale 2 puffs every 4 (four) hours as needed for wheezing   Patient not taking: Reported on 2/12/2020   famotidine (PEPCID) 20 mg tablet Not Taking at Unknown time  No No   Sig: Take 1 tablet (20 mg total) by mouth 2 (two) times a day   Patient not taking: Reported on 2/12/2020   methylPREDNISolone 4 MG tablet therapy pack Not Taking at Unknown time  No No   Sig: Use as directed on package   Patient not taking: Reported on 2/12/2020      Facility-Administered Medications: None       Past Medical History:   Diagnosis Date    ADHD     Anxiety     Asthma     Bipolar 1 disorder (HCC)     Depression     [facility] mellitus [facility]     GERD (gastroesophageal reflux disease)     Mental and behavioral problem     Migraine     Palpitations     Last Assessed [id number]    Psychiatric disorder     Psychiatric illness     Psychosis ([facility]     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[facility]     Seizures ([facility] )     last time 2 weeks ago- petit mal    Seizures ([facility]     Pseudoseizures    Stabbing chest pain     Last Assessed [id number]    [facility])     Tachycardia     Last Assessed 29Nov2016    Upper respiratory disease     Last Assessed 27Jan2016       Past Surgical History:   Procedure Laterality Date    KNEE SURGERY      [place] LAP,TUBAL CAUTERY N/A 10/3/2018    Procedure: LAPAROSCOPIC TUBAL LIGATION;  Surgeon: Ash Ovalles MD;  Location: [address] [place] [address];  Service: Gynecology    TUBAL LIGATION         Family History   Problem Relation Age of Onset    Migraines Sister     Cancer Mother     Diabetes Mother     Cancer Father     Diabetes Father     Arthritis Father     Cancer Maternal Grandmother     Cancer Maternal Aunt     Cancer Paternal Uncle     Diabetes Other      I have reviewed and agree with the history as documented  Social History     Tobacco Use    Smoking status: Former Smoker     Packs/day: 0 50     Types: Cigarettes    Smokeless tobacco: Never Used   Substance Use Topics    Alcohol use: Yes     Frequency: Never     Comment: occasional    Drug use: Not Currently     Frequency: 1 0 times per week     Types: Marijuana       Review of Systems   All other systems reviewed and are negative  Physical Exam  Physical Exam   Constitutional: She appears well-developed and well-nourished  HENT:   Head: Normocephalic and atraumatic  Right Ear: External ear normal    Left Ear: External ear normal    Nose: Nose normal    Mouth/Throat: Oropharynx is clear and moist    Eyes: Pupils are equal, round, and reactive to light  Conjunctivae are normal    Neck: Normal range of motion  Neck supple  Cardiovascular: Normal rate and regular rhythm  Pulmonary/Chest: Effort normal and breath sounds normal    Abdominal: Soft  Bowel sounds are normal  There is no tenderness  Musculoskeletal:        Legs:  Neurological: She is alert  Skin: Skin is warm  Psychiatric: She has a normal mood and affect  Her behavior is normal    Nursing note and vitals reviewed        Vital Signs  ED Triage Vitals [02/12/20 1717]   Temperature Pulse Respirations Blood Pressure SpO2   (!) 97 2 °F (36 2 °C) (!) 109 18 137/84 99 %      Temp Source Heart Rate Source Patient Position - Orthostatic VS BP Location FiO2 (%)   Tympanic Monitor Sitting Right arm --      Pain Score       9           Vitals:    02/12/20 1717   BP: 137/84   Pulse: (!) 109   Patient Position - Orthostatic VS: Sitting         Visual Acuity      ED Medications  Medications - No data to display    Diagnostic Studies  Results Reviewed     None                 No orders to display              Procedures  Procedures         ED Course                               MDM      Disposition  Final diagnoses:   Abrasion     Time reflects when diagnosis was documented in both MDM as applicable and the Disposition within this note     Time User Action Codes Description Comment    2/12/2020  5:48 PM [facility], [address] [place] [address] [address] Abrasion       ED Disposition     ED Disposition Condition Date/Time Comment    Discharge Stable Wed Feb 12, 2020  5:48 PM Jamaica Kilgore discharge to home/self care  Follow-up Information     Follow up With Specialties Details Why Contact Info    Will Solorzano,  Family Medicine Schedule an appointment as soon as possible for a visit   [address]   [phone]            Patient's Medications   Discharge Prescriptions    No medications on file     No discharge procedures on file      PDMP Review     None          ED Provider  Electronically Signed by           Marilee Swanson PA-C  02/12/20 5253

## 2020-03-06 ENCOUNTER — APPOINTMENT (EMERGENCY)
Dept: RADIOLOGY | Facility: HOSPITAL | Age: 25
End: 2020-03-06
Payer: MEDICARE

## 2020-03-06 ENCOUNTER — HOSPITAL ENCOUNTER (EMERGENCY)
Facility: HOSPITAL | Age: 25
Discharge: HOME | End: 2020-03-06
Attending: EMERGENCY MEDICINE | Admitting: EMERGENCY MEDICINE
Payer: MEDICARE

## 2020-03-06 VITALS
RESPIRATION RATE: 18 BRPM | DIASTOLIC BLOOD PRESSURE: 67 MMHG | SYSTOLIC BLOOD PRESSURE: 121 MMHG | HEART RATE: 92 BPM | WEIGHT: 205.69 LBS | OXYGEN SATURATION: 100 % | TEMPERATURE: 97.3 F | BODY MASS INDEX: 35.31 KG/M2

## 2020-03-06 DIAGNOSIS — R06.00 DYSPNEA: Primary | ICD-10-CM

## 2020-03-06 LAB
EXT PREG TEST URINE: NEGATIVE
EXT. CONTROL ED NAV: NORMAL

## 2020-03-06 PROCEDURE — 71045 X-RAY EXAM CHEST 1 VIEW: CPT

## 2020-03-06 PROCEDURE — 94640 AIRWAY INHALATION TREATMENT: CPT

## 2020-03-06 PROCEDURE — 99285 EMERGENCY DEPT VISIT HI MDM: CPT

## 2020-03-06 PROCEDURE — 94640 AIRWAY INHALATION TREATMENT: CPT | Performed by: EMERGENCY MEDICINE

## 2020-03-06 PROCEDURE — 81025 URINE PREGNANCY TEST: CPT

## 2020-03-06 PROCEDURE — 99283 EMERGENCY DEPT VISIT LOW MDM: CPT | Performed by: EMERGENCY MEDICINE

## 2020-03-06 RX ORDER — ALBUTEROL SULFATE 90 UG/1
1-2 AEROSOL, METERED RESPIRATORY (INHALATION) EVERY 6 HOURS PRN
Qty: 1 INHALER | Refills: 0 | Status: SHIPPED | OUTPATIENT
Start: 2020-03-06 | End: 2020-04-05

## 2020-03-06 RX ORDER — IPRATROPIUM BROMIDE AND ALBUTEROL SULFATE 2.5; .5 MG/3ML; MG/3ML
3 SOLUTION RESPIRATORY (INHALATION) ONCE
Status: COMPLETED | OUTPATIENT
Start: 2020-03-06 | End: 2020-03-06

## 2020-03-06 RX ORDER — PREDNISONE 20 MG/1
40 TABLET ORAL DAILY
Qty: 8 TABLET | Refills: 0 | Status: SHIPPED | OUTPATIENT
Start: 2020-03-06 | End: 2020-03-10

## 2020-03-06 RX ADMIN — IPRATROPIUM BROMIDE AND ALBUTEROL SULFATE 3 ML: 2.5; .5 SOLUTION RESPIRATORY (INHALATION) at 17:30
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
## 2020-03-23 ENCOUNTER — TELEPHONE (OUTPATIENT)
Dept: CARDIOLOGY CLINIC | Facility: CLINIC | Age: 25
End: 2020-03-23

## 2020-03-23 NOTE — TELEPHONE ENCOUNTER
Patient called regarding "heart pain", she states she really needs to be seen in regards to this, sending a message for advice on appointment scheduling or recommendations    I advised her that she hasn't been seen in quite some time  Oxygen is at 98 and her heart rate has been topping at 111  Patient states that she has shortness of breath - she states she has asthma  She c/o dizziness and lightheadedness  She hasn't seen her PCP recently due to coronavirus  Do you have any advice?

## 2020-04-03 NOTE — ED ATTENDING ATTESTATION
Ryder Hays DO, saw and evaluated the patient  I have discussed the patient with the resident/non-physician practitioner and agree with the resident's/non-physician practitioner's findings, Plan of Care, and MDM as documented in the resident's/non-physician practitioner's note, except where noted  All available labs and Radiology studies were reviewed  At this point I agree with the current assessment done in the Emergency Department  I have conducted an independent evaluation of this patient a history and physical is as follows:    22 yof 9 wks pregnant reports two months of continued abd discomfort and intermittent nausea  She reports having been recently diagnosed with the UTI and not being compliant with her antibiotics  /78   Pulse 100   Temp 98 7 °F (37 1 °C)   Resp 16   Ht 5' 4" (1 626 m)   Wt 97 1 kg (214 lb)   LMP 09/30/2017 (Exact Date)   SpO2 98%   BMI 36 73 kg/m²   Lungs clear  Heart regular  Abdomen soft, nontender and benign other than suprapubic tenderness  Extremities without any clubbing cyanosis or edema    Patient presentation is consistent with a partially treated UTI that has progressed to cystitis at this point  She was given a prescription for Keflex  In addition, her boyfriend is at the bedside stating that she does not have anywhere to sleep  We gave her resources for shelters  The she is discharge in states that she will contact her sister to discuss staying at her house again  Fetal heart was 175 and bedside ultrasound showed a intrauterine pregnancy    She has an outpatient formal ultrasound scheduled with gynecology in [place]    Diagnosis  UTI, possible cystitis  Nine weeks pregnant      Critical Care Time  CritCare Time 61

## 2020-04-07 ENCOUNTER — NURSE TRIAGE (OUTPATIENT)
Dept: OTHER | Facility: OTHER | Age: 25
End: 2020-04-07

## 2020-04-24 ENCOUNTER — HOSPITAL ENCOUNTER (EMERGENCY)
Facility: HOSPITAL | Age: 25
End: 2020-04-25
Attending: EMERGENCY MEDICINE | Admitting: EMERGENCY MEDICINE
Payer: MEDICARE

## 2020-04-24 DIAGNOSIS — T14.91XA SUICIDE ATTEMPT (HCC): Primary | ICD-10-CM

## 2020-04-24 LAB
ALBUMIN SERPL BCP-MCNC: 3.6 G/DL (ref 3.5–5)
ALP SERPL-CCNC: 62 U/L (ref 46–116)
ALT SERPL W P-5'-P-CCNC: 27 U/L (ref 12–78)
AMORPH URATE CRY URNS QL MICRO: ABNORMAL /HPF
AMPHETAMINES SERPL QL SCN: NEGATIVE
ANION GAP SERPL CALCULATED.3IONS-SCNC: 12 MMOL/L (ref 4–13)
APAP SERPL-MCNC: <2 UG/ML (ref 10–20)
AST SERPL W P-5'-P-CCNC: 20 U/L (ref 5–45)
BACTERIA UR QL AUTO: ABNORMAL /HPF
BARBITURATES UR QL: NEGATIVE
BASOPHILS # BLD AUTO: 0.03 THOUSANDS/ΜL (ref 0–0.1)
BASOPHILS NFR BLD AUTO: 0 % (ref 0–1)
BENZODIAZ UR QL: NEGATIVE
BILIRUB SERPL-MCNC: 0.2 MG/DL (ref 0.2–1)
BILIRUB UR QL STRIP: NEGATIVE
BUN SERPL-MCNC: 15 MG/DL (ref 5–25)
CALCIUM SERPL-MCNC: 8.9 MG/DL (ref 8.3–10.1)
CHLORIDE SERPL-SCNC: 102 MMOL/L (ref 100–108)
CLARITY UR: ABNORMAL
CO2 SERPL-SCNC: 22 MMOL/L (ref 21–32)
COCAINE UR QL: NEGATIVE
COLOR UR: YELLOW
CREAT SERPL-MCNC: 1.04 MG/DL (ref 0.6–1.3)
EOSINOPHIL # BLD AUTO: 0.14 THOUSAND/ΜL (ref 0–0.61)
EOSINOPHIL NFR BLD AUTO: 1 % (ref 0–6)
ERYTHROCYTE [DISTWIDTH] IN BLOOD BY AUTOMATED COUNT: 13.2 % (ref 11.6–15.1)
ETHANOL SERPL-MCNC: <3 MG/DL (ref 0–3)
GFR SERPL CREATININE-BSD FRML MDRD: 76 ML/MIN/1.73SQ M
GLUCOSE SERPL-MCNC: 131 MG/DL (ref 65–140)
GLUCOSE SERPL-MCNC: 141 MG/DL (ref 65–140)
GLUCOSE UR STRIP-MCNC: NEGATIVE MG/DL
HCG SERPL QL: NEGATIVE
HCT VFR BLD AUTO: 39.4 % (ref 34.8–46.1)
HGB BLD-MCNC: 13 G/DL (ref 11.5–15.4)
HGB UR QL STRIP.AUTO: NEGATIVE
IMM GRANULOCYTES # BLD AUTO: 0.05 THOUSAND/UL (ref 0–0.2)
IMM GRANULOCYTES NFR BLD AUTO: 1 % (ref 0–2)
KETONES UR STRIP-MCNC: NEGATIVE MG/DL
LEUKOCYTE ESTERASE UR QL STRIP: NEGATIVE
LYMPHOCYTES # BLD AUTO: 3.75 THOUSANDS/ΜL (ref 0.6–4.47)
LYMPHOCYTES NFR BLD AUTO: 36 % (ref 14–44)
MAGNESIUM SERPL-MCNC: 2 MG/DL (ref 1.6–2.6)
MCH RBC QN AUTO: 29.3 PG (ref 26.8–34.3)
MCHC RBC AUTO-ENTMCNC: 33 G/DL (ref 31.4–37.4)
MCV RBC AUTO: 89 FL (ref 82–98)
METHADONE UR QL: NEGATIVE
MONOCYTES # BLD AUTO: 0.71 THOUSAND/ΜL (ref 0.17–1.22)
MONOCYTES NFR BLD AUTO: 7 % (ref 4–12)
MUCOUS THREADS UR QL AUTO: ABNORMAL
NEUTROPHILS # BLD AUTO: 5.8 THOUSANDS/ΜL (ref 1.85–7.62)
NEUTS SEG NFR BLD AUTO: 55 % (ref 43–75)
NITRITE UR QL STRIP: NEGATIVE
NON-SQ EPI CELLS URNS QL MICRO: ABNORMAL /HPF
NRBC BLD AUTO-RTO: 0 /100 WBCS
OPIATES UR QL SCN: NEGATIVE
PCP UR QL: NEGATIVE
PH UR STRIP.AUTO: 5.5 [PH]
PLATELET # BLD AUTO: 338 THOUSANDS/UL (ref 149–390)
PMV BLD AUTO: 9.2 FL (ref 8.9–12.7)
POTASSIUM SERPL-SCNC: 3.6 MMOL/L (ref 3.5–5.3)
PROT SERPL-MCNC: 7.3 G/DL (ref 6.4–8.2)
PROT UR STRIP-MCNC: ABNORMAL MG/DL
RBC # BLD AUTO: 4.44 MILLION/UL (ref 3.81–5.12)
RBC #/AREA URNS AUTO: ABNORMAL /HPF
SALICYLATES SERPL-MCNC: <3 MG/DL (ref 3–20)
SARS-COV-2 RNA RESP QL NAA+PROBE: NEGATIVE
SODIUM SERPL-SCNC: 136 MMOL/L (ref 136–145)
SP GR UR STRIP.AUTO: >=1.03 (ref 1–1.03)
THC UR QL: POSITIVE
UROBILINOGEN UR QL STRIP.AUTO: 0.2 E.U./DL
WBC # BLD AUTO: 10.48 THOUSAND/UL (ref 4.31–10.16)
WBC #/AREA URNS AUTO: ABNORMAL /HPF

## 2020-04-24 PROCEDURE — 82948 REAGENT STRIP/BLOOD GLUCOSE: CPT

## 2020-04-24 PROCEDURE — 99285 EMERGENCY DEPT VISIT HI MDM: CPT

## 2020-04-24 PROCEDURE — 99285 EMERGENCY DEPT VISIT HI MDM: CPT | Performed by: EMERGENCY MEDICINE

## 2020-04-24 PROCEDURE — 80053 COMPREHEN METABOLIC PANEL: CPT | Performed by: EMERGENCY MEDICINE

## 2020-04-24 PROCEDURE — 81001 URINALYSIS AUTO W/SCOPE: CPT | Performed by: EMERGENCY MEDICINE

## 2020-04-24 PROCEDURE — 84703 CHORIONIC GONADOTROPIN ASSAY: CPT | Performed by: EMERGENCY MEDICINE

## 2020-04-24 PROCEDURE — 93005 ELECTROCARDIOGRAM TRACING: CPT

## 2020-04-24 PROCEDURE — 83735 ASSAY OF MAGNESIUM: CPT | Performed by: EMERGENCY MEDICINE

## 2020-04-24 PROCEDURE — 87635 SARS-COV-2 COVID-19 AMP PRB: CPT | Performed by: EMERGENCY MEDICINE

## 2020-04-24 PROCEDURE — 80320 DRUG SCREEN QUANTALCOHOLS: CPT | Performed by: EMERGENCY MEDICINE

## 2020-04-24 PROCEDURE — 96360 HYDRATION IV INFUSION INIT: CPT

## 2020-04-24 PROCEDURE — 85025 COMPLETE CBC W/AUTO DIFF WBC: CPT | Performed by: EMERGENCY MEDICINE

## 2020-04-24 PROCEDURE — 80307 DRUG TEST PRSMV CHEM ANLYZR: CPT | Performed by: EMERGENCY MEDICINE

## 2020-04-24 PROCEDURE — 36415 COLL VENOUS BLD VENIPUNCTURE: CPT | Performed by: EMERGENCY MEDICINE

## 2020-04-24 PROCEDURE — 99448 NTRPROF PH1/NTRNET/EHR 21-30: CPT | Performed by: EMERGENCY MEDICINE

## 2020-04-24 PROCEDURE — 80329 ANALGESICS NON-OPIOID 1 OR 2: CPT | Performed by: EMERGENCY MEDICINE

## 2020-04-24 RX ADMIN — SODIUM CHLORIDE 1000 ML: 0.9 INJECTION, SOLUTION INTRAVENOUS at 17:47

## 2020-04-24 RX ADMIN — SODIUM CHLORIDE 1000 ML: 0.9 INJECTION, SOLUTION INTRAVENOUS at 18:08

## 2020-04-25 VITALS
DIASTOLIC BLOOD PRESSURE: 64 MMHG | HEART RATE: 92 BPM | TEMPERATURE: 98.3 F | OXYGEN SATURATION: 99 % | RESPIRATION RATE: 19 BRPM | SYSTOLIC BLOOD PRESSURE: 106 MMHG

## 2020-04-25 LAB
ATRIAL RATE: 115 BPM
ATRIAL RATE: 131 BPM
P AXIS: 46 DEGREES
P AXIS: 60 DEGREES
PR INTERVAL: 118 MS
PR INTERVAL: 128 MS
QRS AXIS: 20 DEGREES
QRS AXIS: 3 DEGREES
QRSD INTERVAL: 74 MS
QRSD INTERVAL: 74 MS
QT INTERVAL: 306 MS
QT INTERVAL: 324 MS
QTC INTERVAL: 448 MS
QTC INTERVAL: 451 MS
T WAVE AXIS: 28 DEGREES
T WAVE AXIS: 30 DEGREES
VENTRICULAR RATE: 115 BPM
VENTRICULAR RATE: 131 BPM

## 2020-04-25 PROCEDURE — 93010 ELECTROCARDIOGRAM REPORT: CPT | Performed by: INTERNAL MEDICINE

## 2020-04-25 RX ORDER — QUETIAPINE FUMARATE 100 MG/1
100 TABLET, FILM COATED ORAL
COMMUNITY

## 2020-04-25 RX ORDER — LORAZEPAM 0.5 MG/1
0.5 TABLET ORAL ONCE
Status: COMPLETED | OUTPATIENT
Start: 2020-04-25 | End: 2020-04-25

## 2020-04-25 RX ORDER — FLUOXETINE HYDROCHLORIDE 40 MG/1
40 CAPSULE ORAL DAILY
COMMUNITY
End: 2021-09-27 | Stop reason: SDUPTHER

## 2020-04-25 RX ADMIN — LORAZEPAM 0.5 MG: 0.5 TABLET ORAL at 15:29

## 2020-04-25 RX ADMIN — LORAZEPAM 0.5 MG: 0.5 TABLET ORAL at 20:36

## 2020-05-08 ENCOUNTER — NURSE TRIAGE (OUTPATIENT)
Dept: OTHER | Facility: OTHER | Age: 25
End: 2020-05-08

## 2020-05-09 ENCOUNTER — HOSPITAL ENCOUNTER (EMERGENCY)
Facility: HOSPITAL | Age: 25
Discharge: HOME | End: 2020-05-09
Attending: EMERGENCY MEDICINE | Admitting: EMERGENCY MEDICINE
Payer: MEDICARE

## 2020-05-09 ENCOUNTER — NURSE TRIAGE (OUTPATIENT)
Dept: OTHER | Facility: OTHER | Age: 25
End: 2020-05-09

## 2020-05-09 VITALS
WEIGHT: 198.41 LBS | DIASTOLIC BLOOD PRESSURE: 56 MMHG | OXYGEN SATURATION: 96 % | RESPIRATION RATE: 20 BRPM | TEMPERATURE: 97.7 F | SYSTOLIC BLOOD PRESSURE: 101 MMHG | HEART RATE: 92 BPM | BODY MASS INDEX: 34.06 KG/M2

## 2020-05-09 DIAGNOSIS — R10.9 ABDOMINAL PAIN: Primary | ICD-10-CM

## 2020-05-09 DIAGNOSIS — R82.81 PYURIA: ICD-10-CM

## 2020-05-09 LAB
BACTERIA UR QL AUTO: ABNORMAL /HPF
BILIRUB UR QL STRIP: NEGATIVE
CLARITY UR: CLEAR
COLOR UR: ABNORMAL
EXT PREG TEST URINE: NEGATIVE
EXT. CONTROL ED NAV: NORMAL
GLUCOSE UR STRIP-MCNC: NEGATIVE MG/DL
HGB UR QL STRIP.AUTO: NEGATIVE
KETONES UR STRIP-MCNC: NEGATIVE MG/DL
LEUKOCYTE ESTERASE UR QL STRIP: ABNORMAL
NITRITE UR QL STRIP: NEGATIVE
NON-SQ EPI CELLS URNS QL MICRO: ABNORMAL /HPF
PH UR STRIP.AUTO: 7.5 [PH]
PROT UR STRIP-MCNC: NEGATIVE MG/DL
RBC #/AREA URNS AUTO: ABNORMAL /HPF
SP GR UR STRIP.AUTO: <=1.005 (ref 1–1.03)
UROBILINOGEN UR QL STRIP.AUTO: 0.2 E.U./DL
WBC #/AREA URNS AUTO: ABNORMAL /HPF

## 2020-05-09 PROCEDURE — 87491 CHLMYD TRACH DNA AMP PROBE: CPT | Performed by: EMERGENCY MEDICINE

## 2020-05-09 PROCEDURE — 96372 THER/PROPH/DIAG INJ SC/IM: CPT

## 2020-05-09 PROCEDURE — 99284 EMERGENCY DEPT VISIT MOD MDM: CPT | Performed by: EMERGENCY MEDICINE

## 2020-05-09 PROCEDURE — 87591 N.GONORRHOEAE DNA AMP PROB: CPT | Performed by: EMERGENCY MEDICINE

## 2020-05-09 PROCEDURE — 99284 EMERGENCY DEPT VISIT MOD MDM: CPT

## 2020-05-09 PROCEDURE — 81001 URINALYSIS AUTO W/SCOPE: CPT | Performed by: EMERGENCY MEDICINE

## 2020-05-09 PROCEDURE — 81025 URINE PREGNANCY TEST: CPT | Performed by: EMERGENCY MEDICINE

## 2020-05-09 RX ORDER — AZITHROMYCIN 250 MG/1
1000 TABLET, FILM COATED ORAL ONCE
Status: COMPLETED | OUTPATIENT
Start: 2020-05-09 | End: 2020-05-09

## 2020-05-09 RX ADMIN — LIDOCAINE HYDROCHLORIDE 250 MG: 10 INJECTION, SOLUTION EPIDURAL; INFILTRATION; INTRACAUDAL; PERINEURAL at 18:17

## 2020-05-09 RX ADMIN — AZITHROMYCIN 1000 MG: 250 TABLET, FILM COATED ORAL at 18:17

## 2020-05-11 LAB
C TRACH DNA SPEC QL NAA+PROBE: POSITIVE
N GONORRHOEA DNA SPEC QL NAA+PROBE: NEGATIVE

## 2020-05-22 ENCOUNTER — TELEPHONE (OUTPATIENT)
Dept: FAMILY MEDICINE CLINIC | Facility: CLINIC | Age: 25
End: 2020-05-22

## 2020-05-26 ENCOUNTER — HOSPITAL ENCOUNTER (EMERGENCY)
Facility: HOSPITAL | Age: 25
Discharge: HOME | End: 2020-05-26
Attending: EMERGENCY MEDICINE | Admitting: EMERGENCY MEDICINE
Payer: MEDICARE

## 2020-05-26 ENCOUNTER — APPOINTMENT (EMERGENCY)
Dept: RADIOLOGY | Facility: HOSPITAL | Age: 25
End: 2020-05-26
Payer: MEDICARE

## 2020-05-26 VITALS
RESPIRATION RATE: 18 BRPM | BODY MASS INDEX: 37.76 KG/M2 | SYSTOLIC BLOOD PRESSURE: 121 MMHG | WEIGHT: 220 LBS | DIASTOLIC BLOOD PRESSURE: 73 MMHG | HEART RATE: 98 BPM | TEMPERATURE: 98 F | OXYGEN SATURATION: 98 %

## 2020-05-26 DIAGNOSIS — S89.91XA INJURY OF RIGHT KNEE, INITIAL ENCOUNTER: Primary | ICD-10-CM

## 2020-05-26 PROCEDURE — 99284 EMERGENCY DEPT VISIT MOD MDM: CPT | Performed by: EMERGENCY MEDICINE

## 2020-05-26 PROCEDURE — 73564 X-RAY EXAM KNEE 4 OR MORE: CPT

## 2020-05-26 PROCEDURE — 99283 EMERGENCY DEPT VISIT LOW MDM: CPT

## 2020-05-26 RX ORDER — ACETAMINOPHEN 325 MG/1
650 TABLET ORAL ONCE
Status: COMPLETED | OUTPATIENT
Start: 2020-05-26 | End: 2020-05-26

## 2020-05-26 RX ADMIN — ACETAMINOPHEN 650 MG: 325 TABLET ORAL at 20:04

## 2020-06-15 ENCOUNTER — HOSPITAL ENCOUNTER (EMERGENCY)
Facility: HOSPITAL | Age: 25
Discharge: HOME | End: 2020-06-15
Attending: EMERGENCY MEDICINE | Admitting: EMERGENCY MEDICINE
Payer: MEDICARE

## 2020-06-15 VITALS
HEART RATE: 122 BPM | SYSTOLIC BLOOD PRESSURE: 134 MMHG | DIASTOLIC BLOOD PRESSURE: 91 MMHG | TEMPERATURE: 99 F | RESPIRATION RATE: 16 BRPM | OXYGEN SATURATION: 92 % | BODY MASS INDEX: 38.62 KG/M2 | WEIGHT: 225 LBS

## 2020-06-15 VITALS
DIASTOLIC BLOOD PRESSURE: 60 MMHG | RESPIRATION RATE: 22 BRPM | SYSTOLIC BLOOD PRESSURE: 146 MMHG | HEART RATE: 102 BPM | TEMPERATURE: 99.1 F | OXYGEN SATURATION: 100 %

## 2020-06-15 DIAGNOSIS — S50.912A: ICD-10-CM

## 2020-06-15 DIAGNOSIS — F15.10 DRUG ABUSE, AMPHETAMINE TYPE (HCC): Primary | ICD-10-CM

## 2020-06-15 DIAGNOSIS — R45.851 SUICIDAL IDEATION: Primary | ICD-10-CM

## 2020-06-15 PROCEDURE — 99282 EMERGENCY DEPT VISIT SF MDM: CPT | Performed by: EMERGENCY MEDICINE

## 2020-06-15 PROCEDURE — 99283 EMERGENCY DEPT VISIT LOW MDM: CPT | Performed by: EMERGENCY MEDICINE

## 2020-06-15 PROCEDURE — 99284 EMERGENCY DEPT VISIT MOD MDM: CPT

## 2020-06-15 RX ORDER — GINSENG 100 MG
1 CAPSULE ORAL ONCE
Status: COMPLETED | OUTPATIENT
Start: 2020-06-15 | End: 2020-06-15

## 2020-06-15 RX ADMIN — BACITRACIN: 500 OINTMENT TOPICAL at 14:55

## 2020-07-20 ENCOUNTER — HOSPITAL ENCOUNTER (EMERGENCY)
Facility: HOSPITAL | Age: 25
Discharge: LEFT BEFORE DISCHARGE | End: 2020-07-20
Payer: MEDICARE

## 2020-07-20 VITALS
TEMPERATURE: 97.1 F | HEART RATE: 88 BPM | RESPIRATION RATE: 16 BRPM | SYSTOLIC BLOOD PRESSURE: 123 MMHG | OXYGEN SATURATION: 96 % | WEIGHT: 201 LBS | BODY MASS INDEX: 34.5 KG/M2 | DIASTOLIC BLOOD PRESSURE: 65 MMHG

## 2020-07-20 LAB
BACTERIA UR QL AUTO: ABNORMAL /HPF
BILIRUB UR QL STRIP: NEGATIVE
CLARITY UR: CLEAR
COLOR UR: YELLOW
EXT PREG TEST URINE: NEGATIVE
EXT. CONTROL ED NAV: NORMAL
GLUCOSE UR STRIP-MCNC: NEGATIVE MG/DL
HGB UR QL STRIP.AUTO: NEGATIVE
KETONES UR STRIP-MCNC: NEGATIVE MG/DL
LEUKOCYTE ESTERASE UR QL STRIP: ABNORMAL
MUCOUS THREADS UR QL AUTO: ABNORMAL
NITRITE UR QL STRIP: NEGATIVE
NON-SQ EPI CELLS URNS QL MICRO: ABNORMAL /HPF
PH UR STRIP.AUTO: 6.5 [PH]
PROT UR STRIP-MCNC: NEGATIVE MG/DL
RBC #/AREA URNS AUTO: ABNORMAL /HPF
SP GR UR STRIP.AUTO: 1.02 (ref 1–1.03)
UROBILINOGEN UR QL STRIP.AUTO: 0.2 E.U./DL
WBC #/AREA URNS AUTO: ABNORMAL /HPF

## 2020-07-20 PROCEDURE — 81025 URINE PREGNANCY TEST: CPT | Performed by: EMERGENCY MEDICINE

## 2020-07-20 PROCEDURE — 81001 URINALYSIS AUTO W/SCOPE: CPT | Performed by: EMERGENCY MEDICINE

## 2020-07-23 ENCOUNTER — TELEMEDICINE (OUTPATIENT)
Dept: FAMILY MEDICINE CLINIC | Facility: CLINIC | Age: 25
End: 2020-07-23
Payer: MEDICARE

## 2020-07-23 DIAGNOSIS — K52.9 GASTROENTERITIS: Primary | ICD-10-CM

## 2020-07-23 PROCEDURE — 99213 OFFICE O/P EST LOW 20 MIN: CPT | Performed by: FAMILY MEDICINE

## 2020-07-23 PROCEDURE — 1036F TOBACCO NON-USER: CPT | Performed by: FAMILY MEDICINE
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
## 2020-07-23 NOTE — PATIENT INSTRUCTIONS
pepto bismol 1 teaspoonful twice daily x 2 days,   tylenol 1 reg strength twice a dayx 2 days Gatorades hydrate no dairy x 2 days

## 2020-08-17 ENCOUNTER — HOSPITAL ENCOUNTER (EMERGENCY)
Facility: HOSPITAL | Age: 25
Discharge: HOME | End: 2020-08-17
Attending: EMERGENCY MEDICINE
Payer: MEDICARE

## 2020-08-17 VITALS
RESPIRATION RATE: 16 BRPM | SYSTOLIC BLOOD PRESSURE: 109 MMHG | HEART RATE: 93 BPM | BODY MASS INDEX: 35 KG/M2 | OXYGEN SATURATION: 98 % | WEIGHT: 205 LBS | HEIGHT: 64 IN | DIASTOLIC BLOOD PRESSURE: 53 MMHG | TEMPERATURE: 97.9 F

## 2020-08-17 DIAGNOSIS — J06.9 URI (UPPER RESPIRATORY INFECTION): Primary | ICD-10-CM

## 2020-08-17 PROCEDURE — 99283 EMERGENCY DEPT VISIT LOW MDM: CPT

## 2020-08-17 PROCEDURE — U0003 INFECTIOUS AGENT DETECTION BY NUCLEIC ACID (DNA OR RNA); SEVERE ACUTE RESPIRATORY SYNDROME CORONAVIRUS 2 (SARS-COV-2) (CORONAVIRUS DISEASE [COVID-19]), AMPLIFIED PROBE TECHNIQUE, MAKING USE OF HIGH THROUGHPUT TECHNOLOGIES AS DESCRIBED BY CMS-2020-01-R: HCPCS | Performed by: PHYSICIAN ASSISTANT

## 2020-08-17 PROCEDURE — 99284 EMERGENCY DEPT VISIT MOD MDM: CPT | Performed by: PHYSICIAN ASSISTANT

## 2020-08-17 NOTE — DISCHARGE INSTRUCTIONS
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
## 2020-08-18 LAB — SARS-COV-2 RNA SPEC QL NAA+PROBE: NOT DETECTED

## 2020-11-11 ENCOUNTER — HOSPITAL ENCOUNTER (EMERGENCY)
Facility: HOSPITAL | Age: 25
Discharge: HOME | End: 2020-11-11
Attending: EMERGENCY MEDICINE
Payer: MEDICARE

## 2020-11-11 VITALS
WEIGHT: 207 LBS | SYSTOLIC BLOOD PRESSURE: 115 MMHG | DIASTOLIC BLOOD PRESSURE: 76 MMHG | RESPIRATION RATE: 16 BRPM | TEMPERATURE: 98.4 F | HEIGHT: 64 IN | HEART RATE: 109 BPM | BODY MASS INDEX: 35.34 KG/M2 | OXYGEN SATURATION: 99 %

## 2020-11-11 DIAGNOSIS — N39.0 UTI (URINARY TRACT INFECTION): Primary | ICD-10-CM

## 2020-11-11 LAB
ABO GROUP BLD: NORMAL
B-HCG SERPL-ACNC: <1 MIU/ML
BACTERIA UR QL AUTO: ABNORMAL /HPF
BASOPHILS # BLD AUTO: 0.04 THOUSANDS/ΜL (ref 0–0.1)
BASOPHILS NFR BLD AUTO: 0 % (ref 0–1)
BILIRUB UR QL STRIP: NEGATIVE
CLARITY UR: ABNORMAL
COLOR UR: YELLOW
EOSINOPHIL # BLD AUTO: 0.42 THOUSAND/ΜL (ref 0–0.61)
EOSINOPHIL NFR BLD AUTO: 3 % (ref 0–6)
ERYTHROCYTE [DISTWIDTH] IN BLOOD BY AUTOMATED COUNT: 13.6 % (ref 11.6–15.1)
EXT PREG TEST URINE: NEGATIVE
EXT. CONTROL ED NAV: NORMAL
GLUCOSE UR STRIP-MCNC: NEGATIVE MG/DL
HCT VFR BLD AUTO: 40.9 % (ref 34.8–46.1)
HGB BLD-MCNC: 13.6 G/DL (ref 11.5–15.4)
HGB UR QL STRIP.AUTO: ABNORMAL
IMM GRANULOCYTES # BLD AUTO: 0.04 THOUSAND/UL (ref 0–0.2)
IMM GRANULOCYTES NFR BLD AUTO: 0 % (ref 0–2)
KETONES UR STRIP-MCNC: NEGATIVE MG/DL
LEUKOCYTE ESTERASE UR QL STRIP: ABNORMAL
LYMPHOCYTES # BLD AUTO: 2.22 THOUSANDS/ΜL (ref 0.6–4.47)
LYMPHOCYTES NFR BLD AUTO: 18 % (ref 14–44)
MCH RBC QN AUTO: 30.2 PG (ref 26.8–34.3)
MCHC RBC AUTO-ENTMCNC: 33.3 G/DL (ref 31.4–37.4)
MCV RBC AUTO: 91 FL (ref 82–98)
MONOCYTES # BLD AUTO: 0.88 THOUSAND/ΜL (ref 0.17–1.22)
MONOCYTES NFR BLD AUTO: 7 % (ref 4–12)
MUCOUS THREADS UR QL AUTO: ABNORMAL
NEUTROPHILS # BLD AUTO: 8.99 THOUSANDS/ΜL (ref 1.85–7.62)
NEUTS SEG NFR BLD AUTO: 72 % (ref 43–75)
NITRITE UR QL STRIP: POSITIVE
NON-SQ EPI CELLS URNS QL MICRO: ABNORMAL /HPF
PH UR STRIP.AUTO: 7 [PH]
PLATELET # BLD AUTO: 287 THOUSANDS/UL (ref 149–390)
PMV BLD AUTO: 9.3 FL (ref 8.9–12.7)
PROT UR STRIP-MCNC: ABNORMAL MG/DL
RBC # BLD AUTO: 4.51 MILLION/UL (ref 3.81–5.12)
RBC #/AREA URNS AUTO: ABNORMAL /HPF
RH BLD: POSITIVE
SP GR UR STRIP.AUTO: 1.02 (ref 1–1.03)
UROBILINOGEN UR QL STRIP.AUTO: 0.2 E.U./DL
WBC # BLD AUTO: 12.59 THOUSAND/UL (ref 4.31–10.16)
WBC #/AREA URNS AUTO: ABNORMAL /HPF

## 2020-11-11 PROCEDURE — 81001 URINALYSIS AUTO W/SCOPE: CPT | Performed by: EMERGENCY MEDICINE

## 2020-11-11 PROCEDURE — 99284 EMERGENCY DEPT VISIT MOD MDM: CPT | Performed by: EMERGENCY MEDICINE

## 2020-11-11 PROCEDURE — 86901 BLOOD TYPING SEROLOGIC RH(D): CPT | Performed by: EMERGENCY MEDICINE

## 2020-11-11 PROCEDURE — 86900 BLOOD TYPING SEROLOGIC ABO: CPT | Performed by: EMERGENCY MEDICINE

## 2020-11-11 PROCEDURE — 84702 CHORIONIC GONADOTROPIN TEST: CPT | Performed by: EMERGENCY MEDICINE

## 2020-11-11 PROCEDURE — 81025 URINE PREGNANCY TEST: CPT | Performed by: EMERGENCY MEDICINE

## 2020-11-11 PROCEDURE — 85025 COMPLETE CBC W/AUTO DIFF WBC: CPT | Performed by: EMERGENCY MEDICINE

## 2020-11-11 PROCEDURE — 36415 COLL VENOUS BLD VENIPUNCTURE: CPT | Performed by: EMERGENCY MEDICINE

## 2020-11-11 PROCEDURE — 87186 SC STD MICRODIL/AGAR DIL: CPT | Performed by: EMERGENCY MEDICINE

## 2020-11-11 PROCEDURE — 87086 URINE CULTURE/COLONY COUNT: CPT | Performed by: EMERGENCY MEDICINE

## 2020-11-11 PROCEDURE — 99284 EMERGENCY DEPT VISIT MOD MDM: CPT

## 2020-11-11 PROCEDURE — 87077 CULTURE AEROBIC IDENTIFY: CPT | Performed by: EMERGENCY MEDICINE

## 2020-11-11 RX ORDER — CEPHALEXIN 500 MG/1
500 CAPSULE ORAL EVERY 6 HOURS SCHEDULED
Qty: 28 CAPSULE | Refills: 0 | Status: SHIPPED | OUTPATIENT
Start: 2020-11-11 | End: 2020-11-18

## 2020-11-13 DIAGNOSIS — B95.2 UTI (URINARY TRACT INFECTION) DUE TO ENTEROCOCCUS: Primary | ICD-10-CM

## 2020-11-13 DIAGNOSIS — N39.0 UTI (URINARY TRACT INFECTION) DUE TO ENTEROCOCCUS: Primary | ICD-10-CM

## 2020-11-13 LAB
BACTERIA UR CULT: ABNORMAL
BACTERIA UR CULT: ABNORMAL

## 2020-11-13 RX ORDER — CIPROFLOXACIN 500 MG/1
500 TABLET, FILM COATED ORAL 2 TIMES DAILY
Qty: 14 TABLET | Refills: 0 | Status: SHIPPED | OUTPATIENT
Start: 2020-11-13 | End: 2020-11-20

## 2020-11-18 ENCOUNTER — HOSPITAL ENCOUNTER (EMERGENCY)
Facility: HOSPITAL | Age: 25
Discharge: HOME | End: 2020-11-18
Attending: EMERGENCY MEDICINE
Payer: MEDICARE

## 2020-11-18 VITALS
OXYGEN SATURATION: 97 % | SYSTOLIC BLOOD PRESSURE: 127 MMHG | RESPIRATION RATE: 16 BRPM | HEART RATE: 100 BPM | DIASTOLIC BLOOD PRESSURE: 67 MMHG | TEMPERATURE: 97.3 F

## 2020-11-18 DIAGNOSIS — R20.2 PARESTHESIA OF LEFT UPPER EXTREMITY: Primary | ICD-10-CM

## 2020-11-18 PROCEDURE — 99284 EMERGENCY DEPT VISIT MOD MDM: CPT | Performed by: EMERGENCY MEDICINE

## 2020-11-18 PROCEDURE — 99283 EMERGENCY DEPT VISIT LOW MDM: CPT

## 2020-11-27 ENCOUNTER — HOSPITAL ENCOUNTER (EMERGENCY)
Facility: HOSPITAL | Age: 25
Discharge: HOME | End: 2020-11-27
Attending: EMERGENCY MEDICINE
Payer: MEDICARE

## 2020-11-27 VITALS
TEMPERATURE: 98.2 F | SYSTOLIC BLOOD PRESSURE: 143 MMHG | RESPIRATION RATE: 18 BRPM | HEIGHT: 64 IN | WEIGHT: 211 LBS | BODY MASS INDEX: 36.02 KG/M2 | DIASTOLIC BLOOD PRESSURE: 66 MMHG | HEART RATE: 90 BPM | OXYGEN SATURATION: 98 %

## 2020-11-27 DIAGNOSIS — R56.9 SEIZURE-LIKE ACTIVITY (HCC): Primary | ICD-10-CM

## 2020-11-27 DIAGNOSIS — R56.9 PSEUDOSEIZURES (HCC): ICD-10-CM

## 2020-11-27 PROCEDURE — 99284 EMERGENCY DEPT VISIT MOD MDM: CPT | Performed by: EMERGENCY MEDICINE

## 2020-11-27 PROCEDURE — 99283 EMERGENCY DEPT VISIT LOW MDM: CPT

## 2020-12-08 ENCOUNTER — HOSPITAL ENCOUNTER (EMERGENCY)
Facility: HOSPITAL | Age: 25
Discharge: HOME | End: 2020-12-08
Payer: MEDICARE

## 2020-12-08 ENCOUNTER — APPOINTMENT (EMERGENCY)
Dept: RADIOLOGY | Facility: HOSPITAL | Age: 25
End: 2020-12-08
Payer: MEDICARE

## 2020-12-08 VITALS
RESPIRATION RATE: 20 BRPM | SYSTOLIC BLOOD PRESSURE: 106 MMHG | DIASTOLIC BLOOD PRESSURE: 68 MMHG | TEMPERATURE: 97.1 F | HEIGHT: 64 IN | BODY MASS INDEX: 35 KG/M2 | WEIGHT: 205 LBS | OXYGEN SATURATION: 100 % | HEART RATE: 100 BPM

## 2020-12-08 DIAGNOSIS — S80.02XA CONTUSION OF LEFT KNEE, INITIAL ENCOUNTER: Primary | ICD-10-CM

## 2020-12-08 PROCEDURE — 99284 EMERGENCY DEPT VISIT MOD MDM: CPT

## 2020-12-08 PROCEDURE — 73560 X-RAY EXAM OF KNEE 1 OR 2: CPT

## 2020-12-08 PROCEDURE — 99283 EMERGENCY DEPT VISIT LOW MDM: CPT

## 2020-12-16 ENCOUNTER — HOSPITAL ENCOUNTER (EMERGENCY)
Facility: HOSPITAL | Age: 25
Discharge: HOME | End: 2020-12-16
Attending: EMERGENCY MEDICINE | Admitting: EMERGENCY MEDICINE
Payer: MEDICARE

## 2020-12-16 ENCOUNTER — APPOINTMENT (EMERGENCY)
Dept: RADIOLOGY | Facility: HOSPITAL | Age: 25
End: 2020-12-16
Payer: MEDICARE

## 2020-12-16 VITALS
TEMPERATURE: 98.1 F | HEART RATE: 94 BPM | BODY MASS INDEX: 36.05 KG/M2 | WEIGHT: 210 LBS | DIASTOLIC BLOOD PRESSURE: 62 MMHG | SYSTOLIC BLOOD PRESSURE: 109 MMHG | OXYGEN SATURATION: 98 % | RESPIRATION RATE: 16 BRPM

## 2020-12-16 DIAGNOSIS — R07.89 CHEST WALL PAIN: Primary | ICD-10-CM

## 2020-12-16 DIAGNOSIS — F44.5 PSYCHOGENIC NONEPILEPTIC SEIZURE: ICD-10-CM

## 2020-12-16 LAB
ALBUMIN SERPL BCP-MCNC: 3.6 G/DL (ref 3.5–5)
ALP SERPL-CCNC: 67 U/L (ref 46–116)
ALT SERPL W P-5'-P-CCNC: 35 U/L (ref 12–78)
ANION GAP SERPL CALCULATED.3IONS-SCNC: 6 MMOL/L (ref 4–13)
AST SERPL W P-5'-P-CCNC: 22 U/L (ref 5–45)
ATRIAL RATE: 99 BPM
BASOPHILS # BLD AUTO: 0.05 THOUSANDS/ΜL (ref 0–0.1)
BASOPHILS NFR BLD AUTO: 1 % (ref 0–1)
BILIRUB SERPL-MCNC: 0.31 MG/DL (ref 0.2–1)
BUN SERPL-MCNC: 13 MG/DL (ref 5–25)
CALCIUM SERPL-MCNC: 8.9 MG/DL (ref 8.3–10.1)
CHLORIDE SERPL-SCNC: 103 MMOL/L (ref 100–108)
CO2 SERPL-SCNC: 28 MMOL/L (ref 21–32)
CREAT SERPL-MCNC: 0.82 MG/DL (ref 0.6–1.3)
EOSINOPHIL # BLD AUTO: 0.29 THOUSAND/ΜL (ref 0–0.61)
EOSINOPHIL NFR BLD AUTO: 4 % (ref 0–6)
ERYTHROCYTE [DISTWIDTH] IN BLOOD BY AUTOMATED COUNT: 12.6 % (ref 11.6–15.1)
GFR SERPL CREATININE-BSD FRML MDRD: 100 ML/MIN/1.73SQ M
GLUCOSE SERPL-MCNC: 83 MG/DL (ref 65–140)
HCT VFR BLD AUTO: 42.8 % (ref 34.8–46.1)
HGB BLD-MCNC: 13.8 G/DL (ref 11.5–15.4)
IMM GRANULOCYTES # BLD AUTO: 0.04 THOUSAND/UL (ref 0–0.2)
IMM GRANULOCYTES NFR BLD AUTO: 1 % (ref 0–2)
LYMPHOCYTES # BLD AUTO: 2.63 THOUSANDS/ΜL (ref 0.6–4.47)
LYMPHOCYTES NFR BLD AUTO: 32 % (ref 14–44)
MCH RBC QN AUTO: 29.8 PG (ref 26.8–34.3)
MCHC RBC AUTO-ENTMCNC: 32.2 G/DL (ref 31.4–37.4)
MCV RBC AUTO: 92 FL (ref 82–98)
MONOCYTES # BLD AUTO: 0.72 THOUSAND/ΜL (ref 0.17–1.22)
MONOCYTES NFR BLD AUTO: 9 % (ref 4–12)
NEUTROPHILS # BLD AUTO: 4.45 THOUSANDS/ΜL (ref 1.85–7.62)
NEUTS SEG NFR BLD AUTO: 53 % (ref 43–75)
NRBC BLD AUTO-RTO: 0 /100 WBCS
P AXIS: 38 DEGREES
PLATELET # BLD AUTO: 322 THOUSANDS/UL (ref 149–390)
PMV BLD AUTO: 8.8 FL (ref 8.9–12.7)
POTASSIUM SERPL-SCNC: 4.4 MMOL/L (ref 3.5–5.3)
PR INTERVAL: 114 MS
PROT SERPL-MCNC: 7.7 G/DL (ref 6.4–8.2)
QRS AXIS: 8 DEGREES
QRSD INTERVAL: 90 MS
QT INTERVAL: 332 MS
QTC INTERVAL: 404 MS
RBC # BLD AUTO: 4.63 MILLION/UL (ref 3.81–5.12)
SODIUM SERPL-SCNC: 137 MMOL/L (ref 136–145)
T WAVE AXIS: 11 DEGREES
TROPONIN I SERPL-MCNC: <0.02 NG/ML
VENTRICULAR RATE: 89 BPM
WBC # BLD AUTO: 8.18 THOUSAND/UL (ref 4.31–10.16)

## 2020-12-16 PROCEDURE — 99285 EMERGENCY DEPT VISIT HI MDM: CPT

## 2020-12-16 PROCEDURE — 71045 X-RAY EXAM CHEST 1 VIEW: CPT

## 2020-12-16 PROCEDURE — 99285 EMERGENCY DEPT VISIT HI MDM: CPT | Performed by: PHYSICIAN ASSISTANT

## 2020-12-16 PROCEDURE — 85025 COMPLETE CBC W/AUTO DIFF WBC: CPT | Performed by: PHYSICIAN ASSISTANT

## 2020-12-16 PROCEDURE — 93005 ELECTROCARDIOGRAM TRACING: CPT

## 2020-12-16 PROCEDURE — 36415 COLL VENOUS BLD VENIPUNCTURE: CPT | Performed by: PHYSICIAN ASSISTANT

## 2020-12-16 PROCEDURE — 96374 THER/PROPH/DIAG INJ IV PUSH: CPT

## 2020-12-16 PROCEDURE — 80053 COMPREHEN METABOLIC PANEL: CPT | Performed by: PHYSICIAN ASSISTANT

## 2020-12-16 PROCEDURE — 84484 ASSAY OF TROPONIN QUANT: CPT | Performed by: PHYSICIAN ASSISTANT

## 2020-12-16 PROCEDURE — 93010 ELECTROCARDIOGRAM REPORT: CPT | Performed by: INTERNAL MEDICINE

## 2020-12-16 PROCEDURE — 87637 SARSCOV2&INF A&B&RSV AMP PRB: CPT | Performed by: PHYSICIAN ASSISTANT

## 2020-12-16 RX ORDER — MORPHINE SULFATE 4 MG/ML
4 INJECTION, SOLUTION INTRAMUSCULAR; INTRAVENOUS ONCE
Status: COMPLETED | OUTPATIENT
Start: 2020-12-16 | End: 2020-12-16

## 2020-12-16 RX ORDER — LIDOCAINE 50 MG/G
1 PATCH TOPICAL ONCE
Status: DISCONTINUED | OUTPATIENT
Start: 2020-12-16 | End: 2020-12-16 | Stop reason: HOSPADM

## 2020-12-16 RX ADMIN — MORPHINE SULFATE 4 MG: 4 INJECTION INTRAVENOUS at 14:24

## 2020-12-16 RX ADMIN — LIDOCAINE 5% 1 PATCH: 700 PATCH TOPICAL at 16:21

## 2020-12-17 ENCOUNTER — HOSPITAL ENCOUNTER (EMERGENCY)
Facility: HOSPITAL | Age: 25
Discharge: HOME | End: 2020-12-17
Attending: EMERGENCY MEDICINE | Admitting: EMERGENCY MEDICINE
Payer: MEDICARE

## 2020-12-17 VITALS
OXYGEN SATURATION: 99 % | RESPIRATION RATE: 21 BRPM | DIASTOLIC BLOOD PRESSURE: 68 MMHG | HEART RATE: 88 BPM | SYSTOLIC BLOOD PRESSURE: 123 MMHG | TEMPERATURE: 97.5 F

## 2020-12-17 DIAGNOSIS — R07.89 CHEST WALL PAIN: Primary | ICD-10-CM

## 2020-12-17 LAB
ATRIAL RATE: 89 BPM
D DIMER PPP FEU-MCNC: 0.39 UG/ML FEU
P AXIS: 44 DEGREES
PR INTERVAL: 140 MS
QRS AXIS: 12 DEGREES
QRSD INTERVAL: 76 MS
QT INTERVAL: 354 MS
QTC INTERVAL: 430 MS
T WAVE AXIS: 29 DEGREES
TROPONIN I SERPL-MCNC: <0.02 NG/ML
VENTRICULAR RATE: 89 BPM

## 2020-12-17 PROCEDURE — 93005 ELECTROCARDIOGRAM TRACING: CPT

## 2020-12-17 PROCEDURE — 84484 ASSAY OF TROPONIN QUANT: CPT | Performed by: EMERGENCY MEDICINE

## 2020-12-17 PROCEDURE — 93010 ELECTROCARDIOGRAM REPORT: CPT | Performed by: INTERNAL MEDICINE

## 2020-12-17 PROCEDURE — 85379 FIBRIN DEGRADATION QUANT: CPT | Performed by: EMERGENCY MEDICINE

## 2020-12-17 PROCEDURE — 99284 EMERGENCY DEPT VISIT MOD MDM: CPT | Performed by: EMERGENCY MEDICINE

## 2020-12-17 PROCEDURE — 36415 COLL VENOUS BLD VENIPUNCTURE: CPT | Performed by: EMERGENCY MEDICINE

## 2020-12-17 PROCEDURE — 99285 EMERGENCY DEPT VISIT HI MDM: CPT

## 2020-12-17 RX ORDER — OXYCODONE HYDROCHLORIDE AND ACETAMINOPHEN 5; 325 MG/1; MG/1
1 TABLET ORAL ONCE
Status: COMPLETED | OUTPATIENT
Start: 2020-12-17 | End: 2020-12-17

## 2020-12-17 RX ORDER — PREDNISONE 10 MG/1
20 TABLET ORAL DAILY
Qty: 10 TABLET | Refills: 0 | Status: SHIPPED | OUTPATIENT
Start: 2020-12-17 | End: 2020-12-22

## 2020-12-17 RX ADMIN — OXYCODONE HYDROCHLORIDE AND ACETAMINOPHEN 1 TABLET: 5; 325 TABLET ORAL at 16:59

## 2020-12-18 LAB
FLUAV RNA NPH QL NAA+PROBE: NOT DETECTED
FLUBV RNA NPH QL NAA+PROBE: NOT DETECTED
RSV RNA NPH QL NAA+PROBE: NOT DETECTED
SARS-COV-2 RNA NPH QL NAA+PROBE: NOT DETECTED

## 2020-12-30 NOTE — PROGRESS NOTES
07/02/19 1115   Activity/Group Checklist   Group Other (Comment)  (Surviving Trauma/Education, Open Discussion)   Attendance Attended   Attendance Duration (min) 46-60   Interactions Interacted appropriately   Affect/Mood Appropriate; Constricted   Goals Achieved Able to listen to others; Able to engage in interactions; Able to recieve feedback; Able to give feedback to another Pt left message at 124pm.  He goes to [facility]    rx sent

## 2021-01-05 ENCOUNTER — TELEMEDICINE (OUTPATIENT)
Dept: FAMILY MEDICINE CLINIC | Facility: CLINIC | Age: 26
End: 2021-01-05
Payer: MEDICARE

## 2021-01-05 DIAGNOSIS — R07.9 CHEST PAIN IN ADULT: Primary | ICD-10-CM

## 2021-01-05 PROCEDURE — 99442 PR PHYS/QHP TELEPHONE EVALUATION 11-20 MIN: CPT | Performed by: FAMILY MEDICINE

## 2021-01-05 NOTE — PROGRESS NOTES
Virtual Brief Visit    Assessment/Plan:    Problem List Items Addressed This Visit     None      Visit Diagnoses     Chest pain in adult    -  Primary      Patient is currently complaining of chest pain and shortness of breath  While my suspicion for a cardiovascular event is low, I feel it necessary that this patient be evaluated in the emergency room as I a am unable to do an exam over the phone  I suspect a more likely diagnosis is gastric reflux or panic attack  Reason for visit is   Chief Complaint   Patient presents with    Virtual Brief Visit        Encounter provider Madie Mcghee DO    Provider located at [address] [place] [phone]    Recent Visits  Date Type Provider Dept   01/05/21 Telemedicine Madie Mcghee DO [facility]   Showing recent visits within past 7 days and meeting all other requirements     Future Appointments  No visits were found meeting these conditions  Showing future appointments within next 150 days and meeting all other requirements        After connecting through telephone, the patient was identified by name and date of birth  Heaven Watson was informed that this is a telemedicine visit and that the visit is being conducted through telephone  Other methods to assure confidentiality were taken using headset  The patient was notified the following individuals were present in the room other doctors  She acknowledged consent and understanding of privacy and security of the platform  The patient has agreed to participate and understands she can discontinue the visit at any time  Patient is aware this is a billable service  David Mccracken is a 26 y o  female with past medical history significant for depression, bipolar 1 disorder, GERD, and anxiety who complains of chest pain and shortness of breath  She notes that her symptoms started last night    She notes that it feels like a burning in her chest   She has tried Tums which did not relieve her pain  She feels like she is gasping for air  She denies diaphoresis and headaches  Past Medical History:   Diagnosis Date    ADHD     Anxiety     Asthma     B[facility] disorder ([facility] )     Depression     Diabetes mellitus ([facility] )     GERD (gastroesophageal reflux disease)     Mental and behavioral problem     Migraine     Palpitations     Last Assessed [id number]    Psychiatric disorder     Psychiatric illness     Psychosis ([facility] )     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[facility] )     Seizures ([place] [address] )     last time 2 weeks ago- petit mal    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    Stabbing chest pain     Last Assessed [id number]    Suicide attempt ([place] [address] )     Tachycardia     Last Assessed 29Nov2016    Upper respiratory disease     Last Assessed 27Jan2016       Past Surgical History:   Procedure Laterality Date    KNEE SURGERY      [place] LAP,TUBAL CAUTERY N/A 10/3/2018    Procedure: LAPAROSCOPIC TUBAL LIGATION;  Surgeon: Lauren العراقي MD;  Location: [address] [place] [address];  Service: Gynecology    TUBAL LIGATION         Current Outpatient Medications   Medication Sig Dispense Refill    albuterol (PROVENTIL HFA,VENTOLIN HFA) 90 mcg/act inhaler Inhale 2 puffs every 4 (four) hours as needed for wheezing (Patient not taking: Reported on 2/12/2020) 1 Inhaler 0    famotidine (PEPCID) 20 mg tablet Take 1 tablet (20 mg total) by mouth 2 (two) times a day (Patient not taking: Reported on 2/12/2020) 30 tablet 0    FLUoxetine (PROzac) 40 MG capsule Take 40 mg by mouth daily      methylPREDNISolone 4 MG tablet therapy pack Use as directed on package (Patient not taking: Reported on 2/12/2020) 21 tablet 0    QUEtiapine (SEROquel) 100 mg tablet Take 100 mg by mouth daily at bedtime       No current facility-administered medications for this visit           Allergies   Allergen Reactions    Fish-Derived Products Itching    Naproxen Anaphylaxis and Chest Pain    Shellfish Allergy Anaphylaxis    Benadryl [Diphenhydramine] Hives    Fish Oil     Seasonal Ic  [Cholestatin]      allergies    Shellfish-Derived Products     Shellfish-Derived Products Hives       Review of Systems   Constitutional: Negative for chills, diaphoresis and fever  Respiratory: Positive for chest tightness and shortness of breath  Cardiovascular: Positive for chest pain  Negative for palpitations  Gastrointestinal: Positive for nausea  Negative for diarrhea and vomiting  Genitourinary: Negative for dysuria  Neurological: Negative for dizziness, light-headedness and headaches  There were no vitals filed for this visit  I spent 15 minutes directly with the patient during this visit    VIRTUAL VISIT DISCLAIMER    Jamaica Figueroae Janelle acknowledges that she has consented to an online visit or consultation  She understands that the online visit is based solely on information provided by her, and that, in the absence of a face-to-face physical evaluation by the physician, the diagnosis she receives is both limited and provisional in terms of accuracy and completeness  This is not intended to replace a full medical face-to-face evaluation by the physician  Jamaica Luis understands and accepts these terms

## 2021-01-08 ENCOUNTER — NURSE TRIAGE (OUTPATIENT)
Dept: OTHER | Facility: OTHER | Age: 26
End: 2021-01-08

## 2021-01-08 ENCOUNTER — TELEPHONE (OUTPATIENT)
Dept: FAMILY MEDICINE CLINIC | Facility: CLINIC | Age: 26
End: 2021-01-08

## 2021-01-08 ENCOUNTER — HOSPITAL ENCOUNTER (EMERGENCY)
Facility: HOSPITAL | Age: 26
Discharge: HOME | End: 2021-01-08
Attending: EMERGENCY MEDICINE | Admitting: EMERGENCY MEDICINE
Payer: MEDICARE

## 2021-01-08 VITALS
SYSTOLIC BLOOD PRESSURE: 119 MMHG | DIASTOLIC BLOOD PRESSURE: 59 MMHG | WEIGHT: 210 LBS | OXYGEN SATURATION: 99 % | RESPIRATION RATE: 20 BRPM | TEMPERATURE: 96.3 F | HEART RATE: 88 BPM | BODY MASS INDEX: 36.05 KG/M2

## 2021-01-08 DIAGNOSIS — R42 VERTIGO: Primary | ICD-10-CM

## 2021-01-08 DIAGNOSIS — N39.0 UTI (URINARY TRACT INFECTION): ICD-10-CM

## 2021-01-08 LAB
ALBUMIN SERPL BCP-MCNC: 3.9 G/DL (ref 3.5–5)
ALP SERPL-CCNC: 79 U/L (ref 46–116)
ALT SERPL W P-5'-P-CCNC: 28 U/L (ref 12–78)
ANION GAP SERPL CALCULATED.3IONS-SCNC: 5 MMOL/L (ref 4–13)
AST SERPL W P-5'-P-CCNC: 21 U/L (ref 5–45)
BACTERIA UR QL AUTO: ABNORMAL /HPF
BASOPHILS # BLD AUTO: 0.07 THOUSANDS/ΜL (ref 0–0.1)
BASOPHILS NFR BLD AUTO: 1 % (ref 0–1)
BILIRUB SERPL-MCNC: 0.2 MG/DL (ref 0.2–1)
BILIRUB UR QL STRIP: NEGATIVE
BUN SERPL-MCNC: 14 MG/DL (ref 5–25)
CALCIUM SERPL-MCNC: 8.8 MG/DL (ref 8.3–10.1)
CHLORIDE SERPL-SCNC: 103 MMOL/L (ref 100–108)
CLARITY UR: ABNORMAL
CO2 SERPL-SCNC: 29 MMOL/L (ref 21–32)
COLOR UR: YELLOW
CREAT SERPL-MCNC: 1.13 MG/DL (ref 0.6–1.3)
EOSINOPHIL # BLD AUTO: 0.37 THOUSAND/ΜL (ref 0–0.61)
EOSINOPHIL NFR BLD AUTO: 3 % (ref 0–6)
ERYTHROCYTE [DISTWIDTH] IN BLOOD BY AUTOMATED COUNT: 12.1 % (ref 11.6–15.1)
EXT PREG TEST URINE: NEGATIVE
EXT. CONTROL ED NAV: NORMAL
GFR SERPL CREATININE-BSD FRML MDRD: 68 ML/MIN/1.73SQ M
GLUCOSE SERPL-MCNC: 76 MG/DL (ref 65–140)
GLUCOSE UR STRIP-MCNC: NEGATIVE MG/DL
HCT VFR BLD AUTO: 45.1 % (ref 34.8–46.1)
HGB BLD-MCNC: 14 G/DL (ref 11.5–15.4)
HGB UR QL STRIP.AUTO: NEGATIVE
IMM GRANULOCYTES # BLD AUTO: 0.05 THOUSAND/UL (ref 0–0.2)
IMM GRANULOCYTES NFR BLD AUTO: 0 % (ref 0–2)
KETONES UR STRIP-MCNC: NEGATIVE MG/DL
LEUKOCYTE ESTERASE UR QL STRIP: ABNORMAL
LIPASE SERPL-CCNC: 211 U/L (ref 73–393)
LYMPHOCYTES # BLD AUTO: 3.55 THOUSANDS/ΜL (ref 0.6–4.47)
LYMPHOCYTES NFR BLD AUTO: 32 % (ref 14–44)
MCH RBC QN AUTO: 29.6 PG (ref 26.8–34.3)
MCHC RBC AUTO-ENTMCNC: 31 G/DL (ref 31.4–37.4)
MCV RBC AUTO: 95 FL (ref 82–98)
MONOCYTES # BLD AUTO: 0.92 THOUSAND/ΜL (ref 0.17–1.22)
MONOCYTES NFR BLD AUTO: 8 % (ref 4–12)
NEUTROPHILS # BLD AUTO: 6.28 THOUSANDS/ΜL (ref 1.85–7.62)
NEUTS SEG NFR BLD AUTO: 56 % (ref 43–75)
NITRITE UR QL STRIP: POSITIVE
NON-SQ EPI CELLS URNS QL MICRO: ABNORMAL /HPF
NRBC BLD AUTO-RTO: 0 /100 WBCS
PH UR STRIP.AUTO: 6 [PH]
PLATELET # BLD AUTO: 363 THOUSANDS/UL (ref 149–390)
PMV BLD AUTO: 9 FL (ref 8.9–12.7)
POTASSIUM SERPL-SCNC: 3.9 MMOL/L (ref 3.5–5.3)
PROT SERPL-MCNC: 8.5 G/DL (ref 6.4–8.2)
PROT UR STRIP-MCNC: NEGATIVE MG/DL
RBC # BLD AUTO: 4.73 MILLION/UL (ref 3.81–5.12)
RBC #/AREA URNS AUTO: ABNORMAL /HPF
SODIUM SERPL-SCNC: 137 MMOL/L (ref 136–145)
SP GR UR STRIP.AUTO: 1.02 (ref 1–1.03)
UROBILINOGEN UR QL STRIP.AUTO: 0.2 E.U./DL
WBC # BLD AUTO: 11.24 THOUSAND/UL (ref 4.31–10.16)
WBC #/AREA URNS AUTO: ABNORMAL /HPF

## 2021-01-08 PROCEDURE — 80053 COMPREHEN METABOLIC PANEL: CPT | Performed by: EMERGENCY MEDICINE

## 2021-01-08 PROCEDURE — 83690 ASSAY OF LIPASE: CPT | Performed by: EMERGENCY MEDICINE

## 2021-01-08 PROCEDURE — 99284 EMERGENCY DEPT VISIT MOD MDM: CPT | Performed by: EMERGENCY MEDICINE

## 2021-01-08 PROCEDURE — 36415 COLL VENOUS BLD VENIPUNCTURE: CPT | Performed by: EMERGENCY MEDICINE

## 2021-01-08 PROCEDURE — 81001 URINALYSIS AUTO W/SCOPE: CPT | Performed by: EMERGENCY MEDICINE

## 2021-01-08 PROCEDURE — 96375 TX/PRO/DX INJ NEW DRUG ADDON: CPT

## 2021-01-08 PROCEDURE — 81025 URINE PREGNANCY TEST: CPT | Performed by: EMERGENCY MEDICINE

## 2021-01-08 PROCEDURE — 96365 THER/PROPH/DIAG IV INF INIT: CPT

## 2021-01-08 PROCEDURE — 85025 COMPLETE CBC W/AUTO DIFF WBC: CPT | Performed by: EMERGENCY MEDICINE

## 2021-01-08 PROCEDURE — 99284 EMERGENCY DEPT VISIT MOD MDM: CPT

## 2021-01-08 PROCEDURE — 96361 HYDRATE IV INFUSION ADD-ON: CPT

## 2021-01-08 RX ORDER — ONDANSETRON 2 MG/ML
4 INJECTION INTRAMUSCULAR; INTRAVENOUS ONCE
Status: COMPLETED | OUTPATIENT
Start: 2021-01-08 | End: 2021-01-08

## 2021-01-08 RX ORDER — MECLIZINE HYDROCHLORIDE 25 MG/1
25 TABLET ORAL 3 TIMES DAILY PRN
Qty: 30 TABLET | Refills: 0 | Status: SHIPPED | OUTPATIENT
Start: 2021-01-08 | End: 2022-01-15

## 2021-01-08 RX ORDER — MECLIZINE HYDROCHLORIDE 25 MG/1
50 TABLET ORAL ONCE
Status: COMPLETED | OUTPATIENT
Start: 2021-01-08 | End: 2021-01-08

## 2021-01-08 RX ORDER — CEFTRIAXONE 1 G/50ML
1000 INJECTION, SOLUTION INTRAVENOUS ONCE
Status: COMPLETED | OUTPATIENT
Start: 2021-01-08 | End: 2021-01-08

## 2021-01-08 RX ORDER — ONDANSETRON 4 MG/1
4 TABLET, ORALLY DISINTEGRATING ORAL EVERY 6 HOURS PRN
Qty: 15 TABLET | Refills: 0 | Status: SHIPPED | OUTPATIENT
Start: 2021-01-08 | End: 2021-05-01 | Stop reason: SDUPTHER

## 2021-01-08 RX ORDER — CEPHALEXIN 500 MG/1
500 CAPSULE ORAL 2 TIMES DAILY
Qty: 10 CAPSULE | Refills: 0 | Status: SHIPPED | OUTPATIENT
Start: 2021-01-08 | End: 2021-01-13

## 2021-01-08 RX ADMIN — MECLIZINE HYDROCHLORIDE 50 MG: 25 TABLET ORAL at 21:07

## 2021-01-08 RX ADMIN — CEFTRIAXONE 1000 MG: 1 INJECTION, SOLUTION INTRAVENOUS at 22:22

## 2021-01-08 RX ADMIN — SODIUM CHLORIDE 1000 ML: 0.9 INJECTION, SOLUTION INTRAVENOUS at 21:07

## 2021-01-08 RX ADMIN — ONDANSETRON 4 MG: 2 INJECTION INTRAMUSCULAR; INTRAVENOUS at 21:07

## 2021-01-08 NOTE — TELEPHONE ENCOUNTER
Patient was scheduled for virtual visit this morning  When I contacted patient she communicated that she was feeling well and did not need a visit at this time  Advised the patient to call us back with any further questions or concerns

## 2021-01-09 NOTE — ED NOTES
Pt ambulating with steady gait out of the department with friend        Quoc Connors RN  01/08/21 0078

## 2021-01-09 NOTE — TELEPHONE ENCOUNTER
Regarding: light headed, blood sugar  ----- Message from [facility] sent at 1/8/2021  7:01 PM EST -----  " I am starting to feel light headed and I took my blood sugar and it is at 157 "

## 2021-01-09 NOTE — TELEPHONE ENCOUNTER
Reason for Disposition   [1] Vomiting AND [2] signs of dehydration (e g , very dry mouth, lightheaded, dark urine)   Difficulty breathing    Answer Assessment - Initial Assessment Questions  1  BLOOD GLUCOSE: "What is your blood glucose level?"        earlier currently 99  2  ONSET: "When did you check the blood glucose?"      This morning  3  USUAL RANGE: "What is your glucose level usually?" (e g , usual fasting morning value, usual evening value)      Her normal range is   4  KETONES: "Do you check for ketones (urine or blood test strips)?" If yes, ask: "What does the test show now?"         5  TYPE 1 or 2:  "Do you know what type of diabetes you have?"  (e g , Type 1, Type 2, Gestational; doesn't know)       Pre diabetic  6  INSULIN: "Do you take insulin?" "What type of insulin(s) do you use? What is the mode of delivery? (syringe, pen; injection or pump)? "       none  7  DIABETES PILLS: "Do you take any pills for your diabetes?" If yes, ask: "Have you missed taking any pills recently?"      none  8  OTHER SYMPTOMS: "Do you have any symptoms?" (e g , fever, frequent urination, difficulty breathing, dizziness, weakness, vomiting)      Nausea, vomited x 2, lightheaded, sweats, feels sob and unwell  9   PREGNANCY: "Is there any chance you are pregnant?" "When was your last menstrual period?"      no    Protocols used: DIABETES - HIGH BLOOD SUGAR-ADULT-AH, DIZZINESS - LIGHTHEADEDNESS-ADULT-AH

## 2021-01-09 NOTE — ED PROVIDER NOTES
History  Chief Complaint   Patient presents with    Dizziness     patient reports dizziness "room spinning" for 3 days with vomiting,      Patient presents for evaluation of N/V for 3 days without abdominal pain  Also reports dizziness  States worse with standing and movement and better with laying down and closing eyes  History provided by:  Patient   used: No    Dizziness  Associated symptoms: nausea and vomiting    Associated symptoms: no chest pain, no headaches, no shortness of breath and no weakness        Prior to Admission Medications   Prescriptions Last Dose Informant Patient Reported? Taking?    FLUoxetine (PROzac) 40 MG capsule   Yes No   Sig: Take 40 mg by mouth daily   QUEtiapine (SEROquel) 100 mg tablet   Yes No   Sig: Take 100 mg by mouth daily at bedtime   albuterol (PROVENTIL HFA,VENTOLIN HFA) 90 mcg/act inhaler   No No   Sig: Inhale 2 puffs every 4 (four) hours as needed for wheezing   Patient not taking: Reported on 2/12/2020   famotidine (PEPCID) 20 mg tablet   No No   Sig: Take 1 tablet (20 mg total) by mouth 2 (two) times a day   Patient not taking: Reported on 2/12/2020   methylPREDNISolone 4 MG tablet therapy pack   No No   Sig: Use as directed on package   Patient not taking: Reported on 2/12/2020      Facility-Administered Medications: None       Past Medical History:   Diagnosis Date    ADHD     Anxiety     Asthma     Bipolar 1 disorder (HCC)     Depression     [facility] [facility]     GERD (gastroesophageal reflux disease)     Mental and behavioral problem     Migraine     Palpitations     Last Assessed [id number]    Psychiatric disorder     Psychiatric illness     Psychosis ([facility]     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[facility]     Seizures ([facility] [facility] )     last time 2 weeks ago- petit mal    Seizures ([facility] [facility] )     Pseudoseizures    Stabbing chest pain     Last Assessed 29Nov2016    Suicide attempt ([facility] [facility] )     Tachycardia     Last Assessed [id number]    Upper respiratory disease     Last Assessed 27Jan2016       Past Surgical History:   Procedure Laterality Date    KNEE SURGERY      OR LAP,TUBAL CAUTERY N/A 10/3/2018    Procedure: LAPAROSCOPIC TUBAL LIGATION;  Surgeon: Vaishnavi Barnett MD;  Location: [address] [place] [address];  Service: Gynecology    TUBAL LIGATION         Family History   Problem Relation Age of Onset    Migraines Sister     Cancer Mother     Diabetes Mother     Cancer Father     Diabetes Father     Arthritis Father     Cancer Maternal Grandmother     Cancer Maternal Aunt     Cancer Paternal Uncle     Diabetes Other      I have reviewed and agree with the history as documented  E-Cigarette/Vaping    E-Cigarette Use Former User      E-Cigarette/Vaping Substances    Nicotine No     THC No     CBD No     Flavoring No     Other No     Unknown No      Social History     Tobacco Use    Smoking status: Former Smoker     Packs/day: 0 50     Types: Cigarettes    Smokeless tobacco: Never Used   Substance Use Topics    Alcohol use: Not Currently     Comment: occasional    Drug use: Not Currently     Frequency: 1 0 times per week       Review of Systems   Constitutional: Negative for chills and fever  HENT: Negative for congestion and sore throat  Respiratory: Negative for cough and shortness of breath  Cardiovascular: Negative for chest pain  Gastrointestinal: Positive for nausea and vomiting  Negative for abdominal pain  Musculoskeletal: Negative for back pain, gait problem and neck pain  Neurological: Positive for dizziness  Negative for weakness, numbness and headaches  All other systems reviewed and are negative  Physical Exam  Physical Exam  Vitals signs and nursing note reviewed  Constitutional:       General: She is not in acute distress  Appearance: Normal appearance  HENT:      Head: Atraumatic  Mouth/Throat:      Mouth: Mucous membranes are moist       Pharynx: Oropharynx is clear  No oropharyngeal exudate     Eyes: Extraocular Movements: Extraocular movements intact  Pupils: Pupils are equal, round, and reactive to light  Neck:      Musculoskeletal: Normal range of motion and neck supple  Cardiovascular:      Rate and Rhythm: Normal rate and regular rhythm  Pulses: Normal pulses  Pulmonary:      Effort: Pulmonary effort is normal  No respiratory distress  Breath sounds: Normal breath sounds  No wheezing or rhonchi  Abdominal:      General: Abdomen is flat  Bowel sounds are normal  There is no distension  Palpations: Abdomen is soft  Tenderness: There is no abdominal tenderness  There is no guarding or rebound  Musculoskeletal: Normal range of motion  Right lower leg: No edema  Left lower leg: No edema  Skin:     Capillary Refill: Capillary refill takes less than 2 seconds  Neurological:      General: No focal deficit present  Mental Status: She is alert and oriented to person, place, and time  Cranial Nerves: No cranial nerve deficit  Sensory: No sensory deficit  Motor: No weakness        Coordination: Coordination normal          Vital Signs  ED Triage Vitals   Temperature Pulse Respirations Blood Pressure SpO2   01/08/21 2028 01/08/21 2028 01/08/21 2028 01/08/21 2028 01/08/21 2028   (!) 96 3 °F (35 7 °C) 85 16 115/85 100 %      Temp Source Heart Rate Source Patient Position - Orthostatic VS BP Location FiO2 (%)   01/08/21 2028 -- 01/08/21 2230 01/08/21 2230 --   Tympanic  Sitting Left arm       Pain Score       --                  Vitals:    01/08/21 2028 01/08/21 2200 01/08/21 2230   BP: 115/85 114/59 119/59   Pulse: 85 93 88   Patient Position - Orthostatic VS:   Sitting         Visual Acuity      ED Medications  Medications   sodium chloride 0 9 % bolus 1,000 mL (0 mL Intravenous Stopped 1/8/21 2218)   ondansetron (ZOFRAN) injection 4 mg (4 mg Intravenous Given 1/8/21 2107)   meclizine (ANTIVERT) tablet 50 mg (50 mg Oral Given 1/8/21 2107)   cefTRIAXone (ROCEPHIN) IVPB (premix in dextrose) 1,000 mg 50 mL (0 mg Intravenous Stopped 1/8/21 2248)       Diagnostic Studies  Results Reviewed     Procedure Component Value Units Date/Time    Urine Microscopic [[id number]]  (Abnormal) Collected: 01/08/21 2202    Lab Status: Final result Specimen: Urine, Clean Catch Updated: 01/08/21 2217     RBC, UA 0-1 /hpf      WBC, UA 10-20 /hpf      Epithelial Cells Occasional /hpf      Bacteria, UA Innumerable /hpf     UA (URINE) with reflex to Scope [[id number]]  (Abnormal) Collected: 01/08/21 2202    Lab Status: Final result Specimen: Urine, Clean Catch Updated: 01/08/21 2208     Color, UA Yellow     Clarity, UA Slightly Cloudy     Specific Gravity, UA 1 025     pH, UA 6 0     Leukocytes, UA Small     Nitrite, UA Positive     Protein, UA Negative mg/dl      Glucose, UA Negative mg/dl      Ketones, UA Negative mg/dl      Urobilinogen, UA 0 2 E U /dl      Bilirubin, UA Negative     Blood, UA Negative    POCT pregnancy, urine [[id number]]  (Normal) Resulted: 01/08/21 2203    Lab Status: Final result Updated: 01/08/21 2203     EXT PREG TEST UR (Ref: Negative) Negative     Control valid    Lipase [[id number]]  (Normal) Collected: 01/08/21 2106    Lab Status: Final result Specimen: Blood from Arm, Right Updated: 01/08/21 2145     Lipase 211 u/L     Comprehensive metabolic panel [[id number]]  (Abnormal) Collected: 01/08/21 2106    Lab Status: Final result Specimen: Blood from Arm, Right Updated: 01/08/21 2145     Sodium 137 mmol/L      Potassium 3 9 mmol/L      Chloride 103 mmol/L      CO2 29 mmol/L      ANION GAP 5 mmol/L      BUN 14 mg/dL      Creatinine 1 13 mg/dL      Glucose 76 mg/dL      Calcium 8 8 mg/dL      AST 21 U/L      ALT 28 U/L      Alkaline Phosphatase 79 U/L      Total Protein 8 5 g/dL      Albumin 3 9 g/dL      Total Bilirubin 0 20 mg/dL      eGFR 68 ml/min/1 73sq m     Narrative:      [place] guidelines for Chronic Kidney Disease (CKD):     Stage 1 with normal or high GFR (GFR > 90 mL/min/1 73 square meters)    Stage 2 Mild CKD (GFR = 60-89 mL/min/1 73 square meters)    Stage 3A Moderate CKD (GFR = 45-59 mL/min/1 73 square meters)    Stage 3B Moderate CKD (GFR = 30-44 mL/min/1 73 square meters)    Stage 4 Severe CKD (GFR = 15-29 mL/min/1 73 square meters)    Stage 5 End Stage CKD (GFR <15 mL/min/1 73 square meters)  Note: GFR calculation is accurate only with a steady state creatinine    CBC and differential [[id number]]  (Abnormal) Collected: 01/08/21 2106    Lab Status: Final result Specimen: Blood from Arm, Right Updated: 01/08/21 2128     WBC 11 24 Thousand/uL      RBC 4 73 Million/uL      Hemoglobin 14 0 g/dL      Hematocrit 45 1 %      MCV 95 fL      MCH 29 6 pg      MCHC 31 0 g/dL      RDW 12 1 %      MPV 9 0 fL      Platelets 980 Thousands/uL      nRBC 0 /100 WBCs      Neutrophils Relative 56 %      Immat GRANS % 0 %      Lymphocytes Relative 32 %      Monocytes Relative 8 %      Eosinophils Relative 3 %      Basophils Relative 1 %      Neutrophils Absolute 6 28 Thousands/µL      Immature Grans Absolute 0 05 Thousand/uL      Lymphocytes Absolute 3 55 Thousands/µL      Monocytes Absolute 0 92 Thousand/µL      Eosinophils Absolute 0 37 Thousand/µL      Basophils Absolute 0 07 Thousands/µL                  No orders to display              Procedures  Procedures         ED Course                                           MDM  Number of Diagnoses or Management Options  UTI (urinary tract infection):   Vertigo:   Diagnosis management comments: Pulse ox 100% on RA indicating adequate oxygenation         Amount and/or Complexity of Data Reviewed  Clinical lab tests: ordered and reviewed  Decide to obtain previous medical records or to obtain history from someone other than the patient: yes  Review and summarize past medical records: yes    Patient Progress  Patient progress: stable      Disposition  Final diagnoses:   Vertigo   UTI (urinary tract infection)     Time reflects when diagnosis was documented in both MDM as applicable and the Disposition within this note     Time User Action Codes Description Comment    1/8/2021 10:20 PM hBakti Harris Add [R42] Vertigo     1/8/2021 10:20 PM Kathleen Mcdonald Add [N39 0] UTI (urinary tract infection)       ED Disposition     ED Disposition Condition Date/Time Comment    Discharge Stable Fri Jan 8, 2021 10:20 PM [place] LUCIANA Daviddaniela [place] discharge to home/self care  Follow-up Information     Follow up With Specialties Details Why [address] [facility], DO Family Medicine In 1 week  [address]  [facility]  [phone]            Discharge Medication List as of 1/8/2021 10:22 PM      START taking these medications    Details   cephalexin (KEFLEX) 500 mg capsule Take 1 capsule (500 mg total) by mouth 2 (two) times a day for 5 days, Starting Fri 1/8/2021, Until Wed 1/13/2021, Normal      meclizine (ANTIVERT) 25 mg tablet Take 1 tablet (25 mg total) by mouth 3 (three) times a day as needed for dizziness for up to 30 doses, Starting Fri 1/8/2021, Normal      ondansetron (ZOFRAN-ODT) 4 mg disintegrating tablet Take 1 tablet (4 mg total) by mouth every 6 (six) hours as needed for nausea for up to 15 doses, Starting Fri 1/8/2021, Normal         CONTINUE these medications which have NOT CHANGED    Details   albuterol (PROVENTIL HFA,VENTOLIN HFA) 90 mcg/act inhaler Inhale 2 puffs every 4 (four) hours as needed for wheezing, Starting Sun 10/6/2019, Print      famotidine (PEPCID) 20 mg tablet Take 1 tablet (20 mg total) by mouth 2 (two) times a day, Starting Fri 1/10/2020, Print      FLUoxetine (PROzac) 40 MG capsule Take 40 mg by mouth daily, Historical Med      methylPREDNISolone 4 MG tablet therapy pack Use as directed on package, Print      QUEtiapine (SEROquel) 100 mg tablet Take 100 mg by mouth daily at bedtime, Historical Med           No discharge procedures on file      PDMP Review     None          ED Provider  Electronically Signed by           Stanley Robin DO  01/08/21 [id number]

## 2021-02-07 ENCOUNTER — HOSPITAL ENCOUNTER (EMERGENCY)
Facility: HOSPITAL | Age: 26
Discharge: HOME | End: 2021-02-07
Attending: EMERGENCY MEDICINE | Admitting: EMERGENCY MEDICINE
Payer: MEDICARE

## 2021-02-07 VITALS
HEART RATE: 78 BPM | OXYGEN SATURATION: 98 % | RESPIRATION RATE: 18 BRPM | TEMPERATURE: 97 F | WEIGHT: 218.92 LBS | DIASTOLIC BLOOD PRESSURE: 60 MMHG | BODY MASS INDEX: 37.58 KG/M2 | SYSTOLIC BLOOD PRESSURE: 117 MMHG

## 2021-02-07 DIAGNOSIS — T78.40XA ALLERGIC REACTION, INITIAL ENCOUNTER: Primary | ICD-10-CM

## 2021-02-07 PROCEDURE — 99284 EMERGENCY DEPT VISIT MOD MDM: CPT | Performed by: EMERGENCY MEDICINE

## 2021-02-07 PROCEDURE — 99283 EMERGENCY DEPT VISIT LOW MDM: CPT

## 2021-02-07 RX ORDER — HYDROXYZINE HYDROCHLORIDE 25 MG/1
25 TABLET, FILM COATED ORAL ONCE
Status: COMPLETED | OUTPATIENT
Start: 2021-02-07 | End: 2021-02-07

## 2021-02-07 RX ORDER — HYDROXYZINE HYDROCHLORIDE 25 MG/1
25 TABLET, FILM COATED ORAL EVERY 6 HOURS
Qty: 12 TABLET | Refills: 0 | Status: SHIPPED | OUTPATIENT
Start: 2021-02-07 | End: 2021-02-12

## 2021-02-07 RX ORDER — PREDNISONE 20 MG/1
40 TABLET ORAL DAILY
Qty: 10 TABLET | Refills: 0 | Status: SHIPPED | OUTPATIENT
Start: 2021-02-07 | End: 2021-02-12

## 2021-02-07 RX ORDER — PREDNISONE 20 MG/1
20 TABLET ORAL ONCE
Status: COMPLETED | OUTPATIENT
Start: 2021-02-07 | End: 2021-02-07

## 2021-02-07 RX ADMIN — HYDROXYZINE HYDROCHLORIDE 25 MG: 25 TABLET, FILM COATED ORAL at 21:44

## 2021-02-07 RX ADMIN — PREDNISONE 20 MG: 20 TABLET ORAL at 21:44

## 2021-02-08 NOTE — ED NOTES
Pt resting, watching TV  Resp is even and non labored  Redness to the face is more visible at this time  Offers no other co  Call bell within reach        Charisma Cai RN  02/07/21 [id number]

## 2021-02-08 NOTE — ED PROVIDER NOTES
History  Chief Complaint   Patient presents with    Allergic Reaction     Pt arrives BLS, reported that she was putting facial mask (cleaning mask) and developed redness and itchiness to face  Denies any SOB  80-year-old female presents with rash to her face after she applied a face pack  Symptoms started prior to arrival to the ED  Denies any shortness of breath wheezing diarrhea no throat closing or any other allergic symptoms  She is allergic to Benadryl so she could not take it at home and wanted to make sure everything was okay  History provided by:  Patient   used: No        Prior to Admission Medications   Prescriptions Last Dose Informant Patient Reported? Taking?    FLUoxetine (PROzac) 40 MG capsule   Yes No   Sig: Take 40 mg by mouth daily   QUEtiapine (SEROquel) 100 mg tablet   Yes No   Sig: Take 100 mg by mouth daily at bedtime   meclizine (ANTIVERT) 25 mg tablet   No No   Sig: Take 1 tablet (25 mg total) by mouth 3 (three) times a day as needed for dizziness for up to 30 doses   ondansetron (ZOFRAN-ODT) 4 mg disintegrating tablet   No No   Sig: Take 1 tablet (4 mg total) by mouth every 6 (six) hours as needed for nausea for up to 15 doses      Facility-Administered Medications: None       Past Medical History:   Diagnosis Date    ADHD     Anxiety     Asthma     Bipolar [facility] disorder ([facility]     Depression     Diabetes mellitus ([facility]     GERD (gastroesophageal reflux disease)     Mental and behavioral problem     Migraine     Palpitations     Last Assessed [id number]    Psychiatric disorder     Psychiatric illness     Psychosis ([facility]     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[facility]     Seizures ([facility] )     last time 2 weeks ago- petit mal    Seizures ([facility] )     Pseudoseizures    Stabbing chest pain     Last Assessed 29Nov2016    Suicide attempt [facility])     Tachycardia     Last Assessed 29Nov2016    Upper respiratory disease     Last Assessed 27Jan2016       Past Surgical History: Procedure Laterality Date    KNEE SURGERY      MT LAP,TUBAL CAUTERY N/A 10/3/2018    Procedure: LAPAROSCOPIC TUBAL LIGATION;  Surgeon: Lauren العراقي MD;  Location: [address] [place] [address];  Service: Gynecology    TUBAL LIGATION         Family History   Problem Relation Age of Onset    Migraines Sister     Cancer Mother     Diabetes Mother     Cancer Father     Diabetes Father     Arthritis Father     Cancer Maternal Grandmother     Cancer Maternal Aunt     Cancer Paternal Uncle     Diabetes Other      I have reviewed and agree with the history as documented  E-Cigarette/Vaping    E-Cigarette Use Former User      E-Cigarette/Vaping Substances    Nicotine No     THC No     CBD No     Flavoring No     Other No     Unknown No      Social History     Tobacco Use    Smoking status: Former Smoker     Packs/day: 0 50     Types: Cigarettes    Smokeless tobacco: Never Used   Substance Use Topics    Alcohol use: Not Currently     Comment: occasional    Drug use: Not Currently     Frequency: 1 0 times per week       Review of Systems   Constitutional: Negative  HENT: Negative  Eyes: Negative  Respiratory: Negative  Cardiovascular: Negative  Gastrointestinal: Negative  Endocrine: Negative  Genitourinary: Negative  Musculoskeletal: Negative  Skin: Positive for rash  Allergic/Immunologic: Negative  Neurological: Negative  Hematological: Negative  Psychiatric/Behavioral: Negative  All other systems reviewed and are negative  Physical Exam  Physical Exam  Vitals signs and nursing note reviewed  Constitutional:       Appearance: She is well-developed  HENT:      Head: Normocephalic and atraumatic  Eyes:      Pupils: Pupils are equal, round, and reactive to light  Neck:      Musculoskeletal: Normal range of motion and neck supple  Cardiovascular:      Rate and Rhythm: Normal rate and regular rhythm     Pulmonary:      Effort: Pulmonary effort is normal  No respiratory distress  Breath sounds: Normal breath sounds  No stridor  No wheezing, rhonchi or rales  Chest:      Chest wall: No tenderness  Abdominal:      General: Bowel sounds are normal       Palpations: Abdomen is soft  Musculoskeletal: Normal range of motion  Skin:     General: Skin is warm and dry  Comments: Rash noted on the face  Neurological:      Mental Status: She is alert and oriented to person, place, and time  Vital Signs  ED Triage Vitals [02/07/21 2136]   Temperature Pulse Respirations Blood Pressure SpO2   97 8 °F (36 6 °C) 77 18 142/60 96 %      Temp Source Heart Rate Source Patient Position - Orthostatic VS BP Location FiO2 (%)   Tympanic Monitor Sitting Right arm --      Pain Score       No Pain           Vitals:    02/07/21 2136   BP: 142/60   Pulse: 77   Patient Position - Orthostatic VS: Sitting         Visual Acuity      ED Medications  Medications   predniSONE tablet 20 mg (20 mg Oral Given 2/7/21 2144)   hydrOXYzine HCL (ATARAX) tablet 25 mg (25 mg Oral Given 2/7/21 2144)       Diagnostic Studies  Results Reviewed     None                 No orders to display              Procedures  Procedures         ED Course                                           MDM  Number of Diagnoses or Management Options  Diagnosis management comments: Patient observed in the ED for an hour  Patient discharged with appropriate instructions medications and follow-up  Patient verbalized understanding had no further questions at the time of discharge  Patient had stable vital signs and well-appearing at the time of discharge  Patient Progress  Patient progress: stable      Disposition  Final diagnoses:    Allergic reaction, initial encounter     Time reflects when diagnosis was documented in both MDM as applicable and the Disposition within this note     Time User Action Codes Description Comment    2/7/2021 10:34 PM AmypuKhalida Flaming Add [T78 [id number]] Allergic reaction, initial encounter       ED Disposition     ED Disposition Condition Date/Time Comment    Discharge Stable Sun Feb 7, 2021 10:34 PM [place] Martinez discharge to home/self care  Follow-up Information     Follow up With Specialties Details Why Contact Info Additional Information    Vijaya Iverson DO Family Medicine Schedule an appointment as soon as possible for a visit   [address] Emergency Department Emergency Medicine  If symptoms worsen [address] [phone] [address] Emergency Department, [facility], [facility]          Patient's Medications   Discharge Prescriptions    HYDROXYZINE HCL (ATARAX) 25 MG TABLET    Take 1 tablet (25 mg total) by mouth every 6 (six) hours for 3 days       Start Date: 2/7/2021  End Date: 2/10/2021       Order Dose: 25 mg       Quantity: 12 tablet    Refills: 0    PREDNISONE 20 MG TABLET    Take 2 tablets (40 mg total) by mouth daily for 5 days       Start Date: 2/7/2021  End Date: 2/12/2021       Order Dose: 40 mg       Quantity: 10 tablet    Refills: 0     No discharge procedures on file      PDMP Review     None          ED Provider  Electronically Signed by           Mart Garcia DO  02/07/21 7624

## 2021-02-09 ENCOUNTER — TELEMEDICINE (OUTPATIENT)
Dept: FAMILY MEDICINE CLINIC | Facility: CLINIC | Age: 26
End: 2021-02-09
Payer: MEDICARE

## 2021-02-09 DIAGNOSIS — T78.40XD ALLERGIC REACTION, SUBSEQUENT ENCOUNTER: ICD-10-CM

## 2021-02-09 DIAGNOSIS — J45.40 MODERATE PERSISTENT ASTHMA, UNSPECIFIED WHETHER COMPLICATED: Primary | ICD-10-CM

## 2021-02-09 PROCEDURE — 99213 OFFICE O/P EST LOW 20 MIN: CPT | Performed by: FAMILY MEDICINE

## 2021-02-09 RX ORDER — FLUTICASONE PROPIONATE 110 UG/1
2 AEROSOL, METERED RESPIRATORY (INHALATION) 2 TIMES DAILY
Qty: 1 INHALER | Refills: 5 | Status: SHIPPED | OUTPATIENT
Start: 2021-02-09

## 2021-02-09 RX ORDER — ALBUTEROL SULFATE 90 UG/1
2 AEROSOL, METERED RESPIRATORY (INHALATION) EVERY 6 HOURS PRN
Qty: 1 INHALER | Refills: 5 | Status: SHIPPED | OUTPATIENT
Start: 2021-02-09

## 2021-02-09 RX ORDER — EPINEPHRINE 0.3 MG/.3ML
0.3 INJECTION SUBCUTANEOUS ONCE
Qty: 2 EACH | Refills: 1 | Status: SHIPPED | OUTPATIENT
Start: 2021-02-09 | End: 2022-01-18

## 2021-02-09 NOTE — PROGRESS NOTES
Assessment/Plan:    Patient with moderate persistent asthma, has not had a steroid daily inhaler in some time  Refilling albuterol, and ordering flovent today  · Advises patient to take steorid inhaler daily, and use albuterol inhaler only as needed    Patient asking for epi-pen as "I am allergic to benadryl and had recent allergic reaction"    · Ordering Epi-pen given history of anaphylaxis, however counseled patient that this is not for reactions involving only the skin like she had during recent ED visit  · Counseled patient that the epi-pen is to be when she has symptoms of anaphylaxis  Diagnoses and all orders for this visit:    Moderate persistent asthma, unspecified whether complicated  -     albuterol (PROVENTIL HFA,VENTOLIN HFA) 90 mcg/act inhaler; Inhale 2 puffs every 6 (six) hours as needed for wheezing or shortness of breath  -     fluticasone (FLOVENT HFA) 110 MCG/ACT inhaler; Inhale 2 puffs 2 (two) times a day Rinse mouth after use  Allergic reaction, subsequent encounter  -     Ambulatory referral to Allergy; Future  -     EPINEPHrine (EPIPEN) 0 3 mg/0 3 mL SOAJ; Inject 0 3 mL (0 3 mg total) into a muscle once for 1 dose          Subjective:      Patient ID: Marilyn Thakkar is a 26 y o  female  Patient requires refill for asthma inhalers  She reports that she uses her albuterol inhaler multiple times per day  She was previously prescribed a daily steroid inhaler but no longer has it  She has required oral steroids once in the past year (March 2020) for difficulty breathing  Patient reports that she has taken a steroid inhaler in the past, but no longer has a daily steroid inhaler  Patient also interested in getting an [address] for history of anaphylaxis  She is concerned because she had a recent allergic reaction to a face mask which required a visit to the ED  At that time she was prescribed atarax    She also states that she is allergic to benadryl, that it causes her to have hives         The following portions of the patient's history were reviewed and updated as appropriate: allergies, current medications, past family history, past medical history, past social history, past surgical history and problem list     Review of Systems   Constitutional: Negative for chills, fever and unexpected weight change  HENT: Negative for congestion and rhinorrhea  Respiratory: Positive for wheezing  Negative for cough and shortness of breath  Cardiovascular: Negative for chest pain, palpitations and leg swelling  Gastrointestinal: Negative for abdominal distention, constipation, nausea and vomiting  Endocrine: Negative for polyuria  Genitourinary: Negative for dysuria, frequency, hematuria and urgency  Musculoskeletal: Negative for arthralgias and myalgias  Skin: Negative for rash and wound  Neurological: Negative for syncope and headaches  Psychiatric/Behavioral: Negative for sleep disturbance  The patient is not nervous/anxious  Objective:      LMP 01/28/2021          Physical Exam  Constitutional:       General: She is not in acute distress  Appearance: Normal appearance  She is not ill-appearing  Pulmonary:      Effort: Pulmonary effort is normal    Neurological:      Mental Status: She is alert and oriented to person, place, and time     Psychiatric:         Mood and Affect: Mood normal          Behavior: Behavior normal

## 2021-02-12 ENCOUNTER — NURSE TRIAGE (OUTPATIENT)
Dept: OTHER | Facility: OTHER | Age: 26
End: 2021-02-12

## 2021-02-12 ENCOUNTER — HOSPITAL ENCOUNTER (EMERGENCY)
Facility: HOSPITAL | Age: 26
Discharge: HOME | End: 2021-02-13
Attending: EMERGENCY MEDICINE | Admitting: EMERGENCY MEDICINE
Payer: MEDICARE

## 2021-02-12 DIAGNOSIS — J40 BRONCHITIS: Primary | ICD-10-CM

## 2021-02-12 PROCEDURE — 99285 EMERGENCY DEPT VISIT HI MDM: CPT

## 2021-02-12 PROCEDURE — 87635 SARS-COV-2 COVID-19 AMP PRB: CPT | Performed by: EMERGENCY MEDICINE

## 2021-02-12 PROCEDURE — 94640 AIRWAY INHALATION TREATMENT: CPT

## 2021-02-12 PROCEDURE — 99284 EMERGENCY DEPT VISIT MOD MDM: CPT | Performed by: EMERGENCY MEDICINE

## 2021-02-12 RX ORDER — PREDNISONE 10 MG/1
TABLET ORAL
Qty: 20 TABLET | Refills: 0 | Status: SHIPPED | OUTPATIENT
Start: 2021-02-12 | End: 2022-01-15

## 2021-02-12 RX ORDER — AZITHROMYCIN 250 MG/1
250 TABLET, FILM COATED ORAL DAILY
Qty: 4 TABLET | Refills: 0 | Status: SHIPPED | OUTPATIENT
Start: 2021-02-12 | End: 2021-02-16

## 2021-02-12 RX ORDER — IPRATROPIUM BROMIDE AND ALBUTEROL SULFATE 2.5; .5 MG/3ML; MG/3ML
3 SOLUTION RESPIRATORY (INHALATION) ONCE
Status: COMPLETED | OUTPATIENT
Start: 2021-02-12 | End: 2021-02-12

## 2021-02-12 RX ADMIN — IPRATROPIUM BROMIDE AND ALBUTEROL SULFATE 3 ML: 2.5; .5 SOLUTION RESPIRATORY (INHALATION) at 23:12

## 2021-02-12 RX ADMIN — PREDNISONE 50 MG: 20 TABLET ORAL at 23:11

## 2021-02-13 VITALS
OXYGEN SATURATION: 98 % | RESPIRATION RATE: 20 BRPM | BODY MASS INDEX: 37.42 KG/M2 | HEART RATE: 99 BPM | TEMPERATURE: 98.3 F | WEIGHT: 218 LBS | SYSTOLIC BLOOD PRESSURE: 110 MMHG | DIASTOLIC BLOOD PRESSURE: 68 MMHG

## 2021-02-13 LAB — SARS-COV-2 RNA RESP QL NAA+PROBE: NEGATIVE

## 2021-02-13 RX ORDER — AZITHROMYCIN 250 MG/1
500 TABLET, FILM COATED ORAL ONCE
Status: COMPLETED | OUTPATIENT
Start: 2021-02-13 | End: 2021-02-13

## 2021-02-13 RX ADMIN — AZITHROMYCIN MONOHYDRATE 500 MG: 250 TABLET ORAL at 00:11

## 2021-02-13 NOTE — TELEPHONE ENCOUNTER
Reason for Disposition   [1] MODERATE difficulty breathing (e g , speaks in phrases, SOB even at rest, pulse 100-120) AND [2] NEW-onset or WORSE than normal    Answer Assessment - Initial Assessment Questions  1  RESPIRATORY STATUS: "Describe your breathing?" (e g , wheezing, shortness of breath, unable to speak, severe coughing)       Wheezing coughing     2  ONSET: "When did this breathing problem begin?"       4 days ago     3  PATTERN "Does the difficult breathing come and go, or has it been constant since it started?"       Constant     4  SEVERITY: "How bad is your breathing?" (e g , mild, moderate, severe)     - MILD: No SOB at rest, mild SOB with walking, speaks normally in sentences, can lay down, no retractions, pulse < 100      - MODERATE: SOB at rest, SOB with minimal exertion and prefers to sit, cannot lie down flat, speaks in phrases, mild retractions, audible wheezing, pulse 100-120      - SEVERE: Very SOB at rest, speaks in single words, struggling to breathe, sitting hunched forward, retractions, pulse > 120       Moderate    5  RECURRENT SYMPTOM: "Have you had difficulty breathing before?" If so, ask: "When was the last time?" and "What happened that time?"       Recurrent-    6  CARDIAC HISTORY: "Do you have any history of heart disease?" (e g , heart attack, angina, bypass surgery, angioplasty)       History of "really fast heartbeat"    7  LUNG HISTORY: "Do you have any history of lung disease?"  (e g , pulmonary embolus, asthma, emphysema)      Asthma     8  CAUSE: "What do you think is causing the breathing problem?"       illness    9  OTHER SYMPTOMS: "Do you have any other symptoms? (e g , dizziness, runny nose, cough, chest pain, fever)      Cough, chest pain     10   PREGNANCY: "Is there any chance you are pregnant?" "When was your last menstrual period?"        No    Protocols used: BREATHING DIFFICULTY-ADULT-AH

## 2021-02-13 NOTE — DISCHARGE INSTRUCTIONS
Get the prednisone and Zithromax tomorrow  Continue your usual medicines  Rest, drink fluids  Use tylenol as needed for pain or fever

## 2021-02-13 NOTE — TELEPHONE ENCOUNTER
Regarding: Breathing issue  ----- Message from Artis Holiday sent at 2/12/2021 10:16 PM EST -----  "I have been sick for 4 days, and I am having some breathing issues    It hurts around my heart "

## 2021-02-13 NOTE — ED PROVIDER NOTES
History  Chief Complaint   Patient presents with    Breathing Difficulty     patien c/o congestion and difficulty taking a deep breath, reports boyfriend has symptoms also concerned for covid     25 yo female c/o being sick for the last 3-4 days  C/o congestion, cough  Now with sob and chest pain  No fever  + vomiting  No diarrhea  + sick contact - her boyfriend  She would like to be screened for covid  History provided by:  Patient   used: No        Prior to Admission Medications   Prescriptions Last Dose Informant Patient Reported? Taking? EPINEPHrine (EPIPEN) 0 3 mg/0 3 mL SOAJ   No No   Sig: Inject 0 3 mL (0 3 mg total) into a muscle once for 1 dose   FLUoxetine (PROzac) 40 MG capsule Past Week at Unknown time  Yes Yes   Sig: Take 40 mg by mouth daily   QUEtiapine (SEROquel) 100 mg tablet   Yes No   Sig: Take 100 mg by mouth daily at bedtime   albuterol (PROVENTIL HFA,VENTOLIN HFA) 90 mcg/act inhaler 2/12/2021 at Unknown time  No Yes   Sig: Inhale 2 puffs every 6 (six) hours as needed for wheezing or shortness of breath   fluticasone (FLOVENT HFA) 110 MCG/ACT inhaler 2/12/2021 at Unknown time  No Yes   Sig: Inhale 2 puffs 2 (two) times a day Rinse mouth after use     meclizine (ANTIVERT) 25 mg tablet Not Taking at Unknown time  No No   Sig: Take 1 tablet (25 mg total) by mouth 3 (three) times a day as needed for dizziness for up to 30 doses   Patient not taking: Reported on 2/12/2021   ondansetron (ZOFRAN-ODT) 4 mg disintegrating tablet   No No   Sig: Take 1 tablet (4 mg total) by mouth every 6 (six) hours as needed for nausea for up to 15 doses      Facility-Administered Medications: None       Past Medical History:   Diagnosis Date    ADHD     Anxiety     Asthma     [facility]     Depression     Diabetes mellitus ([facility]     GERD (gastroesophageal reflux disease)     Mental and behavioral problem     Migraine     Palpitations     Last Assessed [id number]   Missy Rosario Psychiatric disorder     Psychiatric illness     [facility]osis [facility]     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[facility]     Seizures ([facility]     last time 2 weeks ago- petit mal    Seizures ([facility]     Pseudoseizures    Stabbing chest pain     Last Assessed [id number]    [facility])     Tachycardia     Last Assessed 29Nov2016    Upper respiratory disease     Last Assessed 27Jan2016       Past Surgical History:   Procedure Laterality Date    KNEE SURGERY      [place] LAP,TUBAL CAUTERY N/A 10/3/2018    Procedure: LAPAROSCOPIC TUBAL LIGATION;  Surgeon: Isamar Hyde MD;  Location: [address] [place] [address];  Service: Gynecology    TUBAL LIGATION         Family History   Problem Relation Age of Onset    Migraines Sister     Cancer Mother     Diabetes Mother     Cancer Father     Diabetes Father     Arthritis Father     Cancer Maternal Grandmother     Cancer Maternal Aunt     Cancer Paternal Uncle     Diabetes Other      I have reviewed and agree with the history as documented  E-Cigarette/Vaping    E-Cigarette Use Former User      E-Cigarette/Vaping Substances    Nicotine No     THC No     CBD No     Flavoring No     Other No     Unknown No      Social History     Tobacco Use    Smoking status: Former Smoker     Packs/day: 0 50     Types: Cigarettes    Smokeless tobacco: Never Used   Substance Use Topics    Alcohol use: Not Currently     Comment: occasional    Drug use: Not Currently     Frequency: 1 0 times per week       Review of Systems   Constitutional: Negative for fever  HENT: Positive for congestion  Respiratory: Positive for cough  Gastrointestinal: Positive for vomiting  Negative for diarrhea  Genitourinary: Negative for dysuria  Skin: Negative for rash  Physical Exam  Physical Exam  Vitals signs and nursing note reviewed  Constitutional:       General: She is not in acute distress  Appearance: Normal appearance  She is well-developed   She is not ill-appearing, toxic-appearing or diaphoretic  HENT:      Head: Normocephalic and atraumatic  Eyes:      Conjunctiva/sclera: Conjunctivae normal    Neck:      Musculoskeletal: Neck supple  Cardiovascular:      Rate and Rhythm: Normal rate and regular rhythm  Heart sounds: Normal heart sounds  No murmur  Pulmonary:      Effort: Pulmonary effort is normal  No respiratory distress  Breath sounds: Normal breath sounds  Abdominal:      General: There is no distension  Palpations: Abdomen is soft  Musculoskeletal: Normal range of motion  General: No deformity  Right lower leg: No edema  Left lower leg: No edema  Skin:     General: Skin is warm and dry  Coloration: Skin is not pale  Findings: No rash  Neurological:      General: No focal deficit present  Mental Status: She is alert and oriented to person, place, and time  Cranial Nerves: No cranial nerve deficit     Psychiatric:         Behavior: Behavior normal       Comments: Tearful, very dramatic         Vital Signs  ED Triage Vitals   Temp Pulse Respirations Blood Pressure SpO2   -- 02/12/21 2300 02/12/21 2300 02/12/21 2300 02/12/21 2300    92 18 132/58 98 %      Temp src Heart Rate Source Patient Position - Orthostatic VS BP Location FiO2 (%)   -- 02/12/21 2356 02/12/21 2315 02/12/21 2356 --    Monitor Sitting Left arm       Pain Score       --                  Vitals:    02/12/21 2300 02/12/21 2315 02/12/21 2356   BP: 132/58  110/68   Pulse: 92 98 99   Patient Position - Orthostatic VS:  Sitting Sitting         Visual Acuity      ED Medications  Medications   azithromycin (ZITHROMAX) tablet 500 mg (has no administration in time range)   ipratropium-albuterol (DUO-NEB) 0 5-2 5 mg/3 mL inhalation solution 3 mL (3 mL Nebulization Given 2/12/21 2312)   predniSONE tablet 50 mg (50 mg Oral Given 2/12/21 2311)       Diagnostic Studies  Results Reviewed     Procedure Component Value Units Date/Time    Novel Coronavirus (Covid-19),PCR [facility] [[id number]] Collected: 02/12/21 2309    Lab Status: In process Specimen: Nares from Nose Updated: 02/12/21 2315                 No orders to display              Procedures  Procedures         ED Course                                           MDM  Number of Diagnoses or Management Options  Bronchitis:   Diagnosis management comments: Prior records reviewed  Pt  With multiple visits for various complaints  Last seen for chest pain 2 months ago, had negative EKG, trop, Ddimer, and chest xray at that time  Advised prednisone taper, Z-indigo, tylenol prn, rest, fluids  Disposition  Final diagnoses:   Bronchitis     Time reflects when diagnosis was documented in both MDM as applicable and the Disposition within this note     Time User Action Codes Description Comment    9/47/2604 19:37 PM Rossy Heart Add [R49] Bronchitis       ED Disposition     ED Disposition Condition Date/Time Comment    Discharge Stable Fri Feb 12, 2021 11:46 PM Jamaica Lopez Confer discharge to home/self care  Follow-up Information     Follow up With Specialties Details Why Contact Info    Endy Saenz, DO Family Medicine  As needed [address] [place] [address]  [phone]            Patient's Medications   Discharge Prescriptions    AZITHROMYCIN (ZITHROMAX) 250 MG TABLET    Take 1 tablet (250 mg total) by mouth daily for 4 days       Start Date: 2/12/2021 End Date: 2/16/2021       Order Dose: 250 mg       Quantity: 4 tablet    Refills: 0    PREDNISONE 10 MG TABLET    4 po daily x2 days, then 3 po daily x2 days, then 2 po daily x2 days,then 1 po daily x2days       Start Date: 2/12/2021 End Date: --       Order Dose: --       Quantity: 20 tablet    Refills: 0     No discharge procedures on file      PDMP Review     None          ED Provider  Electronically Signed by           Soha Romero MD  95/27/14 0303

## 2021-03-02 ENCOUNTER — TELEPHONE (OUTPATIENT)
Dept: OBGYN CLINIC | Facility: CLINIC | Age: 26
End: 2021-03-02

## 2021-03-02 NOTE — TELEPHONE ENCOUNTER
patient took a home pregnancy test and was positive, blood color is red, no  cramping lmp  1/28/2021, not sure of blood type , offered an appointment for today, but she cannot come, appointment made for tomorrow 3/3/2021 bleeding precautions given

## 2021-03-03 NOTE — TELEPHONE ENCOUNTER
called the patient she was a no show for her appointment for early gestation with spotting , she states she is ok and will call when she is home to reschedule appointment

## 2021-03-17 ENCOUNTER — OFFICE VISIT (OUTPATIENT)
Dept: FAMILY MEDICINE CLINIC | Facility: CLINIC | Age: 26
End: 2021-03-17
Payer: MEDICARE

## 2021-03-17 DIAGNOSIS — R00.0 TACHYCARDIA: ICD-10-CM

## 2021-03-17 DIAGNOSIS — R07.89 OTHER CHEST PAIN: ICD-10-CM

## 2021-03-17 DIAGNOSIS — F70 MILD INTELLECTUAL DISABILITY: Chronic | ICD-10-CM

## 2021-03-17 DIAGNOSIS — F44.5 PSEUDOSEIZURE: ICD-10-CM

## 2021-03-17 DIAGNOSIS — M94.0 COSTOCHONDRITIS: Primary | ICD-10-CM

## 2021-03-17 DIAGNOSIS — F31.63 BIPOLAR 1 DISORDER, MIXED, SEVERE (HCC): ICD-10-CM

## 2021-03-17 PROCEDURE — 99213 OFFICE O/P EST LOW 20 MIN: CPT | Performed by: FAMILY MEDICINE

## 2021-03-17 RX ORDER — IBUPROFEN 400 MG/1
400 TABLET ORAL 2 TIMES DAILY
Qty: 10 TABLET | Refills: 0 | Status: SHIPPED | OUTPATIENT
Start: 2021-03-17

## 2021-03-17 NOTE — PROGRESS NOTES
Virtual Regular Visit      Assessment/Plan:    Problem List Items Addressed This Visit        Other    Mild intellectual disability (Chronic)    Pseudoseizure    Bipolar 1 disorder, mixed, severe (HCC)    Tachycardia    Relevant Orders    Ambulatory referral to Cardiology      Other Visit Diagnoses     Costochondritis    -  Primary    Relevant Medications    ibuprofen (MOTRIN) 400 mg tablet    Other chest pain            -Chest pain reproducible with palpation over left sternal border  Hx of tachycardia, stress, anxiety, pseudoseizure, intellectual disability  Occurs on and off including at rest while sitting down or in bed  -Very short 5 day course of Ibuprofen 400mg BID due to hx of GERD and to be taken with food  -referral for cardiology for her tachycardia  -will seek medical attention immediately if symptoms worsening or not improving  Reason for visit is   Chief Complaint   Patient presents with    Virtual Regular Visit        Encounter provider Nicole Wang MD    Provider located at [address] [place], [place] [id number]      Recent Visits  No visits were found meeting these conditions  Showing recent visits within past 7 days and meeting all other requirements     Today's Visits  Date Type Provider Dept   03/17/21 Office Visit Nicole Wang MD [facility]   Showing today's visits and meeting all other requirements     Future Appointments  No visits were found meeting these conditions  Showing future appointments within next 150 days and meeting all other requirements        The patient was identified by name and date of birth  Marilyn Thakkar was informed that this is a telemedicine visit and that the visit is being conducted [place] [facility] and patient was informed that this is a secure, HIPAA-compliant platform  She agrees to proceed     My office door was closed  No one else was in the room    She acknowledged consent and understanding of privacy and security of the video platform  The patient has agreed to participate and understands they can discontinue the visit at any time  Patient is aware this is a billable service  Subjective  Jamaica Omalley is a 26 y o  female with chest pain   HPI   Patient is a 26year old female with PMHx of pseudoseizures, ADHD, Bipolar 1 disorder, Mild intellectual disability, Depression, Anxiety, and tachycardia who presents due to 5 month hx of chest pain  Patient states the pain waxes and wanes, and can occur with exertion or at rest when she is sitting down or laying in bed  Described as sharp and 6/10 when it occurs  No radiation, mildly relieved with Ibuprofen or Tylenol which she has tried on and off  No shortness of breath, fever, hx of heart condition other than tachycardia which she says she is always running fast but has never had cardiology exam    Patient lives at home with her fiance and does not work hester to disability  She has depression, anxiety, and bipolar disorder for which she is followed by a psychiatrist at [facility] but she says she does not take any of those medications as they do not work for her and she does not want to  Amarilys Eagle is listed in her chart as 100mg QHS which she is not taking  Also reports she gets seizures that are stress induced and her fiance helps her to the ground and places her on her side  No postsyncopal state, no tongue biting, no loss of bowel or urine and most recent occurred about 1 week ago      Past Medical History:   Diagnosis Date    ADHD     Anxiety     Asthma     [facility]     Depression     Diabetes mellitus [facility]     GERD (gastroesophageal reflux disease)     Mental and behavioral problem     Migraine     Palpitations     Last Assessed [id number]    Psychiatric disorder     Psychiatric illness     [facility]osis [facility]     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[facility]     Seizures ([facility] )     last time 2 weeks ago- petit mal    Seizures (HCC)     Pseudoseizures    Stabbing chest pain     Last Assessed [id number]    Su[facility])     Tachycardia     Last Assessed 29Nov2016    Upper respiratory disease     Last Assessed 27Jan2016       Past Surgical History:   Procedure Laterality Date    KNEE SURGERY      [place] LAP,TUBAL CAUTERY N/A 10/3/2018    Procedure: LAPAROSCOPIC TUBAL LIGATION;  Surgeon: Judge Lolis MD;  Location: [address] [place] [address];  Service: Gynecology    TUBAL LIGATION         Current Outpatient Medications   Medication Sig Dispense Refill    albuterol (PROVENTIL HFA,VENTOLIN HFA) 90 mcg/act inhaler Inhale 2 puffs every 6 (six) hours as needed for wheezing or shortness of breath 1 Inhaler 5    EPINEPHrine (EPIPEN) 0 3 mg/0 3 mL SOAJ Inject 0 3 mL (0 3 mg total) into a muscle once for 1 dose 2 each 1    FLUoxetine (PROzac) 40 MG capsule Take 40 mg by mouth daily      fluticasone (FLOVENT HFA) 110 MCG/ACT inhaler Inhale 2 puffs 2 (two) times a day Rinse mouth after use  1 Inhaler 5    ibuprofen (MOTRIN) 400 mg tablet Take 1 tablet (400 mg total) by mouth 2 (two) times a day 10 tablet 0    meclizine (ANTIVERT) 25 mg tablet Take 1 tablet (25 mg total) by mouth 3 (three) times a day as needed for dizziness for up to 30 doses (Patient not taking: Reported on 2/12/2021) 30 tablet 0    ondansetron (ZOFRAN-ODT) 4 mg disintegrating tablet Take 1 tablet (4 mg total) by mouth every 6 (six) hours as needed for nausea for up to 15 doses 15 tablet 0    predniSONE 10 mg tablet 4 po daily x2 days, then 3 po daily x2 days, then 2 po daily x2 days,then 1 po daily x2days 20 tablet 0    QUEtiapine (SEROquel) 100 mg tablet Take 100 mg by mouth daily at bedtime       No current facility-administered medications for this visit           Allergies   Allergen Reactions    Fish-Derived Products Itching    Naproxen Anaphylaxis and Chest Pain    Shellfish Allergy Anaphylaxis    Benadryl [Diphenhydramine] Hives  Fish Oil     Seasonal Ic  [Cholestatin]      allergies    Shellfish-Derived Products     Shellfish-Derived Products Hives       Review of Systems   Constitutional: Negative for fever  HENT: Negative for sore throat  Eyes: Negative for pain and visual disturbance  Respiratory: Negative for cough, chest tightness and shortness of breath  Cardiovascular: Positive for chest pain and palpitations (when she is very anxious)  Negative for leg swelling  Gastrointestinal: Negative for abdominal pain, constipation and diarrhea  and per HPI    Video Exam    There were no vitals filed for this visit  Physical Exam  Constitutional:       Appearance: She is obese  HENT:      Nose: No congestion or rhinorrhea  Eyes:      General: No scleral icterus  Right eye: No discharge  Left eye: No discharge  Pulmonary:      Effort: No respiratory distress  Neurological:      Mental Status: She is alert and oriented to person, place, and time  I spent 25 minutes directly with the patient during this visit      VIRTUAL VISIT DISCLAIMER    Jamaica Omalley acknowledges that she has consented to an online visit or consultation  She understands that the online visit is based solely on information provided by her, and that, in the absence of a face-to-face physical evaluation by the physician, the diagnosis she receives is both limited and provisional in terms of accuracy and completeness  This is not intended to replace a full medical face-to-face evaluation by the physician  Jamaica Omalley understands and accepts these terms

## 2021-03-19 ENCOUNTER — CONSULT (OUTPATIENT)
Dept: CARDIOLOGY CLINIC | Facility: CLINIC | Age: 26
End: 2021-03-19
Payer: MEDICARE

## 2021-03-19 VITALS
BODY MASS INDEX: 35.51 KG/M2 | OXYGEN SATURATION: 97 % | WEIGHT: 208 LBS | HEIGHT: 64 IN | SYSTOLIC BLOOD PRESSURE: 104 MMHG | DIASTOLIC BLOOD PRESSURE: 68 MMHG | HEART RATE: 105 BPM | RESPIRATION RATE: 18 BRPM | TEMPERATURE: 98.7 F

## 2021-03-19 DIAGNOSIS — R00.0 TACHYCARDIA: Primary | ICD-10-CM

## 2021-03-19 DIAGNOSIS — R07.9 CHEST PAIN IN ADULT: ICD-10-CM

## 2021-03-19 PROCEDURE — 93000 ELECTROCARDIOGRAM COMPLETE: CPT | Performed by: INTERNAL MEDICINE

## 2021-03-19 PROCEDURE — 99213 OFFICE O/P EST LOW 20 MIN: CPT | Performed by: INTERNAL MEDICINE

## 2021-03-19 NOTE — PROGRESS NOTES
Consultation - Cardiology Office  [facility]  Young Alex 26 y o  female MRN: [id number]  : 1995  Unit/Bed#:  Encounter: [id number]      ASSESSMENT:  Tachycardia  Mild sinus tachycardia  Heart rate on EKG today was 100 per minute    Chest pain  Retrosternal and in left chest off and on for a long time, lasts for about an are  Occurs even at rest   No accompanying symptoms   history of  costochondritis    History of pseudoseizures  Bipolar disorder    RECOMMENDATIONS:  Echocardiogram to rule out pericarditis   Exercise stress test        Thank you for your consultation  If you have any question please call me at [phone]      Primary Care Physician Requesting Consult: Namita Richardson DO      Reason for Consult / Principal Problem:  Chest pain        HPI :   Young Alex is a 26y o  year old female who was referred by primary care doctor for  Evaluation of chest pain  As per patient she has had off and on chest pain for a long time  This is around her heart area and accompanied by a little tenderness in her mid sternum  It occurs at rest and without provocation  She denies any accompanying symptoms  She also has baseline Sinus tachycardia    REVIEW OF SYSTEMS:      Constitutional: Negative for activity change, appetite change, chills, fatigue, fever and unexpected weight change  HENT: Negative for congestion, sore throat and trouble swallowing  Eyes: Negative for discharge and redness  Respiratory: Negative for apnea, cough, shortness of breath and wheezing  Cardiovascular: Positive  for chest pain, Negative for shortness of breath, palpitations and leg swelling  Gastrointestinal: Negative for abdominal distention, abdominal pain, anal bleeding, blood in stool, constipation, diarrhea, nausea and vomiting  Endocrine: Negative for polydipsia, polyphagia and polyuria  Genitourinary: Negative for difficulty urinating, dysuria, flank pain and hematuria  Musculoskeletal: Negative for arthralgias, myalgias and neck stiffness  Skin: Negative for pallor and rash  Allergic/Immunologic: Negative for environmental allergies  Neurological: Negative for dizziness, syncope, light-headedness, numbness and headaches  Hematological: Negative for adenopathy  Does not bruise/bleed easily  Psychiatric/Behavioral: Negative for confusion and hallucinations  The patient is not nervous/anxious        Historical Information   Past Medical History:   Diagnosis Date    ADHD     Anxiety     Asthma     [facility] )     Depression     [facility]     GERD (gastroesophageal reflux disease)     Mental and behavioral problem     Migraine     Palpitations     Last Assessed [id number]    Psychiatric disorder     Psychiatric illness     Psychosis ([facility] )     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)     Tachycardia     Last Assessed 29Nov2016    Upper respiratory disease     Last Assessed 27Jan2016     Past Surgical History:   Procedure Laterality Date    KNEE SURGERY      DE LAP,TUBAL CAUTERY N/A 10/3/2018    Procedure: LAPAROSCOPIC TUBAL LIGATION;  Surgeon: Nikolas Lepe MD;  Location: [facility];  Service: Gynecology    TUBAL LIGATION       Social History     Substance and Sexual Activity   Alcohol Use Not Currently    Comment: occasional     Social History     Substance and Sexual Activity   Drug Use Not Currently    Frequency: 1 0 times per week     Social History     Tobacco Use   Smoking Status Former Smoker    Packs/day: 0 50    Types: Cigarettes   Smokeless Tobacco Never Used     Family History:   Family History   Problem Relation Age of Onset   Emaline Folds Migraines Sister     Cancer Mother     Diabetes Mother     Cancer Father     Diabetes Father     Arthritis Father     Cancer Maternal Grandmother     Cancer Maternal Aunt     Cancer Paternal Uncle     Diabetes Other        Meds/Allergies     Allergies   Allergen Reactions    Fish-Derived Products Itching    Naproxen Anaphylaxis and Chest Pain    Shellfish Allergy Anaphylaxis    Benadryl [Diphenhydramine] Hives    Fish Oil     Seasonal Ic  [Cholestatin]      allergies    Shellfish-Derived Products     Shellfish-Derived Products Hives       Current Outpatient Medications:     albuterol (PROVENTIL HFA,VENTOLIN HFA) 90 mcg/act inhaler, Inhale 2 puffs every 6 (six) hours as needed for wheezing or shortness of breath, Disp: 1 Inhaler, Rfl: 5    EPINEPHrine (EPIPEN) 0 3 mg/0 3 mL SOAJ, Inject 0 3 mL (0 3 mg total) into a muscle once for 1 dose, Disp: 2 each, Rfl: 1    FLUoxetine (PROzac) 40 MG capsule, Take 40 mg by mouth daily, Disp: , Rfl:     fluticasone (FLOVENT HFA) 110 MCG/ACT inhaler, Inhale 2 puffs 2 (two) times a day Rinse mouth after use , Disp: 1 Inhaler, Rfl: 5    ibuprofen (MOTRIN) 400 mg tablet, Take 1 tablet (400 mg total) by mouth 2 (two) times a day, Disp: 10 tablet, Rfl: 0    meclizine (ANTIVERT) 25 mg tablet, Take 1 tablet (25 mg total) by mouth 3 (three) times a day as needed for dizziness for up to 30 doses (Patient not taking: Reported on 2/12/2021), Disp: 30 tablet, Rfl: 0    ondansetron (ZOFRAN-ODT) 4 mg disintegrating tablet, Take 1 tablet (4 mg total) by mouth every 6 (six) hours as needed for nausea for up to 15 doses, Disp: 15 tablet, Rfl: 0    predniSONE 10 mg tablet, 4 po daily x2 days, then 3 po daily x2 days, then 2 po daily x2 days,then 1 po daily x2days, Disp: 20 tablet, Rfl: 0    QUEtiapine (SEROquel) 100 mg tablet, Take 100 mg by mouth daily at bedtime, Disp: , Rfl:     Vitals:    /68 mm Hg   Heart rate 100 per minute  BP Readings from Last 3 Encounters:   02/12/21 110/68   02/07/21 117/60   01/08/21 119/59         PHYSICAL EXAMINATION:  Neurologic:  Alert & oriented x 3, no new focal deficits, Not in any acute distress,  Constitutional:  Well developed, well nourished, non-toxic appearance   Eyes:  Pupil equal and reacting to light, conjunctiva normal, No JVP, No LNP   HENT:  Atraumatic, oropharynx moist, Neck- normal range of motion, no tenderness,  Neck supple   Respiratory:  Bilateral air entry, mostly clear to auscultation  Cardiovascular: S1-S2 regular with a I/VI systolic murmur   GI:  Soft, nondistended, normal bowel sounds, nontender, no hepatosplenomegaly appreciated  Musculoskeletal:  No edema, no tenderness, no deformities  Skin:  Well hydrated, no rash   Lymphatic:  No lymphadenopathy noted   Extremities:  No edema and distal pulses are present    Diagnostic Studies Review Cardio:      EKG: normal sinus rhythm, heart rate 100 per minute    Cardiac testing:   Results for orders placed during the hospital encounter of 16   Echo complete with contrast if indicated    Var[address]  [address] [address]  [phone]    Transthoracic Echocardiogram  2D, M-mode, Doppler, and Color Doppler    Study date:  08-Dec-2016    Patient: Flavia Haynes  MR number: [id number]  Account number: [de-identified]  : 1995  Age: 22 years  Gender: Female  Status: Routine  Location: Echo lab  Height: 64 in  Weight: 191 6 lb  BP: 104/ 72 mmHg    Indications: palpitation    Diagnoses: R00 2 - Palpitations    Sonographer:  MARCOS Willams  Primary Physician:  Gabe Arredondo MD  Referring Physician:  Susy Ricardo MD  Group:  Landen Romero  Interpreting Physician:  Susy Ricardo MD    SUMMARY    LEFT VENTRICLE:  Systolic function was normal by Arya Grant  Ejection fraction was estimated in  the range of 55 % to 60 % to be 56 %  There were no regional wall motion abnormalities  Left ventricular diastolic function parameters were normal     VENTRICULAR SEPTUM:  There was "bounce" motion      RIGHT VENTRICLE:  Systolic function was normal     TRICUSPID VALVE:  Pulmonary artery systolic pressure was within the normal range  HISTORY: PRIOR HISTORY: Hypothyroidism    PROCEDURE: The procedure was performed in the echo lab  This was a routine  study  The transthoracic approach was used  The study included complete 2D  imaging, M-mode, complete spectral Doppler, and color Doppler  The heart rate  was 85 bpm, at the start of the study  Images were obtained from the  parasternal, apical, subcostal, and suprasternal notch acoustic windows  Image  quality was adequate  LEFT VENTRICLE: Size was normal  Systolic function was normal by Teichholz  Ejection fraction was estimated in the range of 55 % to 60 % to be 56 %  There  were no regional wall motion abnormalities  Wall thickness was normal  No  evidence of apical thrombus  DOPPLER: Left ventricular diastolic function  parameters were normal     VENTRICULAR SEPTUM: There was "bounce" motion  RIGHT VENTRICLE: The size was normal  Systolic function was normal  Wall  thickness was normal     LEFT ATRIUM: Size was normal     RIGHT ATRIUM: Size was normal     MITRAL VALVE: Valve structure was normal  There was normal leaflet separation  DOPPLER: The transmitral velocity was within the normal range  There was no  evidence for stenosis  There was no significant regurgitation  AORTIC VALVE: The valve was trileaflet  Leaflets exhibited normal thickness and  normal cuspal separation  DOPPLER: Transaortic velocity was within the normal  range  There was no evidence for stenosis  There was no significant  regurgitation  TRICUSPID VALVE: The valve structure was normal  There was normal leaflet  separation  DOPPLER: The transtricuspid velocity was within the normal range  There was no evidence for stenosis  There was trace regurgitation  Pulmonary  artery systolic pressure was within the normal range  Estimated peak PA  pressure was 26 mmHg      PULMONIC VALVE: Leaflets exhibited normal thickness, no calcification, and  normal cuspal separation  DOPPLER: The transpulmonic velocity was within the  normal range  There was trace regurgitation  PERICARDIUM: There was no pericardial effusion  The pericardium was normal in  appearance  AORTA: The root exhibited normal size  SYSTEMIC VEINS: IVC: The inferior vena cava was normal in size  SYSTEM MEASUREMENT TABLES    2D mode  AoR Diam 2D: 2 5 cm  LA Diam (2D): 3 cm  LA/Ao (2D): 1 2  FS (2D Teich): 31 9 %  IVSd (2D): 0 78 cm  LVDEV: 78 6 cm³  LVEDV MOD BP: 108 cm³  LVESV: 31 1 cm³  LVIDd(2D): 4 2 cm  LVISd (2D): 2 86 cm  LVOT Area 2D: 2 84 cm squared  LVPWd (2D): 0 71 cm  SV (Teich): 47 5 cm³    Apical four chamber  LVEF A4C: 54 %    Apical two chamber  LA Area: 12 cm squared  LA Volume: 30 cm³  LVEF A2C: 58 %    Unspecified Scan Mode  JANELLE Cont Eq (Peak Home): 2 49 cm squared  LVOT Diam : 1 9 cm  LVOT Vmax: 1130 mm/s  LVOT Vmax; Mean: 1130 mm/s  Peak Grad ; Mean: 5 mm[Hg]  MV Peak A Home: 859 mm/s  MV Peak E Home  Mean: 913 mm/s  MVA (PHT): 7 1 cm squared  PHT: 31 ms  Max P mm[Hg]  V Max: 2520 mm/s  Vmax: 2290 mm/s  RA Area: 10 3 cm squared  RA Volume: 19 4 cm³  TAPSE: 2 cm    [facility] [place] [facility] Accredited Echocardiography Laboratory    Prepared and electronically signed by    Aidee Prince MD  Signed 08-Dec-2016 11:44:54       No results found for this or any previous visit  No results found for this or any previous visit  No results found for this or any previous visit  No results found for this or any previous visit  No results found for this or any previous visit  Imaging:  Chest X-Ray:   No Chest XR results available for this patient  CT-scan of the chest:     No CTA results available for this patient    Lab Review   Lab Results   Component Value Date    WBC 11 24 (H) 2021    HGB 14 0 2021    HCT 45 1 2021    MCV 95 2021    RDW 12 1 2021     2021     BMP:  Lab Results   Component Value Date SODIUM 137 01/08/2021    K 3 9 01/08/2021     01/08/2021    CO2 29 01/08/2021    ANIONGAP 7 12/16/2014    BUN 14 01/08/2021    CREATININE 1 13 01/08/2021    GLUC 76 01/08/2021    GLUF 98 05/02/2019    CALCIUM 8 8 01/08/2021    EGFR 68 01/08/2021    MG 2 0 04/24/2020     LFT:  Lab Results   Component Value Date    AST 21 01/08/2021    ALT 28 01/08/2021    ALKPHOS 79 01/08/2021    TP 8 5 (H) 01/08/2021    ALB 3 9 01/08/2021      Lab Results   Component Value Date    OZL1MBSPQCOW 2 170 06/30/2019     No components found for: [employer] [facility]  Lab Results   Component Value Date    HGBA1C 5 4 07/01/2019     Lipid Profile:   Lab Results   Component Value Date    CHOLESTEROL 133 07/05/2019    HDL 42 07/05/2019    LDLCALC 73 07/05/2019    TRIG 88 07/05/2019     Lab Results   Component Value Date    CHOLESTEROL 133 07/05/2019    CHOLESTEROL 219 (H) 05/02/2019     Lab Results   Component Value Date    CKTOTAL 59 10/06/2019    TROPONINI <0 02 12/17/2020     Lab Results   Component Value Date    NTBNP 96 05/22/2019      No results found for this or any previous visit (from the past 672 hour(s))  Dr Reza Springer MD, [facility] [place]      "This note has been constructed using a voice recognition system  Therefore there may be syntax, spelling, and/or grammatical errors   Please call if you have any questions  "

## 2021-05-01 ENCOUNTER — NURSE TRIAGE (OUTPATIENT)
Dept: OTHER | Facility: OTHER | Age: 26
End: 2021-05-01

## 2021-05-01 DIAGNOSIS — R42 VERTIGO: ICD-10-CM

## 2021-05-01 RX ORDER — ONDANSETRON 4 MG/1
4 TABLET, ORALLY DISINTEGRATING ORAL EVERY 6 HOURS PRN
Qty: 20 TABLET | Refills: 0 | Status: SHIPPED | OUTPATIENT
Start: 2021-05-01 | End: 2021-06-17

## 2021-05-01 NOTE — TELEPHONE ENCOUNTER
Regarding: nausea - needs refill on zofran  ----- Message from Juju Nelson MA sent at 5/1/2021 12:16 PM EDT -----  "I have been very nauseas for the last 4 days/nights and I can't stand it anymore   I am running out of my zofran and I need something before Monday  "

## 2021-05-01 NOTE — TELEPHONE ENCOUNTER
On call reached via TC  Dr Bessy Lakhani will call pt himself  Pt stated she had a positive pregnany test at home in March and shortly after had a miscarriage at home  She did not follow up with her OBGYN  She had period from 4/28 until today  She is not sure if she could be pregnant because she had a tubal ligation  Pt sounds overwhelmed and anxious on the phone  Reason for Disposition   Nausea lasts > 1 week    Answer Assessment - Initial Assessment Questions  1  NAUSEA SEVERITY: "How bad is the nausea?" (e g , mild, moderate, severe; dehydration, weight loss)    - MILD: loss of appetite without change in eating habits    - MODERATE: decreased oral intake without significant weight loss, dehydration, or malnutrition    - SEVERE: inadequate caloric or fluid intake, significant weight loss, symptoms of dehydration   Moderate    2  ONSET: "When did the nausea begin?"      1 week ago  Taking zofran without relief  3  VOMITING: "Any vomiting?" If so, ask: "How many times today?"      No     4  RECURRENT SYMPTOM: "Have you had nausea before?" If so, ask: "When was the last time?" "What happened that time?"      Been going on for a week  5  CAUSE: "What do you think is causing the nausea?"      Unknown     6  PREGNANCY: "Is there any chance you are pregnant?" (e g , unprotected intercourse, missed birth control pill, broken condom)      Said had positive home test in March and then a miscarriage at home  Never followed up with Kiran Kong  States she had her period from 4/28 until today  Also states she is unable to get pregnant because she was fixed      Protocols used: NAUSEA-ADULT-AH

## 2021-05-24 ENCOUNTER — HOSPITAL ENCOUNTER (EMERGENCY)
Facility: HOSPITAL | Age: 26
Discharge: HOME | End: 2021-05-24
Attending: EMERGENCY MEDICINE
Payer: MEDICARE

## 2021-05-24 VITALS
DIASTOLIC BLOOD PRESSURE: 55 MMHG | WEIGHT: 211.64 LBS | RESPIRATION RATE: 20 BRPM | OXYGEN SATURATION: 98 % | SYSTOLIC BLOOD PRESSURE: 103 MMHG | HEART RATE: 95 BPM | BODY MASS INDEX: 36.13 KG/M2 | TEMPERATURE: 98.1 F | HEIGHT: 64 IN

## 2021-05-24 DIAGNOSIS — B37.2 CANDIDIASIS, INTERTRIGINOUS: Primary | ICD-10-CM

## 2021-05-24 PROCEDURE — 99283 EMERGENCY DEPT VISIT LOW MDM: CPT

## 2021-05-24 PROCEDURE — 99282 EMERGENCY DEPT VISIT SF MDM: CPT | Performed by: EMERGENCY MEDICINE

## 2021-05-24 RX ORDER — CLOTRIMAZOLE 1 %
CREAM (GRAM) TOPICAL
Qty: 15 G | Refills: 0 | Status: SHIPPED | OUTPATIENT
Start: 2021-05-24 | End: 2022-01-21 | Stop reason: ALTCHOICE

## 2021-05-24 NOTE — ED PROVIDER NOTES
History  Chief Complaint   Patient presents with    Rash     pt stating itchy rash on R groin      Pt in the ER with EMS with c/o 1 day of a groin rash  She states the rash is pruritic and is worsening since she took 1 dose of Benadryl  Patient denies vaginal discharge  She is not concerned for STDs as she gets tested every 2 weeks  She denies abdominal pain  She denies fevers or chills  History provided by:  Patient and EMS personnel   used: No    Rash  Location:  Pelvis  Pelvic rash location:  Groin  Quality: itchiness    Severity:  Mild  Onset quality:  Sudden  Timing:  Constant  Progression:  Worsening  Chronicity:  New  Relieved by:  None tried  Worsened by:  Nothing  Ineffective treatments:  None tried  Associated symptoms: no abdominal pain, no diarrhea, no fever, no nausea, no shortness of breath and not vomiting        Prior to Admission Medications   Prescriptions Last Dose Informant Patient Reported? Taking? EPINEPHrine (EPIPEN) 0 3 mg/0 3 mL SOAJ   No No   Sig: Inject 0 3 mL (0 3 mg total) into a muscle once for 1 dose   FLUoxetine (PROzac) 40 MG capsule   Yes No   Sig: Take 40 mg by mouth daily   QUEtiapine (SEROquel) 100 mg tablet   Yes No   Sig: Take 100 mg by mouth daily at bedtime   albuterol (PROVENTIL HFA,VENTOLIN HFA) 90 mcg/act inhaler   No No   Sig: Inhale 2 puffs every 6 (six) hours as needed for wheezing or shortness of breath   fluticasone (FLOVENT HFA) 110 MCG/ACT inhaler   No No   Sig: Inhale 2 puffs 2 (two) times a day Rinse mouth after use     ibuprofen (MOTRIN) 400 mg tablet   No No   Sig: Take 1 tablet (400 mg total) by mouth 2 (two) times a day   meclizine (ANTIVERT) 25 mg tablet   No No   Sig: Take 1 tablet (25 mg total) by mouth 3 (three) times a day as needed for dizziness for up to 30 doses   Patient not taking: Reported on 2/12/2021   ondansetron (ZOFRAN-ODT) 4 mg disintegrating tablet   No No   Sig: Take 1 tablet (4 mg total) by mouth every 6 (six) hours as needed for nausea for up to 20 doses   predniSONE 10 mg tablet   No No   Si po daily x2 days, then 3 po daily x2 days, then 2 po daily x2 days,then 1 po daily x2days   Patient not taking: Reported on 3/19/2021      Facility-Administered Medications: None       Past Medical History:   Diagnosis Date    ADHD     Anxiety     Asthma     Bipolar 1 disorder ([place] [address] )     Depression     Diabetes mellitus ([place] [address] )     GERD (gastroesophageal reflux disease)     Mental and behavioral problem     Migraine     Palpitations     Last Assessed     Psychiatric disorder     Psychiatric illness     Psychosis ([place] [address] )     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[address] )     Seizures ([place] [address] )     last time 2 weeks ago- petit mal    Seizures ([place] [address] )     Pseudoseizures    Stabbing chest pain     Last Assessed     [facility])     Tachycardia     Last Assessed 2016    Upper respiratory disease     Last Assessed 2016       Past Surgical History:   Procedure Laterality Date    KNEE SURGERY      MT LAP,TUBAL CAUTERY N/A 10/3/2018    Procedure: LAPAROSCOPIC TUBAL LIGATION;  Surgeon: Ania Gutierrez MD;  Location: [address] [place] [address];  Service: Gynecology    TUBAL LIGATION         Family History   Problem Relation Age of Onset    Migraines Sister     Cancer Mother     Diabetes Mother     Cancer Father     Diabetes Father     Arthritis Father     Cancer Maternal Grandmother     Cancer Maternal Aunt     Cancer Paternal Uncle     Diabetes Other      I have reviewed and agree with the history as documented      E-Cigarette/Vaping    E-Cigarette Use Former User      E-Cigarette/Vaping Substances    Nicotine No     THC No     CBD No     Flavoring No     Other No     Unknown No      Social History     Tobacco Use    Smoking status: Former Smoker     Packs/day: 0 50     Types: Cigarettes    Smokeless tobacco: Never Used   Substance Use Topics    Alcohol use: Not Currently     Comment: occasional    Drug use: Not Currently     Frequency: 1 0 times per week       Review of Systems   Constitutional: Negative for chills and fever  HENT: Negative for facial swelling and trouble swallowing  Respiratory: Negative for cough, chest tightness and shortness of breath  Gastrointestinal: Negative for abdominal pain, diarrhea, nausea and vomiting  Genitourinary: Negative for dysuria, frequency, hematuria and urgency  Musculoskeletal: Negative for back pain, neck pain and neck stiffness  Skin: Positive for rash  All other systems reviewed and are negative  Physical Exam  Physical Exam  Vitals signs and nursing note reviewed  Constitutional:       General: She is not in acute distress  Appearance: She is well-developed  She is not diaphoretic  HENT:      Head: Normocephalic and atraumatic  Eyes:      Conjunctiva/sclera: Conjunctivae normal       Pupils: Pupils are equal, round, and reactive to light  Neck:      Musculoskeletal: Normal range of motion and neck supple  Cardiovascular:      Rate and Rhythm: Normal rate and regular rhythm  Heart sounds: Normal heart sounds  No murmur  Pulmonary:      Effort: Pulmonary effort is normal  No respiratory distress  Breath sounds: Normal breath sounds  Abdominal:      General: Bowel sounds are normal  There is no distension  Palpations: Abdomen is soft  Tenderness: There is no abdominal tenderness  Musculoskeletal: Normal range of motion  General: No deformity  Skin:     General: Skin is warm and dry  Capillary Refill: Capillary refill takes less than 2 seconds  Coloration: Skin is not pale  Findings: No rash  Neurological:      General: No focal deficit present  Mental Status: She is alert and oriented to person, place, and time  Cranial Nerves: No cranial nerve deficit  Psychiatric:         Mood and Affect: Mood is anxious           Behavior: Behavior normal          Vital Signs  ED Triage Vitals   Temperature Pulse Respirations Blood Pressure SpO2   05/24/21 0101 05/24/21 0101 05/24/21 0101 05/24/21 0104 05/24/21 0101   98 1 °F (36 7 °C) 95 20 103/55 98 %      Temp Source Heart Rate Source Patient Position - Orthostatic VS BP Location FiO2 (%)   05/24/21 0101 05/24/21 0101 05/24/21 0101 05/24/21 0101 --   Temporal Monitor Sitting Left arm       Pain Score       --                  Vitals:    05/24/21 0101 05/24/21 0104   BP:  103/55   Pulse: 95    Patient Position - Orthostatic VS: Sitting          Visual Acuity      ED Medications  Medications - No data to display    Diagnostic Studies  Results Reviewed     None                 No orders to display              Procedures  Procedures         ED Course                                           MDM  Number of Diagnoses or Management Options  Candidiasis, intertriginous: new and requires workup  Diagnosis management comments: Patient with complaint of a right groin rash  Patient with intertriginous candidiasis  Will start on a course of clotrimazole  Patient to follow-up with primary care physician  Patient will be discharged to home    Risk of Complications, Morbidity, and/or Mortality  Presenting problems: high  Diagnostic procedures: high  Management options: high    Patient Progress  Patient progress: improved      Disposition  Final diagnoses:   Candidiasis, intertriginous     Time reflects when diagnosis was documented in both MDM as applicable and the Disposition within this note     Time User Action Codes Description Comment    5/24/2021  1:06 AM Joe Abrams Add [B37 2] Candidiasis, intertriginous       ED Disposition     ED Disposition Condition Date/Time Comment    Discharge Stable Mon May 24, 2021  1:02 AM [place] Tolliver discharge to home/self care              Follow-up Information     Follow up With Specialties Details Why Contact Info    Antonietta Randle, DO Family Medicine Schedule an appointment as soon as possible for a visit in 2 days for follow up [address]   [phone]            Patient's Medications   Discharge Prescriptions    CLOTRIMAZOLE (LOTRIMIN) 1 % CREAM    Apply to affected area 2 times daily for 2-4 wks       Start Date: 5/24/2021 End Date: --       Order Dose: --       Quantity: 15 g    Refills: 0     No discharge procedures on file      PDMP Review     None          ED Provider  Electronically Signed by           Alexa Dennis DO  05/24/21 [id number]

## 2021-05-24 NOTE — DISCHARGE INSTRUCTIONS
Return to the ER for further concerns or worsening symptoms  Follow up with your primary care physician in 1-2 days  Apply medication as prescribed

## 2021-05-27 ENCOUNTER — NURSE TRIAGE (OUTPATIENT)
Dept: OTHER | Facility: OTHER | Age: 26
End: 2021-05-27

## 2021-05-27 NOTE — TELEPHONE ENCOUNTER
Reason for Disposition   Blood glucose  mg/dL (3 9 -13 3 mmol/L)    Answer Assessment - Initial Assessment Questions  1  BLOOD GLUCOSE: "What is your blood glucose level?"       132  2  ONSET: "When did you check the blood glucose?"      States she is pre-diabetic  3  USUAL RANGE: "What is your glucose level usually?" (e g , usual fasting morning value, usual evening value)      105    5  TYPE 1 or 2:  "Do you know what type of diabetes you have?"  (e g , Type 1, Type 2, Gestational; doesn't know)       Pre-diabetic    8  OTHER SYMPTOMS: "Do you have any symptoms?" (e g , fever, frequent urination, difficulty breathing, dizziness, weakness, vomiting)      Nausea and some dizziness that has resolved  9   PREGNANCY: "Is there any chance you are pregnant?" "When was your last menstrual period?"      no    Protocols used: DIABETES - HIGH BLOOD SUGAR-ADULT-

## 2021-05-27 NOTE — TELEPHONE ENCOUNTER
Regarding: blood sugar question  ----- Message from Cody Aldana RN sent at 5/27/2021  5:10 PM EDT -----  "My blood sugar is 132 I have been checking it all day because I am nauseated and dizzy and I want to know if this is normal"

## 2021-05-28 NOTE — TELEPHONE ENCOUNTER
Called patient to inquire about her call to [facility] and to call back if she still has any questions or concerns

## 2021-06-17 ENCOUNTER — HOSPITAL ENCOUNTER (EMERGENCY)
Facility: HOSPITAL | Age: 26
Discharge: HOME | End: 2021-06-17
Attending: EMERGENCY MEDICINE | Admitting: EMERGENCY MEDICINE
Payer: MEDICARE

## 2021-06-17 ENCOUNTER — TELEPHONE (OUTPATIENT)
Dept: FAMILY MEDICINE CLINIC | Facility: CLINIC | Age: 26
End: 2021-06-17

## 2021-06-17 VITALS
WEIGHT: 199.8 LBS | OXYGEN SATURATION: 97 % | BODY MASS INDEX: 34.3 KG/M2 | RESPIRATION RATE: 18 BRPM | DIASTOLIC BLOOD PRESSURE: 76 MMHG | HEART RATE: 96 BPM | SYSTOLIC BLOOD PRESSURE: 117 MMHG | TEMPERATURE: 98.1 F

## 2021-06-17 DIAGNOSIS — R11.10 VOMITING: Primary | ICD-10-CM

## 2021-06-17 DIAGNOSIS — K29.70 GASTRITIS: ICD-10-CM

## 2021-06-17 LAB
EXT PREG TEST URINE: NEGATIVE
EXT. CONTROL ED NAV: NORMAL

## 2021-06-17 PROCEDURE — 81025 URINE PREGNANCY TEST: CPT | Performed by: EMERGENCY MEDICINE

## 2021-06-17 PROCEDURE — 99284 EMERGENCY DEPT VISIT MOD MDM: CPT

## 2021-06-17 PROCEDURE — 99284 EMERGENCY DEPT VISIT MOD MDM: CPT | Performed by: EMERGENCY MEDICINE

## 2021-06-17 RX ORDER — PANTOPRAZOLE SODIUM 40 MG/1
40 TABLET, DELAYED RELEASE ORAL DAILY
Qty: 30 TABLET | Refills: 1 | Status: SHIPPED | OUTPATIENT
Start: 2021-06-17 | End: 2022-01-13 | Stop reason: SDUPTHER

## 2021-06-17 RX ORDER — ONDANSETRON 4 MG/1
4 TABLET, FILM COATED ORAL EVERY 6 HOURS PRN
Qty: 20 TABLET | Refills: 0 | Status: SHIPPED | OUTPATIENT
Start: 2021-06-17 | End: 2021-06-24 | Stop reason: SDUPTHER

## 2021-06-17 NOTE — ED PROVIDER NOTES
History  Chief Complaint   Patient presents with    Vomiting Blood     States for the past 2-3 days when she wakes up she vomits and has dark red blood in vomit  States normal BM's   States pain upper abdomen and also gets chest pain and uses her inhaler  Also, headaches and fevers    Seizure - Prior Hx Of     States her stress seizures are coming back      27 yo female c/o vomiting and upper abdominal pain x 3 days  Says there is visible blood in the vomitus  No diarrhea  No fever  No cough  Has not taken any meds for it  Says her PCP told her to come to the ER  History provided by:  Patient   used: No    Seizure - Prior Hx Of      Prior to Admission Medications   Prescriptions Last Dose Informant Patient Reported? Taking? EPINEPHrine (EPIPEN) 0 3 mg/0 3 mL SOAJ   No No   Sig: Inject 0 3 mL (0 3 mg total) into a muscle once for 1 dose   FLUoxetine (PROzac) 40 MG capsule   Yes No   Sig: Take 40 mg by mouth daily   QUEtiapine (SEROquel) 100 mg tablet   Yes No   Sig: Take 100 mg by mouth daily at bedtime   albuterol (PROVENTIL HFA,VENTOLIN HFA) 90 mcg/act inhaler   No No   Sig: Inhale 2 puffs every 6 (six) hours as needed for wheezing or shortness of breath   clotrimazole (LOTRIMIN) 1 % cream   No No   Sig: Apply to affected area 2 times daily for 2-4 wks   fluticasone (FLOVENT HFA) 110 MCG/ACT inhaler   No No   Sig: Inhale 2 puffs 2 (two) times a day Rinse mouth after use     ibuprofen (MOTRIN) 400 mg tablet   No No   Sig: Take 1 tablet (400 mg total) by mouth 2 (two) times a day   meclizine (ANTIVERT) 25 mg tablet   No No   Sig: Take 1 tablet (25 mg total) by mouth 3 (three) times a day as needed for dizziness for up to 30 doses   Patient not taking: Reported on 2021   predniSONE 10 mg tablet   No No   Si po daily x2 days, then 3 po daily x2 days, then 2 po daily x2 days,then 1 po daily x2days   Patient not taking: Reported on 3/19/2021      Facility-Administered Medications: None       Past Medical History:   Diagnosis Date    ADHD     Anxiety     Asthma     Bipolar 1 disorder ([facility] )     Depression     Diabetes mellitus ([facility] )     GERD (gastroesophageal reflux disease)     Mental and behavioral problem     Migraine     Palpitations     Last Assessed [id number]    Psychiatric disorder     Psychiatric illness     Psychosis ([facility] )     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[facility] )     Seizures ([facility] )     last time 2 weeks ago- petit mal    Seizures ([facility] )     Pseudoseizures    Stabbing chest pain     Last Assessed [id number]    [facility])     Tachycardia     Last Assessed [id number]    Upper respiratory disease     Last Assessed 27Jan2016       Past Surgical History:   Procedure Laterality Date    KNEE SURGERY      RI LAP,TUBAL CAUTERY N/A 10/3/2018    Procedure: LAPAROSCOPIC TUBAL LIGATION;  Surgeon: Levon Anderson MD;  Location: [address] [place] [address];  Service: Gynecology    TUBAL LIGATION         Family History   Problem Relation Age of Onset    Migraines Sister     Cancer Mother     Diabetes Mother     Cancer Father     Diabetes Father     Arthritis Father     Cancer Maternal Grandmother     Cancer Maternal Aunt     Cancer Paternal Uncle     Diabetes Other      I have reviewed and agree with the history as documented  E-Cigarette/Vaping    E-Cigarette Use Former User      E-Cigarette/Vaping Substances    Nicotine No     THC No     CBD No     Flavoring No     Other No     Unknown No      Social History     Tobacco Use    Smoking status: Former Smoker     Packs/day: 0 50     Types: Cigarettes    Smokeless tobacco: Never Used   Vaping Use    Vaping Use: Former   Substance Use Topics    Alcohol use: Not Currently     Comment: occasional    Drug use: Not Currently     Frequency: 1 0 times per week       Review of Systems   Constitutional: Negative  Negative for fever  HENT: Negative  Eyes: Negative  Respiratory: Negative    Negative for cough and shortness of breath  Cardiovascular: Negative  Negative for chest pain  Gastrointestinal: Positive for abdominal pain and vomiting  Negative for diarrhea and nausea  Genitourinary: Negative  Negative for dysuria and flank pain  Musculoskeletal: Negative  Negative for back pain and myalgias  Skin: Negative  Negative for rash and wound  Neurological: Negative  Negative for dizziness and headaches  Hematological: Does not bruise/bleed easily  Psychiatric/Behavioral: Negative  All other systems reviewed and are negative  Physical Exam  Physical Exam  Vitals and nursing note reviewed  Constitutional:       General: She is not in acute distress  Appearance: She is well-developed  She is not ill-appearing, toxic-appearing or diaphoretic  HENT:      Head: Normocephalic and atraumatic  Eyes:      Conjunctiva/sclera: Conjunctivae normal    Cardiovascular:      Rate and Rhythm: Normal rate and regular rhythm  Heart sounds: Normal heart sounds  No murmur heard  Pulmonary:      Effort: Pulmonary effort is normal  No respiratory distress  Breath sounds: Normal breath sounds  Abdominal:      General: Bowel sounds are normal  There is no distension  Palpations: Abdomen is soft  Tenderness: There is no abdominal tenderness  There is no guarding  Musculoskeletal:         General: No deformity  Normal range of motion  Cervical back: Neck supple  Skin:     General: Skin is warm and dry  Coloration: Skin is not pale  Findings: No rash  Neurological:      General: No focal deficit present  Mental Status: She is alert and oriented to person, place, and time  Cranial Nerves: No cranial nerve deficit     Psychiatric:         Mood and Affect: Mood normal          Behavior: Behavior normal          Vital Signs  ED Triage Vitals [06/17/21 1434]   Temperature Pulse Respirations Blood Pressure SpO2   98 1 °F (36 7 °C) 96 18 117/76 97 %      Temp Source Heart Rate Source Patient Position - Orthostatic VS BP Location FiO2 (%)   Tympanic Monitor Sitting Left arm --      Pain Score       8           Vitals:    06/17/21 1434   BP: 117/76   Pulse: 96   Patient Position - Orthostatic VS: Sitting         Visual Acuity      ED Medications  Medications - No data to display    Diagnostic Studies  Results Reviewed     Procedure Component Value Units Date/Time    POCT pregnancy, urine [[id number]]     Lab Status: No result                  No orders to display              Procedures  Procedures         ED Course                                           MDM  Number of Diagnoses or Management Options  Gastritis  Vomiting  Diagnosis management comments: Exam benign, vitals normal   Will try course of protonix for probable gastritis and zofran prn for vomiting  Advised follow up with PCP  Disposition  Final diagnoses:   Vomiting   Gastritis     Time reflects when diagnosis was documented in both MDM as applicable and the Disposition within this note     Time User Action Codes Description Comment    8/59/8954  6:96 PM Closplint Naas A Add [Y71 98] Vomiting     4/14/4627  4:71 PM Nirmala Damian Add [M40 37] Gastritis       ED Disposition     ED Disposition Condition Date/Time Comment    Discharge Stable Thu Jun 17, 2021  3:20 PM Jamaica Kilgore discharge to home/self care              Follow-up Information     Follow up With Specialties Details Why Contact Info    Maral Hahn DO Family Medicine Schedule an appointment as soon as possible for a visit   [address] [place] [address]  [phone]            Patient's Medications   Discharge Prescriptions    ONDANSETRON (ZOFRAN) 4 MG TABLET    Take 1 tablet (4 mg total) by mouth every 6 (six) hours as needed for nausea or vomiting       Start Date: 6/17/2021 End Date: --       Order Dose: 4 mg       Quantity: 20 tablet    Refills: 0    PANTOPRAZOLE (PROTONIX) 40 MG TABLET    Take 1 tablet (40 mg total) by mouth daily       Start Date: 6/17/2021 End Date: --       Order Dose: 40 mg       Quantity: 30 tablet    Refills: 1     No discharge procedures on file      PDMP Review     None          ED Provider  Electronically Signed by           Hilary Cazares MD  00/96/79 9092

## 2021-06-17 NOTE — TELEPHONE ENCOUNTER
Spoke to patient who is experiencing throwing-up blood in the AM for 3-4 days, also having fever on and off, abd pain and SOB    Advised to go to the ER for evaluation, patient verbalizes understanding

## 2021-06-17 NOTE — TELEPHONE ENCOUNTER
Patient called wanting to speak with a dr, she is spitting up blood   Transferred call to Nomi THIBODEAUX

## 2021-06-21 ENCOUNTER — HOSPITAL ENCOUNTER (EMERGENCY)
Facility: HOSPITAL | Age: 26
Discharge: HOME | End: 2021-06-21
Payer: MEDICARE

## 2021-06-21 ENCOUNTER — APPOINTMENT (EMERGENCY)
Dept: CT IMAGING | Facility: HOSPITAL | Age: 26
End: 2021-06-21
Payer: MEDICARE

## 2021-06-21 ENCOUNTER — TELEPHONE (OUTPATIENT)
Dept: FAMILY MEDICINE CLINIC | Facility: CLINIC | Age: 26
End: 2021-06-21

## 2021-06-21 VITALS
OXYGEN SATURATION: 100 % | TEMPERATURE: 98.8 F | RESPIRATION RATE: 18 BRPM | DIASTOLIC BLOOD PRESSURE: 52 MMHG | SYSTOLIC BLOOD PRESSURE: 113 MMHG | HEART RATE: 87 BPM

## 2021-06-21 DIAGNOSIS — R11.2 NAUSEA AND VOMITING, INTRACTABILITY OF VOMITING NOT SPECIFIED, UNSPECIFIED VOMITING TYPE: Primary | ICD-10-CM

## 2021-06-21 LAB
ALBUMIN SERPL BCP-MCNC: 4.2 G/DL (ref 3.4–4.8)
ALP SERPL-CCNC: 44.1 U/L (ref 35–140)
ALT SERPL W P-5'-P-CCNC: 13 U/L (ref 5–54)
ANION GAP SERPL CALCULATED.3IONS-SCNC: 10 MMOL/L (ref 4–13)
APTT PPP: 30 SECONDS (ref 23–31)
AST SERPL W P-5'-P-CCNC: 13 U/L (ref 15–41)
BACTERIA UR QL AUTO: ABNORMAL /HPF
BASOPHILS # BLD AUTO: 0.03 THOUSANDS/ΜL (ref 0–0.1)
BASOPHILS NFR BLD AUTO: 1 % (ref 0–1)
BILIRUB SERPL-MCNC: 0.57 MG/DL (ref 0.3–1.2)
BILIRUB UR QL STRIP: NEGATIVE
BUN SERPL-MCNC: 8 MG/DL (ref 6–20)
CALCIUM SERPL-MCNC: 9.3 MG/DL (ref 8.4–10.2)
CHLORIDE SERPL-SCNC: 104 MMOL/L (ref 96–108)
CLARITY UR: ABNORMAL
CO2 SERPL-SCNC: 26 MMOL/L (ref 22–33)
COLOR UR: YELLOW
CREAT SERPL-MCNC: 0.97 MG/DL (ref 0.4–1.1)
EOSINOPHIL # BLD AUTO: 0.1 THOUSAND/ΜL (ref 0–0.61)
EOSINOPHIL NFR BLD AUTO: 2 % (ref 0–6)
ERYTHROCYTE [DISTWIDTH] IN BLOOD BY AUTOMATED COUNT: 12.9 % (ref 11.6–15.1)
GFR SERPL CREATININE-BSD FRML MDRD: 82 ML/MIN/1.73SQ M
GLUCOSE SERPL-MCNC: 109 MG/DL (ref 65–140)
GLUCOSE UR STRIP-MCNC: NEGATIVE MG/DL
HCG SERPL QL: NEGATIVE
HCT VFR BLD AUTO: 40.8 % (ref 34.8–46.1)
HGB BLD-MCNC: 13.6 G/DL (ref 11.5–15.4)
HGB UR QL STRIP.AUTO: ABNORMAL
IMM GRANULOCYTES # BLD AUTO: 0.03 THOUSAND/UL (ref 0–0.2)
IMM GRANULOCYTES NFR BLD AUTO: 1 % (ref 0–2)
INR PPP: 0.94 (ref 0.9–1.1)
KETONES UR STRIP-MCNC: ABNORMAL MG/DL
LEUKOCYTE ESTERASE UR QL STRIP: ABNORMAL
LYMPHOCYTES # BLD AUTO: 1.66 THOUSANDS/ΜL (ref 0.6–4.47)
LYMPHOCYTES NFR BLD AUTO: 25 % (ref 14–44)
MCH RBC QN AUTO: 29.9 PG (ref 26.8–34.3)
MCHC RBC AUTO-ENTMCNC: 33.3 G/DL (ref 31.4–37.4)
MCV RBC AUTO: 90 FL (ref 82–98)
MONOCYTES # BLD AUTO: 0.53 THOUSAND/ΜL (ref 0.17–1.22)
MONOCYTES NFR BLD AUTO: 8 % (ref 4–12)
NEUTROPHILS # BLD AUTO: 4.3 THOUSANDS/ΜL (ref 1.85–7.62)
NEUTS SEG NFR BLD AUTO: 63 % (ref 43–75)
NITRITE UR QL STRIP: POSITIVE
NON-SQ EPI CELLS URNS QL MICRO: ABNORMAL /HPF
PH UR STRIP.AUTO: 6 [PH]
PLATELET # BLD AUTO: 353 THOUSANDS/UL (ref 149–390)
PMV BLD AUTO: 9.1 FL (ref 8.9–12.7)
POTASSIUM SERPL-SCNC: 3.6 MMOL/L (ref 3.5–5)
PROT SERPL-MCNC: 7.3 G/DL (ref 6.4–8.3)
PROT UR STRIP-MCNC: ABNORMAL MG/DL
PROTHROMBIN TIME: 10.6 SECONDS (ref 9.5–12.1)
RBC # BLD AUTO: 4.55 MILLION/UL (ref 3.81–5.12)
RBC #/AREA URNS AUTO: ABNORMAL /HPF
SODIUM SERPL-SCNC: 140 MMOL/L (ref 133–145)
SP GR UR STRIP.AUTO: 1.02 (ref 1–1.03)
UROBILINOGEN UR QL STRIP.AUTO: 1 E.U./DL
WBC # BLD AUTO: 6.65 THOUSAND/UL (ref 4.31–10.16)
WBC #/AREA URNS AUTO: ABNORMAL /HPF

## 2021-06-21 PROCEDURE — 96374 THER/PROPH/DIAG INJ IV PUSH: CPT

## 2021-06-21 PROCEDURE — 85025 COMPLETE CBC W/AUTO DIFF WBC: CPT

## 2021-06-21 PROCEDURE — 36415 COLL VENOUS BLD VENIPUNCTURE: CPT

## 2021-06-21 PROCEDURE — 80053 COMPREHEN METABOLIC PANEL: CPT

## 2021-06-21 PROCEDURE — 96361 HYDRATE IV INFUSION ADD-ON: CPT

## 2021-06-21 PROCEDURE — 81001 URINALYSIS AUTO W/SCOPE: CPT

## 2021-06-21 PROCEDURE — 84703 CHORIONIC GONADOTROPIN ASSAY: CPT

## 2021-06-21 PROCEDURE — 96375 TX/PRO/DX INJ NEW DRUG ADDON: CPT

## 2021-06-21 PROCEDURE — 99284 EMERGENCY DEPT VISIT MOD MDM: CPT

## 2021-06-21 PROCEDURE — 85730 THROMBOPLASTIN TIME PARTIAL: CPT

## 2021-06-21 PROCEDURE — 85610 PROTHROMBIN TIME: CPT

## 2021-06-21 PROCEDURE — 74176 CT ABD & PELVIS W/O CONTRAST: CPT

## 2021-06-21 PROCEDURE — G1004 CDSM NDSC: HCPCS

## 2021-06-21 RX ORDER — SODIUM CHLORIDE 9 MG/ML
500 INJECTION, SOLUTION INTRAVENOUS CONTINUOUS
Status: DISCONTINUED | OUTPATIENT
Start: 2021-06-21 | End: 2021-06-21 | Stop reason: HOSPADM

## 2021-06-21 RX ORDER — ONDANSETRON 2 MG/ML
4 INJECTION INTRAMUSCULAR; INTRAVENOUS ONCE
Status: COMPLETED | OUTPATIENT
Start: 2021-06-21 | End: 2021-06-21

## 2021-06-21 RX ORDER — ONDANSETRON 4 MG/1
4 TABLET, FILM COATED ORAL EVERY 6 HOURS
Qty: 12 TABLET | Refills: 0 | Status: SHIPPED | OUTPATIENT
Start: 2021-06-21 | End: 2022-01-13 | Stop reason: SDUPTHER

## 2021-06-21 RX ORDER — METOCLOPRAMIDE HYDROCHLORIDE 5 MG/ML
10 INJECTION INTRAMUSCULAR; INTRAVENOUS ONCE
Status: COMPLETED | OUTPATIENT
Start: 2021-06-21 | End: 2021-06-21

## 2021-06-21 RX ADMIN — METOCLOPRAMIDE HYDROCHLORIDE 10 MG: 5 INJECTION INTRAMUSCULAR; INTRAVENOUS at 13:57

## 2021-06-21 RX ADMIN — ONDANSETRON 4 MG: 2 INJECTION INTRAMUSCULAR; INTRAVENOUS at 12:32

## 2021-06-21 RX ADMIN — SODIUM CHLORIDE 500 ML/HR: 0.9 INJECTION, SOLUTION INTRAVENOUS at 12:29

## 2021-06-21 NOTE — ED PROVIDER NOTES
History  Chief Complaint   Patient presents with    Abdominal Pain     pt presents to ed for generalized lower abd pain,n/v/d, states she was dx with gerd at [address] and the medications have not helped, no improvment in the last 3 weeks     23-year-old female history of asthma behavior health issues presents secondary to persistent vomiting for the past few weeks  Patient states she has been worked up for this at [facility] few weeks ago which demonstrated no acute findings  Patient is here today stating that the symptoms have been persistent she can even keep down liquids been having abdominal cramps associated with vomiting  Patient denies vomiting up any blood she denies any blood in stools patient denies any melena hematochezia  Patient is status post bilateral tubal ligation  Patient denies any other prior GI history GI surgical history  Patient states that she is feeling weak secondary to being dehydrated and she also states that she has been I will keep down her medications which is making her feel worse as well  Prior to Admission Medications   Prescriptions Last Dose Informant Patient Reported? Taking? EPINEPHrine (EPIPEN) 0 3 mg/0 3 mL SOAJ   No No   Sig: Inject 0 3 mL (0 3 mg total) into a muscle once for 1 dose   FLUoxetine (PROzac) 40 MG capsule   Yes No   Sig: Take 40 mg by mouth daily   QUEtiapine (SEROquel) 100 mg tablet   Yes No   Sig: Take 100 mg by mouth daily at bedtime   albuterol (PROVENTIL HFA,VENTOLIN HFA) 90 mcg/act inhaler   No No   Sig: Inhale 2 puffs every 6 (six) hours as needed for wheezing or shortness of breath   clotrimazole (LOTRIMIN) 1 % cream   No No   Sig: Apply to affected area 2 times daily for 2-4 wks   fluticasone (FLOVENT HFA) 110 MCG/ACT inhaler   No No   Sig: Inhale 2 puffs 2 (two) times a day Rinse mouth after use     ibuprofen (MOTRIN) 400 mg tablet   No No   Sig: Take 1 tablet (400 mg total) by mouth 2 (two) times a day   meclizine (ANTIVERT) 25 mg tablet   No No   Sig: Take 1 tablet (25 mg total) by mouth 3 (three) times a day as needed for dizziness for up to 30 doses   Patient not taking: Reported on 2021   ondansetron (ZOFRAN) 4 mg tablet   No No   Sig: Take 1 tablet (4 mg total) by mouth every 6 (six) hours as needed for nausea or vomiting   pantoprazole (PROTONIX) 40 mg tablet   No No   Sig: Take 1 tablet (40 mg total) by mouth daily   predniSONE 10 mg tablet   No No   Si po daily x2 days, then 3 po daily x2 days, then 2 po daily x2 days,then 1 po daily x2days   Patient not taking: Reported on 3/19/2021      Facility-Administered Medications: None       Past Medical History:   Diagnosis Date    ADHD     Anxiety     Asthma     [facility]     Depression     Diabetes mellitus [facility]     GERD (gastroesophageal reflux disease)     Mental and behavioral problem     Migraine     Palpitations     Last Assessed [id number]    Psychiatric disorder     Psychiatric illness     Psychosis ([facility]     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[facility]     Seizures ([facility] )     last time 2 weeks ago- petit mal    Seizures ([facility] )     Pseudoseizures    Stabbing chest pain     Last Assessed 2016    Suicide attempt [facility])     Tachycardia     Last Assessed 2016    Upper respiratory disease     Last Assessed 2016       Past Surgical History:   Procedure Laterality Date    KNEE SURGERY      MS LAP,TUBAL CAUTERY N/A 10/3/2018    Procedure: LAPAROSCOPIC TUBAL LIGATION;  Surgeon: Yamil Chapman MD;  Location[facility];  Service: Gynecology    TUBAL LIGATION         Family History   Problem Relation Age of Onset    Migraines Sister     Cancer Mother     Diabetes Mother     Cancer Father     Diabetes Father     Arthritis Father     Cancer Maternal Grandmother     Cancer Maternal Aunt     Cancer Paternal Uncle     Diabetes Other      I have reviewed and agree with the history as documented      E-Cigarette/Vaping    E-Cigarette Use Former User      E-Cigarette/Vaping Substances    Nicotine No     THC No     CBD No     Flavoring No     Other No     Unknown No      Social History     Tobacco Use    Smoking status: Former Smoker     Packs/day: 0 50     Types: Cigarettes    Smokeless tobacco: Never Used   Vaping Use    Vaping Use: Former   Substance Use Topics    Alcohol use: Not Currently     Comment: occasional    Drug use: Not Currently     Frequency: 1 0 times per week       Review of Systems   Constitutional: Negative for chills and fever  HENT: Negative for congestion  Eyes: Negative for visual disturbance  Respiratory: Negative for shortness of breath  Cardiovascular: Negative for chest pain  Gastrointestinal: Positive for abdominal pain, nausea and vomiting  Endocrine: Negative for cold intolerance  Genitourinary: Negative for frequency  Musculoskeletal: Negative for gait problem  Skin: Negative for rash  Neurological: Negative for dizziness  Psychiatric/Behavioral: Negative for behavioral problems and confusion  Physical Exam  Physical Exam  Vitals and nursing note reviewed  Constitutional:       Appearance: She is well-developed  HENT:      Head: Normocephalic and atraumatic  Eyes:      Conjunctiva/sclera: Conjunctivae normal       Pupils: Pupils are equal, round, and reactive to light  Cardiovascular:      Rate and Rhythm: Normal rate and regular rhythm  Heart sounds: Normal heart sounds  Pulmonary:      Effort: Pulmonary effort is normal       Breath sounds: Normal breath sounds  Abdominal:      General: Bowel sounds are normal       Palpations: Abdomen is soft  Tenderness: There is abdominal tenderness in the epigastric area  Musculoskeletal:         General: Normal range of motion  Cervical back: Normal range of motion and neck supple  Skin:     General: Skin is warm and dry  Capillary Refill: Capillary refill takes less than 2 seconds     Neurological:      Mental Status: She is alert and oriented to person, place, and time  Psychiatric:         Behavior: Behavior normal          Vital Signs  ED Triage Vitals   Temperature Pulse Respirations Blood Pressure SpO2   06/21/21 1205 06/21/21 1205 06/21/21 1205 06/21/21 1205 06/21/21 1205   98 8 °F (37 1 °C) 86 18 105/74 99 %      Temp Source Heart Rate Source Patient Position - Orthostatic VS BP Location FiO2 (%)   06/21/21 1205 06/21/21 1205 06/21/21 1412 06/21/21 1205 --   Oral Monitor Sitting Right arm       Pain Score       06/21/21 1205       No Pain           Vitals:    06/21/21 1205 06/21/21 1412   BP: 105/74 113/52   Pulse: 86 87   Patient Position - Orthostatic VS:  Sitting         Visual Acuity      ED Medications  Medications   sodium chloride 0 9 % infusion (500 mL/hr Intravenous New Bag 6/21/21 1229)   ondansetron (ZOFRAN) injection 4 mg (4 mg Intravenous Given 6/21/21 1232)   metoclopramide (REGLAN) injection 10 mg (10 mg Intravenous Given 6/21/21 1357)       Diagnostic Studies  Results Reviewed     Procedure Component Value Units Date/Time    Urine Microscopic [[id number]]  (Abnormal) Collected: 06/21/21 1259    Lab Status: Final result Specimen: Urine, Clean Catch Updated: 06/21/21 1317     RBC, UA None Seen /hpf      WBC, UA 4-10 /hpf      Epithelial Cells Moderate /hpf      Bacteria, UA Moderate /hpf     Narrative:      Unspun microscopic urine, QNS for concentrated microscopic      UA w Reflex to Microscopic w Reflex to Culture [[id number]]  (Abnormal) Collected: 06/21/21 1259    Lab Status: Final result Specimen: Urine, Clean Catch Updated: 06/21/21 1307     Color, UA Yellow     Clarity, UA Cloudy     Specific Gravity, UA 1 025     pH, UA 6 0     Leukocytes, UA 1+     Nitrite, UA Positive     Protein, UA Trace mg/dl      Glucose, UA Negative mg/dl      Ketones, UA 15 (1+) mg/dl      Urobilinogen, UA 1 0 E U /dl      Bilirubin, UA Negative     Blood, UA Trace-Intact    hCG, qualitative pregnancy [[id number]] (Normal) Collected: 06/21/21 1218    Lab Status: Final result Specimen: Blood from Arm, Left Updated: 06/21/21 1249     Preg, Serum Negative    Comprehensive metabolic panel [[id number]]  (Abnormal) Collected: 06/21/21 1218    Lab Status: Final result Specimen: Blood from Arm, Left Updated: 06/21/21 1243     Sodium 140 mmol/L      Potassium 3 6 mmol/L      Chloride 104 mmol/L      CO2 26 mmol/L      ANION GAP 10 mmol/L      BUN 8 mg/dL      Creatinine 0 97 mg/dL      Glucose 109 mg/dL      Calcium 9 3 mg/dL      AST 13 U/L      ALT 13 U/L      Alkaline Phosphatase 44 1 U/L      Total Protein 7 3 g/dL      Albumin 4 2 g/dL      Total Bilirubin 0 57 mg/dL      eGFR 82 ml/min/1 73sq m     Narrative:      [place] guidelines for Chronic Kidney Disease (CKD):     Stage 1 with normal or high GFR (GFR > 90 mL/min/1 73 square meters)    Stage 2 Mild CKD (GFR = 60-89 mL/min/1 73 square meters)    Stage 3A Moderate CKD (GFR = 45-59 mL/min/1 73 square meters)    Stage 3B Moderate CKD (GFR = 30-44 mL/min/1 73 square meters)    Stage 4 Severe CKD (GFR = 15-29 mL/min/1 73 square meters)    Stage 5 End Stage CKD (GFR <15 mL/min/1 73 square meters)  Note: GFR calculation is accurate only with a steady state creatinine    Protime-INR [[id number]]  (Normal) Collected: 06/21/21 1218    Lab Status: Final result Specimen: Blood from Arm, Left Updated: 06/21/21 1240     Protime 10 6 seconds      INR 0 94    Narrative:      INR Reference Ranges:  No Anticoagulant, Normal:           0 9-1 1  Standard Dose, Oral Anticoagulant:  2 0-3 0  High Dose, Oral Anticoagulant:      2 5-3 5    APTT [[id number]]  (Normal) Collected: 06/21/21 1218    Lab Status: Final result Specimen: Blood from Arm, Left Updated: 06/21/21 1240     PTT 30 seconds     CBC and differential [[id number]]  (Normal) Collected: 06/21/21 1218    Lab Status: Final result Specimen: Blood from Arm, Left Updated: 06/21/21 1227     WBC 6 65 Thousand/uL RBC 4 55 Million/uL      Hemoglobin 13 6 g/dL      Hematocrit 40 8 %      MCV 90 fL      MCH 29 9 pg      MCHC 33 3 g/dL      RDW 12 9 %      MPV 9 1 fL      Platelets 335 Thousands/uL      Neutrophils Relative 63 %      Immat GRANS % 1 %      Lymphocytes Relative 25 %      Monocytes Relative 8 %      Eosinophils Relative 2 %      Basophils Relative 1 %      Neutrophils Absolute 4 30 Thousands/µL      Immature Grans Absolute 0 03 Thousand/uL      Lymphocytes Absolute 1 66 Thousands/µL      Monocytes Absolute 0 53 Thousand/µL      Eosinophils Absolute 0 10 Thousand/µL      Basophils Absolute 0 03 Thousands/µL                  CT abdomen pelvis wo contrast   Final Result by Radha Damian MD (06/21 1410)      No acute abdominal or pelvic abnormality            Workstation performed: [id number]                    Procedures  Procedures         ED Course                                           MDM  Number of Diagnoses or Management Options  Diagnosis management comments: Patient was evaluated emergency department she was treated with normal saline 1 L IV Zofran 4 mg IV with some improvement of her symptoms due to persistent nausea she was given 10 mg of Reglan CBC is unremarkable chemistry unremarkable patient had a pregnancy test which is negative urinalysis is negative patient is CT of the abdomen pelvis no contrast which demonstrated no acute findings  Impression is vomiting uncertain etiology patient will be discharged home on Zofran p r n  Referred to GI on-call for re-evaluation        Disposition  Final diagnoses:   Nausea and vomiting, intractability of vomiting not specified, unspecified vomiting type     Time reflects when diagnosis was documented in both MDM as applicable and the Disposition within this note     Time User Action Codes Description Comment    6/21/2021  2:37 PM Jones Trinidad Add [R11 2] Nausea and vomiting, intractability of vomiting not specified, unspecified vomiting type ED Disposition     ED Disposition Condition Date/Time Comment    Discharge Stable Mon Jun 21, 2021  2:37 PM Jamaica Loomis discharge to home/self care  Follow-up Information     Follow up With Specialties Details Why Contact Info    Page DO [facility] Schedule an appointment as soon as possible for a visit in 3 days If symptoms worsen, As needed [facility] [address]  [phone]            Patient's Medications   Discharge Prescriptions    ONDANSETRON (ZOFRAN) 4 MG TABLET    Take 1 tablet (4 mg total) by mouth every 6 (six) hours       Start Date: 6/21/2021 End Date: --       Order Dose: 4 mg       Quantity: 12 tablet    Refills: 0     No discharge procedures on file      PDMP Review     None          ED Provider  Electronically Signed by           Manuel Sharma MD  06/21/21 6839

## 2021-06-21 NOTE — TELEPHONE ENCOUNTER
Patient called c/o increased vomiting and diarrhea - cannot keep any liquids down  Patient said she couldn't wait for her appt and is going to [address] ER

## 2021-06-24 ENCOUNTER — OFFICE VISIT (OUTPATIENT)
Dept: FAMILY MEDICINE CLINIC | Facility: CLINIC | Age: 26
End: 2021-06-24
Payer: MEDICARE

## 2021-06-24 VITALS
RESPIRATION RATE: 18 BRPM | TEMPERATURE: 97.3 F | BODY MASS INDEX: 33.94 KG/M2 | SYSTOLIC BLOOD PRESSURE: 100 MMHG | WEIGHT: 198.8 LBS | HEIGHT: 64 IN | HEART RATE: 88 BPM | DIASTOLIC BLOOD PRESSURE: 62 MMHG

## 2021-06-24 DIAGNOSIS — R11.2 INTRACTABLE VOMITING WITH NAUSEA, UNSPECIFIED VOMITING TYPE: ICD-10-CM

## 2021-06-24 DIAGNOSIS — F44.5 PSEUDOSEIZURE: Primary | ICD-10-CM

## 2021-06-24 DIAGNOSIS — S09.90XS TRAUMATIC INJURY OF HEAD, SEQUELA: ICD-10-CM

## 2021-06-24 PROCEDURE — 99213 OFFICE O/P EST LOW 20 MIN: CPT | Performed by: FAMILY MEDICINE

## 2021-06-24 RX ORDER — ONDANSETRON 4 MG/1
4 TABLET, ORALLY DISINTEGRATING ORAL EVERY 6 HOURS PRN
Qty: 20 TABLET | Refills: 0 | Status: SHIPPED | OUTPATIENT
Start: 2021-06-24 | End: 2022-01-18

## 2021-06-24 RX ORDER — OLANZAPINE 5 MG/1
5 TABLET ORAL
COMMUNITY
Start: 2021-06-18 | End: 2022-06-30

## 2021-06-24 NOTE — PROGRESS NOTES
Assessment/Plan:    Patient endorses intractable nausea and persistent headache complicated with possible pseudoseizures and recent history of head trauma  No imaging of the head has been done during her most recent visit for her intractable vomiting  Patient does not exhibit any focal neurological deficits  Given that the psychiatrist that the patient regularly sees has deferred her current symptoms to her medical provider, would like input from Neurology to rule out any neurological causes  Diagnoses and all orders for this visit:    [facility]  -     Ambulatory referral to Neurology; Future    Traumatic injury of head, sequela  -     Ambulatory referral to Neurology; Future    Intractable vomiting with nausea, unspecified vomiting type  -     Ambulatory referral to Neurology; Future  -     ondansetron (ZOFRAN-ODT) 4 mg disintegrating tablet; Take 1 tablet (4 mg total) by mouth every 6 (six) hours as needed for nausea or vomiting    Other orders  -     OLANZapine (ZyPREXA) 5 mg tablet; Take 5 mg by mouth daily at bedtime          Subjective:      Patient ID: Lucila Lugo is a 26 y o  female with history of bilateral tubal ligation    Patient was admitted for intractable vomiting with abdominal pain on June 21st   At the ED, Patient denied vomiting up any blood she denies any blood in stools patient denies any melena hematochezia  Workup was mostly unremarkable, except for a UA that was positive for nitrites, 1+ leukocytes and acute, and moderate bacteria  Patient denies dysuria or urinary frequency CT abdomen was negative  Patient was given fluids and IV Reglan before she was discharged with instructions for follow-up  Since then, patient states that her nausea and vomiting persisted  Patient states that she would vomit in the morning, and then feel nauseous until late afternoon, affecting her oral intake  Patient also endorses bifrontal pressure-like headache accompanying the symptoms  Patient does endorse the the symptoms correlate with her head trauma that she had 4 weeks ago when she developed a "stress seizure," that caused her to collapse and have a significant in of head trauma to lose consciousness for 5 minutes, according to the patient  EMS arrived at the scene, but the patient refused to go to the hospital at that time  Patient has not seen a neurologist since the onset of these recent symptoms  Patient reports that she has a history of "stress seizures," that initially began in 2014, where her whole body shakes even while she is aware of her surroundings  Patient denies any postictal state, urinary incontinence, or tongue biting  Patient states that she usually get the 'stress seizures' in the middle of her sleep, while she is having "a bad dream "  Patient states that at 1 point, she was treated with a medication that served as both an antiepileptic and an antidepressant for this problem, but when the patient has establish care with [facility], patient has forgotten the name of the medication  Patient is currently seeing a therapist every week and a psychiatrist every other week  Patient states that her medication regimen for her psychiatric condition has not been altered in over a year  Patient has brought up her 'stress seizures,' to her psychiatrist, who has deferred the problem to her medical provider          The following portions of the patient's history were reviewed and updated as appropriate:   She  has a past medical history of ADHD, Anxiety, Asthma, Bipolar 1 disorder ([address] ), Depression, Diabetes mellitus ([address] ), GERD (gastroesophageal reflux disease), Mental and behavioral problem, Migraine, Palpitations, Psychiatric disorder, Psychiatric illness, Psychosis ([address] ), 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[address] ), Seizures ([address] ), Seizures ([address] ), Stabbing chest pain, Suicide attempt ([address] ), Tachycardia, and Upper respiratory disease    She   Patient Active Problem List    Diagnosis Date Noted    Overdose of antipsychotic, intentional self-harm, initial encounter [facility] )     Tachycardia     Lethargy     Abdominal pain 07/05/2019    Bipolar 1 disorder, mixed, severe ([facility] ) 07/03/2019    Seizure disorder ([facility] ) 06/30/2019    Depression 06/30/2019    Borderline diabetes 06/30/2019    Acute cystitis without hematuria 06/30/2019    Left lower quadrant pain 11/01/2018    Hypertriglyceridemia 11/01/2018    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/29/2018    Pseudoseizure     Bipolar I disorder, most recent episode mixed, severe with psychotic features ([facility] ) 10/17/2018    Cold 09/12/2018    Gastroesophageal reflux disease 07/25/2018    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    Seasonal allergies 05/04/2017    Hypothyroidism 11/16/2016    Chronic low back pain 11/08/2016    Chronic fatigue 05/31/2016    Deviated nasal septum 04/22/2016    Abnormal menses 03/22/2016    Attention deficit hyperactivity disorder, combined type 01/08/2015     She  has a past surgical history that includes Knee surgery; pr lap,tubal cautery (N/A, 10/3/2018); and Tubal ligation  Her family history includes Arthritis in her father; Cancer in her father, maternal aunt, maternal grandmother, mother, and paternal uncle; Diabetes in her father, mother, and other; Migraines in her sister  She  reports that she has quit smoking  Her smoking use included cigarettes  She smoked 0 50 packs per day  She has never used smokeless tobacco  She reports previous alcohol use  She reports previous drug use  Frequency: 1 00 time per week    Current Outpatient Medications   Medication Sig Dispense Refill    albuterol (PROVENTIL HFA,VENTOLIN HFA) 90 mcg/act inhaler Inhale 2 puffs every 6 (six) hours as needed for wheezing or shortness of breath 1 Inhaler 5    clotrimazole (LOTRIMIN) 1 % cream Apply to affected area 2 times daily for 2-4 wks 15 g 0    fluticasone (FLOVENT HFA) 110 MCG/ACT inhaler Inhale 2 puffs 2 (two) times a day Rinse mouth after use  1 Inhaler 5    ibuprofen (MOTRIN) 400 mg tablet Take 1 tablet (400 mg total) by mouth 2 (two) times a day 10 tablet 0    ondansetron (ZOFRAN) 4 mg tablet Take 1 tablet (4 mg total) by mouth every 6 (six) hours 12 tablet 0    pantoprazole (PROTONIX) 40 mg tablet Take 1 tablet (40 mg total) by mouth daily 30 tablet 1    EPINEPHrine (EPIPEN) 0 3 mg/0 3 mL SOAJ Inject 0 3 mL (0 3 mg total) into a muscle once for 1 dose 2 each 1    FLUoxetine (PROzac) 40 MG capsule Take 40 mg by mouth daily (Patient not taking: Reported on 6/24/2021)      meclizine (ANTIVERT) 25 mg tablet Take 1 tablet (25 mg total) by mouth 3 (three) times a day as needed for dizziness for up to 30 doses (Patient not taking: Reported on 2/12/2021) 30 tablet 0    OLANZapine (ZyPREXA) 5 mg tablet Take 5 mg by mouth daily at bedtime      ondansetron (ZOFRAN-ODT) 4 mg disintegrating tablet Take 1 tablet (4 mg total) by mouth every 6 (six) hours as needed for nausea or vomiting 20 tablet 0    predniSONE 10 mg tablet 4 po daily x2 days, then 3 po daily x2 days, then 2 po daily x2 days,then 1 po daily x2days (Patient not taking: Reported on 3/19/2021) 20 tablet 0    QUEtiapine (SEROquel) 100 mg tablet Take 100 mg by mouth daily at bedtime (Patient not taking: Reported on 6/24/2021)       No current facility-administered medications for this visit  Current Outpatient Medications on File Prior to Visit   Medication Sig    albuterol (PROVENTIL HFA,VENTOLIN HFA) 90 mcg/act inhaler Inhale 2 puffs every 6 (six) hours as needed for wheezing or shortness of breath    clotrimazole (LOTRIMIN) 1 % cream Apply to affected area 2 times daily for 2-4 wks    fluticasone (FLOVENT HFA) 110 MCG/ACT inhaler Inhale 2 puffs 2 (two) times a day Rinse mouth after use      ibuprofen (MOTRIN) 400 mg tablet Take 1 tablet (400 mg total) by mouth 2 (two) times a day    ondansetron (ZOFRAN) 4 mg tablet Take 1 tablet (4 mg total) by mouth every 6 (six) hours  pantoprazole (PROTONIX) 40 mg tablet Take 1 tablet (40 mg total) by mouth daily    [DISCONTINUED] ondansetron (ZOFRAN) 4 mg tablet Take 1 tablet (4 mg total) by mouth every 6 (six) hours as needed for nausea or vomiting    EPINEPHrine (EPIPEN) 0 3 mg/0 3 mL SOAJ Inject 0 3 mL (0 3 mg total) into a muscle once for 1 dose    FLUoxetine (PROzac) 40 MG capsule Take 40 mg by mouth daily (Patient not taking: Reported on 6/24/2021)    meclizine (ANTIVERT) 25 mg tablet Take 1 tablet (25 mg total) by mouth 3 (three) times a day as needed for dizziness for up to 30 doses (Patient not taking: Reported on 2/12/2021)    OLANZapine (ZyPREXA) 5 mg tablet Take 5 mg by mouth daily at bedtime    predniSONE 10 mg tablet 4 po daily x2 days, then 3 po daily x2 days, then 2 po daily x2 days,then 1 po daily x2days (Patient not taking: Reported on 3/19/2021)    QUEtiapine (SEROquel) 100 mg tablet Take 100 mg by mouth daily at bedtime (Patient not taking: Reported on 6/24/2021)     No current facility-administered medications on file prior to visit  She is allergic to fish-derived products - food allergy, naproxen, shellfish allergy - food allergy, benadryl [diphenhydramine], fish oil - food allergy, seasonal ic  [cholestatin], shellfish-derived products - food allergy, and shellfish-derived products - food allergy       Review of Systems   All other systems reviewed and are negative  Objective:      /62 (BP Location: Left arm, Patient Position: Sitting, Cuff Size: Standard)   Pulse 88   Temp (!) 97 3 °F (36 3 °C) (Tympanic)   Resp 18   Ht 5' 4" (1 626 m)   Wt 90 2 kg (198 lb 12 8 oz)   BMI 34 12 kg/m²          Physical Exam  Vitals reviewed  Constitutional:       General: She is not in acute distress  Appearance: She is not ill-appearing or diaphoretic  HENT:      Head: Normocephalic  No abrasion, contusion or laceration     Eyes:      Conjunctiva/sclera: Conjunctivae normal    Cardiovascular: Rate and Rhythm: Normal rate and regular rhythm  Pulses: Normal pulses  Pulmonary:      Effort: Pulmonary effort is normal  No respiratory distress  Breath sounds: No wheezing  Musculoskeletal:      Cervical back: Neck supple  Right lower leg: No edema  Left lower leg: No edema  Skin:     General: Skin is warm  Capillary Refill: Capillary refill takes less than 2 seconds  Neurological:      Mental Status: She is alert  Cranial Nerves: No cranial nerve deficit  Sensory: No sensory deficit  Motor: No weakness  Gait: Gait normal    Psychiatric:         Attention and Perception: Attention and perception normal          Mood and Affect: Mood is anxious  Affect is labile  Speech: Speech normal          Behavior: Behavior is agitated  Behavior is cooperative

## 2021-06-28 ENCOUNTER — TELEPHONE (OUTPATIENT)
Dept: NEUROLOGY | Facility: CLINIC | Age: 26
End: 2021-06-28
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
## 2021-07-20 NOTE — LETTER
July 20, 2021    Re: Mounikakayode Fraga    Thank you for referring your patient to our Neurology practice for consultation  We regret to inform you that your patient did not attend their hour-long New Patient/Consult appointment scheduled with our office  Due to the limited access in Neurology, we ask that your office please contact the patient to evaluate if there is further necessity for a Neurology consultation  If you determine that a Neurology consultation is still necessary, please have someone from your office call to reschedule  Our office will not automatically reschedule this appointment  Appointment Missed: 7/20/2021      Appointment Scheduled with: Elena Koch MD    If you have questions or concerns regarding this request, please do not hesitate to reach out to our Practice Administrator(s)  Thank you in advance for your assistance with this matter        Sincerely,    [facility]

## 2021-08-03 ENCOUNTER — TELEPHONE (OUTPATIENT)
Dept: OBGYN CLINIC | Facility: CLINIC | Age: 26
End: 2021-08-03

## 2021-08-03 NOTE — TELEPHONE ENCOUNTER
Patient called and left  A voice message that she would like an appointment that she has some pregnancy symptoms, I did call the patient back and left her a message to return the call for an appointment

## 2021-08-06 NOTE — PROGRESS NOTES
As per neurology's request, may you call neurology's office to schedule an appointment for dwayne? I spoke to her, and she states that she still needs to have her pseudoseizures evaluated        Sincerely,  Reesa Meckel

## 2021-08-15 ENCOUNTER — HOSPITAL ENCOUNTER (EMERGENCY)
Facility: HOSPITAL | Age: 26
Discharge: HOME | End: 2021-08-15
Attending: EMERGENCY MEDICINE
Payer: MEDICARE

## 2021-08-15 ENCOUNTER — APPOINTMENT (EMERGENCY)
Dept: RADIOLOGY | Facility: HOSPITAL | Age: 26
End: 2021-08-15
Payer: MEDICARE

## 2021-08-15 VITALS
RESPIRATION RATE: 18 BRPM | BODY MASS INDEX: 37.76 KG/M2 | HEART RATE: 85 BPM | WEIGHT: 220 LBS | DIASTOLIC BLOOD PRESSURE: 70 MMHG | SYSTOLIC BLOOD PRESSURE: 109 MMHG | OXYGEN SATURATION: 97 % | TEMPERATURE: 98.4 F

## 2021-08-15 DIAGNOSIS — J06.9 URI (UPPER RESPIRATORY INFECTION): Primary | ICD-10-CM

## 2021-08-15 LAB
S PYO DNA THROAT QL NAA+PROBE: NORMAL
SARS-COV-2 RNA RESP QL NAA+PROBE: NEGATIVE

## 2021-08-15 PROCEDURE — 99282 EMERGENCY DEPT VISIT SF MDM: CPT | Performed by: EMERGENCY MEDICINE

## 2021-08-15 PROCEDURE — U0003 INFECTIOUS AGENT DETECTION BY NUCLEIC ACID (DNA OR RNA); SEVERE ACUTE RESPIRATORY SYNDROME CORONAVIRUS 2 (SARS-COV-2) (CORONAVIRUS DISEASE [COVID-19]), AMPLIFIED PROBE TECHNIQUE, MAKING USE OF HIGH THROUGHPUT TECHNOLOGIES AS DESCRIBED BY CMS-2020-01-R: HCPCS | Performed by: EMERGENCY MEDICINE

## 2021-08-15 PROCEDURE — 99283 EMERGENCY DEPT VISIT LOW MDM: CPT

## 2021-08-15 PROCEDURE — U0005 INFEC AGEN DETEC AMPLI PROBE: HCPCS | Performed by: EMERGENCY MEDICINE

## 2021-08-15 PROCEDURE — 71045 X-RAY EXAM CHEST 1 VIEW: CPT

## 2021-08-15 PROCEDURE — 87651 STREP A DNA AMP PROBE: CPT | Performed by: EMERGENCY MEDICINE

## 2021-08-15 RX ORDER — ALBUTEROL SULFATE 90 UG/1
1 AEROSOL, METERED RESPIRATORY (INHALATION) ONCE
Status: COMPLETED | OUTPATIENT
Start: 2021-08-15 | End: 2021-08-15

## 2021-08-15 RX ORDER — ACETAMINOPHEN 325 MG/1
650 TABLET ORAL ONCE
Status: COMPLETED | OUTPATIENT
Start: 2021-08-15 | End: 2021-08-15

## 2021-08-15 RX ORDER — GUAIFENESIN 600 MG
600 TABLET, EXTENDED RELEASE 12 HR ORAL EVERY 12 HOURS SCHEDULED
Qty: 14 TABLET | Refills: 0 | Status: SHIPPED | OUTPATIENT
Start: 2021-08-15 | End: 2022-01-21 | Stop reason: ALTCHOICE

## 2021-08-15 RX ADMIN — ALBUTEROL SULFATE 1 PUFF: 90 AEROSOL, METERED RESPIRATORY (INHALATION) at 15:08

## 2021-08-15 RX ADMIN — ACETAMINOPHEN 650 MG: 325 TABLET, FILM COATED ORAL at 15:08
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
## 2021-09-11 ENCOUNTER — HOSPITAL ENCOUNTER (EMERGENCY)
Facility: HOSPITAL | Age: 26
Discharge: HOME | End: 2021-09-11
Attending: EMERGENCY MEDICINE
Payer: MEDICARE

## 2021-09-11 VITALS
RESPIRATION RATE: 18 BRPM | SYSTOLIC BLOOD PRESSURE: 114 MMHG | WEIGHT: 203.3 LBS | TEMPERATURE: 98.5 F | OXYGEN SATURATION: 99 % | DIASTOLIC BLOOD PRESSURE: 71 MMHG | BODY MASS INDEX: 34.9 KG/M2 | HEART RATE: 99 BPM

## 2021-09-11 DIAGNOSIS — R55 NEAR SYNCOPE: Primary | ICD-10-CM

## 2021-09-11 DIAGNOSIS — M62.830 BACK SPASM: ICD-10-CM

## 2021-09-11 LAB
ALBUMIN SERPL BCP-MCNC: 4.1 G/DL (ref 3.4–4.8)
ALP SERPL-CCNC: 54.9 U/L (ref 35–140)
ALT SERPL W P-5'-P-CCNC: 11 U/L (ref 5–54)
ANION GAP SERPL CALCULATED.3IONS-SCNC: 5 MMOL/L (ref 4–13)
AST SERPL W P-5'-P-CCNC: 13 U/L (ref 15–41)
BASOPHILS # BLD MANUAL: 0 THOUSAND/UL (ref 0–0.1)
BASOPHILS NFR MAR MANUAL: 0 % (ref 0–1)
BILIRUB SERPL-MCNC: 0.38 MG/DL (ref 0.3–1.2)
BUN SERPL-MCNC: 12 MG/DL (ref 6–20)
CALCIUM SERPL-MCNC: 9.1 MG/DL (ref 8.4–10.2)
CHLORIDE SERPL-SCNC: 103 MMOL/L (ref 96–108)
CK SERPL-CCNC: 58.2 U/L (ref 38–234)
CO2 SERPL-SCNC: 30 MMOL/L (ref 22–33)
CREAT SERPL-MCNC: 0.86 MG/DL (ref 0.4–1.1)
EOSINOPHIL # BLD MANUAL: 0.31 THOUSAND/UL (ref 0–0.4)
EOSINOPHIL NFR BLD MANUAL: 3 % (ref 0–6)
ERYTHROCYTE [DISTWIDTH] IN BLOOD BY AUTOMATED COUNT: 12.4 % (ref 11.6–15.1)
GFR SERPL CREATININE-BSD FRML MDRD: 94 ML/MIN/1.73SQ M
GLUCOSE SERPL-MCNC: 81 MG/DL (ref 65–140)
HCG SERPL QL: NEGATIVE
HCT VFR BLD AUTO: 40.9 % (ref 34.8–46.1)
HGB BLD-MCNC: 13.4 G/DL (ref 11.5–15.4)
LYMPHOCYTES # BLD AUTO: 2.68 THOUSAND/UL (ref 0.6–4.47)
LYMPHOCYTES # BLD AUTO: 26 % (ref 14–44)
MAGNESIUM SERPL-MCNC: 2 MG/DL (ref 1.6–2.6)
MCH RBC QN AUTO: 29.5 PG (ref 26.8–34.3)
MCHC RBC AUTO-ENTMCNC: 32.8 G/DL (ref 31.4–37.4)
MCV RBC AUTO: 90 FL (ref 82–98)
MONOCYTES # BLD AUTO: 0.72 THOUSAND/UL (ref 0–1.22)
MONOCYTES NFR BLD: 7 % (ref 4–12)
NEUTROPHILS # BLD MANUAL: 6.07 THOUSAND/UL (ref 1.85–7.62)
NEUTS BAND NFR BLD MANUAL: 2 % (ref 0–8)
NEUTS SEG NFR BLD AUTO: 57 % (ref 43–75)
PLATELET # BLD AUTO: 343 THOUSANDS/UL (ref 149–390)
PLATELET BLD QL SMEAR: ADEQUATE
PMV BLD AUTO: 9.3 FL (ref 8.9–12.7)
POTASSIUM SERPL-SCNC: 4 MMOL/L (ref 3.5–5)
PROT SERPL-MCNC: 7.2 G/DL (ref 6.4–8.3)
RBC # BLD AUTO: 4.54 MILLION/UL (ref 3.81–5.12)
RBC MORPH BLD: NORMAL
SODIUM SERPL-SCNC: 138 MMOL/L (ref 133–145)
VARIANT LYMPHS # BLD AUTO: 5 %
WBC # BLD AUTO: 10.29 THOUSAND/UL (ref 4.31–10.16)

## 2021-09-11 PROCEDURE — 80053 COMPREHEN METABOLIC PANEL: CPT | Performed by: EMERGENCY MEDICINE

## 2021-09-11 PROCEDURE — 96360 HYDRATION IV INFUSION INIT: CPT

## 2021-09-11 PROCEDURE — 99285 EMERGENCY DEPT VISIT HI MDM: CPT | Performed by: EMERGENCY MEDICINE

## 2021-09-11 PROCEDURE — 84703 CHORIONIC GONADOTROPIN ASSAY: CPT | Performed by: EMERGENCY MEDICINE

## 2021-09-11 PROCEDURE — 82550 ASSAY OF CK (CPK): CPT | Performed by: EMERGENCY MEDICINE

## 2021-09-11 PROCEDURE — 99284 EMERGENCY DEPT VISIT MOD MDM: CPT

## 2021-09-11 PROCEDURE — 85007 BL SMEAR W/DIFF WBC COUNT: CPT | Performed by: EMERGENCY MEDICINE

## 2021-09-11 PROCEDURE — 83735 ASSAY OF MAGNESIUM: CPT | Performed by: EMERGENCY MEDICINE

## 2021-09-11 PROCEDURE — 36415 COLL VENOUS BLD VENIPUNCTURE: CPT | Performed by: EMERGENCY MEDICINE

## 2021-09-11 PROCEDURE — 85027 COMPLETE CBC AUTOMATED: CPT | Performed by: EMERGENCY MEDICINE

## 2021-09-11 RX ORDER — ACETAMINOPHEN 325 MG/1
650 TABLET ORAL ONCE
Status: DISCONTINUED | OUTPATIENT
Start: 2021-09-11 | End: 2021-09-12 | Stop reason: HOSPADM

## 2021-09-11 RX ADMIN — SODIUM CHLORIDE 1000 ML: 0.9 INJECTION, SOLUTION INTRAVENOUS at 21:29

## 2021-09-11 NOTE — Clinical Note
Forrest Piedra was seen and treated in our emergency department on 9/11/2021  Diagnosis:     [place]  may return to work on return date  She may return on this date: 09/12/2021         If you have any questions or concerns, please don't hesitate to call        Carlos A Stevens MD    ______________________________           _______________          _______________  Hospital Representative                              Date                                Time

## 2021-09-12 NOTE — ED PROVIDER NOTES
History  Chief Complaint   Patient presents with    Dizziness     at work today, reports "seeing stars"     Patient is a 50-year-old female seen in the emergency department with concern for episode of near syncope/dizziness this evening at work prior to evaluation  Patient states that she felt like she was going to pass out  Patient states that she has been dealing with intermittent back spasms at home and more over approximately the past 3-4 days  Patient states that she works at [employer], and is apparently on her feet at work most of the day  Patient states that she has taken Aleve at home, with some improvement of pain  Patient notes no chest pain, shortness of breath, weakness  Patient notes occasional nausea  Patient notes no other clear exacerbating or alleviating factors for her symptoms  Prior to Admission Medications   Prescriptions Last Dose Informant Patient Reported? Taking? EPINEPHrine (EPIPEN) 0 3 mg/0 3 mL SOAJ   No No   Sig: Inject 0 3 mL (0 3 mg total) into a muscle once for 1 dose   FLUoxetine (PROzac) 40 MG capsule   Yes No   Sig: Take 40 mg by mouth daily   Patient not taking: Reported on 6/24/2021   OLANZapine (ZyPREXA) 5 mg tablet   Yes No   Sig: Take 5 mg by mouth daily at bedtime   QUEtiapine (SEROquel) 100 mg tablet   Yes No   Sig: Take 100 mg by mouth daily at bedtime   Patient not taking: Reported on 6/24/2021   albuterol (PROVENTIL HFA,VENTOLIN HFA) 90 mcg/act inhaler   No No   Sig: Inhale 2 puffs every 6 (six) hours as needed for wheezing or shortness of breath   clotrimazole (LOTRIMIN) 1 % cream   No No   Sig: Apply to affected area 2 times daily for 2-4 wks   fluticasone (FLOVENT HFA) 110 MCG/ACT inhaler   No No   Sig: Inhale 2 puffs 2 (two) times a day Rinse mouth after use     guaiFENesin (MUCINEX) 600 mg 12 hr tablet   No No   Sig: Take 1 tablet (600 mg total) by mouth every 12 (twelve) hours   ibuprofen (MOTRIN) 400 mg tablet   No No   Sig: Take 1 tablet (400 mg total) by mouth 2 (two) times a day   meclizine (ANTIVERT) 25 mg tablet   No No   Sig: Take 1 tablet (25 mg total) by mouth 3 (three) times a day as needed for dizziness for up to 30 doses   Patient not taking: Reported on 2021   ondansetron (ZOFRAN) 4 mg tablet   No No   Sig: Take 1 tablet (4 mg total) by mouth every 6 (six) hours   ondansetron (ZOFRAN-ODT) 4 mg disintegrating tablet   No No   Sig: Take 1 tablet (4 mg total) by mouth every 6 (six) hours as needed for nausea or vomiting   pantoprazole (PROTONIX) 40 mg tablet   No No   Sig: Take 1 tablet (40 mg total) by mouth daily   predniSONE 10 mg tablet   No No   Si po daily x2 days, then 3 po daily x2 days, then 2 po daily x2 days,then 1 po daily x2days   Patient not taking: Reported on 3/19/2021      Facility-Administered Medications: None       Past Medical History:   Diagnosis Date    ADHD     Anxiety     Asthma     Bipolar 1 disorder (HCC)     Depression     Diabetes mellitus ([address] )     GERD (gastroesophageal reflux disease)     Mental and behavioral problem     Migraine     Palpitations     Last Assessed     Psychiatric disorder     Psychiatric illness     [facility]osis [facility]     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[address] )     Seizures ([facility] [address] )     last time 2 weeks ago- petit mal    Seizures ([facility] [address] )     Pseudoseizures    Stabbing chest pain     Last Assessed 2016    [facility])     Tachycardia     Last Assessed 2016    Upper respiratory disease     Last Assessed 2016       Past Surgical History:   Procedure Laterality Date    KNEE SURGERY      [place] LAP,TUBAL CAUTERY N/A 10/3/2018    Procedure: LAPAROSCOPIC TUBAL LIGATION;  Surgeon: Moustapha Peters MD;  Location: [facility];  Service: Gynecology    TUBAL LIGATION         Family History   Problem Relation Age of Onset    Migraines Sister     Cancer Mother     Diabetes Mother     Cancer Father     Diabetes Father     Arthritis Father     Cancer Maternal Grandmother     Cancer Maternal Aunt     Cancer Paternal Uncle     Diabetes Other      I have reviewed and agree with the history as documented  E-Cigarette/Vaping    E-Cigarette Use Former User      E-Cigarette/Vaping Substances    Nicotine No     THC No     CBD No     Flavoring No     Other No     Unknown No      Social History     Tobacco Use    Smoking status: Current Every Day Smoker     Packs/day: 0 25     Types: Cigarettes    Smokeless tobacco: Never Used   Vaping Use    Vaping Use: Former   Substance Use Topics    Alcohol use: Not Currently    Drug use: Not Currently     Frequency: 1 0 times per week       Review of Systems   Constitutional: Negative for chills and fever  HENT: Negative for ear pain and sore throat  Eyes: Negative for pain and visual disturbance  Respiratory: Negative for cough and shortness of breath  Cardiovascular: Negative for chest pain and palpitations  Gastrointestinal: Positive for nausea  Negative for abdominal pain and vomiting  Genitourinary: Negative for dysuria and hematuria  Musculoskeletal: Negative for neck pain  Back spasms   Skin: Negative for color change and rash  Neurological: Positive for dizziness  Negative for seizures  All other systems reviewed and are negative  Physical Exam  Physical Exam  Vitals and nursing note reviewed  Constitutional:       General: She is not in acute distress  Appearance: She is well-developed  HENT:      Head: Normocephalic and atraumatic  Right Ear: External ear normal       Left Ear: External ear normal       Nose: Nose normal       Mouth/Throat:      Pharynx: Oropharynx is clear  Eyes:      General: No scleral icterus  Conjunctiva/sclera: Conjunctivae normal    Cardiovascular:      Rate and Rhythm: Normal rate and regular rhythm  Heart sounds: No murmur heard  Pulmonary:      Effort: Pulmonary effort is normal  No respiratory distress        Breath sounds: Normal breath sounds  Abdominal:      General: There is no distension  Palpations: Abdomen is soft  Tenderness: There is no abdominal tenderness  Musculoskeletal:         General: No swelling, tenderness, deformity or signs of injury  Cervical back: Normal range of motion and neck supple  Comments: No appreciable tenderness to palpation of back; no midline lumbar spinal tenderness or step-offs noted   Skin:     General: Skin is warm and dry  Neurological:      General: No focal deficit present  Mental Status: She is alert  Cranial Nerves: No cranial nerve deficit  Sensory: No sensory deficit  Psychiatric:         Mood and Affect: Mood normal          Thought Content:  Thought content normal          Vital Signs  ED Triage Vitals   Temperature Pulse Respirations Blood Pressure SpO2   09/11/21 2055 09/11/21 2056 09/11/21 2056 09/11/21 2056 09/11/21 2056   98 5 °F (36 9 °C) 99 18 114/71 99 %      Temp Source Heart Rate Source Patient Position - Orthostatic VS BP Location FiO2 (%)   09/11/21 2055 09/11/21 2055 09/11/21 2056 09/11/21 2056 --   Oral Monitor Sitting Left arm       Pain Score       --                  Vitals:    09/11/21 2056   BP: 114/71   Pulse: 99   Patient Position - Orthostatic VS: Sitting         Visual Acuity      ED Medications  Medications   sodium chloride 0 9 % bolus 1,000 mL (1,000 mL Intravenous New Bag 9/11/21 2129)   acetaminophen (TYLENOL) tablet 650 mg (650 mg Oral Not Given 9/11/21 2129)       Diagnostic Studies  Results Reviewed     Procedure Component Value Units Date/Time    CK (with reflex to MB) [[id number]]  (Normal) Collected: 09/11/21 2128    Lab Status: Final result Specimen: Blood from Arm, Left Updated: 09/11/21 2209     Total CK 58 2 U/L     Comprehensive metabolic panel [[id number]]  (Abnormal) Collected: 09/11/21 2128    Lab Status: Final result Specimen: Blood from Arm, Left Updated: 09/11/21 2158     Sodium 138 mmol/L      Potassium 4 0 mmol/L      Chloride 103 mmol/L      CO2 30 mmol/L      ANION GAP 5 mmol/L      BUN 12 mg/dL      Creatinine 0 86 mg/dL      Glucose 81 mg/dL      Calcium 9 1 mg/dL      AST 13 U/L      ALT 11 U/L      Alkaline Phosphatase 54 9 U/L      Total Protein 7 2 g/dL      Albumin 4 1 g/dL      Total Bilirubin 0 38 mg/dL      eGFR 94 ml/min/1 73sq m     Narrative:      [place] guidelines for Chronic Kidney Disease (CKD):     Stage 1 with normal or high GFR (GFR > 90 mL/min/1 73 square meters)    Stage 2 Mild CKD (GFR = 60-89 mL/min/1 73 square meters)    Stage 3A Moderate CKD (GFR = 45-59 mL/min/1 73 square meters)    Stage 3B Moderate CKD (GFR = 30-44 mL/min/1 73 square meters)    Stage 4 Severe CKD (GFR = 15-29 mL/min/1 73 square meters)    Stage 5 End Stage CKD (GFR <15 mL/min/1 73 square meters)  Note: GFR calculation is accurate only with a steady state creatinine    Magnesium [[id number]]  (Normal) Collected: 09/11/21 2128    Lab Status: Final result Specimen: Blood from Arm, Left Updated: 09/11/21 2158     Magnesium 2 0 mg/dL     hCG, qualitative pregnancy [[id number]]  (Normal) Collected: 09/11/21 2128    Lab Status: Final result Specimen: Blood from Arm, Left Updated: 09/11/21 2158     Preg, Serum Negative    CBC and differential [[id number]]  (Abnormal) Collected: 09/11/21 2128    Lab Status: Preliminary result Specimen: Blood from Arm, Left Updated: 09/11/21 2140     WBC 10 29 Thousand/uL      RBC 4 54 Million/uL      Hemoglobin 13 4 g/dL      Hematocrit 40 9 %      MCV 90 fL      MCH 29 5 pg      MCHC 32 8 g/dL      RDW 12 4 %      MPV 9 3 fL      Platelets 919 Thousands/uL     POCT pregnancy, urine [[id number]]     Lab Status: No result     UA w Reflex to Microscopic w Reflex to Culture [[id number]]     Lab Status: No result Specimen: Urine, Clean Catch                  No orders to display              Procedures  ECG 12 Lead Documentation Only    Date/Time: 9/11/2021 9:19 PM  Performed by: Hannah Cassidy MD  Authorized by: Hannah Cassidy MD     Indications / Diagnosis:  Near-syncope  ECG reviewed by me, the ED Provider: yes    Patient location:  ED  Rate:     ECG rate:  94    ECG rate assessment: normal    Rhythm:     Rhythm: sinus rhythm    QRS:     QRS axis:  Normal  ST segments:     ST segments:  Normal  T waves:     T waves: normal    Comments:      Sinus rhythm at 94, normal axis, , QRS 86, , no ST-T wave changes, no evidence of acute ischemia             ED Course                                           MDM  Number of Diagnoses or Management Options  Back spasm  Near syncope  Diagnosis management comments: Patient is a 81-year-old female seen in the emergency department with concern for near syncope and back spasms  EKG was obtained and noted  Patient was treated with medication for symptom control  Laboratory evaluation remarkable for elevated white blood cell count of 10 29  No definite cause of the patient's dizziness was discovered  There is no evidence of arrhythmia, electrolyte abnormality, or acute anemia  Plan to have patient follow up with PCP  Patient stable for discharge home  Discharge instructions were reviewed with patient         Amount and/or Complexity of Data Reviewed  Clinical lab tests: ordered and reviewed  Tests in the medicine section of CPT®: ordered and reviewed        Disposition  Final diagnoses:   Near syncope   Back spasm     Time reflects when diagnosis was documented in both MDM as applicable and the Disposition within this note     Time User Action Codes Description Comment    9/11/2021  9:15 PM Nimo Agreste Add [R55] Near syncope     9/11/2021  9:15 PM Nimo Agreste Add [R42] Dizziness     9/11/2021  9:15 PM Nimo Agreste Remove [R42] Dizziness     9/11/2021  9:15 PM Nimo Agreste Add [R36 545] Back spasm       ED Disposition     ED Disposition Condition Date/Time Comment    Discharge Stable Sat Sep 11, 2021 10:20 PM [address] discharge to home/self care  Follow-up Information     Follow up With Specialties Details Why [address],  Family Medicine Call in 1 day  [address]   [phone]            Patient's Medications   Discharge Prescriptions    No medications on file     No discharge procedures on file      PDMP Review     None          ED Provider  Electronically Signed by           Matteo Cannon MD  09/11/21 6905       Matteo Cannon MD  09/11/21 8683

## 2021-09-14 ENCOUNTER — HOSPITAL ENCOUNTER (EMERGENCY)
Facility: HOSPITAL | Age: 26
Discharge: HOME | End: 2021-09-14
Attending: EMERGENCY MEDICINE
Payer: MEDICARE

## 2021-09-14 VITALS
RESPIRATION RATE: 18 BRPM | TEMPERATURE: 98.6 F | HEART RATE: 104 BPM | SYSTOLIC BLOOD PRESSURE: 108 MMHG | OXYGEN SATURATION: 100 % | DIASTOLIC BLOOD PRESSURE: 64 MMHG

## 2021-09-14 DIAGNOSIS — L03.311 CELLULITIS OF ABDOMINAL WALL: Primary | ICD-10-CM

## 2021-09-14 PROCEDURE — 99284 EMERGENCY DEPT VISIT MOD MDM: CPT | Performed by: EMERGENCY MEDICINE

## 2021-09-14 PROCEDURE — 99283 EMERGENCY DEPT VISIT LOW MDM: CPT

## 2021-09-14 RX ORDER — CEPHALEXIN 500 MG/1
500 CAPSULE ORAL EVERY 6 HOURS SCHEDULED
Qty: 27 CAPSULE | Refills: 0 | Status: SHIPPED | OUTPATIENT
Start: 2021-09-14 | End: 2021-09-14 | Stop reason: CLARIF

## 2021-09-14 RX ORDER — CEPHALEXIN 500 MG/1
500 CAPSULE ORAL EVERY 6 HOURS SCHEDULED
Qty: 28 CAPSULE | Refills: 0 | Status: SHIPPED | OUTPATIENT
Start: 2021-09-14 | End: 2021-09-21

## 2021-09-14 RX ORDER — CEPHALEXIN 250 MG/1
500 CAPSULE ORAL ONCE
Status: COMPLETED | OUTPATIENT
Start: 2021-09-14 | End: 2021-09-14

## 2021-09-14 RX ADMIN — CEPHALEXIN 500 MG: 250 CAPSULE ORAL at 00:59

## 2021-09-14 NOTE — ED PROVIDER NOTES
History  Chief Complaint   Patient presents with    Medical Problem     Pt with lump on L side of abscess, started as pimple and pt kept itching it  Patient is a 60-year-old female seen in the emergency department with concern for discomfort/swelling/itching to left lower abdomen, over approximately the past 1-2 weeks  Patient states that she thought she had a pimple in that area, but has not noted any drainage  Patient states that she tried a topical cream at home, without improvement of symptoms noted  Patient notes no trauma, fever, weakness, numbness, tingling  Prior to Admission Medications   Prescriptions Last Dose Informant Patient Reported? Taking? EPINEPHrine (EPIPEN) 0 3 mg/0 3 mL SOAJ   No No   Sig: Inject 0 3 mL (0 3 mg total) into a muscle once for 1 dose   FLUoxetine (PROzac) 40 MG capsule   Yes No   Sig: Take 40 mg by mouth daily   Patient not taking: Reported on 6/24/2021   OLANZapine (ZyPREXA) 5 mg tablet   Yes No   Sig: Take 5 mg by mouth daily at bedtime   QUEtiapine (SEROquel) 100 mg tablet   Yes No   Sig: Take 100 mg by mouth daily at bedtime   Patient not taking: Reported on 6/24/2021   albuterol (PROVENTIL HFA,VENTOLIN HFA) 90 mcg/act inhaler   No No   Sig: Inhale 2 puffs every 6 (six) hours as needed for wheezing or shortness of breath   clotrimazole (LOTRIMIN) 1 % cream   No No   Sig: Apply to affected area 2 times daily for 2-4 wks   fluticasone (FLOVENT HFA) 110 MCG/ACT inhaler   No No   Sig: Inhale 2 puffs 2 (two) times a day Rinse mouth after use     guaiFENesin (MUCINEX) 600 mg 12 hr tablet   No No   Sig: Take 1 tablet (600 mg total) by mouth every 12 (twelve) hours   ibuprofen (MOTRIN) 400 mg tablet   No No   Sig: Take 1 tablet (400 mg total) by mouth 2 (two) times a day   meclizine (ANTIVERT) 25 mg tablet   No No   Sig: Take 1 tablet (25 mg total) by mouth 3 (three) times a day as needed for dizziness for up to 30 doses   Patient not taking: Reported on 2/12/2021 ondansetron (ZOFRAN) 4 mg tablet   No No   Sig: Take 1 tablet (4 mg total) by mouth every 6 (six) hours   ondansetron (ZOFRAN-ODT) 4 mg disintegrating tablet   No No   Sig: Take 1 tablet (4 mg total) by mouth every 6 (six) hours as needed for nausea or vomiting   pantoprazole (PROTONIX) 40 mg tablet   No No   Sig: Take 1 tablet (40 mg total) by mouth daily   predniSONE 10 mg tablet   No No   Si po daily x2 days, then 3 po daily x2 days, then 2 po daily x2 days,then 1 po daily x2days   Patient not taking: Reported on 3/19/2021      Facility-Administered Medications: None       Past Medical History:   Diagnosis Date    ADHD     Anxiety     Asthma     [facility]     Depression     Diabetes mellitus [facility]     GERD (gastroesophageal reflux disease)     Mental and behavioral problem     Migraine     Palpitations     Last Assessed [id number]    Psychiatric disorder     Psychiatric illness     Psychosis ([facility]     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[facility]     Seizures ([facility]     last time 2 weeks ago- petit mal    Seizures ([facility] )     Pseudoseizures    Stabbing chest pain     Last Assessed 2016    [facility])     Tachycardia     Last Assessed 2016    Upper respiratory disease     Last Assessed 2016       Past Surgical History:   Procedure Laterality Date    KNEE SURGERY      SC LAP,TUBAL CAUTERY N/A 10/3/2018    Procedure: LAPAROSCOPIC TUBAL LIGATION;  Surgeon: Sophie Gillespie MD;  Location: [address] [place] [address];  Service: Gynecology    TUBAL LIGATION         Family History   Problem Relation Age of Onset    Migraines Sister     Cancer Mother     Diabetes Mother     Cancer Father     Diabetes Father     Arthritis Father     Cancer Maternal Grandmother     Cancer Maternal Aunt     Cancer Paternal Uncle     Diabetes Other      I have reviewed and agree with the history as documented      E-Cigarette/Vaping    E-Cigarette Use Former User      E-Cigarette/Vaping Substances    Nicotine No     THC No     CBD No     Flavoring No     Other No     Unknown No      Social History     Tobacco Use    Smoking status: Current Every Day Smoker     Packs/day: 0 25     Types: Cigarettes    Smokeless tobacco: Never Used   Vaping Use    Vaping Use: Former   Substance Use Topics    Alcohol use: Not Currently    Drug use: Not Currently     Frequency: 1 0 times per week       Review of Systems   Constitutional: Negative for chills and fever  HENT: Negative for ear pain and sore throat  Eyes: Negative for pain and visual disturbance  Respiratory: Negative for cough and shortness of breath  Cardiovascular: Negative for chest pain and palpitations  Gastrointestinal: Negative for nausea and vomiting  Musculoskeletal: Negative for arthralgias and back pain  Skin: Positive for color change and rash  Neurological: Negative for dizziness and headaches  Physical Exam  Physical Exam  Vitals and nursing note reviewed  Constitutional:       General: She is not in acute distress  Appearance: She is well-developed  HENT:      Head: Normocephalic and atraumatic  Right Ear: External ear normal       Left Ear: External ear normal       Mouth/Throat:      Pharynx: Oropharynx is clear  Eyes:      Conjunctiva/sclera: Conjunctivae normal    Cardiovascular:      Rate and Rhythm: Tachycardia present  Comments: well-perfused extremities  Pulmonary:      Effort: Pulmonary effort is normal  No respiratory distress  Breath sounds: Normal breath sounds  Abdominal:      General: There is no distension  Palpations: Abdomen is soft  Musculoskeletal:         General: No deformity or signs of injury  Cervical back: Normal range of motion and neck supple  Skin:     General: Skin is warm and dry  Findings: Erythema and rash present        Comments: Approximately 5 x 5 cm area of warmth/erythema/tenderness/induration to left lower abdomen; no fluctuance noted Neurological:      Mental Status: She is alert  Cranial Nerves: No cranial nerve deficit  Sensory: No sensory deficit  Psychiatric:         Mood and Affect: Mood normal          Behavior: Behavior normal          Vital Signs  ED Triage Vitals   Temperature Pulse Respirations Blood Pressure SpO2   09/14/21 0042 09/14/21 0042 09/14/21 0042 09/14/21 0044 09/14/21 0042   98 6 °F (37 °C) 104 18 108/64 100 %      Temp src Heart Rate Source Patient Position - Orthostatic VS BP Location FiO2 (%)   -- -- -- -- --             Pain Score       --                  Vitals:    09/14/21 0042 09/14/21 0044   BP:  108/64   Pulse: 104          Visual Acuity      ED Medications  Medications   cephalexin (KEFLEX) capsule 500 mg (500 mg Oral Given 9/14/21 0059)       Diagnostic Studies  Results Reviewed     None                 No orders to display              Procedures  Procedures         ED Course                                           MDM  Number of Diagnoses or Management Options  Cellulitis of abdominal wall  Diagnosis management comments: Patient is a 52-year-old female seen in the emergency department with uncomfortable rash to left lower abdomen, consistent with cellulitis  Evaluation is not consistent with abscess  Plan to treat patient with course of oral antibiotics, and patient follow up with PCP  Patient stable for discharge home  Discharge instructions were reviewed with patient  Disposition  Final diagnoses:   Cellulitis of abdominal wall     Time reflects when diagnosis was documented in both MDM as applicable and the Disposition within this note     Time User Action Codes Description Comment    9/14/2021 12:54 AM Latoya Carrillo Add [[id number]] Cellulitis of abdominal wall       ED Disposition     ED Disposition Condition Date/Time Comment    Discharge Stable Tue Sep 14, 2021 12:54 AM [place] Coughlin discharge to home/self care              Follow-up Information     Follow up With Specialties Details Why Contact Jennifer Caballero DO Family Medicine Call in 1 day  [address]   [phone]            Discharge Medication List as of 9/14/2021 12:58 AM      START taking these medications    Details   cephalexin (KEFLEX) 500 mg capsule Take 1 capsule (500 mg total) by mouth every 6 (six) hours for 7 days, Starting Tue 9/14/2021, Until Tue 9/21/2021, Normal         CONTINUE these medications which have NOT CHANGED    Details   albuterol (PROVENTIL HFA,VENTOLIN HFA) 90 mcg/act inhaler Inhale 2 puffs every 6 (six) hours as needed for wheezing or shortness of breath, Starting Tue 2/9/2021, Normal      clotrimazole (LOTRIMIN) 1 % cream Apply to affected area 2 times daily for 2-4 wks, Normal      EPINEPHrine (EPIPEN) 0 3 mg/0 3 mL SOAJ Inject 0 3 mL (0 3 mg total) into a muscle once for 1 dose, Starting Tue 2/9/2021, Normal      FLUoxetine (PROzac) 40 MG capsule Take 40 mg by mouth daily, Historical Med      fluticasone (FLOVENT HFA) 110 MCG/ACT inhaler Inhale 2 puffs 2 (two) times a day Rinse mouth after use , Starting Tue 2/9/2021, Normal      guaiFENesin (MUCINEX) 600 mg 12 hr tablet Take 1 tablet (600 mg total) by mouth every 12 (twelve) hours, Starting Sun 8/15/2021, Normal      ibuprofen (MOTRIN) 400 mg tablet Take 1 tablet (400 mg total) by mouth 2 (two) times a day, Starting Wed 3/17/2021, Normal      meclizine (ANTIVERT) 25 mg tablet Take 1 tablet (25 mg total) by mouth 3 (three) times a day as needed for dizziness for up to 30 doses, Starting Fri 1/8/2021, Normal      OLANZapine (ZyPREXA) 5 mg tablet Take 5 mg by mouth daily at bedtime, Starting Fri 6/18/2021, Historical Med      ondansetron (ZOFRAN) 4 mg tablet Take 1 tablet (4 mg total) by mouth every 6 (six) hours, Starting Mon 6/21/2021, Normal      ondansetron (ZOFRAN-ODT) 4 mg disintegrating tablet Take 1 tablet (4 mg total) by mouth every 6 (six) hours as needed for nausea or vomiting, Starting Thu 6/24/2021, Normal      pantoprazole (PROTONIX) 40 mg tablet Take 1 tablet (40 mg total) by mouth daily, Starting u 6/17/2021, Normal      predniSONE 10 mg tablet 4 po daily x2 days, then 3 po daily x2 days, then 2 po daily x2 days,then 1 po daily x2days, Normal      QUEtiapine (SEROquel) 100 mg tablet Take 100 mg by mouth daily at bedtime, Historical Med           No discharge procedures on file      PDMP Review     None          ED Provider  Electronically Signed by           Renuka Fried MD  09/14/21 2409

## 2021-09-15 ENCOUNTER — HOSPITAL ENCOUNTER (EMERGENCY)
Facility: HOSPITAL | Age: 26
Discharge: HOME | End: 2021-09-16
Attending: EMERGENCY MEDICINE | Admitting: EMERGENCY MEDICINE
Payer: MEDICARE

## 2021-09-15 ENCOUNTER — APPOINTMENT (EMERGENCY)
Dept: CT IMAGING | Facility: HOSPITAL | Age: 26
End: 2021-09-15
Payer: MEDICARE

## 2021-09-15 VITALS
DIASTOLIC BLOOD PRESSURE: 64 MMHG | HEART RATE: 96 BPM | OXYGEN SATURATION: 98 % | TEMPERATURE: 97.6 F | RESPIRATION RATE: 18 BRPM | SYSTOLIC BLOOD PRESSURE: 109 MMHG

## 2021-09-15 DIAGNOSIS — R10.13 EPIGASTRIC PAIN: ICD-10-CM

## 2021-09-15 DIAGNOSIS — N12 PYELONEPHRITIS: Primary | ICD-10-CM

## 2021-09-15 LAB
ALBUMIN SERPL BCP-MCNC: 3.7 G/DL (ref 3.5–5)
ALP SERPL-CCNC: 70 U/L (ref 46–116)
ALT SERPL W P-5'-P-CCNC: 18 U/L (ref 12–78)
ANION GAP SERPL CALCULATED.3IONS-SCNC: 7 MMOL/L (ref 4–13)
AST SERPL W P-5'-P-CCNC: 16 U/L (ref 5–45)
BACTERIA UR QL AUTO: ABNORMAL /HPF
BASOPHILS # BLD AUTO: 0.06 THOUSANDS/ΜL (ref 0–0.1)
BASOPHILS NFR BLD AUTO: 1 % (ref 0–1)
BILIRUB SERPL-MCNC: 0.44 MG/DL (ref 0.2–1)
BILIRUB UR QL STRIP: NEGATIVE
BUN SERPL-MCNC: 13 MG/DL (ref 5–25)
CALCIUM SERPL-MCNC: 9.2 MG/DL (ref 8.3–10.1)
CHLORIDE SERPL-SCNC: 104 MMOL/L (ref 100–108)
CLARITY UR: ABNORMAL
CO2 SERPL-SCNC: 29 MMOL/L (ref 21–32)
COLOR UR: YELLOW
CREAT SERPL-MCNC: 0.89 MG/DL (ref 0.6–1.3)
EOSINOPHIL # BLD AUTO: 0.28 THOUSAND/ΜL (ref 0–0.61)
EOSINOPHIL NFR BLD AUTO: 4 % (ref 0–6)
ERYTHROCYTE [DISTWIDTH] IN BLOOD BY AUTOMATED COUNT: 12.3 % (ref 11.6–15.1)
EXT PREG TEST URINE: NEGATIVE
EXT. CONTROL ED NAV: NORMAL
GFR SERPL CREATININE-BSD FRML MDRD: 90 ML/MIN/1.73SQ M
GLUCOSE SERPL-MCNC: 95 MG/DL (ref 65–140)
GLUCOSE UR STRIP-MCNC: NEGATIVE MG/DL
HCT VFR BLD AUTO: 43.2 % (ref 34.8–46.1)
HGB BLD-MCNC: 14 G/DL (ref 11.5–15.4)
HGB UR QL STRIP.AUTO: NEGATIVE
IMM GRANULOCYTES # BLD AUTO: 0.02 THOUSAND/UL (ref 0–0.2)
IMM GRANULOCYTES NFR BLD AUTO: 0 % (ref 0–2)
KETONES UR STRIP-MCNC: NEGATIVE MG/DL
LEUKOCYTE ESTERASE UR QL STRIP: ABNORMAL
LIPASE SERPL-CCNC: 131 U/L (ref 73–393)
LYMPHOCYTES # BLD AUTO: 2.63 THOUSANDS/ΜL (ref 0.6–4.47)
LYMPHOCYTES NFR BLD AUTO: 33 % (ref 14–44)
MCH RBC QN AUTO: 29.5 PG (ref 26.8–34.3)
MCHC RBC AUTO-ENTMCNC: 32.4 G/DL (ref 31.4–37.4)
MCV RBC AUTO: 91 FL (ref 82–98)
MONOCYTES # BLD AUTO: 0.64 THOUSAND/ΜL (ref 0.17–1.22)
MONOCYTES NFR BLD AUTO: 8 % (ref 4–12)
MUCOUS THREADS UR QL AUTO: ABNORMAL
NEUTROPHILS # BLD AUTO: 4.34 THOUSANDS/ΜL (ref 1.85–7.62)
NEUTS SEG NFR BLD AUTO: 54 % (ref 43–75)
NITRITE UR QL STRIP: NEGATIVE
NON-SQ EPI CELLS URNS QL MICRO: ABNORMAL /HPF
NRBC BLD AUTO-RTO: 0 /100 WBCS
PH UR STRIP.AUTO: 7 [PH] (ref 4.5–8)
PLATELET # BLD AUTO: 338 THOUSANDS/UL (ref 149–390)
PMV BLD AUTO: 9 FL (ref 8.9–12.7)
POTASSIUM SERPL-SCNC: 4 MMOL/L (ref 3.5–5.3)
PROT SERPL-MCNC: 7.8 G/DL (ref 6.4–8.2)
PROT UR STRIP-MCNC: NEGATIVE MG/DL
RBC # BLD AUTO: 4.74 MILLION/UL (ref 3.81–5.12)
RBC #/AREA URNS AUTO: ABNORMAL /HPF
SODIUM SERPL-SCNC: 140 MMOL/L (ref 136–145)
SP GR UR STRIP.AUTO: 1.02 (ref 1–1.03)
UROBILINOGEN UR QL STRIP.AUTO: 0.2 E.U./DL
WBC # BLD AUTO: 7.97 THOUSAND/UL (ref 4.31–10.16)
WBC #/AREA URNS AUTO: ABNORMAL /HPF

## 2021-09-15 PROCEDURE — G1004 CDSM NDSC: HCPCS

## 2021-09-15 PROCEDURE — 81001 URINALYSIS AUTO W/SCOPE: CPT

## 2021-09-15 PROCEDURE — 87077 CULTURE AEROBIC IDENTIFY: CPT

## 2021-09-15 PROCEDURE — 74177 CT ABD & PELVIS W/CONTRAST: CPT

## 2021-09-15 PROCEDURE — 99285 EMERGENCY DEPT VISIT HI MDM: CPT | Performed by: EMERGENCY MEDICINE

## 2021-09-15 PROCEDURE — 87086 URINE CULTURE/COLONY COUNT: CPT

## 2021-09-15 PROCEDURE — 96375 TX/PRO/DX INJ NEW DRUG ADDON: CPT

## 2021-09-15 PROCEDURE — 99284 EMERGENCY DEPT VISIT MOD MDM: CPT

## 2021-09-15 PROCEDURE — 36415 COLL VENOUS BLD VENIPUNCTURE: CPT | Performed by: EMERGENCY MEDICINE

## 2021-09-15 PROCEDURE — 96361 HYDRATE IV INFUSION ADD-ON: CPT

## 2021-09-15 PROCEDURE — 83690 ASSAY OF LIPASE: CPT | Performed by: EMERGENCY MEDICINE

## 2021-09-15 PROCEDURE — 85025 COMPLETE CBC W/AUTO DIFF WBC: CPT | Performed by: EMERGENCY MEDICINE

## 2021-09-15 PROCEDURE — 87186 SC STD MICRODIL/AGAR DIL: CPT

## 2021-09-15 PROCEDURE — 80053 COMPREHEN METABOLIC PANEL: CPT | Performed by: EMERGENCY MEDICINE

## 2021-09-15 PROCEDURE — C9113 INJ PANTOPRAZOLE SODIUM, VIA: HCPCS | Performed by: EMERGENCY MEDICINE

## 2021-09-15 PROCEDURE — 81025 URINE PREGNANCY TEST: CPT | Performed by: EMERGENCY MEDICINE

## 2021-09-15 RX ORDER — MORPHINE SULFATE 4 MG/ML
4 INJECTION, SOLUTION INTRAMUSCULAR; INTRAVENOUS ONCE
Status: COMPLETED | OUTPATIENT
Start: 2021-09-15 | End: 2021-09-15

## 2021-09-15 RX ORDER — LIDOCAINE HYDROCHLORIDE 20 MG/ML
15 SOLUTION OROPHARYNGEAL ONCE
Status: COMPLETED | OUTPATIENT
Start: 2021-09-15 | End: 2021-09-15

## 2021-09-15 RX ORDER — MAGNESIUM HYDROXIDE/ALUMINUM HYDROXICE/SIMETHICONE 120; 1200; 1200 MG/30ML; MG/30ML; MG/30ML
30 SUSPENSION ORAL ONCE
Status: COMPLETED | OUTPATIENT
Start: 2021-09-15 | End: 2021-09-15

## 2021-09-15 RX ORDER — PANTOPRAZOLE SODIUM 40 MG/1
40 INJECTION, POWDER, FOR SOLUTION INTRAVENOUS ONCE
Status: COMPLETED | OUTPATIENT
Start: 2021-09-15 | End: 2021-09-15

## 2021-09-15 RX ADMIN — SODIUM CHLORIDE 1000 ML: 0.9 INJECTION, SOLUTION INTRAVENOUS at 19:02

## 2021-09-15 RX ADMIN — LIDOCAINE HYDROCHLORIDE 15 ML: 20 SOLUTION ORAL; TOPICAL at 20:22

## 2021-09-15 RX ADMIN — PANTOPRAZOLE SODIUM 40 MG: 40 INJECTION, POWDER, FOR SOLUTION INTRAVENOUS at 19:25

## 2021-09-15 RX ADMIN — MORPHINE SULFATE 4 MG: 4 INJECTION INTRAVENOUS at 19:10

## 2021-09-15 RX ADMIN — IOHEXOL 100 ML: 350 INJECTION, SOLUTION INTRAVENOUS at 23:06

## 2021-09-15 RX ADMIN — MORPHINE SULFATE 2 MG: 2 INJECTION, SOLUTION INTRAMUSCULAR; INTRAVENOUS at 22:49

## 2021-09-15 RX ADMIN — ALUMINA, MAGNESIA, AND SIMETHICONE ORAL SUSPENSION REGULAR STRENGTH 30 ML: 1200; 1200; 120 SUSPENSION ORAL at 20:22

## 2021-09-15 NOTE — Clinical Note
Martha Slater was seen and treated in our emergency department on 9/15/2021  Diagnosis:     [place]    She may return on this date: 09/17/2021         If you have any questions or concerns, please don't hesitate to call        Raudel Reyna MD    ______________________________           _______________          _______________  Hospital Representative                              Date                                Time

## 2021-09-15 NOTE — Clinical Note
Lynnette Gomes was seen and treated in our emergency department on 9/15/2021  Diagnosis:     [place]    She may return on this date: If you have any questions or concerns, please don't hesitate to call        Katiuska Brunner MD    ______________________________           _______________          _______________  Hospital Representative                              Date                                Time

## 2021-09-15 NOTE — Clinical Note
Paula Pagan was seen and treated in our emergency department on 9/15/2021  Diagnosis:     [place]    She may return on this date: 09/17/2021         If you have any questions or concerns, please don't hesitate to call        Tiffany Martinez MD    ______________________________           _______________          _______________  Hospital Representative                              Date                                Time

## 2021-09-15 NOTE — Clinical Note
Varsha Downs was seen and treated in our emergency department on 9/15/2021  Diagnosis:     [place]    She may return on this date: If you have any questions or concerns, please don't hesitate to call        Glory Kamara MD    ______________________________           _______________          _______________  Hospital Representative                              Date                                Time

## 2021-09-16 PROCEDURE — 96365 THER/PROPH/DIAG IV INF INIT: CPT

## 2021-09-16 RX ORDER — PANTOPRAZOLE SODIUM 40 MG/1
40 TABLET, DELAYED RELEASE ORAL DAILY
Qty: 15 TABLET | Refills: 0 | Status: SHIPPED | OUTPATIENT
Start: 2021-09-16 | End: 2021-10-01

## 2021-09-16 RX ORDER — CEPHALEXIN 500 MG/1
500 CAPSULE ORAL EVERY 6 HOURS SCHEDULED
Qty: 28 CAPSULE | Refills: 0 | Status: SHIPPED | OUTPATIENT
Start: 2021-09-16 | End: 2021-09-23

## 2021-09-16 RX ORDER — OXYCODONE HYDROCHLORIDE AND ACETAMINOPHEN 5; 325 MG/1; MG/1
1 TABLET ORAL ONCE
Status: COMPLETED | OUTPATIENT
Start: 2021-09-16 | End: 2021-09-16

## 2021-09-16 RX ADMIN — CEFTRIAXONE 1000 MG: 10 INJECTION, POWDER, FOR SOLUTION INTRAVENOUS at 00:20

## 2021-09-16 RX ADMIN — OXYCODONE HYDROCHLORIDE AND ACETAMINOPHEN 1 TABLET: 5; 325 TABLET ORAL at 00:45

## 2021-09-16 NOTE — ED NOTES
Patient transported to [address] [place] [address], [address] [place] [address]  09/15/21 7949

## 2021-09-16 NOTE — ED PROVIDER NOTES
History  Chief Complaint   Patient presents with    Abdominal Pain     pt c/o epigastric pain x3 days, pt unable to tolerate po intake as "it makes the pain worse"       History provided by:  Medical records and patient   used: No    Abdominal Pain  Pain location:  Epigastric and RUQ  Pain quality: aching, gnawing, sharp and stabbing    Pain radiates to:  Does not radiate  Pain severity:  Moderate  Onset quality:  Gradual  Duration:  2 days  Timing:  Constant  Progression:  Worsening  Chronicity:  New  Context: eating    Context: not sick contacts, not suspicious food intake and not trauma    Relieved by:  Nothing  Worsened by:  Palpation and eating  Ineffective treatments:  None tried  Associated symptoms: anorexia    Associated symptoms: no chest pain, no diarrhea, no dysuria, no fever, no hematuria, no melena, no nausea, no shortness of breath, no sore throat and no vomiting    Risk factors: NSAID use    Risk factors: has not had multiple surgeries, not pregnant and no recent hospitalization        Prior to Admission Medications   Prescriptions Last Dose Informant Patient Reported? Taking?    EPINEPHrine (EPIPEN) 0 3 mg/0 3 mL SOAJ   No Yes   Sig: Inject 0 3 mL (0 3 mg total) into a muscle once for 1 dose   FLUoxetine (PROzac) 40 MG capsule   Yes No   Sig: Take 40 mg by mouth daily   Patient not taking: Reported on 6/24/2021   OLANZapine (ZyPREXA) 5 mg tablet   Yes No   Sig: Take 5 mg by mouth daily at bedtime   QUEtiapine (SEROquel) 100 mg tablet   Yes No   Sig: Take 100 mg by mouth daily at bedtime   Patient not taking: Reported on 6/24/2021   albuterol (PROVENTIL HFA,VENTOLIN HFA) 90 mcg/act inhaler   No Yes   Sig: Inhale 2 puffs every 6 (six) hours as needed for wheezing or shortness of breath   cephalexin (KEFLEX) 500 mg capsule   No No   Sig: Take 1 capsule (500 mg total) by mouth every 6 (six) hours for 7 days   clotrimazole (LOTRIMIN) 1 % cream   No No   Sig: Apply to affected area 2 times daily for 2-4 wks   fluticasone (FLOVENT HFA) 110 MCG/ACT inhaler   No Yes   Sig: Inhale 2 puffs 2 (two) times a day Rinse mouth after use     guaiFENesin (MUCINEX) 600 mg 12 hr tablet   No No   Sig: Take 1 tablet (600 mg total) by mouth every 12 (twelve) hours   ibuprofen (MOTRIN) 400 mg tablet   No No   Sig: Take 1 tablet (400 mg total) by mouth 2 (two) times a day   meclizine (ANTIVERT) 25 mg tablet   No No   Sig: Take 1 tablet (25 mg total) by mouth 3 (three) times a day as needed for dizziness for up to 30 doses   Patient not taking: Reported on 2021   ondansetron (ZOFRAN) 4 mg tablet   No No   Sig: Take 1 tablet (4 mg total) by mouth every 6 (six) hours   ondansetron (ZOFRAN-ODT) 4 mg disintegrating tablet   No No   Sig: Take 1 tablet (4 mg total) by mouth every 6 (six) hours as needed for nausea or vomiting   pantoprazole (PROTONIX) 40 mg tablet   No No   Sig: Take 1 tablet (40 mg total) by mouth daily   predniSONE 10 mg tablet   No No   Si po daily x2 days, then 3 po daily x2 days, then 2 po daily x2 days,then 1 po daily x2days   Patient not taking: Reported on 3/19/2021      Facility-Administered Medications: None       Past Medical History:   Diagnosis Date    ADHD     Anxiety     Asthma     [facility]ipolar [facility] [address] [facility]     Depression     Diabetes mellitus [facility] [address] [facility]     GERD (gastroesophageal reflux disease)     Mental and behavioral problem     Migraine     Palpitations     Last Assessed [id number]    Psychiatric disorder     Psychiatric illness     Psychosis [facility] [address] [facility]     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[facility] [address] [facility]     Seizures ([facility] [address] [facility]     last time 2 weeks ago- petit mal    Seizures ([facility] [address] [facility]     Pseudoseizures    Stabbing chest pain     Last Assessed 2016    [facility])     Tachycardia     Last Assessed 2016    Upper respiratory disease     Last Assessed 2016       Past Surgical History:   Procedure Laterality Date    KNEE SURGERY      IL LAP,TUBAL CAUTERY N/A 10/3/2018 Procedure: LAPAROSCOPIC TUBAL LIGATION;  Surgeon: Aron Cervantes MD;  Location: [facility] OR;  Service: Gynecology    TUBAL LIGATION         Family History   Problem Relation Age of Onset    Migraines Sister     Cancer Mother     Diabetes Mother     Cancer Father     Diabetes Father     Arthritis Father     Cancer Maternal Grandmother     Cancer Maternal Aunt     Cancer Paternal Uncle     Diabetes Other      I have reviewed and agree with the history as documented  E-Cigarette/Vaping    E-Cigarette Use Former User      E-Cigarette/Vaping Substances    Nicotine No     THC No     CBD No     Flavoring No     Other No     Unknown No      Social History     Tobacco Use    Smoking status: Current Every Day Smoker     Packs/day: 0 25     Types: Cigarettes    Smokeless tobacco: Never Used   Vaping Use    Vaping Use: Former   Substance Use Topics    Alcohol use: Not Currently    Drug use: Not Currently     Frequency: 1 0 times per week       Review of Systems   Constitutional: Negative for fever  HENT: Negative for rhinorrhea and sore throat  Respiratory: Negative for chest tightness and shortness of breath  Cardiovascular: Negative for chest pain  Gastrointestinal: Positive for abdominal pain and anorexia  Negative for diarrhea, melena, nausea and vomiting  Genitourinary: Negative for dysuria, flank pain and hematuria  Skin: Negative for rash  All other systems reviewed and are negative  Physical Exam  Physical Exam  Vitals and nursing note reviewed  Constitutional:       Appearance: She is well-developed  She is not diaphoretic  HENT:      Head: Normocephalic and atraumatic  Eyes:      General: No scleral icterus  Conjunctiva/sclera: Conjunctivae normal    Cardiovascular:      Rate and Rhythm: Normal rate and regular rhythm  Heart sounds: Normal heart sounds  No murmur heard  Pulmonary:      Effort: Pulmonary effort is normal  No respiratory distress  Breath sounds: Normal breath sounds  Abdominal:      Palpations: Abdomen is soft  Tenderness: There is abdominal tenderness in the right upper quadrant and epigastric area  There is no right CVA tenderness, left CVA tenderness, guarding or rebound  Musculoskeletal:         General: No deformity  Normal range of motion  Cervical back: Normal range of motion  Skin:     General: Skin is warm and dry  Capillary Refill: Capillary refill takes less than 2 seconds  Neurological:      Mental Status: She is alert and oriented to person, place, and time  Psychiatric:         Mood and Affect: Mood normal          Behavior: Behavior normal          Thought Content:  Thought content normal          Judgment: Judgment normal          Vital Signs  ED Triage Vitals   Temperature Pulse Respirations Blood Pressure SpO2   09/15/21 1758 09/15/21 1758 09/15/21 1758 09/15/21 1758 09/15/21 1759   98 5 °F (36 9 °C) 94 18 106/79 98 %      Temp Source Heart Rate Source Patient Position - Orthostatic VS BP Location FiO2 (%)   09/15/21 1822 09/15/21 1758 09/15/21 1758 09/15/21 1758 --   Oral Monitor Sitting Right arm       Pain Score       09/15/21 2009       9           Vitals:    09/15/21 1830 09/15/21 2115 09/15/21 2152 09/15/21 2200   BP: 114/68 92/53 121/69 109/64   Pulse: 92 86 99 96   Patient Position - Orthostatic VS: Lying Lying Sitting Lying         Visual Acuity      ED Medications  Medications   ceftriaxone (ROCEPHIN) 1 g/50 mL in dextrose IVPB (has no administration in time range)   sodium chloride 0 9 % bolus 1,000 mL (0 mL Intravenous Stopped 9/15/21 2059)   morphine (PF) 4 mg/mL injection 4 mg (4 mg Intravenous Given 9/15/21 1910)   pantoprazole (PROTONIX) injection 40 mg (40 mg Intravenous Given 9/15/21 1925)   aluminum-magnesium hydroxide-simethicone (MYLANTA) oral suspension 30 mL (30 mL Oral Given 9/15/21 2022)   Lidocaine Viscous HCl (XYLOCAINE) 2 % mucosal solution 15 mL (15 mL Swish & Spit Given 9/15/21 2022)   morphine injection 2 mg (2 mg Intravenous Given 9/15/21 2249)   iohexol (OMNIPAQUE) 350 MG/ML injection (SINGLE-DOSE) 100 mL (100 mL Intravenous Given 9/15/21 2306)       Diagnostic Studies  Results Reviewed     Procedure Component Value Units Date/Time    Urine Microscopic [[id number]]  (Abnormal) Collected: 09/15/21 2259    Lab Status: Final result Specimen: Urine, Clean Catch Updated: 09/15/21 2338     RBC, UA 1-2 /hpf      WBC, UA 10-20 /hpf      Epithelial Cells Occasional /hpf      Bacteria, UA Innumerable /hpf      MUCUS THREADS Occasional    Urine culture [[id number]] Collected: 09/15/21 2259    Lab Status:  In process Specimen: Urine, Clean Catch Updated: 09/15/21 2338    Urine Macroscopic, POC [[id number]]  (Abnormal) Collected: 09/15/21 2259    Lab Status: Final result Specimen: Urine Updated: 09/15/21 2301     Color, UA Yellow     Clarity, UA Cloudy     pH, UA 7 0     Leukocytes, UA Small     Nitrite, UA Negative     Protein, UA Negative mg/dl      Glucose, UA Negative mg/dl      Ketones, UA Negative mg/dl      Urobilinogen, UA 0 2 E U /dl      Bilirubin, UA Negative     Blood, UA Negative     Specific Gravity, UA 1 020    Narrative:      CLINITEK RESULT    POCT pregnancy, urine [[id number]]  (Normal) Resulted: 09/15/21 2251    Lab Status: Final result Updated: 09/15/21 2251     EXT PREG TEST UR (Ref: Negative) Negative     Control Valid    Comprehensive metabolic panel [[id number]] Collected: 09/15/21 1858    Lab Status: Final result Specimen: Blood from Arm, Left Updated: 09/15/21 1931     Sodium 140 mmol/L      Potassium 4 0 mmol/L      Chloride 104 mmol/L      CO2 29 mmol/L      ANION GAP 7 mmol/L      BUN 13 mg/dL      Creatinine 0 89 mg/dL      Glucose 95 mg/dL      Calcium 9 2 mg/dL      AST 16 U/L      ALT 18 U/L      Alkaline Phosphatase 70 U/L      Total Protein 7 8 g/dL      Albumin 3 7 g/dL      Total Bilirubin 0 44 mg/dL      eGFR 90 ml/min/1 73sq m     Narrative:      Consolidated [employer] guidelines for Chronic Kidney Disease (CKD):     Stage 1 with normal or high GFR (GFR > 90 mL/min/1 73 square meters)    Stage 2 Mild CKD (GFR = 60-89 mL/min/1 73 square meters)    Stage 3A Moderate CKD (GFR = 45-59 mL/min/1 73 square meters)    Stage 3B Moderate CKD (GFR = 30-44 mL/min/1 73 square meters)    Stage 4 Severe CKD (GFR = 15-29 mL/min/1 73 square meters)    Stage 5 End Stage CKD (GFR <15 mL/min/1 73 square meters)  Note: GFR calculation is accurate only with a steady state creatinine    Lipase [[id number]]  (Normal) Collected: 09/15/21 1858    Lab Status: Final result Specimen: Blood from Arm, Left Updated: 09/15/21 1931     Lipase 131 u/L     CBC and differential [[id number]] Collected: 09/15/21 1858    Lab Status: Final result Specimen: Blood from Arm, Left Updated: 09/15/21 1907     WBC 7 97 Thousand/uL      RBC 4 74 Million/uL      Hemoglobin 14 0 g/dL      Hematocrit 43 2 %      MCV 91 fL      MCH 29 5 pg      MCHC 32 4 g/dL      RDW 12 3 %      MPV 9 0 fL      Platelets 719 Thousands/uL      nRBC 0 /100 WBCs      Neutrophils Relative 54 %      Immat GRANS % 0 %      Lymphocytes Relative 33 %      Monocytes Relative 8 %      Eosinophils Relative 4 %      Basophils Relative 1 %      Neutrophils Absolute 4 34 Thousands/µL      Immature Grans Absolute 0 02 Thousand/uL      Lymphocytes Absolute 2 63 Thousands/µL      Monocytes Absolute 0 64 Thousand/µL      Eosinophils Absolute 0 28 Thousand/µL      Basophils Absolute 0 06 Thousands/µL                  CT abdomen pelvis with contrast    (Results Pending)              Procedures  Procedures         ED Course  ED Course as of Sep 19 1347   Wed Sep 15, 2021   1941 wnl   Lipase: 131   1941 wnl   Comprehensive metabolic panel   7023 Small LE   Urine Macroscopic, POC(!)   2344 Pyuria c/w UTI   Urine Microscopic(!)   Thu Sep 16, 2021   0024 Exam and work up reassuring, possible pyelo?   Signed out to Dr Claudetta Chen pending results of imaging  Pt able to PO without issue  5058 ABDOMINAL WALL/INGUINAL REGIONS:  There is a 1 4 x 0 8 cm superficial soft tissue density nodule with apparent adjacent skin thickening involving the lower left anterior abdominal wall, near the level of the iliac crest (series 2 image 60)  This appears   new compared with June 21, 2021      OSSEOUS STRUCTURES:  No acute fracture or destructive osseous lesion      IMPRESSION:     The urinary bladder wall appears somewhat thickened, however, evaluation is limited by underdistention  Correlation with urinalysis and urine culture and sensitivity is suggested  CT abdomen pelvis with contrast                             SBIRT 22yo+      Most Recent Value   SBIRT (23 yo +)   In order to provide better care to our patients, we are screening all of our patients for alcohol and drug use  Would it be okay to ask you these screening questions? Unable to answer at this time Filed at: 09/15/2021 1922                    MDM  Number of Diagnoses or Management Options     Amount and/or Complexity of Data Reviewed  Clinical lab tests: ordered and reviewed  Tests in the radiology section of CPT®: ordered and reviewed  Tests in the medicine section of CPT®: ordered and reviewed  Review and summarize past medical records: yes  Independent visualization of images, tracings, or specimens: yes    Risk of Complications, Morbidity, and/or Mortality  Presenting problems: moderate  Diagnostic procedures: moderate  Management options: moderate    Patient Progress  Patient progress: stable      Disposition  Final diagnoses:   None     ED Disposition     None      Follow-up Information    None         Patient's Medications   Discharge Prescriptions    No medications on file     No discharge procedures on file      PDMP Review     None          ED Provider  Electronically Signed by           Nicolás Vargas MD  09/19/21 0137

## 2021-09-17 PROCEDURE — 99283 EMERGENCY DEPT VISIT LOW MDM: CPT

## 2021-09-18 ENCOUNTER — HOSPITAL ENCOUNTER (EMERGENCY)
Facility: HOSPITAL | Age: 26
Discharge: HOME | End: 2021-09-18
Attending: EMERGENCY MEDICINE | Admitting: EMERGENCY MEDICINE
Payer: MEDICARE

## 2021-09-18 VITALS
DIASTOLIC BLOOD PRESSURE: 69 MMHG | BODY MASS INDEX: 34.84 KG/M2 | SYSTOLIC BLOOD PRESSURE: 99 MMHG | OXYGEN SATURATION: 98 % | RESPIRATION RATE: 20 BRPM | WEIGHT: 203 LBS | HEART RATE: 89 BPM | TEMPERATURE: 98.6 F

## 2021-09-18 DIAGNOSIS — K21.9 GASTROESOPHAGEAL REFLUX DISEASE, UNSPECIFIED WHETHER ESOPHAGITIS PRESENT: ICD-10-CM

## 2021-09-18 DIAGNOSIS — R10.13 EPIGASTRIC PAIN: Primary | ICD-10-CM

## 2021-09-18 LAB — BACTERIA UR CULT: ABNORMAL

## 2021-09-18 PROCEDURE — 99284 EMERGENCY DEPT VISIT MOD MDM: CPT | Performed by: EMERGENCY MEDICINE

## 2021-09-18 RX ORDER — SUCRALFATE 1 G/1
1 TABLET ORAL ONCE
Status: COMPLETED | OUTPATIENT
Start: 2021-09-18 | End: 2021-09-18

## 2021-09-18 RX ORDER — ONDANSETRON 4 MG/1
4 TABLET, ORALLY DISINTEGRATING ORAL ONCE
Status: COMPLETED | OUTPATIENT
Start: 2021-09-18 | End: 2021-09-18

## 2021-09-18 RX ORDER — MAGNESIUM HYDROXIDE/ALUMINUM HYDROXICE/SIMETHICONE 120; 1200; 1200 MG/30ML; MG/30ML; MG/30ML
30 SUSPENSION ORAL ONCE
Status: DISCONTINUED | OUTPATIENT
Start: 2021-09-18 | End: 2021-09-18 | Stop reason: HOSPADM

## 2021-09-18 RX ORDER — FAMOTIDINE 20 MG/1
20 TABLET, FILM COATED ORAL 2 TIMES DAILY
Qty: 30 TABLET | Refills: 0 | Status: SHIPPED | OUTPATIENT
Start: 2021-09-18 | End: 2021-09-18 | Stop reason: CLARIF

## 2021-09-18 RX ORDER — FAMOTIDINE 20 MG/1
20 TABLET, FILM COATED ORAL ONCE
Status: COMPLETED | OUTPATIENT
Start: 2021-09-18 | End: 2021-09-18

## 2021-09-18 RX ORDER — SUCRALFATE 1 G/1
1 TABLET ORAL 4 TIMES DAILY
Qty: 20 TABLET | Refills: 0 | Status: SHIPPED | OUTPATIENT
Start: 2021-09-18

## 2021-09-18 RX ADMIN — FAMOTIDINE 20 MG: 20 TABLET, FILM COATED ORAL at 03:50

## 2021-09-18 RX ADMIN — ONDANSETRON 4 MG: 4 TABLET, ORALLY DISINTEGRATING ORAL at 03:50

## 2021-09-18 RX ADMIN — SUCRALFATE 1 G: 1 TABLET ORAL at 03:51

## 2021-09-18 NOTE — ED PROVIDER NOTES
History  Chief Complaint   Patient presents with    Abdominal Pain     Pt dx with stomach ulcer 2 days and c/o decreased appetite and increased pain  HPI  Patient 23year old female presenting with week long abdominal pain  Hx of ADHD, Bipolar, Pseudoseizure  She was seen at [facility] ED for same complaint 2 days ago and received extensive workup including CT scan of abdomen which showed no acute abdominal pathology  She was found to have UTI so was started on antibiotics  Patient states that she was told that she most likely has gastric ulcer and discharged  Patient states that she hasn't been able to eat adequately due to the pain after PO intake  She is upset stating "I eat  I eat a lot"  Patient does not have any other complaints other than abdominal pain with PO intake  She does not have any bowel movement changes or urinary symptoms  Denies n/v      Prior to Admission Medications   Prescriptions Last Dose Informant Patient Reported? Taking?    EPINEPHrine (EPIPEN) 0 3 mg/0 3 mL SOAJ   No No   Sig: Inject 0 3 mL (0 3 mg total) into a muscle once for 1 dose   FLUoxetine (PROzac) 40 MG capsule   Yes No   Sig: Take 40 mg by mouth daily   Patient not taking: Reported on 6/24/2021   OLANZapine (ZyPREXA) 5 mg tablet   Yes No   Sig: Take 5 mg by mouth daily at bedtime   QUEtiapine (SEROquel) 100 mg tablet   Yes No   Sig: Take 100 mg by mouth daily at bedtime   Patient not taking: Reported on 6/24/2021   albuterol (PROVENTIL HFA,VENTOLIN HFA) 90 mcg/act inhaler   No No   Sig: Inhale 2 puffs every 6 (six) hours as needed for wheezing or shortness of breath   cephalexin (KEFLEX) 500 mg capsule   No No   Sig: Take 1 capsule (500 mg total) by mouth every 6 (six) hours for 7 days   cephalexin (KEFLEX) 500 mg capsule   No No   Sig: Take 1 capsule (500 mg total) by mouth every 6 (six) hours for 7 days   clotrimazole (LOTRIMIN) 1 % cream   No No   Sig: Apply to affected area 2 times daily for 2-4 wks   fluticasone (FLOVENT HFA) 110 MCG/ACT inhaler   No No   Sig: Inhale 2 puffs 2 (two) times a day Rinse mouth after use     guaiFENesin (MUCINEX) 600 mg 12 hr tablet   No No   Sig: Take 1 tablet (600 mg total) by mouth every 12 (twelve) hours   ibuprofen (MOTRIN) 400 mg tablet   No No   Sig: Take 1 tablet (400 mg total) by mouth 2 (two) times a day   meclizine (ANTIVERT) 25 mg tablet   No No   Sig: Take 1 tablet (25 mg total) by mouth 3 (three) times a day as needed for dizziness for up to 30 doses   Patient not taking: Reported on 2021   ondansetron (ZOFRAN) 4 mg tablet   No No   Sig: Take 1 tablet (4 mg total) by mouth every 6 (six) hours   ondansetron (ZOFRAN-ODT) 4 mg disintegrating tablet   No No   Sig: Take 1 tablet (4 mg total) by mouth every 6 (six) hours as needed for nausea or vomiting   pantoprazole (PROTONIX) 40 mg tablet   No No   Sig: Take 1 tablet (40 mg total) by mouth daily   pantoprazole (PROTONIX) 40 mg tablet   No No   Sig: Take 1 tablet (40 mg total) by mouth daily for 15 days   predniSONE 10 mg tablet   No No   Si po daily x2 days, then 3 po daily x2 days, then 2 po daily x2 days,then 1 po daily x2days   Patient not taking: Reported on 3/19/2021      Facility-Administered Medications: None       Past Medical History:   Diagnosis Date    ADHD     Anxiety     Asthma     [facility]ipolar [facility]     Depression     Diabetes mellitus [facility]     GERD (gastroesophageal reflux disease)     Mental and behavioral problem     Migraine     Palpitations     Last Assessed [id number]    Psychiatric disorder     Psychiatric illness     Psychosis ([facility]     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[facility]     Seizures ([facility] )     last time 2 weeks ago- petit mal    Seizures ([facility] [facility]     Pseudoseizures    Stabbing chest pain     Last Assessed 2016    Su[facility])     Tachycardia     Last Assessed 2016    Upper respiratory disease     Last Assessed 2016       Past Surgical History:   Procedure Laterality Date    KNEE SURGERY      MS LAP,TUBAL CAUTERY N/A 10/3/2018    Procedure: LAPAROSCOPIC TUBAL LIGATION;  Surgeon: Lakeisha Sarkar MD;  Location: [facility];  Service: Gynecology    TUBAL LIGATION         Family History   Problem Relation Age of Onset    Migraines Sister     Cancer Mother     Diabetes Mother     Cancer Father     Diabetes Father     Arthritis Father     Cancer Maternal Grandmother     Cancer Maternal Aunt     Cancer Paternal Uncle     Diabetes Other      I have reviewed and agree with the history as documented  E-Cigarette/Vaping    E-Cigarette Use Former User      E-Cigarette/Vaping Substances    Nicotine No     THC No     CBD No     Flavoring No     Other No     Unknown No      Social History     Tobacco Use    Smoking status: Current Every Day Smoker     Packs/day: 0 25     Types: Cigarettes    Smokeless tobacco: Never Used   Vaping Use    Vaping Use: Former   Substance Use Topics    Alcohol use: Not Currently    Drug use: Not Currently     Frequency: 1 0 times per week        Review of Systems   Constitutional: Negative for chills, diaphoresis, fever and unexpected weight change  HENT: Negative for ear pain and sore throat  Eyes: Negative for visual disturbance  Respiratory: Negative for cough, chest tightness and shortness of breath  Cardiovascular: Negative for chest pain and leg swelling  Gastrointestinal: Positive for abdominal pain  Negative for abdominal distention, constipation, diarrhea, nausea and vomiting  Endocrine: Negative  Genitourinary: Negative for difficulty urinating and dysuria  Musculoskeletal: Negative  Skin: Negative  Allergic/Immunologic: Negative  Neurological: Negative  Hematological: Negative  Psychiatric/Behavioral: Negative  All other systems reviewed and are negative        Physical Exam  ED Triage Vitals [09/17/21 2345]   Temperature Pulse Respirations Blood Pressure SpO2   98 6 °F (37 °C) 94 18 106/72 98 % Temp Source Heart Rate Source Patient Position - Orthostatic VS BP Location FiO2 (%)   Oral Monitor Lying Right arm --      Pain Score       Worst Possible Pain             Orthostatic Vital Signs  Vitals:    09/17/21 2345 09/18/21 0351   BP: 106/72 99/69   Pulse: 94 89   Patient Position - Orthostatic VS: Lying        Physical Exam  Vitals and nursing note reviewed  Constitutional:       General: She is not in acute distress  Appearance: Normal appearance  She is not ill-appearing  HENT:      Head: Normocephalic and atraumatic  Right Ear: External ear normal       Left Ear: External ear normal       Nose: Nose normal       Mouth/Throat:      Mouth: Mucous membranes are moist       Pharynx: Oropharynx is clear  Eyes:      General: No scleral icterus  Right eye: No discharge  Left eye: No discharge  Extraocular Movements: Extraocular movements intact  Conjunctiva/sclera: Conjunctivae normal       Pupils: Pupils are equal, round, and reactive to light  Cardiovascular:      Rate and Rhythm: Normal rate and regular rhythm  Pulses: Normal pulses  Heart sounds: Normal heart sounds  Pulmonary:      Effort: Pulmonary effort is normal       Breath sounds: Normal breath sounds  Abdominal:      General: Abdomen is flat  Bowel sounds are normal  There is no distension  Palpations: Abdomen is soft  Tenderness: There is no abdominal tenderness  There is no guarding or rebound  Musculoskeletal:         General: Normal range of motion  Cervical back: Normal range of motion and neck supple  Skin:     General: Skin is warm and dry  Capillary Refill: Capillary refill takes less than 2 seconds  Neurological:      General: No focal deficit present  Mental Status: She is alert and oriented to person, place, and time  Mental status is at baseline     Psychiatric:         Mood and Affect: Mood normal          Behavior: Behavior normal          Thought Content: Thought content normal          Judgment: Judgment normal          ED Medications  Medications   famotidine (PEPCID) tablet 20 mg (20 mg Oral Given 9/18/21 0350)   sucralfate (CARAFATE) tablet 1 g (1 g Oral Given 9/18/21 0351)   ondansetron (ZOFRAN-ODT) dispersible tablet 4 mg (4 mg Oral Given 9/18/21 0350)       Diagnostic Studies  Results Reviewed     None                 No orders to display         Procedures  Procedures      ED Course                                       MDM  Number of Diagnoses or Management Options  Epigastric pain  Gastroesophageal reflux disease, unspecified whether esophagitis present  Diagnosis management comments:    Patient with abdominal pain   With recent extensive workup will not pursue additional lab or imaging study   Given Carafate, Pepcid, and zofran with symptom relief   Will prescribe carafate   Patient would like to follow up with GI   Discharged with GI follow up and return precaution    Disposition  Final diagnoses:   Gastroesophageal reflux disease, unspecified whether esophagitis present   Epigastric pain     Time reflects when diagnosis was documented in both MDM as applicable and the Disposition within this note     Time User Action Codes Description Comment    9/18/2021  3:32 AM Yissel Iqbal Add [K21 9] Gastroesophageal reflux disease, unspecified whether esophagitis present     9/18/2021  3:32 AM Yissel Iqbal Add [R10 13] Epigastric pain     9/18/2021  3:32 AM Yissel Iqbal Modify [K21 9] Gastroesophageal reflux disease, unspecified whether esophagitis present     9/18/2021  3:32 AM Serge, [address] [R10 13] Epigastric pain       ED Disposition     ED Disposition Condition Date/Time Comment    Discharge Stable Sat Sep 18, 2021  3:53 AM [place] Kilgore discharge to home/self care              Follow-up Information     Follow up With Specialties Details Why Contact Info Additional [facility] Schedule an appointment as soon as possible for a visit   [address] [id number] [phone] [facility] [place], [place]  [address], [place], [address] [facility], [address] [facility]          Discharge Medication List as of 9/18/2021  3:53 AM      START taking these medications    Details   sucralfate (CARAFATE) 1 g tablet Take 1 tablet (1 g total) by mouth 4 (four) times a day, Starting Sat 9/18/2021, Normal         CONTINUE these medications which have NOT CHANGED    Details   albuterol (PROVENTIL HFA,VENTOLIN HFA) 90 mcg/act inhaler Inhale 2 puffs every 6 (six) hours as needed for wheezing or shortness of breath, Starting Tue 2/9/2021, Normal      !! cephalexin (KEFLEX) 500 mg capsule Take 1 capsule (500 mg total) by mouth every 6 (six) hours for 7 days, Starting Tue 9/14/2021, Until Tue 9/21/2021, Print      !! cephalexin (KEFLEX) 500 mg capsule Take 1 capsule (500 mg total) by mouth every 6 (six) hours for 7 days, Starting Thu 9/16/2021, Until Thu 9/23/2021, Normal      clotrimazole (LOTRIMIN) 1 % cream Apply to affected area 2 times daily for 2-4 wks, Normal      EPINEPHrine (EPIPEN) 0 3 mg/0 3 mL SOAJ Inject 0 3 mL (0 3 mg total) into a muscle once for 1 dose, Starting Tue 2/9/2021, Normal      FLUoxetine (PROzac) 40 MG capsule Take 40 mg by mouth daily, Historical Med      fluticasone (FLOVENT HFA) 110 MCG/ACT inhaler Inhale 2 puffs 2 (two) times a day Rinse mouth after use , Starting Tue 2/9/2021, Normal      guaiFENesin (MUCINEX) 600 mg 12 hr tablet Take 1 tablet (600 mg total) by mouth every 12 (twelve) hours, Starting Sun 8/15/2021, Normal      ibuprofen (MOTRIN) 400 mg tablet Take 1 tablet (400 mg total) by mouth 2 (two) times a day, Starting Wed 3/17/2021, Normal      meclizine (ANTIVERT) 25 mg tablet Take 1 tablet (25 mg total) by mouth 3 (three) times a day as needed for dizziness for up to 30 doses, Starting Fri 1/8/2021, Normal OLANZapine (ZyPREXA) 5 mg tablet Take 5 mg by mouth daily at bedtime, Starting Fri 6/18/2021, Historical Med      ondansetron (ZOFRAN) 4 mg tablet Take 1 tablet (4 mg total) by mouth every 6 (six) hours, Starting Mon 6/21/2021, Normal      ondansetron (ZOFRAN-ODT) 4 mg disintegrating tablet Take 1 tablet (4 mg total) by mouth every 6 (six) hours as needed for nausea or vomiting, Starting Thu 6/24/2021, Normal      !! pantoprazole (PROTONIX) 40 mg tablet Take 1 tablet (40 mg total) by mouth daily, Starting Thu 6/17/2021, Normal      !! pantoprazole (PROTONIX) 40 mg tablet Take 1 tablet (40 mg total) by mouth daily for 15 days, Starting Thu 9/16/2021, Until Fri 10/1/2021, Normal      predniSONE 10 mg tablet 4 po daily x2 days, then 3 po daily x2 days, then 2 po daily x2 days,then 1 po daily x2days, Normal      QUEtiapine (SEROquel) 100 mg tablet Take 100 mg by mouth daily at bedtime, Historical Med       !! - Potential duplicate medications found  Please discuss with provider  No discharge procedures on file  PDMP Review     None           ED Provider  Attending physically available and evaluated Anushka Sandoval [address]  I managed the patient along with the ED Attending      Electronically Signed by         Zana Verduzco MD  09/19/21 2371

## 2021-09-18 NOTE — ED ATTENDING ATTESTATION
9/17/2021  I, Jaz Fields MD, saw and evaluated the patient  I have discussed the patient with the resident/non-physician practitioner and agree with the resident's/non-physician practitioner's findings, Plan of Care, and MDM as documented in the resident's/non-physician practitioner's note, except where noted  All available labs and Radiology studies were reviewed  I was present for key portions of any procedure(s) performed by the resident/non-physician practitioner and I was immediately available to provide assistance  At this point I agree with the current assessment done in the Emergency Department  I have conducted an independent evaluation of this patient a history and physical is as follows:    ED Course         Critical Care Time  Procedures    24 yo female with psych hx, pnes, having abdominal pain for one week, not tolerating po  Pt seen at [facility] [place] few days ago and had labs, urine, and ct and found a uti  Pt told could be ulcer due to pain worse with eating  Pt feels weakness and fatigue  No fever  Pt with vomiting, no blood or bile, no diarrhea  No urinary complaints currently  Pt taking abx  Vss, afebrile, lungs cta, rrr, abdomen soft nontender    Carafate, symptom control

## 2021-09-25 ENCOUNTER — HOSPITAL ENCOUNTER (EMERGENCY)
Facility: HOSPITAL | Age: 26
Discharge: HOME | End: 2021-09-25
Attending: EMERGENCY MEDICINE | Admitting: EMERGENCY MEDICINE
Payer: MEDICARE

## 2021-09-25 ENCOUNTER — NURSE TRIAGE (OUTPATIENT)
Dept: OTHER | Facility: OTHER | Age: 26
End: 2021-09-25

## 2021-09-25 ENCOUNTER — APPOINTMENT (EMERGENCY)
Dept: CT IMAGING | Facility: HOSPITAL | Age: 26
End: 2021-09-25
Payer: MEDICARE

## 2021-09-25 VITALS
HEIGHT: 64 IN | WEIGHT: 219.58 LBS | RESPIRATION RATE: 16 BRPM | OXYGEN SATURATION: 99 % | SYSTOLIC BLOOD PRESSURE: 126 MMHG | DIASTOLIC BLOOD PRESSURE: 85 MMHG | BODY MASS INDEX: 37.49 KG/M2 | HEART RATE: 89 BPM | TEMPERATURE: 98.3 F

## 2021-09-25 DIAGNOSIS — G47.00 INSOMNIA: ICD-10-CM

## 2021-09-25 DIAGNOSIS — G40.909 RECURRENT SEIZURES (HCC): Primary | ICD-10-CM

## 2021-09-25 LAB
ALBUMIN SERPL BCP-MCNC: 3.7 G/DL (ref 3.5–5)
ALP SERPL-CCNC: 56 U/L (ref 46–116)
ALT SERPL W P-5'-P-CCNC: 22 U/L (ref 12–78)
AMORPH PHOS CRY URNS QL MICRO: ABNORMAL /HPF
ANION GAP SERPL CALCULATED.3IONS-SCNC: 8 MMOL/L (ref 4–13)
AST SERPL W P-5'-P-CCNC: 14 U/L (ref 5–45)
ATRIAL RATE: 102 BPM
BACTERIA UR QL AUTO: ABNORMAL /HPF
BASOPHILS # BLD AUTO: 0.07 THOUSANDS/ΜL (ref 0–0.1)
BASOPHILS NFR BLD AUTO: 1 % (ref 0–1)
BILIRUB SERPL-MCNC: 0.17 MG/DL (ref 0.2–1)
BILIRUB UR QL STRIP: NEGATIVE
BUN SERPL-MCNC: 12 MG/DL (ref 5–25)
CALCIUM SERPL-MCNC: 8.8 MG/DL (ref 8.3–10.1)
CHLORIDE SERPL-SCNC: 106 MMOL/L (ref 100–108)
CLARITY UR: ABNORMAL
CO2 SERPL-SCNC: 28 MMOL/L (ref 21–32)
COLOR UR: ABNORMAL
CREAT SERPL-MCNC: 0.89 MG/DL (ref 0.6–1.3)
EOSINOPHIL # BLD AUTO: 0.51 THOUSAND/ΜL (ref 0–0.61)
EOSINOPHIL NFR BLD AUTO: 4 % (ref 0–6)
ERYTHROCYTE [DISTWIDTH] IN BLOOD BY AUTOMATED COUNT: 12.5 % (ref 11.6–15.1)
EXT PREG TEST URINE: NEGATIVE
EXT. CONTROL ED NAV: NORMAL
GFR SERPL CREATININE-BSD FRML MDRD: 90 ML/MIN/1.73SQ M
GLUCOSE SERPL-MCNC: 91 MG/DL (ref 65–140)
GLUCOSE UR STRIP-MCNC: NEGATIVE MG/DL
HCT VFR BLD AUTO: 43.1 % (ref 34.8–46.1)
HGB BLD-MCNC: 14 G/DL (ref 11.5–15.4)
HGB UR QL STRIP.AUTO: NEGATIVE
IMM GRANULOCYTES # BLD AUTO: 0.05 THOUSAND/UL (ref 0–0.2)
IMM GRANULOCYTES NFR BLD AUTO: 0 % (ref 0–2)
KETONES UR STRIP-MCNC: NEGATIVE MG/DL
LEUKOCYTE ESTERASE UR QL STRIP: ABNORMAL
LYMPHOCYTES # BLD AUTO: 4 THOUSANDS/ΜL (ref 0.6–4.47)
LYMPHOCYTES NFR BLD AUTO: 32 % (ref 14–44)
MCH RBC QN AUTO: 29.6 PG (ref 26.8–34.3)
MCHC RBC AUTO-ENTMCNC: 32.5 G/DL (ref 31.4–37.4)
MCV RBC AUTO: 91 FL (ref 82–98)
MONOCYTES # BLD AUTO: 1.06 THOUSAND/ΜL (ref 0.17–1.22)
MONOCYTES NFR BLD AUTO: 9 % (ref 4–12)
NEUTROPHILS # BLD AUTO: 6.71 THOUSANDS/ΜL (ref 1.85–7.62)
NEUTS SEG NFR BLD AUTO: 54 % (ref 43–75)
NITRITE UR QL STRIP: NEGATIVE
NON-SQ EPI CELLS URNS QL MICRO: ABNORMAL /HPF
NRBC BLD AUTO-RTO: 0 /100 WBCS
P AXIS: 50 DEGREES
PH UR STRIP.AUTO: 7 [PH]
PLATELET # BLD AUTO: 358 THOUSANDS/UL (ref 149–390)
PMV BLD AUTO: 9.2 FL (ref 8.9–12.7)
POTASSIUM SERPL-SCNC: 3.9 MMOL/L (ref 3.5–5.3)
PR INTERVAL: 138 MS
PROT SERPL-MCNC: 7.5 G/DL (ref 6.4–8.2)
PROT UR STRIP-MCNC: NEGATIVE MG/DL
QRS AXIS: 23 DEGREES
QRSD INTERVAL: 78 MS
QT INTERVAL: 332 MS
QTC INTERVAL: 424 MS
RBC # BLD AUTO: 4.73 MILLION/UL (ref 3.81–5.12)
RBC #/AREA URNS AUTO: ABNORMAL /HPF
SODIUM SERPL-SCNC: 142 MMOL/L (ref 136–145)
SP GR UR STRIP.AUTO: 1.02 (ref 1–1.03)
T WAVE AXIS: 29 DEGREES
TSH SERPL DL<=0.05 MIU/L-ACNC: 3.53 UIU/ML (ref 0.36–3.74)
UROBILINOGEN UR QL STRIP.AUTO: 0.2 E.U./DL
VENTRICULAR RATE: 98 BPM
WBC # BLD AUTO: 12.4 THOUSAND/UL (ref 4.31–10.16)
WBC #/AREA URNS AUTO: ABNORMAL /HPF

## 2021-09-25 PROCEDURE — 96361 HYDRATE IV INFUSION ADD-ON: CPT

## 2021-09-25 PROCEDURE — 93005 ELECTROCARDIOGRAM TRACING: CPT

## 2021-09-25 PROCEDURE — 99285 EMERGENCY DEPT VISIT HI MDM: CPT | Performed by: EMERGENCY MEDICINE

## 2021-09-25 PROCEDURE — 80053 COMPREHEN METABOLIC PANEL: CPT | Performed by: EMERGENCY MEDICINE

## 2021-09-25 PROCEDURE — 93010 ELECTROCARDIOGRAM REPORT: CPT | Performed by: INTERNAL MEDICINE

## 2021-09-25 PROCEDURE — 96374 THER/PROPH/DIAG INJ IV PUSH: CPT

## 2021-09-25 PROCEDURE — 81025 URINE PREGNANCY TEST: CPT | Performed by: EMERGENCY MEDICINE

## 2021-09-25 PROCEDURE — 99284 EMERGENCY DEPT VISIT MOD MDM: CPT

## 2021-09-25 PROCEDURE — G1004 CDSM NDSC: HCPCS

## 2021-09-25 PROCEDURE — 85025 COMPLETE CBC W/AUTO DIFF WBC: CPT | Performed by: EMERGENCY MEDICINE

## 2021-09-25 PROCEDURE — 81001 URINALYSIS AUTO W/SCOPE: CPT | Performed by: EMERGENCY MEDICINE

## 2021-09-25 PROCEDURE — 36415 COLL VENOUS BLD VENIPUNCTURE: CPT | Performed by: EMERGENCY MEDICINE

## 2021-09-25 PROCEDURE — 70450 CT HEAD/BRAIN W/O DYE: CPT

## 2021-09-25 PROCEDURE — 84443 ASSAY THYROID STIM HORMONE: CPT | Performed by: EMERGENCY MEDICINE

## 2021-09-25 RX ORDER — LANOLIN ALCOHOL/MO/W.PET/CERES
3 CREAM (GRAM) TOPICAL
Qty: 30 TABLET | Refills: 0 | Status: SHIPPED | OUTPATIENT
Start: 2021-09-25

## 2021-09-25 RX ORDER — LORAZEPAM 2 MG/ML
1 INJECTION INTRAMUSCULAR ONCE
Status: COMPLETED | OUTPATIENT
Start: 2021-09-25 | End: 2021-09-25

## 2021-09-25 RX ADMIN — SODIUM CHLORIDE 1000 ML: 0.9 INJECTION, SOLUTION INTRAVENOUS at 11:52

## 2021-09-25 RX ADMIN — LORAZEPAM 1 MG: 2 INJECTION INTRAMUSCULAR; INTRAVENOUS at 11:52

## 2021-09-25 NOTE — ED PROVIDER NOTES
History  Chief Complaint   Patient presents with    Seizure - Prior Hx Of     Hx of stress seizures, worse last few nights  Unable to sleep  50-year-old female presents the emergency department for evaluation of increased pseudo-seizure activity  Patient states that she has a history of "stress seizures"  She states over the past few nights these have been keeping her from sleep and she has had several episodes where her significant other noticed her body shaking  Patient does admit that her seizure disorder does worsen with stress though she is unable to identify an acute stressor  She states she was recently  and moved to a new home though that was not stressful  She is currently employed  She has had several visits to the emergency department over the past few weeks for various ailments  Her significant other describes her seizure activity as stiffening of the extremities  At times these are followed by difficulty speaking  Patient reports a history of having inpatient evaluation of seizures in 2016  Continuous EEG did not demonstrate seizures and she was diagnosed with "stress seizures"  She is not currently taking medication for them  Patient does have a follow-up appointment scheduled with her neurologist in November        History provided by:  Patient, medical records and spouse   used: No    Seizure - Prior Hx Of  Seizure activity on arrival: no    Seizure type:  Unable to specify  Preceding symptoms comment:  Insomnia  Initial focality:  Unable to specify  Episode characteristics: abnormal movements, generalized shaking and stiffening    Postictal symptoms comment:  Difficulty with speech  Return to baseline: yes    Severity:  Unable to specify  Timing:  Unable to specify  Number of seizures this episode:  Patient reports "several" seizures over the past few days  Progression:  Unchanged  Context: decreased sleep    Recent head injury:  No recent head injuries  PTA treatment:  None  History of seizures: yes (pseudoseizures)        Prior to Admission Medications   Prescriptions Last Dose Informant Patient Reported? Taking? EPINEPHrine (EPIPEN) 0 3 mg/0 3 mL SOAJ   No No   Sig: Inject 0 3 mL (0 3 mg total) into a muscle once for 1 dose   FLUoxetine (PROzac) 40 MG capsule   Yes No   Sig: Take 40 mg by mouth daily   Patient not taking: Reported on 2021   OLANZapine (ZyPREXA) 5 mg tablet   Yes No   Sig: Take 5 mg by mouth daily at bedtime   QUEtiapine (SEROquel) 100 mg tablet   Yes No   Sig: Take 100 mg by mouth daily at bedtime   Patient not taking: Reported on 2021   albuterol (PROVENTIL HFA,VENTOLIN HFA) 90 mcg/act inhaler   No No   Sig: Inhale 2 puffs every 6 (six) hours as needed for wheezing or shortness of breath   clotrimazole (LOTRIMIN) 1 % cream   No No   Sig: Apply to affected area 2 times daily for 2-4 wks   fluticasone (FLOVENT HFA) 110 MCG/ACT inhaler   No No   Sig: Inhale 2 puffs 2 (two) times a day Rinse mouth after use     guaiFENesin (MUCINEX) 600 mg 12 hr tablet   No No   Sig: Take 1 tablet (600 mg total) by mouth every 12 (twelve) hours   ibuprofen (MOTRIN) 400 mg tablet   No No   Sig: Take 1 tablet (400 mg total) by mouth 2 (two) times a day   meclizine (ANTIVERT) 25 mg tablet   No No   Sig: Take 1 tablet (25 mg total) by mouth 3 (three) times a day as needed for dizziness for up to 30 doses   Patient not taking: Reported on 2021   ondansetron (ZOFRAN) 4 mg tablet   No No   Sig: Take 1 tablet (4 mg total) by mouth every 6 (six) hours   ondansetron (ZOFRAN-ODT) 4 mg disintegrating tablet   No No   Sig: Take 1 tablet (4 mg total) by mouth every 6 (six) hours as needed for nausea or vomiting   pantoprazole (PROTONIX) 40 mg tablet   No No   Sig: Take 1 tablet (40 mg total) by mouth daily   pantoprazole (PROTONIX) 40 mg tablet   No No   Sig: Take 1 tablet (40 mg total) by mouth daily for 15 days   predniSONE 10 mg tablet   No No   Si po daily x2 days, then 3 po daily x2 days, then 2 po daily x2 days,then 1 po daily x2days   Patient not taking: Reported on 3/19/2021   sucralfate (CARAFATE) 1 g tablet   No No   Sig: Take 1 tablet (1 g total) by mouth 4 (four) times a day      Facility-Administered Medications: None       Past Medical History:   Diagnosis Date    ADHD     Anxiety     Asthma     Bipolar 1 disorder ([facility] )     Depression     Diabetes mellitus ([facility] )     GERD (gastroesophageal reflux disease)     Mental and behavioral problem     Migraine     Palpitations     Last Assessed [id number]    Psychiatric disorder     Psychiatric illness     Psychosis ([facility] )     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[facility] )     Seizures ([place] [address] )     last time 2 weeks ago- petit mal    Seizures ([place] [address] )     Pseudoseizures    Stabbing chest pain     Last Assessed [id number]    Suicide attempt [facility])     Tachycardia     Last Assessed 29Nov2016    Upper respiratory disease     Last Assessed 27Jan2016       Past Surgical History:   Procedure Laterality Date    KNEE SURGERY      OR LAP,TUBAL CAUTERY N/A 10/3/2018    Procedure: LAPAROSCOPIC TUBAL LIGATION;  Surgeon: Steve Dixon MD;  Location: [facility];  Service: Gynecology    TUBAL LIGATION         Family History   Problem Relation Age of Onset    Migraines Sister     Cancer Mother     Diabetes Mother     Cancer Father     Diabetes Father     Arthritis Father     Cancer Maternal Grandmother     Cancer Maternal Aunt     Cancer Paternal Uncle     Diabetes Other      I have reviewed and agree with the history as documented      E-Cigarette/Vaping    E-Cigarette Use Former User      E-Cigarette/Vaping Substances    Nicotine No     THC No     CBD No     Flavoring No     Other No     Unknown No      Social History     Tobacco Use    Smoking status: Current Every Day Smoker     Packs/day: 0 25     Types: Cigarettes    Smokeless tobacco: Never Used   Vaping Use    Vaping Use: Former   Substance Use Topics    Alcohol use: Not Currently    Drug use: Not Currently     Frequency: 1 0 times per week       Review of Systems   Constitutional: Positive for fatigue  Negative for fever and unexpected weight change  Respiratory: Negative for cough, choking and chest tightness  Cardiovascular: Negative for chest pain and leg swelling  Gastrointestinal: Negative for diarrhea, nausea and vomiting  Psychiatric/Behavioral: Positive for decreased concentration and sleep disturbance  Negative for suicidal ideas  All other systems reviewed and are negative  Physical Exam  Physical Exam  Vitals and nursing note reviewed  Constitutional:       General: She is not in acute distress  Appearance: She is well-developed  HENT:      Head: Normocephalic  Right Ear: External ear normal       Left Ear: External ear normal       Nose: Nose normal       Mouth/Throat:      Mouth: Mucous membranes are moist       Pharynx: No oropharyngeal exudate  Eyes:      General: No scleral icterus  Conjunctiva/sclera: Conjunctivae normal       Pupils: Pupils are equal, round, and reactive to light  Cardiovascular:      Rate and Rhythm: Normal rate and regular rhythm  Heart sounds: Normal heart sounds  Pulmonary:      Effort: Pulmonary effort is normal       Breath sounds: Normal breath sounds  Abdominal:      General: Bowel sounds are normal  There is no distension  Palpations: Abdomen is soft  Tenderness: There is no abdominal tenderness  There is no guarding or rebound  Musculoskeletal:         General: No tenderness or deformity  Normal range of motion  Cervical back: Normal range of motion and neck supple  Lymphadenopathy:      Cervical: No cervical adenopathy  Skin:     General: Skin is warm and dry  Capillary Refill: Capillary refill takes less than 2 seconds  Findings: No rash  Neurological:      General: No focal deficit present        Mental Status: She is alert and oriented to person, place, and time  Cranial Nerves: No cranial nerve deficit  Sensory: No sensory deficit  Motor: No abnormal muscle tone  Coordination: Coordination normal       Gait: Gait normal       Deep Tendon Reflexes: Reflexes are normal and symmetric  Psychiatric:         Behavior: Behavior normal          Thought Content:  Thought content normal          Judgment: Judgment normal          Vital Signs  ED Triage Vitals   Temperature Pulse Respirations Blood Pressure SpO2   09/25/21 1110 09/25/21 1108 09/25/21 1108 09/25/21 1108 09/25/21 1108   98 6 °F (37 °C) 89 18 126/79 100 %      Temp Source Heart Rate Source Patient Position - Orthostatic VS BP Location FiO2 (%)   09/25/21 1108 09/25/21 1108 09/25/21 1108 09/25/21 1108 --   Oral Monitor Lying Right arm       Pain Score       09/25/21 1108       No Pain           Vitals:    09/25/21 1230 09/25/21 1330 09/25/21 1400 09/25/21 1548   BP: 147/77 146/74 107/55 126/85   Pulse: 92 92 96 89   Patient Position - Orthostatic VS:  Lying Lying Lying         Visual Acuity  Visual Acuity      Most Recent Value   L Pupil Size (mm)  4   R Pupil Size (mm)  4          ED Medications  Medications   sodium chloride 0 9 % bolus 1,000 mL (0 mL Intravenous Stopped 9/25/21 1548)   LORazepam (ATIVAN) injection 1 mg (1 mg Intravenous Given 9/25/21 1152)       Diagnostic Studies  Results Reviewed     Procedure Component Value Units Date/Time    Urine Microscopic [[id number]]  (Abnormal) Collected: 09/25/21 1414    Lab Status: Final result Specimen: Urine, Clean Catch Updated: 09/25/21 1503     RBC, UA 1-2 /hpf      WBC, UA 4-10 /hpf      Epithelial Cells Innumerable /hpf      Bacteria, UA Moderate /hpf      AMORPH PHOSPATES Occasional /hpf     UA w Reflex to Microscopic w Reflex to Culture [[id number]]  (Abnormal) Collected: 09/25/21 1414    Lab Status: Final result Specimen: Urine, Clean Catch Updated: 09/25/21 1442     Color, UA Light Yellow     Clarity, UA Slightly Cloudy Specific [place], UA 1 020     pH, UA 7 0     Leukocytes, UA Large     Nitrite, UA Negative     Protein, UA Negative mg/dl      Glucose, UA Negative mg/dl      Ketones, UA Negative mg/dl      Urobilinogen, UA 0 2 E U /dl      Bilirubin, UA Negative     Blood, UA Negative    POCT pregnancy, urine [[id number]]  (Normal) Resulted: 09/25/21 1418    Lab Status: Final result Updated: 09/25/21 1418     EXT PREG TEST UR (Ref: Negative) NEGATIVE     Control VALID    TSH [[id number]]  (Normal) Collected: 09/25/21 1150    Lab Status: Final result Specimen: Blood from Arm, Left Updated: 09/25/21 1305     TSH 3RD GENERATON 3 530 uIU/mL     Narrative:      Patients undergoing fluorescein dye angiography may retain small amounts of fluorescein in the body for 48-72 hours post procedure  Samples containing fluorescein can produce falsely depressed TSH values  If the patient had this procedure,a specimen should be resubmitted post fluorescein clearance        Comprehensive metabolic panel [[id number]]  (Abnormal) Collected: 09/25/21 1150    Lab Status: Final result Specimen: Blood from Arm, Left Updated: 09/25/21 1256     Sodium 142 mmol/L      Potassium 3 9 mmol/L      Chloride 106 mmol/L      CO2 28 mmol/L      ANION GAP 8 mmol/L      BUN 12 mg/dL      Creatinine 0 89 mg/dL      Glucose 91 mg/dL      Calcium 8 8 mg/dL      AST 14 U/L      ALT 22 U/L      Alkaline Phosphatase 56 U/L      Total Protein 7 5 g/dL      Albumin 3 7 g/dL      Total Bilirubin 0 17 mg/dL      eGFR 90 ml/min/1 73sq m     Narrative:      [place] guidelines for Chronic Kidney Disease (CKD):     Stage 1 with normal or high GFR (GFR > 90 mL/min/1 73 square meters)    Stage 2 Mild CKD (GFR = 60-89 mL/min/1 73 square meters)    Stage 3A Moderate CKD (GFR = 45-59 mL/min/1 73 square meters)    Stage 3B Moderate CKD (GFR = 30-44 mL/min/1 73 square meters)    Stage 4 Severe CKD (GFR = 15-29 mL/min/1 73 square meters)    Stage 5 End Stage CKD (GFR <15 mL/min/1 73 square meters)  Note: GFR calculation is accurate only with a steady state creatinine    CBC and differential [[id number]]  (Abnormal) Collected: 09/25/21 1150    Lab Status: Final result Specimen: Blood from Arm, Left Updated: 09/25/21 1209     WBC 12 40 Thousand/uL      RBC 4 73 Million/uL      Hemoglobin 14 0 g/dL      Hematocrit 43 1 %      MCV 91 fL      MCH 29 6 pg      MCHC 32 5 g/dL      RDW 12 5 %      MPV 9 2 fL      Platelets 334 Thousands/uL      nRBC 0 /100 WBCs      Neutrophils Relative 54 %      Immat GRANS % 0 %      Lymphocytes Relative 32 %      Monocytes Relative 9 %      Eosinophils Relative 4 %      Basophils Relative 1 %      Neutrophils Absolute 6 71 Thousands/µL      Immature Grans Absolute 0 05 Thousand/uL      Lymphocytes Absolute 4 00 Thousands/µL      Monocytes Absolute 1 06 Thousand/µL      Eosinophils Absolute 0 51 Thousand/µL      Basophils Absolute 0 07 Thousands/µL                  CT head without contrast   Final Result by Tonny Guerrero MD (09/25 9404)      No large acute intracranial hemorrhage, mass effect or edema on this motion degraded head CT                    Workstation performed: [id number]                    Procedures  ECG 12 Lead Documentation Only    Date/Time: 9/25/2021 5:07 PM  Performed by: Mai Ruffin DO  Authorized by: Mai Ruffin DO     Indications / Diagnosis:  Pseudoseizures  ECG reviewed by me, the ED Provider: yes    Patient location:  ED  Previous ECG:     Previous ECG:  Compared to current    Comparison ECG info:  September 11, 2021    Similarity:  No change  Rate:     ECG rate:  98    ECG rate assessment: normal    Rhythm:     Rhythm: sinus rhythm    Ectopy:     Ectopy: none    QRS:     QRS axis:  Normal  Conduction:     Conduction: normal    ST segments:     ST segments:  Normal  T waves:     T waves: normal               ED Course  ED Course as of Sep 25 1803   Sat Sep 25, 2021   1240 Nurse reports patient had episodes of shaking while she was in the room - patient did not lose consciousness or have a post ictal period  SBIRT 20yo+      Most Recent Value   SBIRT (22 yo +)   In order to provide better care to our patients, we are screening all of our patients for alcohol and drug use  Would it be okay to ask you these screening questions? Yes Filed at: 09/25/2021 1109   Initial Alcohol Screen: US AUDIT-C    1  How often do you have a drink containing alcohol? 3 Filed at: 09/25/2021 1109   2  How many drinks containing alcohol do you have on a typical day you are drinking? 1 Filed at: 09/25/2021 1109   3a  Male UNDER 65: How often do you have five or more drinks on one occasion? 0 Filed at: 09/25/2021 1109   3b  FEMALE Any Age, or MALE 65+: How often do you have 4 or more drinks on one occassion? 0 Filed at: 09/25/2021 1109   Audit-C Score  4 Filed at: 09/25/2021 1109   JOEY: How many times in the past year have you    Used an illegal drug or used a prescription medication for non-medical reasons? Never Filed at: 09/25/2021 1109                    MDM  Number of Diagnoses or Management Options  Insomnia: new and requires workup  Recurrent seizures ([facility] ): new and requires workup     Amount and/or Complexity of Data Reviewed  Clinical lab tests: ordered and reviewed  Tests in the radiology section of CPT®: ordered and reviewed  Tests in the medicine section of CPT®: ordered and reviewed  Decide to obtain previous medical records or to obtain history from someone other than the patient: yes  Independent visualization of images, tracings, or specimens: yes    Risk of Complications, Morbidity, and/or Mortality  General comments: 21-year-old female with a history of pseudoseizures presents with increased seizure frequency  Patient's workup in apartment is reassuring  Blood work is unremarkable    Patient's head CT ordered for increased seizure frequency and change in character of seizure was within normal limits  Patient is nontoxic and afebrile without trauma  Recommend that she improve her sleep hygiene as this may be contributing to her increased pseudoseizures  Will have patient try melatonin as a sleep aid  We discussed signs and symptoms to return to the emergency department  Patient Progress  Patient progress: stable      Disposition  Final diagnoses:   Recurrent seizures ([address] ) - pseudoseizures   Insomnia     Time reflects when diagnosis was documented in both MDM as applicable and the Disposition within this note     Time User Action Codes Description Comment    [facility]  [facility] Pseudoseiz[facility]     9/25/2021  3:24 PM Lida Ramírez Add [P29 820] Recurrent seizures ([facility] )     9/25/2021  3:24 PM Lida Ramírez Modify [G40 909] Recurrent seizures ([facility] )     9/25/2021  3:24 PM Lida Ramírez Remove [F44 5] Pseudoseizures     9/25/2021  3:25 PM Lida Ramírez Modify [G40 909] Recurrent seizures ([facility] ) pseudoseizures    9/25/2021  3:25 PM Daljit Feng Add [G47 00] Insomnia       ED Disposition     ED Disposition Condition Date/Time Comment    Discharge Stable Sat Sep 25, 2021  3:24 PM [place] Kilgore discharge to home/self care              Follow-up Information     Follow up With Specialties Details Why Contact Jennifer Claire DO Family Medicine Schedule an appointment as soon as possible for a visit in 2 days For recheck of current symptoms [facility]  [phone]            Discharge Medication List as of 9/25/2021  3:29 PM      START taking these medications    Details   melatonin 3 mg Take 1 tablet (3 mg total) by mouth daily at bedtime, Starting Sat 9/25/2021, Normal         CONTINUE these medications which have NOT CHANGED    Details   albuterol (PROVENTIL HFA,VENTOLIN HFA) 90 mcg/act inhaler Inhale 2 puffs every 6 (six) hours as needed for wheezing or shortness of breath, Starting Tue 2/9/2021, Normal      clotrimazole (LOTRIMIN) 1 % cream Apply to affected area 2 times daily for 2-4 wks, Normal      EPINEPHrine (EPIPEN) 0 3 mg/0 3 mL SOAJ Inject 0 3 mL (0 3 mg total) into a muscle once for 1 dose, Starting Tue 2/9/2021, Normal      FLUoxetine (PROzac) 40 MG capsule Take 40 mg by mouth daily, Historical Med      fluticasone (FLOVENT HFA) 110 MCG/ACT inhaler Inhale 2 puffs 2 (two) times a day Rinse mouth after use , Starting Tue 2/9/2021, Normal      guaiFENesin (MUCINEX) 600 mg 12 hr tablet Take 1 tablet (600 mg total) by mouth every 12 (twelve) hours, Starting Sun 8/15/2021, Normal      ibuprofen (MOTRIN) 400 mg tablet Take 1 tablet (400 mg total) by mouth 2 (two) times a day, Starting Wed 3/17/2021, Normal      meclizine (ANTIVERT) 25 mg tablet Take 1 tablet (25 mg total) by mouth 3 (three) times a day as needed for dizziness for up to 30 doses, Starting Fri 1/8/2021, Normal      OLANZapine (ZyPREXA) 5 mg tablet Take 5 mg by mouth daily at bedtime, Starting Fri 6/18/2021, Historical Med      ondansetron (ZOFRAN) 4 mg tablet Take 1 tablet (4 mg total) by mouth every 6 (six) hours, Starting Mon 6/21/2021, Normal      ondansetron (ZOFRAN-ODT) 4 mg disintegrating tablet Take 1 tablet (4 mg total) by mouth every 6 (six) hours as needed for nausea or vomiting, Starting Thu 6/24/2021, Normal      !! pantoprazole (PROTONIX) 40 mg tablet Take 1 tablet (40 mg total) by mouth daily, Starting Thu 6/17/2021, Normal      !!  pantoprazole (PROTONIX) 40 mg tablet Take 1 tablet (40 mg total) by mouth daily for 15 days, Starting Thu 9/16/2021, Until Fri 10/1/2021, Normal      predniSONE 10 mg tablet 4 po daily x2 days, then 3 po daily x2 days, then 2 po daily x2 days,then 1 po daily x2days, Normal      QUEtiapine (SEROquel) 100 mg tablet Take 100 mg by mouth daily at bedtime, Historical Med      sucralfate (CARAFATE) 1 g tablet Take 1 tablet (1 g total) by mouth 4 (four) times a day, Starting Sat 9/18/2021, Normal       !! - Potential duplicate medications found  Please discuss with provider  No discharge procedures on file      PDMP Review     None          ED Provider  Electronically Signed by           Sophia Valencia DO  09/25/21 8837

## 2021-09-25 NOTE — TELEPHONE ENCOUNTER
Reason for Disposition   Second seizure occurs on the same day    Answer Assessment - Initial Assessment Questions  1  ONSET: "How long did the seizure last?" (Minutes)       Seizure onset for the last two nights they have been very frequent  2  CONTENT: "Describe what happened during the seizure  Did the body become stiff? Was there any jerking?"       She gets stiff, jerks   Extremity stiffness and not eyes are at the back of the head  3  CIRCUMSTANCE: "What was the individual doing when the seizure began?"       Pt says it is stressed  4  MENTAL STATUS: "Does he know who he is, who you are, and where he is?"       She seems stressed and frustrated  5  PRIOR SEIZURES: "Has the individual had a seizure (convulsion) before?" If Yes, ask: "When was the last time?" and "What happened last time?"      DX in 2016   6  EPILEPSY: "Does the individual have epilepsy?" (note: check for medical ID bracelet)      None  7  MEDICATIONS: "Does the individual take anticonvulsant medications?" (e g , yes/no, compliance, any recent changes)      She is on no medications for seizure currently  8  INJURY: "Did the individual hurt himself during the seizure?" (e g , head, tongue)     No injury but she does lock her jaw, she does not turn blue  9  OTHER SYMPTOMS: "Are there any other symptoms?" (e g , fever, headache)      She is off and speech is off and effecting her sleep  10   PREGNANCY: "Is there any chance you are pregnant?" "When was your last menstrual period?"        no    Protocols used: [facility]

## 2021-09-25 NOTE — ED NOTES
Walked into pt's rm to pts arms shaking & eyes were closed  This RN asked the pt if she could hear me, no response  Pt's  tapped on pts feet & pt awoke & immediately asked "where am I?"  Lasting about 15 seconds  This RN turned around & upon turning to pt, pt was fixated on the monitor & shaking- blinking was noted during this episode & pt's  tapped on pt's feet & shaking stopped  Lasted about 10 seconds  Ativan was given 10 min prior to these two episodes  Provider aware        Marvin Knapp RN  09/25/21 6660

## 2021-09-25 NOTE — TELEPHONE ENCOUNTER
Regarding: Worsening seizures/ having Seizures every 10 mintues   ----- Message from Kuldeep Pierre sent at 9/25/2021  9:48 AM EDT -----  "My seizures are getting worse  Im having seizures every 10 minutes   Its affecting my sleep "

## 2021-09-27 ENCOUNTER — HOSPITAL ENCOUNTER (EMERGENCY)
Facility: HOSPITAL | Age: 26
Discharge: HOME | End: 2021-09-27
Attending: EMERGENCY MEDICINE
Payer: MEDICARE

## 2021-09-27 VITALS
OXYGEN SATURATION: 100 % | DIASTOLIC BLOOD PRESSURE: 60 MMHG | RESPIRATION RATE: 18 BRPM | HEART RATE: 92 BPM | SYSTOLIC BLOOD PRESSURE: 110 MMHG | TEMPERATURE: 98.8 F

## 2021-09-27 DIAGNOSIS — R56.9 SEIZURE-LIKE ACTIVITY (HCC): ICD-10-CM

## 2021-09-27 DIAGNOSIS — F31.9 BIPOLAR DISORDER (HCC): ICD-10-CM

## 2021-09-27 DIAGNOSIS — N39.0 UTI (URINARY TRACT INFECTION): Primary | ICD-10-CM

## 2021-09-27 LAB
ALBUMIN SERPL BCP-MCNC: 3.4 G/DL (ref 3.5–5)
ALP SERPL-CCNC: 54 U/L (ref 46–116)
ALT SERPL W P-5'-P-CCNC: 21 U/L (ref 12–78)
ANION GAP SERPL CALCULATED.3IONS-SCNC: 7 MMOL/L (ref 4–13)
AST SERPL W P-5'-P-CCNC: 14 U/L (ref 5–45)
ATRIAL RATE: 97 BPM
BACTERIA UR QL AUTO: ABNORMAL /HPF
BASOPHILS # BLD AUTO: 0.06 THOUSANDS/ΜL (ref 0–0.1)
BASOPHILS NFR BLD AUTO: 1 % (ref 0–1)
BILIRUB SERPL-MCNC: 0.27 MG/DL (ref 0.2–1)
BILIRUB UR QL STRIP: NEGATIVE
BUN SERPL-MCNC: 14 MG/DL (ref 5–25)
CALCIUM ALBUM COR SERPL-MCNC: 9 MG/DL (ref 8.3–10.1)
CALCIUM SERPL-MCNC: 8.5 MG/DL (ref 8.3–10.1)
CHLORIDE SERPL-SCNC: 106 MMOL/L (ref 100–108)
CLARITY UR: CLEAR
CO2 SERPL-SCNC: 25 MMOL/L (ref 21–32)
COLOR UR: YELLOW
CREAT SERPL-MCNC: 1.02 MG/DL (ref 0.6–1.3)
EOSINOPHIL # BLD AUTO: 0.29 THOUSAND/ΜL (ref 0–0.61)
EOSINOPHIL NFR BLD AUTO: 3 % (ref 0–6)
ERYTHROCYTE [DISTWIDTH] IN BLOOD BY AUTOMATED COUNT: 12.4 % (ref 11.6–15.1)
GFR SERPL CREATININE-BSD FRML MDRD: 77 ML/MIN/1.73SQ M
GLUCOSE SERPL-MCNC: 99 MG/DL (ref 65–140)
GLUCOSE UR STRIP-MCNC: NEGATIVE MG/DL
HCT VFR BLD AUTO: 38.4 % (ref 34.8–46.1)
HGB BLD-MCNC: 12.7 G/DL (ref 11.5–15.4)
HGB UR QL STRIP.AUTO: NEGATIVE
IMM GRANULOCYTES # BLD AUTO: 0.04 THOUSAND/UL (ref 0–0.2)
IMM GRANULOCYTES NFR BLD AUTO: 0 % (ref 0–2)
KETONES UR STRIP-MCNC: NEGATIVE MG/DL
LEUKOCYTE ESTERASE UR QL STRIP: ABNORMAL
LYMPHOCYTES # BLD AUTO: 3.36 THOUSANDS/ΜL (ref 0.6–4.47)
LYMPHOCYTES NFR BLD AUTO: 31 % (ref 14–44)
MAGNESIUM SERPL-MCNC: 2.1 MG/DL (ref 1.6–2.6)
MCH RBC QN AUTO: 29.7 PG (ref 26.8–34.3)
MCHC RBC AUTO-ENTMCNC: 33.1 G/DL (ref 31.4–37.4)
MCV RBC AUTO: 90 FL (ref 82–98)
MONOCYTES # BLD AUTO: 0.98 THOUSAND/ΜL (ref 0.17–1.22)
MONOCYTES NFR BLD AUTO: 9 % (ref 4–12)
NEUTROPHILS # BLD AUTO: 6.25 THOUSANDS/ΜL (ref 1.85–7.62)
NEUTS SEG NFR BLD AUTO: 56 % (ref 43–75)
NITRITE UR QL STRIP: NEGATIVE
NON-SQ EPI CELLS URNS QL MICRO: ABNORMAL /HPF
NRBC BLD AUTO-RTO: 0 /100 WBCS
P AXIS: 58 DEGREES
PH UR STRIP.AUTO: 6 [PH]
PLATELET # BLD AUTO: 318 THOUSANDS/UL (ref 149–390)
PMV BLD AUTO: 9.2 FL (ref 8.9–12.7)
POTASSIUM SERPL-SCNC: 3.8 MMOL/L (ref 3.5–5.3)
PR INTERVAL: 134 MS
PROT SERPL-MCNC: 7 G/DL (ref 6.4–8.2)
PROT UR STRIP-MCNC: NEGATIVE MG/DL
QRS AXIS: 38 DEGREES
QRSD INTERVAL: 84 MS
QT INTERVAL: 344 MS
QTC INTERVAL: 436 MS
RBC # BLD AUTO: 4.27 MILLION/UL (ref 3.81–5.12)
RBC #/AREA URNS AUTO: ABNORMAL /HPF
SODIUM SERPL-SCNC: 138 MMOL/L (ref 136–145)
SP GR UR STRIP.AUTO: 1.02 (ref 1–1.03)
T WAVE AXIS: 34 DEGREES
TROPONIN I SERPL-MCNC: <0.02 NG/ML
UROBILINOGEN UR QL STRIP.AUTO: 0.2 E.U./DL
VENTRICULAR RATE: 97 BPM
WBC # BLD AUTO: 10.98 THOUSAND/UL (ref 4.31–10.16)
WBC #/AREA URNS AUTO: ABNORMAL /HPF

## 2021-09-27 PROCEDURE — 87086 URINE CULTURE/COLONY COUNT: CPT | Performed by: EMERGENCY MEDICINE

## 2021-09-27 PROCEDURE — 36415 COLL VENOUS BLD VENIPUNCTURE: CPT | Performed by: EMERGENCY MEDICINE

## 2021-09-27 PROCEDURE — 99284 EMERGENCY DEPT VISIT MOD MDM: CPT | Performed by: EMERGENCY MEDICINE

## 2021-09-27 PROCEDURE — 81001 URINALYSIS AUTO W/SCOPE: CPT | Performed by: EMERGENCY MEDICINE

## 2021-09-27 PROCEDURE — 96374 THER/PROPH/DIAG INJ IV PUSH: CPT

## 2021-09-27 PROCEDURE — 80053 COMPREHEN METABOLIC PANEL: CPT | Performed by: EMERGENCY MEDICINE

## 2021-09-27 PROCEDURE — 93010 ELECTROCARDIOGRAM REPORT: CPT | Performed by: INTERNAL MEDICINE

## 2021-09-27 PROCEDURE — 96361 HYDRATE IV INFUSION ADD-ON: CPT

## 2021-09-27 PROCEDURE — 93005 ELECTROCARDIOGRAM TRACING: CPT

## 2021-09-27 PROCEDURE — 83735 ASSAY OF MAGNESIUM: CPT | Performed by: EMERGENCY MEDICINE

## 2021-09-27 PROCEDURE — 84484 ASSAY OF TROPONIN QUANT: CPT | Performed by: EMERGENCY MEDICINE

## 2021-09-27 PROCEDURE — 85025 COMPLETE CBC W/AUTO DIFF WBC: CPT | Performed by: EMERGENCY MEDICINE

## 2021-09-27 PROCEDURE — 99284 EMERGENCY DEPT VISIT MOD MDM: CPT

## 2021-09-27 RX ORDER — CEPHALEXIN 500 MG/1
500 CAPSULE ORAL EVERY 12 HOURS SCHEDULED
Qty: 14 CAPSULE | Refills: 0 | Status: SHIPPED | OUTPATIENT
Start: 2021-09-27 | End: 2021-10-04

## 2021-09-27 RX ORDER — LORAZEPAM 2 MG/ML
1 INJECTION INTRAMUSCULAR ONCE
Status: COMPLETED | OUTPATIENT
Start: 2021-09-27 | End: 2021-09-27

## 2021-09-27 RX ORDER — FLUOXETINE HYDROCHLORIDE 40 MG/1
40 CAPSULE ORAL DAILY
Qty: 30 CAPSULE | Refills: 0 | Status: SHIPPED | OUTPATIENT
Start: 2021-09-27

## 2021-09-27 RX ORDER — HYDROXYZINE HYDROCHLORIDE 25 MG/1
25 TABLET, FILM COATED ORAL EVERY 6 HOURS PRN
Qty: 30 TABLET | Refills: 0 | Status: SHIPPED | OUTPATIENT
Start: 2021-09-27 | End: 2022-01-21

## 2021-09-27 RX ORDER — CEPHALEXIN 250 MG/1
500 CAPSULE ORAL ONCE
Status: COMPLETED | OUTPATIENT
Start: 2021-09-27 | End: 2021-09-27

## 2021-09-27 RX ORDER — QUETIAPINE FUMARATE 50 MG/1
50 TABLET, FILM COATED ORAL
Qty: 14 TABLET | Refills: 0 | Status: SHIPPED | OUTPATIENT
Start: 2021-09-27 | End: 2022-01-15

## 2021-09-27 RX ADMIN — LORAZEPAM 1 MG: 2 INJECTION INTRAMUSCULAR; INTRAVENOUS at 12:51

## 2021-09-27 RX ADMIN — CEPHALEXIN 500 MG: 250 CAPSULE ORAL at 14:01

## 2021-09-27 RX ADMIN — SODIUM CHLORIDE 1000 ML: 0.9 INJECTION, SOLUTION INTRAVENOUS at 12:51

## 2021-09-28 LAB — BACTERIA UR CULT: NORMAL

## 2021-09-29 ENCOUNTER — TELEPHONE (OUTPATIENT)
Dept: GASTROENTEROLOGY | Facility: CLINIC | Age: 26
End: 2021-09-29

## 2021-09-29 ENCOUNTER — TELEMEDICINE (OUTPATIENT)
Dept: FAMILY MEDICINE CLINIC | Facility: CLINIC | Age: 26
End: 2021-09-29
Payer: MEDICARE

## 2021-09-29 DIAGNOSIS — Z86.69 HISTORY OF SEIZURE DISORDER: Primary | ICD-10-CM

## 2021-09-29 PROCEDURE — 99213 OFFICE O/P EST LOW 20 MIN: CPT | Performed by: FAMILY MEDICINE

## 2021-09-29 NOTE — PROGRESS NOTES
Virtual Brief Visit    Verification of patient location:    Patient is located in the following state in which I hold an active license [place]      Assessment/Plan:    Problem List Items Addressed This Visit     None      Visit Diagnoses     History of seizure disorder    -  Primary   16-year-old female who is presenting today with complaints of history of seizure disorder leading to multiple emergency room visits  Patient has never been officially diagnosed with any seizure activity  Patient currently has an appointment to see a neurologist on 12/16/2021  Last EEG of 2016 was unremarkable for any seizure activity  Will get in contact with Dr Bob Aguirre to explore possible options for an earlier date  This was conveyed to patient and it is acceptable to her  Patient's  was also advised to take to patient to emergency room if patient's condition deteriorates  Reason for visit is   Chief Complaint   Patient presents with    Virtual Brief Visit        Encounter provider Sharonda Lizarraga DO    Provider located at [address] [place] [id number]    Recent Visits  No visits were found meeting these conditions  Showing recent visits within past 7 days and meeting all other requirements  Today's Visits  Date Type Provider Dept   09/29/21 Telemedicine Sharonda Lizarraga DO [facility]   Showing today's visits and meeting all other requirements  Future Appointments  No visits were found meeting these conditions  Showing future appointments within next 150 days and meeting all other requirements       After connecting through Telephone, the patient was identified by name and date of birth  Monique Carvajal was informed that this is a telemedicine visit and that the visit is being conducted through Telephone  My office door was closed  No one else was in the room    She acknowledged consent and understanding of privacy and security of the platform  The patient has agreed to participate and understands she can discontinue the visit at any time  Patient is aware this is a billable service  It was my intent to perform this visit via video technology but the patient was not able to do a video connection so the visit was completed via audio telephone only  A    Bethany Berumen Beatrice Perry is a 23 y o  female   59-year-old female with multiple chronic medical problems who is presenting today via telemedicine for evaluation with complaints of recent seizure activities  Patient states over last 1 month she has been to the emergency room x2 due to seizure activity  Patient states she has never been officially diagnosed as having seizures, but states in the past she has been told she has stress seizures  She also states during her most recent ED visit she was offered the option of inpatient psychiatric evaluation, but states  refused  During review of ED notes of 09/25/2021 patient was seen in 2016 for possible seizure activity and continuous EEG not demonstrate any seizure  Patient  was asked to describe the nature of patient's seizure, he states" that when patient,s seizure happens it appears like a shaky elderly person who has Parkinson"    Patient states she has tried to make an appointment to be seen by a neurologist, but has only been able to secure an appointment for 12/16/2021  Patient currently sees a psychiatrist at [facility], although it is mostly virtual with last encounter being last month  Patient at this time denies any chest pain, SOB, headache, visual disturbance, N/V/D            Past Medical History:   Diagnosis Date    ADHD     Anxiety     Asthma     Bipolar 1 disorder [facility]     Depression     Diabetes mellitus ([facility]     GERD (gastroesophageal reflux disease)     Mental and behavioral problem     Migraine     Palpitations     Last Assessed [id number]    Psychiatric disorder  Psychiatric illness     Psychosis [facility]     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[facility]     Seizures [facility]     last time 2 weeks ago- petit mal    Seizures ([facility]     Pseudoseizures    Stabbing chest pain     Last Assessed [id number]    Suicide [facility])     Tachycardia     Last Assessed [id number]    Upper respiratory disease     Last Assessed [id number]       Past Surgical History:   Procedure Laterality Date    KNEE SURGERY      IL LAP,TUBAL CAUTERY N/A 10/3/2018    Procedure: LAPAROSCOPIC TUBAL LIGATION;  Surgeon: Cathy Borges MD;  Location: [address] [place] [address];  Service: Gynecology    TUBAL LIGATION         Current Outpatient Medications   Medication Sig Dispense Refill    albuterol (PROVENTIL HFA,VENTOLIN HFA) 90 mcg/act inhaler Inhale 2 puffs every 6 (six) hours as needed for wheezing or shortness of breath 1 Inhaler 5    cephalexin (KEFLEX) 500 mg capsule Take 1 capsule (500 mg total) by mouth every 12 (twelve) hours for 7 days 14 capsule 0    clotrimazole (LOTRIMIN) 1 % cream Apply to affected area 2 times daily for 2-4 wks 15 g 0    EPINEPHrine (EPIPEN) 0 3 mg/0 3 mL SOAJ Inject 0 3 mL (0 3 mg total) into a muscle once for 1 dose 2 each 1    FLUoxetine (PROzac) 40 MG capsule Take 1 capsule (40 mg total) by mouth daily 30 capsule 0    fluticasone (FLOVENT HFA) 110 MCG/ACT inhaler Inhale 2 puffs 2 (two) times a day Rinse mouth after use   1 Inhaler 5    guaiFENesin (MUCINEX) 600 mg 12 hr tablet Take 1 tablet (600 mg total) by mouth every 12 (twelve) hours 14 tablet 0    hydrOXYzine HCL (ATARAX) 25 mg tablet Take 1 tablet (25 mg total) by mouth every 6 (six) hours as needed for anxiety for up to 10 days 30 tablet 0    ibuprofen (MOTRIN) 400 mg tablet Take 1 tablet (400 mg total) by mouth 2 (two) times a day 10 tablet 0    meclizine (ANTIVERT) 25 mg tablet Take 1 tablet (25 mg total) by mouth 3 (three) times a day as needed for dizziness for up to 30 doses (Patient not taking: Reported on 2/12/2021) 30 tablet 0    melatonin 3 mg Take 1 tablet (3 mg total) by mouth daily at bedtime 30 tablet 0    OLANZapine (ZyPREXA) 5 mg tablet Take 5 mg by mouth daily at bedtime      ondansetron (ZOFRAN) 4 mg tablet Take 1 tablet (4 mg total) by mouth every 6 (six) hours 12 tablet 0    ondansetron (ZOFRAN-ODT) 4 mg disintegrating tablet Take 1 tablet (4 mg total) by mouth every 6 (six) hours as needed for nausea or vomiting 20 tablet 0    pantoprazole (PROTONIX) 40 mg tablet Take 1 tablet (40 mg total) by mouth daily 30 tablet 1    pantoprazole (PROTONIX) 40 mg tablet Take 1 tablet (40 mg total) by mouth daily for 15 days 15 tablet 0    predniSONE 10 mg tablet 4 po daily x2 days, then 3 po daily x2 days, then 2 po daily x2 days,then 1 po daily x2days (Patient not taking: Reported on 3/19/2021) 20 tablet 0    QUEtiapine (SEROquel) 100 mg tablet Take 100 mg by mouth daily at bedtime (Patient not taking: Reported on 6/24/2021)      QUEtiapine (SEROquel) 50 mg tablet Take 1 tablet (50 mg total) by mouth daily at bedtime for 14 days 14 tablet 0    sucralfate (CARAFATE) 1 g tablet Take 1 tablet (1 g total) by mouth 4 (four) times a day 20 tablet 0     No current facility-administered medications for this visit  Allergies   Allergen Reactions    Fish-Derived Products - Food Allergy Itching    Naproxen Anaphylaxis and Chest Pain    Shellfish Allergy - Food Allergy Anaphylaxis    Benadryl [Diphenhydramine] Hives    Fish Oil - Food Allergy     Seasonal Ic  [Cholestatin]      allergies    Shellfish-Derived Products - Food Allergy     Shellfish-Derived Products - Food Allergy Hives       Review of Systems   Constitutional: Negative  HENT: Negative  Respiratory: Negative  Gastrointestinal: Positive for vomiting  Genitourinary: Negative  Musculoskeletal: Negative  Neurological: Positive for headaches  There were no vitals filed for this visit        I spent 20 minutes directly with the patient during this visit    VIRTUAL VISIT DISCLAIMER      Jamaica Sherron Judge verbally agrees to participate in [employer]  Pt is aware that [employer] could be limited without vital signs or the ability to perform a full hands-on physical Thor Zack understands she or the provider may request at any time to terminate the video visit and request the patient to seek care or treatment in person

## 2021-10-03 ENCOUNTER — NURSE TRIAGE (OUTPATIENT)
Dept: OTHER | Facility: OTHER | Age: 26
End: 2021-10-03

## 2021-10-04 ENCOUNTER — HOSPITAL ENCOUNTER (EMERGENCY)
Facility: HOSPITAL | Age: 26
Discharge: HOME | End: 2021-10-04
Attending: EMERGENCY MEDICINE
Payer: MEDICARE

## 2021-10-04 VITALS
TEMPERATURE: 98.2 F | OXYGEN SATURATION: 100 % | BODY MASS INDEX: 35.34 KG/M2 | SYSTOLIC BLOOD PRESSURE: 101 MMHG | RESPIRATION RATE: 20 BRPM | HEIGHT: 64 IN | DIASTOLIC BLOOD PRESSURE: 52 MMHG | WEIGHT: 207 LBS | HEART RATE: 81 BPM

## 2021-10-04 DIAGNOSIS — G44.229 CHRONIC TENSION-TYPE HEADACHE, NOT INTRACTABLE: Primary | ICD-10-CM

## 2021-10-04 LAB — SARS-COV-2 RNA RESP QL NAA+PROBE: NEGATIVE

## 2021-10-04 PROCEDURE — 87635 SARS-COV-2 COVID-19 AMP PRB: CPT | Performed by: EMERGENCY MEDICINE

## 2021-10-04 PROCEDURE — 99285 EMERGENCY DEPT VISIT HI MDM: CPT | Performed by: EMERGENCY MEDICINE

## 2021-10-04 PROCEDURE — 96372 THER/PROPH/DIAG INJ SC/IM: CPT

## 2021-10-04 PROCEDURE — 99283 EMERGENCY DEPT VISIT LOW MDM: CPT

## 2021-10-04 RX ORDER — MORPHINE SULFATE 4 MG/ML
4 INJECTION, SOLUTION INTRAMUSCULAR; INTRAVENOUS ONCE
Status: COMPLETED | OUTPATIENT
Start: 2021-10-04 | End: 2021-10-04

## 2021-10-04 RX ADMIN — MORPHINE SULFATE 4 MG: 4 INJECTION INTRAVENOUS at 15:31

## 2021-10-06 ENCOUNTER — APPOINTMENT (EMERGENCY)
Dept: CT IMAGING | Facility: HOSPITAL | Age: 26
End: 2021-10-06
Payer: MEDICARE

## 2021-10-06 ENCOUNTER — HOSPITAL ENCOUNTER (EMERGENCY)
Facility: HOSPITAL | Age: 26
End: 2021-10-07
Attending: INTERNAL MEDICINE | Admitting: INTERNAL MEDICINE
Payer: MEDICARE

## 2021-10-06 DIAGNOSIS — G40.909 SEIZURE DISORDER (HCC): Primary | ICD-10-CM

## 2021-10-06 LAB
ALBUMIN SERPL BCP-MCNC: 4.1 G/DL (ref 3.4–4.8)
ALP SERPL-CCNC: 43.2 U/L (ref 35–140)
ALT SERPL W P-5'-P-CCNC: 12 U/L (ref 5–54)
AMPHETAMINES SERPL QL SCN: NEGATIVE
ANION GAP SERPL CALCULATED.3IONS-SCNC: 6 MMOL/L (ref 4–13)
APAP SERPL-MCNC: <10 UG/ML (ref 10–20)
AST SERPL W P-5'-P-CCNC: 16 U/L (ref 15–41)
ATRIAL RATE: 77 BPM
BARBITURATES UR QL: NEGATIVE
BASOPHILS # BLD AUTO: 0.02 THOUSANDS/ΜL (ref 0–0.1)
BASOPHILS NFR BLD AUTO: 0 % (ref 0–1)
BENZODIAZ UR QL: NEGATIVE
BILIRUB SERPL-MCNC: 0.53 MG/DL (ref 0.3–1.2)
BILIRUB UR QL STRIP: NEGATIVE
BUN SERPL-MCNC: 9 MG/DL (ref 6–20)
CALCIUM SERPL-MCNC: 9.4 MG/DL (ref 8.4–10.2)
CHLORIDE SERPL-SCNC: 106 MMOL/L (ref 96–108)
CLARITY UR: CLEAR
CO2 SERPL-SCNC: 26 MMOL/L (ref 22–33)
COCAINE UR QL: NEGATIVE
COLOR UR: YELLOW
CREAT SERPL-MCNC: 0.97 MG/DL (ref 0.4–1.1)
EOSINOPHIL # BLD AUTO: 0.41 THOUSAND/ΜL (ref 0–0.61)
EOSINOPHIL NFR BLD AUTO: 6 % (ref 0–6)
ERYTHROCYTE [DISTWIDTH] IN BLOOD BY AUTOMATED COUNT: 12.9 % (ref 11.6–15.1)
ETHANOL SERPL-MCNC: <10 MG/DL
GFR SERPL CREATININE-BSD FRML MDRD: 81 ML/MIN/1.73SQ M
GLUCOSE SERPL-MCNC: 79 MG/DL (ref 65–140)
GLUCOSE SERPL-MCNC: 93 MG/DL (ref 65–140)
GLUCOSE UR STRIP-MCNC: NEGATIVE MG/DL
HCT VFR BLD AUTO: 42.2 % (ref 34.8–46.1)
HGB BLD-MCNC: 14.1 G/DL (ref 11.5–15.4)
HGB UR QL STRIP.AUTO: NEGATIVE
IMM GRANULOCYTES # BLD AUTO: 0.02 THOUSAND/UL (ref 0–0.2)
IMM GRANULOCYTES NFR BLD AUTO: 0 % (ref 0–2)
KETONES UR STRIP-MCNC: NEGATIVE MG/DL
LEUKOCYTE ESTERASE UR QL STRIP: NEGATIVE
LYMPHOCYTES # BLD AUTO: 2.43 THOUSANDS/ΜL (ref 0.6–4.47)
LYMPHOCYTES NFR BLD AUTO: 33 % (ref 14–44)
MCH RBC QN AUTO: 29.8 PG (ref 26.8–34.3)
MCHC RBC AUTO-ENTMCNC: 33.4 G/DL (ref 31.4–37.4)
MCV RBC AUTO: 89 FL (ref 82–98)
METHADONE UR QL: NEGATIVE
MONOCYTES # BLD AUTO: 0.63 THOUSAND/ΜL (ref 0.17–1.22)
MONOCYTES NFR BLD AUTO: 9 % (ref 4–12)
NEUTROPHILS # BLD AUTO: 3.89 THOUSANDS/ΜL (ref 1.85–7.62)
NEUTS SEG NFR BLD AUTO: 52 % (ref 43–75)
NITRITE UR QL STRIP: NEGATIVE
OPIATES UR QL SCN: POSITIVE
OXYCODONE+OXYMORPHONE UR QL SCN: NEGATIVE
P AXIS: 31 DEGREES
PCP UR QL: NEGATIVE
PH UR STRIP.AUTO: 6 [PH]
PLATELET # BLD AUTO: 361 THOUSANDS/UL (ref 149–390)
PMV BLD AUTO: 9.2 FL (ref 8.9–12.7)
POTASSIUM SERPL-SCNC: 4.1 MMOL/L (ref 3.5–5)
PR INTERVAL: 155 MS
PROLACTIN SERPL-MCNC: 12.7 NG/ML
PROT SERPL-MCNC: 7.3 G/DL (ref 6.4–8.3)
PROT UR STRIP-MCNC: NEGATIVE MG/DL
QRS AXIS: 6 DEGREES
QRSD INTERVAL: 95 MS
QT INTERVAL: 373 MS
QTC INTERVAL: 423 MS
RBC # BLD AUTO: 4.73 MILLION/UL (ref 3.81–5.12)
SALICYLATES SERPL-MCNC: <2.5 MG/DL (ref 6–30)
SODIUM SERPL-SCNC: 138 MMOL/L (ref 133–145)
SP GR UR STRIP.AUTO: 1.02 (ref 1–1.03)
T WAVE AXIS: 8 DEGREES
THC UR QL: POSITIVE
TROPONIN I SERPL-MCNC: <0.03 NG/ML (ref 0–0.07)
UROBILINOGEN UR QL STRIP.AUTO: 0.2 E.U./DL
VENTRICULAR RATE: 77 BPM
WBC # BLD AUTO: 7.4 THOUSAND/UL (ref 4.31–10.16)

## 2021-10-06 PROCEDURE — 36415 COLL VENOUS BLD VENIPUNCTURE: CPT | Performed by: INTERNAL MEDICINE

## 2021-10-06 PROCEDURE — 85025 COMPLETE CBC W/AUTO DIFF WBC: CPT | Performed by: INTERNAL MEDICINE

## 2021-10-06 PROCEDURE — 99285 EMERGENCY DEPT VISIT HI MDM: CPT

## 2021-10-06 PROCEDURE — 84484 ASSAY OF TROPONIN QUANT: CPT | Performed by: INTERNAL MEDICINE

## 2021-10-06 PROCEDURE — 82948 REAGENT STRIP/BLOOD GLUCOSE: CPT

## 2021-10-06 PROCEDURE — 80179 DRUG ASSAY SALICYLATE: CPT | Performed by: INTERNAL MEDICINE

## 2021-10-06 PROCEDURE — 93010 ELECTROCARDIOGRAM REPORT: CPT | Performed by: INTERNAL MEDICINE

## 2021-10-06 PROCEDURE — 96374 THER/PROPH/DIAG INJ IV PUSH: CPT

## 2021-10-06 PROCEDURE — 84146 ASSAY OF PROLACTIN: CPT | Performed by: INTERNAL MEDICINE

## 2021-10-06 PROCEDURE — 96361 HYDRATE IV INFUSION ADD-ON: CPT

## 2021-10-06 PROCEDURE — G0425 INPT/ED TELECONSULT30: HCPCS | Performed by: PSYCHIATRY & NEUROLOGY

## 2021-10-06 PROCEDURE — 70450 CT HEAD/BRAIN W/O DYE: CPT

## 2021-10-06 PROCEDURE — 80307 DRUG TEST PRSMV CHEM ANLYZR: CPT | Performed by: INTERNAL MEDICINE

## 2021-10-06 PROCEDURE — 82077 ASSAY SPEC XCP UR&BREATH IA: CPT | Performed by: INTERNAL MEDICINE

## 2021-10-06 PROCEDURE — 80053 COMPREHEN METABOLIC PANEL: CPT | Performed by: INTERNAL MEDICINE

## 2021-10-06 PROCEDURE — 93005 ELECTROCARDIOGRAM TRACING: CPT

## 2021-10-06 PROCEDURE — G1004 CDSM NDSC: HCPCS

## 2021-10-06 PROCEDURE — 80143 DRUG ASSAY ACETAMINOPHEN: CPT | Performed by: INTERNAL MEDICINE

## 2021-10-06 PROCEDURE — 99285 EMERGENCY DEPT VISIT HI MDM: CPT | Performed by: INTERNAL MEDICINE

## 2021-10-06 RX ORDER — LANOLIN ALCOHOL/MO/W.PET/CERES
3 CREAM (GRAM) TOPICAL ONCE
Status: COMPLETED | OUTPATIENT
Start: 2021-10-06 | End: 2021-10-06

## 2021-10-06 RX ORDER — LORAZEPAM 2 MG/ML
INJECTION INTRAMUSCULAR
Status: COMPLETED
Start: 2021-10-06 | End: 2021-10-06

## 2021-10-06 RX ORDER — SODIUM CHLORIDE 9 MG/ML
125 INJECTION, SOLUTION INTRAVENOUS CONTINUOUS
Status: DISCONTINUED | OUTPATIENT
Start: 2021-10-06 | End: 2021-10-07 | Stop reason: HOSPADM

## 2021-10-06 RX ORDER — LEVETIRACETAM 10 MG/ML
1000 INJECTION INTRAVASCULAR ONCE
Status: COMPLETED | OUTPATIENT
Start: 2021-10-06 | End: 2021-10-06

## 2021-10-06 RX ADMIN — SODIUM CHLORIDE 125 ML/HR: 0.9 INJECTION, SOLUTION INTRAVENOUS at 10:00

## 2021-10-06 RX ADMIN — LORAZEPAM 2 MG: 2 INJECTION INTRAMUSCULAR; INTRAVENOUS at 10:30

## 2021-10-06 RX ADMIN — SODIUM CHLORIDE 125 ML/HR: 0.9 INJECTION, SOLUTION INTRAVENOUS at 21:14

## 2021-10-06 RX ADMIN — LORAZEPAM 2 MG: 2 INJECTION INTRAMUSCULAR; INTRAVENOUS at 11:00

## 2021-10-06 RX ADMIN — LEVETIRACETAM 1000 MG: 10 INJECTION INTRAVENOUS at 11:34

## 2021-10-06 RX ADMIN — Medication 3 MG: at 21:12

## 2021-10-07 ENCOUNTER — HOSPITAL ENCOUNTER (INPATIENT)
Facility: HOSPITAL | Age: 26
LOS: 1 days | Discharge: HOME | DRG: 101 | End: 2021-10-09
Attending: FAMILY MEDICINE | Admitting: FAMILY MEDICINE
Payer: MEDICARE

## 2021-10-07 VITALS
DIASTOLIC BLOOD PRESSURE: 44 MMHG | SYSTOLIC BLOOD PRESSURE: 90 MMHG | BODY MASS INDEX: 35.53 KG/M2 | HEART RATE: 84 BPM | WEIGHT: 207 LBS | OXYGEN SATURATION: 99 % | TEMPERATURE: 98 F | RESPIRATION RATE: 18 BRPM

## 2021-10-07 DIAGNOSIS — G40.909 SEIZURE DISORDER (HCC): Primary | ICD-10-CM

## 2021-10-07 DIAGNOSIS — F90.2 ATTENTION DEFICIT HYPERACTIVITY DISORDER, COMBINED TYPE: ICD-10-CM

## 2021-10-07 DIAGNOSIS — F19.10 DRUG ABUSE (HCC): ICD-10-CM

## 2021-10-07 DIAGNOSIS — F41.9 ANXIETY: ICD-10-CM

## 2021-10-07 DIAGNOSIS — F32.A DEPRESSION: ICD-10-CM

## 2021-10-07 DIAGNOSIS — F31.64 BIPOLAR I DISORDER, MOST RECENT EPISODE MIXED, SEVERE WITH PSYCHOTIC FEATURES (HCC): ICD-10-CM

## 2021-10-07 LAB — SARS-COV-2 RNA RESP QL NAA+PROBE: NEGATIVE

## 2021-10-07 PROCEDURE — 87635 SARS-COV-2 COVID-19 AMP PRB: CPT | Performed by: INTERNAL MEDICINE

## 2021-10-07 PROCEDURE — 99214 OFFICE O/P EST MOD 30 MIN: CPT | Performed by: INTERNAL MEDICINE

## 2021-10-07 PROCEDURE — 96361 HYDRATE IV INFUSION ADD-ON: CPT

## 2021-10-07 RX ORDER — FLUOXETINE 10 MG/1
40 CAPSULE ORAL DAILY
Status: DISCONTINUED | OUTPATIENT
Start: 2021-10-07 | End: 2021-10-07 | Stop reason: HOSPADM

## 2021-10-07 RX ORDER — QUETIAPINE FUMARATE 25 MG/1
50 TABLET, FILM COATED ORAL
Status: DISCONTINUED | OUTPATIENT
Start: 2021-10-07 | End: 2021-10-07 | Stop reason: HOSPADM

## 2021-10-07 RX ORDER — HYDROXYZINE HYDROCHLORIDE 25 MG/1
25 TABLET, FILM COATED ORAL ONCE AS NEEDED
Status: DISCONTINUED | OUTPATIENT
Start: 2021-10-07 | End: 2021-10-07 | Stop reason: HOSPADM

## 2021-10-07 RX ADMIN — QUETIAPINE FUMARATE 50 MG: 25 TABLET ORAL at 22:25

## 2021-10-08 ENCOUNTER — APPOINTMENT (INPATIENT)
Dept: NEUROLOGY | Facility: CLINIC | Age: 26
DRG: 101 | End: 2021-10-08
Payer: MEDICARE

## 2021-10-08 PROBLEM — F19.10 DRUG ABUSE (HCC): Status: ACTIVE | Noted: 2021-10-08

## 2021-10-08 PROBLEM — R56.9 SEIZURE-LIKE ACTIVITY (HCC): Status: ACTIVE | Noted: 2019-06-30

## 2021-10-08 PROBLEM — Z72.0 TOBACCO ABUSE: Status: ACTIVE | Noted: 2021-10-08

## 2021-10-08 LAB
ANION GAP SERPL CALCULATED.3IONS-SCNC: 4 MMOL/L (ref 4–13)
BASOPHILS # BLD AUTO: 0.05 THOUSANDS/ΜL (ref 0–0.1)
BASOPHILS NFR BLD AUTO: 1 % (ref 0–1)
BUN SERPL-MCNC: 11 MG/DL (ref 5–25)
CALCIUM SERPL-MCNC: 8.7 MG/DL (ref 8.3–10.1)
CHLORIDE SERPL-SCNC: 111 MMOL/L (ref 100–108)
CO2 SERPL-SCNC: 25 MMOL/L (ref 21–32)
CREAT SERPL-MCNC: 0.82 MG/DL (ref 0.6–1.3)
EOSINOPHIL # BLD AUTO: 0.77 THOUSAND/ΜL (ref 0–0.61)
EOSINOPHIL NFR BLD AUTO: 8 % (ref 0–6)
ERYTHROCYTE [DISTWIDTH] IN BLOOD BY AUTOMATED COUNT: 12.7 % (ref 11.6–15.1)
GFR SERPL CREATININE-BSD FRML MDRD: 100 ML/MIN/1.73SQ M
GLUCOSE SERPL-MCNC: 93 MG/DL (ref 65–140)
HCT VFR BLD AUTO: 39.6 % (ref 34.8–46.1)
HGB BLD-MCNC: 12.9 G/DL (ref 11.5–15.4)
IMM GRANULOCYTES # BLD AUTO: 0.03 THOUSAND/UL (ref 0–0.2)
IMM GRANULOCYTES NFR BLD AUTO: 0 % (ref 0–2)
LYMPHOCYTES # BLD AUTO: 3.08 THOUSANDS/ΜL (ref 0.6–4.47)
LYMPHOCYTES NFR BLD AUTO: 33 % (ref 14–44)
MCH RBC QN AUTO: 30.1 PG (ref 26.8–34.3)
MCHC RBC AUTO-ENTMCNC: 32.6 G/DL (ref 31.4–37.4)
MCV RBC AUTO: 93 FL (ref 82–98)
MONOCYTES # BLD AUTO: 0.68 THOUSAND/ΜL (ref 0.17–1.22)
MONOCYTES NFR BLD AUTO: 7 % (ref 4–12)
NEUTROPHILS # BLD AUTO: 4.72 THOUSANDS/ΜL (ref 1.85–7.62)
NEUTS SEG NFR BLD AUTO: 51 % (ref 43–75)
NRBC BLD AUTO-RTO: 0 /100 WBCS
PLATELET # BLD AUTO: 299 THOUSANDS/UL (ref 149–390)
PMV BLD AUTO: 9.7 FL (ref 8.9–12.7)
POTASSIUM SERPL-SCNC: 3.6 MMOL/L (ref 3.5–5.3)
RBC # BLD AUTO: 4.28 MILLION/UL (ref 3.81–5.12)
SODIUM SERPL-SCNC: 140 MMOL/L (ref 136–145)
WBC # BLD AUTO: 9.33 THOUSAND/UL (ref 4.31–10.16)

## 2021-10-08 PROCEDURE — 85025 COMPLETE CBC W/AUTO DIFF WBC: CPT | Performed by: FAMILY MEDICINE

## 2021-10-08 PROCEDURE — 95715 VEEG EA 12-26HR INTMT MNTR: CPT

## 2021-10-08 PROCEDURE — 80048 BASIC METABOLIC PNL TOTAL CA: CPT | Performed by: FAMILY MEDICINE

## 2021-10-08 PROCEDURE — 99232 SBSQ HOSP IP/OBS MODERATE 35: CPT | Performed by: PHYSICIAN ASSISTANT

## 2021-10-08 PROCEDURE — 95700 EEG CONT REC W/VID EEG TECH: CPT

## 2021-10-08 PROCEDURE — 99223 1ST HOSP IP/OBS HIGH 75: CPT | Performed by: FAMILY MEDICINE

## 2021-10-08 RX ORDER — SUCRALFATE 1 G/1
1 TABLET ORAL 4 TIMES DAILY
Status: DISCONTINUED | OUTPATIENT
Start: 2021-10-08 | End: 2021-10-09 | Stop reason: HOSPADM

## 2021-10-08 RX ORDER — ALBUTEROL SULFATE 90 UG/1
2 AEROSOL, METERED RESPIRATORY (INHALATION) EVERY 6 HOURS PRN
Status: DISCONTINUED | OUTPATIENT
Start: 2021-10-08 | End: 2021-10-09 | Stop reason: HOSPADM

## 2021-10-08 RX ORDER — LANOLIN ALCOHOL/MO/W.PET/CERES
3 CREAM (GRAM) TOPICAL
Status: DISCONTINUED | OUTPATIENT
Start: 2021-10-08 | End: 2021-10-09 | Stop reason: HOSPADM

## 2021-10-08 RX ORDER — HYDROXYZINE HYDROCHLORIDE 25 MG/1
25 TABLET, FILM COATED ORAL ONCE AS NEEDED
Status: DISCONTINUED | OUTPATIENT
Start: 2021-10-08 | End: 2021-10-09 | Stop reason: HOSPADM

## 2021-10-08 RX ORDER — FLUTICASONE PROPIONATE 110 UG/1
2 AEROSOL, METERED RESPIRATORY (INHALATION) 2 TIMES DAILY
Status: DISCONTINUED | OUTPATIENT
Start: 2021-10-08 | End: 2021-10-09 | Stop reason: HOSPADM

## 2021-10-08 RX ORDER — LORATADINE 10 MG/1
10 TABLET ORAL DAILY
Status: DISCONTINUED | OUTPATIENT
Start: 2021-10-08 | End: 2021-10-09 | Stop reason: HOSPADM

## 2021-10-08 RX ORDER — SODIUM CHLORIDE 9 MG/ML
100 INJECTION, SOLUTION INTRAVENOUS CONTINUOUS
Status: DISCONTINUED | OUTPATIENT
Start: 2021-10-08 | End: 2021-10-09 | Stop reason: HOSPADM

## 2021-10-08 RX ORDER — PANTOPRAZOLE SODIUM 40 MG/1
40 TABLET, DELAYED RELEASE ORAL DAILY
Status: DISCONTINUED | OUTPATIENT
Start: 2021-10-08 | End: 2021-10-09 | Stop reason: HOSPADM

## 2021-10-08 RX ORDER — FLUOXETINE HYDROCHLORIDE 20 MG/1
40 CAPSULE ORAL DAILY
Status: DISCONTINUED | OUTPATIENT
Start: 2021-10-08 | End: 2021-10-09 | Stop reason: HOSPADM

## 2021-10-08 RX ORDER — QUETIAPINE FUMARATE 25 MG/1
50 TABLET, FILM COATED ORAL
Status: DISCONTINUED | OUTPATIENT
Start: 2021-10-08 | End: 2021-10-09 | Stop reason: HOSPADM

## 2021-10-08 RX ORDER — OLANZAPINE 5 MG/1
5 TABLET ORAL
Status: DISCONTINUED | OUTPATIENT
Start: 2021-10-08 | End: 2021-10-09 | Stop reason: HOSPADM

## 2021-10-08 RX ORDER — ONDANSETRON 2 MG/ML
4 INJECTION INTRAMUSCULAR; INTRAVENOUS EVERY 6 HOURS PRN
Status: DISCONTINUED | OUTPATIENT
Start: 2021-10-08 | End: 2021-10-09 | Stop reason: HOSPADM

## 2021-10-08 RX ADMIN — SUCRALFATE 1 G: 1 TABLET ORAL at 17:24

## 2021-10-08 RX ADMIN — ENOXAPARIN SODIUM 40 MG: 40 INJECTION SUBCUTANEOUS at 08:47

## 2021-10-08 RX ADMIN — LORATADINE 10 MG: 10 TABLET ORAL at 00:59

## 2021-10-08 RX ADMIN — FLUOXETINE 40 MG: 20 CAPSULE ORAL at 08:47

## 2021-10-08 RX ADMIN — OLANZAPINE 5 MG: 5 TABLET, FILM COATED ORAL at 00:59

## 2021-10-08 RX ADMIN — SUCRALFATE 1 G: 1 TABLET ORAL at 00:59

## 2021-10-08 RX ADMIN — Medication 3 MG: at 21:42

## 2021-10-08 RX ADMIN — SUCRALFATE 1 G: 1 TABLET ORAL at 21:43

## 2021-10-08 RX ADMIN — SODIUM CHLORIDE 100 ML/HR: 0.9 INJECTION, SOLUTION INTRAVENOUS at 00:47

## 2021-10-08 RX ADMIN — LORATADINE 10 MG: 10 TABLET ORAL at 08:47

## 2021-10-08 RX ADMIN — OLANZAPINE 5 MG: 5 TABLET, FILM COATED ORAL at 21:42

## 2021-10-08 RX ADMIN — NICOTINE 1 PATCH: 7 PATCH, EXTENDED RELEASE TRANSDERMAL at 08:47

## 2021-10-08 RX ADMIN — SUCRALFATE 1 G: 1 TABLET ORAL at 11:48

## 2021-10-08 RX ADMIN — FLUTICASONE PROPIONATE 2 PUFF: 110 AEROSOL, METERED RESPIRATORY (INHALATION) at 11:48

## 2021-10-08 RX ADMIN — SUCRALFATE 1 G: 1 TABLET ORAL at 08:47

## 2021-10-08 RX ADMIN — Medication 3 MG: at 00:59

## 2021-10-08 RX ADMIN — PANTOPRAZOLE SODIUM 40 MG: 40 TABLET, DELAYED RELEASE ORAL at 08:47

## 2021-10-08 RX ADMIN — QUETIAPINE FUMARATE 50 MG: 25 TABLET ORAL at 21:43

## 2021-10-09 VITALS
RESPIRATION RATE: 16 BRPM | HEART RATE: 79 BPM | TEMPERATURE: 97.3 F | OXYGEN SATURATION: 97 % | SYSTOLIC BLOOD PRESSURE: 126 MMHG | DIASTOLIC BLOOD PRESSURE: 78 MMHG

## 2021-10-09 LAB — HCG SERPL QL: NEGATIVE

## 2021-10-09 PROCEDURE — 99238 HOSP IP/OBS DSCHRG MGMT 30/<: CPT | Performed by: FAMILY MEDICINE

## 2021-10-09 PROCEDURE — 95720 EEG PHY/QHP EA INCR W/VEEG: CPT | Performed by: PSYCHIATRY & NEUROLOGY

## 2021-10-09 PROCEDURE — 99232 SBSQ HOSP IP/OBS MODERATE 35: CPT | Performed by: PSYCHIATRY & NEUROLOGY

## 2021-10-09 PROCEDURE — 84703 CHORIONIC GONADOTROPIN ASSAY: CPT | Performed by: FAMILY MEDICINE

## 2021-10-09 RX ADMIN — NICOTINE 1 PATCH: 7 PATCH, EXTENDED RELEASE TRANSDERMAL at 08:24

## 2021-10-09 RX ADMIN — LORATADINE 10 MG: 10 TABLET ORAL at 08:17

## 2021-10-09 RX ADMIN — FLUTICASONE PROPIONATE 2 PUFF: 110 AEROSOL, METERED RESPIRATORY (INHALATION) at 08:24

## 2021-10-09 RX ADMIN — ALBUTEROL SULFATE 2 PUFF: 90 AEROSOL, METERED RESPIRATORY (INHALATION) at 08:25

## 2021-10-09 RX ADMIN — SUCRALFATE 1 G: 1 TABLET ORAL at 08:17

## 2021-10-09 RX ADMIN — PANTOPRAZOLE SODIUM 40 MG: 40 TABLET, DELAYED RELEASE ORAL at 08:17

## 2021-10-09 RX ADMIN — SUCRALFATE 1 G: 1 TABLET ORAL at 11:29

## 2021-10-09 RX ADMIN — FLUOXETINE 40 MG: 20 CAPSULE ORAL at 08:17

## 2021-10-09 RX ADMIN — ENOXAPARIN SODIUM 40 MG: 40 INJECTION SUBCUTANEOUS at 08:17

## 2021-10-10 ENCOUNTER — APPOINTMENT (EMERGENCY)
Dept: RADIOLOGY | Facility: HOSPITAL | Age: 26
End: 2021-10-10
Payer: MEDICARE

## 2021-10-10 ENCOUNTER — HOSPITAL ENCOUNTER (EMERGENCY)
Facility: HOSPITAL | Age: 26
Discharge: HOME | End: 2021-10-10
Payer: MEDICARE

## 2021-10-10 VITALS
HEIGHT: 64 IN | RESPIRATION RATE: 16 BRPM | TEMPERATURE: 98.2 F | BODY MASS INDEX: 35 KG/M2 | OXYGEN SATURATION: 99 % | HEART RATE: 84 BPM | DIASTOLIC BLOOD PRESSURE: 64 MMHG | WEIGHT: 205 LBS | SYSTOLIC BLOOD PRESSURE: 99 MMHG

## 2021-10-10 DIAGNOSIS — M25.471 RIGHT ANKLE SWELLING: ICD-10-CM

## 2021-10-10 DIAGNOSIS — R51.9 HEADACHE: Primary | ICD-10-CM

## 2021-10-10 PROCEDURE — 73610 X-RAY EXAM OF ANKLE: CPT

## 2021-10-10 PROCEDURE — 96374 THER/PROPH/DIAG INJ IV PUSH: CPT

## 2021-10-10 PROCEDURE — 99283 EMERGENCY DEPT VISIT LOW MDM: CPT

## 2021-10-10 PROCEDURE — 99284 EMERGENCY DEPT VISIT MOD MDM: CPT | Performed by: PHYSICIAN ASSISTANT

## 2021-10-10 RX ORDER — BUTALBITAL, ACETAMINOPHEN AND CAFFEINE 50; 325; 40 MG/1; MG/1; MG/1
1 TABLET ORAL ONCE
Status: COMPLETED | OUTPATIENT
Start: 2021-10-10 | End: 2021-10-10

## 2021-10-10 RX ORDER — ONDANSETRON 2 MG/ML
4 INJECTION INTRAMUSCULAR; INTRAVENOUS ONCE
Status: DISCONTINUED | OUTPATIENT
Start: 2021-10-10 | End: 2021-10-10 | Stop reason: HOSPADM

## 2021-10-10 RX ORDER — LORAZEPAM 2 MG/ML
0.5 INJECTION INTRAMUSCULAR ONCE
Status: COMPLETED | OUTPATIENT
Start: 2021-10-10 | End: 2021-10-10

## 2021-10-10 RX ORDER — BUTALBITAL, ACETAMINOPHEN AND CAFFEINE 50; 325; 40 MG/1; MG/1; MG/1
1 TABLET ORAL EVERY 6 HOURS PRN
Qty: 10 TABLET | Refills: 0 | Status: SHIPPED | OUTPATIENT
Start: 2021-10-10

## 2021-10-10 RX ADMIN — LORAZEPAM 0.5 MG: 2 INJECTION INTRAMUSCULAR; INTRAVENOUS at 17:51

## 2021-10-10 RX ADMIN — BUTALBITAL, ACETAMINOPHEN, AND CAFFEINE 1 TABLET: 50; 325; 40 TABLET ORAL at 17:43

## 2021-10-12 ENCOUNTER — HOSPITAL ENCOUNTER (EMERGENCY)
Facility: HOSPITAL | Age: 26
Discharge: HOME | End: 2021-10-13
Attending: EMERGENCY MEDICINE
Payer: MEDICARE

## 2021-10-12 DIAGNOSIS — F15.10 METHAMPHETAMINE ABUSE (HCC): ICD-10-CM

## 2021-10-12 DIAGNOSIS — R11.0 NAUSEA: Primary | ICD-10-CM

## 2021-10-12 DIAGNOSIS — F12.90 MARIJUANA USE: ICD-10-CM

## 2021-10-12 DIAGNOSIS — R10.13 EPIGASTRIC PAIN: ICD-10-CM

## 2021-10-12 LAB
ALBUMIN SERPL BCP-MCNC: 3.4 G/DL (ref 3.5–5)
ALP SERPL-CCNC: 54 U/L (ref 46–116)
ALT SERPL W P-5'-P-CCNC: 47 U/L (ref 12–78)
ANION GAP SERPL CALCULATED.3IONS-SCNC: 8 MMOL/L (ref 4–13)
AST SERPL W P-5'-P-CCNC: 32 U/L (ref 5–45)
BASOPHILS # BLD AUTO: 0.06 THOUSANDS/ΜL (ref 0–0.1)
BASOPHILS NFR BLD AUTO: 1 % (ref 0–1)
BILIRUB SERPL-MCNC: 0.19 MG/DL (ref 0.2–1)
BUN SERPL-MCNC: 15 MG/DL (ref 5–25)
CALCIUM ALBUM COR SERPL-MCNC: 9.1 MG/DL (ref 8.3–10.1)
CALCIUM SERPL-MCNC: 8.6 MG/DL (ref 8.3–10.1)
CHLORIDE SERPL-SCNC: 106 MMOL/L (ref 100–108)
CO2 SERPL-SCNC: 28 MMOL/L (ref 21–32)
CREAT SERPL-MCNC: 0.81 MG/DL (ref 0.6–1.3)
EOSINOPHIL # BLD AUTO: 0.38 THOUSAND/ΜL (ref 0–0.61)
EOSINOPHIL NFR BLD AUTO: 4 % (ref 0–6)
ERYTHROCYTE [DISTWIDTH] IN BLOOD BY AUTOMATED COUNT: 12.5 % (ref 11.6–15.1)
GFR SERPL CREATININE-BSD FRML MDRD: 101 ML/MIN/1.73SQ M
GLUCOSE SERPL-MCNC: 98 MG/DL (ref 65–140)
HCT VFR BLD AUTO: 37.9 % (ref 34.8–46.1)
HGB BLD-MCNC: 12.2 G/DL (ref 11.5–15.4)
IMM GRANULOCYTES # BLD AUTO: 0.03 THOUSAND/UL (ref 0–0.2)
IMM GRANULOCYTES NFR BLD AUTO: 0 % (ref 0–2)
LIPASE SERPL-CCNC: 153 U/L (ref 73–393)
LYMPHOCYTES # BLD AUTO: 3.49 THOUSANDS/ΜL (ref 0.6–4.47)
LYMPHOCYTES NFR BLD AUTO: 36 % (ref 14–44)
MCH RBC QN AUTO: 29.4 PG (ref 26.8–34.3)
MCHC RBC AUTO-ENTMCNC: 32.2 G/DL (ref 31.4–37.4)
MCV RBC AUTO: 91 FL (ref 82–98)
MONOCYTES # BLD AUTO: 0.83 THOUSAND/ΜL (ref 0.17–1.22)
MONOCYTES NFR BLD AUTO: 9 % (ref 4–12)
NEUTROPHILS # BLD AUTO: 4.99 THOUSANDS/ΜL (ref 1.85–7.62)
NEUTS SEG NFR BLD AUTO: 50 % (ref 43–75)
NRBC BLD AUTO-RTO: 0 /100 WBCS
PLATELET # BLD AUTO: 281 THOUSANDS/UL (ref 149–390)
PMV BLD AUTO: 9.2 FL (ref 8.9–12.7)
POTASSIUM SERPL-SCNC: 4 MMOL/L (ref 3.5–5.3)
PROT SERPL-MCNC: 6.9 G/DL (ref 6.4–8.2)
RBC # BLD AUTO: 4.15 MILLION/UL (ref 3.81–5.12)
SODIUM SERPL-SCNC: 142 MMOL/L (ref 136–145)
WBC # BLD AUTO: 9.78 THOUSAND/UL (ref 4.31–10.16)

## 2021-10-12 PROCEDURE — 96375 TX/PRO/DX INJ NEW DRUG ADDON: CPT

## 2021-10-12 PROCEDURE — 81025 URINE PREGNANCY TEST: CPT | Performed by: EMERGENCY MEDICINE

## 2021-10-12 PROCEDURE — 83690 ASSAY OF LIPASE: CPT

## 2021-10-12 PROCEDURE — 85025 COMPLETE CBC W/AUTO DIFF WBC: CPT

## 2021-10-12 PROCEDURE — 96374 THER/PROPH/DIAG INJ IV PUSH: CPT

## 2021-10-12 PROCEDURE — 36415 COLL VENOUS BLD VENIPUNCTURE: CPT

## 2021-10-12 PROCEDURE — 96361 HYDRATE IV INFUSION ADD-ON: CPT

## 2021-10-12 PROCEDURE — 80053 COMPREHEN METABOLIC PANEL: CPT

## 2021-10-12 PROCEDURE — 99285 EMERGENCY DEPT VISIT HI MDM: CPT

## 2021-10-12 PROCEDURE — 99285 EMERGENCY DEPT VISIT HI MDM: CPT | Performed by: EMERGENCY MEDICINE

## 2021-10-12 RX ORDER — PANTOPRAZOLE SODIUM 40 MG/1
40 TABLET, DELAYED RELEASE ORAL ONCE
Status: COMPLETED | OUTPATIENT
Start: 2021-10-12 | End: 2021-10-12

## 2021-10-12 RX ORDER — ONDANSETRON 2 MG/ML
4 INJECTION INTRAMUSCULAR; INTRAVENOUS ONCE
Status: COMPLETED | OUTPATIENT
Start: 2021-10-12 | End: 2021-10-12

## 2021-10-12 RX ORDER — LORAZEPAM 2 MG/ML
0.5 INJECTION INTRAMUSCULAR ONCE
Status: COMPLETED | OUTPATIENT
Start: 2021-10-12 | End: 2021-10-12

## 2021-10-12 RX ORDER — MAGNESIUM HYDROXIDE/ALUMINUM HYDROXICE/SIMETHICONE 120; 1200; 1200 MG/30ML; MG/30ML; MG/30ML
30 SUSPENSION ORAL ONCE
Status: COMPLETED | OUTPATIENT
Start: 2021-10-12 | End: 2021-10-12

## 2021-10-12 RX ORDER — SUCRALFATE 1 G/1
1 TABLET ORAL ONCE
Status: COMPLETED | OUTPATIENT
Start: 2021-10-12 | End: 2021-10-12

## 2021-10-12 RX ORDER — SUCRALFATE ORAL 1 G/10ML
1000 SUSPENSION ORAL ONCE
Status: DISCONTINUED | OUTPATIENT
Start: 2021-10-12 | End: 2021-10-12

## 2021-10-12 RX ORDER — SUCRALFATE 1 G/1
1 TABLET ORAL ONCE
Status: DISCONTINUED | OUTPATIENT
Start: 2021-10-12 | End: 2021-10-12

## 2021-10-12 RX ORDER — PANTOPRAZOLE SODIUM 40 MG/1
40 TABLET, DELAYED RELEASE ORAL DAILY
Qty: 90 TABLET | Refills: 0 | OUTPATIENT
Start: 2021-10-12 | End: 2022-01-15

## 2021-10-12 RX ORDER — SUCRALFATE 1 G/1
1 TABLET ORAL 3 TIMES DAILY
Qty: 18 TABLET | Refills: 0 | OUTPATIENT
Start: 2021-10-12 | End: 2022-01-15

## 2021-10-12 RX ADMIN — SODIUM CHLORIDE 1000 ML: 0.9 INJECTION, SOLUTION INTRAVENOUS at 22:04

## 2021-10-12 RX ADMIN — SUCRALFATE 1 G: 1 TABLET ORAL at 22:04

## 2021-10-12 RX ADMIN — LORAZEPAM 0.5 MG: 2 INJECTION INTRAMUSCULAR; INTRAVENOUS at 22:04

## 2021-10-12 RX ADMIN — ONDANSETRON 4 MG: 2 INJECTION INTRAMUSCULAR; INTRAVENOUS at 23:26

## 2021-10-12 RX ADMIN — ALUMINA, MAGNESIA, AND SIMETHICONE ORAL SUSPENSION REGULAR STRENGTH 30 ML: 1200; 1200; 120 SUSPENSION ORAL at 22:04

## 2021-10-12 RX ADMIN — PANTOPRAZOLE SODIUM 40 MG: 40 TABLET, DELAYED RELEASE ORAL at 22:04

## 2021-10-13 VITALS
DIASTOLIC BLOOD PRESSURE: 59 MMHG | RESPIRATION RATE: 18 BRPM | WEIGHT: 208.78 LBS | BODY MASS INDEX: 35.84 KG/M2 | SYSTOLIC BLOOD PRESSURE: 115 MMHG | HEART RATE: 81 BPM | OXYGEN SATURATION: 97 % | TEMPERATURE: 98.7 F

## 2021-10-13 LAB
EXT PREG TEST URINE: NEGATIVE
EXT. CONTROL ED NAV: NORMAL

## 2021-10-13 RX ORDER — ACETAMINOPHEN 325 MG/1
975 TABLET ORAL ONCE
Status: COMPLETED | OUTPATIENT
Start: 2021-10-13 | End: 2021-10-13

## 2021-10-13 RX ORDER — LANOLIN ALCOHOL/MO/W.PET/CERES
3 CREAM (GRAM) TOPICAL
Status: DISCONTINUED | OUTPATIENT
Start: 2021-10-13 | End: 2021-10-13 | Stop reason: HOSPADM

## 2021-10-13 RX ADMIN — ACETAMINOPHEN 975 MG: 325 TABLET, FILM COATED ORAL at 00:35

## 2021-10-13 RX ADMIN — Medication 3 MG: at 00:22

## 2021-10-15 ENCOUNTER — PATIENT OUTREACH (OUTPATIENT)
Dept: FAMILY MEDICINE CLINIC | Facility: CLINIC | Age: 26
End: 2021-10-15

## 2021-11-04 ENCOUNTER — PATIENT OUTREACH (OUTPATIENT)
Dept: FAMILY MEDICINE CLINIC | Facility: CLINIC | Age: 26
End: 2021-11-04

## 2021-11-26 ENCOUNTER — HOSPITAL ENCOUNTER (EMERGENCY)
Facility: HOSPITAL | Age: 26
Discharge: LEFT BEFORE DISCHARGE | End: 2021-11-26
Attending: EMERGENCY MEDICINE
Payer: MEDICARE

## 2021-11-26 ENCOUNTER — DOCUMENTATION (OUTPATIENT)
Dept: FAMILY MEDICINE CLINIC | Facility: CLINIC | Age: 26
End: 2021-11-26

## 2021-11-26 VITALS
DIASTOLIC BLOOD PRESSURE: 86 MMHG | TEMPERATURE: 98.7 F | RESPIRATION RATE: 16 BRPM | HEART RATE: 89 BPM | SYSTOLIC BLOOD PRESSURE: 105 MMHG | OXYGEN SATURATION: 97 %

## 2021-11-26 DIAGNOSIS — N39.0 UTI (URINARY TRACT INFECTION): ICD-10-CM

## 2021-11-26 DIAGNOSIS — R10.9 ABDOMINAL PAIN: Primary | ICD-10-CM

## 2021-11-26 LAB
BACTERIA UR QL AUTO: ABNORMAL /HPF
BASOPHILS # BLD AUTO: 0.04 THOUSANDS/ΜL (ref 0–0.1)
BASOPHILS NFR BLD AUTO: 1 % (ref 0–1)
BILIRUB UR QL STRIP: NEGATIVE
CLARITY UR: CLEAR
COLOR UR: YELLOW
EOSINOPHIL # BLD AUTO: 0.28 THOUSAND/ΜL (ref 0–0.61)
EOSINOPHIL NFR BLD AUTO: 4 % (ref 0–6)
ERYTHROCYTE [DISTWIDTH] IN BLOOD BY AUTOMATED COUNT: 12.8 % (ref 11.6–15.1)
EXT PREG TEST URINE: NEGATIVE
EXT. CONTROL ED NAV: NORMAL
GLUCOSE UR STRIP-MCNC: NEGATIVE MG/DL
HCT VFR BLD AUTO: 40.6 % (ref 34.8–46.1)
HGB BLD-MCNC: 13.9 G/DL (ref 11.5–15.4)
HGB UR QL STRIP.AUTO: NEGATIVE
IMM GRANULOCYTES # BLD AUTO: 0.02 THOUSAND/UL (ref 0–0.2)
IMM GRANULOCYTES NFR BLD AUTO: 0 % (ref 0–2)
KETONES UR STRIP-MCNC: ABNORMAL MG/DL
LEUKOCYTE ESTERASE UR QL STRIP: ABNORMAL
LYMPHOCYTES # BLD AUTO: 2.75 THOUSANDS/ΜL (ref 0.6–4.47)
LYMPHOCYTES NFR BLD AUTO: 35 % (ref 14–44)
MCH RBC QN AUTO: 29.9 PG (ref 26.8–34.3)
MCHC RBC AUTO-ENTMCNC: 34.2 G/DL (ref 31.4–37.4)
MCV RBC AUTO: 87 FL (ref 82–98)
MONOCYTES # BLD AUTO: 0.72 THOUSAND/ΜL (ref 0.17–1.22)
MONOCYTES NFR BLD AUTO: 9 % (ref 4–12)
MUCOUS THREADS UR QL AUTO: ABNORMAL
NEUTROPHILS # BLD AUTO: 4.16 THOUSANDS/ΜL (ref 1.85–7.62)
NEUTS SEG NFR BLD AUTO: 51 % (ref 43–75)
NITRITE UR QL STRIP: NEGATIVE
NON-SQ EPI CELLS URNS QL MICRO: ABNORMAL /HPF
PH UR STRIP.AUTO: 6.5 [PH]
PLATELET # BLD AUTO: 299 THOUSANDS/UL (ref 149–390)
PMV BLD AUTO: 9.7 FL (ref 8.9–12.7)
PROT UR STRIP-MCNC: NEGATIVE MG/DL
RBC # BLD AUTO: 4.65 MILLION/UL (ref 3.81–5.12)
RBC #/AREA URNS AUTO: ABNORMAL /HPF
SP GR UR STRIP.AUTO: 1.02 (ref 1–1.03)
UROBILINOGEN UR QL STRIP.AUTO: 1 E.U./DL
WBC # BLD AUTO: 7.97 THOUSAND/UL (ref 4.31–10.16)
WBC #/AREA URNS AUTO: ABNORMAL /HPF

## 2021-11-26 PROCEDURE — 81003 URINALYSIS AUTO W/O SCOPE: CPT | Performed by: EMERGENCY MEDICINE

## 2021-11-26 PROCEDURE — 81001 URINALYSIS AUTO W/SCOPE: CPT | Performed by: EMERGENCY MEDICINE

## 2021-11-26 PROCEDURE — 99284 EMERGENCY DEPT VISIT MOD MDM: CPT | Performed by: EMERGENCY MEDICINE

## 2021-11-26 PROCEDURE — 81025 URINE PREGNANCY TEST: CPT | Performed by: EMERGENCY MEDICINE

## 2021-11-26 PROCEDURE — 99284 EMERGENCY DEPT VISIT MOD MDM: CPT

## 2021-11-26 PROCEDURE — 36415 COLL VENOUS BLD VENIPUNCTURE: CPT | Performed by: EMERGENCY MEDICINE

## 2021-11-26 PROCEDURE — 96374 THER/PROPH/DIAG INJ IV PUSH: CPT

## 2021-11-26 PROCEDURE — 96361 HYDRATE IV INFUSION ADD-ON: CPT

## 2021-11-26 PROCEDURE — 85025 COMPLETE CBC W/AUTO DIFF WBC: CPT | Performed by: EMERGENCY MEDICINE

## 2021-11-26 RX ORDER — SULFAMETHOXAZOLE AND TRIMETHOPRIM 800; 160 MG/1; MG/1
1 TABLET ORAL 2 TIMES DAILY
Qty: 14 TABLET | Refills: 0 | Status: SHIPPED | OUTPATIENT
Start: 2021-11-26 | End: 2021-12-03

## 2021-11-26 RX ORDER — ONDANSETRON 2 MG/ML
4 INJECTION INTRAMUSCULAR; INTRAVENOUS ONCE
Status: COMPLETED | OUTPATIENT
Start: 2021-11-26 | End: 2021-11-26

## 2021-11-26 RX ADMIN — SODIUM CHLORIDE 1000 ML: 0.9 INJECTION, SOLUTION INTRAVENOUS at 19:18

## 2021-11-26 RX ADMIN — ONDANSETRON 4 MG: 2 INJECTION INTRAMUSCULAR; INTRAVENOUS at 19:23

## 2021-12-12 ENCOUNTER — HOSPITAL ENCOUNTER (EMERGENCY)
Facility: HOSPITAL | Age: 26
Discharge: HOME | End: 2021-12-12
Attending: EMERGENCY MEDICINE
Payer: MEDICARE

## 2021-12-12 VITALS
DIASTOLIC BLOOD PRESSURE: 44 MMHG | RESPIRATION RATE: 18 BRPM | TEMPERATURE: 98.9 F | OXYGEN SATURATION: 100 % | SYSTOLIC BLOOD PRESSURE: 93 MMHG | HEART RATE: 62 BPM

## 2021-12-12 DIAGNOSIS — R11.0 NAUSEA: ICD-10-CM

## 2021-12-12 DIAGNOSIS — R51.9 ACUTE HEADACHE: Primary | ICD-10-CM

## 2021-12-12 PROCEDURE — 99284 EMERGENCY DEPT VISIT MOD MDM: CPT | Performed by: EMERGENCY MEDICINE

## 2021-12-12 PROCEDURE — 99283 EMERGENCY DEPT VISIT LOW MDM: CPT

## 2021-12-12 RX ORDER — ONDANSETRON 2 MG/ML
4 INJECTION INTRAMUSCULAR; INTRAVENOUS ONCE
Status: DISCONTINUED | OUTPATIENT
Start: 2021-12-12 | End: 2021-12-12

## 2021-12-12 RX ORDER — ONDANSETRON 4 MG/1
4 TABLET, ORALLY DISINTEGRATING ORAL ONCE
Status: COMPLETED | OUTPATIENT
Start: 2021-12-12 | End: 2021-12-12

## 2021-12-12 RX ORDER — BUTALBITAL, ACETAMINOPHEN AND CAFFEINE 50; 325; 40 MG/1; MG/1; MG/1
1 TABLET ORAL ONCE
Status: COMPLETED | OUTPATIENT
Start: 2021-12-12 | End: 2021-12-12

## 2021-12-12 RX ORDER — LORAZEPAM 2 MG/ML
0.5 INJECTION INTRAMUSCULAR ONCE
Status: DISCONTINUED | OUTPATIENT
Start: 2021-12-12 | End: 2021-12-12

## 2021-12-12 RX ORDER — LORAZEPAM 0.5 MG/1
0.5 TABLET ORAL ONCE
Status: COMPLETED | OUTPATIENT
Start: 2021-12-12 | End: 2021-12-12

## 2021-12-12 RX ADMIN — BUTALBITAL, ACETAMINOPHEN, AND CAFFEINE 1 TABLET: 50; 325; 40 TABLET, COATED ORAL at 03:24

## 2021-12-12 RX ADMIN — LORAZEPAM 0.5 MG: 0.5 TABLET ORAL at 03:24

## 2021-12-12 RX ADMIN — ONDANSETRON 4 MG: 4 TABLET, ORALLY DISINTEGRATING ORAL at 03:24

## 2021-12-13 ENCOUNTER — PATIENT OUTREACH (OUTPATIENT)
Dept: FAMILY MEDICINE CLINIC | Facility: CLINIC | Age: 26
End: 2021-12-13

## 2021-12-13 ENCOUNTER — PATIENT OUTREACH (OUTPATIENT)
Dept: CASE MANAGEMENT | Facility: OTHER | Age: 26
End: 2021-12-13

## 2022-01-06 ENCOUNTER — PATIENT OUTREACH (OUTPATIENT)
Dept: CASE MANAGEMENT | Facility: HOSPITAL | Age: 27
End: 2022-01-06

## 2022-01-06 NOTE — PROGRESS NOTES
This CM is covering embedded CM caseload while [facility]  Medicare Bundle episode ended  Bundle episode resolved and care coordination note removed  CM removed embedded CM from care team and sent email to embedded CM with same

## 2022-01-07 ENCOUNTER — NURSE TRIAGE (OUTPATIENT)
Dept: OTHER | Facility: OTHER | Age: 27
End: 2022-01-07

## 2022-01-08 NOTE — TELEPHONE ENCOUNTER
Regarding: headache, vomiting   ----- Message from Payton Eddy sent at 1/7/2022  9:27 PM EST -----  " I've had a really bad headache and also vomiting "

## 2022-01-08 NOTE — TELEPHONE ENCOUNTER
Reason for Disposition   [1] SEVERE headache (e g , excruciating) AND [2] not improved after 2 hours of pain medicine    Answer Assessment - Initial Assessment Questions  1  LOCATION: "Where does it hurt?"       Front and back    2  ONSET: "When did the headache start?" (Minutes, hours or days)       Yesterday    3  PATTERN: "Does the pain come and go, or has it been constant since it started?"      Constant    4  SEVERITY: "How bad is the pain?" and "What does it keep you from doing?"  (e g , Scale 1-10; mild, moderate, or severe)    - MILD (1-3): doesn't interfere with normal activities     - MODERATE (4-7): interferes with normal activities or awakens from sleep     - SEVERE (8-10): excruciating pain, unable to do any normal activities         10/10    5  RECURRENT SYMPTOM: "Have you ever had headaches before?" If Yes, ask: "When was the last time?" and "What happened that time?"       Yes    6  CAUSE: "What do you think is causing the headache?"      Unknown    7  MIGRAINE: "Have you been diagnosed with migraine headaches?" If Yes, ask: "Is this headache similar?"       Denies    8  HEAD INJURY: "Has there been any recent injury to the head?"       Denies    9  OTHER SYMPTOMS: "Do you have any other symptoms?" (fever, stiff neck, eye pain, sore throat, cold symptoms)      Vomiting, "seizures more than I can count"    10   PREGNANCY: "Is there any chance you are pregnant?" "When was your last menstrual period?"  denies    Protocols used: HEADACHE-ADULT-

## 2022-01-11 ENCOUNTER — TELEPHONE (OUTPATIENT)
Dept: FAMILY MEDICINE CLINIC | Facility: CLINIC | Age: 27
End: 2022-01-11

## 2022-01-13 ENCOUNTER — HOSPITAL ENCOUNTER (EMERGENCY)
Facility: HOSPITAL | Age: 27
Discharge: HOME | End: 2022-01-13
Attending: EMERGENCY MEDICINE | Admitting: EMERGENCY MEDICINE
Payer: MEDICARE

## 2022-01-13 VITALS
SYSTOLIC BLOOD PRESSURE: 95 MMHG | OXYGEN SATURATION: 98 % | TEMPERATURE: 99.2 F | HEART RATE: 76 BPM | RESPIRATION RATE: 18 BRPM | DIASTOLIC BLOOD PRESSURE: 55 MMHG

## 2022-01-13 DIAGNOSIS — R11.10 VOMITING: ICD-10-CM

## 2022-01-13 DIAGNOSIS — R11.2 NAUSEA AND VOMITING, INTRACTABILITY OF VOMITING NOT SPECIFIED, UNSPECIFIED VOMITING TYPE: ICD-10-CM

## 2022-01-13 DIAGNOSIS — K29.70 GASTRITIS: ICD-10-CM

## 2022-01-13 DIAGNOSIS — G43.909 MIGRAINE HEADACHE: Primary | ICD-10-CM

## 2022-01-13 LAB
ALBUMIN SERPL BCP-MCNC: 3.6 G/DL (ref 3.5–5)
ALP SERPL-CCNC: 63 U/L (ref 46–116)
ALT SERPL W P-5'-P-CCNC: 24 U/L (ref 12–78)
ANION GAP SERPL CALCULATED.3IONS-SCNC: 6 MMOL/L (ref 4–13)
AST SERPL W P-5'-P-CCNC: 18 U/L (ref 5–45)
BASOPHILS # BLD AUTO: 0.04 THOUSANDS/ΜL (ref 0–0.1)
BASOPHILS NFR BLD AUTO: 1 % (ref 0–1)
BILIRUB SERPL-MCNC: 0.27 MG/DL (ref 0.2–1)
BILIRUB UR QL STRIP: NEGATIVE
BUN SERPL-MCNC: 9 MG/DL (ref 5–25)
CALCIUM SERPL-MCNC: 9.1 MG/DL (ref 8.3–10.1)
CHLORIDE SERPL-SCNC: 107 MMOL/L (ref 100–108)
CLARITY UR: ABNORMAL
CO2 SERPL-SCNC: 26 MMOL/L (ref 21–32)
COLOR UR: YELLOW
CREAT SERPL-MCNC: 0.82 MG/DL (ref 0.6–1.3)
EOSINOPHIL # BLD AUTO: 0.34 THOUSAND/ΜL (ref 0–0.61)
EOSINOPHIL NFR BLD AUTO: 4 % (ref 0–6)
ERYTHROCYTE [DISTWIDTH] IN BLOOD BY AUTOMATED COUNT: 13.1 % (ref 11.6–15.1)
EXT PREG TEST URINE: NEGATIVE
EXT. CONTROL ED NAV: NORMAL
GFR SERPL CREATININE-BSD FRML MDRD: 99 ML/MIN/1.73SQ M
GLUCOSE SERPL-MCNC: 104 MG/DL (ref 65–140)
GLUCOSE UR STRIP-MCNC: NEGATIVE MG/DL
HCT VFR BLD AUTO: 43.9 % (ref 34.8–46.1)
HGB BLD-MCNC: 14.5 G/DL (ref 11.5–15.4)
HGB UR QL STRIP.AUTO: NEGATIVE
IMM GRANULOCYTES # BLD AUTO: 0.02 THOUSAND/UL (ref 0–0.2)
IMM GRANULOCYTES NFR BLD AUTO: 0 % (ref 0–2)
KETONES UR STRIP-MCNC: NEGATIVE MG/DL
LEUKOCYTE ESTERASE UR QL STRIP: NEGATIVE
LIPASE SERPL-CCNC: 188 U/L (ref 73–393)
LYMPHOCYTES # BLD AUTO: 1.65 THOUSANDS/ΜL (ref 0.6–4.47)
LYMPHOCYTES NFR BLD AUTO: 21 % (ref 14–44)
MAGNESIUM SERPL-MCNC: 1.7 MG/DL (ref 1.6–2.6)
MCH RBC QN AUTO: 29.7 PG (ref 26.8–34.3)
MCHC RBC AUTO-ENTMCNC: 33 G/DL (ref 31.4–37.4)
MCV RBC AUTO: 90 FL (ref 82–98)
MONOCYTES # BLD AUTO: 0.69 THOUSAND/ΜL (ref 0.17–1.22)
MONOCYTES NFR BLD AUTO: 9 % (ref 4–12)
NEUTROPHILS # BLD AUTO: 4.96 THOUSANDS/ΜL (ref 1.85–7.62)
NEUTS SEG NFR BLD AUTO: 65 % (ref 43–75)
NITRITE UR QL STRIP: NEGATIVE
NRBC BLD AUTO-RTO: 0 /100 WBCS
PH UR STRIP.AUTO: 8.5 [PH]
PLATELET # BLD AUTO: 283 THOUSANDS/UL (ref 149–390)
PMV BLD AUTO: 9.4 FL (ref 8.9–12.7)
POTASSIUM SERPL-SCNC: 3.9 MMOL/L (ref 3.5–5.3)
PROT SERPL-MCNC: 7.3 G/DL (ref 6.4–8.2)
PROT UR STRIP-MCNC: NEGATIVE MG/DL
RBC # BLD AUTO: 4.88 MILLION/UL (ref 3.81–5.12)
SODIUM SERPL-SCNC: 139 MMOL/L (ref 136–145)
SP GR UR STRIP.AUTO: 1.02 (ref 1–1.03)
UROBILINOGEN UR QL STRIP.AUTO: 0.2 E.U./DL
WBC # BLD AUTO: 7.7 THOUSAND/UL (ref 4.31–10.16)

## 2022-01-13 PROCEDURE — 96361 HYDRATE IV INFUSION ADD-ON: CPT

## 2022-01-13 PROCEDURE — 80053 COMPREHEN METABOLIC PANEL: CPT | Performed by: EMERGENCY MEDICINE

## 2022-01-13 PROCEDURE — 83735 ASSAY OF MAGNESIUM: CPT | Performed by: EMERGENCY MEDICINE

## 2022-01-13 PROCEDURE — 83690 ASSAY OF LIPASE: CPT | Performed by: EMERGENCY MEDICINE

## 2022-01-13 PROCEDURE — 96365 THER/PROPH/DIAG IV INF INIT: CPT

## 2022-01-13 PROCEDURE — 85025 COMPLETE CBC W/AUTO DIFF WBC: CPT | Performed by: EMERGENCY MEDICINE

## 2022-01-13 PROCEDURE — 81003 URINALYSIS AUTO W/O SCOPE: CPT | Performed by: EMERGENCY MEDICINE

## 2022-01-13 PROCEDURE — 81025 URINE PREGNANCY TEST: CPT | Performed by: EMERGENCY MEDICINE

## 2022-01-13 PROCEDURE — 36415 COLL VENOUS BLD VENIPUNCTURE: CPT | Performed by: EMERGENCY MEDICINE

## 2022-01-13 PROCEDURE — 99284 EMERGENCY DEPT VISIT MOD MDM: CPT

## 2022-01-13 PROCEDURE — 99285 EMERGENCY DEPT VISIT HI MDM: CPT | Performed by: EMERGENCY MEDICINE

## 2022-01-13 PROCEDURE — 96375 TX/PRO/DX INJ NEW DRUG ADDON: CPT

## 2022-01-13 RX ORDER — ONDANSETRON 2 MG/ML
4 INJECTION INTRAMUSCULAR; INTRAVENOUS ONCE
Status: COMPLETED | OUTPATIENT
Start: 2022-01-13 | End: 2022-01-13

## 2022-01-13 RX ORDER — MAGNESIUM SULFATE HEPTAHYDRATE 40 MG/ML
2 INJECTION, SOLUTION INTRAVENOUS ONCE
Status: COMPLETED | OUTPATIENT
Start: 2022-01-13 | End: 2022-01-13

## 2022-01-13 RX ORDER — DEXAMETHASONE SODIUM PHOSPHATE 4 MG/ML
8 INJECTION, SOLUTION INTRA-ARTICULAR; INTRALESIONAL; INTRAMUSCULAR; INTRAVENOUS; SOFT TISSUE ONCE
Status: COMPLETED | OUTPATIENT
Start: 2022-01-13 | End: 2022-01-13

## 2022-01-13 RX ORDER — PANTOPRAZOLE SODIUM 40 MG/1
40 TABLET, DELAYED RELEASE ORAL DAILY
Qty: 30 TABLET | Refills: 1 | Status: SHIPPED | OUTPATIENT
Start: 2022-01-13

## 2022-01-13 RX ORDER — METOCLOPRAMIDE HYDROCHLORIDE 5 MG/ML
10 INJECTION INTRAMUSCULAR; INTRAVENOUS ONCE
Status: DISCONTINUED | OUTPATIENT
Start: 2022-01-13 | End: 2022-01-13

## 2022-01-13 RX ORDER — LORAZEPAM 2 MG/ML
1 INJECTION INTRAMUSCULAR ONCE
Status: COMPLETED | OUTPATIENT
Start: 2022-01-13 | End: 2022-01-13

## 2022-01-13 RX ORDER — ONDANSETRON 4 MG/1
4 TABLET, FILM COATED ORAL EVERY 6 HOURS
Qty: 12 TABLET | Refills: 0 | Status: SHIPPED | OUTPATIENT
Start: 2022-01-13

## 2022-01-13 RX ADMIN — DEXAMETHASONE SODIUM PHOSPHATE 8 MG: 4 INJECTION, SOLUTION INTRAMUSCULAR; INTRAVENOUS at 19:06

## 2022-01-13 RX ADMIN — LORAZEPAM 1 MG: 2 INJECTION INTRAMUSCULAR; INTRAVENOUS at 18:59

## 2022-01-13 RX ADMIN — SODIUM CHLORIDE 1000 ML: 0.9 INJECTION, SOLUTION INTRAVENOUS at 18:56

## 2022-01-13 RX ADMIN — MAGNESIUM SULFATE HEPTAHYDRATE 2 G: 40 INJECTION, SOLUTION INTRAVENOUS at 18:52

## 2022-01-13 RX ADMIN — FAMOTIDINE 20 MG: 10 INJECTION, SOLUTION INTRAVENOUS at 19:03

## 2022-01-13 RX ADMIN — ONDANSETRON 4 MG: 2 INJECTION INTRAMUSCULAR; INTRAVENOUS at 19:01

## 2022-01-13 NOTE — ED PROVIDER NOTES
History  Chief Complaint   Patient presents with    Headache     PT c/o headaches and seizures (last one was PTA)    Seizure - Prior Hx Of     20-year-old female presenting to the ED for evaluation of multiple complaints  She states for about 2 weeks she has had a migraine with light sensitivity, irritation with loud noises, with eye strain she also complains of epigastric abdominal discomfort and has had intermittent blood in her stool and believes she has an ulcer that aggravated  She states she is taking Carafate every day as prescribed  She also believes at times she is having seizures, states she spaces out  She states she is having difficult time taking care of her 3month-old puppy  Patient is frequent ER visits for complaints of headache and seizures  She states her headaches feel typical of her usual migraines  History provided by:  Patient   used: No    Headache  Associated symptoms: abdominal pain and nausea    Seizure - Prior Hx Of      Prior to Admission Medications   Prescriptions Last Dose Informant Patient Reported? Taking?    EPINEPHrine (EPIPEN) 0 3 mg/0 3 mL SOAJ   No No   Sig: Inject 0 3 mL (0 3 mg total) into a muscle once for 1 dose   FLUoxetine (PROzac) 40 MG capsule   No No   Sig: Take 1 capsule (40 mg total) by mouth daily   OLANZapine (ZyPREXA) 5 mg tablet   Yes No   Sig: Take 5 mg by mouth daily at bedtime   QUEtiapine (SEROquel) 100 mg tablet   Yes No   Sig: Take 100 mg by mouth daily at bedtime   Patient not taking: Reported on 6/24/2021   QUEtiapine (SEROquel) 50 mg tablet   No No   Sig: Take 1 tablet (50 mg total) by mouth daily at bedtime for 14 days   albuterol (PROVENTIL HFA,VENTOLIN HFA) 90 mcg/act inhaler   No No   Sig: Inhale 2 puffs every 6 (six) hours as needed for wheezing or shortness of breath   butalbital-acetaminophen-caffeine (FIORICET,ESGIC) -40 mg per tablet   No No   Sig: Take 1 tablet by mouth every 6 (six) hours as needed for headaches for up to 10 doses   clotrimazole (LOTRIMIN) 1 % cream   No No   Sig: Apply to affected area 2 times daily for 2-4 wks   fluticasone (FLOVENT HFA) 110 MCG/ACT inhaler   No No   Sig: Inhale 2 puffs 2 (two) times a day Rinse mouth after use     guaiFENesin (MUCINEX) 600 mg 12 hr tablet   No No   Sig: Take 1 tablet (600 mg total) by mouth every 12 (twelve) hours   hydrOXYzine HCL (ATARAX) 25 mg tablet   No No   Sig: Take 1 tablet (25 mg total) by mouth every 6 (six) hours as needed for anxiety for up to 10 days   ibuprofen (MOTRIN) 400 mg tablet   No No   Sig: Take 1 tablet (400 mg total) by mouth 2 (two) times a day   meclizine (ANTIVERT) 25 mg tablet   No No   Sig: Take 1 tablet (25 mg total) by mouth 3 (three) times a day as needed for dizziness for up to 30 doses   Patient not taking: Reported on 2021   melatonin 3 mg   No No   Sig: Take 1 tablet (3 mg total) by mouth daily at bedtime   ondansetron (ZOFRAN) 4 mg tablet   No No   Sig: Take 1 tablet (4 mg total) by mouth every 6 (six) hours   ondansetron (ZOFRAN) 4 mg tablet   No No   Sig: Take 1 tablet (4 mg total) by mouth every 6 (six) hours   ondansetron (ZOFRAN-ODT) 4 mg disintegrating tablet   No No   Sig: Take 1 tablet (4 mg total) by mouth every 6 (six) hours as needed for nausea or vomiting   pantoprazole (PROTONIX) 40 mg tablet   No No   Sig: Take 1 tablet (40 mg total) by mouth daily   pantoprazole (PROTONIX) 40 mg tablet   No No   Sig: Take 1 tablet (40 mg total) by mouth daily for 15 days   pantoprazole (PROTONIX) 40 mg tablet   No No   Sig: Take 1 tablet (40 mg total) by mouth daily   pantoprazole (PROTONIX) 40 mg tablet   No No   Sig: Take 1 tablet (40 mg total) by mouth daily   predniSONE 10 mg tablet   No No   Si po daily x2 days, then 3 po daily x2 days, then 2 po daily x2 days,then 1 po daily x2days   Patient not taking: Reported on 3/19/2021   sucralfate (CARAFATE) 1 g tablet   No No   Sig: Take 1 tablet (1 g total) by mouth 4 (four) times a day   sucralfate (CARAFATE) 1 g tablet   No No   Sig: Take 1 tablet (1 g total) by mouth 3 (three) times a day for 6 days      Facility-Administered Medications: None       Past Medical History:   Diagnosis Date    ADHD     Anxiety     Asthma     Bipolar 1 disorder ([facility] )     Depression     Diabetes mellitus ([facility] )     GERD (gastroesophageal reflux disease)     Mental and behavioral problem     Migraine     Palpitations     Last Assessed [id number]    Psychiatric disorder     Psychiatric illness     Psychosis ([place] [address] )     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[facility] )     Seizures ([place] [address] )     last time 2 weeks ago- petit mal    Seizures ([facility] )     Pseudoseizures    Stabbing chest pain     Last Assessed [id number]    Suicide attempt [facility])     Tachycardia     Last Assessed [id number]    Upper respiratory disease     Last Assessed 27Jan2016       Past Surgical History:   Procedure Laterality Date    KNEE SURGERY      NC LAP,TUBAL CAUTERY N/A 10/3/2018    Procedure: LAPAROSCOPIC TUBAL LIGATION;  Surgeon: Yamil Chapman MD;  [facility];  Service: Gynecology    TUBAL LIGATION         Family History   Problem Relation Age of Onset    Migraines Sister     Cancer Mother     Diabetes Mother     Cancer Father     Diabetes Father     Arthritis Father     Cancer Maternal Grandmother     Cancer Maternal Aunt     Cancer Paternal Uncle     Diabetes Other      I have reviewed and agree with the history as documented      E-Cigarette/Vaping    E-Cigarette Use Former User      E-Cigarette/Vaping Substances    Nicotine No     THC No     CBD No     Flavoring No     Other No     Unknown No      Social History     Tobacco Use    Smoking status: Current Some Day Smoker     Packs/day: 0 25     Types: Cigarettes    Smokeless tobacco: Never Used   Vaping Use    Vaping Use: Former   Substance Use Topics    Alcohol use: Not Currently    Drug use: Yes     Frequency: 1 0 times per week     Types: Marijuana Review of Systems   Gastrointestinal: Positive for abdominal pain, blood in stool and nausea  Neurological: Positive for headaches  All other systems reviewed and are negative  Physical Exam  Physical Exam  Vitals and nursing note reviewed  Constitutional:       Appearance: Normal appearance  She is well-developed and normal weight  HENT:      Head: Normocephalic and atraumatic  Right Ear: External ear normal       Left Ear: External ear normal       Nose: Nose normal       Mouth/Throat:      Mouth: Mucous membranes are moist       Pharynx: Oropharynx is clear  Eyes:      General: No scleral icterus  Conjunctiva/sclera: Conjunctivae normal    Cardiovascular:      Rate and Rhythm: Normal rate and regular rhythm  Pulses: Normal pulses  Heart sounds: Normal heart sounds  Pulmonary:      Effort: Pulmonary effort is normal       Breath sounds: Normal breath sounds  Abdominal:      General: Abdomen is flat  There is no distension  Palpations: Abdomen is soft  Tenderness: There is no abdominal tenderness  Musculoskeletal:         General: Normal range of motion  Cervical back: Normal range of motion  Skin:     General: Skin is warm and dry  Capillary Refill: Capillary refill takes less than 2 seconds  Coloration: Skin is not jaundiced  Neurological:      General: No focal deficit present  Mental Status: She is alert and oriented to person, place, and time  Mental status is at baseline  Cranial Nerves: No cranial nerve deficit  Sensory: No sensory deficit  Motor: No weakness        Coordination: Coordination normal    Psychiatric:         Mood and Affect: Mood normal          Behavior: Behavior normal          Vital Signs  ED Triage Vitals [01/13/22 1635]   Temperature Pulse Respirations Blood Pressure SpO2   99 2 °F (37 3 °C) 94 18 102/51 97 %      Temp Source Heart Rate Source Patient Position - Orthostatic VS BP Location FiO2 (%)   Oral Monitor Sitting Left arm --      Pain Score       --           Vitals:    01/13/22 1635 01/13/22 1942 01/13/22 1945 01/13/22 2000   BP: 102/51 100/53 100/53 95/55   Pulse: 94 80 82 76   Patient Position - Orthostatic VS: Sitting Lying           Visual Acuity      ED Medications  Medications   sodium chloride 0 9 % bolus 1,000 mL (0 mL Intravenous Stopped 1/13/22 2124)   dexamethasone (DECADRON) injection 8 mg (8 mg Intravenous Given 1/13/22 1906)   LORazepam (ATIVAN) injection 1 mg (1 mg Intravenous Given 1/13/22 1859)   ondansetron (ZOFRAN) injection 4 mg (4 mg Intravenous Given 1/13/22 1901)   magnesium sulfate 2 g/50 mL IVPB (premix) 2 g (0 g Intravenous Stopped 1/13/22 1940)   famotidine (PEPCID) injection 20 mg (20 mg Intravenous Given 1/13/22 1903)       Diagnostic Studies  Results Reviewed     Procedure Component Value Units Date/Time    CBC and differential [[id number]] Collected: 01/13/22 1845    Lab Status: Final result Specimen: Blood from Arm, Left Updated: 01/13/22 1857     WBC 7 70 Thousand/uL      RBC 4 88 Million/uL      Hemoglobin 14 5 g/dL      Hematocrit 43 9 %      MCV 90 fL      MCH 29 7 pg      MCHC 33 0 g/dL      RDW 13 1 %      MPV 9 4 fL      Platelets 144 Thousands/uL      nRBC 0 /100 WBCs      Neutrophils Relative 65 %      Immat GRANS % 0 %      Lymphocytes Relative 21 %      Monocytes Relative 9 %      Eosinophils Relative 4 %      Basophils Relative 1 %      Neutrophils Absolute 4 96 Thousands/µL      Immature Grans Absolute 0 02 Thousand/uL      Lymphocytes Absolute 1 65 Thousands/µL      Monocytes Absolute 0 69 Thousand/µL      Eosinophils Absolute 0 34 Thousand/µL      Basophils Absolute 0 04 Thousands/µL     Lipase [[id number]]  (Normal) Collected: 01/13/22 1810    Lab Status: Final result Specimen: Blood from Arm, Left Updated: 01/13/22 1845     Lipase 188 u/L     Comprehensive metabolic panel [[id number]] Collected: 01/13/22 1810    Lab Status: Final result Specimen: Blood from Arm, Left Updated: 01/13/22 1845     Sodium 139 mmol/L      Potassium 3 9 mmol/L      Chloride 107 mmol/L      CO2 26 mmol/L      ANION GAP 6 mmol/L      BUN 9 mg/dL      Creatinine 0 82 mg/dL      Glucose 104 mg/dL      Calcium 9 1 mg/dL      AST 18 U/L      ALT 24 U/L      Alkaline Phosphatase 63 U/L      Total Protein 7 3 g/dL      Albumin 3 6 g/dL      Total Bilirubin 0 27 mg/dL      eGFR 99 ml/min/1 73sq m     Narrative:      National Kidney Disease Foundation guidelines for Chronic Kidney Disease (CKD):     Stage 1 with normal or high GFR (GFR > 90 mL/min/1 73 square meters)    Stage 2 Mild CKD (GFR = 60-89 mL/min/1 73 square meters)    Stage 3A Moderate CKD (GFR = 45-59 mL/min/1 73 square meters)    Stage 3B Moderate CKD (GFR = 30-44 mL/min/1 73 square meters)    Stage 4 Severe CKD (GFR = 15-29 mL/min/1 73 square meters)    Stage 5 End Stage CKD (GFR <15 mL/min/1 73 square meters)  Note: GFR calculation is accurate only with a steady state creatinine    Magnesium [[id number]]  (Normal) Collected: 01/13/22 1810    Lab Status: Final result Specimen: Blood from Arm, Left Updated: 01/13/22 1845     Magnesium 1 7 mg/dL     UA w Reflex to Microscopic w Reflex to Culture [[id number]]  (Abnormal) Collected: 01/13/22 1821    Lab Status: Final result Specimen: Urine, Clean Catch Updated: 01/13/22 1831     Color, UA Yellow     Clarity, UA Cloudy     Specific Gravity, UA 1 025     pH, UA 8 5     Leukocytes, UA Negative     Nitrite, UA Negative     Protein, UA Negative mg/dl      Glucose, UA Negative mg/dl      Ketones, UA Negative mg/dl      Urobilinogen, UA 0 2 E U /dl      Bilirubin, UA Negative     Blood, UA Negative    POCT pregnancy, urine [[id number]]  (Normal) Resulted: 01/13/22 1821    Lab Status: Final result Specimen: Urine Updated: 01/13/22 1821     EXT PREG TEST UR (Ref: Negative) negative     Control valid                 No orders to display              Procedures  Procedures         ED Course  ED Course as of 01/14/22 0127   Thu Jan 13, 2022   2121 Pt feeling better, headache resolved, would like to go home  MDM  Number of Diagnoses or Management Options  Migraine headache: new and requires workup  Vomiting: new and requires workup     Amount and/or Complexity of Data Reviewed  Clinical lab tests: ordered and reviewed  Tests in the radiology section of CPT®: ordered and reviewed  Decide to obtain previous medical records or to obtain history from someone other than the patient: yes    Patient Progress  Patient progress: stable      Disposition  Final diagnoses:   Migraine headache   Vomiting     Time reflects when diagnosis was documented in both MDM as applicable and the Disposition within this note     Time User Action Codes Description Comment    1/13/2022  9:14 PM Kylah Mems Add [G43 909] Migraine headache     1/13/2022  9:14 PM Dorethia Finders [R11 10] Vomiting     1/13/2022  9:14 PM Dorethia Finders [K29 70] Gastritis     1/13/2022  9:14 PM Dichter, [address] [R11 2] Nausea and vomiting, intractability of vomiting not specified, unspecified vomiting type       ED Disposition     ED Disposition Condition Date/Time Comment    Discharge Stable u Jan 13, 2022  9:14 PM [address] discharge to home/self care              Follow-up Information     Follow up With Specialties Details Why Contact Info Additional Information    Jose Roberto Solis DO Family Medicine In 3 days  [address] [place] [address]       [facility] In 1 week  [address] [id number] [phone] [facility] [place] [address] [place], [place], [id number] [phone]          Discharge Medication List as of 1/13/2022  9:15 PM      CONTINUE these medications which have CHANGED    Details   ondansetron (ZOFRAN) 4 mg tablet Take 1 tablet (4 mg total) by mouth every 6 (six) hours, Starting Thu 1/13/2022, Normal      !! pantoprazole (PROTONIX) 40 mg tablet Take 1 tablet (40 mg total) by mouth daily, Starting Thu 1/13/2022, Normal       !! - Potential duplicate medications found  Please discuss with provider        CONTINUE these medications which have NOT CHANGED    Details   albuterol (PROVENTIL HFA,VENTOLIN HFA) 90 mcg/act inhaler Inhale 2 puffs every 6 (six) hours as needed for wheezing or shortness of breath, Starting Tue 2/9/2021, Normal      butalbital-acetaminophen-caffeine (FIORICET,ESGIC) -40 mg per tablet Take 1 tablet by mouth every 6 (six) hours as needed for headaches for up to 10 doses, Starting Sun 10/10/2021, Normal      clotrimazole (LOTRIMIN) 1 % cream Apply to affected area 2 times daily for 2-4 wks, Normal      EPINEPHrine (EPIPEN) 0 3 mg/0 3 mL SOAJ Inject 0 3 mL (0 3 mg total) into a muscle once for 1 dose, Starting Tue 2/9/2021, Normal      FLUoxetine (PROzac) 40 MG capsule Take 1 capsule (40 mg total) by mouth daily, Starting Mon 9/27/2021, Normal      fluticasone (FLOVENT HFA) 110 MCG/ACT inhaler Inhale 2 puffs 2 (two) times a day Rinse mouth after use , Starting Tue 2/9/2021, Normal      guaiFENesin (MUCINEX) 600 mg 12 hr tablet Take 1 tablet (600 mg total) by mouth every 12 (twelve) hours, Starting Sun 8/15/2021, Normal      hydrOXYzine HCL (ATARAX) 25 mg tablet Take 1 tablet (25 mg total) by mouth every 6 (six) hours as needed for anxiety for up to 10 days, Starting Mon 9/27/2021, Until u 10/7/2021 at 2359, Normal      ibuprofen (MOTRIN) 400 mg tablet Take 1 tablet (400 mg total) by mouth 2 (two) times a day, Starting Wed 3/17/2021, Normal      meclizine (ANTIVERT) 25 mg tablet Take 1 tablet (25 mg total) by mouth 3 (three) times a day as needed for dizziness for up to 30 doses, Starting Fri 1/8/2021, Normal      melatonin 3 mg Take 1 tablet (3 mg total) by mouth daily at bedtime, Starting Sat 9/25/2021, Normal      OLANZapine (ZyPREXA) 5 mg tablet Take 5 mg by mouth daily at bedtime, Starting Fri 6/18/2021, Historical Med      ondansetron (ZOFRAN-ODT) 4 mg disintegrating tablet Take 1 tablet (4 mg total) by mouth every 6 (six) hours as needed for nausea or vomiting, Starting Thu 6/24/2021, Normal      !! pantoprazole (PROTONIX) 40 mg tablet Take 1 tablet (40 mg total) by mouth daily, Starting Tue 10/12/2021, Normal      predniSONE 10 mg tablet 4 po daily x2 days, then 3 po daily x2 days, then 2 po daily x2 days,then 1 po daily x2days, Normal      QUEtiapine (SEROquel) 100 mg tablet Take 100 mg by mouth daily at bedtime, Historical Med      sucralfate (CARAFATE) 1 g tablet Take 1 tablet (1 g total) by mouth 4 (four) times a day, Starting Sat 9/18/2021, Normal       !! - Potential duplicate medications found  Please discuss with provider  No discharge procedures on file      PDMP Review     None          ED Provider  Electronically Signed by           Krishna Childress DO  01/14/22 0127

## 2022-01-14 ENCOUNTER — HOSPITAL ENCOUNTER (OUTPATIENT)
Facility: HOSPITAL | Age: 27
Setting detail: OBSERVATION
Discharge: HOME | End: 2022-01-15
Attending: EMERGENCY MEDICINE | Admitting: HOSPITALIST
Payer: MEDICARE

## 2022-01-14 DIAGNOSIS — K92.0 HEMATEMESIS: ICD-10-CM

## 2022-01-14 DIAGNOSIS — K92.0 VOMITING BLOOD: ICD-10-CM

## 2022-01-14 DIAGNOSIS — R11.10 INTRACTABLE VOMITING: ICD-10-CM

## 2022-01-14 DIAGNOSIS — R10.13 EPIGASTRIC PAIN: Primary | ICD-10-CM

## 2022-01-14 PROBLEM — R65.10 SIRS (SYSTEMIC INFLAMMATORY RESPONSE SYNDROME) (HCC): Status: ACTIVE | Noted: 2022-01-14

## 2022-01-14 PROBLEM — G43.909 MIGRAINE: Status: ACTIVE | Noted: 2022-01-14

## 2022-01-14 LAB
ALBUMIN SERPL BCP-MCNC: 3.6 G/DL (ref 3.5–5)
ALP SERPL-CCNC: 61 U/L (ref 46–116)
ALT SERPL W P-5'-P-CCNC: 22 U/L (ref 12–78)
ANION GAP SERPL CALCULATED.3IONS-SCNC: 10 MMOL/L (ref 4–13)
AST SERPL W P-5'-P-CCNC: 20 U/L (ref 5–45)
BASOPHILS # BLD AUTO: 0.03 THOUSANDS/ΜL (ref 0–0.1)
BASOPHILS NFR BLD AUTO: 0 % (ref 0–1)
BILIRUB SERPL-MCNC: 0.25 MG/DL (ref 0.2–1)
BUN SERPL-MCNC: 7 MG/DL (ref 5–25)
CALCIUM SERPL-MCNC: 8.9 MG/DL (ref 8.3–10.1)
CHLORIDE SERPL-SCNC: 108 MMOL/L (ref 100–108)
CO2 SERPL-SCNC: 21 MMOL/L (ref 21–32)
CREAT SERPL-MCNC: 0.76 MG/DL (ref 0.6–1.3)
EOSINOPHIL # BLD AUTO: 0 THOUSAND/ΜL (ref 0–0.61)
EOSINOPHIL NFR BLD AUTO: 0 % (ref 0–6)
ERYTHROCYTE [DISTWIDTH] IN BLOOD BY AUTOMATED COUNT: 13.2 % (ref 11.6–15.1)
GFR SERPL CREATININE-BSD FRML MDRD: 109 ML/MIN/1.73SQ M
GLUCOSE SERPL-MCNC: 102 MG/DL (ref 65–140)
HCT VFR BLD AUTO: 39.6 % (ref 34.8–46.1)
HGB BLD-MCNC: 13.1 G/DL (ref 11.5–15.4)
IMM GRANULOCYTES # BLD AUTO: 0.1 THOUSAND/UL (ref 0–0.2)
IMM GRANULOCYTES NFR BLD AUTO: 1 % (ref 0–2)
LIPASE SERPL-CCNC: 147 U/L (ref 73–393)
LYMPHOCYTES # BLD AUTO: 2.3 THOUSANDS/ΜL (ref 0.6–4.47)
LYMPHOCYTES NFR BLD AUTO: 15 % (ref 14–44)
MCH RBC QN AUTO: 29.3 PG (ref 26.8–34.3)
MCHC RBC AUTO-ENTMCNC: 33.1 G/DL (ref 31.4–37.4)
MCV RBC AUTO: 89 FL (ref 82–98)
MONOCYTES # BLD AUTO: 1.28 THOUSAND/ΜL (ref 0.17–1.22)
MONOCYTES NFR BLD AUTO: 8 % (ref 4–12)
NEUTROPHILS # BLD AUTO: 12.19 THOUSANDS/ΜL (ref 1.85–7.62)
NEUTS SEG NFR BLD AUTO: 76 % (ref 43–75)
NRBC BLD AUTO-RTO: 0 /100 WBCS
PLATELET # BLD AUTO: 319 THOUSANDS/UL (ref 149–390)
PMV BLD AUTO: 9.5 FL (ref 8.9–12.7)
POTASSIUM SERPL-SCNC: 3.8 MMOL/L (ref 3.5–5.3)
PROT SERPL-MCNC: 7.4 G/DL (ref 6.4–8.2)
RBC # BLD AUTO: 4.47 MILLION/UL (ref 3.81–5.12)
SODIUM SERPL-SCNC: 139 MMOL/L (ref 136–145)
WBC # BLD AUTO: 15.9 THOUSAND/UL (ref 4.31–10.16)

## 2022-01-14 PROCEDURE — 96366 THER/PROPH/DIAG IV INF ADDON: CPT

## 2022-01-14 PROCEDURE — 36415 COLL VENOUS BLD VENIPUNCTURE: CPT | Performed by: EMERGENCY MEDICINE

## 2022-01-14 PROCEDURE — 99285 EMERGENCY DEPT VISIT HI MDM: CPT

## 2022-01-14 PROCEDURE — 99285 EMERGENCY DEPT VISIT HI MDM: CPT | Performed by: EMERGENCY MEDICINE

## 2022-01-14 PROCEDURE — 96372 THER/PROPH/DIAG INJ SC/IM: CPT

## 2022-01-14 PROCEDURE — 85025 COMPLETE CBC W/AUTO DIFF WBC: CPT | Performed by: EMERGENCY MEDICINE

## 2022-01-14 PROCEDURE — 80053 COMPREHEN METABOLIC PANEL: CPT | Performed by: EMERGENCY MEDICINE

## 2022-01-14 PROCEDURE — 83690 ASSAY OF LIPASE: CPT | Performed by: EMERGENCY MEDICINE

## 2022-01-14 PROCEDURE — 96365 THER/PROPH/DIAG IV INF INIT: CPT

## 2022-01-14 RX ORDER — QUETIAPINE FUMARATE 25 MG/1
50 TABLET, FILM COATED ORAL
Status: DISCONTINUED | OUTPATIENT
Start: 2022-01-14 | End: 2022-01-14

## 2022-01-14 RX ORDER — CALCIUM CARBONATE 200(500)MG
1000 TABLET,CHEWABLE ORAL DAILY PRN
Status: DISCONTINUED | OUTPATIENT
Start: 2022-01-14 | End: 2022-01-15 | Stop reason: HOSPADM

## 2022-01-14 RX ORDER — OLANZAPINE 10 MG/1
10 INJECTION, POWDER, LYOPHILIZED, FOR SOLUTION INTRAMUSCULAR ONCE
Status: COMPLETED | OUTPATIENT
Start: 2022-01-14 | End: 2022-01-14

## 2022-01-14 RX ORDER — PANTOPRAZOLE SODIUM 40 MG/1
40 TABLET, DELAYED RELEASE ORAL
Status: DISCONTINUED | OUTPATIENT
Start: 2022-01-15 | End: 2022-01-15

## 2022-01-14 RX ORDER — ONDANSETRON 2 MG/ML
4 INJECTION INTRAMUSCULAR; INTRAVENOUS EVERY 6 HOURS PRN
Status: DISCONTINUED | OUTPATIENT
Start: 2022-01-14 | End: 2022-01-15 | Stop reason: HOSPADM

## 2022-01-14 RX ORDER — HYDROXYZINE HYDROCHLORIDE 25 MG/1
25 TABLET, FILM COATED ORAL EVERY 6 HOURS PRN
Status: DISCONTINUED | OUTPATIENT
Start: 2022-01-14 | End: 2022-01-15 | Stop reason: HOSPADM

## 2022-01-14 RX ORDER — LANOLIN ALCOHOL/MO/W.PET/CERES
3 CREAM (GRAM) TOPICAL
Status: DISCONTINUED | OUTPATIENT
Start: 2022-01-14 | End: 2022-01-15 | Stop reason: HOSPADM

## 2022-01-14 RX ORDER — OLANZAPINE 2.5 MG/1
5 TABLET ORAL
Status: DISCONTINUED | OUTPATIENT
Start: 2022-01-14 | End: 2022-01-14

## 2022-01-14 RX ORDER — SUCRALFATE 1 G/1
1 TABLET ORAL 4 TIMES DAILY
Status: DISCONTINUED | OUTPATIENT
Start: 2022-01-14 | End: 2022-01-15 | Stop reason: HOSPADM

## 2022-01-14 RX ORDER — SODIUM CHLORIDE 9 MG/ML
100 INJECTION, SOLUTION INTRAVENOUS CONTINUOUS
Status: DISCONTINUED | OUTPATIENT
Start: 2022-01-14 | End: 2022-01-15 | Stop reason: HOSPADM

## 2022-01-14 RX ORDER — METOCLOPRAMIDE HYDROCHLORIDE 5 MG/ML
10 INJECTION INTRAMUSCULAR; INTRAVENOUS EVERY 8 HOURS SCHEDULED
Status: CANCELLED | OUTPATIENT
Start: 2022-01-14 | End: 2022-01-17

## 2022-01-14 RX ORDER — DOCUSATE SODIUM 100 MG/1
100 CAPSULE, LIQUID FILLED ORAL 2 TIMES DAILY PRN
Status: DISCONTINUED | OUTPATIENT
Start: 2022-01-14 | End: 2022-01-15 | Stop reason: HOSPADM

## 2022-01-14 RX ORDER — DIPHENHYDRAMINE HYDROCHLORIDE 50 MG/ML
25 INJECTION INTRAMUSCULAR; INTRAVENOUS EVERY 8 HOURS PRN
Status: DISCONTINUED | OUTPATIENT
Start: 2022-01-14 | End: 2022-01-15 | Stop reason: HOSPADM

## 2022-01-14 RX ORDER — METOCLOPRAMIDE HYDROCHLORIDE 5 MG/ML
10 INJECTION INTRAMUSCULAR; INTRAVENOUS EVERY 8 HOURS SCHEDULED
Status: DISCONTINUED | OUTPATIENT
Start: 2022-01-14 | End: 2022-01-15

## 2022-01-14 RX ORDER — DIPHENHYDRAMINE HYDROCHLORIDE 50 MG/ML
25 INJECTION INTRAMUSCULAR; INTRAVENOUS EVERY 8 HOURS PRN
Status: CANCELLED | OUTPATIENT
Start: 2022-01-14 | End: 2022-01-17

## 2022-01-14 RX ORDER — SUCRALFATE 1 G/1
1 TABLET ORAL ONCE
Status: COMPLETED | OUTPATIENT
Start: 2022-01-14 | End: 2022-01-14

## 2022-01-14 RX ORDER — FLUOXETINE 10 MG/1
40 CAPSULE ORAL DAILY
Status: DISCONTINUED | OUTPATIENT
Start: 2022-01-15 | End: 2022-01-15 | Stop reason: HOSPADM

## 2022-01-14 RX ORDER — ACETAMINOPHEN 325 MG/1
650 TABLET ORAL EVERY 6 HOURS PRN
Status: DISCONTINUED | OUTPATIENT
Start: 2022-01-14 | End: 2022-01-15 | Stop reason: HOSPADM

## 2022-01-14 RX ADMIN — METOCLOPRAMIDE HYDROCHLORIDE 10 MG: 5 INJECTION INTRAMUSCULAR; INTRAVENOUS at 22:05

## 2022-01-14 RX ADMIN — SUCRALFATE 1 G: 1 TABLET ORAL at 22:05

## 2022-01-14 RX ADMIN — SODIUM CHLORIDE 100 ML/HR: 0.9 INJECTION, SOLUTION INTRAVENOUS at 20:50

## 2022-01-14 RX ADMIN — SUCRALFATE 1 G: 1 TABLET ORAL at 17:17

## 2022-01-14 RX ADMIN — OLANZAPINE 10 MG: 10 INJECTION, POWDER, LYOPHILIZED, FOR SOLUTION INTRAMUSCULAR at 15:16

## 2022-01-14 RX ADMIN — Medication 3 MG: at 22:05

## 2022-01-14 RX ADMIN — SODIUM CHLORIDE, SODIUM LACTATE, POTASSIUM CHLORIDE, AND CALCIUM CHLORIDE 500 ML: .6; .31; .03; .02 INJECTION, SOLUTION INTRAVENOUS at 15:16

## 2022-01-14 NOTE — ED NOTES
PATIENT IS SLEEPING ON LITTER  NAD  NO EPISODES OF VOMITING SINCE ARRIVAL TO ED  WILL CONT TO MONITOR       Patsy Doran RN  01/14/22 2952

## 2022-01-14 NOTE — ED PROVIDER NOTES
History  Chief Complaint   Patient presents with    Vomiting Blood     Pt states she started vomiting up blood with colts this am  states she is unable to keep anything down  reports having a stomach ulcer she "thinks it ruptured" was here yesterday for different complaint, demanding we find out what is wrong  [place] is a 23year old female with a history of cyclic vomiting and a self-reported history of peptic ulcer disease presents today with an episode of intractable vomiting and retching with noted blood in vomitus  Patient says that she believed the "chunks" of blood in her vomit, she said she often notes blood in her vomit  She is also complaining of epigastric abdominal pain today  The patient is very frustrated about returning to the emergency department, as she was just seen in the emergency department last evening for vomiting and a migraine headache, she says that she is tired of coming to the hospital, she frequently comes to the emergency department for similar symptoms  The patient says that due to insurance constraints she has not been able to follow-up with a GI specialist for evaluation of her vomiting and "stomach ulcers " Patient says that she has never had a formal EGD she believes that she needs 1 for further evaluation  The patient takes Carafate for her symptoms, she was prescribed a PPI last evening but she has not yet picked up the prescription  Patient says that she is still currently nauseous  Last bowel movement was yesterday and patient's head was normal, she did not note any melena gross blood  History provided by:  Patient   used: No        Prior to Admission Medications   Prescriptions Last Dose Informant Patient Reported? Taking?    EPINEPHrine (EPIPEN) 0 3 mg/0 3 mL SOAJ   No No   Sig: Inject 0 3 mL (0 3 mg total) into a muscle once for 1 dose   FLUoxetine (PROzac) 40 MG capsule 1/14/2022 at Unknown time  No Yes   Sig: Take 1 capsule (40 mg total) by mouth daily   OLANZapine (ZyPREXA) 5 mg tablet   Yes No   Sig: Take 5 mg by mouth daily at bedtime   QUEtiapine (SEROquel) 100 mg tablet   Yes No   Sig: Take 100 mg by mouth daily at bedtime   Patient not taking: Reported on 6/24/2021   QUEtiapine (SEROquel) 50 mg tablet   No No   Sig: Take 1 tablet (50 mg total) by mouth daily at bedtime for 14 days   albuterol (PROVENTIL HFA,VENTOLIN HFA) 90 mcg/act inhaler   No No   Sig: Inhale 2 puffs every 6 (six) hours as needed for wheezing or shortness of breath   butalbital-acetaminophen-caffeine (FIORICET,ESGIC) -40 mg per tablet   No No   Sig: Take 1 tablet by mouth every 6 (six) hours as needed for headaches for up to 10 doses   clotrimazole (LOTRIMIN) 1 % cream   No No   Sig: Apply to affected area 2 times daily for 2-4 wks   fluticasone (FLOVENT HFA) 110 MCG/ACT inhaler   No No   Sig: Inhale 2 puffs 2 (two) times a day Rinse mouth after use     guaiFENesin (MUCINEX) 600 mg 12 hr tablet   No No   Sig: Take 1 tablet (600 mg total) by mouth every 12 (twelve) hours   hydrOXYzine HCL (ATARAX) 25 mg tablet   No No   Sig: Take 1 tablet (25 mg total) by mouth every 6 (six) hours as needed for anxiety for up to 10 days   ibuprofen (MOTRIN) 400 mg tablet Past Week at Unknown time  No Yes   Sig: Take 1 tablet (400 mg total) by mouth 2 (two) times a day   meclizine (ANTIVERT) 25 mg tablet Not Taking at Unknown time  No No   Sig: Take 1 tablet (25 mg total) by mouth 3 (three) times a day as needed for dizziness for up to 30 doses   Patient not taking: Reported on 2/12/2021   melatonin 3 mg   No No   Sig: Take 1 tablet (3 mg total) by mouth daily at bedtime   ondansetron (ZOFRAN) 4 mg tablet   No No   Sig: Take 1 tablet (4 mg total) by mouth every 6 (six) hours   ondansetron (ZOFRAN-ODT) 4 mg disintegrating tablet   No No   Sig: Take 1 tablet (4 mg total) by mouth every 6 (six) hours as needed for nausea or vomiting   pantoprazole (PROTONIX) 40 mg tablet   No No   Sig: Take 1 tablet (40 mg total) by mouth daily for 15 days   pantoprazole (PROTONIX) 40 mg tablet   No No   Sig: Take 1 tablet (40 mg total) by mouth daily   pantoprazole (PROTONIX) 40 mg tablet Past Week at Unknown time  No Yes   Sig: Take 1 tablet (40 mg total) by mouth daily   predniSONE 10 mg tablet   No No   Si po daily x2 days, then 3 po daily x2 days, then 2 po daily x2 days,then 1 po daily x2days   Patient not taking: Reported on 3/19/2021   sucralfate (CARAFATE) 1 g tablet   No No   Sig: Take 1 tablet (1 g total) by mouth 4 (four) times a day   sucralfate (CARAFATE) 1 g tablet   No No   Sig: Take 1 tablet (1 g total) by mouth 3 (three) times a day for 6 days      Facility-Administered Medications: None       Past Medical History:   Diagnosis Date    ADHD     Anxiety     Asthma     Bipolar 1 disorder [facility]     Depression     Diabetes mellitus ([facility]     GERD (gastroesophageal reflux disease)     Mental and behavioral problem     Migraine     Palpitations     Last Assessed [id number]    Psychiatric disorder     Psychiatric illness     Psychosis ([facility]     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[facility]     Seizures ([facility] )     last time 2 weeks ago- petit mal    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    Stabbing chest pain     Last Assessed 2016    Suicide attempt [facility])     Tachycardia     Last Assessed 2016    Upper respiratory disease     Last Assessed 2016       Past Surgical History:   Procedure Laterality Date    KNEE SURGERY      [facility] LAP,TUBAL CAUTERY N/A 10/3/2018    Procedure: LAPAROSCOPIC TUBAL LIGATION;  Surgeon: Alejandro Pena MD;  Location: [address] [place] [address];  Service: Gynecology    TUBAL LIGATION         Family History   Problem Relation Age of Onset    Migraines Sister     Cancer Mother     Diabetes Mother     Cancer Father     Diabetes Father     Arthritis Father     Cancer Maternal Grandmother     Cancer Maternal Aunt     Cancer Paternal Uncle     Diabetes Other      I have reviewed and agree with the history as documented  E-Cigarette/Vaping    E-Cigarette Use Former User      E-Cigarette/Vaping Substances    Nicotine No     THC No     CBD No     Flavoring No     Other No     Unknown No      Social History     Tobacco Use    Smoking status: Current Some Day Smoker     Packs/day: 0 25     Types: Cigarettes    Smokeless tobacco: Never Used   Vaping Use    Vaping Use: Former   Substance Use Topics    Alcohol use: Not Currently    Drug use: Yes     Frequency: 1 0 times per week     Types: Marijuana        Review of Systems   Constitutional: Negative for chills and fever  HENT: Negative for ear pain and sore throat  Eyes: Negative for pain and visual disturbance  Respiratory: Negative for cough and shortness of breath  Cardiovascular: Negative for chest pain and palpitations  Gastrointestinal: Positive for abdominal pain, nausea and vomiting  Genitourinary: Negative for dysuria and hematuria  Musculoskeletal: Negative for arthralgias and back pain  Skin: Negative for color change and rash  Neurological: Negative for seizures and syncope  All other systems reviewed and are negative  Physical Exam  ED Triage Vitals   Temperature Pulse Respirations Blood Pressure SpO2   01/14/22 1407 01/14/22 1407 01/14/22 1407 01/14/22 1407 01/14/22 1407   98 5 °F (36 9 °C) 94 18 106/65 98 %      Temp Source Heart Rate Source Patient Position - Orthostatic VS BP Location FiO2 (%)   01/14/22 1407 01/14/22 1407 01/14/22 1723 01/14/22 1407 --   Oral Monitor Lying Left arm       Pain Score       --                    Orthostatic Vital Signs  Vitals:    01/14/22 1407 01/14/22 1723   BP: 106/65 101/74   Pulse: 94 74   Patient Position - Orthostatic VS:  Lying       Physical Exam  Vitals and nursing note reviewed  Constitutional:       Appearance: Normal appearance  She is not ill-appearing        Comments: Patient is not in any acute distress, although she does express frustration with returning to the emergency department  HENT:      Head: Normocephalic and atraumatic  Right Ear: External ear normal       Left Ear: External ear normal       Nose: No congestion or rhinorrhea  Mouth/Throat:      Mouth: Mucous membranes are moist       Pharynx: Oropharynx is clear  No oropharyngeal exudate or posterior oropharyngeal erythema  Eyes:      General: No scleral icterus  Conjunctiva/sclera: Conjunctivae normal    Cardiovascular:      Rate and Rhythm: Normal rate and regular rhythm  Heart sounds: Normal heart sounds  Pulmonary:      Effort: Pulmonary effort is normal  No respiratory distress  Breath sounds: Normal breath sounds  Abdominal:      General: Abdomen is flat  A surgical scar is present  There is no distension  Palpations: Abdomen is soft  There is no mass  Tenderness: There is abdominal tenderness in the epigastric area  There is no right CVA tenderness, left CVA tenderness, guarding or rebound  Hernia: No hernia is present  Musculoskeletal:         General: No tenderness or signs of injury  Cervical back: Neck supple  No rigidity  Skin:     General: Skin is warm  Coloration: Skin is not jaundiced  Findings: No erythema or rash  Neurological:      General: No focal deficit present  Mental Status: She is alert  Mental status is at baseline     Psychiatric:         Mood and Affect: Mood normal          Behavior: Behavior normal          ED Medications  Medications   lactated ringers bolus 500 mL (0 mL Intravenous Stopped 1/14/22 1717)   OLANZapine (ZyPREXA) IM injection 10 mg (10 mg Intramuscular Given 1/14/22 1516)   sucralfate (CARAFATE) tablet 1 g (1 g Oral Given 1/14/22 1717)       Diagnostic Studies  Results Reviewed     Procedure Component Value Units Date/Time    Lipase [[id number]]  (Normal) Collected: 01/14/22 1517    Lab Status: Final result Specimen: Blood from Arm, Left Updated: 01/14/22 1553     Lipase 147 u/L Comprehensive metabolic panel [[id number]] Collected: 01/14/22 1517    Lab Status: Final result Specimen: Blood from Arm, Left Updated: 01/14/22 1553     Sodium 139 mmol/L      Potassium 3 8 mmol/L      Chloride 108 mmol/L      CO2 21 mmol/L      ANION GAP 10 mmol/L      BUN 7 mg/dL      Creatinine 0 76 mg/dL      Glucose 102 mg/dL      Calcium 8 9 mg/dL      AST 20 U/L      ALT 22 U/L      Alkaline Phosphatase 61 U/L      Total Protein 7 4 g/dL      Albumin 3 6 g/dL      Total Bilirubin 0 25 mg/dL      eGFR 109 ml/min/1 73sq m     Narrative:      National Kidney Disease Foundation guidelines for Chronic Kidney Disease (CKD):     Stage 1 with normal or high GFR (GFR > 90 mL/min/1 73 square meters)    Stage 2 Mild CKD (GFR = 60-89 mL/min/1 73 square meters)    Stage 3A Moderate CKD (GFR = 45-59 mL/min/1 73 square meters)    Stage 3B Moderate CKD (GFR = 30-44 mL/min/1 73 square meters)    Stage 4 Severe CKD (GFR = 15-29 mL/min/1 73 square meters)    Stage 5 End Stage CKD (GFR <15 mL/min/1 73 square meters)  Note: GFR calculation is accurate only with a steady state creatinine    CBC and differential [[id number]]  (Abnormal) Collected: 01/14/22 1517    Lab Status: Final result Specimen: Blood from Arm, Left Updated: 01/14/22 1531     WBC 15 90 Thousand/uL      RBC 4 47 Million/uL      Hemoglobin 13 1 g/dL      Hematocrit 39 6 %      MCV 89 fL      MCH 29 3 pg      MCHC 33 1 g/dL      RDW 13 2 %      MPV 9 5 fL      Platelets 435 Thousands/uL      nRBC 0 /100 WBCs      Neutrophils Relative 76 %      Immat GRANS % 1 %      Lymphocytes Relative 15 %      Monocytes Relative 8 %      Eosinophils Relative 0 %      Basophils Relative 0 %      Neutrophils Absolute 12 19 Thousands/µL      Immature Grans Absolute 0 10 Thousand/uL      Lymphocytes Absolute 2 30 Thousands/µL      Monocytes Absolute 1 28 Thousand/µL      Eosinophils Absolute 0 00 Thousand/µL      Basophils Absolute 0 03 Thousands/µL                  No orders to display         Procedures  Procedures      ED Course  ED Course as of 01/14/22 1934   Anisha Estes Jan 14, 2022   5004 I reassessed the patient  At this time she was able to tolerate oral intake, she drinks some water without any difficulty  Will reassess in 15 minutes and ask her to take p o  Carafate  MDM  Number of Diagnoses or Management Options  Epigastric pain: new and requires workup  Vomiting blood: new and requires workup     Amount and/or Complexity of Data Reviewed  Clinical lab tests: ordered and reviewed  Review and summarize past medical records: yes  Independent visualization of images, tracings, or specimens: yes    Risk of Complications, Morbidity, and/or Mortality  General comments: [place] is a 23year old female with a history of cyclic vomiting and a self-reported history of peptic ulcer disease presents today with an episode of intractable vomiting and retching with noted blood in vomitus  Patient frequently presents to the emergency department for similar symptoms, however she has not been able to follow-up with a GI specialist     Patient's vital signs were all unremarkable  Physical exam showed mild tenderness to palpation of the epigastrium, the patient was not actively vomiting in the emergency department  CBC did show an elevated white blood cell count, CMP was unremarkable, lipase unremarkable  Patient was given Zyprexa for her nausea, she was also given 500 mL of lactated Ringer for fluid repletion  Patient's nausea resolved with the Zyprexa, she was able to tolerate p o  Intake    However, given that the patient recently failed outpatient management and had another episode of bloody vomitus, it would be beneficial for the patient to be admitted to the hospital for possible EGD here, or for her to schedule an appointment for EGD outpatient given her struggles to meet with a GI specialist     Patient Progress  Patient progress: stable      Disposition  Final diagnoses:   Epigastric pain   Vomiting blood     Time reflects when diagnosis was documented in both MDM as applicable and the Disposition within this note     Time User Action Codes Description Comment    1/14/2022  5:38 PM Bryce Holbrook Add [R10 13] Epigastric pain     1/14/2022  5:39 PM Bryce Holbrook Add [K92 0] Vomiting blood     1/14/2022  6:49 PM Lillian Championharshal Add [K92 0] Hematemesis       ED Disposition     ED Disposition Condition Date/Time Comment    Admit Stable Fri Jan 14, 2022  5:40 PM Case was discussed with Dr Gabriella Huynh and the patient's admission status was agreed to be Admission Status: observation status to the service of Dr Gabriella Huynh  Follow-up Information    None         Patient's Medications   Discharge Prescriptions    No medications on file     No discharge procedures on file  PDMP Review     None           ED Provider  Attending physically available and evaluated [address]  I managed the patient along with the ED Attending      Electronically Signed by         Estrada Rodriguez DO  01/14/22 1934

## 2022-01-15 VITALS
RESPIRATION RATE: 16 BRPM | TEMPERATURE: 98.5 F | SYSTOLIC BLOOD PRESSURE: 134 MMHG | HEART RATE: 100 BPM | OXYGEN SATURATION: 97 % | DIASTOLIC BLOOD PRESSURE: 60 MMHG

## 2022-01-15 PROBLEM — R10.13 EPIGASTRIC PAIN: Status: ACTIVE | Noted: 2022-01-15

## 2022-01-15 PROBLEM — R11.10 INTRACTABLE VOMITING: Status: RESOLVED | Noted: 2022-01-14 | Resolved: 2022-01-15

## 2022-01-15 PROBLEM — K92.0 HEMATEMESIS: Status: RESOLVED | Noted: 2022-01-14 | Resolved: 2022-01-15

## 2022-01-15 PROBLEM — R65.10 SIRS (SYSTEMIC INFLAMMATORY RESPONSE SYNDROME) (HCC): Status: RESOLVED | Noted: 2022-01-14 | Resolved: 2022-01-15

## 2022-01-15 LAB
ALBUMIN SERPL BCP-MCNC: 3.4 G/DL (ref 3.5–5)
ALP SERPL-CCNC: 57 U/L (ref 46–116)
ALT SERPL W P-5'-P-CCNC: 31 U/L (ref 12–78)
ANION GAP SERPL CALCULATED.3IONS-SCNC: 9 MMOL/L (ref 4–13)
AST SERPL W P-5'-P-CCNC: 19 U/L (ref 5–45)
BILIRUB SERPL-MCNC: 0.32 MG/DL (ref 0.2–1)
BUN SERPL-MCNC: 9 MG/DL (ref 5–25)
CALCIUM ALBUM COR SERPL-MCNC: 9 MG/DL (ref 8.3–10.1)
CALCIUM SERPL-MCNC: 8.5 MG/DL (ref 8.3–10.1)
CHLORIDE SERPL-SCNC: 110 MMOL/L (ref 100–108)
CO2 SERPL-SCNC: 24 MMOL/L (ref 21–32)
CREAT SERPL-MCNC: 0.78 MG/DL (ref 0.6–1.3)
ERYTHROCYTE [DISTWIDTH] IN BLOOD BY AUTOMATED COUNT: 13.4 % (ref 11.6–15.1)
GFR SERPL CREATININE-BSD FRML MDRD: 105 ML/MIN/1.73SQ M
GLUCOSE P FAST SERPL-MCNC: 91 MG/DL (ref 65–99)
GLUCOSE SERPL-MCNC: 91 MG/DL (ref 65–140)
HCT VFR BLD AUTO: 36.6 % (ref 34.8–46.1)
HCT VFR BLD AUTO: 37.4 % (ref 34.8–46.1)
HCT VFR BLD AUTO: 39.5 % (ref 34.8–46.1)
HGB BLD-MCNC: 12.2 G/DL (ref 11.5–15.4)
HGB BLD-MCNC: 12.6 G/DL (ref 11.5–15.4)
HGB BLD-MCNC: 13.3 G/DL (ref 11.5–15.4)
INR PPP: 1.01 (ref 0.84–1.19)
MCH RBC QN AUTO: 30 PG (ref 26.8–34.3)
MCHC RBC AUTO-ENTMCNC: 33.7 G/DL (ref 31.4–37.4)
MCV RBC AUTO: 89 FL (ref 82–98)
PLATELET # BLD AUTO: 299 THOUSANDS/UL (ref 149–390)
PMV BLD AUTO: 9.3 FL (ref 8.9–12.7)
POTASSIUM SERPL-SCNC: 3.1 MMOL/L (ref 3.5–5.3)
PROT SERPL-MCNC: 7 G/DL (ref 6.4–8.2)
PROTHROMBIN TIME: 13.3 SECONDS (ref 11.6–14.5)
RBC # BLD AUTO: 4.43 MILLION/UL (ref 3.81–5.12)
SODIUM SERPL-SCNC: 143 MMOL/L (ref 136–145)
WBC # BLD AUTO: 10.23 THOUSAND/UL (ref 4.31–10.16)

## 2022-01-15 PROCEDURE — 85014 HEMATOCRIT: CPT

## 2022-01-15 PROCEDURE — 99217 PR OBSERVATION CARE DISCHARGE MANAGEMENT: CPT | Performed by: INTERNAL MEDICINE

## 2022-01-15 PROCEDURE — 85018 HEMOGLOBIN: CPT

## 2022-01-15 PROCEDURE — 85610 PROTHROMBIN TIME: CPT

## 2022-01-15 PROCEDURE — 80053 COMPREHEN METABOLIC PANEL: CPT

## 2022-01-15 PROCEDURE — 99204 OFFICE O/P NEW MOD 45 MIN: CPT | Performed by: INTERNAL MEDICINE

## 2022-01-15 PROCEDURE — 36415 COLL VENOUS BLD VENIPUNCTURE: CPT

## 2022-01-15 PROCEDURE — 85027 COMPLETE CBC AUTOMATED: CPT

## 2022-01-15 RX ORDER — POTASSIUM CHLORIDE 20 MEQ/1
40 TABLET, EXTENDED RELEASE ORAL ONCE
Status: DISCONTINUED | OUTPATIENT
Start: 2022-01-15 | End: 2022-01-15 | Stop reason: HOSPADM

## 2022-01-15 RX ORDER — CALCIUM CARBONATE 200(500)MG
1000 TABLET,CHEWABLE ORAL DAILY PRN
Qty: 60 TABLET | Refills: 0 | Status: SHIPPED | OUTPATIENT
Start: 2022-01-15

## 2022-01-15 RX ORDER — POTASSIUM CHLORIDE 20 MEQ/1
40 TABLET, EXTENDED RELEASE ORAL ONCE
Status: COMPLETED | OUTPATIENT
Start: 2022-01-15 | End: 2022-01-15

## 2022-01-15 RX ORDER — POTASSIUM CHLORIDE 20 MEQ/1
20 TABLET, EXTENDED RELEASE ORAL DAILY
Qty: 2 TABLET | Refills: 0 | Status: SHIPPED | OUTPATIENT
Start: 2022-01-15 | End: 2022-01-21 | Stop reason: ALTCHOICE

## 2022-01-15 RX ORDER — PANTOPRAZOLE SODIUM 40 MG/1
40 INJECTION, POWDER, FOR SOLUTION INTRAVENOUS EVERY 12 HOURS SCHEDULED
Status: DISCONTINUED | OUTPATIENT
Start: 2022-01-15 | End: 2022-01-15 | Stop reason: HOSPADM

## 2022-01-15 RX ADMIN — FLUOXETINE 40 MG: 10 CAPSULE ORAL at 09:03

## 2022-01-15 RX ADMIN — ONDANSETRON 4 MG: 2 INJECTION INTRAMUSCULAR; INTRAVENOUS at 14:09

## 2022-01-15 RX ADMIN — PANTOPRAZOLE SODIUM 40 MG: 40 TABLET, DELAYED RELEASE ORAL at 09:14

## 2022-01-15 RX ADMIN — METOCLOPRAMIDE HYDROCHLORIDE 10 MG: 5 INJECTION INTRAMUSCULAR; INTRAVENOUS at 05:49

## 2022-01-15 RX ADMIN — HYDROXYZINE HYDROCHLORIDE 25 MG: 25 TABLET ORAL at 06:13

## 2022-01-15 RX ADMIN — SUCRALFATE 1 G: 1 TABLET ORAL at 09:16

## 2022-01-15 RX ADMIN — POTASSIUM CHLORIDE 40 MEQ: 1500 TABLET, EXTENDED RELEASE ORAL at 14:14

## 2022-01-15 NOTE — ED NOTES
Pt will be discharged to home   Admitting resident spoke with patient and made aware     Pastor Lore RN  01/15/22 6825

## 2022-01-15 NOTE — ASSESSMENT & PLAN NOTE
Patient reports history of migraines for over 1 year  Patient reports migraine headaches since yesterday  Patient attempted to manage at home with Aleve, took 1 dose yesterday  On arrival to ED, was 10/10  On evaluation, patient reported headache was 2/10  Magnesium 1 7      Plan   · Reglan 10 mg q 8h scheduled  · Benadryl p r n  25 mg IV   · Adequate kg   · Avoiding Toradol due to report of hematemesis

## 2022-01-15 NOTE — ASSESSMENT & PLAN NOTE
Met SIRS criteria with tachycardia and leukocytosis       Plan   ·  mL/hr NS   · Monitor off of antibiotics   · A m  CBC  · Monitor VS

## 2022-01-15 NOTE — QUICK NOTE
[address] [facility] Attending Physician Attestation Note - Admission    I have seen and examined Autumn E Candance Blow personally and have reviewed the medical record independently  I have reviewed the case with the resident physician including all assessments and the plan of care for each  I agree with the resident physician and offer the following addendum to the below statements by the resident physician:     Date Evaluated:  January 14, 2022  Time Evaluated:  6:00 p m  Subjective / HPI:  This is a 71-year-old female with intractable nausea vomiting which has been ongoing over the last 1-2 days  She has not vomited since she came in through the ED and last time she ate was yesterday  She also notes that she coughed up blood yesterday but does not remember what time  She states she does not use [place] often but also states the last time she used it could have been some days ago  Regardless she has SIRS criteria with elevated white cell count and tachycardia on arrival     Exam:   Abdomen tender  Bowel sounds present  Chest clear  Overall well-appearing  Extremities no edema    Assessment and Plan:  · Intractable nausea vomiting with concern for hematemesis  · H&H every 8 hours  · GI consult  · IV fluids  · Antiemetics    Education and Discussions with Family / Patient:  Discussed with the patient resident updated family updated the     Time Spent for Care: 45 minutes  More than 50% of total time spent on counseling and coordination of care as described above  Current Patient Status: Observation   Anticipated Length of Stay:  Patient will be admitted on an Observation basis with an anticipated length of stay of  less than 2 midnights  Justification for Hospital Stay:  Intractable nausea vomiting    Epic / Care Everywhere Records Reviewed Independently: Yes     For detailed history, assessment, and plan of care, please review the statements below by Dr Madison Hampton Johanna Humphrey MD

## 2022-01-15 NOTE — ED NOTES
Pt discharged   on the way to pick her up   Currently in the waiting room     Tamara Dias, SARKIS  01/15/22 8855

## 2022-01-15 NOTE — DISCHARGE INSTR - AVS FIRST PAGE
Dear Lucila Lugo,     It was our pleasure to care for you here at [facility]  It is our hope that we were always able to exceed the expected standards for your care during your stay  You were hospitalized due to ***  You were cared for on the *** floor by Hetal Caldera MD under the service of Nishi Maradiaga MD with the [facility] who covers for your primary care physician (PCP), Pascale Xavier DO, while you were hospitalized  If you have any questions or concerns related to this hospitalization, you may contact us at [phone]  For follow up as well as any medication refills, we recommend that you follow up with your primary care physician  A registered nurse will reach out to you by phone within a few days after your discharge to answer any additional questions that you may have after going home  However, at this time we provide for you here, the most important instructions / recommendations at discharge:     Notable Medication Adjustments -   Potassium chloride 20 mEq tablet daily for 7 days  Continue taking Zofran 4 mg as needed for nausea/vomiting  Continue taking Carafate as needed for epigastric pain  Testing Required after Discharge -   Outpatient EGD  Important follow up information -   Please f/u with your primary care physician within 1 week of discharge  Please call as soon as possible to schedule a visit with outpatient Gastroenterology within 1 week for EGD planning  Other Instructions -   Recommend that you continue to eat light meals for the next few days and advance as tolerated (see diet detail in discharge instructions)  Please review this entire after visit summary as additional general instructions including medication list, appointments, activity, diet, any pertinent wound care, and other additional recommendations from your care team that may be provided for you        Sincerely,     Hetal Caldera MD

## 2022-01-15 NOTE — DISCHARGE INSTRUCTIONS
Abdominal Pain   WHAT YOU NEED TO KNOW:   Abdominal pain can be dull, achy, or sharp  You may have pain in one area of your abdomen, or in your entire abdomen  Your pain may be caused by a condition such as constipation, food sensitivity or poisoning, infection, or a blockage  Abdominal pain can also be from a hernia, appendicitis, or an ulcer  Liver, gallbladder, or kidney conditions can also cause abdominal pain  The cause of your abdominal pain may not be known  DISCHARGE INSTRUCTIONS:   Call your local emergency number (911 in the [address] [facility]) if:   · You have new chest pain or shortness of breath  Return to the emergency department if:   · You have pulsing pain in your upper abdomen or lower back that suddenly becomes constant  · Your pain is in the right lower abdominal area and worsens with movement  · You have a fever over 100 4°F (38°C) or shaking chills  · You are vomiting and cannot keep food or liquids down  · Your pain does not improve or gets worse over the next 8 to 12 hours  · You see blood in your vomit or bowel movements, or they look black and tarry  · Your skin or the whites of your eyes turn yellow  · You are a woman and have a large amount of vaginal bleeding that is not your monthly period  Call your doctor if:   · You have pain in your lower back  · You are a man and have pain in your testicles  · You have pain when you urinate  · You have questions or concerns about your condition or care  Medicines:   · Prescription pain medicine  may be given  Ask your healthcare provider how to take this medicine safely  Some prescription pain medicines contain acetaminophen  Do not take other medicines that contain acetaminophen without talking to your healthcare provider  Too much acetaminophen may cause liver damage  Prescription pain medicine may cause constipation  Ask your healthcare provider how to prevent or treat constipation       · Medicines  may be given to calm your stomach or prevent vomiting  · Take your medicine as directed  Contact your healthcare provider if you think your medicine is not helping or if you have side effects  Tell him of her if you are allergic to any medicine  Keep a list of the medicines, vitamins, and herbs you take  Include the amounts, and when and why you take them  Bring the list or the pill bottles to follow-up visits  Carry your medicine list with you in case of an emergency  Manage your symptoms:   · Apply heat  on your abdomen for 20 to 30 minutes every 2 hours for as many days as directed  Heat helps decrease pain and muscle spasms  · Make changes to the food you eat, if needed  Do not eat foods that cause abdominal pain or other symptoms  Eat small meals more often  The following changes may also help:    ? Eat more high-fiber foods if you are constipated  High-fiber foods include fruits, vegetables, whole-grain foods, and legumes  ? Do not eat foods that cause gas if you have bloating  Examples include broccoli, cabbage, and cauliflower  Do not drink soda or carbonated drinks  These may also cause gas  ? Do not eat foods or drinks that contain sorbitol or fructose if you have diarrhea and bloating  Some examples are fruit juices, candy, jelly, and sugar-free gum  ? Do not eat high-fat foods  Examples include fried foods, cheeseburgers, hot dogs, and desserts  ? Limit or do not have caffeine  Caffeine may make symptoms such as heartburn or nausea worse  ? Drink more liquids to prevent dehydration from diarrhea or vomiting  Ask your healthcare provider how much liquid to drink each day and which liquids are best for you  · Keep a diary of your abdominal pain  A diary may help your healthcare provider learn what is causing your abdominal pain  Include when the pain happens, how long it lasts, and what the pain feels like  Write down any other symptoms you have with abdominal pain   Also write down what you eat, and what symptoms you have after you eat  · Manage your stress  Stress may cause abdominal pain  Your healthcare provider may recommend relaxation techniques and deep breathing exercises to help decrease your stress  Your healthcare provider may recommend you talk to someone about your stress or anxiety, such as a counselor or a trusted friend  Get plenty of sleep and exercise regularly  · Limit or do not drink alcohol  Alcohol can make your abdominal pain worse  Ask your healthcare provider if it is safe for you to drink alcohol  Also ask how much is safe for you to drink  · Do not smoke  Nicotine and other chemicals in cigarettes can damage your esophagus and stomach  Ask your healthcare provider for information if you currently smoke and need help to quit  E-cigarettes or smokeless tobacco still contain nicotine  Talk to your healthcare provider before you use these products  Follow up with your doctor within 24 hours or as directed:  Write down your questions so you remember to ask them during your visits  © [employer] 2021 Information is for End User's use only and may not be sold, redistributed or otherwise used for commercial purposes  All illustrations and images included in CareNotes® are the copyrighted property of A D A M , Inc  or [address] [place], [place] [address]dixie   The above information is an  only  It is not intended as medical advice for individual conditions or treatments  Talk to your doctor, nurse or pharmacist before following any medical regimen to see if it is safe and effective for you  GI (Gastrointestinal) Soft Diet   WHAT YOU NEED TO KNOW:   A GI soft diet is prescribed by your healthcare provider to allow your intestines (bowels) to heal  Your bowels may need it before a procedure, after surgery, or because of a medical condition  The food in a GI soft diet keeps your bowels from working too hard   The diet gives you nutrition while allowing your bowels time to heal or rest    DISCHARGE INSTRUCTIONS:   Foods to eat on a GI soft diet:  Your healthcare provider may tell you to keep your fiber intake to less than 10 grams each day  · Grains:  Choose grains that have less than 2 grams of fiber in each serving  Examples include the following:     ? Cream of wheat and finely ground grits    ? Dry cereal made from rice     ? White bread, white pasta, and white rice    ? Crackers, bagels, and rolls made from white or refined flour    · Fruits and vegetables:      ? Canned and well-cooked fruit without skins or seeds, and juice without pulp    ? Ripe bananas and soft melon    ? Canned and well-cooked vegetables without skins or seeds, and strained vegetable juice    ? Potatoes without skin    · Dairy:     ? Cow's milk, lactose-free milk, soy milk, and rice milk    ? Cottage cheese and yogurt without nuts, fruit, or granola    · Protein:      ? Eggs, fish, and tender, well-cooked poultry (such as chicken and turkey) and beef     ? Tofu and smooth peanut butter    Foods to avoid: The following foods are hard to digest:  · Breads, cereals, crackers, and pasta made with whole wheat or whole grains (such as whole oats)    · Light & Minor, wild rice, quinoa, kasha, and barley    · All fresh fruit with skin, except banana and melons     · Dried fruits and fruit juice with pulp    · Canned and fresh pineapple    · Raw vegetables    · Nuts, seeds, and popcorn    · Beans, nuts, peas, and lentils    · Tough meats    · Coconut and avocado    What else you need to know:  A GI soft diet can decrease the amount of bowel movements you have  Drink liquids as directed to avoid constipation  Ask how much liquid to drink each day and which liquids are best for you  Do not  drink liquids with your meal  Wait until 30 minutes after your meal to drink liquids     © Copyright [employer] 2021 Information is for End User's use only and may not be sold, redistributed or otherwise used for commercial purposes  All illustrations and images included in CareNotes® are the copyrighted property of A D A M [employer] Inc  or Rula Connors  The above information is an  only  It is not intended as medical advice for individual conditions or treatments  Talk to your doctor, nurse or pharmacist before following any medical regimen to see if it is safe and effective for you

## 2022-01-15 NOTE — H&P
[facility]  H&P- [place] Crawford Gamma 1995, 23 y o  female MRN: [id number]  Unit/Bed#: FT 04 Encounter: [id number]  Primary Care Provider: Mecca Reilly DO   Date and time admitted to hospital: 1/14/2022  2:16 PM    * Intractable vomiting  Assessment & Plan  POA, vomiting since yesterday and hematemesis twice in the last 24 hours  No vomiting episodes in ED  Plan   · Reglan 10 mg Q 8 hours  · NPO  · GI consult  · IVF  · Protonix  · A m  CMP    Hematemesis  Assessment & Plan  Patient reports 2 episodes of hematemesis in last 24 hours  Plan  · GI consult  · NPO  · H&H Q 8   · A m  CBC  · Transfuse if < 7 - needs consent    SIRS (systemic inflammatory response syndrome) (HCC)  Assessment & Plan  Met SIRS criteria with tachycardia and leukocytosis  Plan   ·  mL/hr NS   · Monitor off of antibiotics   · A m  CBC  · Monitor VS    Migraine  Assessment & Plan  Patient reports history of migraines for over 1 year  Patient reports migraine headaches since yesterday  Patient attempted to manage at home with Aleve, took 1 dose yesterday  On arrival to ED, was 10/10  On evaluation, patient reported headache was 2/10  Magnesium 1 7  Plan   · Reglan 10 mg q 8h scheduled  · Benadryl p r n  25 mg IV   · Adequate kg   · Avoiding Toradol due to report of hematemesis    Tobacco abuse  Assessment & Plan  Patient reports smoking less than 1/4 pack per day  Plan   · Nicotine patch daily   · Encouraged smoking cessation      VTE Prophylaxis: Pharmacologic VTE Prophylaxis contraindicated due to Possible EGD procedure  / sequential compression device   Code Status:  Level 1  POLST: POLST form is not discussed and not completed at this time  Anticipated Length of Stay:  Patient will be admitted on an Observation basis with an anticipated length of stay of  < 2 midnights     Justification for Hospital Stay:  Intractable vomiting with hematemesis    Chief Complaint:   Intractable vomiting and migraine headache    History of Present Illness:    Autumn E Anmol Man is a 23 y o  female with history of cyclic vomiting, migraine headaches, seizures, anxiety, and bipolar disorder, who presents with intractable vomiting and migraine headache since yesterday  Patient was seen yesterday in ED with same chief complaint was managed with Decadron, Ativan, Zofran and Pepcid  Patient reports that on arrival her migraine was 10/10, reports abdominal pain, and vomiting blood twice in the last 24 hours  Patient reports making of cigarettes and use of marijuana last time yesterday  In ED, noted to slight elevation of WBC and was given a dose of Zyprexa, 500 L bolus, and Carafate  On my arrival to patient,  at bedside, and patient was found sleeping and very drowsy  Reported improvement in her headache now 2/10  Patient reported abdominal pain denied nausea and recent vomiting  On exam, patient with abdominal tenderness at epigastrium  Patient will be admitted to [facility] service for management of intractable vomiting with hematemesis  Review of Systems:    Review of Systems   Constitutional: Negative for fatigue and fever  HENT: Negative for sore throat  Eyes: Negative for photophobia and visual disturbance  Respiratory: Negative for cough, chest tightness and shortness of breath  Gastrointestinal: Positive for abdominal pain, nausea and vomiting (Hematemesis)  Negative for blood in stool and diarrhea  Musculoskeletal: Negative for back pain  Skin: Negative for color change and rash  Neurological: Positive for seizures and headaches  Negative for weakness and light-headedness         Past Medical and Surgical History:     Past Medical History:   Diagnosis Date    ADHD     Anxiety     Asthma     Bipolar 1 disorder ([facility] )     Depression     Diabetes mellitus ([facility] )     GERD (gastroesophageal reflux disease)     Mental and behavioral problem     Migraine     Palpitations Last Assessed [id number]    Psychiatric disorder     Psychiatric illness     Psychosis [facility]     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[facility] )     Seizures ([facility] )     last time 2 weeks ago- petit mal    Seizures ([facility] )     Pseudoseizures    Stabbing chest pain     Last Assessed [id number]    Suicide attempt [facility])     Tachycardia     Last Assessed 29Nov2016    Upper respiratory disease     Last Assessed 27Jan2016       Past Surgical History:   Procedure Laterality Date    KNEE SURGERY      [place] LAP,TUBAL CAUTERY N/A 10/3/2018    Procedure: LAPAROSCOPIC TUBAL LIGATION;  Surgeon: Davis Carrel, MD;  Location: [address] [place] [address];  Service: Gynecology    TUBAL LIGATION         Meds/Allergies:    Prior to Admission medications    Medication Sig Start Date End Date Taking? Authorizing Provider   FLUoxetine (PROzac) 40 MG capsule Take 1 capsule (40 mg total) by mouth daily 9/27/21  Yes Checo Fowler DO   ibuprofen (MOTRIN) 400 mg tablet Take 1 tablet (400 mg total) by mouth 2 (two) times a day 3/17/21  Yes Bill Johnson MD   pantoprazole (PROTONIX) 40 mg tablet Take 1 tablet (40 mg total) by mouth daily 1/13/22  Yes Kingsley Patel DO   albuterol (PROVENTIL HFA,VENTOLIN HFA) 90 mcg/act inhaler Inhale 2 puffs every 6 (six) hours as needed for wheezing or shortness of breath 2/9/21   Greg Shane MD   butalbital-acetaminophen-caffeine (FIORICET,ESGIC) -40 mg per tablet Take 1 tablet by mouth every 6 (six) hours as needed for headaches for up to 10 doses 10/10/21   Cira Breen PA-C   clotrimazole (LOTRIMIN) 1 % cream Apply to affected area 2 times daily for 2-4 wks 5/24/21   Fitz Patterson DO   EPINEPHrine (EPIPEN) 0 3 mg/0 3 mL SOAJ Inject 0 3 mL (0 3 mg total) into a muscle once for 1 dose 2/9/21 9/15/21  Greg Shane MD   fluticasone (FLOVENT HFA) 110 MCG/ACT inhaler Inhale 2 puffs 2 (two) times a day Rinse mouth after use   2/9/21   Greg Shane MD   guaiFENesin (MUCINEX) 600 mg 12 hr tablet Take 1 tablet (600 mg total) by mouth every 12 (twelve) hours 8/15/21   Serge Kim, DO   hydrOXYzine HCL (ATARAX) 25 mg tablet Take 1 tablet (25 mg total) by mouth every 6 (six) hours as needed for anxiety for up to 10 days 9/27/21 10/7/21  Oswaldo Ewing, DO   meclizine (ANTIVERT) 25 mg tablet Take 1 tablet (25 mg total) by mouth 3 (three) times a day as needed for dizziness for up to 30 doses  Patient not taking: Reported on 2/12/2021 1/8/21   Ramonita Mcdonald,    melatonin 3 mg Take 1 tablet (3 mg total) by mouth daily at bedtime 9/25/21   Lida Ramírez DO   OLANZapine (ZyPREXA) 5 mg tablet Take 5 mg by mouth daily at bedtime 6/18/21   Historical Provider, MD   ondansetron (ZOFRAN) 4 mg tablet Take 1 tablet (4 mg total) by mouth every 6 (six) hours 1/13/22   Niya Fowler DO   ondansetron (ZOFRAN-ODT) 4 mg disintegrating tablet Take 1 tablet (4 mg total) by mouth every 6 (six) hours as needed for nausea or vomiting 6/24/21   Melina Gold MD   pantoprazole (PROTONIX) 40 mg tablet Take 1 tablet (40 mg total) by mouth daily for 15 days 9/16/21 10/1/21  Derik James MD   pantoprazole (PROTONIX) 40 mg tablet Take 1 tablet (40 mg total) by mouth daily 10/12/21   Trish Landry MD   predniSONE 10 mg tablet 4 po daily x2 days, then 3 po daily x2 days, then 2 po daily x2 days,then 1 po daily x2days  Patient not taking: Reported on 3/19/2021 5/00/92   Remedios Vu MD   QUEtiapine (SEROquel) 100 mg tablet Take 100 mg by mouth daily at bedtime  Patient not taking: Reported on 6/24/2021    Historical Provider, MD   QUEtiapine (SEROquel) 50 mg tablet Take 1 tablet (50 mg total) by mouth daily at bedtime for 14 days 9/27/21 10/11/21  Niya Fowler DO   sucralfate (CARAFATE) 1 g tablet Take 1 tablet (1 g total) by mouth 4 (four) times a day 9/18/21   Marvin Manzano MD   sucralfate (CARAFATE) 1 g tablet Take 1 tablet (1 g total) by mouth 3 (three) times a day for 6 days 10/12/21 10/18/21  Trish Landry MD   famotidine (PEPCID) 20 mg tablet Take 1 tablet (20 mg total) by mouth 2 (two) times a day 9/18/21 9/18/21  Bekah De Luna MD     I have reviewed home medications with patient family member  Allergies: Allergies   Allergen Reactions    Fish-Derived Products - Food Allergy Itching    Latex Hives    Naproxen Anaphylaxis and Chest Pain    Shellfish Allergy - Food Allergy Anaphylaxis    Benadryl [Diphenhydramine] Hives    Fish Oil - Food Allergy     Seasonal Ic  [Cholestatin]      allergies    Shellfish-Derived Products - Food Allergy     Shellfish-Derived Products - Food Allergy Hives       Social History:     Marital Status: /Civil Union   Occupation:  Unknown  Patient Pre-hospital Living Situation:  At home with   Patient Pre-hospital Level of Mobility:  Complete  Patient Pre-hospital Diet Restrictions:  None  Substance Use History:   Social History     Substance and Sexual Activity   Alcohol Use Not Currently     Social History     Tobacco Use   Smoking Status Current Some Day Smoker    Packs/day: 0 25    Types: Cigarettes   Smokeless Tobacco Never Used     Social History     Substance and Sexual Activity   Drug Use Yes    Frequency: 1 0 times per week    Types: Marijuana       Family History:    Family History   Problem Relation Age of Onset    Migraines Sister     Cancer Mother     Diabetes Mother     Cancer Father     Diabetes Father     Arthritis Father     Cancer Maternal Grandmother     Cancer Maternal Aunt     Cancer Paternal Uncle     Diabetes Other        Physical Exam:     Vitals:   Blood Pressure: (!) 89/52 (01/14/22 2047)  Pulse: 55 (01/14/22 2047)  Temperature: 98 5 °F (36 9 °C) (01/14/22 1407)  Temp Source: Oral (01/14/22 2047)  Respirations: 18 (01/14/22 2047)  SpO2: 98 % (01/14/22 2047)    Physical Exam  Vitals and nursing note reviewed  Constitutional:       General: She is not in acute distress  Appearance: Normal appearance  She is obese   She is not ill-appearing or diaphoretic  HENT:      Head: Normocephalic and atraumatic  Mouth/Throat:      Mouth: Mucous membranes are moist       Pharynx: Oropharynx is clear  Eyes:      General: No scleral icterus  Conjunctiva/sclera: Conjunctivae normal       Pupils: Pupils are equal, round, and reactive to light  Cardiovascular:      Rate and Rhythm: Normal rate and regular rhythm  Pulses: Normal pulses  Heart sounds: Normal heart sounds  No murmur heard  Pulmonary:      Effort: Pulmonary effort is normal  No respiratory distress  Breath sounds: Normal breath sounds  No wheezing or rales  Abdominal:      General: Bowel sounds are normal  There is no distension  Palpations: Abdomen is soft  Tenderness: There is abdominal tenderness (Epigastric)  There is no guarding  Musculoskeletal:         General: Normal range of motion  Cervical back: Normal range of motion  Right lower leg: No edema  Left lower leg: No edema  Skin:     General: Skin is warm and dry  Capillary Refill: Capillary refill takes less than 2 seconds  Coloration: Skin is not jaundiced or pale  Neurological:      Mental Status: She is oriented to person, place, and time  Mental status is at baseline  Sensory: No sensory deficit  Motor: No weakness  Psychiatric:         Mood and Affect: Mood normal          Behavior: Behavior is uncooperative  Thought Content: Thought content normal            Additional Data:     Lab Results: I have personally reviewed pertinent reports        Results from last 7 days   Lab Units 01/14/22  1517   WBC Thousand/uL 15 90*   HEMOGLOBIN g/dL 13 1   HEMATOCRIT % 39 6   PLATELETS Thousands/uL 319   NEUTROS PCT % 76*   LYMPHS PCT % 15   MONOS PCT % 8   EOS PCT % 0     Results from last 7 days   Lab Units 01/14/22  1517   POTASSIUM mmol/L 3 8   CHLORIDE mmol/L 108   CO2 mmol/L 21   BUN mg/dL 7   CREATININE mg/dL 0 76   CALCIUM mg/dL 8 9   ALK PHOS U/L 61 ALT U/L 22   AST U/L 20           Imaging: I have personally reviewed pertinent reports  No results found  EKG, Pathology, and Other Studies Reviewed on Admission:   · EKG:  None obtained  Epic / Care Everywhere Records Reviewed: Yes     ** Please Note: This note has been constructed using a voice recognition system   **

## 2022-01-15 NOTE — ASSESSMENT & PLAN NOTE
POA, vomiting since yesterday and hematemesis twice in the last 24 hours  No vomiting episodes in ED      Plan   · Reglan 10 mg Q 8 hours  · NPO  · GI consult  · IVF  · Protonix  · A m  CMP

## 2022-01-15 NOTE — ED NOTES
Tolerated clear liquids, requesting to talk to the doctor about a regular diet  Has been requesting food all day, no vomiting or complaints of abd pain  Is currently ordering from the menu       Ewelina Tenorio RN  01/15/22 5189

## 2022-01-15 NOTE — ED NOTES
Is now complaining the cheeseburger she ordered for lunch is hurting her stomach, states she told the GI doctor that was just in the room to re evaluate her  Pt states the GI doctor told her she would stay in the hospital to be further tested later in the week  Was advised by myself to stop eating if it hurts        Pastor Lore RN  01/15/22 1873

## 2022-01-15 NOTE — CONSULTS
Consultation -[facility]   [place] Esters 23 y o  female MRN: [id number]    Unit/Bed#: FT 04 Encounter: [id number]      Physician Requesting Consult: Dr Deidra Easley    Reason for Consult / Principal Problem: intractable vomiting    HPI: This is a 23 y o  female with history of cyclic vomiting, migraine headaches, seizures, anxiety, and bipolar disorder, who presents with intractable vomiting and migraine headache since day prior to admission  Patient was seen yesterday in ED with same chief complaint was managed with Decadron, Ativan, Zofran and Pepcid  Patient reports that on arrival her migraine was 10/10, reports abdominal pain, and vomiting blood twice in the last 24 hours  Patient reports cigarettes and use of marijuana last time yesterday  Hgb remained stable at 13 3 today  Patient denies any further nausea or vomiting since hospitalization  She states that she has been having epigastric pain for quite some time and she was diagnosed with an ulcer and was supposed to have an EGD  Patient states that she was also diagnosed with cyclic vomiting in the ER previously  She states that the vomiting was dark blood and this was 2 times after persistent vomiting, not the initial vomitus  Denies any melena  She states that she does not take NSAIDs anymore because she was told a few months ago to stop but was taking them previously  She does not take any PPI on a regular basis  Reports family history of GERD  Does admit to some diarrhea at times as well as fluctuation of weight  States that she had some rectal bleeding the other day but that was likely related to her hemorrhoids which she noted since she had a child 3 years ago, occasionally will have difficulty swallowing primarily solids which she noticed over the past few days  Has had multiple CT scans over the past year which had shown no acute abnormality  Labs on admission revealed normal lipase as well as LFTs    Did have some leukocytosis of 15 9 admission which has improved today to 10 23  Patient reports intermittent episodes of vomiting and normal periods in between        Allergies:    Allergies   Allergen Reactions    Fish-Derived Products - Food Allergy Itching    Latex Hives    Naproxen Anaphylaxis and Chest Pain    Shellfish Allergy - Food Allergy Anaphylaxis    Benadryl [Diphenhydramine] Hives    Fish Oil - Food Allergy     Seasonal Ic  [Cholestatin]      allergies    Shellfish-Derived Products - Food Allergy     Shellfish-Derived Products - Food Allergy Hives       Medications:  Current Facility-Administered Medications:     acetaminophen (TYLENOL) tablet 650 mg, 650 mg, Oral, Q6H PRN, Aury Mathias MD    calcium carbonate (TUMS) chewable tablet 1,000 mg, 1,000 mg, Oral, Daily PRN, Aury Mathias MD    diphenhydrAMINE (BENADRYL) injection 25 mg, 25 mg, Intravenous, Q8H PRN, Aury Mathias MD    docusate sodium (COLACE) capsule 100 mg, 100 mg, Oral, BID PRN, Aury Mathias MD    FLUoxetine (PROzac) capsule 40 mg, 40 mg, Oral, Daily, Aury Mathias MD    hydrOXYzine HCL (ATARAX) tablet 25 mg, 25 mg, Oral, Q6H PRN, Aury Mathias MD, 25 mg at 01/15/22 5679    melatonin tablet 3 mg, 3 mg, Oral, HS, Aury Mathias MD, 3 mg at 01/14/22 2205    metoclopramide (REGLAN) injection 10 mg, 10 mg, Intravenous, Q8H [address], Aury Mathias MD, 10 mg at 01/15/22 0549    nicotine (NICODERM CQ) 7 mg/24hr TD 24 hr patch 1 patch, 1 patch, Transdermal, Daily, Aury Mathias MD    ondansetron [facility]) injection 4 mg, 4 mg, Intravenous, Q6H PRN, Aury Mathias MD    pantoprazole (PROTONIX) EC tablet 40 mg, 40 mg, Oral, Daily Before Breakfast, Aury Mathias MD    sodium chloride 0 9 % infusion, 100 mL/hr, Intravenous, Continuous, Aury Mathias MD, Last Rate: 100 mL/hr at 01/14/22 2050, 100 mL/hr at 01/14/22 2050    sucralfate (CARAFATE) tablet 1 g, 1 g, Oral, 4x Daily, Riccardo Glavan MD, 1 g at 01/14/22 2205    Current Outpatient Medications:     FLUoxetine (PROzac) 40 MG capsule, Take 1 capsule (40 mg total) by mouth daily, Disp: 30 capsule, Rfl: 0    ibuprofen (MOTRIN) 400 mg tablet, Take 1 tablet (400 mg total) by mouth 2 (two) times a day, Disp: 10 tablet, Rfl: 0    pantoprazole (PROTONIX) 40 mg tablet, Take 1 tablet (40 mg total) by mouth daily, Disp: 30 tablet, Rfl: 1    albuterol (PROVENTIL HFA,VENTOLIN HFA) 90 mcg/act inhaler, Inhale 2 puffs every 6 (six) hours as needed for wheezing or shortness of breath, Disp: 1 Inhaler, Rfl: 5    butalbital-acetaminophen-caffeine (FIORICET,ESGIC) -40 mg per tablet, Take 1 tablet by mouth every 6 (six) hours as needed for headaches for up to 10 doses, Disp: 10 tablet, Rfl: 0    clotrimazole (LOTRIMIN) 1 % cream, Apply to affected area 2 times daily for 2-4 wks, Disp: 15 g, Rfl: 0    EPINEPHrine (EPIPEN) 0 3 mg/0 3 mL SOAJ, Inject 0 3 mL (0 3 mg total) into a muscle once for 1 dose, Disp: 2 each, Rfl: 1    fluticasone (FLOVENT HFA) 110 MCG/ACT inhaler, Inhale 2 puffs 2 (two) times a day Rinse mouth after use , Disp: 1 Inhaler, Rfl: 5    guaiFENesin (MUCINEX) 600 mg 12 hr tablet, Take 1 tablet (600 mg total) by mouth every 12 (twelve) hours, Disp: 14 tablet, Rfl: 0    hydrOXYzine HCL (ATARAX) 25 mg tablet, Take 1 tablet (25 mg total) by mouth every 6 (six) hours as needed for anxiety for up to 10 days, Disp: 30 tablet, Rfl: 0    meclizine (ANTIVERT) 25 mg tablet, Take 1 tablet (25 mg total) by mouth 3 (three) times a day as needed for dizziness for up to 30 doses (Patient not taking: Reported on 2/12/2021), Disp: 30 tablet, Rfl: 0    melatonin 3 mg, Take 1 tablet (3 mg total) by mouth daily at bedtime, Disp: 30 tablet, Rfl: 0    OLANZapine (ZyPREXA) 5 mg tablet, Take 5 mg by mouth daily at bedtime, Disp: , Rfl:     ondansetron (ZOFRAN) 4 mg tablet, Take 1 tablet (4 mg total) by mouth every 6 (six) hours, Disp: 12 tablet, Rfl: 0    ondansetron (ZOFRAN-ODT) 4 mg disintegrating tablet, Take 1 tablet (4 mg total) by mouth every 6 (six) hours as needed for nausea or vomiting, Disp: 20 tablet, Rfl: 0    pantoprazole (PROTONIX) 40 mg tablet, Take 1 tablet (40 mg total) by mouth daily for 15 days, Disp: 15 tablet, Rfl: 0    pantoprazole (PROTONIX) 40 mg tablet, Take 1 tablet (40 mg total) by mouth daily, Disp: 90 tablet, Rfl: 0    predniSONE 10 mg tablet, 4 po daily x2 days, then 3 po daily x2 days, then 2 po daily x2 days,then 1 po daily x2days (Patient not taking: Reported on 3/19/2021), Disp: 20 tablet, Rfl: 0    QUEtiapine (SEROquel) 100 mg tablet, Take 100 mg by mouth daily at bedtime (Patient not taking: Reported on 6/24/2021), Disp: , Rfl:     QUEtiapine (SEROquel) 50 mg tablet, Take 1 tablet (50 mg total) by mouth daily at bedtime for 14 days, Disp: 14 tablet, Rfl: 0    sucralfate (CARAFATE) 1 g tablet, Take 1 tablet (1 g total) by mouth 4 (four) times a day, Disp: 20 tablet, Rfl: 0    sucralfate (CARAFATE) 1 g tablet, Take 1 tablet (1 g total) by mouth 3 (three) times a day for 6 days, Disp: 18 tablet, Rfl: 0    Past Medical history:  Past Medical History:   Diagnosis Date    ADHD     Anxiety     Asthma     Bipolar 1 disorder ([address] )     Depression     Diabetes mellitus ([address] )     GERD (gastroesophageal reflux disease)     Mental and behavioral problem     Migraine     Palpitations     Last Assessed [id number]    Psychiatric disorder     Psychiatric illness     Psychosis ([address] )     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[address] )     Seizures ([address] )     last time 2 weeks ago- petit mal    Seizures ([address] )     Pseudoseizures    Stabbing chest pain     Last Assessed 29Nov2016    Suicide attempt [facility])     Tachycardia     Last Assessed 29Nov2016    Upper respiratory disease     Last Assessed 27Jan2016       Past Surgical History:   Past Surgical History: Procedure Laterality Date    KNEE SURGERY      MD LAP,TUBAL CAUTERY N/A 10/3/2018    Procedure: LAPAROSCOPIC TUBAL LIGATION;  Surgeon: Chrissie Hdz MD;  Location: [facility];  Service: Gynecology    TUBAL LIGATION         Family History:   Family History   Problem Relation Age of Onset    Migraines Sister     Cancer Mother     Diabetes Mother     Cancer Father     Diabetes Father     Arthritis Father     Cancer Maternal Grandmother     Cancer Maternal Aunt     Cancer Paternal Uncle     Diabetes Other        Social history:   Social History     Socioeconomic History    Marital status: /Civil Union     Spouse name: Not on file    Number of children: Not on file    Years of education: Not on file    Highest education level: Not on file   Occupational History    Not on file   Tobacco Use    Smoking status: Current Some Day Smoker     Packs/day: 0 25     Types: Cigarettes    Smokeless tobacco: Never Used   Vaping Use    Vaping Use: Former   Substance and Sexual Activity    Alcohol use: Not Currently    Drug use: Yes     Frequency: 1 0 times per week     Types: Marijuana    Sexual activity: Yes     Partners: Female, Male     Birth control/protection: Condom Male   Other Topics Concern    Not on file   Social History Narrative    ** Merged History Encounter **         Drug seeking  Pets/Animals: Dog  Sleeps 8-10 hours/day  Lack of exercise     Social Determinants of Health     Financial Resource Strain: Not on file   Food Insecurity: Not on file   Transportation Needs: Not on file   Physical Activity: Not on file   Stress: Not on file   Social Connections: Not on file   Intimate Partner Violence: Not on file   Housing Stability: Not on file       Review of Systems: All other systems were reviewed and were negative, otherwise please refer to HPI    Physical Exam: BP 92/53 (BP Location: Right arm)   Pulse 61   Temp 98 5 °F (36 9 °C) (Oral)   Resp 18   LMP 01/01/2022   SpO2 96% General Appearance:    Alert, cooperative, no distress, appears stated age   Head:    Normocephalic, without obvious abnormality, atraumatic   Eyes:    No scleral icterus           Mouth:  Mucosa moist   Neck:   Supple, symmetrical, trachea midline, no thyromegaly       Lungs:     Clear to auscultation bilaterally, respirations unlabored       Heart:    Regular rate and rhythm, S1 and S2 normal, no murmur, rub   or gallop     Abdomen:     Soft,+tender to palp of epigastrium, bowel sounds active all four quadrants,     no masses, no organomegaly   Genitalia:   deferred   Rectal:   deferred   Extremities:   Extremities normal,no cyanosis or edema       Skin:   Skin color, texture, turgor normal, no rashes or lesions       Neurologic:   Grossly intact, no focal deficit           Lab Results:   Recent Results (from the past 24 hour(s))   CBC and differential    Collection Time: 01/14/22  3:17 PM   Result Value Ref Range    WBC 15 90 (H) 4 31 - 10 16 Thousand/uL    RBC 4 47 3 81 - 5 12 Million/uL    Hemoglobin 13 1 11 5 - 15 4 g/dL    Hematocrit 39 6 34 8 - 46 1 %    MCV 89 82 - 98 fL    MCH 29 3 26 8 - 34 3 pg    MCHC 33 1 31 4 - 37 4 g/dL    RDW 13 2 11 6 - 15 1 %    MPV 9 5 8 9 - 12 7 fL    Platelets 719 252 - 463 Thousands/uL    nRBC 0 /100 WBCs    Neutrophils Relative 76 (H) 43 - 75 %    Immat GRANS % 1 0 - 2 %    Lymphocytes Relative 15 14 - 44 %    Monocytes Relative 8 4 - 12 %    Eosinophils Relative 0 0 - 6 %    Basophils Relative 0 0 - 1 %    Neutrophils Absolute 12 19 (H) 1 85 - 7 62 Thousands/µL    Immature Grans Absolute 0 10 0 00 - 0 20 Thousand/uL    Lymphocytes Absolute 2 30 0 60 - 4 47 Thousands/µL    Monocytes Absolute 1 28 (H) 0 17 - 1 22 Thousand/µL    Eosinophils Absolute 0 00 0 00 - 0 61 Thousand/µL    Basophils Absolute 0 03 0 00 - 0 10 Thousands/µL   Comprehensive metabolic panel    Collection Time: 01/14/22  3:17 PM   Result Value Ref Range    Sodium 139 136 - 145 mmol/L    Potassium 3 8 3 5 - 5 3 mmol/L    Chloride 108 100 - 108 mmol/L    CO2 21 21 - 32 mmol/L    ANION GAP 10 4 - 13 mmol/L    BUN 7 5 - 25 mg/dL    Creatinine 0 76 0 60 - 1 30 mg/dL    Glucose 102 65 - 140 mg/dL    Calcium 8 9 8 3 - 10 1 mg/dL    AST 20 5 - 45 U/L    ALT 22 12 - 78 U/L    Alkaline Phosphatase 61 46 - 116 U/L    Total Protein 7 4 6 4 - 8 2 g/dL    Albumin 3 6 3 5 - 5 0 g/dL    Total Bilirubin 0 25 0 20 - 1 00 mg/dL    eGFR 109 ml/min/1 73sq m   Lipase    Collection Time: 01/14/22  3:17 PM   Result Value Ref Range    Lipase 147 73 - 393 u/L   Hemoglobin and hematocrit, blood    Collection Time: 01/15/22  1:36 AM   Result Value Ref Range    Hemoglobin 12 2 11 5 - 15 4 g/dL    Hematocrit 36 6 34 8 - 46 1 %   Comprehensive metabolic panel    Collection Time: 01/15/22  5:50 AM   Result Value Ref Range    Sodium 143 136 - 145 mmol/L    Potassium 3 1 (L) 3 5 - 5 3 mmol/L    Chloride 110 (H) 100 - 108 mmol/L    CO2 24 21 - 32 mmol/L    ANION GAP 9 4 - 13 mmol/L    BUN 9 5 - 25 mg/dL    Creatinine 0 78 0 60 - 1 30 mg/dL    Glucose 91 65 - 140 mg/dL    Glucose, Fasting 91 65 - 99 mg/dL    Calcium 8 5 8 3 - 10 1 mg/dL    Corrected Calcium 9 0 8 3 - 10 1 mg/dL    AST 19 5 - 45 U/L    ALT 31 12 - 78 U/L    Alkaline Phosphatase 57 46 - 116 U/L    Total Protein 7 0 6 4 - 8 2 g/dL    Albumin 3 4 (L) 3 5 - 5 0 g/dL    Total Bilirubin 0 32 0 20 - 1 00 mg/dL    eGFR 105 ml/min/1 73sq m   CBC (With Platelets)    Collection Time: 01/15/22  5:50 AM   Result Value Ref Range    WBC 10 23 (H) 4 31 - 10 16 Thousand/uL    RBC 4 43 3 81 - 5 12 Million/uL    Hemoglobin 13 3 11 5 - 15 4 g/dL    Hematocrit 39 5 34 8 - 46 1 %    MCV 89 82 - 98 fL    MCH 30 0 26 8 - 34 3 pg    MCHC 33 7 31 4 - 37 4 g/dL    RDW 13 4 11 6 - 15 1 %    Platelets 732 165 - 124 Thousands/uL    MPV 9 3 8 9 - 12 7 fL   Protime-INR    Collection Time: 01/15/22  5:50 AM   Result Value Ref Range    Protime 13 3 11 6 - 14 5 seconds    INR 1 01 0 84 - 1 19       Imaging Studies: No results found  Assessment/Plan:   1) intractable nausea and vomiting  This is a 64-year-old female with recurrent ER visits due to intractable nausea and vomiting  Etiology could be related to cyclic vomiting versus cannabis hyperemesis although she denies frequent marijuana use  Advised cessation and explained to patient that this could possibly be causing these episodes  Patient with mild leukocytosis on admission which is improving which is likely reactive  She had multiple CT scans over the past year which were negative for any acute findings  Does report NSAID use but denies any recently  Cannot exclude concomitant PUD gastritis and/or esophagitis Patient did have hematemesis x2 at home which I suspect was a [place]-F[place] tear from profuse vomiting  Currently hemodynamically stable as well as stable hemoglobin, would recommend to start clear liquid diet at this time  Can continue PPI b i d  and Carafate as well as antiemetics  Ideally, will need an EGD but likely can be done next week as an outpatient if patient remains stable  Can follow serial H&H  Would recommend to continue Zofran and we can discontinue Reglan  Cannot exclude gastroparesis as there is history of diabetes noted in chart although does not appear patient is taking meds for this as outpt  Thank you for the consultation  Case was discussed with Dr Ira Gil

## 2022-01-15 NOTE — ASSESSMENT & PLAN NOTE
Patient reports 2 episodes of hematemesis in last 24 hours        Plan  · GI consult  · NPO  · H&H Q 8   · A m  CBC  · Transfuse if < 7 - needs consent

## 2022-01-15 NOTE — PLAN OF CARE
Problem: PAIN - ADULT  Goal: Verbalizes/displays adequate comfort level or baseline comfort level  Description: Interventions:  - Encourage patient to monitor pain and request assistance  - Assess pain using appropriate pain scale  - Administer analgesics based on type and severity of pain and evaluate response  - Implement non-pharmacological measures as appropriate and evaluate response  - Consider cultural and social influences on pain and pain management  - Notify physician/advanced practitioner if interventions unsuccessful or patient reports new pain  Outcome: Progressing     Problem: INFECTION - ADULT  Goal: Absence or prevention of progression during hospitalization  Description: INTERVENTIONS:  - Assess and monitor for signs and symptoms of infection  - Monitor lab/diagnostic results  - Monitor all insertion sites, i e  indwelling lines, tubes, and drains  - Monitor endotracheal if appropriate and nasal secretions for changes in amount and color  - [place] appropriate cooling/warming therapies per order  - Administer medications as ordered  - Instruct and encourage patient and family to use good hand hygiene technique  - Identify and instruct in appropriate isolation precautions for identified infection/condition  Outcome: Progressing     Problem: SAFETY ADULT  Goal: Patient will remain free of falls  Description: INTERVENTIONS:  - Educate patient/family on patient safety including physical limitations  - Instruct patient to call for assistance with activity   - Consult OT/PT to assist with strengthening/mobility   - Keep Call bell within reach  - Keep bed low and locked with side rails adjusted as appropriate  - Keep care items and personal belongings within reach  - Initiate and maintain comfort rounds  - Make Fall Risk Sign visible to staff  Problem: DISCHARGE PLANNING  Goal: Discharge to home or other facility with appropriate resources  Description: INTERVENTIONS:  - Identify barriers to discharge w/patient and caregiver  - Arrange for needed discharge resources and transportation as appropriate  - Identify discharge learning needs (meds, wound care, etc )  - Arrange for interpretive services to assist at discharge as needed  - Refer to Case Management Department for coordinating discharge planning if the patient needs post-hospital services based on physician/advanced practitioner order or complex needs related to functional status, cognitive ability, or social support system  Outcome: Progressing     Problem: Knowledge Deficit  Goal: Patient/family/caregiver demonstrates understanding of disease process, treatment plan, medications, and discharge instructions  Description: Complete learning assessment and assess knowledge base    Interventions:  - Provide teaching at level of understanding  - Provide teaching via preferred learning methods  Outcome: Progressing     - Apply yellow socks and bracelet for high fall risk patients  - Consider moving patient to room near nurses station  Outcome: Progressing

## 2022-01-15 NOTE — ASSESSMENT & PLAN NOTE
Patient reports smoking less than 1/4 pack per day      Plan   · Nicotine patch daily   · Encouraged smoking cessation

## 2022-01-16 NOTE — ASSESSMENT & PLAN NOTE
POA, patient has a h/o cyclic vomiting and marijuana use reports 1 day of vomiting with 2 episodes of hematemesis  No vomiting or hematemesis since admission and patient tolerated oral intake well  Likely secondary to cyclic vomiting vs  Marijuana hyperemesis vs  Gastritis with possible [place]-Ford tear from profuse vomiting  GI was consulted and appreciate input       Plan   · Continue Protonix  · Zofran p r n  for nausea/vomiting  · Outpatient EGD within 1 week per GI

## 2022-01-16 NOTE — ASSESSMENT & PLAN NOTE
POA patient reports 2 episodes of hematemesis in last 24 hours  GI was consulted and recommend outpatient EGD next week  Possibly due to [place]-F[place] tear secondary to intractable vomiting  Patient had no further hematemesis since presentation and hemoglobin remained stable       Plan  · GI consult  · NPO  · H&H Q 8   · A m  CBC  · Transfuse if < 7 - needs consent

## 2022-01-16 NOTE — ASSESSMENT & PLAN NOTE
Patient reports history of migraines for over 1 year  Patient reports migraine headaches since yesterday  Patient attempted to manage at home with Aleve, took 1 dose yesterday  On arrival to ED, was 10/10 improved to 2/10 on admission and resolved upon evaluation in the [facility]     Plan   · Benadryl p r n  25 mg IV   · Encouraged adequate oral hydration  · Avoiding Toradol due to report of hematemesis

## 2022-01-16 NOTE — ASSESSMENT & PLAN NOTE
POA:  Patient reported 1 day of intractable vomiting associated with 2 episodes of hematemesis and epigastric pain/cramping  No further vomiting or hematemesis since admission and patient tolerated oral intake well but continued to c/o epigastric pain  GI was consulted and recommends EGD as outpatient within 1 week  Possible mild gastritis vs  GERD, low clinical suspicion for gastric ulcers       Plan:  · Continue Carafate  · Continue Protonix  · Continue Zofran p r n  for nausea/vomiting  · Outpatient referral to GI for EGD planning

## 2022-01-16 NOTE — DISCHARGE SUMMARY
[address] [facility], 1995, 23 y o  female MRN: [id number]   Unit/Bed#: FT 04/FT 04 Encounter: [id number]   Primary Care Physician: Gregorio Martinez DO   Date and Time Admitted to Hospital: 1/14/2022  2:16 PM       * Intractable vomiting-resolved as of 1/15/2022  Assessment & Plan  POA, patient has a h/o cyclic vomiting and marijuana use reports 1 day of vomiting with 2 episodes of hematemesis  No vomiting or hematemesis since admission and patient tolerated oral intake well  Likely secondary to cyclic vomiting vs  Marijuana hyperemesis vs  Gastritis with possible [place]-Ford tear from profuse vomiting  GI was consulted and appreciate input  Plan   · Continue Protonix  · Zofran p r n  for nausea/vomiting  · Outpatient EGD within 1 week per GI    Epigastric pain  Assessment & Plan  POA:  Patient reported 1 day of intractable vomiting associated with 2 episodes of hematemesis and epigastric pain/cramping  No further vomiting or hematemesis since admission and patient tolerated oral intake well but continued to c/o epigastric pain  GI was consulted and recommends EGD as outpatient within 1 week  Possible mild gastritis vs  GERD, low clinical suspicion for gastric ulcers  Plan:  · Continue Carafate  · Continue Protonix  · Continue Zofran p r n  for nausea/vomiting  · Outpatient referral to GI for EGD planning      Migraine  Assessment & Plan  Patient reports history of migraines for over 1 year  Patient reports migraine headaches since yesterday  Patient attempted to manage at home with Aleve, took 1 dose yesterday  On arrival to ED, was 10/10 improved to 2/10 on admission and [facility]     Plan   · Benadryl p r n  25 mg IV   · Encouraged adequate oral hydration  · Avoiding Toradol due to report of hematemesis    Hematemesis-resolved as of 1/15/2022  Assessment & Plan  POA patient reports 2 episodes of hematemesis in last 24 hours  GI was consulted and recommend outpatient EGD next week  Possibly due to [place]-Ford tear secondary to intractable vomiting  Patient had no further hematemesis since presentation and hemoglobin remained stable  Plan  · GI consult  · NPO  · H&H Q 8   · A m  CBC  · Transfuse if < 7 - needs consent    Tobacco abuse  Assessment & Plan  Patient reports smoking less than 1/4 pack per day  Plan   · Nicotine patch daily   · Encouraged smoking cessation      SIRS (systemic inflammatory response syndrome) (HCC)-resolved as of 1/15/2022  Assessment & Plan  Met SIRS criteria with tachycardia and leukocytosis  Mild leukocytosis 10 23 with tachycardia resolved HR in 60s  Likely reactive leukocytosis in the setting of intractable vomiting vs mild gastritis  Plan   ·  mL/hr NS   · Monitor off of antibiotics   · A m  CBC  · Monitor VS        Medical Problems             Resolved Problems  Date Reviewed: 1/14/2022          Resolved    [facility] 1/15/2022     Resolved by  Niki Suarez MD    * (Principal) Intractable vomiting 1/15/2022     Resolved by  Niki Suarez MD    Hematemesis 1/15/2022     Resolved by  Niki Suarez MD              Discharging Resident: Niki Suarez MD  Discharging Attending: No att  providers found  PCP: Soy Maradiaga DO  Admission Date:   Admission Orders (From admission, onward)     Ordered        01/14/22 1740  Place in Observation  Once                      Discharge Date: 01/15/22    Consultations During Hospital Stay:  · Gastroenterology    Procedures Performed:   · None    Significant Findings / Test Results:   · None    Incidental Findings:   · None     Test Results Pending at Discharge (will require follow up):    · None      Outpatient Tests Requested:  · Outpatient EGD    Complications:  None    Reason for Admission:  Intractable vomiting    Hospital Course:   Morris Gill is a 23 y o  female patient who originally presented to the hospital on 1/14/2022 due to 1 day of intractable vomiting associated with epigastric pain and severe 10/10 migraine  Patient has multiple previous admissions for similar complaints  On presentation, patient was tachycardic with hypotension  Initial w/u was unremarkable except for mild leukocytosis  Patient was given Zyprexa and 500 mL IV NS bolus with improved BP and migraine headache with tachycardia resolved  She was also given Carafate, Zofran and Reglan p r n  with nausea controlled and no further vomiting since admission  Patient was initially kept NPO with GI was consulted and recommends not emergent outpatient EGD  She was advanced to clear liquid diet then to soft GI diet and tolerated oral intake well with further no nausea or vomiting but continued to have epigastric pain  Given that the patient is medically stable with intractable vomiting resolved, she will be discharged home today with referral to f/u with outpatient GI in 1 week for outpatient EGD  Patient was made aware of the discharge and agrees with the plan except that she was unhappy that we were unable to provide/set up transport for her outpatient follow-up appointment with GI  Please see above list of diagnoses and related plan for additional information  Condition at Discharge: Stable    Discharge Day Visit / Exam:   Subjective: Patient was seen at bedside this a m  in no acute distress  Patient reports feeling better today no further vomiting or migraines but continued to have epigastric pain/discomfort  Denies fever, chills, dizziness/lightheadedness, chest pain, palpitations, SOB, diarrhea, or urinary symptoms  Patient was able to tolerate oral intake well and remains medically stable with no significant events overnight       Vitals: Blood Pressure: 134/60 (01/15/22 1414)  Pulse: 100 (01/15/22 1414)  Temperature: 98 5 °F (36 9 °C) (01/14/22 1407)  Temp Source: Oral (01/14/22 2047)  Respirations: 16 (01/15/22 1414)  SpO2: 97 % (01/15/22 2658)    Physical Exam  Constitutional:       General: She is not in acute distress  Appearance: She is not ill-appearing or toxic-appearing  HENT:      Head: Normocephalic and atraumatic  Eyes:      Conjunctiva/sclera: Conjunctivae normal    Neck:      Trachea: Trachea normal    Cardiovascular:      Rate and Rhythm: Normal rate and regular rhythm  Pulses: Normal pulses  Heart sounds: Normal heart sounds  No murmur heard  No gallop  Pulmonary:      Effort: Pulmonary effort is normal       Breath sounds: Normal breath sounds  No wheezing, rhonchi or rales  Abdominal:      General: Bowel sounds are normal  There is no distension  Tenderness: There is abdominal tenderness in the epigastric area  Musculoskeletal:         General: No tenderness  Normal range of motion  Cervical back: Normal range of motion and neck supple  Right lower leg: No edema  Left lower leg: No edema  Skin:     General: Skin is warm and dry  Coloration: Skin is not cyanotic  Findings: No bruising, lesion or rash  Nails: There is no clubbing  Neurological:      General: No focal deficit present  Mental Status: She is alert and oriented to person, place, and time  Psychiatric:         Mood and Affect: Mood normal          Behavior: Behavior normal  Behavior is cooperative  Cognition and Memory: Cognition normal           Discussion with Family: Patient declined call to   Discharge instructions/Information to patient and family:   See after visit summary for information provided to patient and family  Provisions for Follow-Up Care:  See after visit summary for information related to follow-up care and any pertinent home health orders  Disposition:   Home    Planned Readmission: No     Discharge Medications:  See after visit summary for reconciled discharge medications provided to patient and/or family        **Please Note: This note may have been constructed using a voice recognition system**

## 2022-01-16 NOTE — ASSESSMENT & PLAN NOTE
Met SIRS criteria with tachycardia and leukocytosis  Mild leukocytosis 10 23 with tachycardia resolved HR in 60s  Likely reactive leukocytosis in the setting of intractable vomiting vs mild gastritis      Plan   ·  mL/hr NS   · Monitor off of antibiotics   · A m  CBC  · Monitor VS

## 2022-01-17 ENCOUNTER — TRANSITIONAL CARE MANAGEMENT (OUTPATIENT)
Dept: FAMILY MEDICINE CLINIC | Facility: CLINIC | Age: 27
End: 2022-01-17

## 2022-01-18 ENCOUNTER — TELEPHONE (OUTPATIENT)
Dept: PREADMISSION TESTING | Facility: HOSPITAL | Age: 27
End: 2022-01-18

## 2022-01-18 ENCOUNTER — TELEPHONE (OUTPATIENT)
Dept: GASTROENTEROLOGY | Facility: CLINIC | Age: 27
End: 2022-01-18

## 2022-01-18 NOTE — TELEPHONE ENCOUNTER
Per Dr Juana Campuzano, patient does not need to be seen in office  Would like patient to schedule EGD with him at [facility]   Thank you

## 2022-01-18 NOTE — TELEPHONE ENCOUNTER
Pt scheduled for EGD w/ Dr Tammy Pool 1/21/2022 @ [facility]/COVID test ordered for TODAY (OK per Arcelia/SARKIS in pre admission testing)

## 2022-01-18 NOTE — PRE-PROCEDURE INSTRUCTIONS
Pre-Surgery Instructions:   Medication Instructions    albuterol (PROVENTIL HFA,VENTOLIN HFA) 90 mcg/act inhaler Patient was instructed by Physician and understands   butalbital-acetaminophen-caffeine (FIORICET,ESGIC) -40 mg per tablet Patient was instructed by Physician and understands   calcium carbonate (TUMS) 500 mg chewable tablet Patient was instructed by Physician and understands   clotrimazole (LOTRIMIN) 1 % cream Patient was instructed by Physician and understands   EPINEPHrine (EPIPEN) 0 3 mg/0 3 mL SOAJ Patient was instructed by Physician and understands   FLUoxetine (PROzac) 40 MG capsule Patient was instructed by Physician and understands   fluticasone (FLOVENT HFA) 110 MCG/ACT inhaler Patient was instructed by Physician and understands   guaiFENesin (MUCINEX) 600 mg 12 hr tablet Patient was instructed by Physician and understands   hydrOXYzine HCL (ATARAX) 25 mg tablet Patient was instructed by Physician and understands   ibuprofen (MOTRIN) 400 mg tablet Patient was instructed by Physician and understands   melatonin 3 mg Patient was instructed by Physician and understands   OLANZapine (ZyPREXA) 5 mg tablet Patient was instructed by Physician and understands   ondansetron (ZOFRAN) 4 mg tablet Patient was instructed by Physician and understands   pantoprazole (PROTONIX) 40 mg tablet Patient was instructed by Physician and understands   potassium chloride (K-DUR,KLOR-CON) 20 mEq tablet Patient was instructed by Physician and understands   QUEtiapine (SEROquel) 100 mg tablet Patient was instructed by Physician and understands   sucralfate (CARAFATE) 1 g tablet Patient was instructed by Physician and understands  Pt to take seroquel and fluoxetine a m of procedure

## 2022-01-18 NOTE — ED ATTENDING ATTESTATION
1/14/2022  IDenice MD, saw and evaluated the patient  I have discussed the patient with the resident/non-physician practitioner and agree with the resident's/non-physician practitioner's findings, Plan of Care, and MDM as documented in the resident's/non-physician practitioner's note, except where noted  All available labs and Radiology studies were reviewed  I was present for key portions of any procedure(s) performed by the resident/non-physician practitioner and I was immediately available to provide assistance  At this point I agree with the current assessment done in the Emergency Department    I have conducted an independent evaluation of this patient a history and physical is as follows:SEE H AND P ABOVE- AGREE WITH RESIDENT TX PLAN/ DISPO    ED Course  ED Course as of 01/18/22 1645   Fri Jan 14, 2022   1421 Er md note- recent labs/ er vitis/ imaging reviewed by er md    1645 Er md note-  pt re-evaluated-- feels much improved          Critical Care Time  Procedures

## 2022-01-21 ENCOUNTER — HOSPITAL ENCOUNTER (OUTPATIENT)
Dept: GASTROENTEROLOGY | Facility: AMBULARY SURGERY CENTER | Age: 27
Setting detail: OUTPATIENT SURGERY
Discharge: HOME | End: 2022-01-21
Attending: INTERNAL MEDICINE | Admitting: INTERNAL MEDICINE
Payer: MEDICARE

## 2022-01-21 ENCOUNTER — ANESTHESIA (OUTPATIENT)
Dept: GASTROENTEROLOGY | Facility: AMBULARY SURGERY CENTER | Age: 27
End: 2022-01-21

## 2022-01-21 ENCOUNTER — ANESTHESIA EVENT (OUTPATIENT)
Dept: GASTROENTEROLOGY | Facility: AMBULARY SURGERY CENTER | Age: 27
End: 2022-01-21

## 2022-01-21 ENCOUNTER — TELEMEDICINE (OUTPATIENT)
Dept: FAMILY MEDICINE CLINIC | Facility: CLINIC | Age: 27
End: 2022-01-21
Payer: MEDICARE

## 2022-01-21 VITALS
WEIGHT: 205 LBS | HEART RATE: 76 BPM | HEIGHT: 64 IN | OXYGEN SATURATION: 100 % | TEMPERATURE: 96.5 F | RESPIRATION RATE: 18 BRPM | BODY MASS INDEX: 35 KG/M2 | DIASTOLIC BLOOD PRESSURE: 58 MMHG | SYSTOLIC BLOOD PRESSURE: 110 MMHG

## 2022-01-21 DIAGNOSIS — K21.9 GASTROESOPHAGEAL REFLUX DISEASE, UNSPECIFIED WHETHER ESOPHAGITIS PRESENT: ICD-10-CM

## 2022-01-21 DIAGNOSIS — R10.13 EPIGASTRIC PAIN: ICD-10-CM

## 2022-01-21 DIAGNOSIS — Z01.89 VISIT FOR LABORATORY TEST: Primary | ICD-10-CM

## 2022-01-21 DIAGNOSIS — R10.84 GENERALIZED ABDOMINAL PAIN: ICD-10-CM

## 2022-01-21 PROBLEM — E78.1 HYPERTRIGLYCERIDEMIA: Status: RESOLVED | Noted: 2018-11-01 | Resolved: 2022-01-21

## 2022-01-21 PROBLEM — F17.200 SMOKING: Status: ACTIVE | Noted: 2021-10-08

## 2022-01-21 PROBLEM — E11.9 DIABETES MELLITUS, TYPE 2 (HCC): Status: ACTIVE | Noted: 2022-01-21

## 2022-01-21 PROBLEM — J45.909 ASTHMA: Status: ACTIVE | Noted: 2022-01-21

## 2022-01-21 PROBLEM — IMO0001 SMOKING: Status: ACTIVE | Noted: 2021-10-08

## 2022-01-21 PROCEDURE — 43239 EGD BIOPSY SINGLE/MULTIPLE: CPT | Performed by: INTERNAL MEDICINE

## 2022-01-21 PROCEDURE — 88305 TISSUE EXAM BY PATHOLOGIST: CPT | Performed by: SPECIALIST

## 2022-01-21 PROCEDURE — 99442 PR PHYS/QHP TELEPHONE EVALUATION 11-20 MIN: CPT | Performed by: FAMILY MEDICINE

## 2022-01-21 RX ORDER — ACETAMINOPHEN,DIPHENHYDRAMINE HCL 500; 25 MG/1; MG/1
1 TABLET, FILM COATED ORAL
COMMUNITY

## 2022-01-21 RX ORDER — PROPOFOL 10 MG/ML
INJECTION, EMULSION INTRAVENOUS AS NEEDED
Status: DISCONTINUED | OUTPATIENT
Start: 2022-01-21 | End: 2022-01-21

## 2022-01-21 RX ORDER — SODIUM CHLORIDE, SODIUM LACTATE, POTASSIUM CHLORIDE, CALCIUM CHLORIDE 600; 310; 30; 20 MG/100ML; MG/100ML; MG/100ML; MG/100ML
75 INJECTION, SOLUTION INTRAVENOUS CONTINUOUS
Status: DISCONTINUED | OUTPATIENT
Start: 2022-01-21 | End: 2022-01-25 | Stop reason: HOSPADM

## 2022-01-21 RX ORDER — SODIUM CHLORIDE, SODIUM LACTATE, POTASSIUM CHLORIDE, CALCIUM CHLORIDE 600; 310; 30; 20 MG/100ML; MG/100ML; MG/100ML; MG/100ML
INJECTION, SOLUTION INTRAVENOUS CONTINUOUS PRN
Status: DISCONTINUED | OUTPATIENT
Start: 2022-01-21 | End: 2022-01-21

## 2022-01-21 RX ORDER — LIDOCAINE HYDROCHLORIDE 10 MG/ML
INJECTION, SOLUTION EPIDURAL; INFILTRATION; INTRACAUDAL; PERINEURAL AS NEEDED
Status: DISCONTINUED | OUTPATIENT
Start: 2022-01-21 | End: 2022-01-21

## 2022-01-21 RX ADMIN — PROPOFOL 250 MG: 10 INJECTION, EMULSION INTRAVENOUS at 09:44

## 2022-01-21 RX ADMIN — PROPOFOL 50 MG: 10 INJECTION, EMULSION INTRAVENOUS at 09:45

## 2022-01-21 RX ADMIN — PROPOFOL 50 MG: 10 INJECTION, EMULSION INTRAVENOUS at 09:46

## 2022-01-21 RX ADMIN — SODIUM CHLORIDE, SODIUM LACTATE, POTASSIUM CHLORIDE, AND CALCIUM CHLORIDE: .6; .31; .03; .02 INJECTION, SOLUTION INTRAVENOUS at 09:34

## 2022-01-21 RX ADMIN — LIDOCAINE HYDROCHLORIDE 50 MG: 10 INJECTION, SOLUTION EPIDURAL; INFILTRATION; INTRACAUDAL; PERINEURAL at 09:44

## 2022-01-21 NOTE — ANESTHESIA PREPROCEDURE EVALUATION
Procedure:  EGD    Relevant Problems   CARDIO   (+) Hypertriglyceridemia (Resolved)   (+) Migraine      ENDO   (+) Diabetes mellitus, type 2 (HCC) (pre-DM)   (+) Hypothyroidism      GI/HEPATIC   (+) Gastroesophageal reflux disease      MUSCULOSKELETAL   (+) Chronic low back pain      NEURO/PSYCH   (+) Anxiety   (+) Depression   (+) Migraine   (+) Seizure disorder (HCC)      PULMONARY   (+) Asthma   (+) Smoking      Other   (+) Attention deficit hyperactivity disorder, combined type   (+) Bipolar 1 disorder, mixed, severe (HCC)   (+) Overdose of antipsychotic, intentional self-harm, initial encounter ([facility] )   (+) S/P tubal ligation   (+) 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[facility] )        Physical Exam    Airway    Mallampati score: II  TM Distance: >3 FB  Neck ROM: full     Dental       Cardiovascular  Rhythm: regular, Rate: normal,     Pulmonary  Breath sounds clear to auscultation,     Other Findings        Anesthesia Plan  ASA Score- 2     Anesthesia Type- IV sedation with anesthesia with ASA Monitors  Additional Monitors:   Airway Plan:           Plan Factors-    Chart reviewed  Patient is a current smoker  Induction- intravenous  Postoperative Plan-     Informed Consent- Anesthetic plan and risks discussed with patient  I personally reviewed this patient with the CRNA  Discussed and agreed on the Anesthesia Plan with the CRNA  Mayuri Krishna

## 2022-01-21 NOTE — PROGRESS NOTES
It was my intent to perform this visit via video technology but the patient was not able to do a video connection so the visit was completed via audio telephone only  Virtual Brief Visit    Patient is located in the following state in which I hold an active license [place]      Assessment/Plan:    Problem List Items Addressed This Visit     None      Visit Diagnoses     Visit for laboratory test    -  Primary        Patient called to discuss her recent lab work from ER visit for bloody emesis earlier this week  · Lab work reviewed with patient, mild hypokalemia likely secondary to emesis  Patient advised to eat foods high in potassium  · EGD results reviewed, no significant abnormal findings to explain bloody emesis  · On chart review, patient noted to have history of hemorrhoids  Advised patient to follow-up with her gastroenterologist regarding blood in her stools  Patient may require hemorrhoid banding  · Continue all current medications  HPI   Patient a visit to discuss her recent lab results after being seen in the ER earlier this week for bloody emesis  Lab findings showed hypokalemia to 3 1, mild elevated chloride to 110  Remain being CMP unremarkable  She also has a mild leukocytosis, remain to her cbc normal   PT INR and lipase also normal    Patient was seen in the ER for reported episode of bloody emesis  She has since completed an EGD, which showed a small 1 cm hiatal hernia  Biopsies were taken and results are still pending  She also today complains of blood in her stool, has not had prior colonoscopy  Patient reports having bright red blood on toilet paper and mixed in with stool  Recent Visits  No visits were found meeting these conditions    Showing recent visits within past 7 days and meeting all other requirements  Today's Visits  Date Type Provider Dept   01/21/22 Telemedicine Tez Leong MD [facility]   Showing today's visits and meeting all other requirements  Future Appointments  No visits were found meeting these conditions    Showing future appointments within next 150 days and meeting all other requirements         I spent 15 minutes directly with the patient during this visit

## 2022-01-21 NOTE — ANESTHESIA POSTPROCEDURE EVALUATION
Post-Op Assessment Note    CV Status:  Stable  Pain Score: 0    Pain management: adequate     Mental Status:  Sleepy   Hydration Status:  Euvolemic   PONV Controlled:  Controlled   Airway Patency:  Patent      Post Op Vitals Reviewed: Yes      Staff: CRNA         No complications documented      BP   106/56   Temp     Pulse 82   Resp 18   SpO2 96

## 2022-02-18 ENCOUNTER — HOSPITAL ENCOUNTER (EMERGENCY)
Facility: HOSPITAL | Age: 27
Discharge: HOME | End: 2022-02-19
Attending: EMERGENCY MEDICINE | Admitting: EMERGENCY MEDICINE
Payer: MEDICARE

## 2022-02-18 VITALS
DIASTOLIC BLOOD PRESSURE: 63 MMHG | HEART RATE: 92 BPM | TEMPERATURE: 97.2 F | RESPIRATION RATE: 16 BRPM | OXYGEN SATURATION: 96 % | SYSTOLIC BLOOD PRESSURE: 137 MMHG

## 2022-02-18 DIAGNOSIS — S60.00XA FINGER CONTUSION: Primary | ICD-10-CM

## 2022-02-18 PROCEDURE — 99283 EMERGENCY DEPT VISIT LOW MDM: CPT

## 2022-02-18 RX ORDER — ACETAMINOPHEN 325 MG/1
650 TABLET ORAL ONCE
Status: COMPLETED | OUTPATIENT
Start: 2022-02-19 | End: 2022-02-19

## 2022-02-19 ENCOUNTER — APPOINTMENT (EMERGENCY)
Dept: RADIOLOGY | Facility: HOSPITAL | Age: 27
End: 2022-02-19
Payer: MEDICARE

## 2022-02-19 PROCEDURE — 73140 X-RAY EXAM OF FINGER(S): CPT

## 2022-02-19 PROCEDURE — 99282 EMERGENCY DEPT VISIT SF MDM: CPT | Performed by: EMERGENCY MEDICINE

## 2022-02-19 RX ADMIN — ACETAMINOPHEN 650 MG: 325 TABLET, FILM COATED ORAL at 00:03
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
## 2022-03-04 ENCOUNTER — TELEPHONE (OUTPATIENT)
Dept: NEUROLOGY | Facility: CLINIC | Age: 27
End: 2022-03-04

## 2022-03-04 NOTE — TELEPHONE ENCOUNTER
[facility] requesting the patient contact the office to reschedule the appointment on 04/26/22

## 2022-03-10 ENCOUNTER — APPOINTMENT (EMERGENCY)
Dept: RADIOLOGY | Facility: HOSPITAL | Age: 27
End: 2022-03-10
Payer: MEDICARE

## 2022-03-10 ENCOUNTER — HOSPITAL ENCOUNTER (EMERGENCY)
Facility: HOSPITAL | Age: 27
Discharge: HOME | End: 2022-03-11
Attending: EMERGENCY MEDICINE
Payer: MEDICARE

## 2022-03-10 VITALS
DIASTOLIC BLOOD PRESSURE: 59 MMHG | TEMPERATURE: 98.3 F | WEIGHT: 202 LBS | RESPIRATION RATE: 20 BRPM | OXYGEN SATURATION: 98 % | HEART RATE: 73 BPM | SYSTOLIC BLOOD PRESSURE: 126 MMHG | BODY MASS INDEX: 34.67 KG/M2

## 2022-03-10 DIAGNOSIS — R56.9 CONVULSIONS (HCC): Primary | ICD-10-CM

## 2022-03-10 DIAGNOSIS — K29.70 GASTRITIS: ICD-10-CM

## 2022-03-10 PROCEDURE — 71045 X-RAY EXAM CHEST 1 VIEW: CPT

## 2022-03-10 PROCEDURE — 99285 EMERGENCY DEPT VISIT HI MDM: CPT

## 2022-03-10 PROCEDURE — 99285 EMERGENCY DEPT VISIT HI MDM: CPT | Performed by: EMERGENCY MEDICINE

## 2022-03-10 PROCEDURE — 96374 THER/PROPH/DIAG INJ IV PUSH: CPT

## 2022-03-10 PROCEDURE — 93005 ELECTROCARDIOGRAM TRACING: CPT

## 2022-03-10 RX ORDER — LIDOCAINE HYDROCHLORIDE 20 MG/ML
15 SOLUTION OROPHARYNGEAL ONCE
Status: COMPLETED | OUTPATIENT
Start: 2022-03-10 | End: 2022-03-10

## 2022-03-10 RX ORDER — SUCRALFATE ORAL 1 G/10ML
1000 SUSPENSION ORAL ONCE
Status: DISCONTINUED | OUTPATIENT
Start: 2022-03-10 | End: 2022-03-10 | Stop reason: SDUPTHER

## 2022-03-10 RX ORDER — MAGNESIUM HYDROXIDE/ALUMINUM HYDROXICE/SIMETHICONE 120; 1200; 1200 MG/30ML; MG/30ML; MG/30ML
30 SUSPENSION ORAL ONCE
Status: COMPLETED | OUTPATIENT
Start: 2022-03-10 | End: 2022-03-10

## 2022-03-10 RX ORDER — SUCRALFATE 1 G/1
1 TABLET ORAL ONCE
Status: COMPLETED | OUTPATIENT
Start: 2022-03-10 | End: 2022-03-10

## 2022-03-10 RX ADMIN — LIDOCAINE HYDROCHLORIDE 15 ML: 20 SOLUTION ORAL; TOPICAL at 23:56

## 2022-03-10 RX ADMIN — SUCRALFATE 1 G: 1 TABLET ORAL at 23:58

## 2022-03-10 RX ADMIN — FAMOTIDINE 20 MG: 10 INJECTION INTRAVENOUS at 23:55

## 2022-03-10 RX ADMIN — ALUMINA, MAGNESIA, AND SIMETHICONE ORAL SUSPENSION REGULAR STRENGTH 30 ML: 1200; 1200; 120 SUSPENSION ORAL at 23:56

## 2022-03-11 LAB
ALBUMIN SERPL BCP-MCNC: 3.8 G/DL (ref 3.5–5)
ALP SERPL-CCNC: 61 U/L (ref 46–116)
ALT SERPL W P-5'-P-CCNC: 44 U/L (ref 12–78)
ANION GAP SERPL CALCULATED.3IONS-SCNC: 10 MMOL/L (ref 4–13)
AST SERPL W P-5'-P-CCNC: 21 U/L (ref 5–45)
ATRIAL RATE: 74 BPM
BASOPHILS # BLD AUTO: 0.05 THOUSANDS/ΜL (ref 0–0.1)
BASOPHILS NFR BLD AUTO: 0 % (ref 0–1)
BILIRUB DIRECT SERPL-MCNC: 0.08 MG/DL (ref 0–0.2)
BILIRUB SERPL-MCNC: 0.22 MG/DL (ref 0.2–1)
BUN SERPL-MCNC: 14 MG/DL (ref 5–25)
CALCIUM SERPL-MCNC: 8.4 MG/DL (ref 8.3–10.1)
CHLORIDE SERPL-SCNC: 102 MMOL/L (ref 100–108)
CK SERPL-CCNC: 55 U/L (ref 26–192)
CO2 SERPL-SCNC: 28 MMOL/L (ref 21–32)
CREAT SERPL-MCNC: 0.76 MG/DL (ref 0.6–1.3)
EOSINOPHIL # BLD AUTO: 0.61 THOUSAND/ΜL (ref 0–0.61)
EOSINOPHIL NFR BLD AUTO: 5 % (ref 0–6)
ERYTHROCYTE [DISTWIDTH] IN BLOOD BY AUTOMATED COUNT: 13.3 % (ref 11.6–15.1)
GFR SERPL CREATININE-BSD FRML MDRD: 109 ML/MIN/1.73SQ M
GLUCOSE SERPL-MCNC: 92 MG/DL (ref 65–140)
HCT VFR BLD AUTO: 41.7 % (ref 34.8–46.1)
HGB BLD-MCNC: 13.1 G/DL (ref 11.5–15.4)
IMM GRANULOCYTES # BLD AUTO: 0.07 THOUSAND/UL (ref 0–0.2)
IMM GRANULOCYTES NFR BLD AUTO: 1 % (ref 0–2)
LACTATE SERPL-SCNC: 0.5 MMOL/L (ref 0.5–2)
LIPASE SERPL-CCNC: 129 U/L (ref 73–393)
LYMPHOCYTES # BLD AUTO: 3.6 THOUSANDS/ΜL (ref 0.6–4.47)
LYMPHOCYTES NFR BLD AUTO: 30 % (ref 14–44)
MCH RBC QN AUTO: 29.6 PG (ref 26.8–34.3)
MCHC RBC AUTO-ENTMCNC: 31.4 G/DL (ref 31.4–37.4)
MCV RBC AUTO: 94 FL (ref 82–98)
MONOCYTES # BLD AUTO: 1.3 THOUSAND/ΜL (ref 0.17–1.22)
MONOCYTES NFR BLD AUTO: 11 % (ref 4–12)
NEUTROPHILS # BLD AUTO: 6.37 THOUSANDS/ΜL (ref 1.85–7.62)
NEUTS SEG NFR BLD AUTO: 53 % (ref 43–75)
NRBC BLD AUTO-RTO: 0 /100 WBCS
P AXIS: 57 DEGREES
PLATELET # BLD AUTO: 341 THOUSANDS/UL (ref 149–390)
PMV BLD AUTO: 8.7 FL (ref 8.9–12.7)
POTASSIUM SERPL-SCNC: 3.9 MMOL/L (ref 3.5–5.3)
PR INTERVAL: 144 MS
PROLACTIN SERPL-MCNC: 6.3 NG/ML
PROT SERPL-MCNC: 8 G/DL (ref 6.4–8.2)
QRS AXIS: 35 DEGREES
QRSD INTERVAL: 86 MS
QT INTERVAL: 368 MS
QTC INTERVAL: 408 MS
RBC # BLD AUTO: 4.42 MILLION/UL (ref 3.81–5.12)
SODIUM SERPL-SCNC: 140 MMOL/L (ref 136–145)
T WAVE AXIS: 41 DEGREES
VENTRICULAR RATE: 74 BPM
WBC # BLD AUTO: 12 THOUSAND/UL (ref 4.31–10.16)

## 2022-03-11 PROCEDURE — 83605 ASSAY OF LACTIC ACID: CPT | Performed by: EMERGENCY MEDICINE

## 2022-03-11 PROCEDURE — 85025 COMPLETE CBC W/AUTO DIFF WBC: CPT | Performed by: EMERGENCY MEDICINE

## 2022-03-11 PROCEDURE — 80053 COMPREHEN METABOLIC PANEL: CPT | Performed by: EMERGENCY MEDICINE

## 2022-03-11 PROCEDURE — 93010 ELECTROCARDIOGRAM REPORT: CPT | Performed by: INTERNAL MEDICINE

## 2022-03-11 PROCEDURE — 83690 ASSAY OF LIPASE: CPT | Performed by: EMERGENCY MEDICINE

## 2022-03-11 PROCEDURE — 36415 COLL VENOUS BLD VENIPUNCTURE: CPT | Performed by: EMERGENCY MEDICINE

## 2022-03-11 PROCEDURE — 82248 BILIRUBIN DIRECT: CPT | Performed by: EMERGENCY MEDICINE

## 2022-03-11 PROCEDURE — 82550 ASSAY OF CK (CPK): CPT | Performed by: EMERGENCY MEDICINE

## 2022-03-11 PROCEDURE — 84146 ASSAY OF PROLACTIN: CPT | Performed by: EMERGENCY MEDICINE

## 2022-03-11 NOTE — ED NOTES
Registration called and reported that bf said that pt is SOB again, pt O2 sat at this time 100%, no distress noted        Gaston Oconnor, SARKIS  03/10/22 2049
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
## 2022-03-11 NOTE — ED PROVIDER NOTES
Final Diagnosis:  1  Convulsions ([facility] [address] )    2  Gastritis        Chief Complaint   Patient presents with    Shortness of Breath     Arrived BLS to waiting room, states SOB today and had a seizure in the ambulance ( not reported by EMT ) patient is not post ictal     HPI  23year-old she presents with shortness of breath and stabbing chest pain middle of her chest after eating high fat food  She points towards her gallbladder  She reports she might had some prior biliary colic  Called EMS for shortness of breath and reports in route she was seizing though not described by EMS  They brought her to the waiting room where she says she continued to have seizures and then presents in the room  Did not have any postictal period  Has not had any seizures since she has been in the room  She presents with her significant other  She reports she has a seizure history not on any antiepileptic  Other than tubal ligation no prior abdominal surgeries  She has not vaccinated for COVID  She does have some rhinorrhea other URI signs  Declines COVID testing  No fevers at home satting on room air speaking full sentences no increased work of breathing  No productive cough  - No language barrier    - History obtained from patient  - There are no limitations to the history obtained  - Previous charting underwent limited review with attention to last ED visits, labs, ekgs, and prior imaging      PMH:   has a past medical history of ADHD, Anxiety, Asthma, Bipolar 1 disorder ([facility] ), Depression, Diabetes mellitus ([facility] ), GERD (gastroesophageal reflux disease), Intractable vomiting (1/14/2022), Mental and behavioral problem, Migraine, Palpitations, Psychiatric disorder, Psychiatric illness, Psychosis ([facility] ), 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[address] ), Seizures ([facility] ) (01/14/2022), Seizures ([facility] [address] ), SIRS (systemic inflammatory response syndrome) ([facility] ) (1/14/2022), Stabbing chest pain, Suicide attempt ([facility] ), Tachycardia, and Upper respiratory disease  PSH:   has a past surgical history that includes Knee surgery; pr lap,tubal cautery (N/A, 10/3/2018); and Tubal ligation  Social History:  noncontrib    ROS:    Pertinent positives/negatives:   Review of Systems     CONSTITUTIONAL:  No dizziness  No weakness  No unexpected weight loss  EYES:  No pain, erythema, or discharge  No loss of vision  ENT:  No tinnitus, decreased hearing  No epistaxis/purulent drainage  No voice change, airway closing, trismus  CARDIOVASCULAR:  No chest pain  No palpitations  No new lower extremity edema  RESPIRATORY:  No purulent cough  No hemoptysis  No dyspnea  No paroxysmal nocturnal dyspnea  No stridor, audible wheezing bedside  GASTROINTESTINAL:  Normal appetite  No vomiting, diarrhea  No pain  No bloating  No melena  GENITOURINARY:  No frequency, urgency, nocturia  No hematuria or dysuria  No discharge  No sores/adenopathy  MUSCULOSKELETAL:  No arthralgias or myalgias that are new  INTEGUMENTARY:  No swelling  No unexpected contusions  No abrasions  No lymphangitis  NEUROLOGIC:  No meningismus  No numbness of the extremities  No new focal weakness  No postural instability  PSYCHIATRIC:  No SI HI AVH  HEMATOLOGICAL:  No bleeding  No petechiae  No bruising  ALLERGIES:  No urticaria  No sudden abd cramping  No stridor  PE:     Physical exam highlights:   Physical Exam       Vitals:    03/10/22 2014 03/10/22 2220   BP: 131/67 126/59   BP Location: Left arm Right arm   Pulse: 94 73   Resp: 20 20   Temp: 98 3 °F (36 8 °C)    TempSrc: Tympanic    SpO2: 100% 98%   Weight: 91 6 kg (202 lb)      Vitals reviewed by me  Nursing note reviewed  Chaperone present for all sensitive exam   Const: No acute distress  Alert  Nontoxic  Not diaphoretic  HEENT: External ears normal  No protrusion drainage swelling  Nose normal  No drainage/traumatic deformity  MMM  Mouth with baseline/symmetric movement  No trismus  Eyes: No squinting  No icterus   Tracks through the room with normal EOM  No tearing/swelling/drainage  Neck: ROM normal  No rigidity  No meningismus  Cards: Rate as per vitals  Compared to monitor sinus unless documented above  Regular  Well perfused  Pulm: able to verbalize without additional effort  Effort and excursion normal  No disress  No audible wheezing/ stridor  Normal resp rate  Abd: No distension beyond baseline  No fluctuant wave  Patient without peritoneal pain with shifting/bumping the bed  MSK: ROM normal and baseline  No deformity  Skin: No new rashes visible  Well perfused  Neuro: Nonfocal  Baseline  CN grossly intact  Moving all four with coordination  Psych: Normal behavior and affect  A:  - Nursing note reviewed      Ddx and MDM  PE   percs out  Cholecystitis and pain with inspiration   No signs on labs  Gastritis/esophagitis   Treat symptoms  Pneumonia  Pneumothorax   Chest xr       Wells' Criteria for PE      Most Recent Value   Wells' Criteria for PE    Clinical signs and symptoms of DVT 0 Filed at: 03/10/2022 2228   PE is primary diagnosis or equally likely 0 Filed at: 03/10/2022 2228   HR >100 0 Filed at: 03/10/2022 2228   Immobilization at least 3 days or Surgery in the previous 4 weeks 0 Filed at: 03/10/2022 2228   Previous, objectively diagnosed PE or DVT 0 Filed at: 03/10/2022 2228   Hemoptysis 0 Filed at: 03/10/2022 2228   Malignancy with treatment within 6 months or palliative 0 Filed at: 03/10/2022 2228   Wells' Criteria Total 0 Filed at: 03/10/2022 2228         PERC Rule for PE      Most Recent Value   PERC Rule for PE    Age >=50 0 Filed at: 03/10/2022 2228   HR >=100 0 Filed at: 03/10/2022 2228   O2 Sat on room air < 95% 0 Filed at: 03/10/2022 2228   History of PE or DVT 0 Filed at: 03/10/2022 2228   Recent trauma or surgery 0 Filed at: 03/10/2022 2228   Hemoptysis 0 Filed at: 03/10/2022 2228   Exogenous estrogen 0 Filed at: 03/10/2022 2228   Unilateral leg swelling 0 Filed at: 03/10/2022 2228   PERC Rule for PE Results 0 Filed at: 03/10/2022 2228                  XR chest 1 view portable   ED Interpretation   No acute cardiopulmonary process        Orders Placed This Encounter   Procedures    COVID/FLU/RSV    XR chest 1 view portable    Comprehensive metabolic panel    CBC and differential    Bilirubin, direct    Lipase    Lactic acid, plasma    CK (with reflex to MB)    ECG 12 lead     Labs Reviewed   CBC AND DIFFERENTIAL - Abnormal       Result Value Ref Range Status    WBC 12 00 (*) 4 31 - 10 16 Thousand/uL Final    RBC 4 42  3 81 - 5 12 Million/uL Final    Hemoglobin 13 1  11 5 - 15 4 g/dL Final    Hematocrit 41 7  34 8 - 46 1 % Final    MCV 94  82 - 98 fL Final    MCH 29 6  26 8 - 34 3 pg Final    MCHC 31 4  31 4 - 37 4 g/dL Final    RDW 13 3  11 6 - 15 1 % Final    MPV 8 7 (*) 8 9 - 12 7 fL Final    Platelets 024  381 - 390 Thousands/uL Final    nRBC 0  /100 WBCs Final    Neutrophils Relative 53  43 - 75 % Final    Immat GRANS % 1  0 - 2 % Final    Lymphocytes Relative 30  14 - 44 % Final    Monocytes Relative 11  4 - 12 % Final    Eosinophils Relative 5  0 - 6 % Final    Basophils Relative 0  0 - 1 % Final    Neutrophils Absolute 6 37  1 85 - 7 62 Thousands/µL Final    Immature Grans Absolute 0 07  0 00 - 0 20 Thousand/uL Final    Lymphocytes Absolute 3 60  0 60 - 4 47 Thousands/µL Final    Monocytes Absolute 1 30 (*) 0 17 - 1 22 Thousand/µL Final    Eosinophils Absolute 0 61  0 00 - 0 61 Thousand/µL Final    Basophils Absolute 0 05  0 00 - 0 10 Thousands/µL Final   BILIRUBIN, DIRECT - Normal    Bilirubin, Direct 0 08  0 00 - 0 20 mg/dL Final   LIPASE - Normal    Lipase 129  73 - 393 u/L Final   LACTIC ACID, PLASMA - Normal    LACTIC ACID 0 5  0 5 - 2 0 mmol/L Final    Narrative:     Result may be elevated if tourniquet was used during collection     CK - Normal    Total CK 55  26 - 192 U/L Final   COVID19, INFLUENZA A/B, RSV PCR, [facility]   COMPREHENSIVE METABOLIC PANEL    Sodium 659  136 - 145 mmol/L Final Potassium 3 9  3 5 - 5 3 mmol/L Final    Chloride 102  100 - 108 mmol/L Final    CO2 28  21 - 32 mmol/L Final    ANION GAP 10  4 - 13 mmol/L Final    BUN 14  5 - 25 mg/dL Final    Creatinine 0 76  0 60 - 1 30 mg/dL Final    Comment: Standardized to IDMS reference method    Glucose 92  65 - 140 mg/dL Final    Comment: If the patient is fasting, the ADA then defines impaired fasting glucose as > 100 mg/dL and diabetes as > or equal to 123 mg/dL  Specimen collection should occur prior to Sulfasalazine administration due to the potential for falsely depressed results  Specimen collection should occur prior to Sulfapyridine administration due to the potential for falsely elevated results  Calcium 8 4  8 3 - 10 1 mg/dL Final    AST 21  5 - 45 U/L Final    Comment: Specimen collection should occur prior to Sulfasalazine administration due to the potential for falsely depressed results  ALT 44  12 - 78 U/L Final    Comment: Specimen collection should occur prior to Sulfasalazine administration due to the potential for falsely depressed results  Alkaline Phosphatase 61  46 - 116 U/L Final    Total Protein 8 0  6 4 - 8 2 g/dL Final    Albumin 3 8  3 5 - 5 0 g/dL Final    Total Bilirubin 0 22  0 20 - 1 00 mg/dL Final    Comment: Use of this assay is not recommended for patients undergoing treatment with eltrombopag due to the potential for falsely elevated results      eGFR 109  ml/min/1 73sq m Final    Narrative:     [place] guidelines for Chronic Kidney Disease (CKD):     Stage 1 with normal or high GFR (GFR > 90 mL/min/1 73 square meters)    Stage 2 Mild CKD (GFR = 60-89 mL/min/1 73 square meters)    Stage 3A Moderate CKD (GFR = 45-59 mL/min/1 73 square meters)    Stage 3B Moderate CKD (GFR = 30-44 mL/min/1 73 square meters)    Stage 4 Severe CKD (GFR = 15-29 mL/min/1 73 square meters)    Stage 5 End Stage CKD (GFR <15 mL/min/1 73 square meters)  Note: GFR calculation is accurate only with a steady state creatinine   PROLACTIN       Final Diagnosis:  1  Convulsions ([facility] [address] )    2  Gastritis        P:  - hospital tx includes   Medications   famotidine (PEPCID) injection 20 mg (20 mg Intravenous Given 3/10/22 2355)   Lidocaine Viscous HCl (XYLOCAINE) 2 % mucosal solution 15 mL (15 mL Swish & Spit Given 3/10/22 2356)   aluminum-magnesium hydroxide-simethicone (MYLANTA) oral suspension 30 mL (30 mL Oral Given 3/10/22 2356)   sucralfate (CARAFATE) tablet 1 g (1 g Oral Given 3/10/22 2358)         - disposition  Time reflects when diagnosis was documented in both MDM as applicable and the Disposition within this note     Time User Action Codes Description Comment    3/11/2022 12:52 AM Ralph Colder Add [R56 9] Convulsions ([facility] [address] )     3/11/2022 12:52 AM Ralph Colder Add [K29 70] Gastritis       ED Disposition     ED Disposition Condition Date/Time Comment    Discharge Stable Fri Mar 11, 2022 12:51 AM Jamaica Perera discharge to home/self care  Follow-up Information     Follow up With Specialties Details Why [address] [facility], DO Family Medicine   [address]  [address] [place] [id number]            - patient will call their PCP to let them know they were in the emergency department   We discuss return precautions       - additional tx intended, if consistent with primary provider:  - patient to follow with :      Discharge Medication List as of 3/11/2022  1:23 AM      CONTINUE these medications which have NOT CHANGED    Details   albuterol (PROVENTIL HFA,VENTOLIN HFA) 90 mcg/act inhaler Inhale 2 puffs every 6 (six) hours as needed for wheezing or shortness of breath, Starting Tue 2/9/2021, Normal      butalbital-acetaminophen-caffeine (FIORICET,ESGIC) -40 mg per tablet Take 1 tablet by mouth every 6 (six) hours as needed for headaches for up to 10 doses, Starting Sun 10/10/2021, Normal      calcium carbonate (TUMS) 500 mg chewable tablet Chew 2 tablets (1,000 mg total) daily as needed for indigestion or heartburn, Starting Sat 1/15/2022, Normal      diphenhydrAMINE-acetaminophen (TYLENOL PM)  MG TABS Take 1 tablet by mouth daily at bedtime as needed for sleep, Historical Med      EPINEPHrine (EPIPEN) 0 3 mg/0 3 mL SOAJ Inject 0 3 mL (0 3 mg total) into a muscle once for 1 dose, Starting Tue 2/9/2021, Normal      FLUoxetine (PROzac) 40 MG capsule Take 1 capsule (40 mg total) by mouth daily, Starting Mon 9/27/2021, Normal      fluticasone (FLOVENT HFA) 110 MCG/ACT inhaler Inhale 2 puffs 2 (two) times a day Rinse mouth after use , Starting Tue 2/9/2021, Normal      hydrOXYzine HCL (ATARAX) 25 mg tablet Take 1 tablet (25 mg total) by mouth every 6 (six) hours as needed for anxiety for up to 10 days, Starting Mon 9/27/2021, Until Fri 1/21/2022 at 2359, Normal      ibuprofen (MOTRIN) 400 mg tablet Take 1 tablet (400 mg total) by mouth 2 (two) times a day, Starting Wed 3/17/2021, Normal      melatonin 3 mg Take 1 tablet (3 mg total) by mouth daily at bedtime, Starting Sat 9/25/2021, Normal      OLANZapine (ZyPREXA) 5 mg tablet Take 5 mg by mouth daily at bedtime, Starting Fri 6/18/2021, Historical Med      ondansetron (ZOFRAN) 4 mg tablet Take 1 tablet (4 mg total) by mouth every 6 (six) hours, Starting Thu 1/13/2022, Normal      pantoprazole (PROTONIX) 40 mg tablet Take 1 tablet (40 mg total) by mouth daily, Starting Thu 1/13/2022, Normal      QUEtiapine (SEROquel) 100 mg tablet Take 100 mg by mouth daily at bedtime  , Historical Med      sucralfate (CARAFATE) 1 g tablet Take 1 tablet (1 g total) by mouth 4 (four) times a day, Starting Sat 9/18/2021, Normal           No discharge procedures on file  Prior to Admission Medications   Prescriptions Last Dose Informant Patient Reported? Taking?    EPINEPHrine (EPIPEN) 0 3 mg/0 3 mL SOAJ   No No   Sig: Inject 0 3 mL (0 3 mg total) into a muscle once for 1 dose   FLUoxetine (PROzac) 40 MG capsule   No No Sig: Take 1 capsule (40 mg total) by mouth daily   OLANZapine (ZyPREXA) 5 mg tablet   Yes No   Sig: Take 5 mg by mouth daily at bedtime   QUEtiapine (SEROquel) 100 mg tablet   Yes No   Sig: Take 100 mg by mouth daily at bedtime     albuterol (PROVENTIL HFA,VENTOLIN HFA) 90 mcg/act inhaler   No No   Sig: Inhale 2 puffs every 6 (six) hours as needed for wheezing or shortness of breath   butalbital-acetaminophen-caffeine (FIORICET,ESGIC) -40 mg per tablet   No No   Sig: Take 1 tablet by mouth every 6 (six) hours as needed for headaches for up to 10 doses   calcium carbonate (TUMS) 500 mg chewable tablet   No No   Sig: Chew 2 tablets (1,000 mg total) daily as needed for indigestion or heartburn   diphenhydrAMINE-acetaminophen (TYLENOL PM)  MG TABS   Yes No   Sig: Take 1 tablet by mouth daily at bedtime as needed for sleep   fluticasone (FLOVENT HFA) 110 MCG/ACT inhaler   No No   Sig: Inhale 2 puffs 2 (two) times a day Rinse mouth after use    hydrOXYzine HCL (ATARAX) 25 mg tablet   No No   Sig: Take 1 tablet (25 mg total) by mouth every 6 (six) hours as needed for anxiety for up to 10 days   ibuprofen (MOTRIN) 400 mg tablet   No No   Sig: Take 1 tablet (400 mg total) by mouth 2 (two) times a day   melatonin 3 mg   No No   Sig: Take 1 tablet (3 mg total) by mouth daily at bedtime   ondansetron (ZOFRAN) 4 mg tablet   No No   Sig: Take 1 tablet (4 mg total) by mouth every 6 (six) hours   pantoprazole (PROTONIX) 40 mg tablet   No No   Sig: Take 1 tablet (40 mg total) by mouth daily for 15 days   pantoprazole (PROTONIX) 40 mg tablet   No No   Sig: Take 1 tablet (40 mg total) by mouth daily   sucralfate (CARAFATE) 1 g tablet   No No   Sig: Take 1 tablet (1 g total) by mouth 4 (four) times a day      Facility-Administered Medications: None       Portions of the record may have been created with voice recognition software   Occasional wrong word or "sound a like" substitutions may have occurred due to the inherent limitations of voice recognition software  Read the chart carefully and recognize, using context, where substitutions have occurred      Electronically signed by:  Kenard Mohs, MD Eulalio Dredge, MD  03/11/22 [id number]

## 2022-03-15 ENCOUNTER — TELEPHONE (OUTPATIENT)
Dept: FAMILY MEDICINE CLINIC | Facility: CLINIC | Age: 27
End: 2022-03-15

## 2022-03-15 NOTE — TELEPHONE ENCOUNTER
Called patient about her question sent in my chart  Patient asked if prolactin levels indicate she could be pregnant  Explained to patient that prolactin is not used to test for pregnancy, and that her level was normal for someone who isn't pregnant

## 2022-04-17 ENCOUNTER — HOSPITAL ENCOUNTER (EMERGENCY)
Facility: HOSPITAL | Age: 27
Discharge: HOME | End: 2022-04-17
Attending: EMERGENCY MEDICINE
Payer: MEDICARE

## 2022-04-17 ENCOUNTER — APPOINTMENT (EMERGENCY)
Dept: RADIOLOGY | Facility: HOSPITAL | Age: 27
End: 2022-04-17
Payer: MEDICARE

## 2022-04-17 VITALS
TEMPERATURE: 99.5 F | HEART RATE: 95 BPM | SYSTOLIC BLOOD PRESSURE: 108 MMHG | DIASTOLIC BLOOD PRESSURE: 51 MMHG | RESPIRATION RATE: 18 BRPM | OXYGEN SATURATION: 98 %

## 2022-04-17 DIAGNOSIS — B34.9 VIRAL SYNDROME: Primary | ICD-10-CM

## 2022-04-17 LAB
ANION GAP SERPL CALCULATED.3IONS-SCNC: 8 MMOL/L (ref 4–13)
BASOPHILS # BLD AUTO: 0.03 THOUSANDS/ΜL (ref 0–0.1)
BASOPHILS NFR BLD AUTO: 0 % (ref 0–1)
BUN SERPL-MCNC: 8 MG/DL (ref 5–25)
CALCIUM SERPL-MCNC: 8.7 MG/DL (ref 8.4–10.2)
CHLORIDE SERPL-SCNC: 104 MMOL/L (ref 96–108)
CO2 SERPL-SCNC: 24 MMOL/L (ref 21–32)
CREAT SERPL-MCNC: 0.87 MG/DL (ref 0.6–1.3)
EOSINOPHIL # BLD AUTO: 0.18 THOUSAND/ΜL (ref 0–0.61)
EOSINOPHIL NFR BLD AUTO: 2 % (ref 0–6)
ERYTHROCYTE [DISTWIDTH] IN BLOOD BY AUTOMATED COUNT: 12.1 % (ref 11.6–15.1)
GFR SERPL CREATININE-BSD FRML MDRD: 92 ML/MIN/1.73SQ M
GLUCOSE SERPL-MCNC: 90 MG/DL (ref 65–140)
HCT VFR BLD AUTO: 40.1 % (ref 34.8–46.1)
HGB BLD-MCNC: 13.4 G/DL (ref 11.5–15.4)
IMM GRANULOCYTES # BLD AUTO: 0.02 THOUSAND/UL (ref 0–0.2)
IMM GRANULOCYTES NFR BLD AUTO: 0 % (ref 0–2)
LYMPHOCYTES # BLD AUTO: 0.55 THOUSANDS/ΜL (ref 0.6–4.47)
LYMPHOCYTES NFR BLD AUTO: 6 % (ref 14–44)
MCH RBC QN AUTO: 30.5 PG (ref 26.8–34.3)
MCHC RBC AUTO-ENTMCNC: 33.4 G/DL (ref 31.4–37.4)
MCV RBC AUTO: 91 FL (ref 82–98)
MONOCYTES # BLD AUTO: 0.69 THOUSAND/ΜL (ref 0.17–1.22)
MONOCYTES NFR BLD AUTO: 8 % (ref 4–12)
NEUTROPHILS # BLD AUTO: 7.45 THOUSANDS/ΜL (ref 1.85–7.62)
NEUTS SEG NFR BLD AUTO: 84 % (ref 43–75)
NRBC BLD AUTO-RTO: 0 /100 WBCS
PLATELET # BLD AUTO: 314 THOUSANDS/UL (ref 149–390)
PMV BLD AUTO: 8.9 FL (ref 8.9–12.7)
POTASSIUM SERPL-SCNC: 3.5 MMOL/L (ref 3.5–5.3)
RBC # BLD AUTO: 4.39 MILLION/UL (ref 3.81–5.12)
SODIUM SERPL-SCNC: 136 MMOL/L (ref 135–147)
WBC # BLD AUTO: 8.92 THOUSAND/UL (ref 4.31–10.16)

## 2022-04-17 PROCEDURE — 87636 SARSCOV2 & INF A&B AMP PRB: CPT | Performed by: EMERGENCY MEDICINE

## 2022-04-17 PROCEDURE — 85025 COMPLETE CBC W/AUTO DIFF WBC: CPT | Performed by: EMERGENCY MEDICINE

## 2022-04-17 PROCEDURE — 99285 EMERGENCY DEPT VISIT HI MDM: CPT | Performed by: EMERGENCY MEDICINE

## 2022-04-17 PROCEDURE — 71045 X-RAY EXAM CHEST 1 VIEW: CPT

## 2022-04-17 PROCEDURE — 99283 EMERGENCY DEPT VISIT LOW MDM: CPT

## 2022-04-17 PROCEDURE — 96374 THER/PROPH/DIAG INJ IV PUSH: CPT

## 2022-04-17 PROCEDURE — 36415 COLL VENOUS BLD VENIPUNCTURE: CPT | Performed by: EMERGENCY MEDICINE

## 2022-04-17 PROCEDURE — 80048 BASIC METABOLIC PNL TOTAL CA: CPT | Performed by: EMERGENCY MEDICINE

## 2022-04-17 PROCEDURE — 96361 HYDRATE IV INFUSION ADD-ON: CPT

## 2022-04-17 RX ORDER — ACETAMINOPHEN 325 MG/1
650 TABLET ORAL ONCE
Status: COMPLETED | OUTPATIENT
Start: 2022-04-17 | End: 2022-04-17

## 2022-04-17 RX ORDER — KETOROLAC TROMETHAMINE 30 MG/ML
30 INJECTION, SOLUTION INTRAMUSCULAR; INTRAVENOUS ONCE
Status: COMPLETED | OUTPATIENT
Start: 2022-04-17 | End: 2022-04-17

## 2022-04-17 RX ADMIN — SODIUM CHLORIDE 1000 ML: 0.9 INJECTION, SOLUTION INTRAVENOUS at 21:34

## 2022-04-17 RX ADMIN — ACETAMINOPHEN 650 MG: 325 TABLET ORAL at 21:22

## 2022-04-17 RX ADMIN — KETOROLAC TROMETHAMINE 30 MG: 30 INJECTION, SOLUTION INTRAMUSCULAR at 21:33

## 2022-04-18 NOTE — ED PROVIDER NOTES
History  Chief Complaint   Patient presents with    Fever - 9 weeks to 74 years     Pt presents to the ED with a fever, cough, and sore throat that started 1-2 days ago     This is a 58-year-old female presents the emergency department with a fever and body aches  Patient states this started yesterday  She has had chest pain associated with trouble breathing  She has full body aches  She denies nausea or vomiting  She denies urinary symptoms  She denies taking anything for relief of her symptoms  She did have COVID earlier in this year  She did not get a COVID vaccine or a flu vaccine this year  Prior to Admission Medications   Prescriptions Last Dose Informant Patient Reported? Taking?    EPINEPHrine (EPIPEN) 0 3 mg/0 3 mL SOAJ   No No   Sig: Inject 0 3 mL (0 3 mg total) into a muscle once for 1 dose   FLUoxetine (PROzac) 40 MG capsule   No No   Sig: Take 1 capsule (40 mg total) by mouth daily   OLANZapine (ZyPREXA) 5 mg tablet   Yes No   Sig: Take 5 mg by mouth daily at bedtime   QUEtiapine (SEROquel) 100 mg tablet   Yes No   Sig: Take 100 mg by mouth daily at bedtime     albuterol (PROVENTIL HFA,VENTOLIN HFA) 90 mcg/act inhaler   No No   Sig: Inhale 2 puffs every 6 (six) hours as needed for wheezing or shortness of breath   butalbital-acetaminophen-caffeine (FIORICET,ESGIC) -40 mg per tablet   No No   Sig: Take 1 tablet by mouth every 6 (six) hours as needed for headaches for up to 10 doses   calcium carbonate (TUMS) 500 mg chewable tablet   No No   Sig: Chew 2 tablets (1,000 mg total) daily as needed for indigestion or heartburn   diphenhydrAMINE-acetaminophen (TYLENOL PM)  MG TABS   Yes No   Sig: Take 1 tablet by mouth daily at bedtime as needed for sleep   fluticasone (FLOVENT HFA) 110 MCG/ACT inhaler   No No   Sig: Inhale 2 puffs 2 (two) times a day Rinse mouth after use    hydrOXYzine HCL (ATARAX) 25 mg tablet   No No   Sig: Take 1 tablet (25 mg total) by mouth every 6 (six) hours as needed for anxiety for up to 10 days   ibuprofen (MOTRIN) 400 mg tablet   No No   Sig: Take 1 tablet (400 mg total) by mouth 2 (two) times a day   melatonin 3 mg   No No   Sig: Take 1 tablet (3 mg total) by mouth daily at bedtime   ondansetron (ZOFRAN) 4 mg tablet   No No   Sig: Take 1 tablet (4 mg total) by mouth every 6 (six) hours   pantoprazole (PROTONIX) 40 mg tablet   No No   Sig: Take 1 tablet (40 mg total) by mouth daily for 15 days   pantoprazole (PROTONIX) 40 mg tablet   No No   Sig: Take 1 tablet (40 mg total) by mouth daily   sucralfate (CARAFATE) 1 g tablet   No No   Sig: Take 1 tablet (1 g total) by mouth 4 (four) times a day      Facility-Administered Medications: None       Past Medical History:   Diagnosis Date    ADHD     Anxiety     Asthma     Bipolar 1 disorder ([facility] )     Depression     Diabetes mellitus ([facility] )     GERD (gastroesophageal reflux disease)     Intractable vomiting 1/14/2022    Mental and behavioral problem     Migraine     Palpitations     Last Assessed [id number]    Psychiatric disorder     Psychiatric illness     Psychosis ([facility] )     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[facility] )     Seizures ([place] [id number] ) 01/14/2022    - petit mal    Seizures (HCC)     Pseudoseizures    SIRS (systemic inflammatory response syndrome) ([facility] ) 1/14/2022    Stabbing chest pain     Last Assessed 29Nov2016    Suicide attempt [facility])     Tachycardia     Last Assessed 29Nov2016    Upper respiratory disease     Last Assessed 27Jan2016       Past Surgical History:   Procedure Laterality Date    KNEE SURGERY      AK LAP,TUBAL CAUTERY N/A 10/3/2018    Procedure: LAPAROSCOPIC TUBAL LIGATION;  Surgeon: Brianna Bailey MD;  Location: [facility];  Service: Gynecology    TUBAL LIGATION         Family History   Problem Relation Age of Onset    Migraines Sister     Cancer Mother     Diabetes Mother     Cancer Father     Diabetes Father     Arthritis Father     Cancer Maternal Grandmother     Cancer Maternal Aunt     Cancer Paternal Uncle     Diabetes Other      I have reviewed and agree with the history as documented  E-Cigarette/Vaping    E-Cigarette Use Former User      E-Cigarette/Vaping Substances    Nicotine No     THC No     CBD No     Flavoring No     Other No     Unknown No      Social History     Tobacco Use    Smoking status: Current Some Day Smoker     Packs/day: 0 10     Types: Cigarettes    Smokeless tobacco: Never Used   Vaping Use    Vaping Use: Former   Substance Use Topics    Alcohol use: Not Currently    Drug use: Yes     Frequency: 1 0 times per week     Types: Marijuana, Methamphetamines     Comment: marijuana occasional, no meth at present        Review of Systems   All other systems reviewed and are negative        Physical Exam  Physical Exam  Constitutional:  Vital signs reviewed, patient appears nontoxic, no acute distress  Eyes: Pupils equal round reactive to light and accommodation, extraocular muscles intact  HEENT: trachea midline, no JVD, moist mucous membranes, uvula midline, no pharyngeal exudates, no tonsillar swelling, no erythema, no nuchal rigidity  Respiratory: lung sounds clear throughout, no rhonchi, no rales  Cardiovascular:  Tachycardic rate, regular rhythm, no murmurs or rubs  Abdomen: soft, nontender, nondistended, no rebound or guarding  Back: no CVA tenderness, normal inspection  Extremities: no edema, pulses equal in all 4 extremities  Neuro: awake, alert, oriented, no focal weakness  Skin: warm, dry, intact, no rashes noted    Vital Signs  ED Triage Vitals   Temperature Pulse Respirations Blood Pressure SpO2   04/17/22 2041 04/17/22 2042 04/17/22 2042 04/17/22 2042 04/17/22 2042   (!) 101 6 °F (38 7 °C) (!) 118 16 102/57 100 %      Temp Source Heart Rate Source Patient Position - Orthostatic VS BP Location FiO2 (%)   04/17/22 2041 04/17/22 2042 04/17/22 2042 04/17/22 2042 --   Oral Monitor Sitting Left arm       Pain Score       04/17/22 2042       10 - Worst Possible Pain           Vitals:    04/17/22 2042 04/17/22 2213   BP: 102/57 108/51   Pulse: (!) 118 95   Patient Position - Orthostatic VS: Sitting Lying         Visual Acuity      ED Medications  Medications   sodium chloride 0 9 % bolus 1,000 mL (0 mL Intravenous Stopped 4/17/22 2234)   ketorolac (TORADOL) injection 30 mg (30 mg Intravenous Given 4/17/22 2133)   acetaminophen (TYLENOL) tablet 650 mg (650 mg Oral Given 4/17/22 2122)       Diagnostic Studies  Results Reviewed     Procedure Component Value Units Date/Time    Basic metabolic panel [[id number]] Collected: 04/17/22 2129    Lab Status: Final result Specimen: Blood from Arm, Left Updated: 04/17/22 2155     Sodium 136 mmol/L      Potassium 3 5 mmol/L      Chloride 104 mmol/L      CO2 24 mmol/L      ANION GAP 8 mmol/L      BUN 8 mg/dL      Creatinine 0 87 mg/dL      Glucose 90 mg/dL      Calcium 8 7 mg/dL      eGFR 92 ml/min/1 73sq m     Narrative:      Kamran guidelines for Chronic Kidney Disease (CKD):     Stage 1 with normal or high GFR (GFR > 90 mL/min/1 73 square meters)    Stage 2 Mild CKD (GFR = 60-89 mL/min/1 73 square meters)    Stage 3A Moderate CKD (GFR = 45-59 mL/min/1 73 square meters)    Stage 3B Moderate CKD (GFR = 30-44 mL/min/1 73 square meters)    Stage 4 Severe CKD (GFR = 15-29 mL/min/1 73 square meters)    Stage 5 End Stage CKD (GFR <15 mL/min/1 73 square meters)  Note: GFR calculation is accurate only with a steady state creatinine    CBC and differential [[id number]]  (Abnormal) Collected: 04/17/22 2129    Lab Status: Final result Specimen: Blood from Arm, Left Updated: 04/17/22 2137     WBC 8 92 Thousand/uL      RBC 4 39 Million/uL      Hemoglobin 13 4 g/dL      Hematocrit 40 1 %      MCV 91 fL      MCH 30 5 pg      MCHC 33 4 g/dL      RDW 12 1 %      MPV 8 9 fL      Platelets 891 Thousands/uL      nRBC 0 /100 WBCs      Neutrophils Relative 84 %      Immat GRANS % 0 %      Lymphocytes Relative 6 % Monocytes Relative 8 %      Eosinophils Relative 2 %      Basophils Relative 0 %      Neutrophils Absolute 7 45 Thousands/µL      Immature Grans Absolute 0 02 Thousand/uL      Lymphocytes Absolute 0 55 Thousands/µL      Monocytes Absolute 0 69 Thousand/µL      Eosinophils Absolute 0 18 Thousand/µL      Basophils Absolute 0 03 Thousands/µL     COVID/FLU - 24 hour TAT [[id number]] Collected: 04/17/22 2129    Lab Status: In process Specimen: Nares from Nasopharyngeal Swab Updated: 04/17/22 2135                 XR chest 1 view portable   ED Interpretation by Young Lewis DO (04/17 2205)   No pneumonia or pneumothorax                 Procedures  Procedures         ED Course  ED Course as of 04/17/22 2301   Sun Apr 17, 2022 2205 The patient had a chest x-ray that was interpreted by me that shows no pneumonia or pneumothorax  2301 The patient was re-evaluated and felt much better  She no longer had a headache  she was no longer tachycardic or febrile  She is still pending a COVID and flu test   The patient will be discharged with follow-up to her primary care physician                                             MDM  Number of Diagnoses or Management Options  Diagnosis management comments: This is a 26-year-old female presented to the emergency department complaining of chest pain, shortness of breath, and a fever  I considered pneumonia, viral illness, COVID  These and other diagnoses were considered  The patient does have an allergy to naproxen  I discussed at length with the patient whether she was allergic to Toradol, which she states she has not         Amount and/or Complexity of Data Reviewed  Clinical lab tests: ordered and reviewed  Tests in the radiology section of CPT®: reviewed and ordered  Decide to obtain previous medical records or to obtain history from someone other than the patient: yes        Disposition  Final diagnoses:   Viral syndrome     Time reflects when diagnosis was documented in both MDM as applicable and the Disposition within this note     Time User Action Codes Description Comment    4/17/2022 10:19 PM Blanca Radford Add [B34 9] Viral syndrome       ED Disposition     ED Disposition Condition Date/Time Comment    Discharge Stable Sun Apr 17, 2022 10:19 PM [place] Kilgore discharge to home/self care              Follow-up Information     Follow up With Specialties Details Why Contact Info Additional Information    Mehran Medrano, DO Family Medicine In 2 days  [address] [facility] Emergency Department Emergency Medicine  If symptoms worsen [address] [facility],[address] [facility] Emergency Department, [address], [address]          Discharge Medication List as of 4/17/2022 10:19 PM      CONTINUE these medications which have NOT CHANGED    Details   albuterol (PROVENTIL HFA,VENTOLIN HFA) 90 mcg/act inhaler Inhale 2 puffs every 6 (six) hours as needed for wheezing or shortness of breath, Starting Tue 2/9/2021, Normal      butalbital-acetaminophen-caffeine (FIORICET,ESGIC) -40 mg per tablet Take 1 tablet by mouth every 6 (six) hours as needed for headaches for up to 10 doses, Starting Sun 10/10/2021, Normal      calcium carbonate (TUMS) 500 mg chewable tablet Chew 2 tablets (1,000 mg total) daily as needed for indigestion or heartburn, Starting Sat 1/15/2022, Normal      diphenhydrAMINE-acetaminophen (TYLENOL PM)  MG TABS Take 1 tablet by mouth daily at bedtime as needed for sleep, Historical Med      EPINEPHrine (EPIPEN) 0 3 mg/0 3 mL SOAJ Inject 0 3 mL (0 3 mg total) into a muscle once for 1 dose, Starting Tue 2/9/2021, Normal      FLUoxetine (PROzac) 40 MG capsule Take 1 capsule (40 mg total) by mouth daily, Starting Mon 9/27/2021, Normal      fluticasone (FLOVENT HFA) 110 MCG/ACT inhaler Inhale 2 puffs 2 (two) times a day Rinse mouth after use , Starting Tue 2/9/2021, Normal      hydrOXYzine HCL (ATARAX) 25 mg tablet Take 1 tablet (25 mg total) by mouth every 6 (six) hours as needed for anxiety for up to 10 days, Starting Mon 9/27/2021, Until Fri 1/21/2022 at 2359, Normal      ibuprofen (MOTRIN) 400 mg tablet Take 1 tablet (400 mg total) by mouth 2 (two) times a day, Starting Wed 3/17/2021, Normal      melatonin 3 mg Take 1 tablet (3 mg total) by mouth daily at bedtime, Starting Sat 9/25/2021, Normal      OLANZapine (ZyPREXA) 5 mg tablet Take 5 mg by mouth daily at bedtime, Starting Fri 6/18/2021, Historical Med      ondansetron (ZOFRAN) 4 mg tablet Take 1 tablet (4 mg total) by mouth every 6 (six) hours, Starting Thu 1/13/2022, Normal      pantoprazole (PROTONIX) 40 mg tablet Take 1 tablet (40 mg total) by mouth daily, Starting Thu 1/13/2022, Normal      QUEtiapine (SEROquel) 100 mg tablet Take 100 mg by mouth daily at bedtime  , Historical Med      sucralfate (CARAFATE) 1 g tablet Take 1 tablet (1 g total) by mouth 4 (four) times a day, Starting Sat 9/18/2021, Normal             No discharge procedures on file      PDMP Review     None          ED Provider  Electronically Signed by           Kavitha Estrada DO  04/17/22 5823

## 2022-04-19 LAB
FLUAV RNA RESP QL NAA+PROBE: POSITIVE
FLUBV RNA RESP QL NAA+PROBE: NEGATIVE
SARS-COV-2 RNA RESP QL NAA+PROBE: NEGATIVE

## 2022-05-24 ENCOUNTER — HOSPITAL ENCOUNTER (EMERGENCY)
Facility: HOSPITAL | Age: 27
Discharge: HOME | End: 2022-05-24
Attending: EMERGENCY MEDICINE | Admitting: EMERGENCY MEDICINE
Payer: MEDICARE

## 2022-05-24 VITALS
TEMPERATURE: 98.5 F | HEART RATE: 88 BPM | RESPIRATION RATE: 18 BRPM | DIASTOLIC BLOOD PRESSURE: 62 MMHG | SYSTOLIC BLOOD PRESSURE: 102 MMHG | BODY MASS INDEX: 34.67 KG/M2 | WEIGHT: 202 LBS | OXYGEN SATURATION: 100 %

## 2022-05-24 DIAGNOSIS — M79.605 LEFT LEG PAIN: Primary | ICD-10-CM

## 2022-05-24 LAB
ANION GAP SERPL CALCULATED.3IONS-SCNC: 7 MMOL/L (ref 4–13)
BUN SERPL-MCNC: 11 MG/DL (ref 5–25)
CALCIUM SERPL-MCNC: 9.8 MG/DL (ref 8.4–10.2)
CHLORIDE SERPL-SCNC: 104 MMOL/L (ref 96–108)
CO2 SERPL-SCNC: 27 MMOL/L (ref 21–32)
CREAT SERPL-MCNC: 0.85 MG/DL (ref 0.6–1.3)
D DIMER PPP FEU-MCNC: 0.28 UG/ML FEU
GFR SERPL CREATININE-BSD FRML MDRD: 95 ML/MIN/1.73SQ M
GLUCOSE SERPL-MCNC: 84 MG/DL (ref 65–140)
POTASSIUM SERPL-SCNC: 3.6 MMOL/L (ref 3.5–5.3)
SODIUM SERPL-SCNC: 138 MMOL/L (ref 135–147)

## 2022-05-24 PROCEDURE — 80048 BASIC METABOLIC PNL TOTAL CA: CPT | Performed by: EMERGENCY MEDICINE

## 2022-05-24 PROCEDURE — 36415 COLL VENOUS BLD VENIPUNCTURE: CPT | Performed by: EMERGENCY MEDICINE

## 2022-05-24 PROCEDURE — 99282 EMERGENCY DEPT VISIT SF MDM: CPT | Performed by: EMERGENCY MEDICINE

## 2022-05-24 PROCEDURE — 85379 FIBRIN DEGRADATION QUANT: CPT | Performed by: EMERGENCY MEDICINE

## 2022-05-24 PROCEDURE — 99283 EMERGENCY DEPT VISIT LOW MDM: CPT

## 2022-05-24 NOTE — ED PROVIDER NOTES
History  Chief Complaint   Patient presents with    Leg Pain     Pt states her R leg has been hurting, is swollen, and is warm to the touch  To er with concern for leg swelling and pain with walking  Both legs are reported to bother her when walking but much more on the right as per pt  No cp, sob  No redness, falls, trauma, open wounds  Prior to Admission Medications   Prescriptions Last Dose Informant Patient Reported? Taking?    EPINEPHrine (EPIPEN) 0 3 mg/0 3 mL SOAJ   No No   Sig: Inject 0 3 mL (0 3 mg total) into a muscle once for 1 dose   FLUoxetine (PROzac) 40 MG capsule   No No   Sig: Take 1 capsule (40 mg total) by mouth daily   OLANZapine (ZyPREXA) 5 mg tablet   Yes No   Sig: Take 5 mg by mouth daily at bedtime   QUEtiapine (SEROquel) 100 mg tablet   Yes No   Sig: Take 100 mg by mouth daily at bedtime     albuterol (PROVENTIL HFA,VENTOLIN HFA) 90 mcg/act inhaler   No No   Sig: Inhale 2 puffs every 6 (six) hours as needed for wheezing or shortness of breath   butalbital-acetaminophen-caffeine (FIORICET,ESGIC) -40 mg per tablet   No No   Sig: Take 1 tablet by mouth every 6 (six) hours as needed for headaches for up to 10 doses   calcium carbonate (TUMS) 500 mg chewable tablet   No No   Sig: Chew 2 tablets (1,000 mg total) daily as needed for indigestion or heartburn   diphenhydrAMINE-acetaminophen (TYLENOL PM)  MG TABS   Yes No   Sig: Take 1 tablet by mouth daily at bedtime as needed for sleep   fluticasone (FLOVENT HFA) 110 MCG/ACT inhaler   No No   Sig: Inhale 2 puffs 2 (two) times a day Rinse mouth after use    hydrOXYzine HCL (ATARAX) 25 mg tablet   No No   Sig: Take 1 tablet (25 mg total) by mouth every 6 (six) hours as needed for anxiety for up to 10 days   ibuprofen (MOTRIN) 400 mg tablet   No No   Sig: Take 1 tablet (400 mg total) by mouth 2 (two) times a day   melatonin 3 mg   No No   Sig: Take 1 tablet (3 mg total) by mouth daily at bedtime   ondansetron (ZOFRAN) 4 mg tablet   No No   Sig: Take 1 tablet (4 mg total) by mouth every 6 (six) hours   pantoprazole (PROTONIX) 40 mg tablet   No No   Sig: Take 1 tablet (40 mg total) by mouth daily for 15 days   pantoprazole (PROTONIX) 40 mg tablet   No No   Sig: Take 1 tablet (40 mg total) by mouth daily   sucralfate (CARAFATE) 1 g tablet   No No   Sig: Take 1 tablet (1 g total) by mouth 4 (four) times a day      Facility-Administered Medications: None       Past Medical History:   Diagnosis Date    ADHD     Anxiety     Asthma     Bipolar 1 disorder ([facility] )     Depression     Diabetes mellitus ([place] [address] )     GERD (gastroesophageal reflux disease)     Intractable vomiting 1/14/2022    Mental and behavioral problem     Migraine     Palpitations     Last Assessed [id number]    Psychiatric disorder     Psychiatric illness     Psychosis ([place] [address] )     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[address] )     Seizures ([place] [address] ) 01/14/2022    - petit mal    Seizures ([place] [address] )     Pseudoseizures    SIRS (systemic inflammatory response syndrome) ([place] [address] ) 1/14/2022    Stabbing chest pain     Last Assessed 29Nov2016    Suicide attempt [facility])     Tachycardia     Last Assessed 29Nov2016    Upper respiratory disease     Last Assessed 27Jan2016       Past Surgical History:   Procedure Laterality Date    KNEE SURGERY      NC LAP,TUBAL CAUTERY N/A 10/3/2018    Procedure: LAPAROSCOPIC TUBAL LIGATION;  Surgeon: Lauren العراقي MD;  Location: [address] [place] [address];  Service: Gynecology    TUBAL LIGATION         Family History   Problem Relation Age of Onset    Migraines Sister     Cancer Mother     Diabetes Mother     Cancer Father     Diabetes Father     Arthritis Father     Cancer Maternal Grandmother     Cancer Maternal Aunt     Cancer Paternal Uncle     Diabetes Other      I have reviewed and agree with the history as documented      E-Cigarette/Vaping    E-Cigarette Use Former User      E-Cigarette/Vaping Substances    Nicotine No     THC No     CBD No     Flavoring No     Other No     Unknown No      Social History     Tobacco Use    Smoking status: Current Some Day Smoker     Packs/day: 0 10     Types: Cigarettes    Smokeless tobacco: Never Used   Vaping Use    Vaping Use: Former   Substance Use Topics    Alcohol use: Not Currently    Drug use: Yes     Frequency: 1 0 times per week     Types: Marijuana     Comment: marijuana occasional, no meth at present        Review of Systems   All other systems reviewed and are negative  Physical Exam  Physical Exam  Constitutional:       General: She is not in acute distress  Appearance: Normal appearance  She is well-developed  She is not ill-appearing, toxic-appearing or diaphoretic  HENT:      Head: Normocephalic and atraumatic  Right Ear: External ear normal       Left Ear: External ear normal       Mouth/Throat:      Mouth: Mucous membranes are moist    Eyes:      General:         Right eye: No discharge  Left eye: No discharge  Pupils: Pupils are equal, round, and reactive to light  Neck:      Vascular: No JVD  Cardiovascular:      Rate and Rhythm: Normal rate and regular rhythm  Pulses: Normal pulses  Heart sounds: Normal heart sounds  No murmur heard  No friction rub  No gallop  Pulmonary:      Effort: Pulmonary effort is normal  No respiratory distress  Breath sounds: Normal breath sounds  No stridor  No wheezing, rhonchi or rales  Chest:      Chest wall: No tenderness  Abdominal:      General: Abdomen is flat  Bowel sounds are normal  There is no distension  Palpations: Abdomen is soft  There is no mass  Tenderness: There is no abdominal tenderness  There is no guarding or rebound  Hernia: No hernia is present  Musculoskeletal:         General: No swelling, tenderness, deformity or signs of injury  Normal range of motion  Cervical back: Normal range of motion and neck supple  Right lower leg: No edema  Left lower leg: No edema  Comments: bl well perfused legs with intact pedal pulses  No swelling, erythremia, is noted to either leg  Skin:     General: Skin is warm  Capillary Refill: Capillary refill takes less than 2 seconds  Coloration: Skin is not jaundiced or pale  Findings: No bruising, erythema, lesion or rash  Neurological:      General: No focal deficit present  Mental Status: She is alert and oriented to person, place, and time  Mental status is at baseline  Cranial Nerves: No cranial nerve deficit  Sensory: No sensory deficit  Motor: No weakness or abnormal muscle tone        Coordination: Coordination normal       Gait: Gait normal       Deep Tendon Reflexes: Reflexes normal    Psychiatric:         Mood and Affect: Mood normal          Vital Signs  ED Triage Vitals   Temperature Pulse Respirations Blood Pressure SpO2   05/24/22 1754 05/24/22 1739 05/24/22 1739 05/24/22 1739 05/24/22 1739   98 5 °F (36 9 °C) 88 18 102/62 100 %      Temp Source Heart Rate Source Patient Position - Orthostatic VS BP Location FiO2 (%)   05/24/22 1754 05/24/22 1739 05/24/22 1739 05/24/22 1739 --   Oral Monitor Lying Right arm       Pain Score       --                  Vitals:    05/24/22 1739   BP: 102/62   Pulse: 88   Patient Position - Orthostatic VS: Lying         Visual Acuity      ED Medications  Medications - No data to display    Diagnostic Studies  Results Reviewed     Procedure Component Value Units Date/Time    Basic metabolic panel [[id number]] Collected: 05/24/22 1750    Lab Status: Final result Specimen: Blood from Arm, Left Updated: 05/24/22 1818     Sodium 138 mmol/L      Potassium 3 6 mmol/L      Chloride 104 mmol/L      CO2 27 mmol/L      ANION GAP 7 mmol/L      BUN 11 mg/dL      Creatinine 0 85 mg/dL      Glucose 84 mg/dL      Calcium 9 8 mg/dL      eGFR 95 ml/min/1 73sq m     Narrative:      [place] guidelines for Chronic Kidney Disease (CKD):     Stage 1 with normal or high GFR (GFR > 90 mL/min/1 73 square meters)    Stage 2 Mild CKD (GFR = 60-89 mL/min/1 73 square meters)    Stage 3A Moderate CKD (GFR = 45-59 mL/min/1 73 square meters)    Stage 3B Moderate CKD (GFR = 30-44 mL/min/1 73 square meters)    Stage 4 Severe CKD (GFR = 15-29 mL/min/1 73 square meters)    Stage 5 End Stage CKD (GFR <15 mL/min/1 73 square meters)  Note: GFR calculation is accurate only with a steady state creatinine    D-Dimer [[id number]]  (Normal) Collected: 05/24/22 1750    Lab Status: Final result Specimen: Blood from Arm, Left Updated: 05/24/22 1807     D-Dimer, Quant 0 28 ug/ml FEU                  No orders to display              Procedures  Procedures         ED Course                               SBIRT 20yo+    Flowsheet Row Most Recent Value   SBIRT (25 yo +)    In order to provide better care to our patients, we are screening all of our patients for alcohol and drug use  Would it be okay to ask you these screening questions? No Filed at: 05/24/2022 1811                    MDM  Number of Diagnoses or Management Options  Diagnosis management comments: To er with bl leg pain and weakness with walking, subjective swelling to the right leg only  D dimer negative  Bmp reassuring  Walking well  Well perfused  Strong motor  Spine nt  She will fu with pcp  I have discussed all red flags, need for fu and when to return to er and she has voiced understanding  Disposition  Final diagnoses:   Left leg pain     Time reflects when diagnosis was documented in both MDM as applicable and the Disposition within this note     Time User Action Codes Description Comment    5/24/2022  6:31 PM Kitty Osuna Add [B53 [id number]] Left leg pain       ED Disposition     ED Disposition   Discharge    Condition   Stable    Date/Time   Tue May 24, 2022  6:31 PM    Comment   [place] Manrique discharge to home/self care                 Follow-up Information     Follow up With Specialties Details Why Contact Info Sally Mcdaniels,  Family Medicine Schedule an appointment as soon as possible for a visit in 3 days  [address]  [facility]  [phone]            Discharge Medication List as of 5/24/2022  6:31 PM      CONTINUE these medications which have NOT CHANGED    Details   albuterol (PROVENTIL HFA,VENTOLIN HFA) 90 mcg/act inhaler Inhale 2 puffs every 6 (six) hours as needed for wheezing or shortness of breath, Starting Tue 2/9/2021, Normal      butalbital-acetaminophen-caffeine (FIORICET,ESGIC) -40 mg per tablet Take 1 tablet by mouth every 6 (six) hours as needed for headaches for up to 10 doses, Starting Sun 10/10/2021, Normal      calcium carbonate (TUMS) 500 mg chewable tablet Chew 2 tablets (1,000 mg total) daily as needed for indigestion or heartburn, Starting Sat 1/15/2022, Normal      diphenhydrAMINE-acetaminophen (TYLENOL PM)  MG TABS Take 1 tablet by mouth daily at bedtime as needed for sleep, Historical Med      EPINEPHrine (EPIPEN) 0 3 mg/0 3 mL SOAJ Inject 0 3 mL (0 3 mg total) into a muscle once for 1 dose, Starting Tue 2/9/2021, Normal      FLUoxetine (PROzac) 40 MG capsule Take 1 capsule (40 mg total) by mouth daily, Starting Mon 9/27/2021, Normal      fluticasone (FLOVENT HFA) 110 MCG/ACT inhaler Inhale 2 puffs 2 (two) times a day Rinse mouth after use , Starting Tue 2/9/2021, Normal      hydrOXYzine HCL (ATARAX) 25 mg tablet Take 1 tablet (25 mg total) by mouth every 6 (six) hours as needed for anxiety for up to 10 days, Starting Mon 9/27/2021, Until Fri 1/21/2022 at 2359, Normal      ibuprofen (MOTRIN) 400 mg tablet Take 1 tablet (400 mg total) by mouth 2 (two) times a day, Starting Wed 3/17/2021, Normal      melatonin 3 mg Take 1 tablet (3 mg total) by mouth daily at bedtime, Starting Sat 9/25/2021, Normal      OLANZapine (ZyPREXA) 5 mg tablet Take 5 mg by mouth daily at bedtime, Starting Fri 6/18/2021, Historical Med      ondansetron (ZOFRAN) 4 mg tablet Take 1 tablet (4 mg total) by mouth every 6 (six) hours, Starting Thu 1/13/2022, Normal      pantoprazole (PROTONIX) 40 mg tablet Take 1 tablet (40 mg total) by mouth daily, Starting Thu 1/13/2022, Normal      QUEtiapine (SEROquel) 100 mg tablet Take 100 mg by mouth daily at bedtime  , Historical Med      sucralfate (CARAFATE) 1 g tablet Take 1 tablet (1 g total) by mouth 4 (four) times a day, Starting Sat 9/18/2021, Normal             No discharge procedures on file      PDMP Review     None          ED Provider  Electronically Signed by           Allan Bennett MD  05/25/22 2447

## 2022-05-24 NOTE — DISCHARGE INSTRUCTIONS
Return to er with worsening symptoms at any time, chest pain, back pain or with new symptoms  See pcp this week for follow up

## 2022-06-10 ENCOUNTER — HOSPITAL ENCOUNTER (EMERGENCY)
Facility: HOSPITAL | Age: 27
Discharge: HOME | End: 2022-06-10
Attending: EMERGENCY MEDICINE
Payer: MEDICARE

## 2022-06-10 ENCOUNTER — NURSE TRIAGE (OUTPATIENT)
Dept: OTHER | Facility: OTHER | Age: 27
End: 2022-06-10

## 2022-06-10 VITALS
HEART RATE: 89 BPM | SYSTOLIC BLOOD PRESSURE: 124 MMHG | DIASTOLIC BLOOD PRESSURE: 61 MMHG | WEIGHT: 186 LBS | OXYGEN SATURATION: 100 % | TEMPERATURE: 98.7 F | BODY MASS INDEX: 31.93 KG/M2 | RESPIRATION RATE: 18 BRPM

## 2022-06-10 DIAGNOSIS — R53.83 FATIGUE: Primary | ICD-10-CM

## 2022-06-10 DIAGNOSIS — G40.909 SEIZURE DISORDER (HCC): ICD-10-CM

## 2022-06-10 LAB
ALBUMIN SERPL BCP-MCNC: 4.2 G/DL (ref 3.5–5)
ALP SERPL-CCNC: 65 U/L (ref 46–116)
ALT SERPL W P-5'-P-CCNC: 27 U/L (ref 12–78)
AMPHETAMINES SERPL QL SCN: NEGATIVE
ANION GAP SERPL CALCULATED.3IONS-SCNC: 8 MMOL/L (ref 4–13)
AST SERPL W P-5'-P-CCNC: 22 U/L (ref 5–45)
BACTERIA UR QL AUTO: ABNORMAL /HPF
BARBITURATES UR QL: NEGATIVE
BASOPHILS # BLD AUTO: 0.07 THOUSANDS/ΜL (ref 0–0.1)
BASOPHILS NFR BLD AUTO: 1 % (ref 0–1)
BENZODIAZ UR QL: NEGATIVE
BILIRUB SERPL-MCNC: 0.44 MG/DL (ref 0.2–1)
BILIRUB UR QL STRIP: NEGATIVE
BUN SERPL-MCNC: 13 MG/DL (ref 5–25)
CALCIUM SERPL-MCNC: 9.2 MG/DL (ref 8.3–10.1)
CHLORIDE SERPL-SCNC: 100 MMOL/L (ref 100–108)
CLARITY UR: ABNORMAL
CO2 SERPL-SCNC: 30 MMOL/L (ref 21–32)
COCAINE UR QL: NEGATIVE
COLOR UR: YELLOW
CREAT SERPL-MCNC: 0.95 MG/DL (ref 0.6–1.3)
EOSINOPHIL # BLD AUTO: 0.25 THOUSAND/ΜL (ref 0–0.61)
EOSINOPHIL NFR BLD AUTO: 2 % (ref 0–6)
ERYTHROCYTE [DISTWIDTH] IN BLOOD BY AUTOMATED COUNT: 12.8 % (ref 11.6–15.1)
EXT PREG TEST URINE: NEGATIVE
EXT. CONTROL ED NAV: NORMAL
GFR SERPL CREATININE-BSD FRML MDRD: 83 ML/MIN/1.73SQ M
GLUCOSE SERPL-MCNC: 86 MG/DL (ref 65–140)
GLUCOSE UR STRIP-MCNC: NEGATIVE MG/DL
HCT VFR BLD AUTO: 43.2 % (ref 34.8–46.1)
HGB BLD-MCNC: 14 G/DL (ref 11.5–15.4)
HGB UR QL STRIP.AUTO: NEGATIVE
IMM GRANULOCYTES # BLD AUTO: 0.05 THOUSAND/UL (ref 0–0.2)
IMM GRANULOCYTES NFR BLD AUTO: 0 % (ref 0–2)
KETONES UR STRIP-MCNC: NEGATIVE MG/DL
LEUKOCYTE ESTERASE UR QL STRIP: ABNORMAL
LYMPHOCYTES # BLD AUTO: 3.05 THOUSANDS/ΜL (ref 0.6–4.47)
LYMPHOCYTES NFR BLD AUTO: 23 % (ref 14–44)
MAGNESIUM SERPL-MCNC: 2.2 MG/DL (ref 1.6–2.6)
MCH RBC QN AUTO: 29.9 PG (ref 26.8–34.3)
MCHC RBC AUTO-ENTMCNC: 32.4 G/DL (ref 31.4–37.4)
MCV RBC AUTO: 92 FL (ref 82–98)
METHADONE UR QL: NEGATIVE
MONOCYTES # BLD AUTO: 0.98 THOUSAND/ΜL (ref 0.17–1.22)
MONOCYTES NFR BLD AUTO: 8 % (ref 4–12)
NEUTROPHILS # BLD AUTO: 8.7 THOUSANDS/ΜL (ref 1.85–7.62)
NEUTS SEG NFR BLD AUTO: 66 % (ref 43–75)
NITRITE UR QL STRIP: NEGATIVE
NON-SQ EPI CELLS URNS QL MICRO: ABNORMAL /HPF
NRBC BLD AUTO-RTO: 0 /100 WBCS
OPIATES UR QL SCN: NEGATIVE
OXYCODONE+OXYMORPHONE UR QL SCN: NEGATIVE
PCP UR QL: NEGATIVE
PH UR STRIP.AUTO: 6 [PH]
PLATELET # BLD AUTO: 368 THOUSANDS/UL (ref 149–390)
PMV BLD AUTO: 8.7 FL (ref 8.9–12.7)
POTASSIUM SERPL-SCNC: 3.8 MMOL/L (ref 3.5–5.3)
PROT SERPL-MCNC: 8.4 G/DL (ref 6.4–8.2)
PROT UR STRIP-MCNC: NEGATIVE MG/DL
RBC # BLD AUTO: 4.69 MILLION/UL (ref 3.81–5.12)
RBC #/AREA URNS AUTO: ABNORMAL /HPF
SODIUM SERPL-SCNC: 138 MMOL/L (ref 136–145)
SP GR UR STRIP.AUTO: 1.02 (ref 1–1.03)
THC UR QL: POSITIVE
TSH SERPL DL<=0.05 MIU/L-ACNC: 1.59 UIU/ML (ref 0.45–4.5)
UROBILINOGEN UR QL STRIP.AUTO: 0.2 E.U./DL
WBC # BLD AUTO: 13.1 THOUSAND/UL (ref 4.31–10.16)
WBC #/AREA URNS AUTO: ABNORMAL /HPF

## 2022-06-10 PROCEDURE — 81025 URINE PREGNANCY TEST: CPT | Performed by: EMERGENCY MEDICINE

## 2022-06-10 PROCEDURE — 80307 DRUG TEST PRSMV CHEM ANLYZR: CPT | Performed by: EMERGENCY MEDICINE

## 2022-06-10 PROCEDURE — 99284 EMERGENCY DEPT VISIT MOD MDM: CPT

## 2022-06-10 PROCEDURE — 96360 HYDRATION IV INFUSION INIT: CPT

## 2022-06-10 PROCEDURE — 99285 EMERGENCY DEPT VISIT HI MDM: CPT | Performed by: EMERGENCY MEDICINE

## 2022-06-10 PROCEDURE — 84443 ASSAY THYROID STIM HORMONE: CPT | Performed by: EMERGENCY MEDICINE

## 2022-06-10 PROCEDURE — 36415 COLL VENOUS BLD VENIPUNCTURE: CPT | Performed by: EMERGENCY MEDICINE

## 2022-06-10 PROCEDURE — 80053 COMPREHEN METABOLIC PANEL: CPT | Performed by: EMERGENCY MEDICINE

## 2022-06-10 PROCEDURE — 81001 URINALYSIS AUTO W/SCOPE: CPT | Performed by: EMERGENCY MEDICINE

## 2022-06-10 PROCEDURE — 85025 COMPLETE CBC W/AUTO DIFF WBC: CPT | Performed by: EMERGENCY MEDICINE

## 2022-06-10 PROCEDURE — 83735 ASSAY OF MAGNESIUM: CPT | Performed by: EMERGENCY MEDICINE

## 2022-06-10 PROCEDURE — 93005 ELECTROCARDIOGRAM TRACING: CPT

## 2022-06-10 RX ADMIN — SODIUM CHLORIDE 1000 ML: 0.9 INJECTION, SOLUTION INTRAVENOUS at 21:24

## 2022-06-10 NOTE — TELEPHONE ENCOUNTER
Regarding: heavy breathing, dizziness   ----- Message from Regina Jorgensen sent at 6/10/2022  6:50 PM EDT -----  "i've been feeling very tired, drained, dizzy, weak, and heavy breathing   should i go to the ER?"

## 2022-06-10 NOTE — ED PROVIDER NOTES
History  Chief Complaint   Patient presents with    Seizure - Prior Hx Of     Patient states, "I have non-focal partial seizures and I had about 15 today that I could count " States this causes her to forget to turn the stove off  Also c/o weakness, headaches, dizziness, and confusion  HPI     22 yo F hx of adhd anxiety asthma bipolar depression dm psychosis, schizoid personality, seizures of non epileptic origin, presents to ed for eval of lightheadedness  Patient had EMU done in 10/2021 when she was determined to have non focal non epileptic spells  She is not on seizure meds  Patient believes she is continuing to have focal seizures  She feels lightheaded and fatigued for weeks  Patient has no fever or cough  No falls  No tongue biting, no head strike not post ictal today  Denies drug alsochol abuse  No other complaints on ros  Has been trying to follow with neurology, unable to get appt  Prior to Admission Medications   Prescriptions Last Dose Informant Patient Reported? Taking?    EPINEPHrine (EPIPEN) 0 3 mg/0 3 mL SOAJ   No No   Sig: Inject 0 3 mL (0 3 mg total) into a muscle once for 1 dose   FLUoxetine (PROzac) 40 MG capsule   No No   Sig: Take 1 capsule (40 mg total) by mouth daily   OLANZapine (ZyPREXA) 5 mg tablet   Yes No   Sig: Take 5 mg by mouth daily at bedtime   QUEtiapine (SEROquel) 100 mg tablet   Yes No   Sig: Take 100 mg by mouth daily at bedtime     albuterol (PROVENTIL HFA,VENTOLIN HFA) 90 mcg/act inhaler   No No   Sig: Inhale 2 puffs every 6 (six) hours as needed for wheezing or shortness of breath   butalbital-acetaminophen-caffeine (FIORICET,ESGIC) -40 mg per tablet   No No   Sig: Take 1 tablet by mouth every 6 (six) hours as needed for headaches for up to 10 doses   calcium carbonate (TUMS) 500 mg chewable tablet   No No   Sig: Chew 2 tablets (1,000 mg total) daily as needed for indigestion or heartburn   diphenhydrAMINE-acetaminophen (TYLENOL PM)  MG TABS   Yes No Sig: Take 1 tablet by mouth daily at bedtime as needed for sleep   fluticasone (FLOVENT HFA) 110 MCG/ACT inhaler   No No   Sig: Inhale 2 puffs 2 (two) times a day Rinse mouth after use    hydrOXYzine HCL (ATARAX) 25 mg tablet   No No   Sig: Take 1 tablet (25 mg total) by mouth every 6 (six) hours as needed for anxiety for up to 10 days   ibuprofen (MOTRIN) 400 mg tablet   No No   Sig: Take 1 tablet (400 mg total) by mouth 2 (two) times a day   melatonin 3 mg   No No   Sig: Take 1 tablet (3 mg total) by mouth daily at bedtime   ondansetron (ZOFRAN) 4 mg tablet   No No   Sig: Take 1 tablet (4 mg total) by mouth every 6 (six) hours   pantoprazole (PROTONIX) 40 mg tablet   No No   Sig: Take 1 tablet (40 mg total) by mouth daily for 15 days   pantoprazole (PROTONIX) 40 mg tablet   No No   Sig: Take 1 tablet (40 mg total) by mouth daily   sucralfate (CARAFATE) 1 g tablet   No No   Sig: Take 1 tablet (1 g total) by mouth 4 (four) times a day      Facility-Administered Medications: None       Past Medical History:   Diagnosis Date    ADHD     Anxiety     Asthma     B[facility]     Depression     Diabetes mellitus (HCC)     GERD (gastroesophageal reflux disease)     Intractable vomiting 1/14/2022    Mental and behavioral problem     Migraine     Palpitations     Last Assessed [id number]    Psychiatric disorder     Psychiatric illness     Psychosis ([facility] )     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[facility] )     Seizures ([facility] ) 01/14/2022    - petit mal    Seizures ([facility] )     Pseudoseizures    SIRS (systemic inflammatory response syndrome) ([facility] ) 1/14/2022    Stabbing chest pain     Last Assessed [id number]    Suicide attempt [facility])     Tachycardia     Last Assessed [id number]    Upper respiratory disease     Last Assessed 27Jan2016       Past Surgical History:   Procedure Laterality Date    KNEE SURGERY      NH LAP,TUBAL CAUTERY N/A 10/3/2018    Procedure: LAPAROSCOPIC TUBAL LIGATION;  Surgeon: Paula Hadley MD; Location: [facility];  Service: Gynecology    TUBAL LIGATION         Family History   Problem Relation Age of Onset    Migraines Sister     Cancer Mother     Diabetes Mother     Cancer Father     Diabetes Father     Arthritis Father     Cancer Maternal Grandmother     Cancer Maternal Aunt     Cancer Paternal Uncle     Diabetes Other      I have reviewed and agree with the history as documented  E-Cigarette/Vaping    E-Cigarette Use Former User      E-Cigarette/Vaping Substances    Nicotine No     THC No     CBD No     Flavoring No     Other No     Unknown No      Social History     Tobacco Use    Smoking status: Current Some Day Smoker     Packs/day: 0 10     Types: Cigarettes    Smokeless tobacco: Never Used   Vaping Use    Vaping Use: Former   Substance Use Topics    Alcohol use: Not Currently    Drug use: Yes     Frequency: 1 0 times per week     Types: Marijuana     Comment: marijuana occasional, no meth at present        Review of Systems   Constitutional: Positive for fatigue  Negative for chills and fever  HENT: Negative for sore throat  Eyes: Negative for redness and visual disturbance  Respiratory: Negative for cough and shortness of breath  Cardiovascular: Negative for chest pain  Gastrointestinal: Negative for abdominal pain, diarrhea and nausea  Genitourinary: Negative for difficulty urinating, dysuria and pelvic pain  Musculoskeletal: Negative for back pain  Skin: Negative for rash  Neurological: Positive for seizures  Negative for syncope, weakness and headaches  All other systems reviewed and are negative  Physical Exam  Physical Exam  Vitals and nursing note reviewed  Constitutional:       General: She is not in acute distress  HENT:      Head: Normocephalic and atraumatic  Eyes:      Conjunctiva/sclera: Conjunctivae normal    Cardiovascular:      Rate and Rhythm: Normal rate and regular rhythm  Heart sounds: Normal heart sounds  Pulmonary:      Effort: Pulmonary effort is normal  No respiratory distress  Breath sounds: Normal breath sounds  Abdominal:      General: Bowel sounds are normal       Palpations: Abdomen is soft  Tenderness: There is no abdominal tenderness  Musculoskeletal:         General: Normal range of motion  Cervical back: Normal range of motion  Skin:     General: Skin is warm and dry  Findings: No rash  Neurological:      General: No focal deficit present  Mental Status: She is alert and oriented to person, place, and time  Mental status is at baseline  Cranial Nerves: No cranial nerve deficit  Sensory: No sensory deficit  Motor: No abnormal muscle tone  Coordination: Coordination normal       Comments: Mental Status: Alert and oriented to person place time and situation, language fluent with good comprehension and repetition  CN:  extraocular muscles intact without nystagmus  Face symmetrical with full sensation  Hearing in tact  Motor: Normal muscle bulk and tone throughout 5/5 strength in upper and lower extremities throughout    Sensory: Sensation intact           Vital Signs  ED Triage Vitals [06/10/22 1936]   Temperature Pulse Respirations Blood Pressure SpO2   98 7 °F (37 1 °C) 89 19 111/65 99 %      Temp Source Heart Rate Source Patient Position - Orthostatic VS BP Location FiO2 (%)   Oral Monitor Sitting Left arm --      Pain Score       No Pain           Vitals:    06/10/22 1936 06/10/22 1945 06/10/22 2224   BP: 111/65 107/56 124/61   Pulse: 89 84 89   Patient Position - Orthostatic VS: Sitting Sitting Sitting         Visual Acuity  Visual Acuity    Flowsheet Row Most Recent Value   L Pupil Size (mm) 3   R Pupil Size (mm) 3          ED Medications  Medications   sodium chloride 0 9 % bolus 1,000 mL (0 mL Intravenous Stopped 6/10/22 2217)       Diagnostic Studies  Results Reviewed     Procedure Component Value Units Date/Time    Magnesium [[id number]] (Normal) Collected: 06/10/22 2045    Lab Status: Final result Specimen: Blood from Arm, Left Updated: 06/10/22 2118     Magnesium 2 2 mg/dL     TSH, 3rd generation with Free T4 reflex [[id number]]  (Normal) Collected: 06/10/22 2045    Lab Status: Final result Specimen: Blood from Arm, Left Updated: 06/10/22 2118     TSH 3RD GENERATON 1 594 uIU/mL     Narrative:      Patients undergoing fluorescein dye angiography may retain small amounts of fluorescein in the body for 48-72 hours post procedure  Samples containing fluorescein can produce falsely depressed TSH values  If the patient had this procedure,a specimen should be resubmitted post fluorescein clearance        Comprehensive metabolic panel [[id number]]  (Abnormal) Collected: 06/10/22 2045    Lab Status: Final result Specimen: Blood from Arm, Left Updated: 06/10/22 2108     Sodium 138 mmol/L      Potassium 3 8 mmol/L      Chloride 100 mmol/L      CO2 30 mmol/L      ANION GAP 8 mmol/L      BUN 13 mg/dL      Creatinine 0 95 mg/dL      Glucose 86 mg/dL      Calcium 9 2 mg/dL      AST 22 U/L      ALT 27 U/L      Alkaline Phosphatase 65 U/L      Total Protein 8 4 g/dL      Albumin 4 2 g/dL      Total Bilirubin 0 44 mg/dL      eGFR 83 ml/min/1 73sq m     Narrative:      Kamran guidelines for Chronic Kidney Disease (CKD):     Stage 1 with normal or high GFR (GFR > 90 mL/min/1 73 square meters)    Stage 2 Mild CKD (GFR = 60-89 mL/min/1 73 square meters)    Stage 3A Moderate CKD (GFR = 45-59 mL/min/1 73 square meters)    Stage 3B Moderate CKD (GFR = 30-44 mL/min/1 73 square meters)    Stage 4 Severe CKD (GFR = 15-29 mL/min/1 73 square meters)    Stage 5 End Stage CKD (GFR <15 mL/min/1 73 square meters)  Note: GFR calculation is accurate only with a steady state creatinine    Urine Microscopic [[id number]]  (Abnormal) Collected: 06/10/22 2045    Lab Status: Final result Specimen: Urine, Clean Catch Updated: 06/10/22 2107     RBC, UA 0-1 /hpf WBC, UA 4-10 /hpf      Epithelial Cells Occasional /hpf      Bacteria, UA Occasional /hpf     Rapid drug screen, urine [[id number]]  (Abnormal) Collected: 06/10/22 2045    Lab Status: Final result Specimen: Urine, Catheter Updated: 06/10/22 2107     Amph/Meth UR Negative     Barbiturate Ur Negative     Benzodiazepine Urine Negative     Cocaine Urine Negative     Methadone Urine Negative     Opiate Urine Negative     PCP Ur Negative     THC Urine Positive     Oxycodone Urine Negative    Narrative:      Presumptive report  If requested, specimen will be sent to reference lab for confirmation  FOR MEDICAL PURPOSES ONLY  IF CONFIRMATION NEEDED PLEASE CONTACT THE LAB WITHIN 5 DAYS      Drug Screen Cutoff Levels:  AMPHETAMINE/METHAMPHETAMINES  1000 ng/mL  BARBITURATES     200 ng/mL  BENZODIAZEPINES     200 ng/mL  COCAINE      300 ng/mL  METHADONE      300 ng/mL  OPIATES      300 ng/mL  PHENCYCLIDINE     25 ng/mL  THC       50 ng/mL  OXYCODONE      100 ng/mL    UA (URINE) with reflex to Scope [[id number]]  (Abnormal) Collected: 06/10/22 2045    Lab Status: Final result Specimen: Urine, Clean Catch Updated: 06/10/22 2053     Color, UA Yellow     Clarity, UA Slightly Cloudy     Specific Gravity, UA 1 025     pH, UA 6 0     Leukocytes, UA Small     Nitrite, UA Negative     Protein, UA Negative mg/dl      Glucose, UA Negative mg/dl      Ketones, UA Negative mg/dl      Urobilinogen, UA 0 2 E U /dl      Bilirubin, UA Negative     Blood, UA Negative    CBC and differential [[id number]]  (Abnormal) Collected: 06/10/22 2045    Lab Status: Final result Specimen: Blood from Arm, Left Updated: 06/10/22 2052     WBC 13 10 Thousand/uL      RBC 4 69 Million/uL      Hemoglobin 14 0 g/dL      Hematocrit 43 2 %      MCV 92 fL      MCH 29 9 pg      MCHC 32 4 g/dL      RDW 12 8 %      MPV 8 7 fL      Platelets 435 Thousands/uL      nRBC 0 /100 WBCs      Neutrophils Relative 66 %      Immat GRANS % 0 %      Lymphocytes Relative 23 % Monocytes Relative 8 %      Eosinophils Relative 2 %      Basophils Relative 1 %      Neutrophils Absolute 8 70 Thousands/µL      Immature Grans Absolute 0 05 Thousand/uL      Lymphocytes Absolute 3 05 Thousands/µL      Monocytes Absolute 0 98 Thousand/µL      Eosinophils Absolute 0 25 Thousand/µL      Basophils Absolute 0 07 Thousands/µL     POCT pregnancy, urine [[id number]]  (Normal) Resulted: 06/10/22 2047    Lab Status: Final result Updated: 06/10/22 2047     EXT PREG TEST UR (Ref: Negative) negative     Control valid                 No orders to display              Procedures  Procedures         ED Course  ED Course as of 06/10/22 2237   Fri Bin 10, 2022   2112 WBC(!): 13 10  No bands baseline 8-12 2112 No urinary complaints   2211 Patient complaining to RN of her dislike of this hospital  Informed of results, and stable for discharge with pcp follow up  MDM    22 yo F with fatigue  Labs looking for any acute changes or any acute electrolyte abnormalities  States she has increasing seizures though prior admission shows non epileptic origin  Neurology follow up outpatient referral already made by pcp  No acute findings to warrant admission at this time  Informed to continue with close follow up  Normal neuro exam at this time  The patient was instructed to follow up as documented  Strict return precautions were discussed with the patient and the patient was instructed to return to the emergency department immediately if symptoms worsen  The patient/patient family member acknowledged and were in agreement with plan             Disposition  Final diagnoses:   Fatigue   Seizure disorder [facility])     Time reflects when diagnosis was documented in both MDM as applicable and the Disposition within this note     Time User Action Codes Description Comment    6/10/2022  9:37 PM [employer] Add [R53 [id number]] Fatigue     6/10/2022  9:38  [address], [address] [[id number]] Seizure disorder [facility])       ED Disposition     ED Disposition   Discharge    Condition   Stable    Date/Time   Fri Bin 10, 2022  9:36 PM    Comment   J[place] Serna discharge to home/self care                 Follow-up Information     Follow up With Specialties Details Why Contact Info Additional Information    Namita Richardson DO Family Medicine Schedule an appointment as soon as possible for a visit today For follow up regarding your symptoms and recheck [facility] [facility] [address] [facility] Schedule an appointment as soon as possible for a visit in 1 week For follow up regarding your symptoms and recheck [address] [id number]  [address] [facility] [address], [address], [place], [place], [address]          Discharge Medication List as of 6/10/2022  9:37 PM      CONTINUE these medications which have NOT CHANGED    Details   albuterol (PROVENTIL HFA,VENTOLIN HFA) 90 mcg/act inhaler Inhale 2 puffs every 6 (six) hours as needed for wheezing or shortness of breath, Starting Tue 2/9/2021, Normal      butalbital-acetaminophen-caffeine (FIORICET,ESGIC) -40 mg per tablet Take 1 tablet by mouth every 6 (six) hours as needed for headaches for up to 10 doses, Starting Sun 10/10/2021, Normal      calcium carbonate (TUMS) 500 mg chewable tablet Chew 2 tablets (1,000 mg total) daily as needed for indigestion or heartburn, Starting Sat 1/15/2022, Normal      diphenhydrAMINE-acetaminophen (TYLENOL PM)  MG TABS Take 1 tablet by mouth daily at bedtime as needed for sleep, Historical Med      EPINEPHrine (EPIPEN) 0 3 mg/0 3 mL SOAJ Inject 0 3 mL (0 3 mg total) into a muscle once for 1 dose, Starting Tue 2/9/2021, Normal      FLUoxetine (PROzac) 40 MG capsule Take 1 capsule (40 mg total) by mouth daily, Starting Mon 9/27/2021, Normal      fluticasone (FLOVENT HFA) 110 MCG/ACT inhaler Inhale 2 puffs 2 (two) times a day Rinse mouth after use , Starting Tue 2/9/2021, Normal      hydrOXYzine HCL (ATARAX) 25 mg tablet Take 1 tablet (25 mg total) by mouth every 6 (six) hours as needed for anxiety for up to 10 days, Starting Mon 9/27/2021, Until Fri 1/21/2022 at 2359, Normal      ibuprofen (MOTRIN) 400 mg tablet Take 1 tablet (400 mg total) by mouth 2 (two) times a day, Starting Wed 3/17/2021, Normal      melatonin 3 mg Take 1 tablet (3 mg total) by mouth daily at bedtime, Starting Sat 9/25/2021, Normal      OLANZapine (ZyPREXA) 5 mg tablet Take 5 mg by mouth daily at bedtime, Starting Fri 6/18/2021, Historical Med      ondansetron (ZOFRAN) 4 mg tablet Take 1 tablet (4 mg total) by mouth every 6 (six) hours, Starting Thu 1/13/2022, Normal      pantoprazole (PROTONIX) 40 mg tablet Take 1 tablet (40 mg total) by mouth daily, Starting Thu 1/13/2022, Normal      QUEtiapine (SEROquel) 100 mg tablet Take 100 mg by mouth daily at bedtime  , Historical Med      sucralfate (CARAFATE) 1 g tablet Take 1 tablet (1 g total) by mouth 4 (four) times a day, Starting Sat 9/18/2021, Normal                 PDMP Review     None          ED Provider  Electronically Signed by           Lety Munoz MD  06/10/22 7135

## 2022-06-10 NOTE — TELEPHONE ENCOUNTER
Reason for Disposition   Sounds like a life-threatening emergency to the triager    Answer Assessment - Initial Assessment Questions  1  DESCRIPTION: "Describe your dizziness "      dizzy  2  LIGHTHEADED: "Do you feel lightheaded?" (e g , somewhat faint, woozy, weak upon standing)      Yes   3  VERTIGO: "Do you feel like either you or the room is spinning or tilting?" (i e  vertigo)      no  4  SEVERITY: "How bad is it?"  "Do you feel like you are going to faint?" "Can you stand and walk?"    - MILD: Feels slightly dizzy, but walking normally  - MODERATE: Feels very unsteady when walking, but not falling; interferes with normal activities (e g , school, work)   - SEVERE: Unable to walk without falling, or requires assistance to walk without falling; feels like passing out now        mild  5  ONSET:  "When did the dizziness begin?"      Last week  6  AGGRAVATING FACTORS: "Does anything make it worse?" (e g , standing, change in head position)      HA, chest, pain, heavy breathing ,weakness   7  HEART RATE: "Can you tell me your heart rate?" "How many beats in 15 seconds?"  (Note: not all patients can do this)        no  8  CAUSE: "What do you think is causing the dizziness?"      Not sure  9  RECURRENT SYMPTOM: "Have you had dizziness before?" If Yes, ask: "When was the last time?" "What happened that time?"      no  10  OTHER SYMPTOMS: "Do you have any other symptoms?" (e g , fever, chest pain, vomiting, diarrhea, bleeding)        Tired,see above   11   PREGNANCY: "Is there any chance you are pregnant?" "When was your last menstrual period?"        no    Protocols used: [facility]

## 2022-06-11 LAB
ATRIAL RATE: 83 BPM
P AXIS: 54 DEGREES
PR INTERVAL: 140 MS
QRS AXIS: 28 DEGREES
QRSD INTERVAL: 90 MS
QT INTERVAL: 360 MS
QTC INTERVAL: 423 MS
T WAVE AXIS: 31 DEGREES
VENTRICULAR RATE: 83 BPM

## 2022-06-11 PROCEDURE — 93010 ELECTROCARDIOGRAM REPORT: CPT | Performed by: INTERNAL MEDICINE

## 2022-06-11 NOTE — ED NOTES
Pt overheard screaming on her phone to someone about the hospital and the hospital care  Pt states that the nurses and doctors are incompetent and she wants to "lucy us" for not giving her enough care  Pt swearing and talking badly about staff  Pt had bell ringing and when writer of this note asked if she needed anything pt stated "I am so done with her" to whomever she was face timing  Pt advised to keep arms straight to allow fluids to infuse but as soon as writer of this note left room pt bent arm again  Pt heard laughing, and singing  Pt then rang bell again and asked tech to "get me discharge papers since no one wants to care for my seizures"          Melany Ferrari, [address] [facility]  06/10/22 6933

## 2022-06-20 ENCOUNTER — OFFICE VISIT (OUTPATIENT)
Dept: FAMILY MEDICINE CLINIC | Facility: CLINIC | Age: 27
End: 2022-06-20
Payer: MEDICARE

## 2022-06-20 VITALS
WEIGHT: 198.7 LBS | DIASTOLIC BLOOD PRESSURE: 60 MMHG | HEIGHT: 64 IN | HEART RATE: 102 BPM | RESPIRATION RATE: 16 BRPM | SYSTOLIC BLOOD PRESSURE: 110 MMHG | BODY MASS INDEX: 33.92 KG/M2

## 2022-06-20 DIAGNOSIS — E11.9 TYPE 2 DIABETES MELLITUS WITHOUT COMPLICATION, WITHOUT LONG-TERM CURRENT USE OF INSULIN (HCC): ICD-10-CM

## 2022-06-20 DIAGNOSIS — G89.29 CHRONIC BILATERAL THORACIC BACK PAIN: Primary | ICD-10-CM

## 2022-06-20 DIAGNOSIS — N30.01 ACUTE CYSTITIS WITH HEMATURIA: ICD-10-CM

## 2022-06-20 DIAGNOSIS — R82.90 ABNORMAL URINALYSIS: ICD-10-CM

## 2022-06-20 DIAGNOSIS — E11.9 ENCOUNTER FOR DIABETIC FOOT EXAM (HCC): ICD-10-CM

## 2022-06-20 DIAGNOSIS — M54.6 CHRONIC BILATERAL THORACIC BACK PAIN: Primary | ICD-10-CM

## 2022-06-20 DIAGNOSIS — S39.012A STRAIN OF LUMBAR REGION, INITIAL ENCOUNTER: ICD-10-CM

## 2022-06-20 DIAGNOSIS — M54.9 BACK PAIN, UNSPECIFIED BACK LOCATION, UNSPECIFIED BACK PAIN LATERALITY, UNSPECIFIED CHRONICITY: ICD-10-CM

## 2022-06-20 LAB
SL AMB  POCT GLUCOSE, UA: NORMAL
SL AMB LEUKOCYTE ESTERASE,UA: 500
SL AMB POCT BILIRUBIN,UA: 1
SL AMB POCT BLOOD,UA: 50
SL AMB POCT CLARITY,UA: ABNORMAL
SL AMB POCT COLOR,UA: ABNORMAL
SL AMB POCT KETONES,UA: 15
SL AMB POCT NITRITE,UA: POSITIVE
SL AMB POCT PH,UA: 5
SL AMB POCT SPECIFIC GRAVITY,UA: 1.02
SL AMB POCT URINE HCG: NEGATIVE
SL AMB POCT URINE PROTEIN: ABNORMAL
SL AMB POCT UROBILINOGEN: 4

## 2022-06-20 PROCEDURE — 81002 URINALYSIS NONAUTO W/O SCOPE: CPT | Performed by: FAMILY MEDICINE

## 2022-06-20 PROCEDURE — 81025 URINE PREGNANCY TEST: CPT | Performed by: FAMILY MEDICINE

## 2022-06-20 PROCEDURE — 99213 OFFICE O/P EST LOW 20 MIN: CPT | Performed by: FAMILY MEDICINE

## 2022-06-20 RX ORDER — NITROFURANTOIN 25; 75 MG/1; MG/1
100 CAPSULE ORAL 2 TIMES DAILY
Qty: 10 CAPSULE | Refills: 0 | Status: SHIPPED | OUTPATIENT
Start: 2022-06-20 | End: 2022-06-25

## 2022-06-20 RX ORDER — BUPRENORPHINE HYDROCHLORIDE AND NALOXONE HYDROCHLORIDE DIHYDRATE 8; 2 MG/1; MG/1
1 TABLET SUBLINGUAL 2 TIMES DAILY
COMMUNITY
Start: 2022-06-13

## 2022-06-20 RX ORDER — NITROFURANTOIN 25; 75 MG/1; MG/1
100 CAPSULE ORAL 2 TIMES DAILY
Qty: 10 CAPSULE | Refills: 0 | Status: SHIPPED | OUTPATIENT
Start: 2022-06-20 | End: 2022-06-20

## 2022-06-20 RX ORDER — LIDOCAINE 4 G/G
15 PATCH TOPICAL DAILY PRN
Qty: 30 PATCH | Refills: 0 | Status: SHIPPED | OUTPATIENT
Start: 2022-06-20

## 2022-06-20 RX ORDER — LIDOCAINE 4 G/G
15 PATCH TOPICAL DAILY PRN
Qty: 30 PATCH | Refills: 0 | Status: SHIPPED | OUTPATIENT
Start: 2022-06-20 | End: 2022-06-20

## 2022-06-20 NOTE — PROGRESS NOTES
[facility] - Office Visit  Manedep Pond  23 y o  female  [id number]     Subjective:      Patient ID: Mandeep Pond is a 23 y o  female  HPI    Patient is a 25-year-old female presenting with chronic back pain  Patient states that she has been experiencing pain in bilateral thoracic and lumbar area for long time  Patient notes no specific trauma however states that she has fallen off a stair cases in the past   Patient states that she has tried Tylenol, ibuprofen, naproxen, heat with no relief  Patient denies any radiculopathy or saddle anesthesia at this time  Patient states that she is allergic to all muscle relaxants ("makes my chest tight")  Patient denies any fevers, chills, abdominal pain, nausea, vomiting, dysuria, or hematuria  Patient has a history of IV drug use which she denies any use in the past few years  Patient also states that she would like a pregnancy test since she has been "very sexually active"  Patient states that her last menstrual period began a week ago on June 13th 2022  Review of Systems    See above    Objective:    /60 (BP Location: Left arm, Patient Position: Sitting, Cuff Size: Large)   Pulse 102   Resp 16   Ht 5' 4" (1 626 m)   Wt 90 1 kg (198 lb 11 2 oz)   BMI 34 11 kg/m²        Physical Exam  Vitals and nursing note reviewed  Constitutional:       General: She is not in acute distress  Appearance: Normal appearance  She is obese  She is not ill-appearing, toxic-appearing or diaphoretic  HENT:      Head: Normocephalic and atraumatic  Abdominal:      General: Abdomen is flat  Tenderness: There is no abdominal tenderness  There is no right CVA tenderness, left CVA tenderness or guarding  Musculoskeletal:         General: Tenderness present  No deformity or signs of injury  Normal range of motion  Right lower leg: No edema  Left lower leg: No edema        Comments: Bilateral paraspinal thoracic and lumbar tenderness; negative straight leg raise bilaterally; strength intact 5/5 bilateral lower extremities  Negative diabetic foot exam bilaterally   Skin:     General: Skin is warm and dry  Neurological:      General: No focal deficit present  Mental Status: She is alert and oriented to person, place, and time  Motor: No weakness  Gait: Gait normal    Psychiatric:      Comments: Flat affect; poor judgment           Assessment/Plan:    Given lidocaine patches and referral to PT provided given the patient's previous history of IV drug use  Will attempt conservative management at this time  Patient instructed follow-up in 6 weeks  The patient also advised that although pregnancy test in the office is negative, due to her very recent sexual activity, is may be a false-positive  Patient advised to repeat pregnancy test at home in another 2 weeks  Will treat UTI with Macrobid for 5 days  Diagnoses and all orders for this visit:    Chronic bilateral thoracic back pain  -     Ambulatory Referral to Physical Therapy; Future  -     Discontinue: Lidocaine 4 % PTCH; Apply 15 each topically daily as needed (back pain)  -     Lidocaine 4 % PTCH; Apply 15 each topically daily as needed (back pain)    Back pain, unspecified back location, unspecified back pain laterality, unspecified chronicity  -     Cancel: POCT urine dip  -     POCT urine dip  -     POCT urine HCG  -     Urine culture  -     CBC and differential; Future    Encounter for diabetic foot exam ([facility] )    Abnormal urinalysis  -     Urine culture  -     CBC and differential; Future  -     Comprehensive metabolic panel; Future    Strain of lumbar region, initial encounter  -     Ambulatory Referral to Physical Therapy; Future  -     Discontinue: Lidocaine 4 % PTCH; Apply 15 each topically daily as needed (back pain)  -     Lidocaine 4 % PTCH;  Apply 15 each topically daily as needed (back pain)    Type 2 diabetes mellitus without complication, without long-term current use of insulin (HCC)  -     HEMOGLOBIN A1C W/ EAG ESTIMATION; Future  -     Comprehensive metabolic panel; Future    Acute cystitis with hematuria  -     Discontinue: nitrofurantoin (MACROBID) 100 mg capsule; Take 1 capsule (100 mg total) by mouth 2 (two) times a day for 5 days  -     nitrofurantoin (MACROBID) 100 mg capsule; Take 1 capsule (100 mg total) by mouth 2 (two) times a day for 5 days    Other orders  -     buprenorphine-naloxone (SUBOXONE) 8-2 mg per SL tablet; 1 tablet 2 (two) times a day         RTC 4 weeks Medicare annual wellness   The patient was counseled regarding diagnostic results, instructions for management, risk factor reductions, prognosis, patient and family education, impressions, risks and benefits of treatment options  The treatment plan was reviewed with the patient/guardian  The patient/guardian understands and agrees with the treatment plan  --  Pam Ruffin, DO    "This note has been constructed using a voice recognition system  Therefore there may be syntax, spelling, and/or grammatical errors   Please call if you have any questions "

## 2022-06-23 LAB
BACTERIA UR CULT: ABNORMAL
Lab: ABNORMAL
SL AMB ANTIMICROBIAL SUSCEPTIBILITY: ABNORMAL

## 2022-06-29 ENCOUNTER — HOSPITAL ENCOUNTER (EMERGENCY)
Facility: HOSPITAL | Age: 27
Discharge: HOME | End: 2022-06-30
Attending: EMERGENCY MEDICINE
Payer: MEDICARE

## 2022-06-29 DIAGNOSIS — R45.851 SUICIDAL IDEATION: ICD-10-CM

## 2022-06-29 DIAGNOSIS — F32.A DEPRESSION: Primary | ICD-10-CM

## 2022-06-29 LAB
ANION GAP SERPL CALCULATED.3IONS-SCNC: 9 MMOL/L (ref 4–13)
BASOPHILS # BLD AUTO: 0.03 THOUSANDS/ΜL (ref 0–0.1)
BASOPHILS NFR BLD AUTO: 0 % (ref 0–1)
BUN SERPL-MCNC: 9 MG/DL (ref 5–25)
CALCIUM SERPL-MCNC: 10.2 MG/DL (ref 8.4–10.2)
CHLORIDE SERPL-SCNC: 101 MMOL/L (ref 96–108)
CO2 SERPL-SCNC: 25 MMOL/L (ref 21–32)
CREAT SERPL-MCNC: 1.04 MG/DL (ref 0.6–1.3)
EOSINOPHIL # BLD AUTO: 0.03 THOUSAND/ΜL (ref 0–0.61)
EOSINOPHIL NFR BLD AUTO: 0 % (ref 0–6)
ERYTHROCYTE [DISTWIDTH] IN BLOOD BY AUTOMATED COUNT: 12.5 % (ref 11.6–15.1)
GFR SERPL CREATININE-BSD FRML MDRD: 74 ML/MIN/1.73SQ M
GLUCOSE SERPL-MCNC: 116 MG/DL (ref 65–140)
HCG SERPL QL: NEGATIVE
HCT VFR BLD AUTO: 45.7 % (ref 34.8–46.1)
HGB BLD-MCNC: 15.2 G/DL (ref 11.5–15.4)
IMM GRANULOCYTES # BLD AUTO: 0.04 THOUSAND/UL (ref 0–0.2)
IMM GRANULOCYTES NFR BLD AUTO: 0 % (ref 0–2)
LYMPHOCYTES # BLD AUTO: 1.8 THOUSANDS/ΜL (ref 0.6–4.47)
LYMPHOCYTES NFR BLD AUTO: 18 % (ref 14–44)
MCH RBC QN AUTO: 29.9 PG (ref 26.8–34.3)
MCHC RBC AUTO-ENTMCNC: 33.3 G/DL (ref 31.4–37.4)
MCV RBC AUTO: 90 FL (ref 82–98)
MONOCYTES # BLD AUTO: 0.75 THOUSAND/ΜL (ref 0.17–1.22)
MONOCYTES NFR BLD AUTO: 8 % (ref 4–12)
NEUTROPHILS # BLD AUTO: 7.25 THOUSANDS/ΜL (ref 1.85–7.62)
NEUTS SEG NFR BLD AUTO: 74 % (ref 43–75)
NRBC BLD AUTO-RTO: 0 /100 WBCS
PLATELET # BLD AUTO: 331 THOUSANDS/UL (ref 149–390)
PMV BLD AUTO: 8.9 FL (ref 8.9–12.7)
POTASSIUM SERPL-SCNC: 4.2 MMOL/L (ref 3.5–5.3)
RBC # BLD AUTO: 5.09 MILLION/UL (ref 3.81–5.12)
SODIUM SERPL-SCNC: 135 MMOL/L (ref 135–147)
WBC # BLD AUTO: 9.9 THOUSAND/UL (ref 4.31–10.16)

## 2022-06-29 PROCEDURE — 99285 EMERGENCY DEPT VISIT HI MDM: CPT | Performed by: EMERGENCY MEDICINE

## 2022-06-29 PROCEDURE — 84703 CHORIONIC GONADOTROPIN ASSAY: CPT | Performed by: EMERGENCY MEDICINE

## 2022-06-29 PROCEDURE — 99285 EMERGENCY DEPT VISIT HI MDM: CPT

## 2022-06-29 PROCEDURE — 81025 URINE PREGNANCY TEST: CPT | Performed by: EMERGENCY MEDICINE

## 2022-06-29 PROCEDURE — 85025 COMPLETE CBC W/AUTO DIFF WBC: CPT | Performed by: EMERGENCY MEDICINE

## 2022-06-29 PROCEDURE — 93005 ELECTROCARDIOGRAM TRACING: CPT

## 2022-06-29 PROCEDURE — 82075 ASSAY OF BREATH ETHANOL: CPT | Performed by: EMERGENCY MEDICINE

## 2022-06-29 PROCEDURE — 80048 BASIC METABOLIC PNL TOTAL CA: CPT | Performed by: EMERGENCY MEDICINE

## 2022-06-29 PROCEDURE — 36415 COLL VENOUS BLD VENIPUNCTURE: CPT | Performed by: EMERGENCY MEDICINE

## 2022-06-29 RX ORDER — LORAZEPAM 2 MG/ML
1 INJECTION INTRAMUSCULAR ONCE
Status: COMPLETED | OUTPATIENT
Start: 2022-06-29 | End: 2022-06-29

## 2022-06-29 RX ORDER — ACETAMINOPHEN 325 MG/1
975 TABLET ORAL ONCE
Status: COMPLETED | OUTPATIENT
Start: 2022-06-29 | End: 2022-06-29

## 2022-06-29 RX ADMIN — ACETAMINOPHEN 975 MG: 325 TABLET, FILM COATED ORAL at 21:33

## 2022-06-30 VITALS
SYSTOLIC BLOOD PRESSURE: 124 MMHG | WEIGHT: 198 LBS | OXYGEN SATURATION: 99 % | RESPIRATION RATE: 16 BRPM | HEART RATE: 99 BPM | HEIGHT: 64 IN | BODY MASS INDEX: 33.8 KG/M2 | DIASTOLIC BLOOD PRESSURE: 84 MMHG | TEMPERATURE: 98 F

## 2022-06-30 LAB
AMPHETAMINES SERPL QL SCN: NEGATIVE
ATRIAL RATE: 101 BPM
BARBITURATES UR QL: NEGATIVE
BENZODIAZ UR QL: NEGATIVE
COCAINE UR QL: NEGATIVE
ETHANOL EXG-MCNC: 0 MG/DL
EXT PREG TEST URINE: NEGATIVE
EXT. CONTROL ED NAV: NORMAL
FLUAV RNA RESP QL NAA+PROBE: NEGATIVE
FLUBV RNA RESP QL NAA+PROBE: NEGATIVE
METHADONE UR QL: NEGATIVE
OPIATES UR QL SCN: NEGATIVE
OXYCODONE+OXYMORPHONE UR QL SCN: NEGATIVE
P AXIS: 57 DEGREES
PCP UR QL: NEGATIVE
PR INTERVAL: 134 MS
QRS AXIS: 32 DEGREES
QRSD INTERVAL: 80 MS
QT INTERVAL: 318 MS
QTC INTERVAL: 412 MS
RSV RNA RESP QL NAA+PROBE: NEGATIVE
SARS-COV-2 RNA RESP QL NAA+PROBE: NEGATIVE
T WAVE AXIS: 34 DEGREES
THC UR QL: POSITIVE
VENTRICULAR RATE: 101 BPM

## 2022-06-30 PROCEDURE — 80307 DRUG TEST PRSMV CHEM ANLYZR: CPT | Performed by: EMERGENCY MEDICINE

## 2022-06-30 PROCEDURE — 93010 ELECTROCARDIOGRAM REPORT: CPT | Performed by: INTERNAL MEDICINE

## 2022-06-30 PROCEDURE — G0425 INPT/ED TELECONSULT30: HCPCS | Performed by: PSYCHIATRY & NEUROLOGY

## 2022-06-30 PROCEDURE — 0241U HB NFCT DS VIR RESP RNA 4 TRGT: CPT | Performed by: EMERGENCY MEDICINE

## 2022-06-30 RX ORDER — OLANZAPINE 2.5 MG/1
5 TABLET ORAL ONCE
Status: COMPLETED | OUTPATIENT
Start: 2022-06-30 | End: 2022-06-30

## 2022-06-30 RX ORDER — LORAZEPAM 1 MG/1
1 TABLET ORAL ONCE
Status: COMPLETED | OUTPATIENT
Start: 2022-06-30 | End: 2022-06-30

## 2022-06-30 RX ORDER — QUETIAPINE FUMARATE 100 MG/1
100 TABLET, FILM COATED ORAL
Status: DISCONTINUED | OUTPATIENT
Start: 2022-06-30 | End: 2022-06-30

## 2022-06-30 RX ORDER — OLANZAPINE 5 MG/1
5 TABLET ORAL DAILY
Qty: 7 TABLET | Refills: 0 | Status: SHIPPED | OUTPATIENT
Start: 2022-07-01 | End: 2022-07-08

## 2022-06-30 RX ORDER — OLANZAPINE 5 MG/1
5 TABLET, ORALLY DISINTEGRATING ORAL DAILY
Status: DISCONTINUED | OUTPATIENT
Start: 2022-06-30 | End: 2022-06-30

## 2022-06-30 RX ADMIN — LORAZEPAM 1 MG: 1 TABLET ORAL at 15:07

## 2022-06-30 RX ADMIN — OLANZAPINE 5 MG: 2.5 TABLET, FILM COATED ORAL at 19:51

## 2022-06-30 NOTE — ED NOTES
Met with patient, sister at bedside  Patient much more calm, cooperative, reports she feels better, denies SI, and asks to be dc  No overt psychosis  States she has things to do such as going to [facility] tomorrow to obtain 2 PFAs  Patient states she can safety plan and is able to yemporarely stay with sister if cleared  Advised pt of ordered psychiatry consult; she agreed to wait for Dr Barbara Hancock to speak with her  Advised EDMD and Intake of the same  Advised intake that network bed no longer necessary
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
## 2022-06-30 NOTE — ED NOTES
Pt asking about her klonopin, states she takes 80mg  When questioned regarding this dose, she changed and stated she took suboxone 80mg BID, but hasn't taken it in a few days        Yadira Gutierrez RN  06/30/22 6028
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
## 2022-06-30 NOTE — ED NOTES
When I entered room to complete bloodwork, patient asked if there was anyone on-site available for psych / crisis  When asked about SI/HI, patient denies both but states her "depression is getting the best of her " Dr Loretta Casanova made aware of patient's request to speak with crisis worker       Santino Hayward, RN  06/29/22 6055

## 2022-06-30 NOTE — ED NOTES
Patient provided with paper scrubs and instructed she needs to change clothing per hospital policy for behavioral health patients with suicidal ideation  Patient refused, stating she wants to leave  Allowed another RN and myself to remove her shirt but took off paper shirt when staff attempted to place shirt on her  Patient stating "I want to sign out of this" and leave the department  Dr Miguel Davis made aware, he went to bedside and spoke with patient where patient reiterated that she does feel like she wants to kill herself but does not have a plan  Dr Miguel Davis reinforced to patient that she will need to stay in department for treatment and speak with crisis worker again in a m  At this time patient lying under blankets with no shirt on  IV access removed and room equipment stripped per policy for [facility] patient  Patient remains on virtual 1:1 monitoring       Ese Washington RN  06/30/22 6324

## 2022-06-30 NOTE — ED CARE HANDOFF
Emergency Department Sign Out Note        ED Course as of 06/30/22 1336   Thu Jun 30, 2022   0704 Signed out to me pending crisis  The patient was medically cleared by the prior shift     0900 PT is willing to sign a 201  Procedures  MDM        Disposition  Final diagnoses:   Suicidal ideation     Time reflects when diagnosis was documented in both MDM as applicable and the Disposition within this note     Time User Action Codes Description Comment    6/29/2022 11:22 PM Lennie Smith Add [T39 [id number]] Suicidal ideation       ED Disposition     None      MD Documentation    [address] Most Recent Value   Sending MD Dr Shantelle Feng    None       Patient's Medications   Discharge Prescriptions    No medications on file     No discharge procedures on file         ED Provider  Electronically Signed by     Mirella Bearden DO  06/30/22 4425
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
## 2022-06-30 NOTE — ED NOTES
Pt reports she is prescribed zyprexa and seroquel however she doesn't take it       [address], RN  06/30/22 0058
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
## 2022-06-30 NOTE — DISCHARGE INSTRUCTIONS
Follow up with your primary care doctor/outpatient providers, and return to the emergency department for new or worsening symptoms
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
## 2022-06-30 NOTE — ED NOTES
Patient lying quietly on stretcher at this time, appears to be sleeping       Chidi Pack RN  06/30/22 1558

## 2022-06-30 NOTE — ED NOTES
Advised patient that they can be reviewed at [facility], though need to void for medical clearance  Pt voiced understanding

## 2022-06-30 NOTE — ED NOTES
[facility] requesting [facility]ry consult  Consult ordered, pending with Jose Alberto

## 2022-06-30 NOTE — ED NOTES
Insurance for admission:   Primary Medicare A/B  PA Promise indicates: Category: MHX/not managed   ID [id number]

## 2022-07-10 ENCOUNTER — HOSPITAL ENCOUNTER (EMERGENCY)
Facility: HOSPITAL | Age: 27
Discharge: HOME | End: 2022-07-10
Attending: EMERGENCY MEDICINE | Admitting: EMERGENCY MEDICINE
Payer: MEDICARE

## 2022-07-10 VITALS
RESPIRATION RATE: 18 BRPM | DIASTOLIC BLOOD PRESSURE: 54 MMHG | HEART RATE: 63 BPM | SYSTOLIC BLOOD PRESSURE: 99 MMHG | OXYGEN SATURATION: 100 % | TEMPERATURE: 98.1 F

## 2022-07-10 DIAGNOSIS — F10.129 HANGOVER (HCC): Primary | ICD-10-CM

## 2022-07-10 LAB
ALBUMIN SERPL BCP-MCNC: 4 G/DL (ref 3.5–5)
ALP SERPL-CCNC: 47 U/L (ref 34–104)
ALT SERPL W P-5'-P-CCNC: 10 U/L (ref 7–52)
ANION GAP SERPL CALCULATED.3IONS-SCNC: 7 MMOL/L (ref 4–13)
AST SERPL W P-5'-P-CCNC: 13 U/L (ref 13–39)
BACTERIA UR QL AUTO: ABNORMAL /HPF
BASOPHILS # BLD AUTO: 0.05 THOUSANDS/ΜL (ref 0–0.1)
BASOPHILS NFR BLD AUTO: 1 % (ref 0–1)
BILIRUB SERPL-MCNC: 0.7 MG/DL (ref 0.2–1)
BILIRUB UR QL STRIP: NEGATIVE
BUN SERPL-MCNC: 7 MG/DL (ref 5–25)
CALCIUM SERPL-MCNC: 9.3 MG/DL (ref 8.4–10.2)
CHLORIDE SERPL-SCNC: 105 MMOL/L (ref 96–108)
CLARITY UR: ABNORMAL
CO2 SERPL-SCNC: 25 MMOL/L (ref 21–32)
COLOR UR: ABNORMAL
CREAT SERPL-MCNC: 0.89 MG/DL (ref 0.6–1.3)
EOSINOPHIL # BLD AUTO: 0.17 THOUSAND/ΜL (ref 0–0.61)
EOSINOPHIL NFR BLD AUTO: 2 % (ref 0–6)
ERYTHROCYTE [DISTWIDTH] IN BLOOD BY AUTOMATED COUNT: 12.8 % (ref 11.6–15.1)
EXT PREG TEST URINE: NEGATIVE
EXT. CONTROL ED NAV: NORMAL
GFR SERPL CREATININE-BSD FRML MDRD: 90 ML/MIN/1.73SQ M
GLUCOSE SERPL-MCNC: 104 MG/DL (ref 65–140)
GLUCOSE UR STRIP-MCNC: NEGATIVE MG/DL
HCT VFR BLD AUTO: 40.9 % (ref 34.8–46.1)
HGB BLD-MCNC: 13.7 G/DL (ref 11.5–15.4)
HGB UR QL STRIP.AUTO: ABNORMAL
IMM GRANULOCYTES # BLD AUTO: 0.03 THOUSAND/UL (ref 0–0.2)
IMM GRANULOCYTES NFR BLD AUTO: 0 % (ref 0–2)
KETONES UR STRIP-MCNC: NEGATIVE MG/DL
LEUKOCYTE ESTERASE UR QL STRIP: ABNORMAL
LIPASE SERPL-CCNC: 28 U/L (ref 11–82)
LYMPHOCYTES # BLD AUTO: 1.82 THOUSANDS/ΜL (ref 0.6–4.47)
LYMPHOCYTES NFR BLD AUTO: 23 % (ref 14–44)
MCH RBC QN AUTO: 29.9 PG (ref 26.8–34.3)
MCHC RBC AUTO-ENTMCNC: 33.5 G/DL (ref 31.4–37.4)
MCV RBC AUTO: 89 FL (ref 82–98)
MONOCYTES # BLD AUTO: 0.62 THOUSAND/ΜL (ref 0.17–1.22)
MONOCYTES NFR BLD AUTO: 8 % (ref 4–12)
NEUTROPHILS # BLD AUTO: 5.23 THOUSANDS/ΜL (ref 1.85–7.62)
NEUTS SEG NFR BLD AUTO: 66 % (ref 43–75)
NITRITE UR QL STRIP: NEGATIVE
NON-SQ EPI CELLS URNS QL MICRO: ABNORMAL /HPF
NRBC BLD AUTO-RTO: 0 /100 WBCS
OTHER STN SPEC: ABNORMAL
PH UR STRIP.AUTO: 6 [PH]
PLATELET # BLD AUTO: 330 THOUSANDS/UL (ref 149–390)
PMV BLD AUTO: 8.9 FL (ref 8.9–12.7)
POTASSIUM SERPL-SCNC: 3.7 MMOL/L (ref 3.5–5.3)
PROT SERPL-MCNC: 7.1 G/DL (ref 6.4–8.4)
PROT UR STRIP-MCNC: ABNORMAL MG/DL
RBC # BLD AUTO: 4.58 MILLION/UL (ref 3.81–5.12)
RBC #/AREA URNS AUTO: ABNORMAL /HPF
SODIUM SERPL-SCNC: 137 MMOL/L (ref 135–147)
SP GR UR STRIP.AUTO: 1.02 (ref 1–1.03)
UROBILINOGEN UR QL STRIP.AUTO: 0.2 E.U./DL
WBC # BLD AUTO: 7.92 THOUSAND/UL (ref 4.31–10.16)
WBC #/AREA URNS AUTO: ABNORMAL /HPF

## 2022-07-10 PROCEDURE — 85025 COMPLETE CBC W/AUTO DIFF WBC: CPT | Performed by: PHYSICIAN ASSISTANT

## 2022-07-10 PROCEDURE — 81001 URINALYSIS AUTO W/SCOPE: CPT | Performed by: PHYSICIAN ASSISTANT

## 2022-07-10 PROCEDURE — 81025 URINE PREGNANCY TEST: CPT | Performed by: PHYSICIAN ASSISTANT

## 2022-07-10 PROCEDURE — 83690 ASSAY OF LIPASE: CPT | Performed by: PHYSICIAN ASSISTANT

## 2022-07-10 PROCEDURE — 87086 URINE CULTURE/COLONY COUNT: CPT | Performed by: PHYSICIAN ASSISTANT

## 2022-07-10 PROCEDURE — 96374 THER/PROPH/DIAG INJ IV PUSH: CPT

## 2022-07-10 PROCEDURE — 80053 COMPREHEN METABOLIC PANEL: CPT | Performed by: PHYSICIAN ASSISTANT

## 2022-07-10 PROCEDURE — 99284 EMERGENCY DEPT VISIT MOD MDM: CPT | Performed by: PHYSICIAN ASSISTANT

## 2022-07-10 PROCEDURE — 96361 HYDRATE IV INFUSION ADD-ON: CPT

## 2022-07-10 PROCEDURE — 99284 EMERGENCY DEPT VISIT MOD MDM: CPT

## 2022-07-10 PROCEDURE — 36415 COLL VENOUS BLD VENIPUNCTURE: CPT | Performed by: PHYSICIAN ASSISTANT

## 2022-07-10 RX ORDER — METOCLOPRAMIDE HYDROCHLORIDE 5 MG/ML
10 INJECTION INTRAMUSCULAR; INTRAVENOUS ONCE
Status: COMPLETED | OUTPATIENT
Start: 2022-07-10 | End: 2022-07-10

## 2022-07-10 RX ORDER — ONDANSETRON 4 MG/1
4 TABLET, ORALLY DISINTEGRATING ORAL EVERY 6 HOURS PRN
Qty: 12 TABLET | Refills: 0 | Status: SHIPPED | OUTPATIENT
Start: 2022-07-10

## 2022-07-10 RX ORDER — ONDANSETRON 2 MG/ML
4 INJECTION INTRAMUSCULAR; INTRAVENOUS ONCE
Status: COMPLETED | OUTPATIENT
Start: 2022-07-10 | End: 2022-07-10

## 2022-07-10 RX ADMIN — METOCLOPRAMIDE HYDROCHLORIDE 10 MG: 5 INJECTION INTRAMUSCULAR; INTRAVENOUS at 15:24

## 2022-07-10 RX ADMIN — ONDANSETRON 4 MG: 2 INJECTION INTRAMUSCULAR; INTRAVENOUS at 13:46

## 2022-07-10 RX ADMIN — SODIUM CHLORIDE 1000 ML: 0.9 INJECTION, SOLUTION INTRAVENOUS at 13:47
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
## 2022-07-10 NOTE — ED NOTES
Specimen Note    Pt unable to provide urine sample; will continue to encourage; attempt #2     Ricco Barbour  07/10/22 0691

## 2022-07-10 NOTE — DISCHARGE INSTRUCTIONS
Please return to the emergency department for worsening symptoms including chest pain, shortness of breath, dizziness, lightheadedness, fever greater than 103, severe pain, inability to walk, fainting episodes, etc  Please follow-up with your family practice provider as soon as possible  Please use the medication I sent to your pharmacy prior to eating and or if you feel nauseous  Please take as prescribed

## 2022-07-10 NOTE — ED NOTES
Specimen Note    Pt unable to provide urine sample; will continue to encourage        Deana Bullock  07/10/22 7844

## 2022-07-10 NOTE — ED NOTES
SARKIS Escudero unsuccessful with attempting IV access, SARKIS Sneed to attempt IV     Nicola SARKIS Man  07/10/22 7881

## 2022-07-10 NOTE — ED PROVIDER NOTES
History  Chief Complaint   Patient presents with    Vomiting     Vomiting since drinking alcohol x 2days     This is a 81-year-old female well-known to the emergency department presenting to the emergency department today for nausea and vomiting  She notes she had drank an excessive amount of alcohol 2 nights ago and since then she has had nausea and vomiting  She notes she has not been able to keep anything down in terms of solids or liquids  Vomiting is nonbloody and nonbilious  She has no abdominal pain  She has no fever or chills  She has no chest pain or shortness of breath  No prior abdominal surgical history  No suicidal or homicidal ideations  Patient does not want resources for alcohol use disorder  She denies any illicit drug use but does occasionally note marijuana use  Last menstrual period began yesterday  The patient denies other complaints at this time  History provided by:  Patient   used: No    Vomiting  Severity:  Moderate  Duration:  2 days  Timing:  Intermittent  Quality:  Stomach contents  Progression:  Unchanged  Chronicity:  New  Recent urination:  Normal  Relieved by:  Nothing  Exacerbated by: eating and drinking  Ineffective treatments:  None tried  Associated symptoms: no abdominal pain, no arthralgias, no chills, no cough, no diarrhea, no fever, no headaches, no myalgias, no sore throat and no URI    Risk factors: alcohol use        Prior to Admission Medications   Prescriptions Last Dose Informant Patient Reported? Taking?    EPINEPHrine (EPIPEN) 0 3 mg/0 3 mL SOAJ   No No   Sig: Inject 0 3 mL (0 3 mg total) into a muscle once for 1 dose   FLUoxetine (PROzac) 40 MG capsule   No No   Sig: Take 1 capsule (40 mg total) by mouth daily   Patient not taking: Reported on 6/20/2022   Lidocaine 4 % PTCH   No No   Sig: Apply 15 each topically daily as needed (back pain)   OLANZapine (ZyPREXA) 5 mg tablet   No No   Sig: Take 1 tablet (5 mg total) by mouth daily for 7 days   QUEtiapine (SEROquel) 100 mg tablet   Yes No   Sig: Take 100 mg by mouth daily at bedtime     Patient not taking: Reported on 2022   albuterol (PROVENTIL HFA,VENTOLIN HFA) 90 mcg/act inhaler   No No   Sig: Inhale 2 puffs every 6 (six) hours as needed for wheezing or shortness of breath   Patient not taking: Reported on 2022   buprenorphine-naloxone (SUBOXONE) 8-2 mg per SL tablet   Yes No   Si tablet 2 (two) times a day   butalbital-acetaminophen-caffeine (FIORICET,ESGIC) -40 mg per tablet   No No   Sig: Take 1 tablet by mouth every 6 (six) hours as needed for headaches for up to 10 doses   Patient not taking: Reported on 2022   calcium carbonate (TUMS) 500 mg chewable tablet   No No   Sig: Chew 2 tablets (1,000 mg total) daily as needed for indigestion or heartburn   diphenhydrAMINE-acetaminophen (TYLENOL PM)  MG TABS   Yes No   Sig: Take 1 tablet by mouth daily at bedtime as needed for sleep   fluticasone (FLOVENT HFA) 110 MCG/ACT inhaler   No No   Sig: Inhale 2 puffs 2 (two) times a day Rinse mouth after use     Patient not taking: Reported on 2022   hydrOXYzine HCL (ATARAX) 25 mg tablet   No No   Sig: Take 1 tablet (25 mg total) by mouth every 6 (six) hours as needed for anxiety for up to 10 days   ibuprofen (MOTRIN) 400 mg tablet   No No   Sig: Take 1 tablet (400 mg total) by mouth 2 (two) times a day   Patient taking differently: Take 400 mg by mouth as needed   melatonin 3 mg   No No   Sig: Take 1 tablet (3 mg total) by mouth daily at bedtime   Patient not taking: Reported on 2022   ondansetron (ZOFRAN) 4 mg tablet   No No   Sig: Take 1 tablet (4 mg total) by mouth every 6 (six) hours   pantoprazole (PROTONIX) 40 mg tablet   No No   Sig: Take 1 tablet (40 mg total) by mouth daily for 15 days   pantoprazole (PROTONIX) 40 mg tablet   No No   Sig: Take 1 tablet (40 mg total) by mouth daily   Patient not taking: Reported on 2022   sucralfate (CARAFATE) 1 g tablet   No No   Sig: Take 1 tablet (1 g total) by mouth 4 (four) times a day   Patient not taking: Reported on 6/20/2022      Facility-Administered Medications: None       Past Medical History:   Diagnosis Date    ADHD     Anxiety     Asthma     Bipolar 1 disorder ([place] [address] )     Depression     Diabetes mellitus ([place] [address] )     GERD (gastroesophageal reflux disease)     Intractable vomiting 1/14/2022    Mental and behavioral problem     Migraine     Palpitations     Last Assessed 29Nov2016    Psychiatric disorder     Psychiatric illness     Psychosis ([place] [address] )     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)     Tachycardia     Last Assessed 29Nov2016    Upper respiratory disease     Last Assessed 27Jan2016       Past Surgical History:   Procedure Laterality Date    KNEE SURGERY      ID LAP,TUBAL CAUTERY N/A 10/3/2018    Procedure: LAPAROSCOPIC TUBAL LIGATION;  Surgeon: Sandeep Foote MD;  Location: [address] [place] [address];  Service: Gynecology    TUBAL LIGATION         Family History   Problem Relation Age of Onset    Migraines Sister     Cancer Mother     Diabetes Mother     Cancer Father     Diabetes Father     Arthritis Father     Cancer Maternal Grandmother     Cancer Maternal Aunt     Cancer Paternal Uncle     Diabetes Other      I have reviewed and agree with the history as documented      E-Cigarette/Vaping    E-Cigarette Use Former User      E-Cigarette/Vaping Substances    Nicotine No     THC No     CBD No     Flavoring No     Other No     Unknown No      Social History     Tobacco Use    Smoking status: Current Some Day Smoker     Packs/day: 0 10     Types: Cigarettes    Smokeless tobacco: Never Used   Vaping Use    Vaping Use: Former   Substance Use Topics    Alcohol use: Not Currently    Drug use: Yes     Frequency: 1 0 times per week     Types: Marijuana     Comment: marijuana occasional, no meth at present        Review of Systems   Constitutional: Positive for appetite change  Negative for chills, diaphoresis, fatigue and fever  HENT: Negative for congestion, dental problem, ear discharge, ear pain, sore throat and voice change  Eyes: Negative for visual disturbance  Respiratory: Negative for cough, chest tightness, shortness of breath and wheezing  Gastrointestinal: Positive for nausea and vomiting  Negative for abdominal distention, abdominal pain, constipation and diarrhea  Genitourinary: Negative for dysuria  Musculoskeletal: Negative for arthralgias, myalgias, neck pain and neck stiffness  Skin: Negative for rash and wound  Neurological: Negative for dizziness, seizures, syncope, weakness, light-headedness, numbness and headaches  Psychiatric/Behavioral: Negative for confusion  All other systems reviewed and are negative  Physical Exam  Physical Exam  Vitals and nursing note reviewed  Constitutional:       General: She is not in acute distress  Appearance: Normal appearance  She is normal weight  She is not ill-appearing, toxic-appearing or diaphoretic  HENT:      Head: Normocephalic and atraumatic  Nose: Nose normal  No congestion or rhinorrhea  Mouth/Throat:      Mouth: Mucous membranes are moist       Pharynx: No oropharyngeal exudate or posterior oropharyngeal erythema  Eyes:      General: No scleral icterus  Right eye: No discharge  Left eye: No discharge  Extraocular Movements: Extraocular movements intact  Pupils: Pupils are equal, round, and reactive to light  Cardiovascular:      Rate and Rhythm: Normal rate and regular rhythm  Pulses: Normal pulses  Heart sounds: Normal heart sounds  No murmur heard  No friction rub  No gallop     Pulmonary:      Effort: Pulmonary effort is normal  No respiratory distress  Breath sounds: Normal breath sounds  No stridor  No wheezing, rhonchi or rales  Chest:      Chest wall: No tenderness  Abdominal:      General: Abdomen is flat  There is no distension  Palpations: Abdomen is soft  Tenderness: There is no abdominal tenderness  There is no right CVA tenderness, left CVA tenderness, guarding or rebound  Comments: Soft, nontender, nondistended, and without organomegaly   Musculoskeletal:         General: Normal range of motion  Cervical back: Normal range of motion  No tenderness  Right lower leg: No edema  Left lower leg: No edema  Skin:     General: Skin is warm and dry  Capillary Refill: Capillary refill takes less than 2 seconds  Coloration: Skin is not jaundiced or pale  Neurological:      General: No focal deficit present  Mental Status: She is alert and oriented to person, place, and time  Mental status is at baseline     Psychiatric:         Mood and Affect: Mood normal          Behavior: Behavior normal          Vital Signs  ED Triage Vitals   Temperature Pulse Respirations Blood Pressure SpO2   07/10/22 1336 07/10/22 1336 07/10/22 1336 07/10/22 1336 07/10/22 1336   98 1 °F (36 7 °C) 74 18 99/65 100 %      Temp Source Heart Rate Source Patient Position - Orthostatic VS BP Location FiO2 (%)   07/10/22 1336 07/10/22 1336 07/10/22 1336 07/10/22 1336 --   Oral Monitor Sitting Left arm       Pain Score       07/10/22 1426       5           Vitals:    07/10/22 1336 07/10/22 1426 07/10/22 1512 07/10/22 1555   BP: 99/65 (!) 94/48 99/54 99/54   Pulse: 74 60 68 63   Patient Position - Orthostatic VS: Sitting Lying Lying Lying         Visual Acuity      ED Medications  Medications   sodium chloride 0 9 % bolus 1,000 mL (0 mL Intravenous Stopped 7/10/22 1555)   ondansetron (ZOFRAN) injection 4 mg (4 mg Intravenous Given 7/10/22 1346)   metoclopramide (REGLAN) injection 10 mg (10 mg Intravenous Given 7/10/22 1524)       Diagnostic Studies  Results Reviewed     Procedure Component Value Units Date/Time    Urine Microscopic [[id number]]  (Abnormal) Collected: 07/10/22 1504    Lab Status: Final result Specimen: Urine, Clean Catch Updated: 07/10/22 1526     RBC, UA Innumerable /hpf      WBC, UA 10-20 /hpf      Epithelial Cells Moderate /hpf      Bacteria, UA Occasional /hpf      OTHER OBSERVATIONS Trichomonas Organisms Present    Urine culture [[id number]] Collected: 07/10/22 1504    Lab Status:  In process Specimen: Urine, Clean Catch Updated: 07/10/22 1526    UA w Reflex to Microscopic w Reflex to Culture [[id number]]  (Abnormal) Collected: 07/10/22 1504    Lab Status: Final result Specimen: Urine, Clean Catch Updated: 07/10/22 1510     Color, UA Orange     Clarity, UA Slightly Cloudy     Specific [place], UA 1 020     pH, UA 6 0     Leukocytes, UA 2+     Nitrite, UA Negative     Protein, UA Trace mg/dl      Glucose, UA Negative mg/dl      Ketones, UA Negative mg/dl      Urobilinogen, UA 0 2 E U /dl      Bilirubin, UA Negative     Occult Blood, UA 3+    POCT pregnancy, urine [[id number]]  (Normal) Resulted: 07/10/22 1507    Lab Status: Final result Updated: 07/10/22 1508     EXT PREG TEST UR (Ref: Negative) negative     Control valid    Comprehensive metabolic panel [[id number]] Collected: 07/10/22 1345    Lab Status: Final result Specimen: Blood from Arm, Left Updated: 07/10/22 1408     Sodium 137 mmol/L      Potassium 3 7 mmol/L      Chloride 105 mmol/L      CO2 25 mmol/L      ANION GAP 7 mmol/L      BUN 7 mg/dL      Creatinine 0 89 mg/dL      Glucose 104 mg/dL      Calcium 9 3 mg/dL      AST 13 U/L      ALT 10 U/L      Alkaline Phosphatase 47 U/L      Total Protein 7 1 g/dL      Albumin 4 0 g/dL      Total Bilirubin 0 70 mg/dL      eGFR 90 ml/min/1 73sq m     Narrative:      [place] guidelines for Chronic Kidney Disease (CKD):     Stage 1 with normal or high GFR (GFR > 90 mL/min/1 73 square meters)    Stage 2 Mild CKD (GFR = 60-89 mL/min/1 73 square meters)    Stage 3A Moderate CKD (GFR = 45-59 mL/min/1 73 square meters)    Stage 3B Moderate CKD (GFR = 30-44 mL/min/1 73 square meters)    Stage 4 Severe CKD (GFR = 15-29 mL/min/1 73 square meters)    Stage 5 End Stage CKD (GFR <15 mL/min/1 73 square meters)  Note: GFR calculation is accurate only with a steady state creatinine    Lipase [[id number]]  (Normal) Collected: 07/10/22 1345    Lab Status: Final result Specimen: Blood from Arm, Left Updated: 07/10/22 1408     Lipase 28 u/L     CBC and differential [[id number]] Collected: 07/10/22 1345    Lab Status: Final result Specimen: Blood from Arm, Left Updated: 07/10/22 1352     WBC 7 92 Thousand/uL      RBC 4 58 Million/uL      Hemoglobin 13 7 g/dL      Hematocrit 40 9 %      MCV 89 fL      MCH 29 9 pg      MCHC 33 5 g/dL      RDW 12 8 %      MPV 8 9 fL      Platelets 040 Thousands/uL      nRBC 0 /100 WBCs      Neutrophils Relative 66 %      Immat GRANS % 0 %      Lymphocytes Relative 23 %      Monocytes Relative 8 %      Eosinophils Relative 2 %      Basophils Relative 1 %      Neutrophils Absolute 5 23 Thousands/µL      Immature Grans Absolute 0 03 Thousand/uL      Lymphocytes Absolute 1 82 Thousands/µL      Monocytes Absolute 0 62 Thousand/µL      Eosinophils Absolute 0 17 Thousand/µL      Basophils Absolute 0 05 Thousands/µL                  No orders to display              Procedures  Procedures         ED Course  ED Course as of 07/10/22 1953   Sun Jul 10, 2022   1547 Patient feeling remarkably better after Zofran and Reglan  Passed a p o  Challenge  Patient received intravenous fluids  Will discharge  46 MAP is currently 76                                             MDM  Number of Diagnoses or Management Options  [facility]): new and requires workup  Diagnosis management comments: This is a 81-year-old female presenting to the emergency department today for nausea and vomiting    Had too much to drink about 2 nights ago and has had nausea and vomiting since then  Vomit is nonbloody and nonbilious  Vital signs are stable  On physical examination, the patient's abdomen was soft, nontender, nondistended, and without organomegaly  She was given Zofran and Reglan which resulted in good anti nausea properties for the patient  She is able to pass a p o  Challenge after that  Labs reassuring  The patient is feeling significantly better after fluid resuscitation and antiemetics  The patient is stable for discharge at this time  She is not suicidal or homicidal and does not want help for alcohol use disorder  Strict return precautions were given  Recommend PCP follow-up as soon as possible  The patient and/or patient's proxy verify their understanding and agree to the plan at this time  All questions answered to the patient and/or their proxy's satisfaction  All labs reviewed and utilized in the medical decision making process  All radiology studies independently viewed by me and interpreted by the radiologist  Portions of the record may have been created with voice recognition software   Occasional wrong word or "sound a like" substitutions may have occurred due to the inherent limitations of voice recognition software   Read the chart carefully and recognize, using context, where substitutions have occurred         Amount and/or Complexity of Data Reviewed  Clinical lab tests: ordered and reviewed  Review and summarize past medical records: yes    Patient Progress  Patient progress: stable      Disposition  Final diagnoses:   Hangover ([facility] [address] )     Time reflects when diagnosis was documented in both MDM as applicable and the Disposition within this note     Time User Action Codes Description Comment    7/10/2022  3:48 PM Prakash Pill Add [F10 129] Hangover [facility])       ED Disposition     ED Disposition   Discharge    Condition   Stable    Date/Time   Sun Jul 10, 2022  3:48 PM    Comment Anushka Ho [address] discharge to home/self care                 Follow-up Information     Follow up With Specialties Details Why Contact Info Additional Information    Ramin Bain DO Family Medicine Schedule an appointment as soon as possible for a visit   [address]   [address] [facility] Emergency Department Emergency Medicine Go to  If symptoms worsen [address] [id number]  [address] [facility] Emergency Department, [address], [address]          Discharge Medication List as of 7/10/2022  3:49 PM      START taking these medications    Details   ondansetron (Zofran ODT) 4 mg disintegrating tablet Take 1 tablet (4 mg total) by mouth every 6 (six) hours as needed for nausea or vomiting, Starting Sun 7/10/2022, Normal         CONTINUE these medications which have NOT CHANGED    Details   albuterol (PROVENTIL HFA,VENTOLIN HFA) 90 mcg/act inhaler Inhale 2 puffs every 6 (six) hours as needed for wheezing or shortness of breath, Starting Tue 2/9/2021, Normal      buprenorphine-naloxone (SUBOXONE) 8-2 mg per SL tablet 1 tablet 2 (two) times a day, Starting Mon 6/13/2022, Historical Med      butalbital-acetaminophen-caffeine (FIORICET,ESGIC) -40 mg per tablet Take 1 tablet by mouth every 6 (six) hours as needed for headaches for up to 10 doses, Starting Sun 10/10/2021, Normal      calcium carbonate (TUMS) 500 mg chewable tablet Chew 2 tablets (1,000 mg total) daily as needed for indigestion or heartburn, Starting Sat 1/15/2022, Normal      diphenhydrAMINE-acetaminophen (TYLENOL PM)  MG TABS Take 1 tablet by mouth daily at bedtime as needed for sleep, Historical Med      EPINEPHrine (EPIPEN) 0 3 mg/0 3 mL SOAJ Inject 0 3 mL (0 3 mg total) into a muscle once for 1 dose, Starting Tue 2/9/2021, Normal      FLUoxetine (PROzac) 40 MG capsule Take 1 capsule (40 mg total) by mouth daily, Starting Mon 9/27/2021, Normal      fluticasone (FLOVENT HFA) 110 MCG/ACT inhaler Inhale 2 puffs 2 (two) times a day Rinse mouth after use , Starting Tue 2/9/2021, Normal      hydrOXYzine HCL (ATARAX) 25 mg tablet Take 1 tablet (25 mg total) by mouth every 6 (six) hours as needed for anxiety for up to 10 days, Starting Mon 9/27/2021, Until Fri 1/21/2022 at 2359, Normal      ibuprofen (MOTRIN) 400 mg tablet Take 1 tablet (400 mg total) by mouth 2 (two) times a day, Starting Wed 3/17/2021, Normal      Lidocaine 4 % PTCH Apply 15 each topically daily as needed (back pain), Starting Mon 6/20/2022, Normal      melatonin 3 mg Take 1 tablet (3 mg total) by mouth daily at bedtime, Starting Sat 9/25/2021, Normal      OLANZapine (ZyPREXA) 5 mg tablet Take 1 tablet (5 mg total) by mouth daily for 7 days, Starting Fri 7/1/2022, Until Fri 7/8/2022, Normal      ondansetron (ZOFRAN) 4 mg tablet Take 1 tablet (4 mg total) by mouth every 6 (six) hours, Starting Thu 1/13/2022, Normal      pantoprazole (PROTONIX) 40 mg tablet Take 1 tablet (40 mg total) by mouth daily, Starting Thu 1/13/2022, Normal      QUEtiapine (SEROquel) 100 mg tablet Take 100 mg by mouth daily at bedtime  , Historical Med      sucralfate (CARAFATE) 1 g tablet Take 1 tablet (1 g total) by mouth 4 (four) times a day, Starting Sat 9/18/2021, Normal             No discharge procedures on file      PDMP Review       Value Time User    PDMP Reviewed  Yes 6/30/2022  2:32 PM Jhon Kevin PA-C          ED Provider  Electronically Signed by           Blanka Saldaña PA-C  07/10/22 2006

## 2022-07-12 LAB — BACTERIA UR CULT: NORMAL

## 2022-07-21 ENCOUNTER — HOSPITAL ENCOUNTER (EMERGENCY)
Facility: HOSPITAL | Age: 27
Discharge: HOME | End: 2022-07-22
Attending: EMERGENCY MEDICINE
Payer: MEDICARE

## 2022-07-21 DIAGNOSIS — U07.1 COVID-19 VIRUS INFECTION: Primary | ICD-10-CM

## 2022-07-21 PROCEDURE — 99285 EMERGENCY DEPT VISIT HI MDM: CPT

## 2022-07-22 ENCOUNTER — APPOINTMENT (EMERGENCY)
Dept: RADIOLOGY | Facility: HOSPITAL | Age: 27
End: 2022-07-22
Payer: MEDICARE

## 2022-07-22 VITALS
SYSTOLIC BLOOD PRESSURE: 110 MMHG | WEIGHT: 197.2 LBS | TEMPERATURE: 100.3 F | RESPIRATION RATE: 20 BRPM | BODY MASS INDEX: 33.85 KG/M2 | DIASTOLIC BLOOD PRESSURE: 73 MMHG | OXYGEN SATURATION: 99 % | HEART RATE: 88 BPM

## 2022-07-22 LAB
ERYTHROCYTE [DISTWIDTH] IN BLOOD BY AUTOMATED COUNT: 13.4 % (ref 11.6–15.1)
EXT PREG TEST URINE: NEGATIVE
EXT. CONTROL ED NAV: NORMAL
FLUAV RNA RESP QL NAA+PROBE: NEGATIVE
FLUBV RNA RESP QL NAA+PROBE: NEGATIVE
HCG SERPL QL: NEGATIVE
HCT VFR BLD AUTO: 38.3 % (ref 34.8–46.1)
HGB BLD-MCNC: 12.4 G/DL (ref 11.5–15.4)
INR PPP: 0.99 (ref 0.84–1.19)
MCH RBC QN AUTO: 30.2 PG (ref 26.8–34.3)
MCHC RBC AUTO-ENTMCNC: 32.4 G/DL (ref 31.4–37.4)
MCV RBC AUTO: 93 FL (ref 82–98)
PLATELET # BLD AUTO: 280 THOUSANDS/UL (ref 149–390)
PMV BLD AUTO: 9.1 FL (ref 8.9–12.7)
PROTHROMBIN TIME: 13.3 SECONDS (ref 11.6–14.5)
RBC # BLD AUTO: 4.1 MILLION/UL (ref 3.81–5.12)
RSV RNA RESP QL NAA+PROBE: NEGATIVE
SARS-COV-2 RNA RESP QL NAA+PROBE: POSITIVE
WBC # BLD AUTO: 14.79 THOUSAND/UL (ref 4.31–10.16)

## 2022-07-22 PROCEDURE — 81025 URINE PREGNANCY TEST: CPT | Performed by: EMERGENCY MEDICINE

## 2022-07-22 PROCEDURE — 36415 COLL VENOUS BLD VENIPUNCTURE: CPT

## 2022-07-22 PROCEDURE — 84703 CHORIONIC GONADOTROPIN ASSAY: CPT | Performed by: EMERGENCY MEDICINE

## 2022-07-22 PROCEDURE — 0241U HB NFCT DS VIR RESP RNA 4 TRGT: CPT | Performed by: EMERGENCY MEDICINE

## 2022-07-22 PROCEDURE — 85027 COMPLETE CBC AUTOMATED: CPT | Performed by: EMERGENCY MEDICINE

## 2022-07-22 PROCEDURE — 85610 PROTHROMBIN TIME: CPT

## 2022-07-22 PROCEDURE — 71046 X-RAY EXAM CHEST 2 VIEWS: CPT

## 2022-07-22 PROCEDURE — 99285 EMERGENCY DEPT VISIT HI MDM: CPT | Performed by: EMERGENCY MEDICINE

## 2022-07-22 NOTE — ED ATTENDING ATTESTATION
7/21/2022  IEmeterio DO, saw and evaluated the patient  I have discussed the patient with the resident/non-physician practitioner and agree with the resident's/non-physician practitioner's findings, Plan of Care, and MDM as documented in the resident's/non-physician practitioner's note, except where noted  All available labs and Radiology studies were reviewed  I was present for key portions of any procedure(s) performed by the resident/non-physician practitioner and I was immediately available to provide assistance  At this point I agree with the current assessment done in the Emergency Department  I have conducted an independent evaluation of this patient a history and physical is as follows:    Patient 32-year-old female history of asthma, says that the last 2 or 3 weeks she has had some slight runny nose, occasional congestion, occasional wheezing, slight nonproductive cough  No fever, no chills, no chest pain or palpitations  No travel history or sick contacts  She had been on a steroid inhaler plus albuterol MDI without a spacer, steroid inhaler was for maintenance therapy but she lost that several months ago and has not called to get a refill  She also says last several days she has noticed some bruising, predominantly on the right lower shin area but also 1 on the left upper arm  She does not recall any particular trauma  She also somewhat irregular menses  She does not notice any easy bleeding from her gums, no rectal bleeding, no vaginal bleeding  No travel history or sick contacts   patient did not receive any COVID vaccinations    General:  Patient is well-appearing  Head:  Atraumatic  Eyes:  Conjunctiva pink  ENT:  Mucous membranes are moist, scant clear rhinorrhea, no purulent discharge  Neck:  Supple  Cardiac:  S1-S2, without murmurs  Lungs:  Clear to auscultation bilaterally  Abdomen:  Soft, nontender, normal bowel sounds, no CVA tenderness, no tympany, no rigidity, no guarding  Extremities:  Normal range of motion, small ecchymosis without hematoma on her left upper arm  She has 2 scattered small ecchymosis on her right pretibial area  No bony tenderness to the bilateral bilateral humeral heads, humerus, elbows, radius, ulna, hands, hips, femurs, knees, tibia, fibula, feet  No pain with passive range of motion at the bilateral shoulders, elbows, wrists, hips, knees, or ankles  Neurologic:  Awake, fluent speech, normal comprehension  AAOx3  Skin:  Pink warm and dry  Psychiatric:  Alert, pleasant, cooperative            ED Course     XR chest pa & lateral   ED Interpretation   No acute cardiopulmonary findings compared to CXR 4/17/22          Labs Reviewed   COVID19, INFLUENZA A/B, RSV PCR, [facility] - Abnormal       Result Value Ref Range Status    SARS-CoV-2 Positive (*) Negative Final    Comment:      INFLUENZA A PCR Negative  Negative Final    Comment:      INFLUENZA B PCR Negative  Negative Final    Comment:      RSV PCR Negative  Negative Final    Comment:      Narrative:     FOR PEDIATRIC PATIENTS - copy/paste COVID Guidelines URL to browser: [email or web address]  [email or web address]    SARS-CoV-2 assay is a Nucleic Acid Amplification assay intended for the  qualitative detection of nucleic acid from SARS-CoV-2 in nasopharyngeal  swabs  Results are for the presumptive identification of SARS-CoV-2 RNA  Positive results are indicative of infection with SARS-CoV-2, the virus  causing COVID-19, but do not rule out bacterial infection or co-infection  with other viruses  Laboratories within the [place] and its  territories are required to report all positive results to the appropriate  public health authorities  Negative results do not preclude SARS-CoV-2  infection and should not be used as the sole basis for treatment or other  patient management decisions   Negative results must be combined with  clinical observations, patient history, and epidemiological information  This test has not been FDA cleared or approved  This test has been authorized by FDA under an Emergency Use Authorization  (EUA)  This test is only authorized for the duration of time the  declaration that circumstances exist justifying the authorization of the  emergency use of an in vitro diagnostic tests for detection of SARS-CoV-2  virus and/or diagnosis of COVID-19 infection under section 564(b)(1) of  the Act, 21 U  [place] C  933QNC-4(X)(5), unless the authorization is terminated  or revoked sooner  The test has been validated but independent review by FDA  and CLIA is pending  Test performed using [employer] Gene[employer]pert: This RT-PCR assay targets N2,  a region unique to SARS-CoV-2  A conserved region in the E-gene was chosen  for pan-Sarbecovirus detection which includes SARS-CoV-2  CBC AND PLATELET - Abnormal    WBC 14 79 (*) 4 31 - 10 16 Thousand/uL Final    RBC 4 10  3 81 - 5 12 Million/uL Final    Hemoglobin 12 4  11 5 - 15 4 g/dL Final    Hematocrit 38 3  34 8 - 46 1 % Final    MCV 93  82 - 98 fL Final    MCH 30 2  26 8 - 34 3 pg Final    MCHC 32 4  31 4 - 37 4 g/dL Final    RDW 13 4  11 6 - 15 1 % Final    Platelets 092  909 - 390 Thousands/uL Final    MPV 9 1  8 9 - 12 7 fL Final   PREGNANCY TEST (HCG QUALITATIVE) - Normal    Preg, Serum Negative  Negative Final   PROTIME-INR - Normal    Protime 13 3  11 6 - 14 5 seconds Final    INR 0 99  0 84 - 1 19 Final   POCT PREGNANCY, URINE - Normal    EXT PREG TEST UR (Ref: Negative) negative   Final    Control valid   Final     On reassessment, patient feeling comfortable  Patient overall well-appearing, however is not vaccinated against COVID, would be considered high risk for progression  Discussed risks and benefits of Paxlovid therapy  Patient indicated she would like a prescription  Patient given a prescription for this, as well as information about locating pharmacies which carry this  Supportive care, importance of follow-up and return precautions were discussed with the patient, who expressed understanding        Critical Care Time  Procedures

## 2022-07-22 NOTE — DISCHARGE INSTRUCTIONS
Please schedule appointment with your primary care doctor for 2 days from now  Please look up a pharmacy that carries Paxil it and failure prescription  Please take Paxil med as prescribed  It is advised that your close contacts get tested for COVID  Please isolate as much as you can and wearing a well fitting T59 mask in public  If you develop any new or worsening symptoms such as intractable shortness of breath, fainting, or severe cough please return to the emergency department

## 2022-07-22 NOTE — ED PROVIDER NOTES
History  Chief Complaint   Patient presents with    Shortness of Breath     Pt c/o wheezing, "throat closing", SOB, stuffy nose, and cough for about 3 weeks   Bleeding/Bruising     Pt states she wakes up with bruises all over her body and is unsure how she got them  Patient is a 43-year-old female with history of asthma, anxiety, bipolar disorder who presents with shortness of breath  Patient says that for the past 2 days she has been moving boxes and furniture with her boyfriend to a friend's house where they were moving, and she has had gradual onset of worsening shortness of breath, coughing, and wheezing  She says she has asthma which is normally well controlled with an albuterol inhaler, no spacer  She notes that she lost her steroid inhaler 2 months ago and a lobular get her prescription refilled  She also endorses congestion, clear rhinorrhea, sore throat, fever, fevers, and chills  She denies any chest pain or palpitations though notes that her heart rate is elevated  Patient also complains of bruising for the past few days  Patient says that she has woken up with bruising predominantly on her lower extremities which he cannot remember any injuries  She notes that for the past few months she has had irregular menses, notes that she has always bladder easily  She explained that she has a half-sister who easily bruises as well, but denies any congenital family history of bleeding disorders  She notes a family history of throat cancer on both the mother and father side  Prior to Admission Medications   Prescriptions Last Dose Informant Patient Reported? Taking?    EPINEPHrine (EPIPEN) 0 3 mg/0 3 mL SOAJ   No No   Sig: Inject 0 3 mL (0 3 mg total) into a muscle once for 1 dose   FLUoxetine (PROzac) 40 MG capsule   No No   Sig: Take 1 capsule (40 mg total) by mouth daily   Patient not taking: Reported on 6/20/2022   Lidocaine 4 % PTCH   No No   Sig: Apply 15 each topically daily as needed (back pain)   OLANZapine (ZyPREXA) 5 mg tablet   No No   Sig: Take 1 tablet (5 mg total) by mouth daily for 7 days   QUEtiapine (SEROquel) 100 mg tablet   Yes No   Sig: Take 100 mg by mouth daily at bedtime     Patient not taking: Reported on 2022   albuterol (PROVENTIL HFA,VENTOLIN HFA) 90 mcg/act inhaler   No No   Sig: Inhale 2 puffs every 6 (six) hours as needed for wheezing or shortness of breath   Patient not taking: Reported on 2022   buprenorphine-naloxone (SUBOXONE) 8-2 mg per SL tablet   Yes No   Si tablet 2 (two) times a day   butalbital-acetaminophen-caffeine (FIORICET,ESGIC) -40 mg per tablet   No No   Sig: Take 1 tablet by mouth every 6 (six) hours as needed for headaches for up to 10 doses   Patient not taking: Reported on 2022   calcium carbonate (TUMS) 500 mg chewable tablet   No No   Sig: Chew 2 tablets (1,000 mg total) daily as needed for indigestion or heartburn   diphenhydrAMINE-acetaminophen (TYLENOL PM)  MG TABS   Yes No   Sig: Take 1 tablet by mouth daily at bedtime as needed for sleep   fluticasone (FLOVENT HFA) 110 MCG/ACT inhaler   No No   Sig: Inhale 2 puffs 2 (two) times a day Rinse mouth after use     Patient not taking: Reported on 2022   hydrOXYzine HCL (ATARAX) 25 mg tablet   No No   Sig: Take 1 tablet (25 mg total) by mouth every 6 (six) hours as needed for anxiety for up to 10 days   ibuprofen (MOTRIN) 400 mg tablet   No No   Sig: Take 1 tablet (400 mg total) by mouth 2 (two) times a day   Patient taking differently: Take 400 mg by mouth as needed   melatonin 3 mg   No No   Sig: Take 1 tablet (3 mg total) by mouth daily at bedtime   Patient not taking: Reported on 2022   ondansetron (ZOFRAN) 4 mg tablet   No No   Sig: Take 1 tablet (4 mg total) by mouth every 6 (six) hours   ondansetron (Zofran ODT) 4 mg disintegrating tablet   No No   Sig: Take 1 tablet (4 mg total) by mouth every 6 (six) hours as needed for nausea or vomiting pantoprazole (PROTONIX) 40 mg tablet   No No   Sig: Take 1 tablet (40 mg total) by mouth daily for 15 days   pantoprazole (PROTONIX) 40 mg tablet   No No   Sig: Take 1 tablet (40 mg total) by mouth daily   Patient not taking: Reported on 6/20/2022   sucralfate (CARAFATE) 1 g tablet   No No   Sig: Take 1 tablet (1 g total) by mouth 4 (four) times a day   Patient not taking: Reported on 6/20/2022      Facility-Administered Medications: None       Past Medical History:   Diagnosis Date    ADHD     Anxiety     Asthma     Bipolar 1 disorder ([facility] )     Depression     Diabetes mellitus ([facility] )     GERD (gastroesophageal reflux disease)     Intractable vomiting 1/14/2022    Mental and behavioral problem     Migraine     Palpitations     Last Assessed 29Nov2016    Psychiatric disorder     Psychiatric illness     Psychosis ([place] [address] )     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[facility] )     Seizures ([place] [address] ) 01/14/2022    - petit mal    Seizures ([place] [address] )     Pseudoseizures    SIRS (systemic inflammatory response syndrome) ([place] [address] ) 1/14/2022    Stabbing chest pain     Last Assessed 29Nov2016    Su[facility] attempt [facility])     Tachycardia     Last Assessed 29Nov2016    Upper respiratory disease     Last Assessed 27Jan2016       Past Surgical History:   Procedure Laterality Date    KNEE SURGERY      AK LAP,TUBAL CAUTERY N/A 10/3/2018    Procedure: LAPAROSCOPIC TUBAL LIGATION;  Surgeon: Jose Smith MD;  Location: [address] [place] [address];  Service: Gynecology    TUBAL LIGATION         Family History   Problem Relation Age of Onset    Migraines Sister     Cancer Mother     Diabetes Mother     Cancer Father     Diabetes Father     Arthritis Father     Cancer Maternal Grandmother     Cancer Maternal Aunt     Cancer Paternal Uncle     Diabetes Other      I have reviewed and agree with the history as documented      E-Cigarette/Vaping    E-Cigarette Use Former User      E-Cigarette/Vaping Substances    Nicotine No     THC No     CBD No     Flavoring No     Other No     Unknown No      Social History     Tobacco Use    Smoking status: Current Some Day Smoker     Packs/day: 0 10     Types: Cigarettes    Smokeless tobacco: Never Used   Vaping Use    Vaping Use: Former   Substance Use Topics    Alcohol use: Not Currently    Drug use: Yes     Frequency: 1 0 times per week     Types: Marijuana     Comment: marijuana occasional, no meth at present         Review of Systems   Constitutional: Positive for chills, fatigue and fever  Night sweats   HENT: Positive for congestion, ear pain (b/l), rhinorrhea and sore throat  Negative for mouth sores, nosebleeds, sinus pressure and sinus pain  Eyes: Positive for itching  Negative for pain and redness  Respiratory: Positive for cough, shortness of breath and wheezing  Negative for choking  Cardiovascular: Negative for chest pain, palpitations and leg swelling  Increased heart rate   Gastrointestinal: Positive for nausea  Negative for abdominal pain, blood in stool, constipation, diarrhea and vomiting  Genitourinary: Positive for frequency and menstrual problem  Negative for dysuria, hematuria and pelvic pain  Musculoskeletal: Positive for myalgias  Negative for neck pain  Skin: Negative for rash and wound  Allergic/Immunologic: Positive for environmental allergies  Neurological: Negative for dizziness, weakness and headaches  Hematological: Negative for adenopathy  Bruises/bleeds easily  Psychiatric/Behavioral: Negative for confusion and decreased concentration  The patient is nervous/anxious          Physical Exam  ED Triage Vitals   Temperature Pulse Respirations Blood Pressure SpO2   07/21/22 2352 07/21/22 2349 07/21/22 2349 07/21/22 2349 07/21/22 2349   99 3 °F (37 4 °C) (!) 107 20 110/73 99 %      Temp Source Heart Rate Source Patient Position - Orthostatic VS BP Location FiO2 (%)   07/22/22 0149 07/22/22 0027 -- -- --   Oral Radial         Pain Score       -- Orthostatic Vital Signs  Vitals:    07/21/22 2349 07/22/22 0027   BP: 110/73    Pulse: (!) 107 88       Physical Exam  Constitutional:       General: She is not in acute distress  Appearance: She is well-developed  She is obese  She is not ill-appearing, toxic-appearing or diaphoretic  Comments: Patient is anxious appearing, tearful   HENT:      Head: Normocephalic and atraumatic  Mouth/Throat:      Mouth: Mucous membranes are moist       Pharynx: Oropharynx is clear  No pharyngeal swelling or oropharyngeal exudate  Eyes:      Extraocular Movements: Extraocular movements intact  Pupils: Pupils are equal, round, and reactive to light  Cardiovascular:      Rate and Rhythm: Normal rate and regular rhythm  Pulses: Normal pulses  Heart sounds: Normal heart sounds  Pulmonary:      Effort: Pulmonary effort is normal  No tachypnea, accessory muscle usage or respiratory distress  Breath sounds: Normal breath sounds  No stridor  No wheezing, rhonchi or rales  Abdominal:      General: Bowel sounds are normal       Palpations: Abdomen is soft  Skin:     General: Skin is warm and dry  Findings: Ecchymosis present  Comments: There are few ecchymoses on the right shin  There is a singular ecchymosis on each bilateral arm  Neurological:      Mental Status: She is alert  Psychiatric:         Mood and Affect: Mood is anxious  Behavior: Behavior normal  Behavior is not agitated           ED Medications  Medications - No data to display    Diagnostic Studies  Results Reviewed     Procedure Component Value Units Date/Time    FLU/RSV/COVID - if FLU/RSV clinically relevant [[id number]]  (Abnormal) Collected: 07/22/22 0114    Lab Status: Final result Specimen: Nares from Nose Updated: 07/22/22 0235     SARS-CoV-2 Positive     INFLUENZA A PCR Negative     INFLUENZA B PCR Negative     RSV PCR Negative    Narrative:      FOR PEDIATRIC PATIENTS - copy/paste COVID Guidelines URL to browser: [email or web address]    SARS-CoV-2 assay is a Nucleic Acid Amplification assay intended for the  qualitative detection of nucleic acid from SARS-CoV-2 in nasopharyngeal  swabs  Results are for the presumptive identification of SARS-CoV-2 RNA  Positive results are indicative of infection with SARS-CoV-2, the virus  causing COVID-19, but do not rule out bacterial infection or co-infection  with other viruses  Laboratories within the [place] and its  territories are required to report all positive results to the appropriate  public health authorities  Negative results do not preclude SARS-CoV-2  infection and should not be used as the sole basis for treatment or other  patient management decisions  Negative results must be combined with  clinical observations, patient history, and epidemiological information  This test has not been FDA cleared or approved  This test has been authorized by FDA under an Emergency Use Authorization  (EUA)  This test is only authorized for the duration of time the  declaration that circumstances exist justifying the authorization of the  emergency use of an in vitro diagnostic tests for detection of SARS-CoV-2  virus and/or diagnosis of COVID-19 infection under section 564(b)(1) of  the Act, 21 U  S C  226ODC-4(F)(2), unless the authorization is terminated  or revoked sooner  The test has been validated but independent review by FDA  and CLIA is pending  Test performed using [employer]: This RT-PCR assay targets N2,  a region unique to SARS-CoV-2  A conserved region in the E-gene was chosen  for pan-Sarbecovirus detection which includes SARS-CoV-2      Pregnancy Test (HCG Qualitative) [[id number]]  (Normal) Collected: 07/22/22 0114    Lab Status: Final result Specimen: Blood from Arm, Left Updated: 07/22/22 0212     Preg, Serum Negative    Protime-INR [[id number]]  (Normal) Collected: 07/22/22 0114    Lab Status: Final result Specimen: Blood from Arm, Left Updated: 07/22/22 0205     Protime 13 3 seconds      INR 0 99    POCT pregnancy, urine [[id number]]  (Normal) Resulted: 07/22/22 0155    Lab Status: Final result Updated: 07/22/22 0155     EXT PREG TEST UR (Ref: Negative) negative     Control valid    CBC [[id number]]  (Abnormal) Collected: 07/22/22 0114    Lab Status: Final result Specimen: Blood from Arm, Left Updated: 07/22/22 0141     WBC 14 79 Thousand/uL      RBC 4 10 Million/uL      Hemoglobin 12 4 g/dL      Hematocrit 38 3 %      MCV 93 fL      MCH 30 2 pg      MCHC 32 4 g/dL      RDW 13 4 %      Platelets 947 Thousands/uL      MPV 9 1 fL                  XR chest pa & lateral   ED Interpretation by Meghana Mcdaniels MD (07/22 0127)   No acute cardiopulmonary findings compared to CXR 4/17/22            Procedures  Procedures      ED Course  ED Course as of 07/22/22 0310 Fri Jul 22, 2022   0147 WBC(!): 14 79   0151 Temperature: 100 3 °F (37 9 °C)   0201 Patient notes some lower abdominal cramping  On abdominal exam bowel sounds are normal, mild tenderness to palpation in the lower left and right lower quadrants  There is moderate tenderness to palpation in the right upper quadrant, no guarding, no rebound tenderness  Patient states she was post right upper quadrant ultrasound a few months ago but has not made appointment  9639 Patient states that she is very anxious and does not want to workup her abdominal pain at this time  She states that she will make an appointment to get her right upper quadrant ultrasound  0206 PROTIME: 13 3   0206 POCT INR: 0 99   0218 SARS-COV-2(!): Positive                             SBIRT 20yo+    Flowsheet Row Most Recent Value   SBIRT (25 yo +)    In order to provide better care to our patients, we are screening all of our patients for alcohol and drug use  Would it be okay to ask you these screening questions?  No Filed at: 07/22/2022 0243 MDM  Number of Diagnoses or Management Options  COVID-19 virus infection: new and does not require workup  Diagnosis management comments: Patient is a 73-year-old female with history of asthma, anxiety, bipolar disorder, and mild intellectual disability who presents with worsening shortness of breath, wheezing, cough, and upper respiratory symptoms  She also complains of bruising without history of injury or trauma  Differential diagnosis of respiratory symptoms include but not limited to viral infection, asthma exacerbation, anxiety  Will get a chest x-ray and COVID test     Given history of irregular menses and easy bleeding, bruising could be due to anemia vs clotting disorder  Will get CBC and PT/INR    CBC and PT INR normal, will advise patient to follow-up with PCP    Patient is COVID positive, chest x-ray normal   Symptoms are mild at this time but patient is unvaccinated  Patient is agreeable to Paxlovid therapy  Instructed to follow up with PCP in 2 days given her on vaccinated status  Patient was instructed about isolating and symptom control  Patient understood and was agreeable to the plan  Amount and/or Complexity of Data Reviewed  Clinical lab tests: ordered and reviewed  Tests in the radiology section of CPT®: ordered and reviewed  Review and summarize past medical records: yes  Discuss the patient with other providers: yes    Patient Progress  Patient progress: stable      Disposition  Final diagnoses:   COVID-19 virus infection     Time reflects when diagnosis was documented in both MDM as applicable and the Disposition within this note     Time User Action Codes Description Comment    7/22/2022  2:50 AM Jessica Wellington Add [U07 1] COVID-19 virus infection       ED Disposition     ED Disposition   Discharge    Condition   Stable    Date/Time   Fri Jul 22, 2022  2:58 AM    Comment   J[place] Pedraza discharge to home/self care                 Follow-up Information     Follow up With Specialties Details Why Contact Info Additional Information    Leonid Gumzán DO Family Medicine Schedule an appointment as soon as possible for a visit in 2 days  [address]   Emergency Department Emergency Medicine  If symptoms worsen [address] Emergency Department, [address], [address] [facility]          Current Discharge Medication List      START taking these medications    Details   nirmatrelvir & ritonavir (Paxlovid) tablet therapy pack Take 3 tablets by mouth 2 (two) times a day for 5 days Take 2 nirmatrelvir tablets + 1 ritonavir tablet together per dose  Qty: 30 tablet, Refills: 0    Associated Diagnoses: COVID-19 virus infection         CONTINUE these medications which have NOT CHANGED    Details   albuterol (PROVENTIL HFA,VENTOLIN HFA) 90 mcg/act inhaler Inhale 2 puffs every 6 (six) hours as needed for wheezing or shortness of breath  Qty: 1 Inhaler, Refills: 5    Comments: Substitution to a formulary equivalent within the same pharmaceutical class is authorized  Associated Diagnoses:  Moderate persistent asthma, unspecified whether complicated      buprenorphine-naloxone (SUBOXONE) 8-2 mg per SL tablet 1 tablet 2 (two) times a day      butalbital-acetaminophen-caffeine (FIORICET,ESGIC) -40 mg per tablet Take 1 tablet by mouth every 6 (six) hours as needed for headaches for up to 10 doses  Qty: 10 tablet, Refills: 0    Associated Diagnoses: Headache      calcium carbonate (TUMS) 500 mg chewable tablet Chew 2 tablets (1,000 mg total) daily as needed for indigestion or heartburn  Qty: 60 tablet, Refills: 0    Associated Diagnoses: Epigastric pain      diphenhydrAMINE-acetaminophen (TYLENOL PM)  MG TABS Take 1 tablet by mouth daily at bedtime as needed for sleep      EPINEPHrine (EPIPEN) 0 3 mg/0 3 mL SOAJ Inject 0 3 mL (0 3 mg total) into a muscle once for 1 dose  Qty: 2 each, Refills: 1    Associated Diagnoses: Allergic reaction, subsequent encounter      FLUoxetine (PROzac) 40 MG capsule Take 1 capsule (40 mg total) by mouth daily  Qty: 30 capsule, Refills: 0    Associated Diagnoses: Bipolar disorder ([facility])      fluticasone (FLOVENT HFA) 110 MCG/ACT inhaler Inhale 2 puffs 2 (two) times a day Rinse mouth after use  Qty: 1 Inhaler, Refills: 5    Associated Diagnoses:  Moderate persistent asthma, unspecified whether complicated      hydrOXYzine HCL (ATARAX) 25 mg tablet Take 1 tablet (25 mg total) by mouth every 6 (six) hours as needed for anxiety for up to 10 days  Qty: 30 tablet, Refills: 0    Associated Diagnoses: Bipolar disorder ([facility])      ibuprofen (MOTRIN) 400 mg tablet Take 1 tablet (400 mg total) by mouth 2 (two) times a day  Qty: 10 tablet, Refills: 0    Associated Diagnoses: Costochondritis      Lidocaine 4 % PTCH Apply 15 each topically daily as needed (back pain)  Qty: 30 patch, Refills: 0    Associated Diagnoses: Strain of lumbar region, initial encounter; Chronic bilateral thoracic back pain      melatonin 3 mg Take 1 tablet (3 mg total) by mouth daily at bedtime  Qty: 30 tablet, Refills: 0    Associated Diagnoses: Insomnia      OLANZapine (ZyPREXA) 5 mg tablet Take 1 tablet (5 mg total) by mouth daily for 7 days  Qty: 7 tablet, Refills: 0    Associated Diagnoses: Depression      ondansetron (Zofran ODT) 4 mg disintegrating tablet Take 1 tablet (4 mg total) by mouth every 6 (six) hours as needed for nausea or vomiting  Qty: 12 tablet, Refills: 0    Associated Diagnoses: Hangover ([facility])      ondansetron (ZOFRAN) 4 mg tablet Take 1 tablet (4 mg total) by mouth every 6 (six) hours  Qty: 12 tablet, Refills: 0    Associated Diagnoses: Nausea and vomiting, intractability of vomiting not specified, unspecified vomiting type      pantoprazole (PROTONIX) 40 mg tablet Take 1 tablet (40 mg total) by mouth daily  Qty: 30 tablet, Refills: 1    Associated Diagnoses: Gastritis      QUEtiapine (SEROquel) 100 mg tablet Take 100 mg by mouth daily at bedtime        sucralfate (CARAFATE) 1 g tablet Take 1 tablet (1 g total) by mouth 4 (four) times a day  Qty: 20 tablet, Refills: 0    Associated Diagnoses: Gastroesophageal reflux disease, unspecified whether esophagitis present           No discharge procedures on file  PDMP Review       Value Time User    PDMP Reviewed  Yes 6/30/2022  2:32 PM Nathalia Malhotra PA-C           ED Provider  Attending physically available and evaluated [place] Kasandra Swati CANDELARIA managed the patient along with the ED Attending      Electronically Signed by         July Barr MD  07/22/22 4526

## 2022-07-28 ENCOUNTER — TELEPHONE (OUTPATIENT)
Dept: NEUROLOGY | Facility: CLINIC | Age: 27
End: 2022-07-28

## 2022-07-28 NOTE — TELEPHONE ENCOUNTER
THE [facility] to confirm patient's 8/2/2022 @ 1 pm appointment with Dr Sherren Charon at the [facility]  Call back number given [phone]

## 2022-08-19 ENCOUNTER — TELEPHONE (OUTPATIENT)
Dept: FAMILY MEDICINE CLINIC | Facility: CLINIC | Age: 27
End: 2022-08-19

## 2022-09-08 ENCOUNTER — TELEPHONE (OUTPATIENT)
Dept: OTHER | Facility: OTHER | Age: 27
End: 2022-09-08

## 2022-10-12 PROBLEM — N39.0 UTI (URINARY TRACT INFECTION): Status: RESOLVED | Noted: 2021-11-26 | Resolved: 2022-10-12

## 2022-10-17 ENCOUNTER — TELEPHONE (OUTPATIENT)
Dept: FAMILY MEDICINE CLINIC | Facility: CLINIC | Age: 27
End: 2022-10-17

## 2022-10-18 ENCOUNTER — APPOINTMENT (EMERGENCY)
Dept: RADIOLOGY | Facility: HOSPITAL | Age: 27
End: 2022-10-18
Payer: MEDICARE

## 2022-10-18 ENCOUNTER — HOSPITAL ENCOUNTER (EMERGENCY)
Facility: HOSPITAL | Age: 27
Discharge: HOME | End: 2022-10-18
Attending: EMERGENCY MEDICINE
Payer: MEDICARE

## 2022-10-18 VITALS
OXYGEN SATURATION: 100 % | RESPIRATION RATE: 20 BRPM | HEART RATE: 83 BPM | SYSTOLIC BLOOD PRESSURE: 114 MMHG | TEMPERATURE: 97.2 F | DIASTOLIC BLOOD PRESSURE: 60 MMHG

## 2022-10-18 DIAGNOSIS — R11.2 NAUSEA AND VOMITING, UNSPECIFIED VOMITING TYPE: Primary | ICD-10-CM

## 2022-10-18 LAB
ALBUMIN SERPL BCP-MCNC: 3.9 G/DL (ref 3.5–5)
ALP SERPL-CCNC: 54 U/L (ref 46–116)
ALT SERPL W P-5'-P-CCNC: 26 U/L (ref 12–78)
ANION GAP SERPL CALCULATED.3IONS-SCNC: 11 MMOL/L (ref 4–13)
AST SERPL W P-5'-P-CCNC: 24 U/L (ref 5–45)
BASOPHILS # BLD AUTO: 0.05 THOUSANDS/ÂΜL (ref 0–0.1)
BASOPHILS NFR BLD AUTO: 1 % (ref 0–1)
BILIRUB SERPL-MCNC: 0.57 MG/DL (ref 0.2–1)
BUN SERPL-MCNC: 8 MG/DL (ref 5–25)
CALCIUM SERPL-MCNC: 8.9 MG/DL (ref 8.3–10.1)
CHLORIDE SERPL-SCNC: 103 MMOL/L (ref 96–108)
CO2 SERPL-SCNC: 27 MMOL/L (ref 21–32)
CREAT SERPL-MCNC: 0.87 MG/DL (ref 0.6–1.3)
EOSINOPHIL # BLD AUTO: 0.17 THOUSAND/ÂΜL (ref 0–0.61)
EOSINOPHIL NFR BLD AUTO: 2 % (ref 0–6)
ERYTHROCYTE [DISTWIDTH] IN BLOOD BY AUTOMATED COUNT: 12.5 % (ref 11.6–15.1)
GFR SERPL CREATININE-BSD FRML MDRD: 92 ML/MIN/1.73SQ M
GLUCOSE SERPL-MCNC: 92 MG/DL (ref 65–140)
HCT VFR BLD AUTO: 42.9 % (ref 34.8–46.1)
HGB BLD-MCNC: 14.2 G/DL (ref 11.5–15.4)
IMM GRANULOCYTES # BLD AUTO: 0.03 THOUSAND/UL (ref 0–0.2)
IMM GRANULOCYTES NFR BLD AUTO: 0 % (ref 0–2)
LIPASE SERPL-CCNC: 109 U/L (ref 73–393)
LYMPHOCYTES # BLD AUTO: 3.49 THOUSANDS/ÂΜL (ref 0.6–4.47)
LYMPHOCYTES NFR BLD AUTO: 33 % (ref 14–44)
MCH RBC QN AUTO: 29.9 PG (ref 26.8–34.3)
MCHC RBC AUTO-ENTMCNC: 33.1 G/DL (ref 31.4–37.4)
MCV RBC AUTO: 90 FL (ref 82–98)
MONOCYTES # BLD AUTO: 0.88 THOUSAND/ÂΜL (ref 0.17–1.22)
MONOCYTES NFR BLD AUTO: 8 % (ref 4–12)
NEUTROPHILS # BLD AUTO: 6.04 THOUSANDS/ÂΜL (ref 1.85–7.62)
NEUTS SEG NFR BLD AUTO: 56 % (ref 43–75)
NRBC BLD AUTO-RTO: 0 /100 WBCS
PLATELET # BLD AUTO: 321 THOUSANDS/UL (ref 149–390)
PMV BLD AUTO: 8.8 FL (ref 8.9–12.7)
POTASSIUM SERPL-SCNC: 3.9 MMOL/L (ref 3.5–5.3)
PROT SERPL-MCNC: 7.8 G/DL (ref 6.4–8.4)
RBC # BLD AUTO: 4.75 MILLION/UL (ref 3.81–5.12)
SODIUM SERPL-SCNC: 141 MMOL/L (ref 135–147)
WBC # BLD AUTO: 10.66 THOUSAND/UL (ref 4.31–10.16)

## 2022-10-18 PROCEDURE — 99284 EMERGENCY DEPT VISIT MOD MDM: CPT

## 2022-10-18 PROCEDURE — 96366 THER/PROPH/DIAG IV INF ADDON: CPT

## 2022-10-18 PROCEDURE — 83690 ASSAY OF LIPASE: CPT | Performed by: PHYSICIAN ASSISTANT

## 2022-10-18 PROCEDURE — 85025 COMPLETE CBC W/AUTO DIFF WBC: CPT | Performed by: PHYSICIAN ASSISTANT

## 2022-10-18 PROCEDURE — 96365 THER/PROPH/DIAG IV INF INIT: CPT

## 2022-10-18 PROCEDURE — 99284 EMERGENCY DEPT VISIT MOD MDM: CPT | Performed by: PHYSICIAN ASSISTANT

## 2022-10-18 PROCEDURE — 80053 COMPREHEN METABOLIC PANEL: CPT | Performed by: PHYSICIAN ASSISTANT

## 2022-10-18 PROCEDURE — 76705 ECHO EXAM OF ABDOMEN: CPT

## 2022-10-18 PROCEDURE — G1004 CDSM NDSC: HCPCS

## 2022-10-18 PROCEDURE — C9113 INJ PANTOPRAZOLE SODIUM, VIA: HCPCS | Performed by: PHYSICIAN ASSISTANT

## 2022-10-18 PROCEDURE — 96375 TX/PRO/DX INJ NEW DRUG ADDON: CPT

## 2022-10-18 PROCEDURE — 74177 CT ABD & PELVIS W/CONTRAST: CPT

## 2022-10-18 PROCEDURE — 36415 COLL VENOUS BLD VENIPUNCTURE: CPT | Performed by: PHYSICIAN ASSISTANT

## 2022-10-18 RX ORDER — PROMETHAZINE HYDROCHLORIDE 25 MG/ML
25 INJECTION, SOLUTION INTRAMUSCULAR; INTRAVENOUS ONCE
Status: COMPLETED | OUTPATIENT
Start: 2022-10-18 | End: 2022-10-18

## 2022-10-18 RX ORDER — ACETAMINOPHEN 325 MG/1
650 TABLET ORAL ONCE
Status: COMPLETED | OUTPATIENT
Start: 2022-10-18 | End: 2022-10-18

## 2022-10-18 RX ORDER — DICYCLOMINE HYDROCHLORIDE 10 MG/1
20 CAPSULE ORAL ONCE
Status: COMPLETED | OUTPATIENT
Start: 2022-10-18 | End: 2022-10-18

## 2022-10-18 RX ORDER — PANTOPRAZOLE SODIUM 40 MG/1
40 TABLET, DELAYED RELEASE ORAL DAILY
Qty: 30 TABLET | Refills: 0 | Status: SHIPPED | OUTPATIENT
Start: 2022-10-18 | End: 2022-11-17

## 2022-10-18 RX ORDER — ONDANSETRON 4 MG/1
4 TABLET, FILM COATED ORAL EVERY 6 HOURS
Qty: 12 TABLET | Refills: 0 | Status: SHIPPED | OUTPATIENT
Start: 2022-10-18

## 2022-10-18 RX ORDER — MORPHINE SULFATE 4 MG/ML
4 INJECTION, SOLUTION INTRAMUSCULAR; INTRAVENOUS ONCE
Status: COMPLETED | OUTPATIENT
Start: 2022-10-18 | End: 2022-10-18

## 2022-10-18 RX ORDER — ONDANSETRON 2 MG/ML
4 INJECTION INTRAMUSCULAR; INTRAVENOUS EVERY 6 HOURS PRN
Status: DISCONTINUED | OUTPATIENT
Start: 2022-10-18 | End: 2022-10-18 | Stop reason: HOSPADM

## 2022-10-18 RX ADMIN — DICYCLOMINE HYDROCHLORIDE 20 MG: 10 CAPSULE ORAL at 12:25

## 2022-10-18 RX ADMIN — PROMETHAZINE HYDROCHLORIDE 25 MG: 25 INJECTION INTRAMUSCULAR; INTRAVENOUS at 13:19

## 2022-10-18 RX ADMIN — ACETAMINOPHEN 650 MG: 325 TABLET, FILM COATED ORAL at 12:24

## 2022-10-18 RX ADMIN — MORPHINE SULFATE 4 MG: 4 INJECTION INTRAVENOUS at 13:16

## 2022-10-18 RX ADMIN — SODIUM CHLORIDE, SODIUM LACTATE, POTASSIUM CHLORIDE, AND CALCIUM CHLORIDE 1000 ML: .6; .31; .03; .02 INJECTION, SOLUTION INTRAVENOUS at 12:26

## 2022-10-18 RX ADMIN — IOHEXOL 100 ML: 300 INJECTION, SOLUTION INTRAVENOUS at 12:46

## 2022-10-18 RX ADMIN — SODIUM CHLORIDE 80 MG: 9 INJECTION, SOLUTION INTRAVENOUS at 11:48

## 2022-10-18 RX ADMIN — ONDANSETRON 4 MG: 2 INJECTION INTRAMUSCULAR; INTRAVENOUS at 11:45

## 2022-10-18 NOTE — ED PROVIDER NOTES
History  Chief Complaint   Patient presents with   • Nausea     N/v abd pain x5 months, egd in summer with no findings  Pt states lower abd pain  N/v unable to keep anything down food or liquids     Patient is a 78-year-old female with past medical history significant for bipolar disorder, ADHD, anxiety, asthma, psychosis, schizoid personality disorder, migraine headache, who presents today for evaluation of 5 months of post prandial abdominal pain, anorexia,  and intractable nausea and vomiting  She states that for five months, she has been unable to eat anything without vomiting  She smokes marijuana for her symptoms but is not getting any relief from it  She had an EGD in January of this year that did not show abnormality and had been referred for RUQ US but was lost to follow up  SHe endorses weight loss and occasional episodes of blood in her vomit  She specifically denies melena, dysuria, myalgias, rash, jaundice, itching, diarrhea,  arthralgias or myalgias      Nausea  The primary symptoms include weight loss, abdominal pain, nausea, vomiting and hematemesis  Primary symptoms do not include fever, diarrhea, jaundice, hematochezia, dysuria, myalgias, arthralgias or rash  The illness began more than 7 days ago  The problem has been gradually worsening  The weight loss began more than 2 weeks ago  Weight loss amount: unable to specify  The abdominal pain began more than 2 days ago  The abdominal pain has been unchanged since its onset  The abdominal pain is located in the epigastric region  The abdominal pain does not radiate  The severity of the abdominal pain is 9/10  The abdominal pain is relieved by vomiting  Nausea began more than 1 week ago  The nausea is associated with eating  The nausea is exacerbated by food  The vomiting began more than 2 days ago  Vomiting occurs more than 10 times per day (unable to specify)  The emesis contains stomach contents and undigested food     The initial episode of hematemesis occurred more than 2 days ago  The hematemesis is a new problem  The hematemesis has occurred 2 times  The hematemesis is associated with NSAID use  The hematemesis is not associated with heartburn  The illness is also significant for anorexia  The illness does not include chills, dysphagia, odynophagia, bloating, constipation, tenesmus, back pain or itching  Significant associated medical issues include GERD  Associated medical issues do not include inflammatory bowel disease, gallstones, liver disease, alcohol abuse, PUD, gastric bypass, bowel resection, irritable bowel syndrome, hemorrhoids or diverticulitis  Prior to Admission Medications   Prescriptions Last Dose Informant Patient Reported? Taking?    EPINEPHrine (EPIPEN) 0 3 mg/0 3 mL SOAJ   No No   Sig: Inject 0 3 mL (0 3 mg total) into a muscle once for 1 dose   FLUoxetine (PROzac) 40 MG capsule   No No   Sig: Take 1 capsule (40 mg total) by mouth daily   Patient not taking: Reported on 2022   Lidocaine 4 % PTCH   No No   Sig: Apply 15 each topically daily as needed (back pain)   OLANZapine (ZyPREXA) 5 mg tablet   No No   Sig: Take 1 tablet (5 mg total) by mouth daily for 7 days   QUEtiapine (SEROquel) 100 mg tablet   Yes No   Sig: Take 100 mg by mouth daily at bedtime     Patient not taking: Reported on 2022   albuterol (PROVENTIL HFA,VENTOLIN HFA) 90 mcg/act inhaler   No No   Sig: Inhale 2 puffs every 6 (six) hours as needed for wheezing or shortness of breath   Patient not taking: Reported on 2022   buprenorphine-naloxone (SUBOXONE) 8-2 mg per SL tablet   Yes No   Si tablet 2 (two) times a day   butalbital-acetaminophen-caffeine (FIORICET,ESGIC) -40 mg per tablet   No No   Sig: Take 1 tablet by mouth every 6 (six) hours as needed for headaches for up to 10 doses   Patient not taking: Reported on 2022   calcium carbonate (TUMS) 500 mg chewable tablet   No No   Sig: Chew 2 tablets (1,000 mg total) daily as needed for indigestion or heartburn   diphenhydrAMINE-acetaminophen (TYLENOL PM)  MG TABS   Yes No   Sig: Take 1 tablet by mouth daily at bedtime as needed for sleep   fluticasone (FLOVENT HFA) 110 MCG/ACT inhaler   No No   Sig: Inhale 2 puffs 2 (two) times a day Rinse mouth after use     Patient not taking: Reported on 6/20/2022   hydrOXYzine HCL (ATARAX) 25 mg tablet   No No   Sig: Take 1 tablet (25 mg total) by mouth every 6 (six) hours as needed for anxiety for up to 10 days   ibuprofen (MOTRIN) 400 mg tablet   No No   Sig: Take 1 tablet (400 mg total) by mouth 2 (two) times a day   Patient taking differently: Take 400 mg by mouth as needed   melatonin 3 mg   No No   Sig: Take 1 tablet (3 mg total) by mouth daily at bedtime   Patient not taking: Reported on 6/20/2022   ondansetron (ZOFRAN) 4 mg tablet   No No   Sig: Take 1 tablet (4 mg total) by mouth every 6 (six) hours   ondansetron (Zofran ODT) 4 mg disintegrating tablet   No No   Sig: Take 1 tablet (4 mg total) by mouth every 6 (six) hours as needed for nausea or vomiting   pantoprazole (PROTONIX) 40 mg tablet   No No   Sig: Take 1 tablet (40 mg total) by mouth daily   Patient not taking: Reported on 6/20/2022   sucralfate (CARAFATE) 1 g tablet   No No   Sig: Take 1 tablet (1 g total) by mouth 4 (four) times a day   Patient not taking: Reported on 6/20/2022      Facility-Administered Medications: None       Past Medical History:   Diagnosis Date   • ADHD    • Anxiety    • Asthma    • Bipolar 1 disorder (HCC)    • Depression    • GERD (gastroesophageal reflux disease)    • Intractable vomiting 01/14/2022   • Mental and behavioral problem    • Migraine    • Palpitations     Last Assessed [id number]   • Psychiatric disorder    • Psychiatric illness    • Psychosis ([facility] [address] )    • 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[facility] [address] )    • [facility]izures [facility] 01/14/2022    - petit mal   • Seizures ([facility] [address] )     Pseudoseizures   • SIRS (systemic inflammatory response syndrome) ([facility] ) 01/14/2022   • Stabbing chest pain     Last Assessed [id number]   • Suicide attempt [facility])    • Tachycardia     Last Assessed [id number]   • Upper respiratory disease     Last Assessed 27Jan2016       Past Surgical History:   Procedure Laterality Date   • KNEE SURGERY     • IA LAP,TUBAL CAUTERY N/A 10/3/2018    Procedure: LAPAROSCOPIC TUBAL LIGATION;  Surgeon: Sonam Cullen MD;  [facility];  Service: Gynecology   • TUBAL LIGATION         Family History   Problem Relation Age of Onset   • Migraines Sister    • Cancer Mother    • Diabetes Mother    • Cancer Father    • Diabetes Father    • Arthritis Father    • Cancer Maternal Grandmother    • Cancer Maternal Aunt    • Cancer Paternal Uncle    • Diabetes Other      I have reviewed and agree with the history as documented  E-Cigarette/Vaping   • E-Cigarette Use Former User      E-Cigarette/Vaping Substances   • Nicotine No    • THC No    • CBD No    • Flavoring No    • Other No    • Unknown No      Social History     Tobacco Use   • Smoking status: Current Some Day Smoker     Packs/day: 0 10     Types: Cigarettes   • Smokeless tobacco: Never Used   Vaping Use   • Vaping Use: Former   Substance Use Topics   • Alcohol use: Not Currently   • Drug use: Yes     Frequency: 1 0 times per week     Types: Marijuana     Comment: marijuana occasional, no meth at present        Review of Systems   Constitutional: Positive for weight loss  Negative for chills and fever  HENT: Negative for ear pain and sore throat  Eyes: Negative for pain and visual disturbance  Respiratory: Negative for cough and shortness of breath  Cardiovascular: Negative for chest pain and palpitations  Gastrointestinal: Positive for abdominal pain, anorexia, hematemesis, nausea and vomiting  Negative for bloating, constipation, diarrhea, dysphagia, heartburn, hematochezia and jaundice  Endocrine: Negative  Genitourinary: Negative for dysuria and hematuria     Musculoskeletal: Negative for arthralgias, back pain and myalgias  Skin: Negative for color change, itching and rash  Allergic/Immunologic: Negative  Neurological: Negative for seizures and syncope  Hematological: Negative  Psychiatric/Behavioral: Negative  All other systems reviewed and are negative  Physical Exam  Physical Exam  Vitals and nursing note reviewed  Constitutional:       General: She is not in acute distress  Appearance: Normal appearance  She is well-developed  She is not ill-appearing or toxic-appearing  HENT:      Head: Normocephalic and atraumatic  Right Ear: Tympanic membrane, ear canal and external ear normal       Left Ear: Tympanic membrane, ear canal and external ear normal       Nose: Nose normal       Mouth/Throat:      Mouth: Mucous membranes are moist    Eyes:      General: No scleral icterus  Conjunctiva/sclera: Conjunctivae normal       Pupils: Pupils are equal, round, and reactive to light  Cardiovascular:      Rate and Rhythm: Normal rate and regular rhythm  Pulses: Normal pulses  Heart sounds: Normal heart sounds  No murmur heard  Pulmonary:      Effort: Pulmonary effort is normal  No respiratory distress  Breath sounds: Normal breath sounds  Abdominal:      General: Abdomen is flat  Bowel sounds are normal  There is no distension  Palpations: Abdomen is soft  Tenderness: There is abdominal tenderness in the right upper quadrant and epigastric area  There is no right CVA tenderness or left CVA tenderness  Musculoskeletal:         General: No tenderness  Normal range of motion  Cervical back: Normal range of motion and neck supple  No rigidity  Right lower leg: No edema  Left lower leg: No edema  Skin:     General: Skin is warm and dry  Capillary Refill: Capillary refill takes less than 2 seconds  Findings: No erythema or rash  Neurological:      General: No focal deficit present        Mental Status: She is alert and oriented to person, place, and time  Cranial Nerves: No cranial nerve deficit  Psychiatric:         Mood and Affect: Mood is anxious  Affect is labile and angry           Vital Signs  ED Triage Vitals   Temperature Pulse Respirations Blood Pressure SpO2   10/18/22 0946 10/18/22 0946 10/18/22 0946 10/18/22 0946 10/18/22 0946   (!) 96 8 °F (36 °C) 94 20 112/82 98 %      Temp Source Heart Rate Source Patient Position - Orthostatic VS BP Location FiO2 (%)   10/18/22 0946 10/18/22 0946 10/18/22 0946 10/18/22 0946 --   Tympanic Monitor Sitting Right arm       Pain Score       10/18/22 1224       7           Vitals:    10/18/22 0946 10/18/22 1346 10/18/22 1758   BP: 112/82 110/66 114/60   Pulse: 94 92 83   Patient Position - Orthostatic VS: Sitting Sitting Lying         Visual Acuity      ED Medications  Medications   ondansetron (ZOFRAN) injection 4 mg (4 mg Intravenous Given 10/18/22 1145)   lactated ringers bolus 1,000 mL (0 mL Intravenous Stopped 10/18/22 1757)   pantoprazole (PROTONIX) 80 mg in sodium chloride 0 9 % 100 mL IVPB (0 mg Intravenous Stopped 10/18/22 1203)   dicyclomine (BENTYL) capsule 20 mg (20 mg Oral Given 10/18/22 1225)   acetaminophen (TYLENOL) tablet 650 mg (650 mg Oral Given 10/18/22 1224)   morphine injection 4 mg (4 mg Intravenous Given 10/18/22 1316)   promethazine (PHENERGAN) injection 25 mg (25 mg Intravenous Given 10/18/22 1319)   iohexol (OMNIPAQUE) 300 mg/mL injection 100 mL (100 mL Intravenous Given 10/18/22 1246)       Diagnostic Studies  Results Reviewed     Procedure Component Value Units Date/Time    Rapid drug screen, urine [[id number]]     Lab Status: No result Specimen: Urine     Comprehensive metabolic panel [[id number]] Collected: 10/18/22 1144    Lab Status: Final result Specimen: Blood from Arm, Right Updated: 10/18/22 1206     Sodium 141 mmol/L      Potassium 3 9 mmol/L      Chloride 103 mmol/L      CO2 27 mmol/L      ANION GAP 11 mmol/L      BUN 8 mg/dL Creatinine 0 87 mg/dL      Glucose 92 mg/dL      Calcium 8 9 mg/dL      AST 24 U/L      ALT 26 U/L      Alkaline Phosphatase 54 U/L      Total Protein 7 8 g/dL      Albumin 3 9 g/dL      Total Bilirubin 0 57 mg/dL      eGFR 92 ml/min/1 73sq m     Narrative:      [place] guidelines for Chronic Kidney Disease (CKD):   •  Stage 1 with normal or high GFR (GFR > 90 mL/min/1 73 square meters)  •  Stage 2 Mild CKD (GFR = 60-89 mL/min/1 73 square meters)  •  Stage 3A Moderate CKD (GFR = 45-59 mL/min/1 73 square meters)  •  Stage 3B Moderate CKD (GFR = 30-44 mL/min/1 73 square meters)  •  Stage 4 Severe CKD (GFR = 15-29 mL/min/1 73 square meters)  •  Stage 5 End Stage CKD (GFR <15 mL/min/1 73 square meters)  Note: GFR calculation is accurate only with a steady state creatinine    Lipase [[id number]]  (Normal) Collected: 10/18/22 1144    Lab Status: Final result Specimen: Blood from Arm, Right Updated: 10/18/22 1206     Lipase 109 u/L     CBC and differential [[id number]]  (Abnormal) Collected: 10/18/22 1137    Lab Status: Final result Specimen: Blood from Arm, Left Updated: 10/18/22 1147     WBC 10 66 Thousand/uL      RBC 4 75 Million/uL      Hemoglobin 14 2 g/dL      Hematocrit 42 9 %      MCV 90 fL      MCH 29 9 pg      MCHC 33 1 g/dL      RDW 12 5 %      MPV 8 8 fL      Platelets 214 Thousands/uL      nRBC 0 /100 WBCs      Neutrophils Relative 56 %      Immat GRANS % 0 %      Lymphocytes Relative 33 %      Monocytes Relative 8 %      Eosinophils Relative 2 %      Basophils Relative 1 %      Neutrophils Absolute 6 04 Thousands/µL      Immature Grans Absolute 0 03 Thousand/uL      Lymphocytes Absolute 3 49 Thousands/µL      Monocytes Absolute 0 88 Thousand/µL      Eosinophils Absolute 0 17 Thousand/µL      Basophils Absolute 0 05 Thousands/µL     UA w Reflex to Microscopic w Reflex to Culture [[id number]]     Lab Status: No result Specimen: Urine     Chlamydia/GC amplified DNA by PCR [[id number]] Lab Status: No result                  CT abdomen pelvis with contrast   Final Result by David Linn MD (10/18 8700)   1  No acute findings in the abdomen or pelvis  2   Small hiatal hernia  Workstation performed: [id number]         US right upper quadrant   Final Result by Kranthi Simon MD (10/18 6581)      Normal       Workstation performed: [id number]                    Procedures  Procedures         ED Course  ED Course as of 10/18/22 1840   Tue Oct 18, 2022   1150 CBC and differential(!)   1206 Lipase: 109                                             MDM  Number of Diagnoses or Management Options  Nausea and vomiting, unspecified vomiting type: new and requires workup  Diagnosis management comments: Nausea and vomiting x 5 months  History and presentation is suspicious for Marijuana hyperemesis syndrome - encouraged patient to d/c MJ for now to prevent clouding the clinical picture  Pt states that she only smokes intermittently  CT without acute pathology  RUQ US without acute pathology   Pt was given Phenergan and morphine for pain and nausea by attending physician  Pt with paradoxical response and slept for several hours and was unable to be discharged safely  Pt was able to be aroused and was able to eat crackers and drink ginger ale while in ED without symptoms  PT wished to return home and was provided a Lyft  Pt to follow up with GI for further evaluation         Amount and/or Complexity of Data Reviewed  Clinical lab tests: ordered and reviewed  Tests in the radiology section of CPT®: ordered and reviewed  Tests in the medicine section of CPT®: ordered and reviewed  Decide to obtain previous medical records or to obtain history from someone other than the patient: yes  Review and summarize past medical records: yes  Discuss the patient with other providers: yes  Independent visualization of images, tracings, or specimens: yes    Risk of Complications, Morbidity, and/or Mortality  Presenting problems: moderate  Diagnostic procedures: moderate  Management options: moderate    Patient Progress  Patient progress: stable      Disposition  Final diagnoses:   Nausea and vomiting, unspecified vomiting type     Time reflects when diagnosis was documented in both MDM as applicable and the Disposition within this note     Time User Action Codes Description Comment    10/18/2022  5:37 PM Marshall Hurt Add [R11 0] Nausea     10/18/2022  5:37 PM LuciusNelda Add [R11 2] Nausea and vomiting, unspecified vomiting type     10/18/2022  5:37 PM Susavage, Shireen0 [place] [R11 2] Nausea and vomiting, unspecified vomiting type     10/18/2022  5:37 PM Marshall Hurt Remove [R11 0] Nausea       ED Disposition     ED Disposition   Discharge    Condition   Stable    Date/Time   Tue Oct 18, 2022  5:37 PM    Comment   [place] discharge to home/self care  Follow-up Information     Follow up With Specialties Details Why Contact Info Additional [address] Emergency Department Emergency Medicine Go to  If symptoms worsen [address] [place] [address] Emergency Department, [facility], DO Family Medicine Schedule an appointment as soon as possible for a visit   [address] [place] [address]       [facility] Schedule an appointment as soon as possible for a visit   [place] [address]  [phone]             Discharge Medication List as of 10/18/2022  5:39 PM      START taking these medications    Details   !! ondansetron (ZOFRAN) 4 mg tablet Take 1 tablet (4 mg total) by mouth every 6 (six) hours, Starting Tue 10/18/2022, Normal      !!  pantoprazole (PROTONIX) 40 mg tablet Take 1 tablet (40 mg total) by mouth daily, Starting Tue 10/18/2022, Until Thu 11/17/2022, Normal       !! - Potential duplicate medications found  Please discuss with provider        CONTINUE these medications which have NOT CHANGED    Details   albuterol (PROVENTIL HFA,VENTOLIN HFA) 90 mcg/act inhaler Inhale 2 puffs every 6 (six) hours as needed for wheezing or shortness of breath, Starting Tue 2/9/2021, Normal      buprenorphine-naloxone (SUBOXONE) 8-2 mg per SL tablet 1 tablet 2 (two) times a day, Starting Mon 6/13/2022, Historical Med      butalbital-acetaminophen-caffeine (FIORICET,ESGIC) -40 mg per tablet Take 1 tablet by mouth every 6 (six) hours as needed for headaches for up to 10 doses, Starting Sun 10/10/2021, Normal      calcium carbonate (TUMS) 500 mg chewable tablet Chew 2 tablets (1,000 mg total) daily as needed for indigestion or heartburn, Starting Sat 1/15/2022, Normal      diphenhydrAMINE-acetaminophen (TYLENOL PM)  MG TABS Take 1 tablet by mouth daily at bedtime as needed for sleep, Historical Med      EPINEPHrine (EPIPEN) 0 3 mg/0 3 mL SOAJ Inject 0 3 mL (0 3 mg total) into a muscle once for 1 dose, Starting Tue 2/9/2021, Normal      FLUoxetine (PROzac) 40 MG capsule Take 1 capsule (40 mg total) by mouth daily, Starting Mon 9/27/2021, Normal      fluticasone (FLOVENT HFA) 110 MCG/ACT inhaler Inhale 2 puffs 2 (two) times a day Rinse mouth after use , Starting Tue 2/9/2021, Normal      hydrOXYzine HCL (ATARAX) 25 mg tablet Take 1 tablet (25 mg total) by mouth every 6 (six) hours as needed for anxiety for up to 10 days, Starting Mon 9/27/2021, Until Fri 1/21/2022 at 2359, Normal      ibuprofen (MOTRIN) 400 mg tablet Take 1 tablet (400 mg total) by mouth 2 (two) times a day, Starting Wed 3/17/2021, Normal      Lidocaine 4 % PTCH Apply 15 each topically daily as needed (back pain), Starting Mon 6/20/2022, Normal      melatonin 3 mg Take 1 tablet (3 mg total) by mouth daily at bedtime, Starting Sat 9/25/2021, Normal      OLANZapine (ZyPREXA) 5 mg tablet Take 1 tablet (5 mg total) by mouth daily for 7 days, Starting Fri 7/1/2022, Until Fri 7/8/2022, Normal      ondansetron (Zofran ODT) 4 mg disintegrating tablet Take 1 tablet (4 mg total) by mouth every 6 (six) hours as needed for nausea or vomiting, Starting Sun 7/10/2022, Normal      !! ondansetron (ZOFRAN) 4 mg tablet Take 1 tablet (4 mg total) by mouth every 6 (six) hours, Starting Thu 1/13/2022, Normal      !! pantoprazole (PROTONIX) 40 mg tablet Take 1 tablet (40 mg total) by mouth daily, Starting Thu 1/13/2022, Normal      QUEtiapine (SEROquel) 100 mg tablet Take 100 mg by mouth daily at bedtime  , Historical Med      sucralfate (CARAFATE) 1 g tablet Take 1 tablet (1 g total) by mouth 4 (four) times a day, Starting Sat 9/18/2021, Normal       !! - Potential duplicate medications found  Please discuss with provider  No discharge procedures on file      PDMP Review       Value Time User    PDMP Reviewed  Yes 6/30/2022  2:32 PM Noemi Baeza PA-C          ED Provider  Electronically Signed by           Shaylee Hawley PA-C  10/18/22 1634

## 2022-11-04 ENCOUNTER — HOSPITAL ENCOUNTER (EMERGENCY)
Facility: HOSPITAL | Age: 27
Discharge: HOME | End: 2022-11-04
Attending: EMERGENCY MEDICINE

## 2022-11-04 VITALS
RESPIRATION RATE: 18 BRPM | DIASTOLIC BLOOD PRESSURE: 55 MMHG | HEIGHT: 64 IN | WEIGHT: 189 LBS | BODY MASS INDEX: 32.27 KG/M2 | HEART RATE: 82 BPM | OXYGEN SATURATION: 96 % | SYSTOLIC BLOOD PRESSURE: 109 MMHG | TEMPERATURE: 96.8 F

## 2022-11-04 DIAGNOSIS — R11.10 VOMITING: ICD-10-CM

## 2022-11-04 DIAGNOSIS — R10.9 ABDOMINAL PAIN: Primary | ICD-10-CM

## 2022-11-04 LAB
ALBUMIN SERPL BCP-MCNC: 4.1 G/DL (ref 3.5–5)
ALP SERPL-CCNC: 50 U/L (ref 46–116)
ALT SERPL W P-5'-P-CCNC: 27 U/L (ref 12–78)
ANION GAP SERPL CALCULATED.3IONS-SCNC: 8 MMOL/L (ref 4–13)
BASOPHILS # BLD AUTO: 0.06 THOUSANDS/ÂΜL (ref 0–0.1)
BASOPHILS NFR BLD AUTO: 1 % (ref 0–1)
BILIRUB SERPL-MCNC: 0.5 MG/DL (ref 0.2–1)
BUN SERPL-MCNC: 10 MG/DL (ref 5–25)
CALCIUM SERPL-MCNC: 9.4 MG/DL (ref 8.3–10.1)
CHLORIDE SERPL-SCNC: 102 MMOL/L (ref 96–108)
CO2 SERPL-SCNC: 28 MMOL/L (ref 21–32)
CREAT SERPL-MCNC: 1.02 MG/DL (ref 0.6–1.3)
EOSINOPHIL # BLD AUTO: 0.14 THOUSAND/ÂΜL (ref 0–0.61)
EOSINOPHIL NFR BLD AUTO: 1 % (ref 0–6)
ERYTHROCYTE [DISTWIDTH] IN BLOOD BY AUTOMATED COUNT: 12.3 % (ref 11.6–15.1)
GFR SERPL CREATININE-BSD FRML MDRD: 76 ML/MIN/1.73SQ M
GLUCOSE SERPL-MCNC: 108 MG/DL (ref 65–140)
HCT VFR BLD AUTO: 44.1 % (ref 34.8–46.1)
HGB BLD-MCNC: 14.9 G/DL (ref 11.5–15.4)
IMM GRANULOCYTES # BLD AUTO: 0.04 THOUSAND/UL (ref 0–0.2)
IMM GRANULOCYTES NFR BLD AUTO: 0 % (ref 0–2)
LIPASE SERPL-CCNC: 106 U/L (ref 73–393)
LYMPHOCYTES # BLD AUTO: 3.01 THOUSANDS/ÂΜL (ref 0.6–4.47)
LYMPHOCYTES NFR BLD AUTO: 28 % (ref 14–44)
MCH RBC QN AUTO: 30.5 PG (ref 26.8–34.3)
MCHC RBC AUTO-ENTMCNC: 33.8 G/DL (ref 31.4–37.4)
MCV RBC AUTO: 90 FL (ref 82–98)
MONOCYTES # BLD AUTO: 0.88 THOUSAND/ÂΜL (ref 0.17–1.22)
MONOCYTES NFR BLD AUTO: 8 % (ref 4–12)
NEUTROPHILS # BLD AUTO: 6.57 THOUSANDS/ÂΜL (ref 1.85–7.62)
NEUTS SEG NFR BLD AUTO: 62 % (ref 43–75)
NRBC BLD AUTO-RTO: 0 /100 WBCS
PLATELET # BLD AUTO: 331 THOUSANDS/UL (ref 149–390)
PMV BLD AUTO: 8.8 FL (ref 8.9–12.7)
POTASSIUM SERPL-SCNC: 4 MMOL/L (ref 3.5–5.3)
PROT SERPL-MCNC: 7.9 G/DL (ref 6.4–8.4)
RBC # BLD AUTO: 4.89 MILLION/UL (ref 3.81–5.12)
SODIUM SERPL-SCNC: 138 MMOL/L (ref 135–147)
WBC # BLD AUTO: 10.7 THOUSAND/UL (ref 4.31–10.16)

## 2022-11-04 RX ORDER — DICYCLOMINE HCL 20 MG
20 TABLET ORAL 2 TIMES DAILY
Qty: 20 TABLET | Refills: 0 | Status: SHIPPED | OUTPATIENT
Start: 2022-11-04

## 2022-11-04 RX ORDER — ONDANSETRON 2 MG/ML
4 INJECTION INTRAMUSCULAR; INTRAVENOUS ONCE
Status: COMPLETED | OUTPATIENT
Start: 2022-11-04 | End: 2022-11-04

## 2022-11-04 RX ORDER — PANTOPRAZOLE SODIUM 40 MG/10ML
40 INJECTION, POWDER, LYOPHILIZED, FOR SOLUTION INTRAVENOUS ONCE
Status: COMPLETED | OUTPATIENT
Start: 2022-11-04 | End: 2022-11-04

## 2022-11-04 RX ADMIN — SODIUM CHLORIDE 1000 ML: 0.9 INJECTION, SOLUTION INTRAVENOUS at 08:49

## 2022-11-04 RX ADMIN — PANTOPRAZOLE SODIUM 40 MG: 40 INJECTION, POWDER, FOR SOLUTION INTRAVENOUS at 08:56

## 2022-11-04 RX ADMIN — ONDANSETRON 4 MG: 2 INJECTION INTRAMUSCULAR; INTRAVENOUS at 08:53

## 2022-11-04 NOTE — ED PROVIDER NOTES
History  Chief Complaint   Patient presents with   • Abdominal Pain     Pt c/o continuous nausea and vomiting for the last several weeks  Pt can keep no fluids or food down  Pt has a hx of acid reflux  Patient has a history of protracted vomiting episodes and abdominal pain  She has had multiple evaluations including ultrasound and CT scan done 2 weeks ago  Patient has had evaluation by GI including endoscopy  Patient missed a recent follow-up GI appointment  She subsequently started a new job which she went to today, started having uncontrollable nausea vomiting and was sent in  Patient has been told to limit marijuana use as that may be contributing factor  She states she has not smoked at all last 2 weeks or used any edibles  Prior to Admission Medications   Prescriptions Last Dose Informant Patient Reported? Taking?    EPINEPHrine (EPIPEN) 0 3 mg/0 3 mL SOAJ   No No   Sig: Inject 0 3 mL (0 3 mg total) into a muscle once for 1 dose   FLUoxetine (PROzac) 40 MG capsule   No No   Sig: Take 1 capsule (40 mg total) by mouth daily   Patient not taking: Reported on 2022   Lidocaine 4 % PTCH   No No   Sig: Apply 15 each topically daily as needed (back pain)   OLANZapine (ZyPREXA) 5 mg tablet   No No   Sig: Take 1 tablet (5 mg total) by mouth daily for 7 days   QUEtiapine (SEROquel) 100 mg tablet   Yes No   Sig: Take 100 mg by mouth daily at bedtime     Patient not taking: Reported on 2022   albuterol (PROVENTIL HFA,VENTOLIN HFA) 90 mcg/act inhaler   No No   Sig: Inhale 2 puffs every 6 (six) hours as needed for wheezing or shortness of breath   Patient not taking: Reported on 2022   buprenorphine-naloxone (SUBOXONE) 8-2 mg per SL tablet   Yes No   Si tablet 2 (two) times a day   butalbital-acetaminophen-caffeine (FIORICET,ESGIC) -40 mg per tablet   No No   Sig: Take 1 tablet by mouth every 6 (six) hours as needed for headaches for up to 10 doses   Patient not taking: Reported on 6/20/2022   calcium carbonate (TUMS) 500 mg chewable tablet   No No   Sig: Chew 2 tablets (1,000 mg total) daily as needed for indigestion or heartburn   diphenhydrAMINE-acetaminophen (TYLENOL PM)  MG TABS   Yes No   Sig: Take 1 tablet by mouth daily at bedtime as needed for sleep   fluticasone (FLOVENT HFA) 110 MCG/ACT inhaler   No No   Sig: Inhale 2 puffs 2 (two) times a day Rinse mouth after use     Patient not taking: Reported on 6/20/2022   hydrOXYzine HCL (ATARAX) 25 mg tablet   No No   Sig: Take 1 tablet (25 mg total) by mouth every 6 (six) hours as needed for anxiety for up to 10 days   ibuprofen (MOTRIN) 400 mg tablet   No No   Sig: Take 1 tablet (400 mg total) by mouth 2 (two) times a day   Patient taking differently: Take 400 mg by mouth as needed   melatonin 3 mg   No No   Sig: Take 1 tablet (3 mg total) by mouth daily at bedtime   Patient not taking: Reported on 6/20/2022   ondansetron (ZOFRAN) 4 mg tablet   No No   Sig: Take 1 tablet (4 mg total) by mouth every 6 (six) hours   ondansetron (ZOFRAN) 4 mg tablet   No No   Sig: Take 1 tablet (4 mg total) by mouth every 6 (six) hours   ondansetron (Zofran ODT) 4 mg disintegrating tablet   No No   Sig: Take 1 tablet (4 mg total) by mouth every 6 (six) hours as needed for nausea or vomiting   pantoprazole (PROTONIX) 40 mg tablet   No No   Sig: Take 1 tablet (40 mg total) by mouth daily   Patient not taking: Reported on 6/20/2022   pantoprazole (PROTONIX) 40 mg tablet   No No   Sig: Take 1 tablet (40 mg total) by mouth daily   sucralfate (CARAFATE) 1 g tablet   No No   Sig: Take 1 tablet (1 g total) by mouth 4 (four) times a day   Patient not taking: Reported on 6/20/2022      Facility-Administered Medications: None       Past Medical History:   Diagnosis Date   • ADHD    • Anxiety    • Asthma    • Bipolar 1 disorder (HCC)    • Depression    • GERD (gastroesophageal reflux disease)    • Intractable vomiting 01/14/2022   • Mental and behavioral problem • Migraine    • Palpitations     Last Assessed [id number]   • Psychiatric disorder    • Psychiatric illness    • Psychosis ([facility] [address] [facility] )    • 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[facility] )    • Seizures ([facility] ) 01/14/2022    - petit mal   • Seizures (HCC)     Pseudoseizures   • [facility]ic inflammatory response syndrome) [facility] 01/14/2022   • Stabbing chest pain     Last Assessed [id number]   • Suicide attempt [facility])    • Tachycardia     Last Assessed [id number]   • Upper respiratory disease     Last Assessed 27Jan2016       Past Surgical History:   Procedure Laterality Date   • KNEE SURGERY     • WI LAP,TUBAL CAUTERY N/A 10/3/2018    Procedure: LAPAROSCOPIC TUBAL LIGATION;  Surgeon: Edilberto Kearney MD;  Location: [facility];  Service: Gynecology   • TUBAL LIGATION         Family History   Problem Relation Age of Onset   • Migraines Sister    • Cancer Mother    • Diabetes Mother    • Cancer Father    • Diabetes Father    • Arthritis Father    • Cancer Maternal Grandmother    • Cancer Maternal Aunt    • Cancer Paternal Uncle    • Diabetes Other      I have reviewed and agree with the history as documented  E-Cigarette/Vaping   • E-Cigarette Use Former User      E-Cigarette/Vaping Substances   • Nicotine No    • THC No    • CBD No    • Flavoring No    • Other No    • Unknown No      Social History     Tobacco Use   • Smoking status: Current Some Day Smoker     Packs/day: 0 10     Types: Cigarettes   • Smokeless tobacco: Never Used   Vaping Use   • Vaping Use: Former   Substance Use Topics   • Alcohol use: Not Currently   • Drug use: Not Currently     Frequency: 1 0 times per week     Types: Marijuana     Comment: marijuana occasional, no meth at present        Review of Systems   Constitutional: Negative for chills and fever  HENT: Negative for congestion and sore throat  Eyes: Negative for visual disturbance  Respiratory: Negative for cough and shortness of breath  Cardiovascular: Negative for chest pain     Gastrointestinal: Positive for abdominal pain, nausea and vomiting  Negative for constipation and diarrhea  Genitourinary: Negative for decreased urine volume and dysuria  Musculoskeletal: Negative for arthralgias and back pain  Skin: Negative for color change and rash  Neurological: Positive for weakness and light-headedness  Negative for headaches  Hematological: Does not bruise/bleed easily  Psychiatric/Behavioral: Negative for confusion  The patient is nervous/anxious  All other systems reviewed and are negative  Physical Exam  Physical Exam  Vitals and nursing note reviewed  Constitutional:       Appearance: She is obese  HENT:      Head: Normocephalic  Mouth/Throat:      Mouth: Mucous membranes are moist    Eyes:      Extraocular Movements: Extraocular movements intact  Cardiovascular:      Rate and Rhythm: Normal rate  Heart sounds: Normal heart sounds  Pulmonary:      Effort: Pulmonary effort is normal       Breath sounds: Normal breath sounds  Abdominal:      General: Abdomen is flat  Bowel sounds are decreased  Palpations: Abdomen is soft  Tenderness: There is abdominal tenderness in the epigastric area  Hernia: No hernia is present  Skin:     General: Skin is warm and dry  Capillary Refill: Capillary refill takes less than 2 seconds  Neurological:      General: No focal deficit present  Mental Status: She is alert and oriented to person, place, and time     Psychiatric:         Mood and Affect: Mood normal          Behavior: Behavior normal          Vital Signs  ED Triage Vitals [11/04/22 0758]   Temperature Pulse Respirations Blood Pressure SpO2   (!) 96 8 °F (36 °C) 99 18 110/73 99 %      Temp Source Heart Rate Source Patient Position - Orthostatic VS BP Location FiO2 (%)   Tympanic Monitor Lying Right arm --      Pain Score       10 - Worst Possible Pain           Vitals:    11/04/22 0758 11/04/22 1024   BP: 110/73 116/68   Pulse: 99 91   Patient Position - Orthostatic VS: Lying Lying         Visual Acuity      ED Medications  Medications   sodium chloride 0 9 % bolus 1,000 mL (1,000 mL Intravenous New Bag 11/4/22 0849)   ondansetron (ZOFRAN) injection 4 mg (4 mg Intravenous Given 11/4/22 0853)   pantoprazole (PROTONIX) injection 40 mg (40 mg Intravenous Given 11/4/22 0856)       Diagnostic Studies  Results Reviewed     Procedure Component Value Units Date/Time    Comprehensive metabolic panel [[id number]] Collected: 11/04/22 0848    Lab Status: Final result Specimen: Blood from Arm, Left Updated: 11/04/22 0916     Sodium 138 mmol/L      Potassium 4 0 mmol/L      Chloride 102 mmol/L      CO2 28 mmol/L      ANION GAP 8 mmol/L      BUN 10 mg/dL      Creatinine 1 02 mg/dL      Glucose 108 mg/dL      Calcium 9 4 mg/dL      AST --     ALT 27 U/L      Alkaline Phosphatase 50 U/L      Total Protein 7 9 g/dL      Albumin 4 1 g/dL      Total Bilirubin 0 50 mg/dL      eGFR 76 ml/min/1 73sq m     Narrative:      [place] guidelines for Chronic Kidney Disease (CKD):   •  Stage 1 with normal or high GFR (GFR > 90 mL/min/1 73 square meters)  •  Stage 2 Mild CKD (GFR = 60-89 mL/min/1 73 square meters)  •  Stage 3A Moderate CKD (GFR = 45-59 mL/min/1 73 square meters)  •  Stage 3B Moderate CKD (GFR = 30-44 mL/min/1 73 square meters)  •  Stage 4 Severe CKD (GFR = 15-29 mL/min/1 73 square meters)  •  Stage 5 End Stage CKD (GFR <15 mL/min/1 73 square meters)  Note: GFR calculation is accurate only with a steady state creatinine    Lipase [[id number]]  (Normal) Collected: 11/04/22 0848    Lab Status: Final result Specimen: Blood from Arm, Left Updated: 11/04/22 0914     Lipase 106 u/L     FLU/RSV/COVID - if FLU/RSV clinically relevant [[id number]]     Lab Status: No result Specimen: Nares from Nose     CBC and differential [[id number]]  (Abnormal) Collected: 11/04/22 0848    Lab Status: Final result Specimen: Blood from Arm, Left Updated: 11/04/22 4671 WBC 10 70 Thousand/uL      RBC 4 89 Million/uL      Hemoglobin 14 9 g/dL      Hematocrit 44 1 %      MCV 90 fL      MCH 30 5 pg      MCHC 33 8 g/dL      RDW 12 3 %      MPV 8 8 fL      Platelets 533 Thousands/uL      nRBC 0 /100 WBCs      Neutrophils Relative 62 %      Immat GRANS % 0 %      Lymphocytes Relative 28 %      Monocytes Relative 8 %      Eosinophils Relative 1 %      Basophils Relative 1 %      Neutrophils Absolute 6 57 Thousands/µL      Immature Grans Absolute 0 04 Thousand/uL      Lymphocytes Absolute 3 01 Thousands/µL      Monocytes Absolute 0 88 Thousand/µL      Eosinophils Absolute 0 14 Thousand/µL      Basophils Absolute 0 06 Thousands/µL     UA (URINE) with reflex to Scope [[id number]]     Lab Status: No result Specimen: Urine                  No orders to display              Procedures  Procedures         ED Course                               SBIRT 22yo+    Flowsheet Row Most Recent Value   SBIRT (25 yo +)    In order to provide better care to our patients, we are screening all of our patients for alcohol and drug use  Would it be okay to ask you these screening questions? No Filed at: 11/04/2022 0808                    [facility]  Number of Diagnoses or Management Options  Diagnosis management comments: Patient has had extensive workup for recurrent vomiting  I do not see the benefit of reimaging the abdomen again today    I believe symptoms are rather functional not structural   Possible IBS      Disposition  Final diagnoses:   Abdominal pain   Vomiting     Time reflects when diagnosis was documented in both MDM as applicable and the Disposition within this note     Time User Action Codes Description Comment    11/4/2022 10:54 AM Jenni WHITTINGTON Add [R10 9] Abdominal pain     11/4/2022 10:54 AM Aleksandra Wilcox Add [R11 10] Vomiting       ED Disposition     ED Disposition   Discharge    Condition   Stable    Date/Time   Fri Nov 4, 2022 10:54 AM    Comment   Jamaica Kilgore discharge to home/self care                Follow-up Information     Follow up With Specialties Details Why [address], [address] [address]  [phone]      GI  Schedule an appointment as soon as possible for a visit   Asap          Patient's Medications   Discharge Prescriptions    DICYCLOMINE (BENTYL) 20 MG TABLET    Take 1 tablet (20 mg total) by mouth 2 (two) times a day       Start Date: 11/4/2022 End Date: --       Order Dose: 20 mg       Quantity: 20 tablet    Refills: 0       No discharge procedures on file      PDMP Review       Value Time User    PDMP Reviewed  Yes 6/30/2022  2:32 PM Gema Hernandez PA-C          ED Provider  Electronically Signed by           Karen Kelly MD  11/04/22 6385

## 2022-11-04 NOTE — Clinical Note
Charkatelyn Bennettkilo was seen and treated in our emergency department on 11/4/2022  Diagnosis:     Jamaica  may return to work on return date  She may return on this date: 11/07/2022         If you have any questions or concerns, please don't hesitate to call        Micheline Piedra MD    ______________________________           _______________          _______________  Hospital Representative                              Date                                Time

## 2022-11-17 ENCOUNTER — HOSPITAL ENCOUNTER (EMERGENCY)
Facility: HOSPITAL | Age: 27
Discharge: HOME | End: 2022-11-17
Attending: EMERGENCY MEDICINE

## 2022-11-17 VITALS
BODY MASS INDEX: 32.01 KG/M2 | RESPIRATION RATE: 16 BRPM | WEIGHT: 186.5 LBS | HEART RATE: 84 BPM | OXYGEN SATURATION: 98 % | TEMPERATURE: 97.7 F | DIASTOLIC BLOOD PRESSURE: 68 MMHG | SYSTOLIC BLOOD PRESSURE: 122 MMHG

## 2022-11-17 DIAGNOSIS — N94.9 GENITAL LESION, FEMALE: Primary | ICD-10-CM

## 2022-11-17 LAB
BACTERIA UR QL AUTO: ABNORMAL /HPF
BILIRUB UR QL STRIP: NEGATIVE
CLARITY UR: ABNORMAL
COLOR UR: YELLOW
EXT PREGNANCY TEST URINE: NEGATIVE
EXT. CONTROL: NORMAL
GLUCOSE UR STRIP-MCNC: NEGATIVE MG/DL
HGB UR QL STRIP.AUTO: 25
KETONES UR STRIP-MCNC: ABNORMAL MG/DL
LEUKOCYTE ESTERASE UR QL STRIP: 500
MUCOUS THREADS UR QL AUTO: ABNORMAL
NITRITE UR QL STRIP: NEGATIVE
NON-SQ EPI CELLS URNS QL MICRO: ABNORMAL /HPF
PH UR STRIP.AUTO: 6 [PH]
PROT UR STRIP-MCNC: ABNORMAL MG/DL
RBC #/AREA URNS AUTO: ABNORMAL /HPF
SP GR UR STRIP.AUTO: 1.02 (ref 1–1.04)
UROBILINOGEN UA: NEGATIVE MG/DL
WBC #/AREA URNS AUTO: ABNORMAL /HPF

## 2022-11-17 RX ORDER — ACYCLOVIR 400 MG/1
400 TABLET ORAL 4 TIMES DAILY
Qty: 40 TABLET | Refills: 0 | Status: SHIPPED | OUTPATIENT
Start: 2022-11-17 | End: 2022-11-27

## 2022-11-17 NOTE — DISCHARGE INSTRUCTIONS
Please follow up with gynecology for further evaluation of your symptoms  If you experience any new, worsening, or concerning symptoms, please return to the emergency department for re-evaluation  Continue with acyclovir for your treatment and abstain from sexual activity while you are under treatment

## 2022-11-17 NOTE — ED PROVIDER NOTES
History  Chief Complaint   Patient presents with   • Vaginal Pain     Reports having cyst on vagina and burning with peeing for over a week; reports pain and swelling is increasing  34 yo F presents to the emergency department for re-evaluation of vaginal pain and sores on vagina  Patient reports that the sores developed approximately one week ago and have been spreading on her labia  She states that she has severe pain and burning with voiding  She has a new boyfriend that she admits to sexual intercourse prior to the symptoms starting  Patient reports that significant other has denied STI's and was tested as well  Does not follow with gynecologist  No personal hx of STI's  LMP was 10/31/22  She denies abdominal pain, vaginal bleeding, vaginal discharge, fevers, chills, headache, dizziness, chest pain, SOB, hematuria, blood in stool, constipation, diarrhea  History provided by:  Patient   used: No    Vaginal Pain  Location:  External vagina  Quality:  Burning  Severity:  Severe  Onset quality:  Sudden  Duration:  1 week  Timing:  Constant  Progression:  Worsening  Chronicity:  New  Context:  New sexual partner  Relieved by:  Nothing  Ineffective treatments:  None tried  Associated symptoms: rash    Associated symptoms: no abdominal pain, no chest pain, no cough, no ear pain, no fever, no loss of consciousness, no shortness of breath, no sore throat and no vomiting    Rash:     Location:  Genitalia    Quality: blistering and burning      Severity:  Moderate    Onset quality:  Sudden    Duration:  1 week    Timing:  Constant    Progression:  Worsening      Prior to Admission Medications   Prescriptions Last Dose Informant Patient Reported? Taking?    EPINEPHrine (EPIPEN) 0 3 mg/0 3 mL SOAJ   No No   Sig: Inject 0 3 mL (0 3 mg total) into a muscle once for 1 dose   FLUoxetine (PROzac) 40 MG capsule   No No   Sig: Take 1 capsule (40 mg total) by mouth daily   Patient not taking: Reported on 2022   Lidocaine 4 % PTCH   No No   Sig: Apply 15 each topically daily as needed (back pain)   OLANZapine (ZyPREXA) 5 mg tablet   No No   Sig: Take 1 tablet (5 mg total) by mouth daily for 7 days   QUEtiapine (SEROquel) 100 mg tablet   Yes No   Sig: Take 100 mg by mouth daily at bedtime     Patient not taking: Reported on 2022   albuterol (PROVENTIL HFA,VENTOLIN HFA) 90 mcg/act inhaler   No No   Sig: Inhale 2 puffs every 6 (six) hours as needed for wheezing or shortness of breath   Patient not taking: Reported on 2022   buprenorphine-naloxone (SUBOXONE) 8-2 mg per SL tablet   Yes No   Si tablet 2 (two) times a day   butalbital-acetaminophen-caffeine (FIORICET,ESGIC) -40 mg per tablet   No No   Sig: Take 1 tablet by mouth every 6 (six) hours as needed for headaches for up to 10 doses   Patient not taking: Reported on 2022   calcium carbonate (TUMS) 500 mg chewable tablet   No No   Sig: Chew 2 tablets (1,000 mg total) daily as needed for indigestion or heartburn   dicyclomine (BENTYL) 20 mg tablet   No No   Sig: Take 1 tablet (20 mg total) by mouth 2 (two) times a day   diphenhydrAMINE-acetaminophen (TYLENOL PM)  MG TABS   Yes No   Sig: Take 1 tablet by mouth daily at bedtime as needed for sleep   fluticasone (FLOVENT HFA) 110 MCG/ACT inhaler   No No   Sig: Inhale 2 puffs 2 (two) times a day Rinse mouth after use     Patient not taking: Reported on 2022   hydrOXYzine HCL (ATARAX) 25 mg tablet   No No   Sig: Take 1 tablet (25 mg total) by mouth every 6 (six) hours as needed for anxiety for up to 10 days   ibuprofen (MOTRIN) 400 mg tablet   No No   Sig: Take 1 tablet (400 mg total) by mouth 2 (two) times a day   Patient taking differently: Take 400 mg by mouth as needed   melatonin 3 mg   No No   Sig: Take 1 tablet (3 mg total) by mouth daily at bedtime   Patient not taking: Reported on 2022   ondansetron (ZOFRAN) 4 mg tablet   No No   Sig: Take 1 tablet (4 mg total) by mouth every 6 (six) hours   ondansetron (ZOFRAN) 4 mg tablet   No No   Sig: Take 1 tablet (4 mg total) by mouth every 6 (six) hours   ondansetron (Zofran ODT) 4 mg disintegrating tablet   No No   Sig: Take 1 tablet (4 mg total) by mouth every 6 (six) hours as needed for nausea or vomiting   pantoprazole (PROTONIX) 40 mg tablet   No No   Sig: Take 1 tablet (40 mg total) by mouth daily   Patient not taking: Reported on 6/20/2022   pantoprazole (PROTONIX) 40 mg tablet   No No   Sig: Take 1 tablet (40 mg total) by mouth daily   sucralfate (CARAFATE) 1 g tablet   No No   Sig: Take 1 tablet (1 g total) by mouth 4 (four) times a day   Patient not taking: Reported on 6/20/2022      Facility-Administered Medications: None       Past Medical History:   Diagnosis Date   • ADHD    • Anxiety    • Asthma    • Bipolar 1 disorder (HCC)    • Depression    • GERD (gastroesophageal reflux disease)    • Intractable vomiting 01/14/2022   • Mental and behavioral problem    • Migraine    • Palpitations     Last Assessed [id number]   • Psychiatric disorder    • Psychiatric illness    • Psychosis ([facility] [address] )    • 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[address] )    • Seizures ([facility] [address] ) 01/14/2022    - petit mal   • Seizures ([facility] [address] )     Pseudoseizures   • SIRS (systemic inflammatory response syndrome) ([facility] [address] ) 01/14/2022   • Stabbing chest pain     Last Assessed [id number]   • Suicide attempt [facility])    • Tachycardia     Last Assessed [id number]   • Upper respiratory disease     Last Assessed [id number]       Past Surgical History:   Procedure Laterality Date   • KNEE SURGERY     • NM LAP,TUBAL CAUTERY N/A 10/3/2018    Procedure: LAPAROSCOPIC TUBAL LIGATION;  Surgeon: Carlie Guzman MD;  Location: [facility];  Service: Gynecology   • TUBAL LIGATION         Family History   Problem Relation Age of Onset   • Migraines Sister    • Cancer Mother    • Diabetes Mother    • Cancer Father    • Diabetes Father    • Arthritis Father    • Cancer Maternal Grandmother    • Cancer Maternal Aunt • Cancer Paternal Uncle    • Diabetes Other      I have reviewed and agree with the history as documented  E-Cigarette/Vaping   • E-Cigarette Use Former User      E-Cigarette/Vaping Substances   • Nicotine No    • THC No    • CBD No    • Flavoring No    • Other No    • Unknown No      Social History     Tobacco Use   • Smoking status: Some Days     Packs/day: 0 10     Types: Cigarettes   • Smokeless tobacco: Never   Vaping Use   • Vaping Use: Former   Substance Use Topics   • Alcohol use: Not Currently   • Drug use: Not Currently     Frequency: 1 0 times per week     Types: Marijuana     Comment: marijuana occasional, no meth at present        Review of Systems   Constitutional: Negative for chills and fever  HENT: Negative for ear pain and sore throat  Eyes: Negative for pain and visual disturbance  Respiratory: Negative for cough and shortness of breath  Cardiovascular: Negative for chest pain and palpitations  Gastrointestinal: Negative for abdominal pain and vomiting  Genitourinary: Positive for genital sores and vaginal pain  Negative for dysuria, hematuria and menstrual problem  Musculoskeletal: Negative for arthralgias and back pain  Skin: Positive for rash  Negative for color change  Neurological: Negative for seizures, loss of consciousness and syncope  All other systems reviewed and are negative  Physical Exam  Physical Exam  Vitals and nursing note reviewed  Exam conducted with a chaperone present  Constitutional:       General: She is not in acute distress  Appearance: Normal appearance  She is well-developed  She is obese  She is not ill-appearing  HENT:      Head: Normocephalic and atraumatic  Right Ear: Tympanic membrane and external ear normal       Left Ear: Tympanic membrane and external ear normal       Nose: Nose normal       Mouth/Throat:      Mouth: Mucous membranes are moist       Pharynx: Oropharynx is clear   No oropharyngeal exudate or posterior oropharyngeal erythema  Eyes:      General: No scleral icterus  Right eye: No discharge  Left eye: No discharge  Extraocular Movements: Extraocular movements intact  Conjunctiva/sclera: Conjunctivae normal       Pupils: Pupils are equal, round, and reactive to light  Cardiovascular:      Rate and Rhythm: Normal rate and regular rhythm  Pulses: Normal pulses  Heart sounds: Normal heart sounds  No murmur heard  Pulmonary:      Effort: Pulmonary effort is normal  No respiratory distress  Breath sounds: Normal breath sounds  No wheezing or rales  Chest:      Chest wall: No tenderness  Abdominal:      General: Abdomen is flat  Bowel sounds are normal       Palpations: Abdomen is soft  Tenderness: There is no abdominal tenderness  There is no right CVA tenderness or left CVA tenderness  Genitourinary:     Vagina: No vaginal discharge  Rectum: Normal       Comments: Scattered, small open genital sores on labia, TTP  No drainage noted  No abscess or fluid collection  Musculoskeletal:         General: No signs of injury  Normal range of motion  Cervical back: Normal range of motion and neck supple  No tenderness  Right lower leg: No edema  Left lower leg: No edema  Skin:     General: Skin is warm and dry  Capillary Refill: Capillary refill takes less than 2 seconds  Findings: No rash  Neurological:      General: No focal deficit present  Mental Status: She is alert and oriented to person, place, and time  Mental status is at baseline  Motor: No weakness  Gait: Gait normal    Psychiatric:         Mood and Affect: Mood normal          Behavior: Behavior normal          Thought Content:  Thought content normal          Vital Signs  ED Triage Vitals [11/17/22 1405]   Temperature Pulse Respirations Blood Pressure SpO2   97 7 °F (36 5 °C) 84 16 122/68 98 %      Temp Source Heart Rate Source Patient Position - Orthostatic VS BP Location FiO2 (%)   Tympanic Monitor Sitting Left arm --      Pain Score       --           Vitals:    11/17/22 1405   BP: 122/68   Pulse: 84   Patient Position - Orthostatic VS: Sitting         Visual Acuity      ED Medications  Medications - No data to display    Diagnostic Studies  Results Reviewed     Procedure Component Value Units Date/Time    RPR [[id number]] Collected: 11/17/22 1545    Lab Status: In process Specimen: Blood from Arm, Left Updated: 11/17/22 1552    HIV 1/2 ANTIGEN/ANTIBODY (4TH GENERATION) Petros Brown [[id number]] Collected: 11/17/22 1545    Lab Status: In process Specimen: Blood from Arm, Left Updated: 11/17/22 1552    Molecular Vaginal Panel [[id number]] Collected: 11/17/22 1549    Lab Status: In process Specimen: Genital from Vaginal Updated: 11/17/22 1552    UA w Reflex to Microscopic w Reflex to Culture [[id number]]  (Abnormal) Collected: 11/17/22 1511    Lab Status: Final result Specimen: Urine, Clean Catch Updated: 11/17/22 1548     Color, UA Yellow     Clarity, UA Slightly Cloudy     Specific Gravity, UA 1 025     pH, UA 6 0     Leukocytes,  0     Nitrite, UA Negative     Protein, UA 15 (Trace) mg/dl      Glucose, UA Negative mg/dl      Ketones, UA 15 (1+) mg/dl      Bilirubin, UA Negative     Occult Blood, UA 25 0     UROBILINOGEN UA Negative mg/dL     Urine Microscopic [[id number]] Collected: 11/17/22 1511    Lab Status: In process Specimen: Urine, Clean Catch Updated: 11/17/22 1546    POCT pregnancy, urine [[id number]]  (Normal) Resulted: 11/17/22 1519    Lab Status: Final result Updated: 11/17/22 1519     EXT Preg Test, Ur Negative     Control Valid    Chlamydia/GC amplified DNA by PCR [[id number]] Collected: 11/17/22 1512    Lab Status:  In process Specimen: Urine, Other Updated: 11/17/22 1518                 No orders to display              Procedures  Procedures         ED Course                                             MDM  Number of Diagnoses or Management Options  Genital lesion, female: new and requires workup  Diagnosis management comments: 34 yo F with history of bipolar disorder, seizure disorder presents for evaluation of vaginal pain and genital sores that started one week ago and has been gradually worsening  Patient is overall well-appearing in no acute distress  VS are stable, afebrile  Scattered, small open blisters on labia most consistent with herpes simplex rash  Will test for STI's and treat as herpes simplex with acyclovir  Informed her that we will give her a phone call if any of her test results are positive however information will be provided in my chart as well  Recommended follow up with gynecologist for further evaluation and management  Patient verbalizes understanding of the assessment and plan and is amenable to discharge with outpatient follow up  Amount and/or Complexity of Data Reviewed  Clinical lab tests: ordered and reviewed  Review and summarize past medical records: yes    Patient Progress  Patient progress: stable      Disposition  Final diagnoses:   Genital lesion, female     Time reflects when diagnosis was documented in both MDM as applicable and the Disposition within this note     Time User Action Codes Description Comment    11/17/2022  3:25 PM Virginia Murphy Add [N94 9] Genital lesion, female       ED Disposition     ED Disposition   Discharge    Condition   Stable    Date/Time   Thu Nov 17, 2022  4:15 PM    Comment   Jamaica Donovan discharge to home/self care                 Follow-up Information     Follow up With Specialties Details Why Contact Info Additional Information    Olga Foy,  Family Medicine Call in 3 days follow up for further evaluation of symptoms [address] [place] [address] Emergency Department Emergency Medicine Go to  If symptoms worsen [address] [address]  [facility] [address]  [address]  OB/GYN Obstetrics and Gynecology Schedule an appointment as soon as possible for a visit  follow up for further evaluation of symptoms [address] [address]  [address] [place] [address], [address] [place] [address], [address] [place] [address], [address] [phone]          Patient's Medications   Discharge Prescriptions    ACYCLOVIR (ZOVIRAX) 400 MG TABLET    Take 1 tablet (400 mg total) by mouth 4 (four) times a day for 10 days       Start Date: 11/17/2022End Date: 11/27/2022       Order Dose: 400 mg       Quantity: 40 tablet    Refills: 0           PDMP Review       Value Time User    PDMP Reviewed  Yes 6/30/2022  2:32 PM Dylan Whittington PA-C          ED Provider  Electronically Signed by           Minesh Watts PA-C  11/17/22 0271

## 2022-11-18 DIAGNOSIS — A59.9 TRICHOMONAS INFECTION: Primary | ICD-10-CM

## 2022-11-18 DIAGNOSIS — A74.9 CHLAMYDIA: ICD-10-CM

## 2022-11-18 LAB
BACTERIA UR CULT: NORMAL
C GLABRATA DNA VAG QL NAA+PROBE: NEGATIVE
C KRUSEI DNA VAG QL NAA+PROBE: NEGATIVE
C TRACH DNA SPEC QL NAA+PROBE: POSITIVE
CANDIDA SP 6 PNL VAG NAA+PROBE: NEGATIVE
N GONORRHOEA DNA SPEC QL NAA+PROBE: NEGATIVE
RPR SER QL: NORMAL
T VAGINALIS DNA VAG QL NAA+PROBE: POSITIVE
VAGINOSIS/ITIS DNA PNL VAG PROBE+SIG AMP: POSITIVE

## 2022-11-18 RX ORDER — DOXYCYCLINE 100 MG/1
100 TABLET ORAL 2 TIMES DAILY
Qty: 14 TABLET | Refills: 0 | Status: SHIPPED | OUTPATIENT
Start: 2022-11-18 | End: 2022-11-25

## 2022-11-18 RX ORDER — METRONIDAZOLE 500 MG/1
500 TABLET ORAL EVERY 12 HOURS SCHEDULED
Qty: 14 TABLET | Refills: 0 | Status: SHIPPED | OUTPATIENT
Start: 2022-11-18 | End: 2022-11-25

## 2022-11-18 NOTE — ED ATTENDING ATTESTATION
I was the attending physician on duty at the time the patient visited the emergency department  The patient was evaluated by the Advanced Practitioner  I was personally available for consultation; however the patient’s care, medical decisions and disposition fell within the Advanced Practitioner’s scope of practice to independently manage the patient, unless otherwise noted      Susu Hines MD

## 2022-11-18 NOTE — RESULT ENCOUNTER NOTE
Patient is positive for BV, trichomonas and Chlamydia  Pregnancy test yesterday at ER was negative  Will start doxy and flagyl  Abstain from intercourse until symptoms resolved and medication are complete  Follow up with ob/gyn or std clinic

## 2022-11-21 LAB — HIV 1+2 AB+HIV1 P24 AG SERPL QL IA: NORMAL

## 2022-12-28 ENCOUNTER — TELEPHONE (OUTPATIENT)
Dept: OBGYN CLINIC | Facility: CLINIC | Age: 27
End: 2022-12-28

## 2023-01-03 ENCOUNTER — TELEPHONE (OUTPATIENT)
Dept: OBGYN CLINIC | Facility: CLINIC | Age: 28
End: 2023-01-03

## 2023-01-26 ENCOUNTER — TELEPHONE (OUTPATIENT)
Dept: OBGYN CLINIC | Facility: CLINIC | Age: 28
End: 2023-01-26

## 2023-02-07 ENCOUNTER — HOSPITAL ENCOUNTER (EMERGENCY)
Facility: HOSPITAL | Age: 28
Discharge: HOME | End: 2023-02-07
Attending: EMERGENCY MEDICINE

## 2023-02-07 ENCOUNTER — APPOINTMENT (EMERGENCY)
Dept: RADIOLOGY | Facility: HOSPITAL | Age: 28
End: 2023-02-07

## 2023-02-07 VITALS
TEMPERATURE: 98.7 F | SYSTOLIC BLOOD PRESSURE: 108 MMHG | OXYGEN SATURATION: 98 % | HEART RATE: 93 BPM | DIASTOLIC BLOOD PRESSURE: 59 MMHG | RESPIRATION RATE: 16 BRPM | WEIGHT: 184.1 LBS | BODY MASS INDEX: 31.6 KG/M2

## 2023-02-07 DIAGNOSIS — T75.89XA ENVIRONMENTAL EXPOSURE: Primary | ICD-10-CM

## 2023-02-07 DIAGNOSIS — R07.9 CHEST PAIN: ICD-10-CM

## 2023-02-07 LAB
ATRIAL RATE: 72 BPM
P AXIS: 58 DEGREES
PR INTERVAL: 134 MS
QRS AXIS: 32 DEGREES
QRSD INTERVAL: 90 MS
QT INTERVAL: 360 MS
QTC INTERVAL: 394 MS
T WAVE AXIS: 33 DEGREES
VENTRICULAR RATE: 72 BPM

## 2023-02-07 NOTE — ED NOTES
Patient declined testing for flu and covid; provider made aware       Tamara Ellis RN  02/07/23 5708
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
## 2023-09-21 ENCOUNTER — OFFICE VISIT (OUTPATIENT)
Dept: OBGYN CLINIC | Facility: CLINIC | Age: 28
End: 2023-09-21
Payer: MEDICARE

## 2023-09-21 VITALS
BODY MASS INDEX: 27.83 KG/M2 | WEIGHT: 163 LBS | SYSTOLIC BLOOD PRESSURE: 112 MMHG | HEIGHT: 64 IN | DIASTOLIC BLOOD PRESSURE: 78 MMHG

## 2023-09-21 DIAGNOSIS — N91.0 DELAYED MENSES: ICD-10-CM

## 2023-09-21 DIAGNOSIS — Z34.90 PREGNANCY, UNSPECIFIED GESTATIONAL AGE: ICD-10-CM

## 2023-09-21 DIAGNOSIS — Z01.419 ENCOUNTER FOR ANNUAL ROUTINE GYNECOLOGICAL EXAMINATION: Primary | ICD-10-CM

## 2023-09-21 DIAGNOSIS — N91.2 AMENORRHEA: ICD-10-CM

## 2023-09-21 DIAGNOSIS — N91.2 AMENORRHEA: Primary | ICD-10-CM

## 2023-09-21 DIAGNOSIS — R10.2 PELVIC PAIN: ICD-10-CM

## 2023-09-21 PROCEDURE — G0145 SCR C/V CYTO,THINLAYER,RESCR: HCPCS | Performed by: OBSTETRICS & GYNECOLOGY

## 2023-09-21 PROCEDURE — G0101 CA SCREEN;PELVIC/BREAST EXAM: HCPCS | Performed by: OBSTETRICS & GYNECOLOGY

## 2023-09-21 NOTE — PROGRESS NOTES
Josh Tuttle is a 33 y.o. female who presents for annual well woman exam. Periods are regular every 28-30 days, lasting 5 days. No intermenstrual bleeding, spotting, or discharge. Current contraception: tubal ligation  History of abnormal Pap smear: yes -   Family history of uterine or ovarian cancer: no  Family history of breast cancer: no    Menstrual History:  OB History        4    Para   1    Term   1       0    AB   3    Living   1       SAB   0    IAB   0    Ectopic   0    Multiple        Live Births                   Patient's last menstrual period was 2023. Period Cycle (Days): 28  Period Duration (Days): 5  Period Pattern: Regular  Menstrual Flow: Moderate, Heavy  Dysmenorrhea: None    The following portions of the patient's history were reviewed and updated as appropriate: allergies, current medications, past family history, past medical history, past social history, past surgical history and problem list.    Review of Systems  Review of Systems   Constitutional: Negative for activity change, appetite change, chills, fatigue and fever. Respiratory: Negative for apnea, cough, chest tightness and shortness of breath. Cardiovascular: Negative for chest pain, palpitations and leg swelling. Gastrointestinal: Negative for abdominal pain, constipation, diarrhea, nausea and vomiting. Genitourinary: Negative for difficulty urinating, dysuria, flank pain, frequency, hematuria and urgency. Neurological: Negative for dizziness, seizures, syncope, light-headedness, numbness and headaches. Psychiatric/Behavioral: Negative for agitation and confusion.       Patient noticed pelvic pain started 2 weeks ago intermittent comes and go desire pregnancy test worry about possibility of pregnancy urine hCG negative requested lab to check for any pregnancy also I would recommend ultrasound to check for any ovarian cyst or GYN etiology for her pain if ultrasound is reassuring I would recommend GI evaluation plan  Discussed with patient all patient questions answered and patient was satisfied    Objective      /78 (BP Location: Left arm, Patient Position: Sitting, Cuff Size: Adult)   Ht 5' 4" (1.626 m)   Wt 73.9 kg (163 lb)   LMP 09/09/2023   BMI 27.98 kg/m²     Physical Exam  OBGyn Exam     General:   alert and oriented, in no acute distress, alert, appears stated age and cooperative   Heart: regular rate and rhythm, S1, S2 normal, no murmur, click, rub or gallop   Lungs: clear to auscultation bilaterally   Abdomen: soft, non-tender, without masses or organomegaly   Vulva: normal   Vagina: normal mucosa, white discharge noted   Cervix: no cervical motion tenderness and no lesions   Uterus: normal size   Adnexa:  Breast Exam:  normal adnexa  breasts appear normal, no suspicious masses, no skin or nipple changes or axillary nodes. Assessment      @well woman@ . 21-year-old female  Annual exam  Prior vaginal delivery  Has tubal ligation  Seizure disorder  Asthma  Hypothyroid  Bipolar  New onset of pelvic pain request pregnancy test  Plan   Pap/reflux  Diet/exercise  Calcium/vitamin D  Pelvic ultrasound  Quantitative hCG patient request  We will call patient with all results   All questions answered. There are no Patient Instructions on file for this visit.

## 2023-09-28 LAB
LAB AP GYN PRIMARY INTERPRETATION: NORMAL
Lab: NORMAL
PATH INTERP SPEC-IMP: NORMAL

## 2023-10-17 DIAGNOSIS — J45.40 MODERATE PERSISTENT ASTHMA, UNSPECIFIED WHETHER COMPLICATED: ICD-10-CM

## 2023-10-17 RX ORDER — ALBUTEROL SULFATE 90 UG/1
2 AEROSOL, METERED RESPIRATORY (INHALATION) EVERY 6 HOURS PRN
Qty: 18 G | Refills: 1 | Status: SHIPPED | OUTPATIENT
Start: 2023-10-17

## 2023-10-17 RX ORDER — FLUTICASONE PROPIONATE 110 UG/1
2 AEROSOL, METERED RESPIRATORY (INHALATION) 2 TIMES DAILY
Qty: 12 G | Refills: 1 | Status: SHIPPED | OUTPATIENT
Start: 2023-10-17
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
## 2023-10-26 ENCOUNTER — OFFICE VISIT (OUTPATIENT)
Dept: PSYCHIATRY | Facility: OTHER | Age: 28
End: 2023-10-26

## 2023-10-26 DIAGNOSIS — F70 MILD INTELLECTUAL DISABILITY: Chronic | ICD-10-CM

## 2023-10-26 DIAGNOSIS — F43.10 PTSD (POST-TRAUMATIC STRESS DISORDER): ICD-10-CM

## 2023-10-26 DIAGNOSIS — F31.60 BIPOLAR 1 DISORDER, MIXED (HCC): Primary | Chronic | ICD-10-CM

## 2023-10-26 DIAGNOSIS — F41.1 GAD (GENERALIZED ANXIETY DISORDER): ICD-10-CM

## 2023-10-26 DIAGNOSIS — F84.9 PERVASIVE DEVELOPMENTAL DISORDER: ICD-10-CM

## 2023-10-26 RX ORDER — HYDROXYZINE HYDROCHLORIDE 25 MG/1
25 TABLET, FILM COATED ORAL 3 TIMES DAILY PRN
Qty: 60 TABLET | Refills: 0 | Status: SHIPPED | OUTPATIENT
Start: 2023-10-26 | End: 2023-11-25
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
## 2023-10-29 PROBLEM — R10.9 ABDOMINAL PAIN: Status: RESOLVED | Noted: 2019-07-05 | Resolved: 2023-10-29

## 2023-10-29 PROBLEM — R10.32 LEFT LOWER QUADRANT PAIN: Status: RESOLVED | Noted: 2018-11-01 | Resolved: 2023-10-29

## 2023-10-29 PROBLEM — N30.00 ACUTE CYSTITIS WITHOUT HEMATURIA: Status: RESOLVED | Noted: 2019-06-30 | Resolved: 2023-10-29

## 2023-10-29 PROBLEM — IMO0001 COLD: Status: RESOLVED | Noted: 2018-09-12 | Resolved: 2023-10-29

## 2023-10-29 PROBLEM — R10.13 EPIGASTRIC PAIN: Status: RESOLVED | Noted: 2022-01-15 | Resolved: 2023-10-29

## 2023-10-29 PROBLEM — E11.9 DIABETES MELLITUS, TYPE 2 (HCC): Status: RESOLVED | Noted: 2022-01-21 | Resolved: 2023-10-29

## 2023-10-30 NOTE — BH TREATMENT PLAN
TREATMENT PLAN (Medication Management Only)        [address] [facility] [place]    Name/Date of Birth/MRN:  Jamaica Gilmore 28 y.o. 1995 MRN: [id number]  Date of Treatment Plan: October 30, 2023  Diagnosis/Diagnoses:   1. Bipolar 1 disorder, mixed ([address])    2. CRUZ (generalized anxiety disorder)    3. PTSD (post-traumatic stress disorder)    4. Mild intellectual disability per history    5. Pervasive developmental disorder per history      Strengths/Personal Resources for Self-Care: supportive friends, ability to adapt to life changes, ability to negotiate basic needs, being resoureceful. Area/Areas of need (in own words): anxiety symptoms, mood instability, anger control, difficulty accepting psychiatric illness, financial problems, unemployment, unstable housing situation, limited support system, substance use  1. Long Term Goal:   decrease anxiety, improve depression, improve mood stability, improve anger control, increase acceptance of need for psychiatric treatment, No self abusive behavior  Target Date: 6 months - 4/30/2024  Person/Persons responsible for completion of goal: Autumn  2. Short Term Objective (s) - How will we reach this goal?:   A. Provider new recommended medication/dosage changes and/or continue medication(s): continue current medications as prescribed. B.  Attend medication management appointments regularly. C.  N/A. Target Date: 6 months - 4/30/2024  Person/Persons Responsible for Completion of Goal: Autumn   Progress Towards Goals: starting treatment  Treatment Modality: medication management every 1-2 weeks  Review due 180 days from date of this plan: 6 months - 4/30/2024  Expected length of service: ongoing treatment unless revised  My Physician/PA/NP and I have developed this plan together and I agree to work on the goals and objectives. I understand the treatment goals that were developed for my treatment.     Treatment Plan done but not signed at time of office visit due to: plan reviewed by phone or in person and verbal consent secondary to recommendations regarding COVID social distancing.       Feli Burgos DO 10/30/23

## 2023-10-31 DIAGNOSIS — M25.532 LEFT WRIST PAIN: ICD-10-CM

## 2023-10-31 DIAGNOSIS — R10.13 EPIGASTRIC PAIN: Primary | ICD-10-CM

## 2023-10-31 RX ORDER — LIDOCAINE 40 MG/G
CREAM TOPICAL AS NEEDED
Qty: 28 G | Refills: 0 | Status: SHIPPED | OUTPATIENT
Start: 2023-10-31

## 2023-10-31 RX ORDER — PANTOPRAZOLE SODIUM 40 MG/1
40 TABLET, DELAYED RELEASE ORAL DAILY
Qty: 30 TABLET | Refills: 0 | Status: SHIPPED | OUTPATIENT
Start: 2023-10-31

## 2023-10-31 NOTE — PROGRESS NOTES
Jamaica presents to the [address] [place] [address] at the [facility] on [place] for left wrist pain. Pain and numbness of the 4th and 5th digits of the left hand has been intermittently occurring for the past 2 weeks. Pronation/supination of forearm or flexion of elbow initiates symptoms. Cold exacerbates, rest alleviates symptoms. No treatments tried. Pt denies injury to the arm. She has been carrying her possessions since becoming homeless. Pt states that in the past, she used to shoot up in the left arm and is concerned that current symptoms may be related to nerve damage from that time period. Secondly, Jamaica reports intermittent epigastric pain, worse after eating, and in particular, exacerbated by greasy foods. Pain does not radiate. Denies reflux or other GI symptoms. Pt has been treated in the past with pantoprazole and Carafate for GERD, and these were effective. EGD last done in January 2022 for similar concerns revealed only a 1 cm hiatal hernia and no other abnormal findings. Pt states that she can't be expected to cut out all the foods she likes. Pt also mentions that the inhalers that were started after our last meeting on the medical [facility] have made a major improvement in her breathing. She is using the Flovent BID as prescribed and needing the albuterol only occasionally. Denies wheezing or SOB. O: Left arm no TTP elbow, wrist, or hand. Sensation normal all 5 fingers. Phalen's negative. Symptoms of pain, weakness, and numbness 4th and 5th fingers elicited by flexion of elbow.  strength decreased. 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 Conservative management. Pt cannot tolerate NSAIDs due to allergy and as is homeless, has no access to ice or heat. Advised rest, avoid prolonged flexion of elbow or carrying heavy objects with left arm. Tylenol and lidocaine PRN for pain. Follow up in office.  - GERD exacerbation. Restart pantoprazole 40 mg daily.  Avoid trigger foods, large meals, and lying down within 2 hours after eating. Follow up in office.  - Asthma. Controlled. Continue Flovent 2 puffs BID, albuterol PRN. - Need for new PCP. Pt information will be forwarded to [facility] for assistance scheduling at [facility].

## 2023-12-15 ENCOUNTER — APPOINTMENT (EMERGENCY)
Dept: RADIOLOGY | Facility: HOSPITAL | Age: 28
End: 2023-12-15
Payer: MEDICARE

## 2023-12-15 ENCOUNTER — HOSPITAL ENCOUNTER (EMERGENCY)
Facility: HOSPITAL | Age: 28
Discharge: HOME | End: 2023-12-15
Attending: EMERGENCY MEDICINE
Payer: MEDICARE

## 2023-12-15 VITALS
OXYGEN SATURATION: 97 % | TEMPERATURE: 98.8 F | BODY MASS INDEX: 24.56 KG/M2 | RESPIRATION RATE: 18 BRPM | WEIGHT: 143.1 LBS | HEART RATE: 102 BPM | DIASTOLIC BLOOD PRESSURE: 79 MMHG | SYSTOLIC BLOOD PRESSURE: 137 MMHG

## 2023-12-15 DIAGNOSIS — J45.901 ASTHMA EXACERBATION: Primary | ICD-10-CM

## 2023-12-15 LAB
EXT PREGNANCY TEST URINE: NEGATIVE
EXT. CONTROL: NORMAL

## 2023-12-15 PROCEDURE — 94640 AIRWAY INHALATION TREATMENT: CPT

## 2023-12-15 PROCEDURE — 81025 URINE PREGNANCY TEST: CPT

## 2023-12-15 PROCEDURE — 71046 X-RAY EXAM CHEST 2 VIEWS: CPT

## 2023-12-15 PROCEDURE — 99284 EMERGENCY DEPT VISIT MOD MDM: CPT

## 2023-12-15 PROCEDURE — 99283 EMERGENCY DEPT VISIT LOW MDM: CPT

## 2023-12-15 RX ORDER — ALBUTEROL SULFATE 90 UG/1
2 AEROSOL, METERED RESPIRATORY (INHALATION) EVERY 6 HOURS PRN
Qty: 8.5 G | Refills: 0 | Status: SHIPPED | OUTPATIENT
Start: 2023-12-15

## 2023-12-15 RX ORDER — IPRATROPIUM BROMIDE AND ALBUTEROL SULFATE 2.5; .5 MG/3ML; MG/3ML
3 SOLUTION RESPIRATORY (INHALATION) ONCE
Status: COMPLETED | OUTPATIENT
Start: 2023-12-15 | End: 2023-12-15

## 2023-12-15 RX ORDER — FLUTICASONE PROPIONATE 110 UG/1
2 AEROSOL, METERED RESPIRATORY (INHALATION) 2 TIMES DAILY
Qty: 12 G | Refills: 0 | Status: SHIPPED | OUTPATIENT
Start: 2023-12-15

## 2023-12-15 RX ORDER — DEXAMETHASONE 4 MG/1
10 TABLET ORAL ONCE
Status: COMPLETED | OUTPATIENT
Start: 2023-12-15 | End: 2023-12-15

## 2023-12-15 RX ORDER — FLUTICASONE PROPIONATE 110 UG/1
2 AEROSOL, METERED RESPIRATORY (INHALATION) 2 TIMES DAILY
Qty: 12 G | Refills: 0 | Status: SHIPPED | OUTPATIENT
Start: 2023-12-15 | End: 2023-12-15

## 2023-12-15 RX ORDER — ALBUTEROL SULFATE 90 UG/1
2 AEROSOL, METERED RESPIRATORY (INHALATION) ONCE
Status: COMPLETED | OUTPATIENT
Start: 2023-12-15 | End: 2023-12-15

## 2023-12-15 RX ORDER — ALBUTEROL SULFATE 90 UG/1
2 AEROSOL, METERED RESPIRATORY (INHALATION) EVERY 6 HOURS PRN
Qty: 8.5 G | Refills: 0 | Status: SHIPPED | OUTPATIENT
Start: 2023-12-15 | End: 2023-12-15

## 2023-12-15 RX ADMIN — IPRATROPIUM BROMIDE AND ALBUTEROL SULFATE 3 ML: 2.5; .5 SOLUTION RESPIRATORY (INHALATION) at 02:33

## 2023-12-15 RX ADMIN — DEXAMETHASONE 10 MG: 4 TABLET ORAL at 02:33

## 2023-12-15 RX ADMIN — ALBUTEROL SULFATE 2 PUFF: 90 AEROSOL, METERED RESPIRATORY (INHALATION) at 03:37

## 2023-12-15 NOTE — ED PROVIDER NOTES
History  Chief Complaint   Patient presents with    Asthma     Arrives reporting she is homeless living in a tent and that her asthma has been getting bad over the last week and got worse tonight. Reports she does not have any pumps or asthma meds. The patient is a 62-year-old female with a past medical history of ***, who presents for shortness of breath. She reports worsening shortness of breath and chest tightness over the last few          Prior to Admission Medications   Prescriptions Last Dose Informant Patient Reported? Taking?    EPINEPHrine (EPIPEN) 0.3 mg/0.3 mL SOAJ   No No   Sig: Inject 0.3 mL (0.3 mg total) into a muscle once for 1 dose   Lidocaine 4 % PTCH Not Taking  No No   Sig: Apply 15 each topically daily as needed (back pain)   Patient not taking: Reported on 12/15/2023   acyclovir (ZOVIRAX) 400 MG tablet   No No   Sig: Take 1 tablet (400 mg total) by mouth 4 (four) times a day for 10 days   albuterol (PROVENTIL HFA,VENTOLIN HFA) 90 mcg/act inhaler Not Taking  No No   Sig: Inhale 2 puffs every 6 (six) hours as needed for wheezing or shortness of breath   Patient not taking: Reported on 12/15/2023   buprenorphine-naloxone (SUBOXONE) 8-2 mg per SL tablet   Yes No   Si tablet 2 (two) times a day   Patient not taking: Reported on 2023   butalbital-acetaminophen-caffeine (FIORICET,ESGIC) -40 mg per tablet   No No   Sig: Take 1 tablet by mouth every 6 (six) hours as needed for headaches for up to 10 doses   Patient not taking: Reported on 2022   calcium carbonate (TUMS) 500 mg chewable tablet   No No   Sig: Chew 2 tablets (1,000 mg total) daily as needed for indigestion or heartburn   dicyclomine (BENTYL) 20 mg tablet   No No   Sig: Take 1 tablet (20 mg total) by mouth 2 (two) times a day   Patient not taking: Reported on 2023   diphenhydrAMINE-acetaminophen (TYLENOL PM)  MG TABS   Yes No   Sig: Take 1 tablet by mouth daily at bedtime as needed for sleep fluticasone (FLOVENT HFA) 110 MCG/ACT inhaler   No No   Sig: Inhale 2 puffs 2 (two) times a day Rinse mouth after use.   hydrOXYzine HCL (ATARAX) 25 mg tablet   No No   Sig: Take 1 tablet (25 mg total) by mouth 3 (three) times a day as needed for anxiety   lidocaine (LMX) 4 % cream Not Taking  No No   Sig: Apply topically as needed for mild pain   Patient not taking: Reported on 12/15/2023   melatonin 3 mg   No No   Sig: Take 1 tablet (3 mg total) by mouth daily at bedtime   Patient not taking: Reported on 6/20/2022   ondansetron (ZOFRAN) 4 mg tablet   No No   Sig: Take 1 tablet (4 mg total) by mouth every 6 (six) hours   Patient not taking: Reported on 9/21/2023   ondansetron (ZOFRAN) 4 mg tablet   No No   Sig: Take 1 tablet (4 mg total) by mouth every 6 (six) hours   Patient not taking: Reported on 9/21/2023   ondansetron (Zofran ODT) 4 mg disintegrating tablet   No No   Sig: Take 1 tablet (4 mg total) by mouth every 6 (six) hours as needed for nausea or vomiting   Patient not taking: Reported on 9/21/2023   pantoprazole (PROTONIX) 40 mg tablet   No No   Sig: Take 1 tablet (40 mg total) by mouth daily   Patient not taking: Reported on 6/20/2022   pantoprazole (PROTONIX) 40 mg tablet   No No   Sig: Take 1 tablet (40 mg total) by mouth daily   sucralfate (CARAFATE) 1 g tablet   No No   Sig: Take 1 tablet (1 g total) by mouth 4 (four) times a day   Patient not taking: Reported on 6/20/2022      Facility-Administered Medications: None       Past Medical History:   Diagnosis Date    Abdominal pain 07/05/2019    Acute cystitis without hematuria 06/30/2019    ADHD     Anxiety     Asthma     Bipolar 1 disorder ([address])     Depression     Epigastric pain 01/15/2022    GERD (gastroesophageal reflux disease)     Intractable vomiting 01/14/2022    Left lower quadrant pain 11/01/2018    Lethargy     Mental and behavioral problem     Migraine     Palpitations     Last Assessed 29Nov2016    Psychiatric disorder     Psychiatric illness     Psychosis ([address])     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[address])     Seizures ([address]) 01/14/2022    - petit mal    Seizures ([address])     Pseudoseizures    SIRS (systemic inflammatory response syndrome) ([address]) 01/14/2022    Stabbing chest pain     Last Assessed [id number]    Suicide attempt [facility])     Tachycardia     Last Assessed [id number]    Upper respiratory disease     Last Assessed 27Jan2016       Past Surgical History:   Procedure Laterality Date    KNEE SURGERY      GA LAPAROSCOPY FULGURATION OVIDUCTS N/A 10/3/2018    Procedure: LAPAROSCOPIC TUBAL LIGATION;  Surgeon: Moise Sloan MD;  Location: [facility];  Service: Gynecology    TUBAL LIGATION         Family History   Problem Relation Age of Onset    Diabetes Mother     Cancer Father     Diabetes Father     Arthritis Father     Throat cancer Father     Migraines Sister     Cancer Maternal Grandmother     Heart attack Maternal Aunt     Cancer Maternal Aunt     Cancer Paternal Uncle     Diabetes Other      I have reviewed and agree with the history as documented.     E-Cigarette/Vaping    E-Cigarette Use Current Some Day User      E-Cigarette/Vaping Substances    Nicotine No     THC No     CBD No     Flavoring No     Other No     Unknown No      Social History     Tobacco Use    Smoking status: Former     Current packs/day: 0.10     Types: Cigarettes    Smokeless tobacco: Never   Vaping Use    Vaping status: Some Days   Substance Use Topics    Alcohol use: Not Currently    Drug use: Yes     Frequency: 1.0 times per week     Types: Marijuana, "Crack" cocaine     Comment: marijuana occasional, no meth at present        Review of Systems    Physical Exam  Physical Exam    Vital Signs  ED Triage Vitals [12/15/23 0212]   Temperature Pulse Respirations Blood Pressure SpO2   98.8 °F (37.1 °C) (!) 120 20 128/70 94 %      Temp Source Heart Rate Source Patient Position - Orthostatic VS BP Location FiO2 (%)   Oral Monitor Sitting Left arm --      Pain Score       -- Vitals:    12/15/23 0212 12/15/23 0308 12/15/23 0344   BP: 128/70  137/79   Pulse: (!) 120 102 102   Patient Position - Orthostatic VS: Sitting  Lying         Visual Acuity      ED Medications  Medications   ipratropium-albuterol (DUO-NEB) 0.5-2.5 mg/3 mL inhalation solution 3 mL (3 mL Nebulization Given 12/15/23 0233)   dexamethasone (DECADRON) tablet 10 mg (10 mg Oral Given 12/15/23 0233)   albuterol (PROVENTIL HFA,VENTOLIN HFA) inhaler 2 puff (2 puffs Inhalation Given 12/15/23 8991)       Diagnostic Studies  Results Reviewed       Procedure Component Value Units Date/Time    POCT pregnancy, urine [[id number]]  (Normal) Resulted: 12/15/23 0232    Lab Status: Final result Updated: 12/15/23 0232     EXT Preg Test, Ur Negative     Control Valid                   XR chest 2 views   ED Interpretation by [facility] MANGO Carbajal PA-C (12/15 0328)   No acute cardiopulmonary disease. Procedures  Procedures         ED Course                               SBIRT 20yo+      Flowsheet Row Most Recent Value   Initial Alcohol Screen: US AUDIT-C     1. How often do you have a drink containing alcohol? 0 Filed at: 12/15/2023 0232   2. How many drinks containing alcohol do you have on a typical day you are drinking? 0 Filed at: 12/15/2023 0232   3a. Male UNDER 65: How often do you have five or more drinks on one occasion? 0 Filed at: 12/15/2023 0232   3b. FEMALE Any Age, or MALE 65+: How often do you have 4 or more drinks on one occassion? 0 Filed at: 12/15/2023 0232   Audit-C Score 0 Filed at: 12/15/2023 4963   JOEY: How many times in the past year have you. .. Used an illegal drug or used a prescription medication for non-medical reasons? Never  [Pt reports crack use tonight but states she is going to stop] Filed at: 12/15/2023 0232                      Medical Decision Making  Amount and/or Complexity of Data Reviewed  Labs: ordered.   Radiology: ordered and independent interpretation performed. Risk  Prescription drug management. Disposition  Final diagnoses:   Asthma exacerbation     Time reflects when diagnosis was documented in both MDM as applicable and the Disposition within this note       Time User Action Codes Description Comment    12/15/2023  3:29 AM Liana Clements [facility] Add [J45.901] Asthma exacerbation           ED Disposition       ED Disposition   Discharge    Condition   Stable    Date/Time   Fri Dec 15, 2023 [address] discharge to home/self care. Follow-up Information       Follow up With Specialties Details Why [address] [place] [address],  Family Medicine   [address] [place] [address]  [phone]              Discharge Medication List as of 12/15/2023  3:39 AM        CONTINUE these medications which have CHANGED    Details   !! albuterol (ProAir HFA) 90 mcg/act inhaler Inhale 2 puffs every 6 (six) hours as needed for wheezing, Starting Fri 12/15/2023, Normal      !! fluticasone (Flovent HFA) 110 MCG/ACT inhaler Inhale 2 puffs 2 (two) times a day Rinse mouth after use., Starting Fri 12/15/2023, Normal       !! - Potential duplicate medications found. Please discuss with provider.         CONTINUE these medications which have NOT CHANGED    Details   acyclovir (ZOVIRAX) 400 MG tablet Take 1 tablet (400 mg total) by mouth 4 (four) times a day for 10 days, Starting Thu 11/17/2022, Until Sun 11/27/2022, Normal      !! albuterol (PROVENTIL HFA,VENTOLIN HFA) 90 mcg/act inhaler Inhale 2 puffs every 6 (six) hours as needed for wheezing or shortness of breath, Starting Tue 10/17/2023, Normal      buprenorphine-naloxone (SUBOXONE) 8-2 mg per SL tablet 1 tablet 2 (two) times a day, Starting Mon 6/13/2022, Historical Med      butalbital-acetaminophen-caffeine (FIORICET,ESGIC) -40 mg per tablet Take 1 tablet by mouth every 6 (six) hours as needed for headaches for up to 10 doses, Starting Sun 10/10/2021, Normal      calcium carbonate (TUMS) 500 mg chewable tablet Chew 2 tablets (1,000 mg total) daily as needed for indigestion or heartburn, Starting Sat 1/15/2022, Normal      dicyclomine (BENTYL) 20 mg tablet Take 1 tablet (20 mg total) by mouth 2 (two) times a day, Starting Fri 11/4/2022, Normal      diphenhydrAMINE-acetaminophen (TYLENOL PM)  MG TABS Take 1 tablet by mouth daily at bedtime as needed for sleep, Historical Med      EPINEPHrine (EPIPEN) 0.3 mg/0.3 mL SOAJ Inject 0.3 mL (0.3 mg total) into a muscle once for 1 dose, Starting Tue 2/9/2021, Normal      !! fluticasone (FLOVENT HFA) 110 MCG/ACT inhaler Inhale 2 puffs 2 (two) times a day Rinse mouth after use., Starting Tue 10/17/2023, Normal      hydrOXYzine HCL (ATARAX) 25 mg tablet Take 1 tablet (25 mg total) by mouth 3 (three) times a day as needed for anxiety, Starting Thu 10/26/2023, Until Sat 11/25/2023 at 2359, Normal      lidocaine (LMX) 4 % cream Apply topically as needed for mild pain, Starting Tue 10/31/2023, Normal      Lidocaine 4 % PTCH Apply 15 each topically daily as needed (back pain), Starting Mon 6/20/2022, Normal      melatonin 3 mg Take 1 tablet (3 mg total) by mouth daily at bedtime, Starting Sat 9/25/2021, Normal      ondansetron (Zofran ODT) 4 mg disintegrating tablet Take 1 tablet (4 mg total) by mouth every 6 (six) hours as needed for nausea or vomiting, Starting Sun 7/10/2022, Normal      !! ondansetron (ZOFRAN) 4 mg tablet Take 1 tablet (4 mg total) by mouth every 6 (six) hours, Starting Thu 1/13/2022, Normal      !! ondansetron (ZOFRAN) 4 mg tablet Take 1 tablet (4 mg total) by mouth every 6 (six) hours, Starting Tue 10/18/2022, Normal      !! pantoprazole (PROTONIX) 40 mg tablet Take 1 tablet (40 mg total) by mouth daily, Starting Thu 1/13/2022, Normal      !!  pantoprazole (PROTONIX) 40 mg tablet Take 1 tablet (40 mg total) by mouth daily, Starting Tue 10/31/2023, Normal      sucralfate (CARAFATE) 1 g tablet Take 1 tablet (1 g total) by mouth 4 (four) times a day, Starting Sat 9/18/2021, Normal       !! - Potential duplicate medications found. Please discuss with provider. No discharge procedures on file.     PDMP Review         Value Time User    PDMP Reviewed  Yes 6/30/2022  2:32 PM Darius Gil PA-C            ED Provider  Electronically Signed by wheezing or shortness of breath, Starting Tue 10/17/2023, Normal      buprenorphine-naloxone (SUBOXONE) 8-2 mg per SL tablet 1 tablet 2 (two) times a day, Starting Mon 6/13/2022, Historical Med      butalbital-acetaminophen-caffeine (FIORICET,ESGIC) -40 mg per tablet Take 1 tablet by mouth every 6 (six) hours as needed for headaches for up to 10 doses, Starting Sun 10/10/2021, Normal      calcium carbonate (TUMS) 500 mg chewable tablet Chew 2 tablets (1,000 mg total) daily as needed for indigestion or heartburn, Starting Sat 1/15/2022, Normal      dicyclomine (BENTYL) 20 mg tablet Take 1 tablet (20 mg total) by mouth 2 (two) times a day, Starting Fri 11/4/2022, Normal      diphenhydrAMINE-acetaminophen (TYLENOL PM)  MG TABS Take 1 tablet by mouth daily at bedtime as needed for sleep, Historical Med      EPINEPHrine (EPIPEN) 0.3 mg/0.3 mL SOAJ Inject 0.3 mL (0.3 mg total) into a muscle once for 1 dose, Starting Tue 2/9/2021, Normal      !! fluticasone (FLOVENT HFA) 110 MCG/ACT inhaler Inhale 2 puffs 2 (two) times a day Rinse mouth after use., Starting Tue 10/17/2023, Normal      hydrOXYzine HCL (ATARAX) 25 mg tablet Take 1 tablet (25 mg total) by mouth 3 (three) times a day as needed for anxiety, Starting Thu 10/26/2023, Until Sat 11/25/2023 at 2359, Normal      lidocaine (LMX) 4 % cream Apply topically as needed for mild pain, Starting Tue 10/31/2023, Normal      Lidocaine 4 % PTCH Apply 15 each topically daily as needed (back pain), Starting Mon 6/20/2022, Normal      melatonin 3 mg Take 1 tablet (3 mg total) by mouth daily at bedtime, Starting Sat 9/25/2021, Normal      ondansetron (Zofran ODT) 4 mg disintegrating tablet Take 1 tablet (4 mg total) by mouth every 6 (six) hours as needed for nausea or vomiting, Starting Sun 7/10/2022, Normal      !! ondansetron (ZOFRAN) 4 mg tablet Take 1 tablet (4 mg total) by mouth every 6 (six) hours, Starting Thu 1/13/2022, Normal      !! ondansetron (ZOFRAN) 4 mg  tablet Take 1 tablet (4 mg total) by mouth every 6 (six) hours, Starting Tue 10/18/2022, Normal      !! pantoprazole (PROTONIX) 40 mg tablet Take 1 tablet (40 mg total) by mouth daily, Starting Thu 1/13/2022, Normal      !! pantoprazole (PROTONIX) 40 mg tablet Take 1 tablet (40 mg total) by mouth daily, Starting Tue 10/31/2023, Normal      sucralfate (CARAFATE) 1 g tablet Take 1 tablet (1 g total) by mouth 4 (four) times a day, Starting Sat 9/18/2021, Normal       !! - Potential duplicate medications found. Please discuss with provider.          No discharge procedures on file.    PDMP Review         Value Time User    PDMP Reviewed  Yes 6/30/2022  2:32 PM Robert Kemp PA-C            ED Provider  Electronically Signed by             Candice Carbajal PA-C  12/19/23 0323

## 2023-12-15 NOTE — ED NOTES
Breath sounds improved after receiving breathing treatment with peak flow 325. Room air saturations noted to be 98%. Patient does report that she is breathing and feeling better.      Tracee Ewing RN  12/15/23 0462
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
## 2024-06-24 ENCOUNTER — TELEPHONE (OUTPATIENT)
Age: 29
End: 2024-06-24

## 2024-06-24 NOTE — TELEPHONE ENCOUNTER
Attempted Contacting Patient regarding recent ED Visit on       ED:[facility]  CC: Altered mental status    DX:   Time:  Last OV: Patient has not been seen in office in 2+ years.  Attempted contacting her however, the number listed on file is not receiving calls at this time. Unable to contact patient. According to her chart, No valid address at this time.

## 2025-02-12 ENCOUNTER — TELEPHONE (OUTPATIENT)
Age: 30
End: 2025-02-12

## 2025-03-17 NOTE — Clinical Note
[facility] was seen and treated in our emergency department on 2/7/2023  Diagnosis:     [place]    She may return on this date: 02/10/2023         If you have any questions or concerns, please don't hesitate to call        Joshua Rockwell DO    ______________________________           _______________          _______________  Hospital Representative                              Date                                Time
accompanied Diana Arango to the emergency department on 2/7/2023  Return date if applicable:     Bladimir Gordon    If you have any questions or concerns, please don't hesitate to call        Fauzia Naylor, DO
accompanied Roma Brown to the emergency department on 2/7/2023  Return date if applicable:     Brenda Quintanilla    If you have any questions or concerns, please don't hesitate to call        Ese Davidson, DO
Detail Level: Detailed
Detail Level: Generalized

## 2025-05-06 ENCOUNTER — TELEPHONE (OUTPATIENT)
Age: 30
End: 2025-05-06

## (undated) DEVICE — PACK GENERAL LF

## (undated) DEVICE — SCD SEQUENTIAL COMPRESSION COMFORT SLEEVE LARGE KNEE LENGTH: Brand: KENDALL SCD

## (undated) DEVICE — LATEX FREE 10 FT  INSUFFLATION TUBING, COLDER CONNECTOR WITH 1 MICRON FILTER STERILE ONE TIME USE, 20 U: Brand: SURGICAL DIRECT

## (undated) DEVICE — PREP PAD BNS: Brand: CONVERTORS

## (undated) DEVICE — TRAY FOLEY 16FR URIMETER SURESTEP

## (undated) DEVICE — BASIC DOUBLE BASIN 2-LF: Brand: MEDLINE INDUSTRIES, INC.

## (undated) DEVICE — GLOVE INDICATOR PI UNDERGLOVE SZ 7.5 BLUE

## (undated) DEVICE — LABEL STERILE (RSC) (2-SENSOR CAINE 2-LIDOCAINE 2-HEPARIN)

## (undated) DEVICE — 3M™ TEGADERM™ TRANSPARENT FILM DRESSING FRAME STYLE, 1626W, 4 IN X 4-3/4 IN (10 CM X 12 CM), 50/CT 4CT/CASE: Brand: 3M™ TEGADERM™

## (undated) DEVICE — GAUZE,SPONGE,2"X2",8PLY,STERILE,LF,2'S: Brand: MEDLINE

## (undated) DEVICE — CHLORAPREP HI-LITE 26ML ORANGE

## (undated) DEVICE — GROUNDING PAD UNIVERSAL SLW

## (undated) DEVICE — 3M™ STERI-STRIP™ REINFORCED ADHESIVE SKIN CLOSURES, R1546, 1/4 IN X 4 IN (6 MM X 100 MM), 10 STRIPS/ENVELOPE: Brand: 3M™ STERI-STRIP™

## (undated) DEVICE — Device

## (undated) DEVICE — MAXI PAD5.51 X 13.78 IN. (14.0 X 35.0 CM)HEAVYCONTOUREDUNSCENTED: Brand: CURITY

## (undated) DEVICE — GLOVE SRG BIOGEL M SRGS 7.5

## (undated) DEVICE — SYRINGE 10ML LL CONTROL TOP

## (undated) DEVICE — LUBRICANT SURGILUBE TUBE 4 OZ  FLIP TOP

## (undated) DEVICE — TIBURON LAPAROTOMY DRAPE: Brand: CONVERTORS

## (undated) DEVICE — PERI/GYN PACK: Brand: CONVERTORS

## (undated) DEVICE — CHLORAPREP HI-LITE 10.5ML ORANGE

## (undated) DEVICE — TROCARS: Brand: KII® BALLOON BLUNT TIP SYSTEM

## (undated) DEVICE — NEEDLE 25G X 1 1/2

## (undated) DEVICE — STANDARD SURGICAL GOWN, L: Brand: CONVERTORS